# Patient Record
Sex: FEMALE | Race: WHITE | NOT HISPANIC OR LATINO
[De-identification: names, ages, dates, MRNs, and addresses within clinical notes are randomized per-mention and may not be internally consistent; named-entity substitution may affect disease eponyms.]

---

## 2022-11-15 ENCOUNTER — RESULT REVIEW (OUTPATIENT)
Age: 57
End: 2022-11-15

## 2022-11-20 ENCOUNTER — RESULT REVIEW (OUTPATIENT)
Age: 57
End: 2022-11-20

## 2022-11-29 ENCOUNTER — RESULT REVIEW (OUTPATIENT)
Age: 57
End: 2022-11-29

## 2022-12-01 ENCOUNTER — RESULT REVIEW (OUTPATIENT)
Age: 57
End: 2022-12-01

## 2023-12-18 RX ORDER — ERYTHROMYCIN BASE 5 MG/GRAM
1 OINTMENT (GRAM) OPHTHALMIC (EYE)
Refills: 0 | DISCHARGE
Start: 2023-12-18 | End: 2023-12-24

## 2023-12-21 ENCOUNTER — INPATIENT (INPATIENT)
Facility: HOSPITAL | Age: 58
LOS: 6 days | Discharge: ANOTHER HOSPITAL | DRG: 872 | End: 2023-12-28
Attending: INTERNAL MEDICINE | Admitting: INTERNAL MEDICINE
Payer: MEDICAID

## 2023-12-21 VITALS
WEIGHT: 139.99 LBS | RESPIRATION RATE: 16 BRPM | HEART RATE: 113 BPM | TEMPERATURE: 98 F | HEIGHT: 55 IN | SYSTOLIC BLOOD PRESSURE: 105 MMHG | DIASTOLIC BLOOD PRESSURE: 70 MMHG | OXYGEN SATURATION: 98 %

## 2023-12-21 LAB
BASOPHILS # BLD AUTO: 0.04 K/UL — SIGNIFICANT CHANGE UP (ref 0–0.2)
BASOPHILS # BLD AUTO: 0.04 K/UL — SIGNIFICANT CHANGE UP (ref 0–0.2)
BASOPHILS NFR BLD AUTO: 0.3 % — SIGNIFICANT CHANGE UP (ref 0–2)
BASOPHILS NFR BLD AUTO: 0.3 % — SIGNIFICANT CHANGE UP (ref 0–2)
EOSINOPHIL # BLD AUTO: 0.05 K/UL — SIGNIFICANT CHANGE UP (ref 0–0.5)
EOSINOPHIL # BLD AUTO: 0.05 K/UL — SIGNIFICANT CHANGE UP (ref 0–0.5)
EOSINOPHIL NFR BLD AUTO: 0.4 % — SIGNIFICANT CHANGE UP (ref 0–6)
EOSINOPHIL NFR BLD AUTO: 0.4 % — SIGNIFICANT CHANGE UP (ref 0–6)
HCT VFR BLD CALC: 24.6 % — LOW (ref 34.5–45)
HCT VFR BLD CALC: 24.6 % — LOW (ref 34.5–45)
HGB BLD-MCNC: 8.2 G/DL — LOW (ref 11.5–15.5)
HGB BLD-MCNC: 8.2 G/DL — LOW (ref 11.5–15.5)
IMM GRANULOCYTES NFR BLD AUTO: 0.9 % — SIGNIFICANT CHANGE UP (ref 0–0.9)
IMM GRANULOCYTES NFR BLD AUTO: 0.9 % — SIGNIFICANT CHANGE UP (ref 0–0.9)
LYMPHOCYTES # BLD AUTO: 17.3 % — SIGNIFICANT CHANGE UP (ref 13–44)
LYMPHOCYTES # BLD AUTO: 17.3 % — SIGNIFICANT CHANGE UP (ref 13–44)
LYMPHOCYTES # BLD AUTO: 2.09 K/UL — SIGNIFICANT CHANGE UP (ref 1–3.3)
LYMPHOCYTES # BLD AUTO: 2.09 K/UL — SIGNIFICANT CHANGE UP (ref 1–3.3)
MCHC RBC-ENTMCNC: 31.5 PG — SIGNIFICANT CHANGE UP (ref 27–34)
MCHC RBC-ENTMCNC: 31.5 PG — SIGNIFICANT CHANGE UP (ref 27–34)
MCHC RBC-ENTMCNC: 33.3 GM/DL — SIGNIFICANT CHANGE UP (ref 32–36)
MCHC RBC-ENTMCNC: 33.3 GM/DL — SIGNIFICANT CHANGE UP (ref 32–36)
MCV RBC AUTO: 94.6 FL — SIGNIFICANT CHANGE UP (ref 80–100)
MCV RBC AUTO: 94.6 FL — SIGNIFICANT CHANGE UP (ref 80–100)
MONOCYTES # BLD AUTO: 0.7 K/UL — SIGNIFICANT CHANGE UP (ref 0–0.9)
MONOCYTES # BLD AUTO: 0.7 K/UL — SIGNIFICANT CHANGE UP (ref 0–0.9)
MONOCYTES NFR BLD AUTO: 5.8 % — SIGNIFICANT CHANGE UP (ref 2–14)
MONOCYTES NFR BLD AUTO: 5.8 % — SIGNIFICANT CHANGE UP (ref 2–14)
NEUTROPHILS # BLD AUTO: 9.07 K/UL — HIGH (ref 1.8–7.4)
NEUTROPHILS # BLD AUTO: 9.07 K/UL — HIGH (ref 1.8–7.4)
NEUTROPHILS NFR BLD AUTO: 75.3 % — SIGNIFICANT CHANGE UP (ref 43–77)
NEUTROPHILS NFR BLD AUTO: 75.3 % — SIGNIFICANT CHANGE UP (ref 43–77)
NRBC # BLD: 0 /100 WBCS — SIGNIFICANT CHANGE UP (ref 0–0)
NRBC # BLD: 0 /100 WBCS — SIGNIFICANT CHANGE UP (ref 0–0)
PLATELET # BLD AUTO: 399 K/UL — SIGNIFICANT CHANGE UP (ref 150–400)
PLATELET # BLD AUTO: 399 K/UL — SIGNIFICANT CHANGE UP (ref 150–400)
RBC # BLD: 2.6 M/UL — LOW (ref 3.8–5.2)
RBC # BLD: 2.6 M/UL — LOW (ref 3.8–5.2)
RBC # FLD: 13.4 % — SIGNIFICANT CHANGE UP (ref 10.3–14.5)
RBC # FLD: 13.4 % — SIGNIFICANT CHANGE UP (ref 10.3–14.5)
WBC # BLD: 12.06 K/UL — HIGH (ref 3.8–10.5)
WBC # BLD: 12.06 K/UL — HIGH (ref 3.8–10.5)
WBC # FLD AUTO: 12.06 K/UL — HIGH (ref 3.8–10.5)
WBC # FLD AUTO: 12.06 K/UL — HIGH (ref 3.8–10.5)

## 2023-12-21 PROCEDURE — 93010 ELECTROCARDIOGRAM REPORT: CPT

## 2023-12-21 PROCEDURE — 99291 CRITICAL CARE FIRST HOUR: CPT

## 2023-12-21 RX ORDER — ACETAMINOPHEN 500 MG
650 TABLET ORAL EVERY 6 HOURS
Refills: 0 | Status: DISCONTINUED | OUTPATIENT
Start: 2023-12-21 | End: 2023-12-28

## 2023-12-21 RX ORDER — PANTOPRAZOLE SODIUM 20 MG/1
40 TABLET, DELAYED RELEASE ORAL DAILY
Refills: 0 | Status: DISCONTINUED | OUTPATIENT
Start: 2023-12-22 | End: 2023-12-28

## 2023-12-21 RX ORDER — SODIUM CHLORIDE 9 MG/ML
1000 INJECTION INTRAMUSCULAR; INTRAVENOUS; SUBCUTANEOUS ONCE
Refills: 0 | Status: COMPLETED | OUTPATIENT
Start: 2023-12-21 | End: 2023-12-21

## 2023-12-21 RX ORDER — SODIUM CHLORIDE 9 MG/ML
1000 INJECTION, SOLUTION INTRAVENOUS
Refills: 0 | Status: DISCONTINUED | OUTPATIENT
Start: 2023-12-21 | End: 2023-12-26

## 2023-12-21 RX ORDER — PIPERACILLIN AND TAZOBACTAM 4; .5 G/20ML; G/20ML
3.38 INJECTION, POWDER, LYOPHILIZED, FOR SOLUTION INTRAVENOUS ONCE
Refills: 0 | Status: COMPLETED | OUTPATIENT
Start: 2023-12-21 | End: 2023-12-21

## 2023-12-21 RX ORDER — VANCOMYCIN HCL 1 G
1000 VIAL (EA) INTRAVENOUS ONCE
Refills: 0 | Status: COMPLETED | OUTPATIENT
Start: 2023-12-21 | End: 2023-12-21

## 2023-12-21 RX ADMIN — SODIUM CHLORIDE 1000 MILLILITER(S): 9 INJECTION INTRAMUSCULAR; INTRAVENOUS; SUBCUTANEOUS at 23:40

## 2023-12-21 RX ADMIN — PIPERACILLIN AND TAZOBACTAM 200 GRAM(S): 4; .5 INJECTION, POWDER, LYOPHILIZED, FOR SOLUTION INTRAVENOUS at 23:41

## 2023-12-21 NOTE — ED ADULT TRIAGE NOTE - CHIEF COMPLAINT QUOTE
Patient brought in by ambulance from [facility] as reported was having low blood pressure and tachycardia; red spots to bilateral upper and lower extremities and redness to right eye Patient brought in by ambulance from [facility] as reported was having low blood pressure and tachycardia; red spots to bilateral upper and lower extremities and redness to right eye

## 2023-12-21 NOTE — ED ADULT NURSE NOTE - OBJECTIVE STATEMENT
Pt is a 58yr old female, c/o hypotension. Pt is from [place], DNR/DNI. Pt is warm to touch, rectal temp 101.4. Pt is A+Ox4, calm and cooperative, able to move all extremities. Pt has redness to medial buttocks area. Unlabored breathing at this time. No distress noted. MD evaluation in progress. Pt is a 58yr old female, c/o hypotension. Pt is from [place], DNR/DNI. Pt is warm to touch, rectal temp 101.4. Pt is A+Ox4, calm and cooperative, able to move all extremities. Pt has redness to medial buttocks area. Unlabored breathing at this time. No distress noted. MD evaluation in progress. Pt is a 58yr old female, c/o hypotension. Pt is from [place], DNR/DNI. Pt is warm to touch, rectal temp 101.4. Pt is A+Ox4, calm and cooperative, able to move all extremities. Pt has redness to medial buttocks area, pt has ostomy bag in place. Unlabored breathing at this time. No distress noted. MD evaluation in progress. Pt is a 58yr old female, c/o hypotension. Pt is [place], DNR/DNI. Pt is warm to touch, rectal temp 101.4. Pt is A+Ox4, calm and cooperative, able to move all extremities. Pt has redness to medial buttocks area, pt has ostomy bag in place. Unlabored breathing at this time. No distress noted. MD evaluation in progress.

## 2023-12-21 NOTE — ED ADULT NURSE NOTE - NSFALLRISKFACTORS_ED_ALL_ED
weakness/Coagulation: Bleeding disorder either through use of anticoagulants or underlying clinical condition(s)/Other

## 2023-12-21 NOTE — ED ADULT NURSE NOTE - NSFALLHARMRISKINTERV_ED_ALL_ED
Assistance OOB with selected safe patient handling equipment if applicable/Assistance with ambulation/Communicate risk of Fall with Harm to all staff, patient, and family/Monitor gait and stability/Provide patient with walking aids/Provide visual cue: red socks, yellow wristband, yellow gown, etc/Reinforce activity limits and safety measures with patient and family/Bed in lowest position, wheels locked, appropriate side rails in place/Call bell, personal items and telephone in reach/Instruct patient to call for assistance before getting out of bed/chair/stretcher/Non-slip footwear applied when patient is off stretcher/[place] to call system/Physically safe environment - no spills, clutter or unnecessary equipment/Purposeful Proactive Rounding/Room/bathroom lighting operational, light cord in reach Assistance OOB with selected safe patient handling equipment if applicable/Assistance with ambulation/Communicate risk of Fall with Harm to all staff, patient, and family/Monitor gait and stability/Provide patient with walking aids/Provide visual cue: red socks, yellow wristband, yellow gown, etc/Reinforce activity limits and safety measures with patient and family/Bed in lowest position, wheels locked, appropriate side rails in place/Call bell, personal items and telephone in reach/Instruct patient to call for assistance before getting out of bed/chair/stretcher/Non-slip footwear applied when patient is off stretcher/[place] to call system/Physically safe environment - no spills, clutter or unnecessary equipment/Purposeful Proactive Rounding/Room/bathroom lighting operational, light cord in reach

## 2023-12-21 NOTE — ED ADULT NURSE NOTE - CHIEF COMPLAINT QUOTE
Patient brought in by ambulance from [facility] as reported was having low blood pressure and tachycardia; red spots to bilateral upper and lower extremities and redness to right eye Patient brought in by ambulance from [facility] as reported was having low blood pressure and tachycardia; red spots to bilateral upper and lower extremities and redness to right eye

## 2023-12-22 ENCOUNTER — TRANSCRIPTION ENCOUNTER (OUTPATIENT)
Age: 58
End: 2023-12-22

## 2023-12-22 DIAGNOSIS — R50.9 FEVER, UNSPECIFIED: ICD-10-CM

## 2023-12-22 DIAGNOSIS — Z90.49 ACQUIRED ABSENCE OF OTHER SPECIFIED PARTS OF DIGESTIVE TRACT: Chronic | ICD-10-CM

## 2023-12-22 DIAGNOSIS — F32.9 MAJOR DEPRESSIVE DISORDER, SINGLE EPISODE, UNSPECIFIED: ICD-10-CM

## 2023-12-22 DIAGNOSIS — K74.60 UNSPECIFIED CIRRHOSIS OF LIVER: ICD-10-CM

## 2023-12-22 DIAGNOSIS — K21.9 GASTRO-ESOPHAGEAL REFLUX DISEASE WITHOUT ESOPHAGITIS: ICD-10-CM

## 2023-12-22 DIAGNOSIS — Z29.9 ENCOUNTER FOR PROPHYLACTIC MEASURES, UNSPECIFIED: ICD-10-CM

## 2023-12-22 DIAGNOSIS — K51.00 ULCERATIVE (CHRONIC) PANCOLITIS WITHOUT COMPLICATIONS: ICD-10-CM

## 2023-12-22 DIAGNOSIS — R93.0 ABNORMAL FINDINGS ON DIAGNOSTIC IMAGING OF SKULL AND HEAD, NOT ELSEWHERE CLASSIFIED: ICD-10-CM

## 2023-12-22 DIAGNOSIS — I25.10 ATHEROSCLEROTIC HEART DISEASE OF NATIVE CORONARY ARTERY WITHOUT ANGINA PECTORIS: ICD-10-CM

## 2023-12-22 DIAGNOSIS — H05.019 CELLULITIS OF UNSPECIFIED ORBIT: ICD-10-CM

## 2023-12-22 DIAGNOSIS — E11.9 TYPE 2 DIABETES MELLITUS WITHOUT COMPLICATIONS: ICD-10-CM

## 2023-12-22 DIAGNOSIS — R23.3 SPONTANEOUS ECCHYMOSES: ICD-10-CM

## 2023-12-22 LAB
ALBUMIN SERPL ELPH-MCNC: 2.4 G/DL — LOW (ref 3.3–5)
ALBUMIN SERPL ELPH-MCNC: 2.4 G/DL — LOW (ref 3.3–5)
ALP SERPL-CCNC: 125 U/L — HIGH (ref 40–120)
ALP SERPL-CCNC: 125 U/L — HIGH (ref 40–120)
ALT FLD-CCNC: 26 U/L — SIGNIFICANT CHANGE UP (ref 12–78)
ALT FLD-CCNC: 26 U/L — SIGNIFICANT CHANGE UP (ref 12–78)
AMMONIA BLD-MCNC: 35 UMOL/L — HIGH (ref 11–32)
AMMONIA BLD-MCNC: 35 UMOL/L — HIGH (ref 11–32)
ANION GAP SERPL CALC-SCNC: 6 MMOL/L — SIGNIFICANT CHANGE UP (ref 5–17)
ANION GAP SERPL CALC-SCNC: 6 MMOL/L — SIGNIFICANT CHANGE UP (ref 5–17)
APTT BLD: 47.9 SEC — HIGH (ref 24.5–35.6)
APTT BLD: 47.9 SEC — HIGH (ref 24.5–35.6)
AST SERPL-CCNC: 27 U/L — SIGNIFICANT CHANGE UP (ref 15–37)
AST SERPL-CCNC: 27 U/L — SIGNIFICANT CHANGE UP (ref 15–37)
BILIRUB SERPL-MCNC: 0.3 MG/DL — SIGNIFICANT CHANGE UP (ref 0.2–1.2)
BILIRUB SERPL-MCNC: 0.3 MG/DL — SIGNIFICANT CHANGE UP (ref 0.2–1.2)
BUN SERPL-MCNC: 25 MG/DL — HIGH (ref 7–23)
BUN SERPL-MCNC: 25 MG/DL — HIGH (ref 7–23)
CALCIUM SERPL-MCNC: 8.3 MG/DL — LOW (ref 8.5–10.1)
CALCIUM SERPL-MCNC: 8.3 MG/DL — LOW (ref 8.5–10.1)
CHLORIDE SERPL-SCNC: 106 MMOL/L — SIGNIFICANT CHANGE UP (ref 96–108)
CHLORIDE SERPL-SCNC: 106 MMOL/L — SIGNIFICANT CHANGE UP (ref 96–108)
CK SERPL-CCNC: 37 U/L — SIGNIFICANT CHANGE UP (ref 26–192)
CK SERPL-CCNC: 37 U/L — SIGNIFICANT CHANGE UP (ref 26–192)
CK SERPL-CCNC: 40 U/L — SIGNIFICANT CHANGE UP (ref 26–192)
CK SERPL-CCNC: 40 U/L — SIGNIFICANT CHANGE UP (ref 26–192)
CO2 SERPL-SCNC: 24 MMOL/L — SIGNIFICANT CHANGE UP (ref 22–31)
CO2 SERPL-SCNC: 24 MMOL/L — SIGNIFICANT CHANGE UP (ref 22–31)
CREAT SERPL-MCNC: 0.68 MG/DL — SIGNIFICANT CHANGE UP (ref 0.5–1.3)
CREAT SERPL-MCNC: 0.68 MG/DL — SIGNIFICANT CHANGE UP (ref 0.5–1.3)
CRP SERPL-MCNC: 104 MG/L — HIGH
CRP SERPL-MCNC: 104 MG/L — HIGH
EGFR: 102 ML/MIN/1.73M2 — SIGNIFICANT CHANGE UP
EGFR: 102 ML/MIN/1.73M2 — SIGNIFICANT CHANGE UP
ERYTHROCYTE [SEDIMENTATION RATE] IN BLOOD: 51 MM/HR — HIGH (ref 0–20)
ERYTHROCYTE [SEDIMENTATION RATE] IN BLOOD: 51 MM/HR — HIGH (ref 0–20)
GLUCOSE SERPL-MCNC: 125 MG/DL — HIGH (ref 70–99)
GLUCOSE SERPL-MCNC: 125 MG/DL — HIGH (ref 70–99)
HIV 1 & 2 AB SERPL IA.RAPID: SIGNIFICANT CHANGE UP
HIV 1 & 2 AB SERPL IA.RAPID: SIGNIFICANT CHANGE UP
INR BLD: 3.84 RATIO — HIGH (ref 0.85–1.18)
INR BLD: 3.84 RATIO — HIGH (ref 0.85–1.18)
LACTATE SERPL-SCNC: 0.9 MMOL/L — SIGNIFICANT CHANGE UP (ref 0.7–2)
LACTATE SERPL-SCNC: 0.9 MMOL/L — SIGNIFICANT CHANGE UP (ref 0.7–2)
LIDOCAIN IGE QN: 27 U/L — SIGNIFICANT CHANGE UP (ref 13–75)
LIDOCAIN IGE QN: 27 U/L — SIGNIFICANT CHANGE UP (ref 13–75)
MAGNESIUM SERPL-MCNC: 1.7 MG/DL — SIGNIFICANT CHANGE UP (ref 1.6–2.6)
MAGNESIUM SERPL-MCNC: 1.7 MG/DL — SIGNIFICANT CHANGE UP (ref 1.6–2.6)
POTASSIUM SERPL-MCNC: 4.2 MMOL/L — SIGNIFICANT CHANGE UP (ref 3.5–5.3)
POTASSIUM SERPL-MCNC: 4.2 MMOL/L — SIGNIFICANT CHANGE UP (ref 3.5–5.3)
POTASSIUM SERPL-SCNC: 4.2 MMOL/L — SIGNIFICANT CHANGE UP (ref 3.5–5.3)
POTASSIUM SERPL-SCNC: 4.2 MMOL/L — SIGNIFICANT CHANGE UP (ref 3.5–5.3)
PROT SERPL-MCNC: 5.8 G/DL — LOW (ref 6–8.3)
PROT SERPL-MCNC: 5.8 G/DL — LOW (ref 6–8.3)
PROTHROM AB SERPL-ACNC: 43.2 SEC — HIGH (ref 9.5–13)
PROTHROM AB SERPL-ACNC: 43.2 SEC — HIGH (ref 9.5–13)
RAPID RVP RESULT: SIGNIFICANT CHANGE UP
RAPID RVP RESULT: SIGNIFICANT CHANGE UP
SARS-COV-2 RNA SPEC QL NAA+PROBE: SIGNIFICANT CHANGE UP
SARS-COV-2 RNA SPEC QL NAA+PROBE: SIGNIFICANT CHANGE UP
SODIUM SERPL-SCNC: 136 MMOL/L — SIGNIFICANT CHANGE UP (ref 135–145)
SODIUM SERPL-SCNC: 136 MMOL/L — SIGNIFICANT CHANGE UP (ref 135–145)
TROPONIN I, HIGH SENSITIVITY RESULT: 37.7 NG/L — SIGNIFICANT CHANGE UP
TROPONIN I, HIGH SENSITIVITY RESULT: 37.7 NG/L — SIGNIFICANT CHANGE UP

## 2023-12-22 PROCEDURE — 93010 ELECTROCARDIOGRAM REPORT: CPT

## 2023-12-22 PROCEDURE — 70450 CT HEAD/BRAIN W/O DYE: CPT | Mod: 26,MA

## 2023-12-22 PROCEDURE — 93970 EXTREMITY STUDY: CPT | Mod: 26

## 2023-12-22 PROCEDURE — 70481 CT ORBIT/EAR/FOSSA W/DYE: CPT | Mod: 26,59,MA

## 2023-12-22 PROCEDURE — 71260 CT THORAX DX C+: CPT | Mod: 26,MA

## 2023-12-22 PROCEDURE — 74177 CT ABD & PELVIS W/CONTRAST: CPT | Mod: 26,MA

## 2023-12-22 RX ORDER — ONDANSETRON 8 MG/1
4 TABLET, FILM COATED ORAL EVERY 8 HOURS
Refills: 0 | Status: DISCONTINUED | OUTPATIENT
Start: 2023-12-22 | End: 2023-12-28

## 2023-12-22 RX ORDER — PIPERACILLIN AND TAZOBACTAM 4; .5 G/20ML; G/20ML
3.38 INJECTION, POWDER, LYOPHILIZED, FOR SOLUTION INTRAVENOUS EVERY 8 HOURS
Refills: 0 | Status: DISCONTINUED | OUTPATIENT
Start: 2023-12-22 | End: 2023-12-22

## 2023-12-22 RX ORDER — TRAMADOL HYDROCHLORIDE 50 MG/1
50 TABLET ORAL THREE TIMES A DAY
Refills: 0 | Status: DISCONTINUED | OUTPATIENT
Start: 2023-12-22 | End: 2023-12-28

## 2023-12-22 RX ORDER — DEXTROSE 50 % IN WATER 50 %
25 SYRINGE (ML) INTRAVENOUS ONCE
Refills: 0 | Status: DISCONTINUED | OUTPATIENT
Start: 2023-12-22 | End: 2023-12-28

## 2023-12-22 RX ORDER — MIRTAZAPINE 45 MG/1
30 TABLET, ORALLY DISINTEGRATING ORAL AT BEDTIME
Refills: 0 | Status: DISCONTINUED | OUTPATIENT
Start: 2023-12-22 | End: 2023-12-28

## 2023-12-22 RX ORDER — LACTOBACILLUS ACIDOPHILUS 100MM CELL
1 CAPSULE ORAL EVERY 12 HOURS
Refills: 0 | Status: DISCONTINUED | OUTPATIENT
Start: 2023-12-22 | End: 2023-12-28

## 2023-12-22 RX ORDER — SODIUM CHLORIDE 9 MG/ML
1000 INJECTION, SOLUTION INTRAVENOUS
Refills: 0 | Status: DISCONTINUED | OUTPATIENT
Start: 2023-12-22 | End: 2023-12-28

## 2023-12-22 RX ORDER — DEXTROSE 50 % IN WATER 50 %
12.5 SYRINGE (ML) INTRAVENOUS ONCE
Refills: 0 | Status: DISCONTINUED | OUTPATIENT
Start: 2023-12-22 | End: 2023-12-28

## 2023-12-22 RX ORDER — MIDODRINE HYDROCHLORIDE 2.5 MG/1
5 TABLET ORAL THREE TIMES A DAY
Refills: 0 | Status: DISCONTINUED | OUTPATIENT
Start: 2023-12-22 | End: 2023-12-28

## 2023-12-22 RX ORDER — DEXTROSE 50 % IN WATER 50 %
15 SYRINGE (ML) INTRAVENOUS ONCE
Refills: 0 | Status: DISCONTINUED | OUTPATIENT
Start: 2023-12-22 | End: 2023-12-28

## 2023-12-22 RX ORDER — METRONIDAZOLE 500 MG
500 TABLET ORAL EVERY 8 HOURS
Refills: 0 | Status: DISCONTINUED | OUTPATIENT
Start: 2023-12-22 | End: 2023-12-23

## 2023-12-22 RX ORDER — ERYTHROMYCIN BASE 5 MG/GRAM
1 OINTMENT (GRAM) OPHTHALMIC (EYE)
Refills: 0 | Status: COMPLETED | OUTPATIENT
Start: 2023-12-22 | End: 2023-12-27

## 2023-12-22 RX ORDER — CEFTRIAXONE 500 MG/1
1000 INJECTION, POWDER, FOR SOLUTION INTRAMUSCULAR; INTRAVENOUS ONCE
Refills: 0 | Status: COMPLETED | OUTPATIENT
Start: 2023-12-22 | End: 2023-12-22

## 2023-12-22 RX ORDER — METRONIDAZOLE 500 MG
500 TABLET ORAL ONCE
Refills: 0 | Status: COMPLETED | OUTPATIENT
Start: 2023-12-22 | End: 2023-12-22

## 2023-12-22 RX ORDER — METRONIDAZOLE 500 MG
TABLET ORAL
Refills: 0 | Status: DISCONTINUED | OUTPATIENT
Start: 2023-12-22 | End: 2023-12-23

## 2023-12-22 RX ORDER — LANOLIN ALCOHOL/MO/W.PET/CERES
3 CREAM (GRAM) TOPICAL AT BEDTIME
Refills: 0 | Status: DISCONTINUED | OUTPATIENT
Start: 2023-12-22 | End: 2023-12-28

## 2023-12-22 RX ORDER — GLUCAGON INJECTION, SOLUTION 0.5 MG/.1ML
1 INJECTION, SOLUTION SUBCUTANEOUS ONCE
Refills: 0 | Status: DISCONTINUED | OUTPATIENT
Start: 2023-12-22 | End: 2023-12-28

## 2023-12-22 RX ORDER — MAGNESIUM HYDROXIDE 400 MG/1
30 TABLET, CHEWABLE ORAL DAILY
Refills: 0 | Status: DISCONTINUED | OUTPATIENT
Start: 2023-12-22 | End: 2023-12-28

## 2023-12-22 RX ORDER — FOLIC ACID 0.8 MG
1 TABLET ORAL DAILY
Refills: 0 | Status: DISCONTINUED | OUTPATIENT
Start: 2023-12-22 | End: 2023-12-28

## 2023-12-22 RX ORDER — SPIRONOLACTONE 25 MG/1
100 TABLET, FILM COATED ORAL DAILY
Refills: 0 | Status: DISCONTINUED | OUTPATIENT
Start: 2023-12-22 | End: 2023-12-28

## 2023-12-22 RX ORDER — SENNA PLUS 8.6 MG/1
2 TABLET ORAL AT BEDTIME
Refills: 0 | Status: DISCONTINUED | OUTPATIENT
Start: 2023-12-22 | End: 2023-12-26

## 2023-12-22 RX ORDER — INSULIN LISPRO 100/ML
VIAL (ML) SUBCUTANEOUS
Refills: 0 | Status: DISCONTINUED | OUTPATIENT
Start: 2023-12-22 | End: 2023-12-28

## 2023-12-22 RX ORDER — CEFTRIAXONE 500 MG/1
INJECTION, POWDER, FOR SOLUTION INTRAMUSCULAR; INTRAVENOUS
Refills: 0 | Status: DISCONTINUED | OUTPATIENT
Start: 2023-12-22 | End: 2023-12-24

## 2023-12-22 RX ORDER — CEFTRIAXONE 500 MG/1
1000 INJECTION, POWDER, FOR SOLUTION INTRAMUSCULAR; INTRAVENOUS EVERY 24 HOURS
Refills: 0 | Status: DISCONTINUED | OUTPATIENT
Start: 2023-12-23 | End: 2023-12-24

## 2023-12-22 RX ADMIN — Medication 250 MILLIGRAM(S): at 00:53

## 2023-12-22 RX ADMIN — SODIUM CHLORIDE 75 MILLILITER(S): 9 INJECTION, SOLUTION INTRAVENOUS at 04:31

## 2023-12-22 RX ADMIN — Medication 125 MILLIGRAM(S): at 00:53

## 2023-12-22 RX ADMIN — Medication 1 APPLICATION(S): at 17:51

## 2023-12-22 RX ADMIN — Medication 1 TABLET(S): at 17:51

## 2023-12-22 RX ADMIN — CEFTRIAXONE 100 MILLIGRAM(S): 500 INJECTION, POWDER, FOR SOLUTION INTRAMUSCULAR; INTRAVENOUS at 11:25

## 2023-12-22 RX ADMIN — Medication 1: at 17:51

## 2023-12-22 RX ADMIN — Medication 1000 MILLIGRAM(S): at 01:55

## 2023-12-22 RX ADMIN — Medication 100 MILLIGRAM(S): at 12:03

## 2023-12-22 RX ADMIN — SODIUM CHLORIDE 1000 MILLILITER(S): 9 INJECTION INTRAMUSCULAR; INTRAVENOUS; SUBCUTANEOUS at 00:45

## 2023-12-22 RX ADMIN — PIPERACILLIN AND TAZOBACTAM 3.38 GRAM(S): 4; .5 INJECTION, POWDER, LYOPHILIZED, FOR SOLUTION INTRAVENOUS at 00:10

## 2023-12-22 RX ADMIN — Medication 100 MILLIGRAM(S): at 22:09

## 2023-12-22 RX ADMIN — SODIUM CHLORIDE 100 MILLILITER(S): 9 INJECTION, SOLUTION INTRAVENOUS at 08:12

## 2023-12-22 RX ADMIN — PANTOPRAZOLE SODIUM 40 MILLIGRAM(S): 20 TABLET, DELAYED RELEASE ORAL at 12:03

## 2023-12-22 NOTE — H&P ADULT - PROBLEM SELECTOR PLAN 2
Primary source of infection is most likely  periorbital cellulitis  Pt also w/ pancolitis on CT, unclear if infectious or inflammatory; abdomen exam benign  S/p zosyn in the ED   ceftriaxone 1gm q24h and flagyl 500mg q8h (ordered)  F/u pending cx until completion  Trend temps/WBC  Supportive care and additional management

## 2023-12-22 NOTE — CONSULT NOTE ADULT - SUBJECTIVE AND OBJECTIVE BOX
INCOMPLETE NOTE.  Documentation in Progress  PT SEEN AND EVALUATED.   FULL/ADDITIONAL RECOMMENDATIONS TO FOLLOW   ***************************************************************    Patient is a 58y old  Female who presents with a chief complaint of hypotension and tachycardia (22 Dec 2023 10:37)      HPI:   Patient is a 58-year-old female  ,[facility] resident who presents to the emergency room with multiple medical issues.  Past medical history of CAD diabetes insomnia major depressive disorder GERD hypertension irritable bowel syndrome with diarrhea cirrhosis of the liver nonalcoholic fatty liver hepatic encephalopathy bipolar disorder chronic A-fib on Coumadin blepharitis of the left eye.    Per PCP signout patient was transferred to [place] for reported hypotension and tachycardia diffuse purpura.  Patient had a temperature of 99.3 with a heart rate of 116 and a blood pressure of 88/60.  Patient was FEBRILE later in ER with TEMP 101 , septic workup sent ,  started on iv abx and fluids .CT abd showed pancolitis , seen by GI and ID . Found to have cellulitis of lids b/l R>L  (22 Dec 2023 10:37)      PAST MEDICAL & SURGICAL HISTORY:  MDD (major depressive disorder)  GERD (gastroesophageal reflux disease)  Ulcerative colitis  HTN (hypertension)  DM (diabetes mellitus)  Arteriosclerotic heart disease (ASHD)  IBS (irritable bowel syndrome)  History of ataxia  Liver cirrhosis  Nonalcoholic fatty liver disease without nonalcoholic steatohepatitis (PATEL)  Hepatic encephalopathy  Bipolar illness  Chronic atrial fibrillation  Moderate protein-calorie malnutrition  OA (osteoarthritis)  Blepharitis, bilateral  Brain aneurysm  Uterine fibroid  History of cholecystectomy       HEALTH ISSUES - PROBLEM Dx:  Acute febrile illness  Orbital cellulitis  Pancolitis  Petechial rash  Abnormal head CT  Prophylactic measure  Liver cirrhosis  MDD (major depressive disorder)  GERD (gastroesophageal reflux disease)  DM (diabetes mellitus)  CAD (coronary artery disease)    Fever due to unspecified condition [R50.9]  MDD (major depressive disorder) [F32.9]  GERD (gastroesophageal reflux disease) [K21.9]  Ulcerative colitis [K51.90]  HTN (hypertension) [I10]  DM (diabetes mellitus) [E11.9]  Arteriosclerotic heart disease (ASHD) [I25.10]  IBS (irritable bowel syndrome) [K58.9]  History of ataxia [Z87.898]  Liver cirrhosis [K74.60]  Nonalcoholic fatty liver disease without nonalcoholic steatohepatitis (PATEL) [K76.0]  Hepatic encephalopathy [K76.82]  Bipolar illness [F31.9]  Chronic atrial fibrillation [I48.20]  Moderate protein-calorie malnutrition [E44.0]  OA (osteoarthritis) [M19.90]  Blepharitis, bilateral [H01.003]  Brain aneurysm [I67.1]  Uterine fibroid [D25.9]  History of cholecystectomy [Z90.49]  Rash [R21]      FAMILY HISTORY:      [SOCIAL HISTORY: ]     smoking:  none currently     EtOH:  none currently     illicit drugs:  none currently     occupation:  Retired     marital status:       Other:       [ALLERGIES/INTOLERANCES:]  Allergies  No Known Allergies  Intolerances      [MEDICATIONS]  MEDICATIONS  (STANDING):  cefTRIAXone   IVPB      dextrose 5% + sodium chloride 0.45%. 1000 milliLiter(s) (100 mL/Hr) IV Continuous <Continuous>  dextrose 5%. 1000 milliLiter(s) (100 mL/Hr) IV Continuous <Continuous>  dextrose 5%. 1000 milliLiter(s) (50 mL/Hr) IV Continuous <Continuous>  dextrose 50% Injectable 25 Gram(s) IV Push once  dextrose 50% Injectable 25 Gram(s) IV Push once  dextrose 50% Injectable 12.5 Gram(s) IV Push once  erythromycin   Ointment 1 Application(s) Both EYES two times a day  folic acid 1 milliGRAM(s) Oral daily  glucagon  Injectable 1 milliGRAM(s) IntraMuscular once  insulin lispro (ADMELOG) corrective regimen sliding scale   SubCutaneous three times a day before meals  lactobacillus acidophilus 1 Tablet(s) Oral every 12 hours  metroNIDAZOLE  IVPB      metroNIDAZOLE  IVPB 500 milliGRAM(s) IV Intermittent every 8 hours  mirtazapine 30 milliGRAM(s) Oral at bedtime  pantoprazole    Tablet 40 milliGRAM(s) Oral daily  rifAXIMin 550 milliGRAM(s) Oral two times a day  senna 2 Tablet(s) Oral at bedtime  spironolactone 100 milliGRAM(s) Oral daily    MEDICATIONS  (PRN):  acetaminophen     Tablet .. 650 milliGRAM(s) Oral every 6 hours PRN Temp greater or equal to 38C (100.4F), Mild Pain (1 - 3)  aluminum hydroxide/magnesium hydroxide/simethicone Suspension 30 milliLiter(s) Oral every 4 hours PRN Dyspepsia  dextrose Oral Gel 15 Gram(s) Oral once PRN Blood Glucose LESS THAN 70 milliGRAM(s)/deciliter  magnesium hydroxide Suspension 30 milliLiter(s) Oral daily PRN Constipation  melatonin 3 milliGRAM(s) Oral at bedtime PRN Insomnia  midodrine. 5 milliGRAM(s) Oral three times a day PRN SBP below 90  ondansetron Injectable 4 milliGRAM(s) IV Push every 8 hours PRN Nausea and/or Vomiting  traMADol 50 milliGRAM(s) Oral three times a day PRN Moderate Pain (4 - 6)      [REVIEW OF SYSTEMS: ]  CONSTITUTIONAL: normal, no fever, no shakes, no chills   EYES: No eye pain, no visual disturbances, no discharge  ENMT:  no discharge  NECK: No pain, no stiffness  BREASTS: No pain, no masses, no nipple discharge  RESPIRATORY: No cough, no wheezing, no chills, no hemoptysis; No shortness of breath  CARDIOVASCULAR: No chest pain, no palpitations, no dizziness, no leg swelling  GASTROINTESTINAL: No abdominal, no epigastric pain. No nausea, no vomiting, no hematemesis; No diarrhea , no constipation. No melena, no hematochezia.  GENITOURINARY: No dysuria, no frequency, no hematuria, no incontinence  NEUROLOGICAL: No headaches, no memory loss, no loss of strength, no numbness, no tremors  SKIN: No itching, no burning, no rashes, no lesions   LYMPH NODES: No enlarged glands  ENDOCRINE: No heat or cold intolerance; No hair loss  MUSCULOSKELETAL: No joint pain or swelling; No muscle, no back, no extremity pain  PSYCHIATRIC: No depression, no anxiety, no mood swings, no difficulty sleeping  HEME/LYMPH: No easy bruising, no bleeding gums    [VITALS SIGNS 24hrs]  Vital Signs Last 24 Hrs  T(C): 36.3 (22 Dec 2023 12:31), Max: 38.6 (21 Dec 2023 22:50)  T(F): 97.4 (22 Dec 2023 12:31), Max: 101.4 (21 Dec 2023 22:50)  HR: 80 (22 Dec 2023 12:31) (80 - 113)  BP: 93/60 (22 Dec 2023 12:31) (93/60 - 105/70)  BP(mean): --  RR: 17 (22 Dec 2023 12:31) (16 - 17)  SpO2: 96% (22 Dec 2023 12:31) (96% - 98%)    Parameters below as of 22 Dec 2023 12:31  Patient On (Oxygen Delivery Method): room air    Daily Height in cm: 119.38 (21 Dec 2023 22:25)    Daily   I&O's Summary    [PHYSICAL EXAM]  GEN:   HEENT: normocephalic and atraumatic. EOMI. PERRL.    NECK: Supple.  No lymphadenopathy   LUNGS: Clear to auscultation.  HEART: S1S2 Regular rate and rhythm, no MRG  ABDOMEN: Soft, nontender, and nondistended.  Positive bowel sounds.    : No CVA tenderness  EXTREMITIES: Without edema.  NEUROLOGIC: grossly intact.  PSYCHIATRIC: Appropriate affect .  SKIN: No rash     [LABS: ]                        8.2    12.06 )-----------( 399      ( 21 Dec 2023 22:30 )             24.6     CBC Full  -  ( 21 Dec 2023 22:30 )  WBC Count : 12.06 K/uL  RBC Count : 2.60 M/uL  Hemoglobin : 8.2 g/dL  Hematocrit : 24.6 %  Platelet Count - Automated : 399 K/uL  Mean Cell Volume : 94.6 fl  Mean Cell Hemoglobin : 31.5 pg  Mean Cell Hemoglobin Concentration : 33.3 gm/dL  Auto Neutrophil # : 9.07 K/uL  Auto Lymphocyte # : 2.09 K/uL  Auto Monocyte # : 0.70 K/uL  Auto Eosinophil # : 0.05 K/uL  Auto Basophil # : 0.04 K/uL  Auto Neutrophil % : 75.3 %  Auto Lymphocyte % : 17.3 %  Auto Monocyte % : 5.8 %  Auto Eosinophil % : 0.4 %  Auto Basophil % : 0.3 %    12-21    136  |  106  |  25<H>  ----------------------------<  125<H>  4.2   |  24  |  0.68    Ca    8.3<L>      21 Dec 2023 22:30  Mg     1.7     12-21    TPro  5.8<L>  /  Alb  2.4<L>  /  TBili  0.3  /  DBili  x   /  AST  27  /  ALT  26  /  AlkPhos  125<H>  12-21  PT/INR - ( 21 Dec 2023 22:30 )   PT: 43.2 sec;   INR: 3.84 ratio    PTT - ( 21 Dec 2023 22:30 )  PTT:47.9 sec  LIVER FUNCTIONS - ( 21 Dec 2023 22:30 )  Alb: 2.4 g/dL / Pro: 5.8 g/dL / ALK PHOS: 125 U/L / ALT: 26 U/L / AST: 27 U/L / GGT: x         CARDIAC MARKERS ( 21 Dec 2023 22:30 )  x     / x     / 40 U/L / x     / x        Urinalysis Basic - ( 21 Dec 2023 22:30 )  Color: x / Appearance: x / SG: x / pH: x  Gluc: 125 mg/dL / Ketone: x  / Bili: x / Urobili: x   Blood: x / Protein: x / Nitrite: x   Leuk Esterase: x / RBC: x / WBC x   Sq Epi: x / Non Sq Epi: x / Bacteria: x    CBC TREND (5 Days)  WBC Count: 12.06 K/uL (12-21 @ 22:30)  Hemoglobin: 8.2 g/dL (12-21 @ 22:30)  Hematocrit: 24.6 % (12-21 @ 22:30)  Platelet Count - Automated: 399 K/uL (12-21 @ 22:30)     Sedimentation Rate, Erythrocyte: 51 mm/hr (12-21 @ 22:30)      [MICROBIOLOGY /  VIROLOGY:]  SARS-CoV-2: NotDetec (22 Dec 2023 00:55)      [PATHOLOGY]       [RADIOLOGY & ADDITIONAL STUDIES:]   CT Chest w/ IV Cont:   ACC: [id number] EXAM:  CT ABDOMEN AND PELVIS IC   ORDERED BY: FRANKI PATEL   ACC: [id number] EXAM:  CT CHEST IC   ORDERED BY: FRANKI PATEL   PROCEDURE DATE:  12/22/2023    INTERPRETATION:  CLINICAL INFORMATION: Hypotension, tachycardia, red spots in the upper and lower extremities, evaluate for vasculitis  COMPARISON: None.  CONTRAST/COMPLICATIONS:  IV Contrast: Omnipaque 350 (accession [id number]), IV contrast documented in unlinked concurrent exam (accession [id number])  90 cc administered (accession [id number]), 0 cc administered (accession [id number])   10 cc discarded (accession [id number]), 0 cc discarded (accession [id number])  Oral Contrast: NONE  Complications: None reported at time of study completion    PROCEDURE:  CT of the Chest, Abdomen and Pelvis was performed.  Sagittal and coronal reformats were performed.    FINDINGS:  CHEST:  LUNGS AND LARGE AIRWAYS: Patent central airways. Dependent atelectatic changes at the lung bases.  PLEURA: No pleural effusion.  VESSELS: Within normal limits.  HEART: Heart size is normal. No pericardial effusion.  MEDIASTINUM AND FOREST: No lymphadenopathy.  CHEST WALL AND LOWER NECK: Within normal limits.    ABDOMEN AND PELVIS:  LIVER: Within normal limits.  BILE DUCTS:Normal caliber.  GALLBLADDER: Cholecystectomy.  SPLEEN: Within normal limits.  PANCREAS: Within normal limits.  ADRENALS: Within normal limits.  KIDNEYS/URETERS: Within normal limits.  BLADDER: Within normal limits.  REPRODUCTIVE ORGANS: Posterior calcified fibroid.  BOWEL: No bowel obstruction. Appendix is unremarkable. There is thickening, thumbprinting and hyperemia of the colon from the cecum through rectum compatible with pancolitis.  PERITONEUM: No ascites.  VESSELS: Aorta is not dilated. Moderate atherosclerotic vascular calcification.  No perivascular inflammation as clinically questioned.  RETROPERITONEUM/LYMPH NODES: No lymphadenopathy.  ABDOMINAL WALL: Within normal limits.  BONES: Within normal limits.    IMPRESSION:  Mild pancolitis, of infectious or inflammatory etiology.  --- End of Report ---  CARLENE ALEJANDRA MD; Attending Radiologist  This document has been electronically signed. Dec 22 2023  3:15AM (12-22-23 @ 02:22)        CT Abdomen and Pelvis w/ IV Cont:   ACC: [id number] EXAM:  CT ABDOMEN AND PELVIS IC   ORDERED BY: FRANKI PATEL   ACC: [id number] EXAM:  CT CHEST IC   ORDERED BY: FRANKI PATEL   PROCEDURE DATE:  12/22/2023    INTERPRETATION:  CLINICAL INFORMATION: Hypotension, tachycardia, red spots in the upper and lower extremities, evaluate for vasculitis  COMPARISON: None.  FINDINGS:  CHEST:  LUNGS AND LARGE AIRWAYS: Patent central airways. Dependent atelectatic   changes at the lung bases.  PLEURA: No pleural effusion.  VESSELS: Within normal limits.  HEART: Heart size is normal. No pericardial effusion.  MEDIASTINUM AND FOREST: No lymphadenopathy.  CHEST WALL AND LOWER NECK: Within normal limits.    ABDOMEN AND PELVIS:  LIVER: Within normal limits.  BILE DUCTS:Normal caliber.  GALLBLADDER: Cholecystectomy.  SPLEEN: Within normal limits.  PANCREAS: Within normal limits.  ADRENALS: Within normal limits.  KIDNEYS/URETERS: Within normal limits.  BLADDER: Within normal limits.  REPRODUCTIVE ORGANS: Posterior calcified fibroid.  BOWEL: No bowel obstruction. Appendix is unremarkable. There is   thickening, thumbprinting and hyperemia of the colon from the cecum   through rectum compatible with pancolitis.  PERITONEUM: No ascites.  VESSELS: Aorta is not dilated. Moderate atherosclerotic vascular   calcification.  No perivascular inflammation as clinically questioned.  RETROPERITONEUM/LYMPH NODES: No lymphadenopathy.  ABDOMINAL WALL: Within normal limits.  BONES: Within normal limits.    IMPRESSION:  Mild pancolitis, of infectious or inflammatory etiology.  --- End of Report ---  CARLENE ALEJANDRA MD; Attending Radiologist  This document has been electronically signed. Dec 22 2023  3:15AM (12-22-23 @ 02:23)   INCOMPLETE NOTE.  Documentation in Progress  PT SEEN AND EVALUATED.   FULL/ADDITIONAL RECOMMENDATIONS TO FOLLOW   ***************************************************************    Patient is a 58y old  Female who presents with a chief complaint of hypotension and tachycardia (22 Dec 2023 10:37)      HPI:   Patient is a 58-year-old female  ,[facility] resident who presents to the emergency room with multiple medical issues.  Past medical history of CAD diabetes insomnia major depressive disorder GERD hypertension irritable bowel syndrome with diarrhea cirrhosis of the liver nonalcoholic fatty liver hepatic encephalopathy bipolar disorder chronic A-fib on Coumadin blepharitis of the left eye.    Per PCP signout patient was transferred to [place] for reported hypotension and tachycardia diffuse purpura.  Patient had a temperature of 99.3 with a heart rate of 116 and a blood pressure of 88/60.  Patient was FEBRILE later in ER with TEMP 101 , septic workup sent ,  started on iv abx and fluids .CT abd showed pancolitis , seen by GI and ID . Found to have cellulitis of lids b/l R>L  (22 Dec 2023 10:37)      PAST MEDICAL & SURGICAL HISTORY:  MDD (major depressive disorder)  GERD (gastroesophageal reflux disease)  Ulcerative colitis  HTN (hypertension)  DM (diabetes mellitus)  Arteriosclerotic heart disease (ASHD)  IBS (irritable bowel syndrome)  History of ataxia  Liver cirrhosis  Nonalcoholic fatty liver disease without nonalcoholic steatohepatitis (PATEL)  Hepatic encephalopathy  Bipolar illness  Chronic atrial fibrillation  Moderate protein-calorie malnutrition  OA (osteoarthritis)  Blepharitis, bilateral  Brain aneurysm  Uterine fibroid  History of cholecystectomy       HEALTH ISSUES - PROBLEM Dx:  Acute febrile illness  Orbital cellulitis  Pancolitis  Petechial rash  Abnormal head CT  Prophylactic measure  Liver cirrhosis  MDD (major depressive disorder)  GERD (gastroesophageal reflux disease)  DM (diabetes mellitus)  CAD (coronary artery disease)    Fever due to unspecified condition [R50.9]  MDD (major depressive disorder) [F32.9]  GERD (gastroesophageal reflux disease) [K21.9]  Ulcerative colitis [K51.90]  HTN (hypertension) [I10]  DM (diabetes mellitus) [E11.9]  Arteriosclerotic heart disease (ASHD) [I25.10]  IBS (irritable bowel syndrome) [K58.9]  History of ataxia [Z87.898]  Liver cirrhosis [K74.60]  Nonalcoholic fatty liver disease without nonalcoholic steatohepatitis (PATEL) [K76.0]  Hepatic encephalopathy [K76.82]  Bipolar illness [F31.9]  Chronic atrial fibrillation [I48.20]  Moderate protein-calorie malnutrition [E44.0]  OA (osteoarthritis) [M19.90]  Blepharitis, bilateral [H01.003]  Brain aneurysm [I67.1]  Uterine fibroid [D25.9]  History of cholecystectomy [Z90.49]  Rash [R21]      FAMILY HISTORY:      [SOCIAL HISTORY: ]     smoking:  none currently     EtOH:  none currently     illicit drugs:  none currently     occupation:  Retired     marital status:       Other:       [ALLERGIES/INTOLERANCES:]  Allergies  No Known Allergies  Intolerances      [MEDICATIONS]  MEDICATIONS  (STANDING):  cefTRIAXone   IVPB      dextrose 5% + sodium chloride 0.45%. 1000 milliLiter(s) (100 mL/Hr) IV Continuous <Continuous>  dextrose 5%. 1000 milliLiter(s) (100 mL/Hr) IV Continuous <Continuous>  dextrose 5%. 1000 milliLiter(s) (50 mL/Hr) IV Continuous <Continuous>  dextrose 50% Injectable 25 Gram(s) IV Push once  dextrose 50% Injectable 25 Gram(s) IV Push once  dextrose 50% Injectable 12.5 Gram(s) IV Push once  erythromycin   Ointment 1 Application(s) Both EYES two times a day  folic acid 1 milliGRAM(s) Oral daily  glucagon  Injectable 1 milliGRAM(s) IntraMuscular once  insulin lispro (ADMELOG) corrective regimen sliding scale   SubCutaneous three times a day before meals  lactobacillus acidophilus 1 Tablet(s) Oral every 12 hours  metroNIDAZOLE  IVPB      metroNIDAZOLE  IVPB 500 milliGRAM(s) IV Intermittent every 8 hours  mirtazapine 30 milliGRAM(s) Oral at bedtime  pantoprazole    Tablet 40 milliGRAM(s) Oral daily  rifAXIMin 550 milliGRAM(s) Oral two times a day  senna 2 Tablet(s) Oral at bedtime  spironolactone 100 milliGRAM(s) Oral daily    MEDICATIONS  (PRN):  acetaminophen     Tablet .. 650 milliGRAM(s) Oral every 6 hours PRN Temp greater or equal to 38C (100.4F), Mild Pain (1 - 3)  aluminum hydroxide/magnesium hydroxide/simethicone Suspension 30 milliLiter(s) Oral every 4 hours PRN Dyspepsia  dextrose Oral Gel 15 Gram(s) Oral once PRN Blood Glucose LESS THAN 70 milliGRAM(s)/deciliter  magnesium hydroxide Suspension 30 milliLiter(s) Oral daily PRN Constipation  melatonin 3 milliGRAM(s) Oral at bedtime PRN Insomnia  midodrine. 5 milliGRAM(s) Oral three times a day PRN SBP below 90  ondansetron Injectable 4 milliGRAM(s) IV Push every 8 hours PRN Nausea and/or Vomiting  traMADol 50 milliGRAM(s) Oral three times a day PRN Moderate Pain (4 - 6)      [REVIEW OF SYSTEMS: ]  CONSTITUTIONAL: normal, no fever, no shakes, no chills   EYES: No eye pain, no visual disturbances, no discharge  ENMT:  no discharge  NECK: No pain, no stiffness  BREASTS: No pain, no masses, no nipple discharge  RESPIRATORY: No cough, no wheezing, no chills, no hemoptysis; No shortness of breath  CARDIOVASCULAR: No chest pain, no palpitations, no dizziness, no leg swelling  GASTROINTESTINAL: No abdominal, no epigastric pain. No nausea, no vomiting, no hematemesis; No diarrhea , no constipation. No melena, no hematochezia.  GENITOURINARY: No dysuria, no frequency, no hematuria, no incontinence  NEUROLOGICAL: No headaches, no memory loss, no loss of strength, no numbness, no tremors  SKIN: No itching, no burning, no rashes, no lesions   LYMPH NODES: No enlarged glands  ENDOCRINE: No heat or cold intolerance; No hair loss  MUSCULOSKELETAL: No joint pain or swelling; No muscle, no back, no extremity pain  PSYCHIATRIC: No depression, no anxiety, no mood swings, no difficulty sleeping  HEME/LYMPH: No easy bruising, no bleeding gums    [VITALS SIGNS 24hrs]  Vital Signs Last 24 Hrs  T(C): 36.3 (22 Dec 2023 12:31), Max: 38.6 (21 Dec 2023 22:50)  T(F): 97.4 (22 Dec 2023 12:31), Max: 101.4 (21 Dec 2023 22:50)  HR: 80 (22 Dec 2023 12:31) (80 - 113)  BP: 93/60 (22 Dec 2023 12:31) (93/60 - 105/70)  BP(mean): --  RR: 17 (22 Dec 2023 12:31) (16 - 17)  SpO2: 96% (22 Dec 2023 12:31) (96% - 98%)    Parameters below as of 22 Dec 2023 12:31  Patient On (Oxygen Delivery Method): room air    Daily Height in cm: 119.38 (21 Dec 2023 22:25)    Daily   I&O's Summary    [PHYSICAL EXAM]  GEN:   HEENT: normocephalic and atraumatic. EOMI. PERRL.    NECK: Supple.  No lymphadenopathy   LUNGS: Clear to auscultation.  HEART: S1S2 Regular rate and rhythm, no MRG  ABDOMEN: Soft, nontender, and nondistended.  Positive bowel sounds.    : No CVA tenderness  EXTREMITIES: Without edema.  NEUROLOGIC: grossly intact.  PSYCHIATRIC: Appropriate affect .  SKIN: No rash     [LABS: ]                        8.2    12.06 )-----------( 399      ( 21 Dec 2023 22:30 )             24.6     CBC Full  -  ( 21 Dec 2023 22:30 )  WBC Count : 12.06 K/uL  RBC Count : 2.60 M/uL  Hemoglobin : 8.2 g/dL  Hematocrit : 24.6 %  Platelet Count - Automated : 399 K/uL  Mean Cell Volume : 94.6 fl  Mean Cell Hemoglobin : 31.5 pg  Mean Cell Hemoglobin Concentration : 33.3 gm/dL  Auto Neutrophil # : 9.07 K/uL  Auto Lymphocyte # : 2.09 K/uL  Auto Monocyte # : 0.70 K/uL  Auto Eosinophil # : 0.05 K/uL  Auto Basophil # : 0.04 K/uL  Auto Neutrophil % : 75.3 %  Auto Lymphocyte % : 17.3 %  Auto Monocyte % : 5.8 %  Auto Eosinophil % : 0.4 %  Auto Basophil % : 0.3 %    12-21    136  |  106  |  25<H>  ----------------------------<  125<H>  4.2   |  24  |  0.68    Ca    8.3<L>      21 Dec 2023 22:30  Mg     1.7     12-21    TPro  5.8<L>  /  Alb  2.4<L>  /  TBili  0.3  /  DBili  x   /  AST  27  /  ALT  26  /  AlkPhos  125<H>  12-21  PT/INR - ( 21 Dec 2023 22:30 )   PT: 43.2 sec;   INR: 3.84 ratio    PTT - ( 21 Dec 2023 22:30 )  PTT:47.9 sec  LIVER FUNCTIONS - ( 21 Dec 2023 22:30 )  Alb: 2.4 g/dL / Pro: 5.8 g/dL / ALK PHOS: 125 U/L / ALT: 26 U/L / AST: 27 U/L / GGT: x         CARDIAC MARKERS ( 21 Dec 2023 22:30 )  x     / x     / 40 U/L / x     / x        Urinalysis Basic - ( 21 Dec 2023 22:30 )  Color: x / Appearance: x / SG: x / pH: x  Gluc: 125 mg/dL / Ketone: x  / Bili: x / Urobili: x   Blood: x / Protein: x / Nitrite: x   Leuk Esterase: x / RBC: x / WBC x   Sq Epi: x / Non Sq Epi: x / Bacteria: x    CBC TREND (5 Days)  WBC Count: 12.06 K/uL (12-21 @ 22:30)  Hemoglobin: 8.2 g/dL (12-21 @ 22:30)  Hematocrit: 24.6 % (12-21 @ 22:30)  Platelet Count - Automated: 399 K/uL (12-21 @ 22:30)     Sedimentation Rate, Erythrocyte: 51 mm/hr (12-21 @ 22:30)      [MICROBIOLOGY /  VIROLOGY:]  SARS-CoV-2: NotDetec (22 Dec 2023 00:55)      [PATHOLOGY]       [RADIOLOGY & ADDITIONAL STUDIES:]   CT Chest w/ IV Cont:   ACC: [id number] EXAM:  CT ABDOMEN AND PELVIS IC   ORDERED BY: FRANKI PATEL   ACC: [id number] EXAM:  CT CHEST IC   ORDERED BY: FRANKI PATEL   PROCEDURE DATE:  12/22/2023    INTERPRETATION:  CLINICAL INFORMATION: Hypotension, tachycardia, red spots in the upper and lower extremities, evaluate for vasculitis  COMPARISON: None.  CONTRAST/COMPLICATIONS:  IV Contrast: Omnipaque 350 (accession [id number]), IV contrast documented in unlinked concurrent exam (accession [id number])  90 cc administered (accession [id number]), 0 cc administered (accession [id number])   10 cc discarded (accession [id number]), 0 cc discarded (accession [id number])  Oral Contrast: NONE  Complications: None reported at time of study completion    PROCEDURE:  CT of the Chest, Abdomen and Pelvis was performed.  Sagittal and coronal reformats were performed.    FINDINGS:  CHEST:  LUNGS AND LARGE AIRWAYS: Patent central airways. Dependent atelectatic changes at the lung bases.  PLEURA: No pleural effusion.  VESSELS: Within normal limits.  HEART: Heart size is normal. No pericardial effusion.  MEDIASTINUM AND FOREST: No lymphadenopathy.  CHEST WALL AND LOWER NECK: Within normal limits.    ABDOMEN AND PELVIS:  LIVER: Within normal limits.  BILE DUCTS:Normal caliber.  GALLBLADDER: Cholecystectomy.  SPLEEN: Within normal limits.  PANCREAS: Within normal limits.  ADRENALS: Within normal limits.  KIDNEYS/URETERS: Within normal limits.  BLADDER: Within normal limits.  REPRODUCTIVE ORGANS: Posterior calcified fibroid.  BOWEL: No bowel obstruction. Appendix is unremarkable. There is thickening, thumbprinting and hyperemia of the colon from the cecum through rectum compatible with pancolitis.  PERITONEUM: No ascites.  VESSELS: Aorta is not dilated. Moderate atherosclerotic vascular calcification.  No perivascular inflammation as clinically questioned.  RETROPERITONEUM/LYMPH NODES: No lymphadenopathy.  ABDOMINAL WALL: Within normal limits.  BONES: Within normal limits.    IMPRESSION:  Mild pancolitis, of infectious or inflammatory etiology.  --- End of Report ---  CARLENE ALEJANDRA MD; Attending Radiologist  This document has been electronically signed. Dec 22 2023  3:15AM (12-22-23 @ 02:22)        CT Abdomen and Pelvis w/ IV Cont:   ACC: [id number] EXAM:  CT ABDOMEN AND PELVIS IC   ORDERED BY: FRANKI PATEL   ACC: [id number] EXAM:  CT CHEST IC   ORDERED BY: FRANKI PATEL   PROCEDURE DATE:  12/22/2023    INTERPRETATION:  CLINICAL INFORMATION: Hypotension, tachycardia, red spots in the upper and lower extremities, evaluate for vasculitis  COMPARISON: None.  FINDINGS:  CHEST:  LUNGS AND LARGE AIRWAYS: Patent central airways. Dependent atelectatic   changes at the lung bases.  PLEURA: No pleural effusion.  VESSELS: Within normal limits.  HEART: Heart size is normal. No pericardial effusion.  MEDIASTINUM AND FOREST: No lymphadenopathy.  CHEST WALL AND LOWER NECK: Within normal limits.    ABDOMEN AND PELVIS:  LIVER: Within normal limits.  BILE DUCTS:Normal caliber.  GALLBLADDER: Cholecystectomy.  SPLEEN: Within normal limits.  PANCREAS: Within normal limits.  ADRENALS: Within normal limits.  KIDNEYS/URETERS: Within normal limits.  BLADDER: Within normal limits.  REPRODUCTIVE ORGANS: Posterior calcified fibroid.  BOWEL: No bowel obstruction. Appendix is unremarkable. There is   thickening, thumbprinting and hyperemia of the colon from the cecum   through rectum compatible with pancolitis.  PERITONEUM: No ascites.  VESSELS: Aorta is not dilated. Moderate atherosclerotic vascular   calcification.  No perivascular inflammation as clinically questioned.  RETROPERITONEUM/LYMPH NODES: No lymphadenopathy.  ABDOMINAL WALL: Within normal limits.  BONES: Within normal limits.    IMPRESSION:  Mild pancolitis, of infectious or inflammatory etiology.  --- End of Report ---  CARLENE ALEJANDRA MD; Attending Radiologist  This document has been electronically signed. Dec 22 2023  3:15AM (12-22-23 @ 02:23)   Patient is a 58y old  Female who presents with a chief complaint of hypotension and tachycardia (22 Dec 2023 10:37)    HPI:   Patient is a 58-year-old female  ,[facility] resident who presents to the emergency room with multiple medical issues.  Past medical history of CAD diabetes insomnia major depressive disorder GERD hypertension irritable bowel syndrome with diarrhea cirrhosis of the liver nonalcoholic fatty liver hepatic encephalopathy bipolar disorder chronic A-fib on Coumadin blepharitis of the left eye.    Per PCP signout patient was transferred to [place] for reported hypotension and tachycardia diffuse purpura.  Patient had a temperature of 99.3 with a heart rate of 116 and a blood pressure of 88/60.  Patient was FEBRILE later in ER with TEMP 101 , septic workup sent ,  started on iv abx and fluids .CT abd showed pancolitis , seen by GI and ID . Found to have cellulitis of lids b/l R>L  (22 Dec 2023 10:37)      PAST MEDICAL & SURGICAL HISTORY:  MDD (major depressive disorder)  GERD (gastroesophageal reflux disease)  Ulcerative colitis  HTN (hypertension)  DM (diabetes mellitus)  Arteriosclerotic heart disease (ASHD)  IBS (irritable bowel syndrome)  History of ataxia  Liver cirrhosis  Nonalcoholic fatty liver disease without nonalcoholic steatohepatitis (PATEL)  Hepatic encephalopathy  Bipolar illness  Chronic atrial fibrillation  Moderate protein-calorie malnutrition  OA (osteoarthritis)  Blepharitis, bilateral  Brain aneurysm  Uterine fibroid  History of cholecystectomy       HEALTH ISSUES - PROBLEM Dx:  Acute febrile illness  Orbital cellulitis  Pancolitis  Petechial rash  Abnormal head CT  Prophylactic measure  Liver cirrhosis  MDD (major depressive disorder)  GERD (gastroesophageal reflux disease)  DM (diabetes mellitus)  CAD (coronary artery disease)    Fever due to unspecified condition [R50.9]  MDD (major depressive disorder) [F32.9]  GERD (gastroesophageal reflux disease) [K21.9]  Ulcerative colitis [K51.90]  HTN (hypertension) [I10]  DM (diabetes mellitus) [E11.9]  Arteriosclerotic heart disease (ASHD) [I25.10]  IBS (irritable bowel syndrome) [K58.9]  History of ataxia [Z87.898]  Liver cirrhosis [K74.60]  Nonalcoholic fatty liver disease without nonalcoholic steatohepatitis (PATEL) [K76.0]  Hepatic encephalopathy [K76.82]  Bipolar illness [F31.9]  Chronic atrial fibrillation [I48.20]  Moderate protein-calorie malnutrition [E44.0]  OA (osteoarthritis) [M19.90]  Blepharitis, bilateral [H01.003]  Brain aneurysm [I67.1]  Uterine fibroid [D25.9]  History of cholecystectomy [Z90.49]  Rash [R21]      FAMILY HISTORY:      [SOCIAL HISTORY: ]     smoking:  none currently     EtOH:  none currently     illicit drugs:  none currently     occupation:  Retired     marital status:       Other:       [ALLERGIES/INTOLERANCES:]  Allergies  No Known Allergies  Intolerances      [MEDICATIONS]  MEDICATIONS  (STANDING):  cefTRIAXone   IVPB      dextrose 5% + sodium chloride 0.45%. 1000 milliLiter(s) (100 mL/Hr) IV Continuous <Continuous>  dextrose 5%. 1000 milliLiter(s) (100 mL/Hr) IV Continuous <Continuous>  dextrose 5%. 1000 milliLiter(s) (50 mL/Hr) IV Continuous <Continuous>  dextrose 50% Injectable 25 Gram(s) IV Push once  dextrose 50% Injectable 25 Gram(s) IV Push once  dextrose 50% Injectable 12.5 Gram(s) IV Push once  erythromycin   Ointment 1 Application(s) Both EYES two times a day  folic acid 1 milliGRAM(s) Oral daily  glucagon  Injectable 1 milliGRAM(s) IntraMuscular once  insulin lispro (ADMELOG) corrective regimen sliding scale   SubCutaneous three times a day before meals  lactobacillus acidophilus 1 Tablet(s) Oral every 12 hours  metroNIDAZOLE  IVPB      metroNIDAZOLE  IVPB 500 milliGRAM(s) IV Intermittent every 8 hours  mirtazapine 30 milliGRAM(s) Oral at bedtime  pantoprazole    Tablet 40 milliGRAM(s) Oral daily  rifAXIMin 550 milliGRAM(s) Oral two times a day  senna 2 Tablet(s) Oral at bedtime  spironolactone 100 milliGRAM(s) Oral daily      MEDICATIONS  (PRN):  acetaminophen     Tablet .. 650 milliGRAM(s) Oral every 6 hours PRN Temp greater or equal to 38C (100.4F), Mild Pain (1 - 3)  aluminum hydroxide/magnesium hydroxide/simethicone Suspension 30 milliLiter(s) Oral every 4 hours PRN Dyspepsia  dextrose Oral Gel 15 Gram(s) Oral once PRN Blood Glucose LESS THAN 70 milliGRAM(s)/deciliter  magnesium hydroxide Suspension 30 milliLiter(s) Oral daily PRN Constipation  melatonin 3 milliGRAM(s) Oral at bedtime PRN Insomnia  midodrine. 5 milliGRAM(s) Oral three times a day PRN SBP below 90  ondansetron Injectable 4 milliGRAM(s) IV Push every 8 hours PRN Nausea and/or Vomiting  traMADol 50 milliGRAM(s) Oral three times a day PRN Moderate Pain (4 - 6)      [REVIEW OF SYSTEMS: ]  CONSTITUTIONAL: normal, no fever, no shakes, no chills   EYES: No eye pain, no visual disturbances, no discharge  ENMT:  no discharge  NECK: No pain, no stiffness  BREASTS: No pain, no masses, no nipple discharge  RESPIRATORY: No cough, no wheezing, no chills, no hemoptysis; No shortness of breath  CARDIOVASCULAR: No chest pain, no palpitations, no dizziness, no leg swelling  GASTROINTESTINAL: No abdominal, no epigastric pain. No nausea, no vomiting, no hematemesis; No diarrhea , no constipation. No melena, no hematochezia.  GENITOURINARY: No dysuria, no frequency, no hematuria, no incontinence  NEUROLOGICAL: No headaches, no memory loss, no loss of strength, no numbness, no tremors  SKIN: No itching, no burning, no rashes, no lesions   LYMPH NODES: No enlarged glands  ENDOCRINE: No heat or cold intolerance; No hair loss  MUSCULOSKELETAL: No joint pain or swelling; No muscle, no back, no extremity pain  PSYCHIATRIC: No depression, no anxiety, no mood swings, no difficulty sleeping  HEME/LYMPH: No easy bruising, no bleeding gums      [VITALS SIGNS 24hrs]  Vital Signs Last 24 Hrs  T(C): 36.3 (22 Dec 2023 12:31), Max: 38.6 (21 Dec 2023 22:50)  T(F): 97.4 (22 Dec 2023 12:31), Max: 101.4 (21 Dec 2023 22:50)  HR: 80 (22 Dec 2023 12:31) (80 - 113)  BP: 93/60 (22 Dec 2023 12:31) (93/60 - 105/70)  BP(mean): --  RR: 17 (22 Dec 2023 12:31) (16 - 17)  SpO2: 96% (22 Dec 2023 12:31) (96% - 98%)    Parameters below as of 22 Dec 2023 12:31  Patient On (Oxygen Delivery Method): room air    Daily Height in cm: 119.38 (21 Dec 2023 22:25)    Daily   I&O's Summary    [PHYSICAL EXAM]  GEN:   HEENT: normocephalic and atraumatic. EOMI. PERRL.    NECK: Supple.  No lymphadenopathy   LUNGS: Clear to auscultation.  HEART: S1S2 Regular rate and rhythm, no MRG  ABDOMEN: Soft, nontender, and nondistended.  Positive bowel sounds.    : No CVA tenderness  EXTREMITIES: Without edema.  NEUROLOGIC: grossly intact.  PSYCHIATRIC: Appropriate affect .  SKIN: No rash     [LABS: ]                        8.2    12.06 )-----------( 399      ( 21 Dec 2023 22:30 )             24.6     CBC Full  -  ( 21 Dec 2023 22:30 )  WBC Count : 12.06 K/uL  RBC Count : 2.60 M/uL  Hemoglobin : 8.2 g/dL  Hematocrit : 24.6 %  Platelet Count - Automated : 399 K/uL  Mean Cell Volume : 94.6 fl  Mean Cell Hemoglobin : 31.5 pg  Mean Cell Hemoglobin Concentration : 33.3 gm/dL  Auto Neutrophil # : 9.07 K/uL  Auto Lymphocyte # : 2.09 K/uL  Auto Monocyte # : 0.70 K/uL  Auto Eosinophil # : 0.05 K/uL  Auto Basophil # : 0.04 K/uL  Auto Neutrophil % : 75.3 %  Auto Lymphocyte % : 17.3 %  Auto Monocyte % : 5.8 %  Auto Eosinophil % : 0.4 %  Auto Basophil % : 0.3 %    12-21    136  |  106  |  25<H>  ----------------------------<  125<H>  4.2   |  24  |  0.68    Ca    8.3<L>      21 Dec 2023 22:30  Mg     1.7     12-21    TPro  5.8<L>  /  Alb  2.4<L>  /  TBili  0.3  /  DBili  x   /  AST  27  /  ALT  26  /  AlkPhos  125<H>  12-21  PT/INR - ( 21 Dec 2023 22:30 )   PT: 43.2 sec;   INR: 3.84 ratio    PTT - ( 21 Dec 2023 22:30 )  PTT:47.9 sec  LIVER FUNCTIONS - ( 21 Dec 2023 22:30 )  Alb: 2.4 g/dL / Pro: 5.8 g/dL / ALK PHOS: 125 U/L / ALT: 26 U/L / AST: 27 U/L / GGT: x         CARDIAC MARKERS ( 21 Dec 2023 22:30 )  x     / x     / 40 U/L / x     / x        Urinalysis Basic - ( 21 Dec 2023 22:30 )  Color: x / Appearance: x / SG: x / pH: x  Gluc: 125 mg/dL / Ketone: x  / Bili: x / Urobili: x   Blood: x / Protein: x / Nitrite: x   Leuk Esterase: x / RBC: x / WBC x   Sq Epi: x / Non Sq Epi: x / Bacteria: x    CBC TREND (5 Days)  WBC Count: 12.06 K/uL (12-21 @ 22:30)  Hemoglobin: 8.2 g/dL (12-21 @ 22:30)  Hematocrit: 24.6 % (12-21 @ 22:30)  Platelet Count - Automated: 399 K/uL (12-21 @ 22:30)     Sedimentation Rate, Erythrocyte: 51 mm/hr (12-21 @ 22:30)      [MICROBIOLOGY /  VIROLOGY:]  SARS-CoV-2: NotDetec (22 Dec 2023 00:55)      [PATHOLOGY]       [RADIOLOGY & ADDITIONAL STUDIES:]   CT Chest w/ IV Cont:   ACC: [id number] EXAM:  CT ABDOMEN AND PELVIS IC   ORDERED BY: FRANKI PATEL   ACC: [id number] EXAM:  CT CHEST IC   ORDERED BY: FRANKI PATEL   PROCEDURE DATE:  12/22/2023    INTERPRETATION:  CLINICAL INFORMATION: Hypotension, tachycardia, red spots in the upper and lower extremities, evaluate for vasculitis  COMPARISON: None.  CONTRAST/COMPLICATIONS:  IV Contrast: Omnipaque 350 (accession [id number]), IV contrast documented in unlinked concurrent exam (accession [id number])  90 cc administered (accession [id number]), 0 cc administered (accession [id number])   10 cc discarded (accession [id number]), 0 cc discarded (accession [id number])  Oral Contrast: NONE  Complications: None reported at time of study completion    PROCEDURE:  CT of the Chest, Abdomen and Pelvis was performed.  Sagittal and coronal reformats were performed.    FINDINGS:  CHEST:  LUNGS AND LARGE AIRWAYS: Patent central airways. Dependent atelectatic changes at the lung bases.  PLEURA: No pleural effusion.  VESSELS: Within normal limits.  HEART: Heart size is normal. No pericardial effusion.  MEDIASTINUM AND FOREST: No lymphadenopathy.  CHEST WALL AND LOWER NECK: Within normal limits.    ABDOMEN AND PELVIS:  LIVER: Within normal limits.  BILE DUCTS:Normal caliber.  GALLBLADDER: Cholecystectomy.  SPLEEN: Within normal limits.  PANCREAS: Within normal limits.  ADRENALS: Within normal limits.  KIDNEYS/URETERS: Within normal limits.  BLADDER: Within normal limits.  REPRODUCTIVE ORGANS: Posterior calcified fibroid.  BOWEL: No bowel obstruction. Appendix is unremarkable. There is thickening, thumbprinting and hyperemia of the colon from the cecum through rectum compatible with pancolitis.  PERITONEUM: No ascites.  VESSELS: Aorta is not dilated. Moderate atherosclerotic vascular calcification.  No perivascular inflammation as clinically questioned.  RETROPERITONEUM/LYMPH NODES: No lymphadenopathy.  ABDOMINAL WALL: Within normal limits.  BONES: Within normal limits.    IMPRESSION:  Mild pancolitis, of infectious or inflammatory etiology.  --- End of Report ---  CARLENE ALEJANDRA MD; Attending Radiologist  This document has been electronically signed. Dec 22 2023  3:15AM (12-22-23 @ 02:22)       Patient is a 58y old  Female who presents with a chief complaint of hypotension and tachycardia (22 Dec 2023 10:37)    HPI:   Patient is a 58-year-old female  ,[facility] resident who presents to the emergency room with multiple medical issues.  Past medical history of CAD diabetes insomnia major depressive disorder GERD hypertension irritable bowel syndrome with diarrhea cirrhosis of the liver nonalcoholic fatty liver hepatic encephalopathy bipolar disorder chronic A-fib on Coumadin blepharitis of the left eye.    Per PCP signout patient was transferred to [place] for reported hypotension and tachycardia diffuse purpura.  Patient had a temperature of 99.3 with a heart rate of 116 and a blood pressure of 88/60.  Patient was FEBRILE later in ER with TEMP 101 , septic workup sent ,  started on iv abx and fluids .CT abd showed pancolitis , seen by GI and ID . Found to have cellulitis of lids b/l R>L  (22 Dec 2023 10:37)      PAST MEDICAL & SURGICAL HISTORY:  MDD (major depressive disorder)  GERD (gastroesophageal reflux disease)  Ulcerative colitis  HTN (hypertension)  DM (diabetes mellitus)  Arteriosclerotic heart disease (ASHD)  IBS (irritable bowel syndrome)  History of ataxia  Liver cirrhosis  Nonalcoholic fatty liver disease without nonalcoholic steatohepatitis (PATEL)  Hepatic encephalopathy  Bipolar illness  Chronic atrial fibrillation  Moderate protein-calorie malnutrition  OA (osteoarthritis)  Blepharitis, bilateral  Brain aneurysm  Uterine fibroid  History of cholecystectomy       HEALTH ISSUES - PROBLEM Dx:  Acute febrile illness  Orbital cellulitis  Pancolitis  Petechial rash  Abnormal head CT  Prophylactic measure  Liver cirrhosis  MDD (major depressive disorder)  GERD (gastroesophageal reflux disease)  DM (diabetes mellitus)  CAD (coronary artery disease)    Fever due to unspecified condition [R50.9]  MDD (major depressive disorder) [F32.9]  GERD (gastroesophageal reflux disease) [K21.9]  Ulcerative colitis [K51.90]  HTN (hypertension) [I10]  DM (diabetes mellitus) [E11.9]  Arteriosclerotic heart disease (ASHD) [I25.10]  IBS (irritable bowel syndrome) [K58.9]  History of ataxia [Z87.898]  Liver cirrhosis [K74.60]  Nonalcoholic fatty liver disease without nonalcoholic steatohepatitis (PATEL) [K76.0]  Hepatic encephalopathy [K76.82]  Bipolar illness [F31.9]  Chronic atrial fibrillation [I48.20]  Moderate protein-calorie malnutrition [E44.0]  OA (osteoarthritis) [M19.90]  Blepharitis, bilateral [H01.003]  Brain aneurysm [I67.1]  Uterine fibroid [D25.9]  History of cholecystectomy [Z90.49]  Rash [R21]      FAMILY HISTORY:      [SOCIAL HISTORY: ]     smoking:  none currently     EtOH:  none currently     illicit drugs:  none currently     occupation:  Retired     marital status:       Other:       [ALLERGIES/INTOLERANCES:]  Allergies  No Known Allergies  Intolerances      [MEDICATIONS]  MEDICATIONS  (STANDING):  cefTRIAXone   IVPB      dextrose 5% + sodium chloride 0.45%. 1000 milliLiter(s) (100 mL/Hr) IV Continuous <Continuous>  dextrose 5%. 1000 milliLiter(s) (100 mL/Hr) IV Continuous <Continuous>  dextrose 5%. 1000 milliLiter(s) (50 mL/Hr) IV Continuous <Continuous>  dextrose 50% Injectable 25 Gram(s) IV Push once  dextrose 50% Injectable 25 Gram(s) IV Push once  dextrose 50% Injectable 12.5 Gram(s) IV Push once  erythromycin   Ointment 1 Application(s) Both EYES two times a day  folic acid 1 milliGRAM(s) Oral daily  glucagon  Injectable 1 milliGRAM(s) IntraMuscular once  insulin lispro (ADMELOG) corrective regimen sliding scale   SubCutaneous three times a day before meals  lactobacillus acidophilus 1 Tablet(s) Oral every 12 hours  metroNIDAZOLE  IVPB      metroNIDAZOLE  IVPB 500 milliGRAM(s) IV Intermittent every 8 hours  mirtazapine 30 milliGRAM(s) Oral at bedtime  pantoprazole    Tablet 40 milliGRAM(s) Oral daily  rifAXIMin 550 milliGRAM(s) Oral two times a day  senna 2 Tablet(s) Oral at bedtime  spironolactone 100 milliGRAM(s) Oral daily      MEDICATIONS  (PRN):  acetaminophen     Tablet .. 650 milliGRAM(s) Oral every 6 hours PRN Temp greater or equal to 38C (100.4F), Mild Pain (1 - 3)  aluminum hydroxide/magnesium hydroxide/simethicone Suspension 30 milliLiter(s) Oral every 4 hours PRN Dyspepsia  dextrose Oral Gel 15 Gram(s) Oral once PRN Blood Glucose LESS THAN 70 milliGRAM(s)/deciliter  magnesium hydroxide Suspension 30 milliLiter(s) Oral daily PRN Constipation  melatonin 3 milliGRAM(s) Oral at bedtime PRN Insomnia  midodrine. 5 milliGRAM(s) Oral three times a day PRN SBP below 90  ondansetron Injectable 4 milliGRAM(s) IV Push every 8 hours PRN Nausea and/or Vomiting  traMADol 50 milliGRAM(s) Oral three times a day PRN Moderate Pain (4 - 6)      [REVIEW OF SYSTEMS: ]  CONSTITUTIONAL: normal, no fever, no shakes, no chills   EYES: No eye pain, no visual disturbances, no discharge  ENMT:  no discharge  NECK: No pain, no stiffness  BREASTS: No pain, no masses, no nipple discharge  RESPIRATORY: No cough, no wheezing, no chills, no hemoptysis; No shortness of breath  CARDIOVASCULAR: No chest pain, no palpitations, no dizziness, no leg swelling  GASTROINTESTINAL: No abdominal, no epigastric pain. No nausea, no vomiting, no hematemesis; No diarrhea , no constipation. No melena, no hematochezia.  GENITOURINARY: No dysuria, no frequency, no hematuria, no incontinence  NEUROLOGICAL: No headaches, no memory loss, no loss of strength, no numbness, no tremors  SKIN: No itching, no burning, no rashes, no lesions   LYMPH NODES: No enlarged glands  ENDOCRINE: No heat or cold intolerance; No hair loss  MUSCULOSKELETAL: No joint pain or swelling; No muscle, no back, no extremity pain  PSYCHIATRIC: No depression, no anxiety, no mood swings, no difficulty sleeping  HEME/LYMPH: No easy bruising, no bleeding gums      [VITALS SIGNS 24hrs]  Vital Signs Last 24 Hrs  T(C): 36.3 (22 Dec 2023 12:31), Max: 38.6 (21 Dec 2023 22:50)  T(F): 97.4 (22 Dec 2023 12:31), Max: 101.4 (21 Dec 2023 22:50)  HR: 80 (22 Dec 2023 12:31) (80 - 113)  BP: 93/60 (22 Dec 2023 12:31) (93/60 - 105/70)  BP(mean): --  RR: 17 (22 Dec 2023 12:31) (16 - 17)  SpO2: 96% (22 Dec 2023 12:31) (96% - 98%)    Parameters below as of 22 Dec 2023 12:31  Patient On (Oxygen Delivery Method): room air    Daily Height in cm: 119.38 (21 Dec 2023 22:25)    Daily   I&O's Summary    [PHYSICAL EXAM]  GEN:   HEENT: normocephalic and atraumatic. EOMI. PERRL.    NECK: Supple.  No lymphadenopathy   LUNGS: Clear to auscultation.  HEART: S1S2 Regular rate and rhythm, no MRG  ABDOMEN: Soft, nontender, and nondistended.  Positive bowel sounds.    : No CVA tenderness  EXTREMITIES: Without edema.  NEUROLOGIC: grossly intact.  PSYCHIATRIC: Appropriate affect .  SKIN: No rash     [LABS: ]                        8.2    12.06 )-----------( 399      ( 21 Dec 2023 22:30 )             24.6     CBC Full  -  ( 21 Dec 2023 22:30 )  WBC Count : 12.06 K/uL  RBC Count : 2.60 M/uL  Hemoglobin : 8.2 g/dL  Hematocrit : 24.6 %  Platelet Count - Automated : 399 K/uL  Mean Cell Volume : 94.6 fl  Mean Cell Hemoglobin : 31.5 pg  Mean Cell Hemoglobin Concentration : 33.3 gm/dL  Auto Neutrophil # : 9.07 K/uL  Auto Lymphocyte # : 2.09 K/uL  Auto Monocyte # : 0.70 K/uL  Auto Eosinophil # : 0.05 K/uL  Auto Basophil # : 0.04 K/uL  Auto Neutrophil % : 75.3 %  Auto Lymphocyte % : 17.3 %  Auto Monocyte % : 5.8 %  Auto Eosinophil % : 0.4 %  Auto Basophil % : 0.3 %    12-21    136  |  106  |  25<H>  ----------------------------<  125<H>  4.2   |  24  |  0.68    Ca    8.3<L>      21 Dec 2023 22:30  Mg     1.7     12-21    TPro  5.8<L>  /  Alb  2.4<L>  /  TBili  0.3  /  DBili  x   /  AST  27  /  ALT  26  /  AlkPhos  125<H>  12-21  PT/INR - ( 21 Dec 2023 22:30 )   PT: 43.2 sec;   INR: 3.84 ratio    PTT - ( 21 Dec 2023 22:30 )  PTT:47.9 sec  LIVER FUNCTIONS - ( 21 Dec 2023 22:30 )  Alb: 2.4 g/dL / Pro: 5.8 g/dL / ALK PHOS: 125 U/L / ALT: 26 U/L / AST: 27 U/L / GGT: x         CARDIAC MARKERS ( 21 Dec 2023 22:30 )  x     / x     / 40 U/L / x     / x        Urinalysis Basic - ( 21 Dec 2023 22:30 )  Color: x / Appearance: x / SG: x / pH: x  Gluc: 125 mg/dL / Ketone: x  / Bili: x / Urobili: x   Blood: x / Protein: x / Nitrite: x   Leuk Esterase: x / RBC: x / WBC x   Sq Epi: x / Non Sq Epi: x / Bacteria: x    CBC TREND (5 Days)  WBC Count: 12.06 K/uL (12-21 @ 22:30)  Hemoglobin: 8.2 g/dL (12-21 @ 22:30)  Hematocrit: 24.6 % (12-21 @ 22:30)  Platelet Count - Automated: 399 K/uL (12-21 @ 22:30)     Sedimentation Rate, Erythrocyte: 51 mm/hr (12-21 @ 22:30)      [MICROBIOLOGY /  VIROLOGY:]  SARS-CoV-2: NotDetec (22 Dec 2023 00:55)      [PATHOLOGY]       [RADIOLOGY & ADDITIONAL STUDIES:]   CT Chest w/ IV Cont:   ACC: [id number] EXAM:  CT ABDOMEN AND PELVIS IC   ORDERED BY: FRANKI PATEL   ACC: [id number] EXAM:  CT CHEST IC   ORDERED BY: FRANKI PATEL   PROCEDURE DATE:  12/22/2023    INTERPRETATION:  CLINICAL INFORMATION: Hypotension, tachycardia, red spots in the upper and lower extremities, evaluate for vasculitis  COMPARISON: None.  CONTRAST/COMPLICATIONS:  IV Contrast: Omnipaque 350 (accession [id number]), IV contrast documented in unlinked concurrent exam (accession [id number])  90 cc administered (accession [id number]), 0 cc administered (accession [id number])   10 cc discarded (accession [id number]), 0 cc discarded (accession [id number])  Oral Contrast: NONE  Complications: None reported at time of study completion    PROCEDURE:  CT of the Chest, Abdomen and Pelvis was performed.  Sagittal and coronal reformats were performed.    FINDINGS:  CHEST:  LUNGS AND LARGE AIRWAYS: Patent central airways. Dependent atelectatic changes at the lung bases.  PLEURA: No pleural effusion.  VESSELS: Within normal limits.  HEART: Heart size is normal. No pericardial effusion.  MEDIASTINUM AND FOREST: No lymphadenopathy.  CHEST WALL AND LOWER NECK: Within normal limits.    ABDOMEN AND PELVIS:  LIVER: Within normal limits.  BILE DUCTS:Normal caliber.  GALLBLADDER: Cholecystectomy.  SPLEEN: Within normal limits.  PANCREAS: Within normal limits.  ADRENALS: Within normal limits.  KIDNEYS/URETERS: Within normal limits.  BLADDER: Within normal limits.  REPRODUCTIVE ORGANS: Posterior calcified fibroid.  BOWEL: No bowel obstruction. Appendix is unremarkable. There is thickening, thumbprinting and hyperemia of the colon from the cecum through rectum compatible with pancolitis.  PERITONEUM: No ascites.  VESSELS: Aorta is not dilated. Moderate atherosclerotic vascular calcification.  No perivascular inflammation as clinically questioned.  RETROPERITONEUM/LYMPH NODES: No lymphadenopathy.  ABDOMINAL WALL: Within normal limits.  BONES: Within normal limits.    IMPRESSION:  Mild pancolitis, of infectious or inflammatory etiology.  --- End of Report ---  CARLENE ALEJANDRA MD; Attending Radiologist  This document has been electronically signed. Dec 22 2023  3:15AM (12-22-23 @ 02:22)       Patient is a 58y old  Female who presents with a chief complaint of hypotension and tachycardia (22 Dec 2023 10:37)    HPI:   Patient is a 58-year-old female  ,[facility] resident who presents to the emergency room with multiple medical issues.  Past medical history of CAD diabetes insomnia major depressive disorder GERD hypertension irritable bowel syndrome with diarrhea cirrhosis of the liver nonalcoholic fatty liver hepatic encephalopathy bipolar disorder chronic A-fib on Coumadin blepharitis of the left eye.    Per PCP signout patient was transferred to [place] for reported hypotension and tachycardia diffuse purpura.  Patient had a temperature of 99.3 with a heart rate of 116 and a blood pressure of 88/60.  Patient was FEBRILE later in ER with TEMP 101 , septic workup sent ,  started on iv abx and fluids .CT abd showed pancolitis , seen by GI and ID . Found to have cellulitis of lids b/l R>L  (22 Dec 2023 10:37)      PAST MEDICAL & SURGICAL HISTORY:  MDD (major depressive disorder)  GERD (gastroesophageal reflux disease)  Ulcerative colitis  HTN (hypertension)  DM (diabetes mellitus)  Arteriosclerotic heart disease (ASHD)  IBS (irritable bowel syndrome)  History of ataxia  Liver cirrhosis  Nonalcoholic fatty liver disease without nonalcoholic steatohepatitis (PATEL)  Hepatic encephalopathy  Bipolar illness  Chronic atrial fibrillation  Moderate protein-calorie malnutrition  OA (osteoarthritis)  Blepharitis, bilateral  Brain aneurysm  Uterine fibroid  History of cholecystectomy       HEALTH ISSUES - PROBLEM Dx:  Acute febrile illness  Orbital cellulitis  Pancolitis  Petechial rash  Abnormal head CT  Prophylactic measure  Liver cirrhosis  MDD (major depressive disorder)  GERD (gastroesophageal reflux disease)  DM (diabetes mellitus)  CAD (coronary artery disease)    Fever due to unspecified condition [R50.9]  MDD (major depressive disorder) [F32.9]  GERD (gastroesophageal reflux disease) [K21.9]  Ulcerative colitis [K51.90]  HTN (hypertension) [I10]  DM (diabetes mellitus) [E11.9]  Arteriosclerotic heart disease (ASHD) [I25.10]  IBS (irritable bowel syndrome) [K58.9]  History of ataxia [Z87.898]  Liver cirrhosis [K74.60]  Nonalcoholic fatty liver disease without nonalcoholic steatohepatitis (PATEL) [K76.0]  Hepatic encephalopathy [K76.82]  Bipolar illness [F31.9]  Chronic atrial fibrillation [I48.20]  Moderate protein-calorie malnutrition [E44.0]  OA (osteoarthritis) [M19.90]  Blepharitis, bilateral [H01.003]  Brain aneurysm [I67.1]  Uterine fibroid [D25.9]  History of cholecystectomy [Z90.49]  Rash [R21]      FAMILY HISTORY:      [SOCIAL HISTORY: ]     smoking:  none currently     EtOH:  none currently     illicit drugs:  none currently     occupation:  Retired     marital status:       Other:       [ALLERGIES/INTOLERANCES:]  Allergies  No Known Allergies  Intolerances      [MEDICATIONS]  MEDICATIONS  (STANDING):  cefTRIAXone   IVPB      dextrose 5% + sodium chloride 0.45%. 1000 milliLiter(s) (100 mL/Hr) IV Continuous <Continuous>  dextrose 5%. 1000 milliLiter(s) (100 mL/Hr) IV Continuous <Continuous>  dextrose 5%. 1000 milliLiter(s) (50 mL/Hr) IV Continuous <Continuous>  dextrose 50% Injectable 25 Gram(s) IV Push once  dextrose 50% Injectable 25 Gram(s) IV Push once  dextrose 50% Injectable 12.5 Gram(s) IV Push once  erythromycin   Ointment 1 Application(s) Both EYES two times a day  folic acid 1 milliGRAM(s) Oral daily  glucagon  Injectable 1 milliGRAM(s) IntraMuscular once  insulin lispro (ADMELOG) corrective regimen sliding scale   SubCutaneous three times a day before meals  lactobacillus acidophilus 1 Tablet(s) Oral every 12 hours  metroNIDAZOLE  IVPB      metroNIDAZOLE  IVPB 500 milliGRAM(s) IV Intermittent every 8 hours  mirtazapine 30 milliGRAM(s) Oral at bedtime  pantoprazole    Tablet 40 milliGRAM(s) Oral daily  rifAXIMin 550 milliGRAM(s) Oral two times a day  senna 2 Tablet(s) Oral at bedtime  spironolactone 100 milliGRAM(s) Oral daily      MEDICATIONS  (PRN):  acetaminophen     Tablet .. 650 milliGRAM(s) Oral every 6 hours PRN Temp greater or equal to 38C (100.4F), Mild Pain (1 - 3)  aluminum hydroxide/magnesium hydroxide/simethicone Suspension 30 milliLiter(s) Oral every 4 hours PRN Dyspepsia  dextrose Oral Gel 15 Gram(s) Oral once PRN Blood Glucose LESS THAN 70 milliGRAM(s)/deciliter  magnesium hydroxide Suspension 30 milliLiter(s) Oral daily PRN Constipation  melatonin 3 milliGRAM(s) Oral at bedtime PRN Insomnia  midodrine. 5 milliGRAM(s) Oral three times a day PRN SBP below 90  ondansetron Injectable 4 milliGRAM(s) IV Push every 8 hours PRN Nausea and/or Vomiting  traMADol 50 milliGRAM(s) Oral three times a day PRN Moderate Pain (4 - 6)      [REVIEW OF SYSTEMS: ]  CONSTITUTIONAL: normal, +fever, no shakes, no chills   EYES: No eye pain, no visual disturbances, no discharge  ENMT:  no discharge  NECK: No pain, no stiffness  BREASTS: No pain, no masses, no nipple discharge  RESPIRATORY: No cough, no wheezing, no chills, no hemoptysis; No shortness of breath  CARDIOVASCULAR: No chest pain, no palpitations, no dizziness, no leg swelling  GASTROINTESTINAL: No abdominal, no epigastric pain. No nausea, no vomiting, no hematemesis; No diarrhea , no constipation. No melena, no hematochezia.  GENITOURINARY: No dysuria, no frequency, no hematuria, no incontinence  NEUROLOGICAL: No headaches, no memory loss, no loss of strength, no numbness, no tremors  SKIN: No itching, no burning, +rashes, +lesions   LYMPH NODES: No enlarged glands  ENDOCRINE: No heat or cold intolerance; No hair loss  MUSCULOSKELETAL: No joint pain or swelling; No muscle, no back, no extremity pain  PSYCHIATRIC: No depression, no anxiety, no mood swings, no difficulty sleeping  HEME/LYMPH: No easy bruising, no bleeding gums      [VITALS SIGNS 24hrs]  Vital Signs Last 24 Hrs  T(C): 36.3 (22 Dec 2023 12:31), Max: 38.6 (21 Dec 2023 22:50)  T(F): 97.4 (22 Dec 2023 12:31), Max: 101.4 (21 Dec 2023 22:50)  HR: 80 (22 Dec 2023 12:31) (80 - 113)  BP: 93/60 (22 Dec 2023 12:31) (93/60 - 105/70)  BP(mean): --  RR: 17 (22 Dec 2023 12:31) (16 - 17)  SpO2: 96% (22 Dec 2023 12:31) (96% - 98%)    Parameters below as of 22 Dec 2023 12:31  Patient On (Oxygen Delivery Method): room air    Daily Height in cm: 119.38 (21 Dec 2023 22:25)    Daily   I&O's Summary    [PHYSICAL EXAM]  GEN:   HEENT: normocephalic and atraumatic. EOMI. PERRL.    NECK: Supple.  No lymphadenopathy   LUNGS: Clear to auscultation.  HEART: S1S2 Regular rate and rhythm, no MRG  ABDOMEN: Soft, nontender, and nondistended.  Positive bowel sounds.    : No CVA tenderness  EXTREMITIES: Without edema.  NEUROLOGIC: grossly intact.  PSYCHIATRIC: Appropriate affect .  SKIN: +rash   semi-symmetric grecia-ungal erythema/purple discoloration  rare annular irregualr lesion palm  pink-puple lesion on sole of feet  BL reddish-purple lesion with central clearing, flat. No scale.   BL periorbital and conjunctival erythema    [LABS: ]                        8.2    12.06 )-----------( 399      ( 21 Dec 2023 22:30 )             24.6     CBC Full  -  ( 21 Dec 2023 22:30 )  WBC Count : 12.06 K/uL  RBC Count : 2.60 M/uL  Hemoglobin : 8.2 g/dL  Hematocrit : 24.6 %  Platelet Count - Automated : 399 K/uL  Mean Cell Volume : 94.6 fl  Mean Cell Hemoglobin : 31.5 pg  Mean Cell Hemoglobin Concentration : 33.3 gm/dL  Auto Neutrophil # : 9.07 K/uL  Auto Lymphocyte # : 2.09 K/uL  Auto Monocyte # : 0.70 K/uL  Auto Eosinophil # : 0.05 K/uL  Auto Basophil # : 0.04 K/uL  Auto Neutrophil % : 75.3 %  Auto Lymphocyte % : 17.3 %  Auto Monocyte % : 5.8 %  Auto Eosinophil % : 0.4 %  Auto Basophil % : 0.3 %    12-21    136  |  106  |  25<H>  ----------------------------<  125<H>  4.2   |  24  |  0.68    Ca    8.3<L>      21 Dec 2023 22:30  Mg     1.7     12-21    TPro  5.8<L>  /  Alb  2.4<L>  /  TBili  0.3  /  DBili  x   /  AST  27  /  ALT  26  /  AlkPhos  125<H>  12-21  PT/INR - ( 21 Dec 2023 22:30 )   PT: 43.2 sec;   INR: 3.84 ratio    PTT - ( 21 Dec 2023 22:30 )  PTT:47.9 sec  LIVER FUNCTIONS - ( 21 Dec 2023 22:30 )  Alb: 2.4 g/dL / Pro: 5.8 g/dL / ALK PHOS: 125 U/L / ALT: 26 U/L / AST: 27 U/L / GGT: x         CARDIAC MARKERS ( 21 Dec 2023 22:30 )  x     / x     / 40 U/L / x     / x        Urinalysis Basic - ( 21 Dec 2023 22:30 )  Color: x / Appearance: x / SG: x / pH: x  Gluc: 125 mg/dL / Ketone: x  / Bili: x / Urobili: x   Blood: x / Protein: x / Nitrite: x   Leuk Esterase: x / RBC: x / WBC x   Sq Epi: x / Non Sq Epi: x / Bacteria: x    CBC TREND (5 Days)  WBC Count: 12.06 K/uL (12-21 @ 22:30)  Hemoglobin: 8.2 g/dL (12-21 @ 22:30)  Hematocrit: 24.6 % (12-21 @ 22:30)  Platelet Count - Automated: 399 K/uL (12-21 @ 22:30)     Sedimentation Rate, Erythrocyte: 51 mm/hr (12-21 @ 22:30)      [MICROBIOLOGY /  VIROLOGY:]  SARS-CoV-2: NotDetec (22 Dec 2023 00:55)      [PATHOLOGY]       [RADIOLOGY & ADDITIONAL STUDIES:]   CT Chest w/ IV Cont:   ACC: [id number] EXAM:  CT ABDOMEN AND PELVIS IC   ORDERED BY: FRANKI PATEL   ACC: [id number] EXAM:  CT CHEST IC   ORDERED BY: FRANKI PATEL   PROCEDURE DATE:  12/22/2023    INTERPRETATION:  CLINICAL INFORMATION: Hypotension, tachycardia, red spots in the upper and lower extremities, evaluate for vasculitis  COMPARISON: None.  CONTRAST/COMPLICATIONS:  IV Contrast: Omnipaque 350 (accession [id number]), IV contrast documented in unlinked concurrent exam (accession [id number])  90 cc administered (accession [id number]), 0 cc administered (accession [id number])   10 cc discarded (accession [id number]), 0 cc discarded (accession [id number])  Oral Contrast: NONE  Complications: None reported at time of study completion    PROCEDURE:  CT of the Chest, Abdomen and Pelvis was performed.  Sagittal and coronal reformats were performed.    FINDINGS:  CHEST:  LUNGS AND LARGE AIRWAYS: Patent central airways. Dependent atelectatic changes at the lung bases.  PLEURA: No pleural effusion.  VESSELS: Within normal limits.  HEART: Heart size is normal. No pericardial effusion.  MEDIASTINUM AND FOREST: No lymphadenopathy.  CHEST WALL AND LOWER NECK: Within normal limits.    ABDOMEN AND PELVIS:  LIVER: Within normal limits.  BILE DUCTS:Normal caliber.  GALLBLADDER: Cholecystectomy.  SPLEEN: Within normal limits.  PANCREAS: Within normal limits.  ADRENALS: Within normal limits.  KIDNEYS/URETERS: Within normal limits.  BLADDER: Within normal limits.  REPRODUCTIVE ORGANS: Posterior calcified fibroid.  BOWEL: No bowel obstruction. Appendix is unremarkable. There is thickening, thumbprinting and hyperemia of the colon from the cecum through rectum compatible with pancolitis.  PERITONEUM: No ascites.  VESSELS: Aorta is not dilated. Moderate atherosclerotic vascular calcification.  No perivascular inflammation as clinically questioned.  RETROPERITONEUM/LYMPH NODES: No lymphadenopathy.  ABDOMINAL WALL: Within normal limits.  BONES: Within normal limits.    IMPRESSION:  Mild pancolitis, of infectious or inflammatory etiology.  --- End of Report ---  CARLENE ALEJANDRA MD; Attending Radiologist  This document has been electronically signed. Dec 22 2023  3:15AM (12-22-23 @ 02:22)       Patient is a 58y old  Female who presents with a chief complaint of hypotension and tachycardia (22 Dec 2023 10:37)    HPI:   Patient is a 58-year-old female  , resident who presents to the emergency room with multiple medical issues.  Past medical history of CAD diabetes insomnia major depressive disorder GERD hypertension irritable bowel syndrome with diarrhea cirrhosis of the liver nonalcoholic fatty liver hepatic encephalopathy bipolar disorder chronic A-fib on Coumadin blepharitis of the left eye.    Per PCP signout patient was transferred to [place] for reported hypotension and tachycardia diffuse purpura.  Patient had a temperature of 99.3 with a heart rate of 116 and a blood pressure of 88/60.  Patient was FEBRILE later in ER with TEMP 101 , septic workup sent ,  started on iv abx and fluids .CT abd showed pancolitis , seen by GI and ID . Found to have cellulitis of lids b/l R>L  (22 Dec 2023 10:37)      PAST MEDICAL & SURGICAL HISTORY:  MDD (major depressive disorder)  GERD (gastroesophageal reflux disease)  Ulcerative colitis  HTN (hypertension)  DM (diabetes mellitus)  Arteriosclerotic heart disease (ASHD)  IBS (irritable bowel syndrome)  History of ataxia  Liver cirrhosis  Nonalcoholic fatty liver disease without nonalcoholic steatohepatitis (PATEL)  Hepatic encephalopathy  Bipolar illness  Chronic atrial fibrillation  Moderate protein-calorie malnutrition  OA (osteoarthritis)  Blepharitis, bilateral  Brain aneurysm  Uterine fibroid  History of cholecystectomy       HEALTH ISSUES - PROBLEM Dx:  Acute febrile illness  Orbital cellulitis  Pancolitis  Petechial rash  Abnormal head CT  Prophylactic measure  Liver cirrhosis  MDD (major depressive disorder)  GERD (gastroesophageal reflux disease)  DM (diabetes mellitus)  CAD (coronary artery disease)    Fever due to unspecified condition [R50.9]  MDD (major depressive disorder) [F32.9]  GERD (gastroesophageal reflux disease) [K21.9]  Ulcerative colitis [K51.90]  HTN (hypertension) [I10]  DM (diabetes mellitus) [E11.9]  Arteriosclerotic heart disease (ASHD) [I25.10]  IBS (irritable bowel syndrome) [K58.9]  History of ataxia [Z87.898]  Liver cirrhosis [K74.60]  Nonalcoholic fatty liver disease without nonalcoholic steatohepatitis (PATEL) [K76.0]  Hepatic encephalopathy [K76.82]  Bipolar illness [F31.9]  Chronic atrial fibrillation [I48.20]  Moderate protein-calorie malnutrition [E44.0]  OA (osteoarthritis) [M19.90]  Blepharitis, bilateral [H01.003]  Brain aneurysm [I67.1]  Uterine fibroid [D25.9]  History of cholecystectomy [Z90.49]  Rash [R21]      FAMILY HISTORY:      [SOCIAL HISTORY: ]     smoking:  none currently     EtOH:  none currently     illicit drugs:  none currently     occupation:  Retired     marital status:       Other:       [ALLERGIES/INTOLERANCES:]  Allergies  No Known Allergies  Intolerances      [MEDICATIONS]  MEDICATIONS  (STANDING):  cefTRIAXone   IVPB      dextrose 5% + sodium chloride 0.45%. 1000 milliLiter(s) (100 mL/Hr) IV Continuous <Continuous>  dextrose 5%. 1000 milliLiter(s) (100 mL/Hr) IV Continuous <Continuous>  dextrose 5%. 1000 milliLiter(s) (50 mL/Hr) IV Continuous <Continuous>  dextrose 50% Injectable 25 Gram(s) IV Push once  dextrose 50% Injectable 25 Gram(s) IV Push once  dextrose 50% Injectable 12.5 Gram(s) IV Push once  erythromycin   Ointment 1 Application(s) Both EYES two times a day  folic acid 1 milliGRAM(s) Oral daily  glucagon  Injectable 1 milliGRAM(s) IntraMuscular once  insulin lispro (ADMELOG) corrective regimen sliding scale   SubCutaneous three times a day before meals  lactobacillus acidophilus 1 Tablet(s) Oral every 12 hours  metroNIDAZOLE  IVPB      metroNIDAZOLE  IVPB 500 milliGRAM(s) IV Intermittent every 8 hours  mirtazapine 30 milliGRAM(s) Oral at bedtime  pantoprazole    Tablet 40 milliGRAM(s) Oral daily  rifAXIMin 550 milliGRAM(s) Oral two times a day  senna 2 Tablet(s) Oral at bedtime  spironolactone 100 milliGRAM(s) Oral daily      MEDICATIONS  (PRN):  acetaminophen     Tablet .. 650 milliGRAM(s) Oral every 6 hours PRN Temp greater or equal to 38C (100.4F), Mild Pain (1 - 3)  aluminum hydroxide/magnesium hydroxide/simethicone Suspension 30 milliLiter(s) Oral every 4 hours PRN Dyspepsia  dextrose Oral Gel 15 Gram(s) Oral once PRN Blood Glucose LESS THAN 70 milliGRAM(s)/deciliter  magnesium hydroxide Suspension 30 milliLiter(s) Oral daily PRN Constipation  melatonin 3 milliGRAM(s) Oral at bedtime PRN Insomnia  midodrine. 5 milliGRAM(s) Oral three times a day PRN SBP below 90  ondansetron Injectable 4 milliGRAM(s) IV Push every 8 hours PRN Nausea and/or Vomiting  traMADol 50 milliGRAM(s) Oral three times a day PRN Moderate Pain (4 - 6)      [REVIEW OF SYSTEMS: ]  CONSTITUTIONAL: normal, +fever, no shakes, no chills   EYES: No eye pain, no visual disturbances, no discharge  ENMT:  no discharge  NECK: No pain, no stiffness  BREASTS: No pain, no masses, no nipple discharge  RESPIRATORY: No cough, no wheezing, no chills, no hemoptysis; No shortness of breath  CARDIOVASCULAR: No chest pain, no palpitations, no dizziness, no leg swelling  GASTROINTESTINAL: No abdominal, no epigastric pain. No nausea, no vomiting, no hematemesis; No diarrhea , no constipation. No melena, no hematochezia.  GENITOURINARY: No dysuria, no frequency, no hematuria, no incontinence  NEUROLOGICAL: No headaches, no memory loss, no loss of strength, no numbness, no tremors  SKIN: No itching, no burning, +rashes, +lesions   LYMPH NODES: No enlarged glands  ENDOCRINE: No heat or cold intolerance; No hair loss  MUSCULOSKELETAL: No joint pain or swelling; No muscle, no back, no extremity pain  PSYCHIATRIC: No depression, no anxiety, no mood swings, no difficulty sleeping  HEME/LYMPH: No easy bruising, no bleeding gums      [VITALS SIGNS 24hrs]  Vital Signs Last 24 Hrs  T(C): 36.3 (22 Dec 2023 12:31), Max: 38.6 (21 Dec 2023 22:50)  T(F): 97.4 (22 Dec 2023 12:31), Max: 101.4 (21 Dec 2023 22:50)  HR: 80 (22 Dec 2023 12:31) (80 - 113)  BP: 93/60 (22 Dec 2023 12:31) (93/60 - 105/70)  BP(mean): --  RR: 17 (22 Dec 2023 12:31) (16 - 17)  SpO2: 96% (22 Dec 2023 12:31) (96% - 98%)    Parameters below as of 22 Dec 2023 12:31  Patient On (Oxygen Delivery Method): room air    Daily Height in cm: 119.38 (21 Dec 2023 22:25)    Daily   I&O's Summary    [PHYSICAL EXAM]  GEN:   HEENT: normocephalic and atraumatic. EOMI. PERRL.    NECK: Supple.  No lymphadenopathy   LUNGS: Clear to auscultation.  HEART: S1S2 Regular rate and rhythm, no MRG  ABDOMEN: Soft, nontender, and nondistended.  Positive bowel sounds.    : No CVA tenderness  EXTREMITIES: Without edema.  NEUROLOGIC: grossly intact.  PSYCHIATRIC: Appropriate affect .  SKIN: +rash   semi-symmetric grecia-ungal erythema/purple discoloration  rare annular irregualr lesion palm  pink-puple lesion on sole of feet  BL reddish-purple lesion with central clearing, flat. No scale.   BL periorbital and conjunctival erythema    [LABS: ]                        8.2    12.06 )-----------( 399      ( 21 Dec 2023 22:30 )             24.6     CBC Full  -  ( 21 Dec 2023 22:30 )  WBC Count : 12.06 K/uL  RBC Count : 2.60 M/uL  Hemoglobin : 8.2 g/dL  Hematocrit : 24.6 %  Platelet Count - Automated : 399 K/uL  Mean Cell Volume : 94.6 fl  Mean Cell Hemoglobin : 31.5 pg  Mean Cell Hemoglobin Concentration : 33.3 gm/dL  Auto Neutrophil # : 9.07 K/uL  Auto Lymphocyte # : 2.09 K/uL  Auto Monocyte # : 0.70 K/uL  Auto Eosinophil # : 0.05 K/uL  Auto Basophil # : 0.04 K/uL  Auto Neutrophil % : 75.3 %  Auto Lymphocyte % : 17.3 %  Auto Monocyte % : 5.8 %  Auto Eosinophil % : 0.4 %  Auto Basophil % : 0.3 %    12-21    136  |  106  |  25<H>  ----------------------------<  125<H>  4.2   |  24  |  0.68    Ca    8.3<L>      21 Dec 2023 22:30  Mg     1.7     12-21    TPro  5.8<L>  /  Alb  2.4<L>  /  TBili  0.3  /  DBili  x   /  AST  27  /  ALT  26  /  AlkPhos  125<H>  12-21  PT/INR - ( 21 Dec 2023 22:30 )   PT: 43.2 sec;   INR: 3.84 ratio    PTT - ( 21 Dec 2023 22:30 )  PTT:47.9 sec  LIVER FUNCTIONS - ( 21 Dec 2023 22:30 )  Alb: 2.4 g/dL / Pro: 5.8 g/dL / ALK PHOS: 125 U/L / ALT: 26 U/L / AST: 27 U/L / GGT: x         CARDIAC MARKERS ( 21 Dec 2023 22:30 )  x     / x     / 40 U/L / x     / x        Urinalysis Basic - ( 21 Dec 2023 22:30 )  Color: x / Appearance: x / SG: x / pH: x  Gluc: 125 mg/dL / Ketone: x  / Bili: x / Urobili: x   Blood: x / Protein: x / Nitrite: x   Leuk Esterase: x / RBC: x / WBC x   Sq Epi: x / Non Sq Epi: x / Bacteria: x    CBC TREND (5 Days)  WBC Count: 12.06 K/uL (12-21 @ 22:30)  Hemoglobin: 8.2 g/dL (12-21 @ 22:30)  Hematocrit: 24.6 % (12-21 @ 22:30)  Platelet Count - Automated: 399 K/uL (12-21 @ 22:30)     Sedimentation Rate, Erythrocyte: 51 mm/hr (12-21 @ 22:30)      [MICROBIOLOGY /  VIROLOGY:]  SARS-CoV-2: NotDetec (22 Dec 2023 00:55)      [PATHOLOGY]       [RADIOLOGY & ADDITIONAL STUDIES:]   CT Chest w/ IV Cont:   ACC: [id number] EXAM:  CT ABDOMEN AND PELVIS IC   ORDERED BY: FRANKI PATEL   ACC: [id number] EXAM:  CT CHEST IC   ORDERED BY: FRANKI PATEL   PROCEDURE DATE:  12/22/2023    INTERPRETATION:  CLINICAL INFORMATION: Hypotension, tachycardia, red spots in the upper and lower extremities, evaluate for vasculitis  COMPARISON: None.  CONTRAST/COMPLICATIONS:  IV Contrast: Omnipaque 350 (accession [id number]), IV contrast documented in unlinked concurrent exam (accession [id number])  90 cc administered (accession [id number]), 0 cc administered (accession [id number])   10 cc discarded (accession [id number]), 0 cc discarded (accession [id number])  Oral Contrast: NONE  Complications: None reported at time of study completion    PROCEDURE:  CT of the Chest, Abdomen and Pelvis was performed.  Sagittal and coronal reformats were performed.    FINDINGS:  CHEST:  LUNGS AND LARGE AIRWAYS: Patent central airways. Dependent atelectatic changes at the lung bases.  PLEURA: No pleural effusion.  VESSELS: Within normal limits.  HEART: Heart size is normal. No pericardial effusion.  MEDIASTINUM AND FOREST: No lymphadenopathy.  CHEST WALL AND LOWER NECK: Within normal limits.    ABDOMEN AND PELVIS:  LIVER: Within normal limits.  BILE DUCTS:Normal caliber.  GALLBLADDER: Cholecystectomy.  SPLEEN: Within normal limits.  PANCREAS: Within normal limits.  ADRENALS: Within normal limits.  KIDNEYS/URETERS: Within normal limits.  BLADDER: Within normal limits.  REPRODUCTIVE ORGANS: Posterior calcified fibroid.  BOWEL: No bowel obstruction. Appendix is unremarkable. There is thickening, thumbprinting and hyperemia of the colon from the cecum through rectum compatible with pancolitis.  PERITONEUM: No ascites.  VESSELS: Aorta is not dilated. Moderate atherosclerotic vascular calcification.  No perivascular inflammation as clinically questioned.  RETROPERITONEUM/LYMPH NODES: No lymphadenopathy.  ABDOMINAL WALL: Within normal limits.  BONES: Within normal limits.    IMPRESSION:  Mild pancolitis, of infectious or inflammatory etiology.  --- End of Report ---  CARLENE ALEJANDRA MD; Attending Radiologist  This document has been electronically signed. Dec 22 2023  3:15AM (12-22-23 @ 02:22)

## 2023-12-22 NOTE — CONSULT NOTE ADULT - ASSESSMENT
57 y/o F w/ pmh of cad, dm, mdd, gerd, IBS, cirrhosis 2/2 to nonalcoholic fatty liver, bipolar, afib on coumadin, today presented to \[facility]"" hospital for hypotension and tachycardia with new diffuse purpura to the LE ongoing the last few days and today morning waking up by R>L orbital swelling. In the ED pt was worked up and was found to have a INR of 3.83, diffuse LE ecchymosis and R>L orbital swelling and CTH finding of 8mm aneurysm.  Pt seen and examined upon further questioning pt states the weakness in her ext have been ongoing for months. Pt states she was brought to the ED today because of the R>L orbital swelling that she woke up with.   ID c/s for further evaluation of possible periorbital cellulitis  Rash noted; faint petechiae, pt reporting has been ongoing  Was febrile in the ED Tm 101.4F  CT orbits: Nonspecific bilateral periorbital soft tissue swelling, right greater than left, as well as right facial soft tissue swelling. No discrete drainable fluid collection. No evidence of post septal involvement/orbital cellulitis.  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 Neurosurgical consultation is recommended.  CT A/P: Mild pancolitis, of infectious or inflammatory etiology.    Recommendations:   Primary c/f possible periorbital cellulitis  Pt also w/ pancolitis on CT, unclear if infectious or inflammatory; abdomen exam benign  Rash unlikely infectious; possibly in the setting of blood thinner use    S/p zosyn in the ED  Can narrow to ceftriaxone 1gm q24h and flagyl 500mg q8h (ordered)  F/u pending cx  Trend temps/WBC  Supportive care and additional management per primary team    Dr. Werner covering service from 12/23-12/29  Infectious Diseases will continue to follow. Please call with any questions.   Emily Urbano M.D.  [facility] Division of Infectious Diseases [phone]  For after 5 P.M. and weekends, please call [phone]       57 y/o F w/ pmh of cad, dm, mdd, gerd, IBS, cirrhosis 2/2 to nonalcoholic fatty liver, bipolar, afib on coumadin, today presented to [facility] hospital for hypotension and tachycardia with new diffuse purpura to the LE ongoing the last few days and today morning waking up by R>L orbital swelling. In the ED pt was worked up and was found to have a INR of 3.83, diffuse LE ecchymosis and R>L orbital swelling and CTH finding of 8mm aneurysm.  Pt seen and examined upon further questioning pt states the weakness in her ext have been ongoing for months. Pt states she was brought to the ED today because of the R>L orbital swelling that she woke up with.   ID c/s for further evaluation of possible periorbital cellulitis  Rash noted; faint petechiae, pt reporting has been ongoing  Was febrile in the ED Tm 101.4F  CT orbits: Nonspecific bilateral periorbital soft tissue swelling, right greater than left, as well as right facial soft tissue swelling. No discrete drainable fluid collection. No evidence of post septal involvement/orbital cellulitis.  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 Neurosurgical consultation is recommended.  CT A/P: Mild pancolitis, of infectious or inflammatory etiology.    Recommendations:   Primary c/f possible periorbital cellulitis  Pt also w/ pancolitis on CT, unclear if infectious or inflammatory; abdomen exam benign  Rash unlikely infectious; possibly in the setting of blood thinner use    S/p zosyn in the ED  Can narrow to ceftriaxone 1gm q24h and flagyl 500mg q8h (ordered)  F/u pending cx  Trend temps/WBC  Supportive care and additional management per primary team    Dr. Werner covering service from 12/23-12/29  Infectious Diseases will continue to follow. Please call with any questions.   Emily Urbano M.D.  [facility] Division of Infectious Diseases [phone]  For after 5 P.M. and weekends, please call [phone]       59 y/o F w/ pmh of cad, dm, mdd, gerd, IBS, cirrhosis 2/2 to nonalcoholic fatty liver, bipolar, afib on coumadin, today presented to [facility] for hypotension and tachycardia with new diffuse purpura to the LE ongoing the last few days and today morning waking up by R>L orbital swelling. In the ED pt was worked up and was found to have a INR of 3.83, diffuse LE ecchymosis and R>L orbital swelling and CTH finding of 8mm aneurysm.  Pt seen and examined upon further questioning pt states the weakness in her ext have been ongoing for months. Pt states she was brought to the ED today because of the R>L orbital swelling that she woke up with.   ID c/s for further evaluation of possible periorbital cellulitis  Rash noted; faint petechiae, pt reporting has been ongoing  Was febrile in the ED Tm 101.4F  CT orbits: Nonspecific bilateral periorbital soft tissue swelling, right greater than left, as well as right facial soft tissue swelling. No discrete drainable fluid collection. No evidence of post septal involvement/orbital cellulitis.  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 Neurosurgical consultation is recommended.  CT A/P: Mild pancolitis, of infectious or inflammatory etiology.    Recommendations:   Primary c/f possible periorbital cellulitis  Pt also w/ pancolitis on CT, unclear if infectious or inflammatory; abdomen exam benign  Rash unlikely infectious; possibly in the setting of blood thinner use    S/p zosyn in the ED  Can narrow to ceftriaxone 1gm q24h and flagyl 500mg q8h (ordered)  F/u pending cx  Trend temps/WBC  Monitor rash progression; appreciate hematology recs  Supportive care and additional management per primary team    D/w Dr. Ramírez  D/w Dr. Rigoberto Werner covering service from 12/23-12/29  Infectious Diseases will continue to follow. Please call with any questions.   Emily Urbano M.D.  [facility] Division of Infectious Diseases [phone]  For after 5 P.M. 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 In the ED pt was worked up and was found to have a INR of 3.83, diffuse LE ecchymosis and R>L orbital swelling and CTH finding of 8mm aneurysm.  Pt seen and examined upon further questioning pt states the weakness in her ext have been ongoing for months. Pt states she was brought to the ED today because of the R>L orbital swelling that she woke up with.   ID c/s for further evaluation of possible periorbital cellulitis  Rash noted; faint petechiae, pt reporting has been ongoing  Was febrile in the ED Tm 101.4F  CT orbits: Nonspecific bilateral periorbital soft tissue swelling, right greater than left, as well as right facial soft tissue swelling. No discrete drainable fluid collection. No evidence of post septal involvement/orbital cellulitis.  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 Neurosurgical consultation is recommended.  CT A/P: Mild pancolitis, of infectious or inflammatory etiology.    Recommendations:   Primary c/f possible periorbital cellulitis  Pt also w/ pancolitis on CT, unclear if infectious or inflammatory; abdomen exam benign  Rash unlikely infectious; possibly in the setting of blood thinner use    S/p zosyn in the ED  Can narrow to ceftriaxone 1gm q24h and flagyl 500mg q8h (ordered)  F/u pending cx  Trend temps/WBC  Monitor rash progression; appreciate hematology recs  Supportive care and additional management per primary team    D/w Dr. Ramírez  D/w Dr. Rigoberto Werner covering service from 12/23-12/29  Infectious Diseases will continue to follow. Please call with any questions.   Emily Urbano M.D.  [facility] Division of Infectious Diseases [phone]  For after 5 P.M. 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 In the ED pt was worked up and was found to have a INR of 3.83, diffuse LE ecchymosis and R>L orbital swelling and CTH finding of 8mm aneurysm.  Pt seen and examined upon further questioning pt states the weakness in her ext have been ongoing for months. Pt states she was brought to the ED today because of the R>L orbital swelling that she woke up with.   ID c/s for further evaluation of possible periorbital cellulitis  Rash noted; faint petechiae, pt reporting has been ongoing  Was febrile in the ED Tm 101.4F  CT orbits: Nonspecific bilateral periorbital soft tissue swelling, right greater than left, as well as right facial soft tissue swelling. No discrete drainable fluid collection. No evidence of post septal involvement/orbital cellulitis.  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 Neurosurgical consultation is recommended.  CT A/P: Mild pancolitis, of infectious or inflammatory etiology.    Recommendations:   Primary c/f possible periorbital cellulitis  Pt also w/ pancolitis on CT, unclear if infectious or inflammatory; abdomen exam benign  Rash unlikely infectious; possibly in the setting of blood thinner use    S/p zosyn in the ED  Can narrow to ceftriaxone 1gm q24h and flagyl 500mg q8h (ordered)  F/u pending cx  F/u RPR  F/u tickborne studies  Trend temps/WBC  Monitor rash progression; appreciate hematology recs  Supportive care and additional management per primary team    D/w Dr. Ramírez  D/w Dr. Rigoberto Werner covering service from 12/23-12/29  Infectious Diseases will continue to follow. Please call with any questions.   Emily Urbano M.D.  [facility] Division of Infectious Diseases [phone]  For after 5 P.M. and weekends, please call [phone]       59 y/o F w/ pmh of cad, dm, mdd, gerd, IBS, cirrhosis 2/2 to nonalcoholic fatty liver, bipolar, afib on coumadin, today presented to \[facility]"" hospital for hypotension and tachycardia with new diffuse purpura to the LE ongoing the last few days and today morning waking up by R>L orbital swelling. In the ED pt was worked up and was found to have a INR of 3.83, diffuse LE ecchymosis and R>L orbital swelling and CTH finding of 8mm aneurysm.  Pt seen and examined upon further questioning pt states the weakness in her ext have been ongoing for months. Pt states she was brought to the ED today because of the R>L orbital swelling that she woke up with.   ID c/s for further evaluation of possible periorbital cellulitis  Rash noted; faint petechiae, pt reporting has been ongoing  Was febrile in the ED Tm 101.4F  CT orbits: Nonspecific bilateral periorbital soft tissue swelling, right greater than left, as well as right facial soft tissue swelling. No discrete drainable fluid collection. No evidence of post septal involvement/orbital cellulitis.  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 Neurosurgical consultation is recommended.  CT A/P: Mild pancolitis, of infectious or inflammatory etiology.    Recommendations:   Primary c/f possible periorbital cellulitis  Pt also w/ pancolitis on CT, unclear if infectious or inflammatory; abdomen exam benign  Rash unlikely infectious; possibly in the setting of blood thinner use    S/p zosyn in the ED  Can narrow to ceftriaxone 1gm q24h and flagyl 500mg q8h (ordered)  F/u pending cx  F/u RPR  F/u tickborne studies  Trend temps/WBC  Monitor rash progression; appreciate hematology recs  Supportive care and additional management per primary team    D/w Dr. Ramírez  D/w Dr. Rigoberto Werner covering service from 12/23-12/29  Infectious Diseases will continue to follow. Please call with any questions.   Emily Urbano M.D.  [facility] Division of Infectious Diseases [phone]  For after 5 P.M. and weekends, please call [phone]

## 2023-12-22 NOTE — DISCHARGE NOTE PROVIDER - NSDCCPCAREPLAN_GEN_ALL_CORE_FT
PRINCIPAL DISCHARGE DIAGNOSIS  Diagnosis: Orbital cellulitis  Assessment and Plan of Treatment: You have been admitted w/ orbital cellulitis which is the likely source of your acute infection/febrile illness. You have been treated with IV antibiotics including zosyn, ceftriaxone, and flagyl. Cultures are pending.  You also have been found to have inflammation of your colon on CT scan howveer unclear if this is infectious in nature as your abdominal exam is benign.   You are being transferred to [facility] as services you require, rheumatology and opthalmology, are unavailable at [facility] at this time.     PRINCIPAL DISCHARGE DIAGNOSIS  Diagnosis: Orbital cellulitis  Assessment and Plan of Treatment: You have been admitted w/ orbital cellulitis which is the likely source of your acute infection/febrile illness. You have been treated with IV antibiotics including zosyn, ceftriaxone, and flagyl. Cultures are pending.  You also have been found to have inflammation of your colon on CT scan howveer unclear if this is infectious in nature as your abdominal exam is benign.   You are being transferred to [facility] as services you require, rheumatology and opthalmology, are unavailable at [facility] at this time.

## 2023-12-22 NOTE — H&P ADULT - TIME BILLING
The patient feels that the cataract is significantly impacting daily activities and has elected cataract surgery. The risks, benefits, and alternatives to surgery were discussed. The patient elects to proceed with surgery. 75minutes spent on this visit, 50% visit time spent in care co-ordination with other attendings and counselling patient ,writing admission orders ( see complete and current orders and order section) ,requesting necessary consults ,informing family about status & plan of care .I have discussed care plan with [facility] wellness/admitting /nursing   department ,outpatient PCP , hospital consultants , ER physician & med staff . 75minutes spent on this visit, 50% visit time spent in care co-ordination with other attendings and counselling patient ,writing admission orders ( see complete and current orders and order section) ,requesting necessary consults ,informing family about status & plan of care .I have discussed care plan with [facility] wellness/admitting /nursing   department ,outpatient PCP , hospital consultants , ER physician & med staff .

## 2023-12-22 NOTE — ED PROVIDER NOTE - OBJECTIVE STATEMENT
Patient is a 58-year-old female who presents to the emergency room with multiple medical issues.  Past medical history of CAD diabetes insomnia major depressive disorder GERD hypertension irritable bowel syndrome with diarrhea cirrhosis of the liver nonalcoholic fatty liver hepatic encephalopathy bipolar disorder chronic A-fib on Coumadin blepharitis of the left eye.  It appears the patient was started on erythromycin ointment to the left eye on 1218.  Per [facility] signout patient was transferred to [place] for reported hypotension and tachycardia diffuse purpura.  Patient had a temperature of 99.3 with a heart rate of 116 and a blood pressure of 88/60.  Patient was started on fluids EMS was called and patient was sent to the emergency room. Patient is a 58-year-old female who presents to the emergency room with multiple medical issues.  Past medical history of CAD diabetes insomnia major depressive disorder GERD hypertension irritable bowel syndrome with diarrhea cirrhosis of the liver nonalcoholic fatty liver hepatic encephalopathy bipolar disorder chronic A-fib on Coumadin blepharitis of the left eye.  It appears the patient was started on erythromycin ointment to the left eye on 1218.  Per [facility] signout patient was transferred to [place] for reported hypotension and tachycardia diffuse purpura.  Patient had a temperature of 99.3 with a heart rate of 116 and a blood pressure of 88/60.  Patient was started on fluids EMS was called and patient was sent to the emergency room.

## 2023-12-22 NOTE — ED PROVIDER NOTE - CARE PLAN
Rx Auth received from the [facility] to RF pt's:  citalopram (CeleXA) 40 MG tablet--take 1 tab po daily. #90 Last Rx was on 11-22-22 for #90 RF3  And  PANTOPRAZOLE SOD DR 20 MG TAB--take 1 tab po daily. #90 Last Rx was on 11-22-22 for #90 RF3    Last seen by PCP on 4-24-23 for a pre-op  Next scheduled appt is on 11-29-23 for CPE      PASSED  Medication: citalopram and pantoprazole  Last office visit date: 4-24-23  Next appointment scheduled?: Yes   Number of refills given: #90 RF0 on each       1 Principal Discharge DX:	Fever  Secondary Diagnosis:	Rash

## 2023-12-22 NOTE — CONSULT NOTE ADULT - ASSESSMENT
[ASSESSMENT and  PLAN]        RECOMMENDATIONS  Transfuse PRBC as clinically indicated.   Transfuse PRBC if Hgb <7.0 or if symptomatic.   Follow CBC    Check Anemia studies.      Ferritin, Iron studies     B12, Folate     ESR, CRP    DVT Prophylaxis  SQ Lovenox or SQ heparin    Discussed plan of care with patient and or family in detail.   Pt/Family expressed understanding of the treatment plan.   Risks, benefits and alternatives discussed in detail.   Opportunity given for questions and discussion.   Questions or concerns all addressed and answered to their satisfaction, and in lay terms.     Discussed with  xxxxxxx.    Thank you for consulting us.     > xxxxxx minutes spent in direct patient care, examining and counseling patient,  reviewing  the notes, lab data/ imaging , discussion with multidisciplinary team.      [ASSESSMENT and  PLAN]    Anemia chronic disease  elevated elevated ESR  elevated CRP  Erythema multiforma  hx ulcerative colitis      58-year-old female ,[facility] resident who presents to the emergency room with multiple medical issues.  Past medical history of CAD diabetes insomnia major depressive disorder GERD hypertension irritable bowel syndrome with diarrhea cirrhosis of the liver nonalcoholic fatty liver hepatic encephalopathy bipolar disorder chronic A-fib on Coumadin blepharitis of the left eye.    Per PCP signout patient was transferred to [place] for reported hypotension and tachycardia diffuse purpura.  Patient had a temperature of 99.3 with a heart rate of 116 and a blood pressure of 88/60.  Patient was FEBRILE later in ER with TEMP 101 , septic workup sent ,  started on iv abx and fluids .CT abd showed pancolitis , seen by GI and ID . Found to have cellulitis of lids b/l R>L       RECOMMENDATIONS  Transfuse PRBC as clinically indicated.   Transfuse PRBC if Hgb <7.0 or if symptomatic.   Follow CBC    Check Anemia studies.      Ferritin, Iron studies     B12, Folate     ESR, CRP    DVT Prophylaxis  SQ Lovenox or SQ heparin    Discussed plan of care with patient and or family in detail.   Pt/Family expressed understanding of the treatment plan.   Risks, benefits and alternatives discussed in detail.   Opportunity given for questions and discussion.   Questions or concerns all addressed and answered to their satisfaction, and in lay terms.     Discussed with  xxxxxxx.    Thank you for consulting us.     > xxxxxx minutes spent in direct patient care, examining and counseling patient,  reviewing  the notes, lab data/ imaging , discussion with multidisciplinary team.      [ASSESSMENT and  PLAN]    Anemia chronic disease  elevated elevated ESR  elevated CRP  Erythema multiforma  hx ulcerative colitis      58-year-old female ,[facility] resident who presents to the emergency room with multiple medical issues.  Past medical history of CAD diabetes insomnia major depressive disorder GERD hypertension irritable bowel syndrome with diarrhea cirrhosis of the liver nonalcoholic fatty liver hepatic encephalopathy bipolar disorder chronic A-fib on Coumadin blepharitis of the left eye.    Per PCP signout patient was transferred to [place] for reported hypotension and tachycardia diffuse purpura.  Patient had a temperature of 99.3 with a heart rate of 116 and a blood pressure of 88/60.  Patient was FEBRILE later in ER with TEMP 101 , septic workup sent ,  started on iv abx and fluids .CT abd showed pancolitis , seen by GI and ID . Found to have cellulitis of lids b/l R>L       RECOMMENDATIONS  Transfuse PRBC as clinically indicated.   Transfuse PRBC if Hgb <7.0 or if symptomatic.   Follow CBC    Check Anemia studies.      Ferritin, Iron studies     B12, Folate     ESR, CRP    DVT Prophylaxis  SQ Lovenox or SQ heparin    Discussed plan of care with patient and or family in detail.   Pt/Family expressed understanding of the treatment plan.   Risks, benefits and alternatives discussed in detail.   Opportunity given for questions and discussion.   Questions or concerns all addressed and answered to their satisfaction, and in lay terms.     Discussed with  xxxxxxx.    Thank you for consulting us.     > xxxxxx minutes spent in direct patient care, examining and counseling patient,  reviewing  the notes, lab data/ imaging , discussion with multidisciplinary team.      [ASSESSMENT and  PLAN]  D63. 8    Anemia chronic disease  R70. 0    elevated elevated ESR  R79. 82  elevated CRP  K51. 90  hx ulcerative colitis  L51. 9    rash    58-year-old female ,[facility] resident who presents to the emergency room with multiple medical issues.  Past medical history of CAD diabetes insomnia major depressive disorder GERD hypertension irritable bowel syndrome with diarrhea cirrhosis of the liver nonalcoholic fatty liver hepatic encephalopathy bipolar disorder chronic A-fib on Coumadin blepharitis of the left eye.    Per PCP signout patient was transferred to [place] for reported hypotension and tachycardia diffuse purpura.  Patient had a temperature of 99.3 with a heart rate of 116 and a blood pressure of 88/60.  Patient was FEBRILE later in ER with TEMP 101 , septic workup sent ,  started on iv abx and fluids .CT abd showed pancolitis , seen by GI and ID . Found to have cellulitis of lids b/l R>L       ? inflammatory rash due to ? Erythema multiforme or vasculitis, or dermatomyositis.   Bl Eyelid redness appear oddly semi-symmetric. R>L.   Similarly with BL periungual reddish to slight purple discoloration of hands, without nail involvement, and to some extent toes.   Doubt purpura  Petechiae not seen.     WBC with mild leukocytosis. Pt non-toxic appearing.   mod anemia, with Hgb 8. likely anemia due to chronic disease  plts normal.     RECOMMENDATIONS    Follow CBC  No indication for transfusion currently.   Transfuse PRBC as clinically indicated.   Transfuse PRBC if Hgb <7.0 or if symptomatic.     Check Anemia studies.      Ferritin, Iron studies     B12, Folate     ESR, CRP    Check rheum studies    CATHERINE, anti-DS-DNA     AntiRo     ANCA studies     HSV 1/2 abs     CMV abs     Aldolase, LDH, CPK    D/w ID  additional infectious workup to be examined  RPR, etc    Consider eval with opth and derm as unusual constellation of sx,  Consider rheum eval     GI evaluating for inflammatory bowel disease.     DVT Prophylaxis  SQ Lovenox or SQ heparin    Discussed plan of care with patient and in detail.   Pt/Family expressed understanding of the treatment plan.   Opportunity given for questions and discussion.   Questions or concerns all addressed and answered to their satisfaction, and in lay terms.     Discussed with Dr EMELINA Urbano [ID] and Dr NAHUM Ramírez, Dr EMELINA Franklin    Thank you for consulting us.      [ASSESSMENT and  PLAN]  D63. 8    Anemia chronic disease  R70. 0    elevated elevated ESR  R79. 82  elevated CRP  K51. 90  hx ulcerative colitis  L51. 9    rash    58-year-old female ,[facility] resident who presents to the emergency room with multiple medical issues.  Past medical history of CAD diabetes insomnia major depressive disorder GERD hypertension irritable bowel syndrome with diarrhea cirrhosis of the liver nonalcoholic fatty liver hepatic encephalopathy bipolar disorder chronic A-fib on Coumadin blepharitis of the left eye.    Per PCP signout patient was transferred to [place] for reported hypotension and tachycardia diffuse purpura.  Patient had a temperature of 99.3 with a heart rate of 116 and a blood pressure of 88/60.  Patient was FEBRILE later in ER with TEMP 101 , septic workup sent ,  started on iv abx and fluids .CT abd showed pancolitis , seen by GI and ID . Found to have cellulitis of lids b/l R>L       ? inflammatory rash due to ? Erythema multiforme or vasculitis, or dermatomyositis.   Bl Eyelid redness appear oddly semi-symmetric. R>L.   Similarly with BL periungual reddish to slight purple discoloration of hands, without nail involvement, and to some extent toes.   Doubt purpura  Petechiae not seen.     WBC with mild leukocytosis. Pt non-toxic appearing.   mod anemia, with Hgb 8. likely anemia due to chronic disease  plts normal.     RECOMMENDATIONS    Follow CBC  No indication for transfusion currently.   Transfuse PRBC as clinically indicated.   Transfuse PRBC if Hgb <7.0 or if symptomatic.     Check Anemia studies.      Ferritin, Iron studies     B12, Folate     ESR, CRP    Check rheum studies    CATHERINE, anti-DS-DNA     AntiRo     ANCA studies     HSV 1/2 abs     CMV abs     Aldolase, LDH, CPK    D/w ID  additional infectious workup to be examined  RPR, etc    Consider eval with opth and derm as unusual constellation of sx,  Consider rheum eval     GI evaluating for inflammatory bowel disease.     DVT Prophylaxis  SQ Lovenox or SQ heparin    Discussed plan of care with patient and in detail.   Pt/Family expressed understanding of the treatment plan.   Opportunity given for questions and discussion.   Questions or concerns all addressed and answered to their satisfaction, and in lay terms.     Discussed with Dr EMELINA Urbano [ID] and Dr NAHUM Ramírez, Dr EMELINA Franklin    Thank you for consulting us.

## 2023-12-22 NOTE — CONSULT NOTE ADULT - CONSULT REASON
Rash D63. 8    Anemia chronic disease  R70. 0    elevated elevated ESR  R79. 82  elevated CRP  K51. 90  hx ulcerative colitis  L51. 9    rash

## 2023-12-22 NOTE — PATIENT PROFILE ADULT - FALL HARM RISK - PATIENT NEEDS ASSISTANCE
Complex Repair And Double Advancement Flap Text: The defect edges were debeveled with a #15 scalpel blade.  The primary defect was closed partially with a complex linear closure.  Given the location of the remaining defect, shape of the defect and the proximity to free margins a double advancement flap was deemed most appropriate for complete closure of the defect.  Using a sterile surgical marker, an appropriate advancement flap was drawn incorporating the defect and placing the expected incisions within the relaxed skin tension lines where possible.    The area thus outlined was incised deep to adipose tissue with a #15 scalpel blade.  The skin margins were undermined to an appropriate distance in all directions utilizing iris scissors. Standing/Walking/Toileting

## 2023-12-22 NOTE — DISCHARGE NOTE PROVIDER - DETAILS OF MALNUTRITION DIAGNOSIS/DIAGNOSES
This patient has been assessed with a concern for Malnutrition and was treated during this hospitalization for the following Nutrition diagnosis/diagnoses:     -  12/24/2023: Moderate protein-calorie malnutrition

## 2023-12-22 NOTE — CONSULT NOTE ADULT - ASSESSMENT
Initial evaluation/Pulmonary Critical Care consultation requested by DR MORGAN   on 12/22/2023  from Dr Kev Franklin    Patient examined chart reviewed    HOSPITAL ADMISSION   PATIENT CAME  FROM (if information available)      REASON FOR VISIT  .. Management of problems listed below        REVIEW OF SYMPTOMS   Able to give ROS  Yes     RELIABILITY +/-   CONSTITUTIONAL Weakness Yes    ENDOCRINE  No heat or cold intolerance    ALLERGY No hives  Sore throat No stridor  RESP Shortness of breath YES   NEURO New weakness No   CARDIAC   Palpitations No         PHYSICAL EXAM    HEENT Unremarkable  atraumatic   RESP Fair air entry  Harsh breath sound   CARDIAC S1 S2 No S3     NO JVD    ABDOMEN No hepatosplenomegaly   PEDAL EDEMA present No calf tenderness  NO rash     SUMMARY.  . 12/22/2023  59 y/o F w/ pmh of cad, dm, mdd, gerd, IBS, cirrhosis 2/2 to nonalcoholic fatty liver, bipolar, afib on coumadin, today presented to [facility] for hypotension and tachycardia with new diffuse purpura to the LE ongoing the last few days and today morning waking up by R>L orbital swelling. In the ED pt was worked up and was found to have a INR of 3.83, diffuse LE ecchymosis and R>L orbital swelling and CTH finding of 8mm aneurysm. ED team requested ICU consult given pt was complaining of UE weakness for vasculitis concern  . 12/22/2023   It appears the patient was started on erythromycin ointment to the left eye on 1218.  . 12/22/2023 Pulm consulktd   . PMHx .   . HOME MEDS .  . ISSUES CURRENT ADMISSION .  . CENTRIPETAL SKIN RASH (More in periphery)  . PRE ORBITAL CELLULITIS   . PANCOLITIS 12/22/2023 CT  . A fib ON COUMADIN PMH  . ANEMIA   . BRAIN ANEURYSM   PROBLEM/ASSESMENT/PLAN.  . SEPSIS  . PREORBITAL CELLULITIS  . PANCOLITIS   .. w 12/22/2023 w 12   .. CT CHEST ABD 12/22/2023  .... Mild pancolitis Dependent atelectasis   .. RVP 12/22/2023 RVP (-)   .. 12/22/2023 Magdalena Urbano   .. 12/22/2023 Flagul     . CAD   .. CK 12/22/2023 CK 40     . A fib ON COUMADIN PMH    . ANEMIA   .. Hb 12/22/2023 Hb 8.2   .. monitor    . PURPURA  .. Plt 12/22/2023 plt 399   .. INR 12/22/2023 INR 3.8     RENAL   .. Na 12/22/2023 Na 136  .. CO2 12/22/2023 CO2 24  .. Cr 12/22/2023 Cr .6     . RO CVA   .. CT h 12/22/2023   .... chr l post frontal infacrt     . RO VTE   .. V duplx 12/22/2023 (-)     . ORBITAL CELLULITIS  .. CT ORBITS 12/22/2023   .... r periorbital and r facial soft tissue swelling  .... mild l periorbital soft tissue swelling   .... patchy paranasal sinus mucosal thickening and opacn charissa ethmoid air cells   .... l laterally projecting 8 x 6 x 6 mm aneurysm betw takeoff of l sup cerebellar artery and l post cerebral artery   .... NO POST SEPTAL/ORBITAL CELLULITIS     . BRAIN ANEURYSM  .. CT ORBITS 12/22/2023   .... l laterally projecting 8 x 6 x 6 mm aneurysm betw takeoff of l sup cerebellar artery and l post cerebral artery   .... NS CONSULT IS RECOMMENDED     . SKIN RASH   .... Palpable purpura lower extremities   .... BL purple discoloration of eyelids with local swelling   .. DD includes vasculitis eg Henoch Schonlein Severe infection eg meningococcal, Staph Strep RMSF (but no ho travel [place])     OVERALL  . 59 y/o F w/ pmh of cad, dm, mdd, gerd, IBS, cirrhosis 2/2 to nonalcoholic fatty liver, bipolar, afib on coumadin admitted 12/22/2023 with orbital cellulitis and rash extremities loked like palpable purpura  incidental finding of brain aneurysm   . CENTRIPETAL SKIN RASH (More in periphery) Suggest ID Hemat eval   . PRE ORBITAL CELLULITIS ID on case 12/22/2023 Started on rocephin  . PANCOLITIS 12/22/2023 CT 12/22/2023 ID on case 12/22/2023 Started on flagyl   . A fib ON COUMADIN PMH  . ANEMIA 12/22/2023 suggest hemat eval   . BRAIN ANEURYSM 12/22/2023 suggest neuro eval    TIME SPENT.  . Over 55 minutes aggregate care time spent on encounter; activities included   direct patient care, counseling and/or coordinating care reviewing notes, lab data/ imaging , discussion with multidisciplinary team/ patient  /family and explaining in detail risks, benefits, alternatives  of the recommendations     PATIENT.  . SPECCIO     Initial evaluation/Pulmonary Critical Care consultation requested by DR MORGAN   on 12/22/2023  from Dr Kev Franklin    Patient examined chart reviewed    HOSPITAL ADMISSION   PATIENT CAME  FROM (if information available)      REASON FOR VISIT  .. Management of problems listed below        REVIEW OF SYMPTOMS   Able to give ROS  Yes     RELIABILITY +/-   CONSTITUTIONAL Weakness Yes    ENDOCRINE  No heat or cold intolerance    ALLERGY No hives  Sore throat No stridor  RESP Shortness of breath YES   NEURO New weakness No   CARDIAC   Palpitations No         PHYSICAL EXAM    HEENT Unremarkable  atraumatic   RESP Fair air entry  Harsh breath sound   CARDIAC S1 S2 No S3     NO JVD    ABDOMEN No hepatosplenomegaly   PEDAL EDEMA present No calf tenderness  NO rash     SUMMARY.  . 12/22/2023  59 y/o F w/ pmh of cad, dm, mdd, gerd, IBS, cirrhosis 2/2 to nonalcoholic fatty liver, bipolar, afib on coumadin, today presented to [facility] for hypotension and tachycardia with new diffuse purpura to the LE ongoing the last few days and today morning waking up by R>L orbital swelling. In the ED pt was worked up and was found to have a INR of 3.83, diffuse LE ecchymosis and R>L orbital swelling and CTH finding of 8mm aneurysm. ED team requested ICU consult given pt was complaining of UE weakness for vasculitis concern  . 12/22/2023   It appears the patient was started on erythromycin ointment to the left eye on 1218.  . 12/22/2023 Pulm consulktd   . PMHx .   . HOME MEDS .  . ISSUES CURRENT ADMISSION .  . CENTRIPETAL SKIN RASH (More in periphery)  . PRE ORBITAL CELLULITIS   . PANCOLITIS 12/22/2023 CT  . A fib ON COUMADIN PMH  . ANEMIA   . BRAIN ANEURYSM   PROBLEM/ASSESMENT/PLAN.  . SEPSIS  . PREORBITAL CELLULITIS  . PANCOLITIS   .. w 12/22/2023 w 12   .. CT CHEST ABD 12/22/2023  .... Mild pancolitis Dependent atelectasis   .. RVP 12/22/2023 RVP (-)   .. 12/22/2023 Magdalena Urbano   .. 12/22/2023 Flagul     . CAD   .. CK 12/22/2023 CK 40     . A fib ON COUMADIN PMH    . ANEMIA   .. Hb 12/22/2023 Hb 8.2   .. monitor    . PURPURA  .. Plt 12/22/2023 plt 399   .. INR 12/22/2023 INR 3.8     RENAL   .. Na 12/22/2023 Na 136  .. CO2 12/22/2023 CO2 24  .. Cr 12/22/2023 Cr .6     . RO CVA   .. CT h 12/22/2023   .... chr l post frontal infacrt     . RO VTE   .. V duplx 12/22/2023 (-)     . ORBITAL CELLULITIS  .. CT ORBITS 12/22/2023   .... r periorbital and r facial soft tissue swelling  .... mild l periorbital soft tissue swelling   .... patchy paranasal sinus mucosal thickening and opacn charissa ethmoid air cells   .... l laterally projecting 8 x 6 x 6 mm aneurysm betw takeoff of l sup cerebellar artery and l post cerebral artery   .... NO POST SEPTAL/ORBITAL CELLULITIS     . BRAIN ANEURYSM  .. CT ORBITS 12/22/2023   .... l laterally projecting 8 x 6 x 6 mm aneurysm betw takeoff of l sup cerebellar artery and l post cerebral artery   .... NS CONSULT IS RECOMMENDED     . SKIN RASH   .... Palpable purpura lower extremities   .... BL purple discoloration of eyelids with local swelling   .. DD includes vasculitis eg Henoch Schonlein Severe infection eg meningococcal, Staph Strep RMSF (but no ho travel [place])     OVERALL  . 59 y/o F w/ pmh of cad, dm, mdd, gerd, IBS, cirrhosis 2/2 to nonalcoholic fatty liver, bipolar, afib on coumadin admitted 12/22/2023 with orbital cellulitis and rash extremities loked like palpable purpura  incidental finding of brain aneurysm   . CENTRIPETAL SKIN RASH (More in periphery) Suggest ID Hemat eval   . PRE ORBITAL CELLULITIS ID on case 12/22/2023 Started on rocephin  . PANCOLITIS 12/22/2023 CT 12/22/2023 ID on case 12/22/2023 Started on flagyl   . A fib ON COUMADIN PMH  . ANEMIA 12/22/2023 suggest hemat eval   . BRAIN ANEURYSM 12/22/2023 suggest neuro eval    TIME SPENT.  . Over 55 minutes aggregate care time spent on encounter; activities included   direct patient care, counseling and/or coordinating care reviewing notes, lab data/ imaging , discussion with multidisciplinary team/ patient  /family and explaining in detail risks, benefits, alternatives  of the recommendations     PATIENT.  . SPECCIO

## 2023-12-22 NOTE — H&P ADULT - PROBLEM SELECTOR PLAN 6
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 Neurosurgical   consultation is recommended.

## 2023-12-22 NOTE — ED PROVIDER NOTE - PROGRESS NOTE DETAILS
Discussed case with neurosurgery at [facility] Dr. Conchita Castellanos who states patient does not need to come to [facility] aneurysm is is an incidental finding Discussed case with neurosurgery at [facility] Dr. Conchita Castellanos who states patient does not need to come to [facility] aneurysm is is an incidental finding ICU PA discussed case with ophthalmology resident intraocular pressures were checked by me 17 mmHg for both eyes.  No need for emergent ophthalmology evaluation as per Optho resident.

## 2023-12-22 NOTE — H&P ADULT - NSHPLABSRESULTS_GEN_ALL_CORE
< from: US Duplex Venous Lower Ext Complete, Bilateral (12.22.23 @ 03:40) >      RIGHT:  Normal compressibility of the RIGHT common femoral, femoral and popliteal   veins.  Doppler examination shows normal spontaneous and phasic flow.  No RIGHT calf vein thrombosis is detected.    LEFT:  Normal compressibility of the LEFT common femoral, femoral and popliteal   veins.  Doppler examination shows normal spontaneous and phasic flow.  No LEFT calf vein thrombosis is detected.    Incidental note is made of calcific plaque involving the left common   femoral artery.    IMPRESSION:  No evidence of deep venous thrombosis in either lower extremity.    < end of copied text > < from: US Duplex Venous Lower Ext Complete, Bilateral (12.22.23 @ 03:40) >      RIGHT:  Normal compressibility of the RIGHT common femoral, femoral and popliteal   veins.  Doppler examination shows normal spontaneous and phasic flow.  No RIGHT calf vein thrombosis is detected.    LEFT:  Normal compressibility of the LEFT common femoral, femoral and popliteal   veins.  Doppler examination shows normal spontaneous and phasic flow.  No LEFT calf vein thrombosis is detected.    Incidental note is made of calcific plaque involving the left common   femoral artery.    IMPRESSION:  No evidence of deep venous thrombosis in either lower extremity.    < end of copied text >< from: CT Abdomen and Pelvis w/ IV Cont (12.22.23 @ 02:23) >    ed   spots in the upper and lower extremities, evaluate for vasculitis    COMPARISON: None.    CONTRAST/COMPLICATIONS:  IV Contrast: Omnipaque 350 (accession [id number]), IV contrast documented   in unlinked concurrent exam (accession [id number])  90 cc administered   (accession [id number]), 0 cc administered (accession [id number])   10 cc   discarded (accession [id number]), 0 cc discarded (accession [id number])  Oral Contrast: NONE  Complications: None reported at time of study completion    PROCEDURE:  CT of the Chest, Abdomen and Pelvis was performed.  Sagittal and coronal reformats were performed.    FINDINGS:  CHEST:  LUNGS AND LARGE AIRWAYS: Patent central airways. Dependent atelectatic   changes at the lung bases.  PLEURA: No pleural effusion.  VESSELS: Within normal limits.  HEART: Heart size is normal. No pericardial effusion.  MEDIASTINUM AND FOREST: No lymphadenopathy.  CHEST WALL AND LOWER NECK: Within normal limits.    ABDOMEN AND PELVIS:  LIVER: Within normal limits.  BILE DUCTS:Normal caliber.  GALLBLADDER: Cholecystectomy.  SPLEEN: Within normal limits.  PANCREAS: Within normal limits.  ADRENALS: Within normal limits.  KIDNEYS/URETERS: Within normal limits.    BLADDER: Within normal limits.  REPRODUCTIVE ORGANS: Posterior calcified fibroid.    BOWEL: No bowel obstruction. Appendix is unremarkable. There is   thickening, thumbprinting and hyperemia of the colon from the cecum   through rectum compatible with pancolitis.  PERITONEUM: No ascites.  VESSELS: Aorta is not dilated. Moderate atherosclerotic vascular   calcification.  No perivascular inflammation as clinically questioned.  RETROPERITONEUM/LYMPH NODES: No lymphadenopathy.  ABDOMINAL WALL: Within normal limits.  BONES: Within normal limits.    IMPRESSION:  Mild pancolitis, of infectious or inflammatory etiology.    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 Neurosurgical   consultation is recommended.    < end of copied text > < from: US Duplex Venous Lower Ext Complete, Bilateral (12.22.23 @ 03:40) >      RIGHT:  Normal compressibility of the RIGHT common femoral, femoral and popliteal   veins.  Doppler examination shows normal spontaneous and phasic flow.  No RIGHT calf vein thrombosis is detected.    LEFT:  Normal compressibility of the LEFT common femoral, femoral and popliteal   veins.  Doppler examination shows normal spontaneous and phasic flow.  No LEFT calf vein thrombosis is detected.    Incidental note is made of calcific plaque involving the left common   femoral artery.    IMPRESSION:  No evidence of deep venous thrombosis in either lower extremity.    < end of copied text >< from: CT Abdomen and Pelvis w/ IV Cont (12.22.23 @ 02:23) >    ed   spots in the upper and lower extremities, evaluate for vasculitis    COMPARISON: None.    CONTRAST/COMPLICATIONS:  IV Contrast: Omnipaque 350 (accession [id number]), IV contrast documented   in unlinked concurrent exam (accession [id number])  90 cc administered   (accession [id number]), 0 cc administered (accession [id number])   10 cc   discarded (accession [id number]), 0 cc discarded (accession [id number])  Oral Contrast: NONE  Complications: None reported at time of study completion    PROCEDURE:  CT of the Chest, Abdomen and Pelvis was performed.  Sagittal and coronal reformats were performed.    FINDINGS:  CHEST:  LUNGS AND LARGE AIRWAYS: Patent central airways. Dependent atelectatic   changes at the lung bases.  PLEURA: No pleural effusion.  VESSELS: Within normal limits.  HEART: Heart size is normal. No pericardial effusion.  MEDIASTINUM AND FOREST: No lymphadenopathy.  CHEST WALL AND LOWER NECK: Within normal limits.    ABDOMEN AND PELVIS:  LIVER: Within normal limits.  BILE DUCTS:Normal caliber.  GALLBLADDER: Cholecystectomy.  SPLEEN: Within normal limits.  PANCREAS: Within normal limits.  ADRENALS: Within normal limits.  KIDNEYS/URETERS: Within normal limits.    BLADDER: Within normal limits.  REPRODUCTIVE ORGANS: Posterior calcified fibroid.    BOWEL: No bowel obstruction. Appendix is unremarkable. There is   thickening, thumbprinting and hyperemia of the colon from the cecum   through rectum compatible with pancolitis.  PERITONEUM: No ascites.  VESSELS: Aorta is not dilated. Moderate atherosclerotic vascular   calcification.  No perivascular inflammation as clinically questioned.  RETROPERITONEUM/LYMPH NODES: No lymphadenopathy.  ABDOMINAL WALL: Within normal limits.  BONES: Within normal limits.    IMPRESSION:  Mild pancolitis, of infectious or inflammatory etiology.    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 Neurosurgical   consultation is recommended.    < end of copied text >

## 2023-12-22 NOTE — ED PROVIDER NOTE - NS ED MD DISPO SPECIAL CONSIDERATION1
Long Salazar's chief complaint for this visit includes:  Chief Complaint   Patient presents with    Follow Up     Turbinate swelling, noisy breathing. Was to continue Flonase and Verimist. With no changes. Has Allergy appt in Dec. Recent cold.      PCP: Sylwia Yanes    Referring Provider:  No referring provider defined for this encounter.    There were no vitals taken for this visit.           None None/Remote Telemetry

## 2023-12-22 NOTE — ED PROVIDER NOTE - PHYSICAL EXAMINATION
Nonblanchable diffuse petechial rash  Right orbital redness and swelling  Pupils equal round and reactive no conjunctival erythema extraocular muscles intact

## 2023-12-22 NOTE — PATIENT PROFILE ADULT - NURSING HOMES
[facility]

## 2023-12-22 NOTE — H&P ADULT - NSICDXPASTMEDICALHX_GEN_ALL_CORE_FT
PAST MEDICAL HISTORY:  Arteriosclerotic heart disease (ASHD)     Bipolar illness     Blepharitis, bilateral     Brain aneurysm     Chronic atrial fibrillation     DM (diabetes mellitus)     GERD (gastroesophageal reflux disease)     Hepatic encephalopathy     History of ataxia     HTN (hypertension)     IBS (irritable bowel syndrome)     Liver cirrhosis     MDD (major depressive disorder)     Moderate protein-calorie malnutrition     Nonalcoholic fatty liver disease without nonalcoholic steatohepatitis (PATEL)     OA (osteoarthritis)     Ulcerative colitis     Uterine fibroid

## 2023-12-22 NOTE — ED PROVIDER NOTE - CRITICAL CARE ATTENDING CONTRIBUTION TO CARE
Patient is a 58-year-old female who presents to the emergency room with multiple medical issues.  Past medical history of CAD diabetes insomnia major depressive disorder GERD hypertension irritable bowel syndrome with diarrhea cirrhosis of the liver nonalcoholic fatty liver hepatic encephalopathy bipolar disorder chronic A-fib on Coumadin blepharitis of the left eye.  It appears the patient was started on erythromycin ointment to the left eye on 1218.  Per [facility] signout patient was transferred to [place] for reported hypotension and tachycardia diffuse purpura.  Patient had a temperature of 99.3 with a heart rate of 116 and a blood pressure of 88/60.  Patient was started on fluids EMS was called and patient was sent to the emergency room. Patient is a 58-year-old female who presents to the emergency room with multiple medical issues.  Past medical history of CAD diabetes insomnia major depressive disorder GERD hypertension irritable bowel syndrome with diarrhea cirrhosis of the liver nonalcoholic fatty liver hepatic encephalopathy bipolar disorder chronic A-fib on Coumadin blepharitis of the left eye.  It appears the patient was started on erythromycin ointment to the left eye on 1218.  Per [facility] signout patient was transferred to [place] for reported hypotension and tachycardia diffuse purpura.  Patient had a temperature of 99.3 with a heart rate of 116 and a blood pressure of 88/60.  Patient was started on fluids EMS was called and patient was sent to the emergency room.

## 2023-12-22 NOTE — CONSULT NOTE ADULT - CONSULT REQUESTED DATE/TIME
22-Dec-2023 07:10
22-Dec-2023 11:53
22-Dec-2023 05:14
22-Dec-2023
22-Dec-2023 03:14
22-Dec-2023 18:49
22-Dec-2023 12:45

## 2023-12-22 NOTE — H&P ADULT - PROBLEM SELECTOR PLAN 4
Rash unlikely infectious; possibly in the setting of blood thinner use ,2/2 to Coumadin ,hematology cons requested

## 2023-12-22 NOTE — ED PROVIDER NOTE - CLINICAL SUMMARY MEDICAL DECISION MAKING FREE TEXT BOX
Patient is a 58-year-old female who presents to the emergency room with multiple medical issues.  Past medical history of CAD diabetes insomnia major depressive disorder GERD hypertension irritable bowel syndrome with diarrhea cirrhosis of the liver nonalcoholic fatty liver hepatic encephalopathy bipolar disorder chronic A-fib on Coumadin blepharitis of the left eye.  It appears the patient was started on erythromycin ointment to the left eye on 1218.  Per [facility] signout patient was transferred to [place] for reported hypotension and tachycardia diffuse purpura.  Patient had a temperature of 99.3 with a heart rate of 116 and a blood pressure of 88/60.  Patient was started on fluids EMS was called and patient was sent to the emergency room. Patient is a 58-year-old female who presents to the emergency room with multiple medical issues.  Past medical history of CAD diabetes insomnia major depressive disorder GERD hypertension irritable bowel syndrome with diarrhea cirrhosis of the liver nonalcoholic fatty liver hepatic encephalopathy bipolar disorder chronic A-fib on Coumadin blepharitis of the left eye.  It appears the patient was started on erythromycin ointment to the left eye on 1218.  Per [facility] signout patient was transferred to [place] for reported hypotension and tachycardia diffuse purpura.  Patient had a temperature of 99.3 with a heart rate of 116 and a blood pressure of 88/60.  Patient was started on fluids EMS was called and patient was sent to the emergency room.

## 2023-12-22 NOTE — PATIENT PROFILE ADULT - NSPROHMDIABETBLDGLCTARGET_GEN_A_NUR
"Anesthesia Discharge Summary    Admit Date: 2022    Discharge Date and Time: 2022  3:16 PM    Attending Physician:  No att. providers found    Discharge Provider:  Julito Mendez MD    Active Problems: There is no problem list on file for this patient.       Discharged Condition: good    Reason for Admission:back mass    Hospital Course: Patient tolerate procedure and anesthesia well. Test performed without complication.    Consults: none    Significant Diagnostic Studies: None    Treatments/Procedures: Procedure(s) (LRB): anesthesia for exam    Disposition: Home or Self Care    Patient Instructions:   Discharge Medication List as of 2022  2:08 PM      CONTINUE these medications which have NOT CHANGED    Details   nystatin (MYCOSTATIN) ointment Apply topically 2 (two) times daily., Starting Mon 2022, Until Tue 4/18/2023, Historical Med               Discharge Procedure Orders (must include Diet, Follow-up, Activity)  No discharge procedures on file.     Discharge instructions - Please return to clinic (contact pediatrician etc..) if:  1) Persistent cough.  2) Respiratory difficulty (including: noisy breathing, nasal flaring, "barky" cough or wheezing).  3) Persistent pain not responsive to prescribed medications (if any).  4) Change in current mental status (age appropriate).  5) Repeating or recurrent episodes of vomiting.  6) Inability to tolerate oral fluids.      " unknown

## 2023-12-22 NOTE — H&P ADULT - PROBLEM SELECTOR PLAN 3
Seen by GI -hx of ulcerative colitis  abnormal ct  pancolitis  check fecal calprotectin  if elevated can start mesalamine 400mg tid  reg diet  Case d/w Dr Maldonado

## 2023-12-22 NOTE — H&P ADULT - ASSESSMENT
< from: US Duplex Venous Lower Ext Complete, Bilateral (12.22.23 @ 03:40) >  ACC: [id number] EXAM:  US DPLX LWR EXT VEINS COMPL BI   ORDERED BY: FRANKI SINCLAIR PATEL     PROCEDURE DATE:  12/22/2023          INTERPRETATION:  CLINICAL INFORMATION: Bilateral lower extremity swelling.    COMPARISON: None available.    TECHNIQUE: Duplex sonography of the BILATERAL LOWER extremity veins with   color and spectral Doppler, with and without compression.    FINDINGS:    RIGHT:  Normal compressibility of the RIGHT common femoral, femoral and popliteal   veins.  Doppler examination shows normal spontaneous and phasic flow.  No RIGHT calf vein thrombosis is detected.    LEFT:  Normal compressibility of the LEFT common femoral, femoral and popliteal   veins.  Doppler examination shows normal spontaneous and phasic flow.  No LEFT calf vein thrombosis is detected.    Incidental note is made of calcific plaque involving the left common   femoral artery.    IMPRESSION:  No evidence of deep venous thrombosis in either lower extremity.    < end of copied text >  < from: CT Abdomen and Pelvis w/ IV Cont (12.22.23 @ 02:23) >  FINDINGS:  CHEST:  LUNGS AND LARGE AIRWAYS: Patent central airways. Dependent atelectatic   changes at the lung bases.  PLEURA: No pleural effusion.  VESSELS: Within normal limits.  HEART: Heart size is normal. No pericardial effusion.  MEDIASTINUM AND FOREST: No lymphadenopathy.  CHEST WALL AND LOWER NECK: Within normal limits.    ABDOMEN AND PELVIS:  LIVER: Within normal limits.  BILE DUCTS:Normal caliber.  GALLBLADDER: Cholecystectomy.  SPLEEN: Within normal limits.  PANCREAS: Within normal limits.  ADRENALS: Within normal limits.  KIDNEYS/URETERS: Within normal limits.    BLADDER: Within normal limits.  REPRODUCTIVE ORGANS: Posterior calcified fibroid.    BOWEL: No bowel obstruction. Appendix is unremarkable. There is   thickening, thumbprinting and hyperemia of the colon from the cecum   through rectum compatible with pancolitis.  PERITONEUM: No ascites.  VESSELS: Aorta is not dilated. Moderate atherosclerotic vascular   calcification.  No perivascular inflammation as clinically questioned.  RETROPERITONEUM/LYMPH NODES: No lymphadenopathy.  ABDOMINAL WALL: Within normal limits.  BONES: Within normal limits.    IMPRESSION:  Mild pancolitis, of infectious or inflammatory etiology.    < end of copied text >   < from: US Duplex Venous Lower Ext Complete, Bilateral (12.22.23 @ 03:40) >  ACC: [id number] EXAM:  US DPLX LWR EXT VEINS COMPL BI   ORDERED BY: FRANKI SINCLAIR PATEL     PROCEDURE DATE:  12/22/2023          INTERPRETATION:  CLINICAL INFORMATION: Bilateral lower extremity swelling.    COMPARISON: None available.    TECHNIQUE: Duplex sonography of the BILATERAL LOWER extremity veins with   color and spectral Doppler, with and without compression.    FINDINGS:    RIGHT:  Normal compressibility of the RIGHT common femoral, femoral and popliteal   veins.  Doppler examination shows normal spontaneous and phasic flow.  No RIGHT calf vein thrombosis is detected.    LEFT:  Normal compressibility of the LEFT common femoral, femoral and popliteal   veins.  Doppler examination shows normal spontaneous and phasic flow.  No LEFT calf vein thrombosis is detected.    Incidental note is made of calcific plaque involving the left common   femoral artery.    IMPRESSION:  No evidence of deep venous thrombosis in either lower extremity.    < end of copied text >  < from: CT Abdomen and Pelvis w/ IV Cont (12.22.23 @ 02:23) >  FINDINGS:  CHEST:  LUNGS AND LARGE AIRWAYS: Patent central airways. Dependent atelectatic   changes at the lung bases.  PLEURA: No pleural effusion.  VESSELS: Within normal limits.  HEART: Heart size is normal. No pericardial effusion.  MEDIASTINUM AND FOREST: No lymphadenopathy.  CHEST WALL AND LOWER NECK: Within normal limits.    ABDOMEN AND PELVIS:  LIVER: Within normal limits.  BILE DUCTS:Normal caliber.  GALLBLADDER: Cholecystectomy.  SPLEEN: Within normal limits.  PANCREAS: Within normal limits.  ADRENALS: Within normal limits.  KIDNEYS/URETERS: Within normal limits.    BLADDER: Within normal limits.  REPRODUCTIVE ORGANS: Posterior calcified fibroid.    BOWEL: No bowel obstruction. Appendix is unremarkable. There is   thickening, thumbprinting and hyperemia of the colon from the cecum   through rectum compatible with pancolitis.  PERITONEUM: No ascites.  VESSELS: Aorta is not dilated. Moderate atherosclerotic vascular   calcification.  No perivascular inflammation as clinically questioned.  RETROPERITONEUM/LYMPH NODES: No lymphadenopathy.  ABDOMINAL WALL: Within normal limits.  BONES: Within normal limits.    IMPRESSION:  Mild pancolitis, of infectious or inflammatory etiology.    < end of copied text >      Patient is a 58-year-old female  ,[facility] resident who presents to the emergency room with multiple medical issues.  Past medical history of CAD diabetes insomnia major depressive disorder GERD hypertension irritable bowel syndrome with diarrhea cirrhosis of the liver nonalcoholic fatty liver hepatic encephalopathy bipolar disorder chronic A-fib on Coumadin blepharitis of the left eye.    Per PCP signout patient was transferred to [place] for reported hypotension and tachycardia diffuse purpura.  Patient had a temperature of 99.3 with a heart rate of 116 and a blood pressure of 88/60.  Patient was FEBRILE later in ER with TEMP 101 , septic workup sent ,  started on iv abx and fluids .CT abd showed pancolitis , seen by GI and ID . Found to have cellulitis of lids b/l R>L     Patient is a 58-year-old female  ,[facility] resident who presents to the emergency room with multiple medical issues.  Past medical history of CAD diabetes insomnia major depressive disorder GERD hypertension irritable bowel syndrome with diarrhea cirrhosis of the liver nonalcoholic fatty liver hepatic encephalopathy bipolar disorder chronic A-fib on Coumadin blepharitis of the left eye.    Per PCP signout patient was transferred to [place] for reported hypotension and tachycardia diffuse purpura.  Patient had a temperature of 99.3 with a heart rate of 116 and a blood pressure of 88/60.  Patient was FEBRILE later in ER with TEMP 101 , septic workup sent ,  started on iv abx and fluids .CT abd showed pancolitis , seen by GI and ID . Found to have cellulitis of lids b/l R>L

## 2023-12-22 NOTE — CONSULT NOTE ADULT - SUBJECTIVE AND OBJECTIVE BOX
Date of Service is equal to the date of entry    24 hour events:     Review of Systems:  Constitutional: No fever, chills, fatigue  Neuro: No headache, numbness, weakness  Resp: No cough, wheezing, shortness of breath  CVS: No chest pain, palpitations, leg swelling  GI: No abdominal pain, nausea, vomiting, diarrhea   : No dysuria, frequency, incontinence  Skin: No itching, burning, rashes, or lesions   Msk: No joint pain or swelling  Psych: No depression, anxiety, mood swings    T(F): 99.4 (12-22-23 @ 04:03), Max: 101.4 (12-21-23 @ 22:50)  HR: 92 (12-22-23 @ 04:03) (92 - 113)  BP: 94/57 (12-22-23 @ 04:03) (94/57 - 105/70)  RR: 17 (12-22-23 @ 04:03) (16 - 17)  SpO2: 96% (12-22-23 @ 04:03) (96% - 98%)  Wt(kg): --        CAPILLARY BLOOD GLUCOSE          I&O's Summary      Physical Exam:   Gen:  Neuro:  HEENT:  Resp:  CVS:  Abd:  Ext:  Skin:    Meds:          acetaminophen     Tablet .. Oral PRN        pantoprazole    Tablet Oral      dextrose 5% + sodium chloride 0.45%. IV Continuous                                  8.2    12.06 )-----------( 399      ( 21 Dec 2023 22:30 )             24.6       12-21    136  |  106  |  25<H>  ----------------------------<  125<H>  4.2   |  24  |  0.68    Ca    8.3<L>      21 Dec 2023 22:30  Mg     1.7     12-21    TPro  5.8<L>  /  Alb  2.4<L>  /  TBili  0.3  /  DBili  x   /  AST  27  /  ALT  26  /  AlkPhos  125<H>  12-21    Lactate 0.9           12-21 @ 22:30      CARDIAC MARKERS ( 21 Dec 2023 22:30 )  x     / x     / 40 U/L / x     / x          PT/INR - ( 21 Dec 2023 22:30 )   PT: 43.2 sec;   INR: 3.84 ratio         PTT - ( 21 Dec 2023 22:30 )  PTT:47.9 sec  Urinalysis Basic - ( 21 Dec 2023 22:30 )    Color: x / Appearance: x / SG: x / pH: x  Gluc: 125 mg/dL / Ketone: x  / Bili: x / Urobili: x   Blood: x / Protein: x / Nitrite: x   Leuk Esterase: x / RBC: x / WBC x   Sq Epi: x / Non Sq Epi: x / Bacteria: x          Rapid RVP Result: NotDetec (12-22 @ 00:55)          Radiology: ***  Bedside ultrasound: ***    CENTRAL LINE: N/Y          DATE INSERTED:              REMOVE: Y/N  GARCIA: N/Y                       DATE INSERTED:              REMOVE: Y/N  A-LINE: N/Y                       DATE INSERTED:              REMOVE: Y/N    GLOBAL ISSUE/BEST PRACTICE:  Analgesia:  Sedation:  CAM-ICU:   HOB elevation: yes  Stress ulcer prophylaxis:  VTE prophylaxis:  Glycemic control:  Nutrition:    CODE STATUS: ***    Time spent on this patient encounter, which includes documenting this note in the electronic medical record, was 42 minutes including assessing the presenting problems with associated risk, reviewing the medical record to prepare for the encounter, and meeting face to face with patient to obtain additional history. I have also performed an appropriate physical exam, made interventions listed and ordered and interpreted appropriate diagnostic studies as documented. To improve communication and patient safety. I have coordinated care with the multidisciplinary team including the bedside nurse, appropriate attending of record and consultant as needed.         Date of Service is equal to the date of entry    HPI: 59 y/o F w/ pmh of cad, dm, mdd, gerd, IBS, cirrhosis 2/2 to nonalcoholic fatty liver, bipolar, afib on coumadin, today presented to [facility] for hypotension and tacycardia with new diffuse purpura to the LE. In the ED    Review of Systems:  Constitutional: No fever, chills, fatigue  Neuro: No headache, numbness, weakness  Resp: No cough, wheezing, shortness of breath  CVS: No chest pain, palpitations, leg swelling  GI: No abdominal pain, nausea, vomiting, diarrhea   : No dysuria, frequency, incontinence  Skin: No itching, burning, rashes, or lesions   Msk: No joint pain or swelling  Psych: No depression, anxiety, mood swings    T(F): 99.4 (12-22-23 @ 04:03), Max: 101.4 (12-21-23 @ 22:50)  HR: 92 (12-22-23 @ 04:03) (92 - 113)  BP: 94/57 (12-22-23 @ 04:03) (94/57 - 105/70)  RR: 17 (12-22-23 @ 04:03) (16 - 17)  SpO2: 96% (12-22-23 @ 04:03) (96% - 98%)  Wt(kg): --        CAPILLARY BLOOD GLUCOSE          I&O's Summary      Physical Exam:   Gen: Comfortable in bed in NAD  Neuro: AAOx3  HEENT: NC/AT, R>L swelling with redness of the orbits and b/l conjunctiva positive for hemorrhage, EOMI, PERRL, no visual changes  Resp: CTA b/l  CVS: +RRR  Abd: BSx4, soft, nt/nd  Ext: +diffuse ecchymosis to the b/l LE  Skin: warm/dry    Meds:          acetaminophen     Tablet .. Oral PRN        pantoprazole    Tablet Oral      dextrose 5% + sodium chloride 0.45%. IV Continuous                                  8.2    12.06 )-----------( 399      ( 21 Dec 2023 22:30 )             24.6       12-21    136  |  106  |  25<H>  ----------------------------<  125<H>  4.2   |  24  |  0.68    Ca    8.3<L>      21 Dec 2023 22:30  Mg     1.7     12-21    TPro  5.8<L>  /  Alb  2.4<L>  /  TBili  0.3  /  DBili  x   /  AST  27  /  ALT  26  /  AlkPhos  125<H>  12-21    Lactate 0.9           12-21 @ 22:30      CARDIAC MARKERS ( 21 Dec 2023 22:30 )  x     / x     / 40 U/L / x     / x          PT/INR - ( 21 Dec 2023 22:30 )   PT: 43.2 sec;   INR: 3.84 ratio         PTT - ( 21 Dec 2023 22:30 )  PTT:47.9 sec  Urinalysis Basic - ( 21 Dec 2023 22:30 )    Color: x / Appearance: x / SG: x / pH: x  Gluc: 125 mg/dL / Ketone: x  / Bili: x / Urobili: x   Blood: x / Protein: x / Nitrite: x   Leuk Esterase: x / RBC: x / WBC x   Sq Epi: x / Non Sq Epi: x / Bacteria: x          Rapid RVP Result: NotDetec (12-22 @ 00:55)          Radiology: ***  Bedside ultrasound: ***    CENTRAL LINE: N/Y          DATE INSERTED:              REMOVE: Y/N  GARCIA: N/Y                       DATE INSERTED:              REMOVE: Y/N  A-LINE: N/Y                       DATE INSERTED:              REMOVE: Y/N    GLOBAL ISSUE/BEST PRACTICE:  Analgesia:  Sedation:  CAM-ICU:   HOB elevation: yes  Stress ulcer prophylaxis:  VTE prophylaxis:  Glycemic control:  Nutrition:    CODE STATUS: ***    Time spent on this patient encounter, which includes documenting this note in the electronic medical record, was 42 minutes including assessing the presenting problems with associated risk, reviewing the medical record to prepare for the encounter, and meeting face to face with patient to obtain additional history. I have also performed an appropriate physical exam, made interventions listed and ordered and interpreted appropriate diagnostic studies as documented. To improve communication and patient safety. I have coordinated care with the multidisciplinary team including the bedside nurse, appropriate attending of record and consultant as needed.         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 In the ED    Review of Systems:  Constitutional: No fever, chills, fatigue  Neuro: No headache, numbness, weakness  Resp: No cough, wheezing, shortness of breath  CVS: No chest pain, palpitations, leg swelling  GI: No abdominal pain, nausea, vomiting, diarrhea   : No dysuria, frequency, incontinence  Skin: No itching, burning, rashes, or lesions   Msk: No joint pain or swelling  Psych: No depression, anxiety, mood swings    T(F): 99.4 (12-22-23 @ 04:03), Max: 101.4 (12-21-23 @ 22:50)  HR: 92 (12-22-23 @ 04:03) (92 - 113)  BP: 94/57 (12-22-23 @ 04:03) (94/57 - 105/70)  RR: 17 (12-22-23 @ 04:03) (16 - 17)  SpO2: 96% (12-22-23 @ 04:03) (96% - 98%)  Wt(kg): --        CAPILLARY BLOOD GLUCOSE          I&O's Summary      Physical Exam:   Gen: Comfortable in bed in NAD  Neuro: AAOx3  HEENT: NC/AT, R>L swelling with redness of the orbits and b/l conjunctiva positive for hemorrhage, EOMI, PERRL, no visual changes  Resp: CTA b/l  CVS: +RRR  Abd: BSx4, soft, nt/nd  Ext: +diffuse ecchymosis to the b/l LE  Skin: warm/dry    Meds:          acetaminophen     Tablet .. Oral PRN        pantoprazole    Tablet Oral      dextrose 5% + sodium chloride 0.45%. IV Continuous                                  8.2    12.06 )-----------( 399      ( 21 Dec 2023 22:30 )             24.6       12-21    136  |  106  |  25<H>  ----------------------------<  125<H>  4.2   |  24  |  0.68    Ca    8.3<L>      21 Dec 2023 22:30  Mg     1.7     12-21    TPro  5.8<L>  /  Alb  2.4<L>  /  TBili  0.3  /  DBili  x   /  AST  27  /  ALT  26  /  AlkPhos  125<H>  12-21    Lactate 0.9           12-21 @ 22:30      CARDIAC MARKERS ( 21 Dec 2023 22:30 )  x     / x     / 40 U/L / x     / x          PT/INR - ( 21 Dec 2023 22:30 )   PT: 43.2 sec;   INR: 3.84 ratio         PTT - ( 21 Dec 2023 22:30 )  PTT:47.9 sec  Urinalysis Basic - ( 21 Dec 2023 22:30 )    Color: x / Appearance: x / SG: x / pH: x  Gluc: 125 mg/dL / Ketone: x  / Bili: x / Urobili: x   Blood: x / Protein: x / Nitrite: x   Leuk Esterase: x / RBC: x / WBC x   Sq Epi: x / Non Sq Epi: x / Bacteria: x          Rapid RVP Result: NotDetec (12-22 @ 00:55)          Radiology: ***  Bedside ultrasound: ***    CENTRAL LINE: N/Y          DATE INSERTED:              REMOVE: Y/N  GARCIA: N/Y                       DATE INSERTED:              REMOVE: Y/N  A-LINE: N/Y                       DATE INSERTED:              REMOVE: Y/N    GLOBAL ISSUE/BEST PRACTICE:  Analgesia:  Sedation:  CAM-ICU:   HOB elevation: yes  Stress ulcer prophylaxis:  VTE prophylaxis:  Glycemic control:  Nutrition:    CODE STATUS: ***    Time spent on this patient encounter, which includes documenting this note in the electronic medical record, was 42 minutes including assessing the presenting problems with associated risk, reviewing the medical record to prepare for the encounter, and meeting face to face with patient to obtain additional history. I have also performed an appropriate physical exam, made interventions listed and ordered and interpreted appropriate diagnostic studies as documented. To improve communication and patient safety. I have coordinated care with the multidisciplinary team including the bedside nurse, appropriate attending of record and consultant as needed.         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 In the ED pt was worked up and was found to have a INR of 3.83, diffuse LE ecchymosis and R>L orbital swelling. ED team requested ICU consult given pt was complaining of UE weakness for vasculitis concern. Pt seen and examined upon further questioning pt states the weakness in her ext have been ongoing for months. Pt states she was brought to the ED today because of the R>L orbital swelling that she woke up with. Pt otherwise denies any other medical complains at this time. No visual changes, pain in the eye, photophobia. ED team reports intraocular pressures were checked and reported to be 17 b/l.    Review of Systems:  Constitutional: No fever, chills, fatigue  Neuro: No headache, numbness, weakness  Resp: No cough, wheezing, shortness of breath  CVS: No chest pain, palpitations, leg swelling  GI: No abdominal pain, nausea, vomiting, diarrhea   : No dysuria, frequency, incontinence  Skin: No itching, burning, rashes, or lesions   Msk: No joint pain or swelling  Psych: No depression, anxiety, mood swings    T(F): 99.4 (12-22-23 @ 04:03), Max: 101.4 (12-21-23 @ 22:50)  HR: 92 (12-22-23 @ 04:03) (92 - 113)  BP: 94/57 (12-22-23 @ 04:03) (94/57 - 105/70)  RR: 17 (12-22-23 @ 04:03) (16 - 17)  SpO2: 96% (12-22-23 @ 04:03) (96% - 98%)  Wt(kg): --        CAPILLARY BLOOD GLUCOSE          I&O's Summary      Physical Exam:   Gen: Comfortable in bed in NAD  Neuro: AAOx3  HEENT: NC/AT, R>L swelling with redness of the orbits and b/l conjunctiva positive for hemorrhage, EOMI, PERRL, no visual changes  Resp: CTA b/l  CVS: +RRR  Abd: BSx4, soft, nt/nd  Ext: +diffuse ecchymosis to the b/l LE  Skin: warm/dry    Meds:          acetaminophen     Tablet .. Oral PRN        pantoprazole    Tablet Oral      dextrose 5% + sodium chloride 0.45%. IV Continuous                                  8.2    12.06 )-----------( 399      ( 21 Dec 2023 22:30 )             24.6       12-21    136  |  106  |  25<H>  ----------------------------<  125<H>  4.2   |  24  |  0.68    Ca    8.3<L>      21 Dec 2023 22:30  Mg     1.7     12-21    TPro  5.8<L>  /  Alb  2.4<L>  /  TBili  0.3  /  DBili  x   /  AST  27  /  ALT  26  /  AlkPhos  125<H>  12-21    Lactate 0.9           12-21 @ 22:30      CARDIAC MARKERS ( 21 Dec 2023 22:30 )  x     / x     / 40 U/L / x     / x          PT/INR - ( 21 Dec 2023 22:30 )   PT: 43.2 sec;   INR: 3.84 ratio         PTT - ( 21 Dec 2023 22:30 )  PTT:47.9 sec  Urinalysis Basic - ( 21 Dec 2023 22:30 )    Color: x / Appearance: x / SG: x / pH: x  Gluc: 125 mg/dL / Ketone: x  / Bili: x / Urobili: x   Blood: x / Protein: x / Nitrite: x   Leuk Esterase: x / RBC: x / WBC x   Sq Epi: x / Non Sq Epi: x / Bacteria: x          Rapid RVP Result: NotDetec (12-22 @ 00:55)          Radiology: ***  Bedside ultrasound: ***    CENTRAL LINE: N/Y          DATE INSERTED:              REMOVE: Y/N  GARCIA: N/Y                       DATE INSERTED:              REMOVE: Y/N  A-LINE: N/Y                       DATE INSERTED:              REMOVE: Y/N    GLOBAL ISSUE/BEST PRACTICE:  Analgesia:  Sedation:  CAM-ICU:   HOB elevation: yes  Stress ulcer prophylaxis:  VTE prophylaxis:  Glycemic control:  Nutrition:    CODE STATUS: ***    Time spent on this patient encounter, which includes documenting this note in the electronic medical record, was 42 minutes including assessing the presenting problems with associated risk, reviewing the medical record to prepare for the encounter, and meeting face to face with patient to obtain additional history. I have also performed an appropriate physical exam, made interventions listed and ordered and interpreted appropriate diagnostic studies as documented. To improve communication and patient safety. I have coordinated care with the multidisciplinary team including the bedside nurse, appropriate attending of record and consultant as needed.         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 for hypotension and tachycardia with new diffuse purpura to the LE. In the ED pt was worked up and was found to have a INR of 3.83, diffuse LE ecchymosis and R>L orbital swelling. ED team requested ICU consult given pt was complaining of UE weakness for vasculitis concern. Pt seen and examined upon further questioning pt states the weakness in her ext have been ongoing for months. Pt states she was brought to the ED today because of the R>L orbital swelling that she woke up with. Pt otherwise denies any other medical complains at this time. No visual changes, pain in the eye, photophobia. ED team reports intraocular pressures were checked and reported to be 17 b/l.    Review of Systems:  Constitutional: No fever, chills, fatigue  Neuro: No headache, numbness, weakness  Resp: No cough, wheezing, shortness of breath  CVS: No chest pain, palpitations, leg swelling  GI: No abdominal pain, nausea, vomiting, diarrhea   : No dysuria, frequency, incontinence  Skin: No itching, burning, rashes, or lesions   Msk: No joint pain or swelling  Psych: No depression, anxiety, mood swings    T(F): 99.4 (12-22-23 @ 04:03), Max: 101.4 (12-21-23 @ 22:50)  HR: 92 (12-22-23 @ 04:03) (92 - 113)  BP: 94/57 (12-22-23 @ 04:03) (94/57 - 105/70)  RR: 17 (12-22-23 @ 04:03) (16 - 17)  SpO2: 96% (12-22-23 @ 04:03) (96% - 98%)  Wt(kg): --        CAPILLARY BLOOD GLUCOSE          I&O's Summary      Physical Exam:   Gen: Comfortable in bed in NAD  Neuro: AAOx3  HEENT: NC/AT, R>L swelling with redness of the orbits and b/l conjunctiva positive for hemorrhage, EOMI, PERRL, no visual changes  Resp: CTA b/l  CVS: +RRR  Abd: BSx4, soft, nt/nd  Ext: +diffuse ecchymosis to the b/l LE  Skin: warm/dry    Meds:          acetaminophen     Tablet .. Oral PRN        pantoprazole    Tablet Oral      dextrose 5% + sodium chloride 0.45%. IV Continuous                                  8.2    12.06 )-----------( 399      ( 21 Dec 2023 22:30 )             24.6       12-21    136  |  106  |  25<H>  ----------------------------<  125<H>  4.2   |  24  |  0.68    Ca    8.3<L>      21 Dec 2023 22:30  Mg     1.7     12-21    TPro  5.8<L>  /  Alb  2.4<L>  /  TBili  0.3  /  DBili  x   /  AST  27  /  ALT  26  /  AlkPhos  125<H>  12-21    Lactate 0.9           12-21 @ 22:30      CARDIAC MARKERS ( 21 Dec 2023 22:30 )  x     / x     / 40 U/L / x     / x          PT/INR - ( 21 Dec 2023 22:30 )   PT: 43.2 sec;   INR: 3.84 ratio         PTT - ( 21 Dec 2023 22:30 )  PTT:47.9 sec  Urinalysis Basic - ( 21 Dec 2023 22:30 )    Color: x / Appearance: x / SG: x / pH: x  Gluc: 125 mg/dL / Ketone: x  / Bili: x / Urobili: x   Blood: x / Protein: x / Nitrite: x   Leuk Esterase: x / RBC: x / WBC x   Sq Epi: x / Non Sq Epi: x / Bacteria: x          Rapid RVP Result: NotDetec (12-22 @ 00:55)          Radiology: ***  Bedside ultrasound: ***    CENTRAL LINE: N/Y          DATE INSERTED:              REMOVE: Y/N  GARCIA: N/Y                       DATE INSERTED:              REMOVE: Y/N  A-LINE: N/Y                       DATE INSERTED:              REMOVE: Y/N    GLOBAL ISSUE/BEST PRACTICE:  Analgesia:  Sedation:  CAM-ICU:   HOB elevation: yes  Stress ulcer prophylaxis:  VTE prophylaxis:  Glycemic control:  Nutrition:    CODE STATUS: ***    Time spent on this patient encounter, which includes documenting this note in the electronic medical record, was 42 minutes including assessing the presenting problems with associated risk, reviewing the medical record to prepare for the encounter, and meeting face to face with patient to obtain additional history. I have also performed an appropriate physical exam, made interventions listed and ordered and interpreted appropriate diagnostic studies as documented. To improve communication and patient safety. I have coordinated care with the multidisciplinary team including the bedside nurse, appropriate attending of record and consultant as needed.         Date of Service is equal to the date of entry    HPI: 57 y/o F w/ pmh of cad, dm, mdd, gerd, IBS, cirrhosis 2/2 to nonalcoholic fatty liver, bipolar, afib on coumadin, today presented to [facility] for hypotension and tachycardia with new diffuse purpura to the LE ongoing the last few days and today morning waking up by R>L orbital swelling. In the ED pt was worked up and was found to have a INR of 3.83, diffuse LE ecchymosis and R>L orbital swelling and CTH finding of 8mm aneurysm. ED team requested ICU consult given pt was complaining of UE weakness for vasculitis concern. Pt seen and examined upon further questioning pt states the weakness in her ext have been ongoing for months. Pt states she was brought to the ED today because of the R>L orbital swelling that she woke up with. Pt otherwise denies any other medical complains at this time. No visual changes, pain in the eye, photophobia. ED team reports intraocular pressures were checked and reported to be 17 b/l. Both Neurosurg/optho were consulted and no urgent transfer was recommend.    Review of Systems:  Constitutional: No fever, chills, fatigue  Neuro: No headache, numbness, weakness  Resp: No cough, wheezing, shortness of breath  CVS: No chest pain, palpitations, leg swelling  GI: No abdominal pain, nausea, vomiting, diarrhea   : No dysuria, frequency, incontinence  Skin: No itching, burning, rashes, or lesions   Msk: No joint pain or swelling  Psych: No depression, anxiety, mood swings    T(F): 99.4 (12-22-23 @ 04:03), Max: 101.4 (12-21-23 @ 22:50)  HR: 92 (12-22-23 @ 04:03) (92 - 113)  BP: 94/57 (12-22-23 @ 04:03) (94/57 - 105/70)  RR: 17 (12-22-23 @ 04:03) (16 - 17)  SpO2: 96% (12-22-23 @ 04:03) (96% - 98%)  Wt(kg): --        CAPILLARY BLOOD GLUCOSE          I&O's Summary      Physical Exam:   Gen: Comfortable in bed in NAD  Neuro: AAOx3  HEENT: NC/AT, R>L swelling with redness of the orbits and b/l conjunctiva positive for hemorrhage, EOMI, PERRL, no visual changes  Resp: CTA b/l  CVS: +RRR  Abd: BSx4, soft, nt/nd  Ext: +diffuse ecchymosis to the b/l LE  Skin: warm/dry    Meds:          acetaminophen     Tablet .. Oral PRN        pantoprazole    Tablet Oral      dextrose 5% + sodium chloride 0.45%. IV Continuous                                  8.2    12.06 )-----------( 399      ( 21 Dec 2023 22:30 )             24.6       12-21    136  |  106  |  25<H>  ----------------------------<  125<H>  4.2   |  24  |  0.68    Ca    8.3<L>      21 Dec 2023 22:30  Mg     1.7     12-21    TPro  5.8<L>  /  Alb  2.4<L>  /  TBili  0.3  /  DBili  x   /  AST  27  /  ALT  26  /  AlkPhos  125<H>  12-21    Lactate 0.9           12-21 @ 22:30      CARDIAC MARKERS ( 21 Dec 2023 22:30 )  x     / x     / 40 U/L / x     / x          PT/INR - ( 21 Dec 2023 22:30 )   PT: 43.2 sec;   INR: 3.84 ratio         PTT - ( 21 Dec 2023 22:30 )  PTT:47.9 sec  Urinalysis Basic - ( 21 Dec 2023 22:30 )    Color: x / Appearance: x / SG: x / pH: x  Gluc: 125 mg/dL / Ketone: x  / Bili: x / Urobili: x   Blood: x / Protein: x / Nitrite: x   Leuk Esterase: x / RBC: x / WBC x   Sq Epi: x / Non Sq Epi: x / Bacteria: x          Rapid RVP Result: NotDetec (12-22 @ 00:55)          Radiology:     < from: US Duplex Venous Lower Ext Complete, Bilateral (12.22.23 @ 03:40) >  ACC: [id number] EXAM:  US DPLX LWR EXT VEINS COMPL BI   ORDERED BY: FRANKI PATEL     PROCEDURE DATE:  12/22/2023        INTERPRETATION:  CLINICAL INFORMATION: Bilateral lower extremity swelling.    COMPARISON: None available.    TECHNIQUE: Duplex sonography of the BILATERAL LOWER extremity veins with   color and spectral Doppler, with and without compression.    FINDINGS:    RIGHT:  Normal compressibility of the RIGHT common femoral, femoral and popliteal   veins.  Doppler examination shows normal spontaneous and phasic flow.  No RIGHT calf vein thrombosis is detected.    LEFT:  Normal compressibility of the LEFT common femoral, femoral and popliteal   veins.  Doppler examination shows normal spontaneous and phasic flow.  No LEFT calf vein thrombosis is detected.    Incidental note is made of calcific plaque involving the left common   femoral artery.    IMPRESSION:  No evidence of deep venous thrombosis in either lower extremity.      --- End of Report ---      RANCHO SALAZAR MD; Attending Radiologist  This document has been electronically signed. Dec 22 2023  3:44AM    < end of copied text >      < from: CT Abdomen and Pelvis w/ IV Cont (12.22.23 @ 02:23) >  ACC: [id number] EXAM:  CT ABDOMEN AND PELVIS IC   ORDERED BY: FRANKI PATEL     ACC: [id number] EXAM:  CT CHEST IC   ORDERED BY: FRANKI PATEL     PROCEDURE DATE:  12/22/2023          INTERPRETATION:  CLINICAL INFORMATION: Hypotension, tachycardia, red   spots in the upper and lower extremities, evaluate for vasculitis    COMPARISON: None.    CONTRAST/COMPLICATIONS:  IV Contrast: Omnipaque 350 (accession [id number]), IV contrast documented   in unlinked concurrent exam (accession [id number])  90 cc administered   (accession [id number]), 0 cc administered (accession [id number])   10 cc   discarded (accession [id number]), 0 cc discarded (accession [id number])  Oral Contrast: NONE  Complications: None reported at time of study completion    PROCEDURE:  CT of the Chest, Abdomen and Pelvis was performed.  Sagittal and coronal reformats were performed.    FINDINGS:  CHEST:  LUNGS AND LARGE AIRWAYS: Patent central airways. Dependent atelectatic   changes at the lung bases.  PLEURA: No pleural effusion.  VESSELS: Within normal limits.  HEART: Heart size is normal. No pericardial effusion.  MEDIASTINUM AND FOREST: No lymphadenopathy.  CHEST WALL AND LOWER NECK: Within normal limits.    ABDOMEN AND PELVIS:  LIVER: Within normal limits.  BILE DUCTS:Normal caliber.  GALLBLADDER: Cholecystectomy.  SPLEEN: Within normal limits.  PANCREAS: Within normal limits.  ADRENALS: Within normal limits.  KIDNEYS/URETERS: Within normal limits.    BLADDER: Within normal limits.  REPRODUCTIVE ORGANS: Posterior calcified fibroid.    BOWEL: No bowel obstruction. Appendix is unremarkable. There is   thickening, thumbprinting and hyperemia of the colon from the cecum   through rectum compatible with pancolitis.  PERITONEUM: No ascites.  VESSELS: Aorta is not dilated. Moderate atherosclerotic vascular   calcification.  No perivascular inflammation as clinically questioned.  RETROPERITONEUM/LYMPH NODES: No lymphadenopathy.  ABDOMINAL WALL: Within normal limits.  BONES: Within normal limits.    IMPRESSION:  Mild pancolitis, of infectious or inflammatory etiology.        --- End of Report ---        CARLENE ALEJANDRA MD; Attending Radiologist  This document has been electronically signed. Dec 22 2023  3:15AM    < end of copied text >    < from: CT Orbit w/ IV Cont (12.22.23 @ 02:23) >    ACC: [id number] EXAM:  CT ORBITS IC   ORDERED BY: FRANKI PATEL     ACC: [id number] EXAM:  CT BRAIN   ORDERED BY: FRANKI PATEL     PROCEDURE DATE:  12/22/2023          INTERPRETATION:  CLINICAL INFORMATION: Right arm weakness. Right eye   redness and swelling.    COMPARISON: None    PROCEDURE:  Noncontrast CT of the Head was followed by contrast-enhanced CT of the   orbits. Coronal and Sagittal reformats were obtained.    CONTRAST/COMPLICATIONS:  IV Contrast: NONE (accession [id number]), IV contrast documented in   unlinked concurrent exam (accession [id number])  90 cc administered   (accession [id number]), 0 cc administered (accession [id number])   10 cc   discarded (accession [id number]), 0 cc discarded (accession [id number])  Complications: None reported at time of study completion    FINDINGS:    CT HEAD:    There is no acute intracranial hemorrhage, mass effect, midline shift,   extra-axial collection, hydrocephalus, or evidence of acute vascular   territorial infarction. Chronic left posterior frontal infarct. Mild   patchy hypodensities within the periventricular and subcortical white   matter, although nonspecific, likely reflect chronic microvascular   disease. Cerebral volume loss contributes to prominence of the ventricles   and sulci.    Mastoid air cells are clear. Calvarium is intact.    CT ORBITS:    Right periorbital and right facial soft tissue swelling. Mild left   periorbital soft tissue swelling. No discrete drainable fluid collection.   Intraorbital contents including the globes, extraocular muscles, and   optic nerves are intact. No infiltration of the retrobulbar fat.    Patchy paranasal sinus mucosal thickening and opacification most   prominent involving the ethmoid air cells.    Left laterally projecting8 x 6 x 6 mm aneurysm situated between the   takeoff of the left superior cerebellar artery and left posterior   cerebral arteries.    IMPRESSION:    CT head: No acute intracranial hemorrhage, mass effect, or evidence of   acute vascular territorial infarction. If clinical symptoms persist or   worsen, more sensitive evaluation with brain MRI may be obtained, if no   contraindications exist.    CT orbits: Nonspecific bilateral periorbital soft tissue swelling, right   greater than left, as well as right facial soft tissue swelling. No   discrete drainable fluid collection.    No evidence of post septal involvement/orbital cellulitis.    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 Neurosurgical   consultation is recommended.    Dr. Ovalle discussed the above findings with Dr. Patel at 3:20 AM via   Teams on 12/22/2023 with read back.    --- End of Report ---      VIRGINIA OVALLE MD; Attending Radiologist  This document has been electronically signed. Dec 22 2023  3:21AM    < end of copied text >        CODE STATUS: FULL CODE    Time spent on this patient encounter, which includes documenting this note in the electronic medical record, was 55 minutes including assessing the presenting problems with associated risk, reviewing the medical record to prepare for the encounter, and meeting face to face with patient to obtain additional history. I have also performed an appropriate physical exam, made interventions listed and ordered and interpreted appropriate diagnostic studies as documented. To improve communication and patient safety. I have coordinated care with the multidisciplinary team including the bedside nurse, appropriate attending of record and consultant as needed.         Date of Service is equal to the date of entry    HPI: 59 y/o F w/ pmh of cad, dm, mdd, gerd, IBS, cirrhosis 2/2 to nonalcoholic fatty liver, bipolar, afib on coumadin, today presented to [facility] for hypotension and tachycardia with new diffuse purpura to the LE ongoing the last few days and today morning waking up by R>L orbital swelling. In the ED pt was worked up and was found to have a INR of 3.83, diffuse LE ecchymosis and R>L orbital swelling and CTH finding of 8mm aneurysm. ED team requested ICU consult given pt was complaining of UE weakness for vasculitis concern. Pt seen and examined upon further questioning pt states the weakness in her ext have been ongoing for months. Pt states she was brought to the ED today because of the R>L orbital swelling that she woke up with. Pt otherwise denies any other medical complains at this time. No visual changes, pain in the eye, photophobia. ED team reports intraocular pressures were checked and reported to be 17 b/l. Both Neurosurg/optho were consulted and no urgent transfer was recommend.    Review of Systems:  Constitutional: No fever, chills, fatigue  Neuro: No headache, numbness, weakness  Resp: No cough, wheezing, shortness of breath  CVS: No chest pain, palpitations, leg swelling  GI: No abdominal pain, nausea, vomiting, diarrhea   : No dysuria, frequency, incontinence  Skin: No itching, burning, rashes, or lesions   Msk: No joint pain or swelling  Psych: No depression, anxiety, mood swings    T(F): 99.4 (12-22-23 @ 04:03), Max: 101.4 (12-21-23 @ 22:50)  HR: 92 (12-22-23 @ 04:03) (92 - 113)  BP: 94/57 (12-22-23 @ 04:03) (94/57 - 105/70)  RR: 17 (12-22-23 @ 04:03) (16 - 17)  SpO2: 96% (12-22-23 @ 04:03) (96% - 98%)  Wt(kg): --        CAPILLARY BLOOD GLUCOSE          I&O's Summary      Physical Exam:   Gen: Comfortable in bed in NAD  Neuro: AAOx3  HEENT: NC/AT, R>L swelling with redness of the orbits and b/l conjunctiva positive for hemorrhage, EOMI, PERRL, no visual changes  Resp: CTA b/l  CVS: +RRR  Abd: BSx4, soft, nt/nd  Ext: +diffuse ecchymosis to the b/l LE  Skin: warm/dry    Meds:          acetaminophen     Tablet .. Oral PRN        pantoprazole    Tablet Oral      dextrose 5% + sodium chloride 0.45%. IV Continuous                                  8.2    12.06 )-----------( 399      ( 21 Dec 2023 22:30 )             24.6       12-21    136  |  106  |  25<H>  ----------------------------<  125<H>  4.2   |  24  |  0.68    Ca    8.3<L>      21 Dec 2023 22:30  Mg     1.7     12-21    TPro  5.8<L>  /  Alb  2.4<L>  /  TBili  0.3  /  DBili  x   /  AST  27  /  ALT  26  /  AlkPhos  125<H>  12-21    Lactate 0.9           12-21 @ 22:30      CARDIAC MARKERS ( 21 Dec 2023 22:30 )  x     / x     / 40 U/L / x     / x          PT/INR - ( 21 Dec 2023 22:30 )   PT: 43.2 sec;   INR: 3.84 ratio         PTT - ( 21 Dec 2023 22:30 )  PTT:47.9 sec  Urinalysis Basic - ( 21 Dec 2023 22:30 )    Color: x / Appearance: x / SG: x / pH: x  Gluc: 125 mg/dL / Ketone: x  / Bili: x / Urobili: x   Blood: x / Protein: x / Nitrite: x   Leuk Esterase: x / RBC: x / WBC x   Sq Epi: x / Non Sq Epi: x / Bacteria: x          Rapid RVP Result: NotDetec (12-22 @ 00:55)          Radiology:     < from: US Duplex Venous Lower Ext Complete, Bilateral (12.22.23 @ 03:40) >  ACC: [id number] EXAM:  US DPLX LWR EXT VEINS COMPL BI   ORDERED BY: FRANKI PATEL     PROCEDURE DATE:  12/22/2023        INTERPRETATION:  CLINICAL INFORMATION: Bilateral lower extremity swelling.    COMPARISON: None available.    TECHNIQUE: Duplex sonography of the BILATERAL LOWER extremity veins with   color and spectral Doppler, with and without compression.    FINDINGS:    RIGHT:  Normal compressibility of the RIGHT common femoral, femoral and popliteal   veins.  Doppler examination shows normal spontaneous and phasic flow.  No RIGHT calf vein thrombosis is detected.    LEFT:  Normal compressibility of the LEFT common femoral, femoral and popliteal   veins.  Doppler examination shows normal spontaneous and phasic flow.  No LEFT calf vein thrombosis is detected.    Incidental note is made of calcific plaque involving the left common   femoral artery.    IMPRESSION:  No evidence of deep venous thrombosis in either lower extremity.      --- End of Report ---      RANCHO SALAZAR MD; Attending Radiologist  This document has been electronically signed. Dec 22 2023  3:44AM    < end of copied text >      < from: CT Abdomen and Pelvis w/ IV Cont (12.22.23 @ 02:23) >  ACC: [id number] EXAM:  CT ABDOMEN AND PELVIS IC   ORDERED BY: FRANKI PATEL     ACC: [id number] EXAM:  CT CHEST IC   ORDERED BY: FRANKI PATEL     PROCEDURE DATE:  12/22/2023          INTERPRETATION:  CLINICAL INFORMATION: Hypotension, tachycardia, red   spots in the upper and lower extremities, evaluate for vasculitis    COMPARISON: None.    CONTRAST/COMPLICATIONS:  IV Contrast: Omnipaque 350 (accession [id number]), IV contrast documented   in unlinked concurrent exam (accession [id number])  90 cc administered   (accession [id number]), 0 cc administered (accession [id number])   10 cc   discarded (accession [id number]), 0 cc discarded (accession [id number])  Oral Contrast: NONE  Complications: None reported at time of study completion    PROCEDURE:  CT of the Chest, Abdomen and Pelvis was performed.  Sagittal and coronal reformats were performed.    FINDINGS:  CHEST:  LUNGS AND LARGE AIRWAYS: Patent central airways. Dependent atelectatic   changes at the lung bases.  PLEURA: No pleural effusion.  VESSELS: Within normal limits.  HEART: Heart size is normal. No pericardial effusion.  MEDIASTINUM AND FOREST: No lymphadenopathy.  CHEST WALL AND LOWER NECK: Within normal limits.    ABDOMEN AND PELVIS:  LIVER: Within normal limits.  BILE DUCTS:Normal caliber.  GALLBLADDER: Cholecystectomy.  SPLEEN: Within normal limits.  PANCREAS: Within normal limits.  ADRENALS: Within normal limits.  KIDNEYS/URETERS: Within normal limits.    BLADDER: Within normal limits.  REPRODUCTIVE ORGANS: Posterior calcified fibroid.    BOWEL: No bowel obstruction. Appendix is unremarkable. There is   thickening, thumbprinting and hyperemia of the colon from the cecum   through rectum compatible with pancolitis.  PERITONEUM: No ascites.  VESSELS: Aorta is not dilated. Moderate atherosclerotic vascular   calcification.  No perivascular inflammation as clinically questioned.  RETROPERITONEUM/LYMPH NODES: No lymphadenopathy.  ABDOMINAL WALL: Within normal limits.  BONES: Within normal limits.    IMPRESSION:  Mild pancolitis, of infectious or inflammatory etiology.        --- End of Report ---        CARLENE ALEJANDRA MD; Attending Radiologist  This document has been electronically signed. Dec 22 2023  3:15AM    < end of copied text >    < from: CT Orbit w/ IV Cont (12.22.23 @ 02:23) >    ACC: [id number] EXAM:  CT ORBITS IC   ORDERED BY: FRANKI PATEL     ACC: [id number] EXAM:  CT BRAIN   ORDERED BY: FRANKI PATEL     PROCEDURE DATE:  12/22/2023          INTERPRETATION:  CLINICAL INFORMATION: Right arm weakness. Right eye   redness and swelling.    COMPARISON: None    PROCEDURE:  Noncontrast CT of the Head was followed by contrast-enhanced CT of the   orbits. Coronal and Sagittal reformats were obtained.    CONTRAST/COMPLICATIONS:  IV Contrast: NONE (accession [id number]), IV contrast documented in   unlinked concurrent exam (accession [id number])  90 cc administered   (accession [id number]), 0 cc administered (accession [id number])   10 cc   discarded (accession [id number]), 0 cc discarded (accession [id number])  Complications: None reported at time of study completion    FINDINGS:    CT HEAD:    There is no acute intracranial hemorrhage, mass effect, midline shift,   extra-axial collection, hydrocephalus, or evidence of acute vascular   territorial infarction. Chronic left posterior frontal infarct. Mild   patchy hypodensities within the periventricular and subcortical white   matter, although nonspecific, likely reflect chronic microvascular   disease. Cerebral volume loss contributes to prominence of the ventricles   and sulci.    Mastoid air cells are clear. Calvarium is intact.    CT ORBITS:    Right periorbital and right facial soft tissue swelling. Mild left   periorbital soft tissue swelling. No discrete drainable fluid collection.   Intraorbital contents including the globes, extraocular muscles, and   optic nerves are intact. No infiltration of the retrobulbar fat.    Patchy paranasal sinus mucosal thickening and opacification most   prominent involving the ethmoid air cells.    Left laterally projecting8 x 6 x 6 mm aneurysm situated between the   takeoff of the left superior cerebellar artery and left posterior   cerebral arteries.    IMPRESSION:    CT head: No acute intracranial hemorrhage, mass effect, or evidence of   acute vascular territorial infarction. If clinical symptoms persist or   worsen, more sensitive evaluation with brain MRI may be obtained, if no   contraindications exist.    CT orbits: Nonspecific bilateral periorbital soft tissue swelling, right   greater than left, as well as right facial soft tissue swelling. No   discrete drainable fluid collection.    No evidence of post septal involvement/orbital cellulitis.    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 Neurosurgical   consultation is recommended.    Dr. Ovalle discussed the above findings with Dr. Patel at 3:20 AM via   Teams on 12/22/2023 with read back.    --- End of Report ---      VIRGINIA OVALLE MD; Attending Radiologist  This document has been electronically signed. Dec 22 2023  3:21AM    < end of copied text >        CODE STATUS: FULL CODE    Time spent on this patient encounter, which includes documenting this note in the electronic medical record, was 55 minutes including assessing the presenting problems with associated risk, reviewing the medical record to prepare for the encounter, and meeting face to face with patient to obtain additional history. I have also performed an appropriate physical exam, made interventions listed and ordered and interpreted appropriate diagnostic studies as documented. To improve communication and patient safety. I have coordinated care with the multidisciplinary team including the bedside nurse, appropriate attending of record and consultant as needed.

## 2023-12-22 NOTE — CONSULT NOTE ADULT - SUBJECTIVE AND OBJECTIVE BOX
Date of Service is equal to the date of entry    24 hour events:     Review of Systems:  Constitutional: No fever, chills, fatigue  Neuro: No headache, numbness, weakness  Resp: No cough, wheezing, shortness of breath  CVS: No chest pain, palpitations, leg swelling  GI: No abdominal pain, nausea, vomiting, diarrhea   : No dysuria, frequency, incontinence  Skin: No itching, burning, rashes, or lesions   Msk: No joint pain or swelling  Psych: No depression, anxiety, mood swings    T(F): 101.4 (12-21-23 @ 22:50), Max: 101.4 (12-21-23 @ 22:50)  HR: 113 (12-21-23 @ 22:25) (113 - 113)  BP: 105/70 (12-21-23 @ 22:25) (105/70 - 105/70)  RR: 16 (12-21-23 @ 22:25) (16 - 16)  SpO2: 98% (12-21-23 @ 22:25) (98% - 98%)  Wt(kg): --        CAPILLARY BLOOD GLUCOSE          I&O's Summary      Physical Exam:   Gen:  Neuro:  HEENT:  Resp:  CVS:  Abd:  Ext:  Skin:    Meds:          acetaminophen     Tablet .. Oral PRN        pantoprazole    Tablet Oral      dextrose 5% + sodium chloride 0.45%. IV Continuous                                  8.2    12.06 )-----------( 399      ( 21 Dec 2023 22:30 )             24.6       12-21    136  |  106  |  25<H>  ----------------------------<  125<H>  4.2   |  24  |  0.68    Ca    8.3<L>      21 Dec 2023 22:30  Mg     1.7     12-21    TPro  5.8<L>  /  Alb  2.4<L>  /  TBili  0.3  /  DBili  x   /  AST  27  /  ALT  26  /  AlkPhos  125<H>  12-21    Lactate 0.9           12-21 @ 22:30      CARDIAC MARKERS ( 21 Dec 2023 22:30 )  x     / x     / 40 U/L / x     / x          PT/INR - ( 21 Dec 2023 22:30 )   PT: 43.2 sec;   INR: 3.84 ratio         PTT - ( 21 Dec 2023 22:30 )  PTT:47.9 sec  Urinalysis Basic - ( 21 Dec 2023 22:30 )    Color: x / Appearance: x / SG: x / pH: x  Gluc: 125 mg/dL / Ketone: x  / Bili: x / Urobili: x   Blood: x / Protein: x / Nitrite: x   Leuk Esterase: x / RBC: x / WBC x   Sq Epi: x / Non Sq Epi: x / Bacteria: x          Rapid RVP Result: NotDetec (12-22 @ 00:55)          Radiology: ***  Bedside ultrasound: ***    CENTRAL LINE: N/Y          DATE INSERTED:              REMOVE: Y/N  GARCIA: N/Y                       DATE INSERTED:              REMOVE: Y/N  A-LINE: N/Y                       DATE INSERTED:              REMOVE: Y/N    GLOBAL ISSUE/BEST PRACTICE:  Analgesia:  Sedation:  CAM-ICU:   HOB elevation: yes  Stress ulcer prophylaxis:  VTE prophylaxis:  Glycemic control:  Nutrition:    CODE STATUS: ***    Time spent on this patient encounter, which includes documenting this note in the electronic medical record, was 42 minutes including assessing the presenting problems with associated risk, reviewing the medical record to prepare for the encounter, and meeting face to face with patient to obtain additional history. I have also performed an appropriate physical exam, made interventions listed and ordered and interpreted appropriate diagnostic studies as documented. To improve communication and patient safety. I have coordinated care with the multidisciplinary team including the bedside nurse, appropriate attending of record and consultant as needed.

## 2023-12-22 NOTE — H&P ADULT - MUSCULOSKELETAL
negative no joint swelling/no joint erythema/no joint warmth/no calf tenderness/no chest wall tenderness

## 2023-12-22 NOTE — ED ADULT NURSE REASSESSMENT NOTE - NS ED NURSE REASSESS COMMENT FT1
Patient and report received at 0715.  Patient is sleeping, easily arousable.  Respirations are even and unlabored, skin is warm and dry.  She is currently on a cooling blanket, temperature is now normal at 97.9 orally.  Will discontinue cooling blanket.  Patient denies needs at this time.  call bell is within reach.

## 2023-12-22 NOTE — CONSULT NOTE ADULT - SUBJECTIVE AND OBJECTIVE BOX
[facility]  Tez Olivera PA-C  [address] [place], [place] [id number] [phone]      Chief Complaint:  Patient is a 58y old  Female who presents with a chief complaint of SOB (22 Dec 2023 07:10)      HPI: 59 y/o F w/ pmh of cad, dm, mdd, gerd, IBS, cirrhosis 2/2 to nonalcoholic fatty liver, bipolar, afib on coumadin, today presented to [facility] for hypotension and tachycardia with new diffuse purpura to the LE ongoing the last few days and today morning waking up by R>L orbital swelling. In the ED pt was worked up and was found to have a INR of 3.83, diffuse LE ecchymosis and R>L orbital swelling and CTH finding of 8mm aneurysm.     GI asked to eval for pancolitis on CT  patient states she has h/o UC  was diagnosed at outside hospital  has soft stools, not really diarrhea  unsure if she is on UC medication    Allergies:  No Known Allergies      Medications:  acetaminophen     Tablet .. 650 milliGRAM(s) Oral every 6 hours PRN  aluminum hydroxide/magnesium hydroxide/simethicone Suspension 30 milliLiter(s) Oral every 4 hours PRN  cefTRIAXone   IVPB      dextrose 5% + sodium chloride 0.45%. 1000 milliLiter(s) IV Continuous <Continuous>  lactobacillus acidophilus 1 Tablet(s) Oral every 12 hours  magnesium hydroxide Suspension 30 milliLiter(s) Oral daily PRN  melatonin 3 milliGRAM(s) Oral at bedtime PRN  metroNIDAZOLE  IVPB      metroNIDAZOLE  IVPB 500 milliGRAM(s) IV Intermittent every 8 hours  ondansetron Injectable 4 milliGRAM(s) IV Push every 8 hours PRN  pantoprazole    Tablet 40 milliGRAM(s) Oral daily  senna 2 Tablet(s) Oral at bedtime  traMADol 50 milliGRAM(s) Oral three times a day PRN      PMHX/PSHX:      Family history:      Social History:     ROS:     General:  no fevers, chills, night sweats, fatigue,   Eyes:  Good vision, no reported pain  ENT:  No sore throat, pain, runny nose, dysphagia  CV:  No pain, palpitations, hypo/hypertension  Resp:  No dyspnea, cough, tachypnea, wheezing  GI:  No pain, No nausea, No vomiting, No diarrhea, No constipation, No weight loss, No fever, No pruritis, No rectal bleeding, No tarry stools, No dysphagia,  :  No pain, bleeding, incontinence, nocturia  Muscle:  No pain, weakness  Neuro:  No weakness, tingling, memory problems  Psych:  No fatigue, insomnia, mood problems, depression  Endocrine:  No polyuria, polydipsia, cold/heat intolerance  Heme:  No petechiae, ecchymosis, easy bruisability  Skin:  No rash, tattoos, scars, edema      PHYSICAL EXAM:   Vital Signs:  Vital Signs Last 24 Hrs  T(C): 36.3 (22 Dec 2023 12:31), Max: 38.6 (21 Dec 2023 22:50)  T(F): 97.4 (22 Dec 2023 12:31), Max: 101.4 (21 Dec 2023 22:50)  HR: 80 (22 Dec 2023 12:31) (80 - 113)  BP: 93/60 (22 Dec 2023 12:31) (93/60 - 105/70)  BP(mean): --  RR: 17 (22 Dec 2023 12:31) (16 - 17)  SpO2: 96% (22 Dec 2023 12:31) (96% - 98%)    Parameters below as of 22 Dec 2023 12:31  Patient On (Oxygen Delivery Method): room air      Daily Height in cm: 119.38 (21 Dec 2023 22:25)    Daily     GENERAL:  Appears stated age,   HEENT:  NC/AT,    CHEST:  Full & symmetric excursion,   HEART:  Regular rhythm  ABDOMEN:  Soft, non-tender, non-distended,   EXTEREMITIES:  no cyanosis,clubbing or edema  SKIN:  No rash  NEURO:  Alert,    LABS:                        8.2    12.06 )-----------( 399      ( 21 Dec 2023 22:30 )             24.6     12-21    136  |  106  |  25<H>  ----------------------------<  125<H>  4.2   |  24  |  0.68    Ca    8.3<L>      21 Dec 2023 22:30  Mg     1.7     12-21    TPro  5.8<L>  /  Alb  2.4<L>  /  TBili  0.3  /  DBili  x   /  AST  27  /  ALT  26  /  AlkPhos  125<H>  12-21    LIVER FUNCTIONS - ( 21 Dec 2023 22:30 )  Alb: 2.4 g/dL / Pro: 5.8 g/dL / ALK PHOS: 125 U/L / ALT: 26 U/L / AST: 27 U/L / GGT: x           PT/INR - ( 21 Dec 2023 22:30 )   PT: 43.2 sec;   INR: 3.84 ratio         PTT - ( 21 Dec 2023 22:30 )  PTT:47.9 sec  Urinalysis Basic - ( 21 Dec 2023 22:30 )    Color: x / Appearance: x / SG: x / pH: x  Gluc: 125 mg/dL / Ketone: x  / Bili: x / Urobili: x   Blood: x / Protein: x / Nitrite: x   Leuk Esterase: x / RBC: x / WBC x   Sq Epi: x / Non Sq Epi: x / Bacteria: x      Amylase Serum--      Lipase serum27       [id number]      Imaging:           [facility]  Tez Olivera PA-C  [address] [place], [place] [id number] [phone]      Chief Complaint:  Patient is a 58y old  Female who presents with a chief complaint of SOB (22 Dec 2023 07:10)      HPI: 57 y/o F w/ pmh of cad, dm, mdd, gerd, IBS, cirrhosis 2/2 to nonalcoholic fatty liver, bipolar, afib on coumadin, today presented to [facility] for hypotension and tachycardia with new diffuse purpura to the LE ongoing the last few days and today morning waking up by R>L orbital swelling. In the ED pt was worked up and was found to have a INR of 3.83, diffuse LE ecchymosis and R>L orbital swelling and CTH finding of 8mm aneurysm.     GI asked to eval for pancolitis on CT  patient states she has h/o UC  was diagnosed at outside hospital  has soft stools, not really diarrhea  unsure if she is on UC medication    Allergies:  No Known Allergies      Medications:  acetaminophen     Tablet .. 650 milliGRAM(s) Oral every 6 hours PRN  aluminum hydroxide/magnesium hydroxide/simethicone Suspension 30 milliLiter(s) Oral every 4 hours PRN  cefTRIAXone   IVPB      dextrose 5% + sodium chloride 0.45%. 1000 milliLiter(s) IV Continuous <Continuous>  lactobacillus acidophilus 1 Tablet(s) Oral every 12 hours  magnesium hydroxide Suspension 30 milliLiter(s) Oral daily PRN  melatonin 3 milliGRAM(s) Oral at bedtime PRN  metroNIDAZOLE  IVPB      metroNIDAZOLE  IVPB 500 milliGRAM(s) IV Intermittent every 8 hours  ondansetron Injectable 4 milliGRAM(s) IV Push every 8 hours PRN  pantoprazole    Tablet 40 milliGRAM(s) Oral daily  senna 2 Tablet(s) Oral at bedtime  traMADol 50 milliGRAM(s) Oral three times a day PRN      PMHX/PSHX:      Family history:      Social History:     ROS:     General:  no fevers, chills, night sweats, fatigue,   Eyes:  Good vision, no reported pain  ENT:  No sore throat, pain, runny nose, dysphagia  CV:  No pain, palpitations, hypo/hypertension  Resp:  No dyspnea, cough, tachypnea, wheezing  GI:  No pain, No nausea, No vomiting, No diarrhea, No constipation, No weight loss, No fever, No pruritis, No rectal bleeding, No tarry stools, No dysphagia,  :  No pain, bleeding, incontinence, nocturia  Muscle:  No pain, weakness  Neuro:  No weakness, tingling, memory problems  Psych:  No fatigue, insomnia, mood problems, depression  Endocrine:  No polyuria, polydipsia, cold/heat intolerance  Heme:  No petechiae, ecchymosis, easy bruisability  Skin:  No rash, tattoos, scars, edema      PHYSICAL EXAM:   Vital Signs:  Vital Signs Last 24 Hrs  T(C): 36.3 (22 Dec 2023 12:31), Max: 38.6 (21 Dec 2023 22:50)  T(F): 97.4 (22 Dec 2023 12:31), Max: 101.4 (21 Dec 2023 22:50)  HR: 80 (22 Dec 2023 12:31) (80 - 113)  BP: 93/60 (22 Dec 2023 12:31) (93/60 - 105/70)  BP(mean): --  RR: 17 (22 Dec 2023 12:31) (16 - 17)  SpO2: 96% (22 Dec 2023 12:31) (96% - 98%)    Parameters below as of 22 Dec 2023 12:31  Patient On (Oxygen Delivery Method): room air      Daily Height in cm: 119.38 (21 Dec 2023 22:25)    Daily     GENERAL:  Appears stated age,   HEENT:  NC/AT,    CHEST:  Full & symmetric excursion,   HEART:  Regular rhythm  ABDOMEN:  Soft, non-tender, non-distended,   EXTEREMITIES:  no cyanosis,clubbing or edema  SKIN:  No rash  NEURO:  Alert,    LABS:                        8.2    12.06 )-----------( 399      ( 21 Dec 2023 22:30 )             24.6     12-21    136  |  106  |  25<H>  ----------------------------<  125<H>  4.2   |  24  |  0.68    Ca    8.3<L>      21 Dec 2023 22:30  Mg     1.7     12-21    TPro  5.8<L>  /  Alb  2.4<L>  /  TBili  0.3  /  DBili  x   /  AST  27  /  ALT  26  /  AlkPhos  125<H>  12-21    LIVER FUNCTIONS - ( 21 Dec 2023 22:30 )  Alb: 2.4 g/dL / Pro: 5.8 g/dL / ALK PHOS: 125 U/L / ALT: 26 U/L / AST: 27 U/L / GGT: x           PT/INR - ( 21 Dec 2023 22:30 )   PT: 43.2 sec;   INR: 3.84 ratio         PTT - ( 21 Dec 2023 22:30 )  PTT:47.9 sec  Urinalysis Basic - ( 21 Dec 2023 22:30 )    Color: x / Appearance: x / SG: x / pH: x  Gluc: 125 mg/dL / Ketone: x  / Bili: x / Urobili: x   Blood: x / Protein: x / Nitrite: x   Leuk Esterase: x / RBC: x / WBC x   Sq Epi: x / Non Sq Epi: x / Bacteria: x      Amylase Serum--      Lipase serum27       [id number]      Imaging:

## 2023-12-22 NOTE — CONSULT NOTE ADULT - ASSESSMENT
57 y/o F w/ pmh of cad, dm, mdd, gerd, IBS, cirrhosis 2/2 to nonalcoholic fatty liver, bipolar, afib on coumadin, today presented to [facility] for hypotension and tachycardia with new diffuse purpura to the LE ongoing the last few days and today morning waking up by R>L orbital swelling. In the ED pt was worked up and was found to have a INR of 3.83, diffuse LE ecchymosis and R>L orbital swelling and CTH finding of 8mm aneurysm. ED team requested ICU consult given pt was complaining of UE weakness for vasculitis concern.     Active problem List  1. Orbital swelling possible cellulitis?   2. Fevers  3. Rash  4. Elevated INR  5. Ecchymosis of LE?  6. Vasculitis?  7. brain Aneurysm incidental finding on CTH    A/P: pt seen and examined currently HDS, pt AAOx3 and protecting her airway, At this time pt is currently stable and does not requiring ICU level of care, pt can be admitted to medicine, please reconsult should pt condition change, Pt medical care per primary medical team, case d/w eicu dr chung in agreement with plan.  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 In the ED pt was worked up and was found to have a INR of 3.83, diffuse LE ecchymosis and R>L orbital swelling and CTH finding of 8mm aneurysm. ED team requested ICU consult given pt was complaining of UE weakness for vasculitis concern.     Active problem List  1. Orbital swelling possible cellulitis?   2. Fevers  3. Rash  4. Elevated INR  5. Ecchymosis of LE?  6. Vasculitis?  7. brain Aneurysm incidental finding on CTH    A/P: pt seen and examined currently HDS, pt AAOx3 and protecting her airway, At this time pt is currently stable and does not requiring ICU level of care, pt can be admitted to medicine, please reconsult should pt condition change, Pt medical care per primary medical team, case d/w eicu dr chung in agreement with plan.

## 2023-12-22 NOTE — CONSULT NOTE ADULT - TIME BILLING
in direct care of patient , reviewing labs and other results and in adjusting medications and in discussion with other consultants , RN and PMD

## 2023-12-22 NOTE — DISCHARGE NOTE PROVIDER - NSDCMRMEDTOKEN_GEN_ALL_CORE_FT
acetaminophen 325 mg oral tablet: 2 tab(s) orally every 6 hours As needed Temp greater or equal to 38C (100.4F), Mild Pain (1 - 3)  Acidophilus oral tablet: 1 tab(s) orally 2 times a day  Aspercreme with Lidocaine 4% topical cream: Apply topically to affected area 2 times a day  Entresto 24 mg-26 mg oral tablet: 1 tab(s) orally 2 times a day  folic acid 1 mg oral tablet: 1 tab(s) orally once a day  insulin lispro 100 units/mL injectable solution: injectable 3 times a day  melatonin 3 mg oral tablet: 1 tab(s) orally once a day (at bedtime) As needed Insomnia  mesalamine 400 mg oral delayed release capsule: 1 cap(s) orally 3 times a day  midodrine 5 mg oral tablet: 1 tab(s) orally 3 times a day As needed SBP below 90  mirtazapine 30 mg oral tablet: 1 tab(s) orally once a day (at bedtime)  pantoprazole 40 mg oral delayed release tablet: 1 tab(s) orally once a day  rifAXIMin 550 mg oral tablet: 1 tab(s) orally 2 times a day  spironolactone 25 mg oral tablet: 4 tab(s) orally once a day

## 2023-12-22 NOTE — DISCHARGE NOTE PROVIDER - CARE PROVIDERS DIRECT ADDRESSES
Detail Level: Detailed Depth Of Biopsy: dermis Was A Bandage Applied: Yes Size Of Lesion In Cm: 0 Biopsy Type: H and E Biopsy Method: Dermablade Anesthesia Type: 1% lidocaine with epinephrine and a 1:10 solution of 8.4% sodium bicarbonate Anesthesia Volume In Cc: 2 Hemostasis: Electrocautery and Aluminum Chloride Wound Care: Petrolatum Dressing: bandage Destruction After The Procedure: No Type Of Destruction Used: Curettage Curettage Text: The wound bed was treated with curettage after the biopsy was performed. Cryotherapy Text: The wound bed was treated with cryotherapy after the biopsy was performed. Electrodesiccation Text: The wound bed was treated with electrodesiccation after the biopsy was performed. Electrodesiccation And Curettage Text: The wound bed was treated with electrodesiccation and curettage after the biopsy was performed. Silver Nitrate Text: The wound bed was treated with silver nitrate after the biopsy was performed. Lab: [id number] Consent: Written consent was obtained and risks were reviewed including but not limited to scarring, infection, bleeding, scabbing, incomplete removal, nerve damage and allergy to anesthesia. ,[email or web address] Post-Care Instructions: I reviewed with the patient in detail post-care instructions. Patient is to keep the biopsy site dry overnight, and then apply bacitracin twice daily until healed. Patient may apply hydrogen peroxide soaks to remove any crusting. Notification Instructions: Patient will be notified of biopsy results. However, patient instructed to call the office if not contacted within 2 weeks. Billing Type: Third-Party Bill Information: Selecting Yes will display possible errors in your note based on the variables you have selected. This validation is only offered as a suggestion for you. PLEASE NOTE THAT THE VALIDATION TEXT WILL BE REMOVED WHEN YOU FINALIZE YOUR NOTE. IF YOU WANT TO FAX A PRELIMINARY NOTE YOU WILL NEED TO TOGGLE THIS TO 'NO' IF YOU DO NOT WANT IT IN YOUR FAXED NOTE.

## 2023-12-22 NOTE — CONSULT NOTE ADULT - SUBJECTIVE AND OBJECTIVE BOX
[facility], Division of Infectious Diseases  GENESIS Gentile S. Shah, Y. Patel, G. [facility]  [phone]    ANAYA JOINER  58y, Female  [phone]    HPI--  HPI:        Active Medications--  acetaminophen     Tablet .. 650 milliGRAM(s) Oral every 6 hours PRN  aluminum hydroxide/magnesium hydroxide/simethicone Suspension 30 milliLiter(s) Oral every 4 hours PRN  cefTRIAXone   IVPB      dextrose 5% + sodium chloride 0.45%. 1000 milliLiter(s) IV Continuous <Continuous>  lactobacillus acidophilus 1 Tablet(s) Oral every 12 hours  magnesium hydroxide Suspension 30 milliLiter(s) Oral daily PRN  melatonin 3 milliGRAM(s) Oral at bedtime PRN  metroNIDAZOLE  IVPB 500 milliGRAM(s) IV Intermittent once  metroNIDAZOLE  IVPB 500 milliGRAM(s) IV Intermittent every 8 hours  metroNIDAZOLE  IVPB      ondansetron Injectable 4 milliGRAM(s) IV Push every 8 hours PRN  pantoprazole    Tablet 40 milliGRAM(s) Oral daily  senna 2 Tablet(s) Oral at bedtime  traMADol 50 milliGRAM(s) Oral three times a day PRN    Antimicrobials:   cefTRIAXone   IVPB      metroNIDAZOLE  IVPB 500 milliGRAM(s) IV Intermittent once  metroNIDAZOLE  IVPB 500 milliGRAM(s) IV Intermittent every 8 hours  metroNIDAZOLE  IVPB        Immunologic:     ROS:  CONSTITUTIONAL: No fevers or chills. No weakness or headache. No weight changes.  EYES/ENT: No visual or hearing changes. No sore throat or throat pain .  NECK: No pain or stiffness  RESPIRATORY: No cough, wheezing, or hemoptysis. No shortness of breath  CARDIOVASCULAR: No chest pain or palpitations  GASTROINTESTINAL: No abdominal pain. No nausea or vomiting. No diarrhea or constipation.  GENITOURINARY: No dysuria, frequency or hematuria  NEUROLOGICAL: No numbness or weakness  SKIN: No itching or rashes  PSYCHIATRIC: Pleasant. Appropriate affect    Allergies: No Known Allergies    PMH --   PSH --   FH --   Social History --  EtOH: denies   Tobacco: denies   Drug Use: denies     Travel/Environmental/Occupational History:    Physical Exam--  Vital Signs Last 24 Hrs  T(F): 97.9 (22 Dec 2023 08:01), Max: 101.4 (21 Dec 2023 22:50)  HR: 81 (22 Dec 2023 08:01) (81 - 113)  BP: 101/66 (22 Dec 2023 08:01) (94/57 - 105/70)  RR: 17 (22 Dec 2023 08:01) (16 - 17)  SpO2: 98% (22 Dec 2023 08:01) (96% - 98%)  General: nontoxic-appearing, no acute distress  HEENT: NC/AT, EOMI, anicteric, conjunctiva pink and moist, oropharynx clear, dentition fair  Neck: Not rigid. No sense of mass. No LAD  Lungs: Clear bilaterally without rales, wheezing or rhonchi  Heart: Regular rate and rhythm. No murmur, rub or gallop.  Abdomen: Soft. Nondistended. Nontender. Bowel sounds present. No organomegaly.  Back: No spinal tenderness. No costovertebral angle tenderness.  Extremities: No cyanosis or clubbing. No edema.   Skin: Warm. Dry. Good turgor. No rash. No vasculitic stigmata.  Psychiatric: Appropriate affect and mood for situation.   Lines:    Laboratory & Imaging Data:  CBC:                       8.2    12.06 )-----------( 399      ( 21 Dec 2023 22:30 )             24.6     CMP: 12-21    136  |  106  |  25<H>  ----------------------------<  125<H>  4.2   |  24  |  0.68    Ca    8.3<L>      21 Dec 2023 22:30  Mg     1.7     12-21    TPro  5.8<L>  /  Alb  2.4<L>  /  TBili  0.3  /  DBili  x   /  AST  27  /  ALT  26  /  AlkPhos  125<H>  12-21    LIVER FUNCTIONS - ( 21 Dec 2023 22:30 )  Alb: 2.4 g/dL / Pro: 5.8 g/dL / ALK PHOS: 125 U/L / ALT: 26 U/L / AST: 27 U/L / GGT: x           Urinalysis Basic - ( 21 Dec 2023 22:30 )    Color: x / Appearance: x / SG: x / pH: x  Gluc: 125 mg/dL / Ketone: x  / Bili: x / Urobili: x   Blood: x / Protein: x / Nitrite: x   Leuk Esterase: x / RBC: x / WBC x   Sq Epi: x / Non Sq Epi: x / Bacteria: x        Microbiology: reviewed        Radiology: reviewed     [facility], Division of Infectious Diseases  GENESIS Gentile S. Shah, Y. Patel, G. [facility]  [phone]    ANAYA JOINER  58y, Female  [id number]    HPI--  HPI:        Active Medications--  acetaminophen     Tablet .. 650 milliGRAM(s) Oral every 6 hours PRN  aluminum hydroxide/magnesium hydroxide/simethicone Suspension 30 milliLiter(s) Oral every 4 hours PRN  cefTRIAXone   IVPB      dextrose 5% + sodium chloride 0.45%. 1000 milliLiter(s) IV Continuous <Continuous>  lactobacillus acidophilus 1 Tablet(s) Oral every 12 hours  magnesium hydroxide Suspension 30 milliLiter(s) Oral daily PRN  melatonin 3 milliGRAM(s) Oral at bedtime PRN  metroNIDAZOLE  IVPB 500 milliGRAM(s) IV Intermittent once  metroNIDAZOLE  IVPB 500 milliGRAM(s) IV Intermittent every 8 hours  metroNIDAZOLE  IVPB      ondansetron Injectable 4 milliGRAM(s) IV Push every 8 hours PRN  pantoprazole    Tablet 40 milliGRAM(s) Oral daily  senna 2 Tablet(s) Oral at bedtime  traMADol 50 milliGRAM(s) Oral three times a day PRN    Antimicrobials:   cefTRIAXone   IVPB      metroNIDAZOLE  IVPB 500 milliGRAM(s) IV Intermittent once  metroNIDAZOLE  IVPB 500 milliGRAM(s) IV Intermittent every 8 hours  metroNIDAZOLE  IVPB        Immunologic:     ROS:  CONSTITUTIONAL: No fevers or chills. No weakness or headache. No weight changes.  EYES/ENT: No visual or hearing changes. No sore throat or throat pain .  NECK: No pain or stiffness  RESPIRATORY: No cough, wheezing, or hemoptysis. No shortness of breath  CARDIOVASCULAR: No chest pain or palpitations  GASTROINTESTINAL: No abdominal pain. No nausea or vomiting. No diarrhea or constipation.  GENITOURINARY: No dysuria, frequency or hematuria  NEUROLOGICAL: No numbness or weakness  SKIN: No itching or rashes  PSYCHIATRIC: Pleasant. Appropriate affect    Allergies: No Known Allergies    PMH --   PSH --   FH --   Social History --  EtOH: denies   Tobacco: denies   Drug Use: denies     Travel/Environmental/Occupational History:    Physical Exam--  Vital Signs Last 24 Hrs  T(F): 97.9 (22 Dec 2023 08:01), Max: 101.4 (21 Dec 2023 22:50)  HR: 81 (22 Dec 2023 08:01) (81 - 113)  BP: 101/66 (22 Dec 2023 08:01) (94/57 - 105/70)  RR: 17 (22 Dec 2023 08:01) (16 - 17)  SpO2: 98% (22 Dec 2023 08:01) (96% - 98%)  General: nontoxic-appearing, no acute distress  HEENT: NC/AT, EOMI, anicteric, conjunctiva pink and moist, oropharynx clear, dentition fair  Neck: Not rigid. No sense of mass. No LAD  Lungs: Clear bilaterally without rales, wheezing or rhonchi  Heart: Regular rate and rhythm. No murmur, rub or gallop.  Abdomen: Soft. Nondistended. Nontender. Bowel sounds present. No organomegaly.  Back: No spinal tenderness. No costovertebral angle tenderness.  Extremities: No cyanosis or clubbing. No edema.   Skin: Warm. Dry. Good turgor. No rash. No vasculitic stigmata.  Psychiatric: Appropriate affect and mood for situation.   Lines:    Laboratory & Imaging Data:  CBC:                       8.2    12.06 )-----------( 399      ( 21 Dec 2023 22:30 )             24.6     CMP: 12-21    136  |  106  |  25<H>  ----------------------------<  125<H>  4.2   |  24  |  0.68    Ca    8.3<L>      21 Dec 2023 22:30  Mg     1.7     12-21    TPro  5.8<L>  /  Alb  2.4<L>  /  TBili  0.3  /  DBili  x   /  AST  27  /  ALT  26  /  AlkPhos  125<H>  12-21    LIVER FUNCTIONS - ( 21 Dec 2023 22:30 )  Alb: 2.4 g/dL / Pro: 5.8 g/dL / ALK PHOS: 125 U/L / ALT: 26 U/L / AST: 27 U/L / GGT: x           Urinalysis Basic - ( 21 Dec 2023 22:30 )    Color: x / Appearance: x / SG: x / pH: x  Gluc: 125 mg/dL / Ketone: x  / Bili: x / Urobili: x   Blood: x / Protein: x / Nitrite: x   Leuk Esterase: x / RBC: x / WBC x   Sq Epi: x / Non Sq Epi: x / Bacteria: x        Microbiology: reviewed        Radiology: reviewed     [facility], Division of Infectious Diseases  GENESIS Gentile S. Shah, Y. Patel, G. [facility]  [phone]    ANAYA JOINER  58y, Female  [id number]    HPI--  HPI: 59 y/o F w/ pmh of cad, dm, mdd, gerd, IBS, cirrhosis 2/2 to nonalcoholic fatty liver, bipolar, afib on coumadin, today presented to [facility] for hypotension and tachycardia with new diffuse purpura to the LE ongoing the last few days and today morning waking up by R>L orbital swelling. In the ED pt was worked up and was found to have a INR of 3.83, diffuse LE ecchymosis and R>L orbital swelling and CTH finding of 8mm aneurysm.  Pt seen and examined upon further questioning pt states the weakness in her ext have been ongoing for months. Pt states she was brought to the ED today because of the R>L orbital swelling that she woke up with. Pt otherwise denies any other medical complains at this time. No visual changes, pain in the eye, photophobia. ED team reports intraocular pressures were checked and reported to be 17 b/l. Both Neurosurg/optho were consulted and no urgent transfer was recommend.  ID c/s for further evaluation of possible periorbital cellulitis  Pt seen at bedside  Rash noted; faint petechiae, pt reporting has been ongoing  R vs L eye swelling is new  No other complaints  Was febrile in the ED Tm 101.4F        Active Medications--  acetaminophen     Tablet .. 650 milliGRAM(s) Oral every 6 hours PRN  aluminum hydroxide/magnesium hydroxide/simethicone Suspension 30 milliLiter(s) Oral every 4 hours PRN  cefTRIAXone   IVPB      dextrose 5% + sodium chloride 0.45%. 1000 milliLiter(s) IV Continuous <Continuous>  lactobacillus acidophilus 1 Tablet(s) Oral every 12 hours  magnesium hydroxide Suspension 30 milliLiter(s) Oral daily PRN  melatonin 3 milliGRAM(s) Oral at bedtime PRN  metroNIDAZOLE  IVPB 500 milliGRAM(s) IV Intermittent once  metroNIDAZOLE  IVPB 500 milliGRAM(s) IV Intermittent every 8 hours  metroNIDAZOLE  IVPB      ondansetron Injectable 4 milliGRAM(s) IV Push every 8 hours PRN  pantoprazole    Tablet 40 milliGRAM(s) Oral daily  senna 2 Tablet(s) Oral at bedtime  traMADol 50 milliGRAM(s) Oral three times a day PRN    Antimicrobials:   cefTRIAXone   IVPB      metroNIDAZOLE  IVPB 500 milliGRAM(s) IV Intermittent once  metroNIDAZOLE  IVPB 500 milliGRAM(s) IV Intermittent every 8 hours  metroNIDAZOLE  IVPB        Immunologic:     ROS:  CONSTITUTIONAL: No fevers or chills. No weakness or headache. No weight changes.  EYES/ENT: No visual or hearing changes. No sore throat or throat pain .  NECK: No pain or stiffness  RESPIRATORY: No cough, wheezing, or hemoptysis. No shortness of breath  CARDIOVASCULAR: No chest pain or palpitations  GASTROINTESTINAL: No abdominal pain. No nausea or vomiting. No diarrhea or constipation.  GENITOURINARY: No dysuria, frequency or hematuria  NEUROLOGICAL: No numbness or weakness  SKIN: No itching or rashes  PSYCHIATRIC: Pleasant. Appropriate affect    Allergies: No Known Allergies    PMH --   PSH --   FH --   Social History --  EtOH: denies   Tobacco: denies   Drug Use: denies     Travel/Environmental/Occupational History:    Physical Exam--  Vital Signs Last 24 Hrs  T(F): 97.9 (22 Dec 2023 08:01), Max: 101.4 (21 Dec 2023 22:50)  HR: 81 (22 Dec 2023 08:01) (81 - 113)  BP: 101/66 (22 Dec 2023 08:01) (94/57 - 105/70)  RR: 17 (22 Dec 2023 08:01) (16 - 17)  SpO2: 98% (22 Dec 2023 08:01) (96% - 98%)  General: nontoxic-appearing, no acute distress  HEENT: NC/AT, R periorbital swelling compared to L  Lungs: CTA  Heart: Regular rate and rhythm. No murmur, rub or gallop.  Abdomen: Soft. Nondistended. Nontender.  Extremities: No cyanosis or clubbing. No edema.   Skin: faint petechial rash scattered across b/l UE/LE and torso    Laboratory & Imaging Data:  CBC:                       8.2    12.06 )-----------( 399      ( 21 Dec 2023 22:30 )             24.6     CMP: 12-21    136  |  106  |  25<H>  ----------------------------<  125<H>  4.2   |  24  |  0.68    Ca    8.3<L>      21 Dec 2023 22:30  Mg     1.7     12-21    TPro  5.8<L>  /  Alb  2.4<L>  /  TBili  0.3  /  DBili  x   /  AST  27  /  ALT  26  /  AlkPhos  125<H>  12-21    LIVER FUNCTIONS - ( 21 Dec 2023 22:30 )  Alb: 2.4 g/dL / Pro: 5.8 g/dL / ALK PHOS: 125 U/L / ALT: 26 U/L / AST: 27 U/L / GGT: x           Urinalysis Basic - ( 21 Dec 2023 22:30 )    Color: x / Appearance: x / SG: x / pH: x  Gluc: 125 mg/dL / Ketone: x  / Bili: x / Urobili: x   Blood: x / Protein: x / Nitrite: x   Leuk Esterase: x / RBC: x / WBC x   Sq Epi: x / Non Sq Epi: x / Bacteria: x        Microbiology: reviewed        Radiology: reviewed    < from: US Duplex Venous Lower Ext Complete, Bilateral (12.22.23 @ 03:40) >    ACC: [id number] EXAM:  US DPLX LWR EXT VEINS COMPL BI   ORDERED BY: FRANKI PATEL     PROCEDURE DATE:  12/22/2023          INTERPRETATION:  CLINICAL INFORMATION: Bilateral lower extremity swelling.    COMPARISON: None available.    TECHNIQUE: Duplex sonography of the BILATERAL LOWER extremity veins with   color and spectral Doppler, with and without compression.    FINDINGS:    RIGHT:  Normal compressibility of the RIGHT common femoral, femoral and popliteal   veins.  Doppler examination shows normal spontaneous and phasic flow.  No RIGHT calf vein thrombosis is detected.    LEFT:  Normal compressibility of the LEFT common femoral, femoral and popliteal   veins.  Doppler examination shows normal spontaneous and phasic flow.  No LEFT calf vein thrombosis is detected.    Incidental note is made of calcific plaque involving the left common   femoral artery.    IMPRESSION:  No evidence of deep venous thrombosis in either lower extremity.            --- End of Report ---            RANCHO SALAZAR MD; Attending Radiologist  This document has been electronically signed. Dec 22 2023  3:44AM    < end of copied text >  < from: CT Abdomen and Pelvis w/ IV Cont (12.22.23 @ 02:23) >    ACC: [id number] EXAM:  CT ABDOMEN AND PELVIS IC   ORDERED BY: FRANKI PATEL     ACC: [id number] EXAM:  CT CHEST IC   ORDERED BY: FRANKI PATEL     PROCEDURE DATE:  12/22/2023          INTERPRETATION:  CLINICAL INFORMATION: Hypotension, tachycardia, red   spots in the upper and lower extremities, evaluate for vasculitis    COMPARISON: None.    CONTRAST/COMPLICATIONS:  IV Contrast: Omnipaque 350 (accession [id number]), IV contrast documented   in unlinked concurrent exam (accession [id number])  90 cc administered   (accession [id number]), 0 cc administered (accession [id number])   10 cc   discarded (accession [id number]), 0 cc discarded (accession [id number])  Oral Contrast: NONE  Complications: None reported at time of study completion    PROCEDURE:  CT of the Chest, Abdomen and Pelvis was performed.  Sagittal and coronal reformats were performed.    FINDINGS:  CHEST:  LUNGS AND LARGE AIRWAYS: Patent central airways. Dependent atelectatic   changes at the lung bases.  PLEURA: No pleural effusion.  VESSELS: Within normal limits.  HEART: Heart size is normal. No pericardial effusion.  MEDIASTINUM AND FOREST: No lymphadenopathy.  CHEST WALL AND LOWER NECK: Within normal limits.    ABDOMEN AND PELVIS:  LIVER: Within normal limits.  BILE DUCTS:Normal caliber.  GALLBLADDER: Cholecystectomy.  SPLEEN: Within normal limits.  PANCREAS: Within normal limits.  ADRENALS: Within normal limits.  KIDNEYS/URETERS: Within normal limits.    BLADDER: Within normal limits.  REPRODUCTIVE ORGANS: Posterior calcified fibroid.    BOWEL: No bowel obstruction. Appendix is unremarkable. There is   thickening, thumbprinting and hyperemia of the colon from the cecum   through rectum compatible with pancolitis.  PERITONEUM: No ascites.  VESSELS: Aorta is not dilated. Moderate atherosclerotic vascular   calcification.  No perivascular inflammation as clinically questioned.  RETROPERITONEUM/LYMPH NODES: No lymphadenopathy.  ABDOMINAL WALL: Within normal limits.  BONES: Within normal limits.    IMPRESSION:  Mild pancolitis, of infectious or inflammatory etiology.        --- End of Report ---            CARLENE ALEJANDRA MD; Attending Radiologist  This document has been electronically signed. Dec 22 2023  3:15AM    < end of copied text >  < from: CT Orbit w/ IV Cont (12.22.23 @ 02:23) >    ACC: [id number] EXAM:  CT ORBITS IC   ORDERED BY: FRANKI PATEL     ACC: [id number] EXAM:  CT BRAIN   ORDERED BY: FRANKI PATEL     PROCEDURE DATE:  12/22/2023          INTERPRETATION:  CLINICAL INFORMATION: Right arm weakness. Right eye   redness and swelling.    COMPARISON: None    PROCEDURE:  Noncontrast CT of the Head was followed by contrast-enhanced CT of the   orbits. Coronal and Sagittal reformats were obtained.    CONTRAST/COMPLICATIONS:  IV Contrast: NONE (accession [id number]), IV contrast documented in   unlinked concurrent exam (accession [id number])  90 cc administered   (accession [id number]), 0 cc administered (accession [id number])   10 cc   discarded (accession [id number]), 0 cc discarded (accession [id number])  Complications: None reported at time of study completion    FINDINGS:    CT HEAD:    There is no acute intracranial hemorrhage, mass effect, midline shift,   extra-axial collection, hydrocephalus, or evidence of acute vascular   territorial infarction. Chronic left posterior frontal infarct. Mild   patchy hypodensities within the periventricular and subcortical white   matter, although nonspecific, likely reflect chronic microvascular   disease. Cerebral volume loss contributes to prominence of the ventricles   and sulci.    Mastoid air cells are clear. Calvarium is intact.    CT ORBITS:    Right periorbital and right facial soft tissue swelling. Mild left   periorbital soft tissue swelling. No discrete drainable fluid collection.   Intraorbital contents including the globes, extraocular muscles, and   optic nerves are intact. No infiltration of the retrobulbar fat.    Patchy paranasal sinus mucosal thickening and opacification most   prominent involving the ethmoid air cells.    Left laterally projecting8 x 6 x 6 mm aneurysm situated between the   takeoff of the left superior cerebellar artery and left posterior   cerebral arteries.    IMPRESSION:    CT head: No acute intracranial hemorrhage, mass effect, or evidence of   acute vascular territorial infarction. If clinical symptoms persist or   worsen, more sensitive evaluation with brain MRI may be obtained, if no   contraindications exist.    CT orbits: Nonspecific bilateral periorbital soft tissue swelling, right   greater than left, as well as right facial soft tissue swelling. No   discrete drainable fluid collection.    No evidence of post septal involvement/orbital cellulitis.    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 Neurosurgical   consultation is recommended.    Dr. Ovalle discussed the above findings with Dr. Patel at 3:20 AM via   Teams on 12/22/2023 with read back.    --- End of Report ---            VIRGINIA OVALLE MD; Attending Radiologist  This document has been electronically signed. Dec 22 2023  3:21AM    < end of copied text >     [facility], Division of Infectious Diseases  GENESIS Gentile S. Shah, Y. Patel, G. [facility]  [phone]    ANAYA JOINER  58y, Female  [id number]    HPI--  HPI: 59 y/o F w/ pmh of cad, dm, mdd, gerd, IBS, cirrhosis 2/2 to nonalcoholic fatty liver, bipolar, afib on coumadin, today presented to [facility] for hypotension and tachycardia with new diffuse purpura to the LE ongoing the last few days and today morning waking up by R>L orbital swelling. In the ED pt was worked up and was found to have a INR of 3.83, diffuse LE ecchymosis and R>L orbital swelling and CTH finding of 8mm aneurysm.  Pt seen and examined upon further questioning pt states the weakness in her ext have been ongoing for months. Pt states she was brought to the ED today because of the R>L orbital swelling that she woke up with. Pt otherwise denies any other medical complains at this time. No visual changes, pain in the eye, photophobia. ED team reports intraocular pressures were checked and reported to be 17 b/l. Both Neurosurg/optho were consulted and no urgent transfer was recommend.  ID c/s for further evaluation of possible periorbital cellulitis  Pt seen at bedside  Rash noted; faint petechiae, pt reporting has been ongoing  R vs L eye swelling is new  No other complaints  Was febrile in the ED Tm 101.4F        Active Medications--  acetaminophen     Tablet .. 650 milliGRAM(s) Oral every 6 hours PRN  aluminum hydroxide/magnesium hydroxide/simethicone Suspension 30 milliLiter(s) Oral every 4 hours PRN  cefTRIAXone   IVPB      dextrose 5% + sodium chloride 0.45%. 1000 milliLiter(s) IV Continuous <Continuous>  lactobacillus acidophilus 1 Tablet(s) Oral every 12 hours  magnesium hydroxide Suspension 30 milliLiter(s) Oral daily PRN  melatonin 3 milliGRAM(s) Oral at bedtime PRN  metroNIDAZOLE  IVPB 500 milliGRAM(s) IV Intermittent once  metroNIDAZOLE  IVPB 500 milliGRAM(s) IV Intermittent every 8 hours  metroNIDAZOLE  IVPB      ondansetron Injectable 4 milliGRAM(s) IV Push every 8 hours PRN  pantoprazole    Tablet 40 milliGRAM(s) Oral daily  senna 2 Tablet(s) Oral at bedtime  traMADol 50 milliGRAM(s) Oral three times a day PRN    Antimicrobials:   cefTRIAXone   IVPB      metroNIDAZOLE  IVPB 500 milliGRAM(s) IV Intermittent once  metroNIDAZOLE  IVPB 500 milliGRAM(s) IV Intermittent every 8 hours  metroNIDAZOLE  IVPB        Immunologic:     ROS:  CONSTITUTIONAL: No fevers or chills. No weakness or headache. No weight changes.  EYES/ENT: No visual or hearing changes. No sore throat or throat pain .  NECK: No pain or stiffness  RESPIRATORY: No cough, wheezing, or hemoptysis. No shortness of breath  CARDIOVASCULAR: No chest pain or palpitations  GASTROINTESTINAL: No abdominal pain. No nausea or vomiting. No diarrhea or constipation.  GENITOURINARY: No dysuria, frequency or hematuria  NEUROLOGICAL: No numbness or weakness  SKIN: No itching or rashes  PSYCHIATRIC: Pleasant. Appropriate affect    Allergies: No Known Allergies    PMH --   PSH --   FH --   Social History --  EtOH: denies   Tobacco: denies   Drug Use: denies     Travel/Environmental/Occupational History:    Physical Exam--  Vital Signs Last 24 Hrs  T(F): 97.9 (22 Dec 2023 08:01), Max: 101.4 (21 Dec 2023 22:50)  HR: 81 (22 Dec 2023 08:01) (81 - 113)  BP: 101/66 (22 Dec 2023 08:01) (94/57 - 105/70)  RR: 17 (22 Dec 2023 08:01) (16 - 17)  SpO2: 98% (22 Dec 2023 08:01) (96% - 98%)  General: nontoxic-appearing, no acute distress  HEENT: NC/AT, R periorbital swelling compared to L  Lungs: CTA  Heart: Regular rate and rhythm. No murmur, rub or gallop.  Abdomen: Soft. Nondistended. Nontender.  Extremities: No cyanosis or clubbing. No edema.   Skin: faint petechial rash scattered across b/l UE/LE and torso    Laboratory & Imaging Data:  CBC:                       8.2    12.06 )-----------( 399      ( 21 Dec 2023 22:30 )             24.6     CMP: 12-21    136  |  106  |  25<H>  ----------------------------<  125<H>  4.2   |  24  |  0.68    Ca    8.3<L>      21 Dec 2023 22:30  Mg     1.7     12-21    TPro  5.8<L>  /  Alb  2.4<L>  /  TBili  0.3  /  DBili  x   /  AST  27  /  ALT  26  /  AlkPhos  125<H>  12-21    LIVER FUNCTIONS - ( 21 Dec 2023 22:30 )  Alb: 2.4 g/dL / Pro: 5.8 g/dL / ALK PHOS: 125 U/L / ALT: 26 U/L / AST: 27 U/L / GGT: x           Urinalysis Basic - ( 21 Dec 2023 22:30 )    Color: x / Appearance: x / SG: x / pH: x  Gluc: 125 mg/dL / Ketone: x  / Bili: x / Urobili: x   Blood: x / Protein: x / Nitrite: x   Leuk Esterase: x / RBC: x / WBC x   Sq Epi: x / Non Sq Epi: x / Bacteria: x        Microbiology: reviewed        Radiology: reviewed    < from: US Duplex Venous Lower Ext Complete, Bilateral (12.22.23 @ 03:40) >    ACC: [id number] EXAM:  US DPLX LWR EXT VEINS COMPL BI   ORDERED BY: FRANKI PATEL     PROCEDURE DATE:  12/22/2023          INTERPRETATION:  CLINICAL INFORMATION: Bilateral lower extremity swelling.    COMPARISON: None available.    TECHNIQUE: Duplex sonography of the BILATERAL LOWER extremity veins with   color and spectral Doppler, with and without compression.    FINDINGS:    RIGHT:  Normal compressibility of the RIGHT common femoral, femoral and popliteal   veins.  Doppler examination shows normal spontaneous and phasic flow.  No RIGHT calf vein thrombosis is detected.    LEFT:  Normal compressibility of the LEFT common femoral, femoral and popliteal   veins.  Doppler examination shows normal spontaneous and phasic flow.  No LEFT calf vein thrombosis is detected.    Incidental note is made of calcific plaque involving the left common   femoral artery.    IMPRESSION:  No evidence of deep venous thrombosis in either lower extremity.            --- End of Report ---            RANCHO SALAZAR MD; Attending Radiologist  This document has been electronically signed. Dec 22 2023  3:44AM    < end of copied text >  < from: CT Abdomen and Pelvis w/ IV Cont (12.22.23 @ 02:23) >    ACC: [id number] EXAM:  CT ABDOMEN AND PELVIS IC   ORDERED BY: FRANKI PATEL     ACC: [id number] EXAM:  CT CHEST IC   ORDERED BY: FRANKI PATEL     PROCEDURE DATE:  12/22/2023          INTERPRETATION:  CLINICAL INFORMATION: Hypotension, tachycardia, red   spots in the upper and lower extremities, evaluate for vasculitis    COMPARISON: None.    CONTRAST/COMPLICATIONS:  IV Contrast: Omnipaque 350 (accession [id number]), IV contrast documented   in unlinked concurrent exam (accession [id number])  90 cc administered   (accession [id number]), 0 cc administered (accession [id number])   10 cc   discarded (accession [id number]), 0 cc discarded (accession [id number])  Oral Contrast: NONE  Complications: None reported at time of study completion    PROCEDURE:  CT of the Chest, Abdomen and Pelvis was performed.  Sagittal and coronal reformats were performed.    FINDINGS:  CHEST:  LUNGS AND LARGE AIRWAYS: Patent central airways. Dependent atelectatic   changes at the lung bases.  PLEURA: No pleural effusion.  VESSELS: Within normal limits.  HEART: Heart size is normal. No pericardial effusion.  MEDIASTINUM AND FOREST: No lymphadenopathy.  CHEST WALL AND LOWER NECK: Within normal limits.    ABDOMEN AND PELVIS:  LIVER: Within normal limits.  BILE DUCTS:Normal caliber.  GALLBLADDER: Cholecystectomy.  SPLEEN: Within normal limits.  PANCREAS: Within normal limits.  ADRENALS: Within normal limits.  KIDNEYS/URETERS: Within normal limits.    BLADDER: Within normal limits.  REPRODUCTIVE ORGANS: Posterior calcified fibroid.    BOWEL: No bowel obstruction. Appendix is unremarkable. There is   thickening, thumbprinting and hyperemia of the colon from the cecum   through rectum compatible with pancolitis.  PERITONEUM: No ascites.  VESSELS: Aorta is not dilated. Moderate atherosclerotic vascular   calcification.  No perivascular inflammation as clinically questioned.  RETROPERITONEUM/LYMPH NODES: No lymphadenopathy.  ABDOMINAL WALL: Within normal limits.  BONES: Within normal limits.    IMPRESSION:  Mild pancolitis, of infectious or inflammatory etiology.        --- End of Report ---            CARLENE ALEJANDRA MD; Attending Radiologist  This document has been electronically signed. Dec 22 2023  3:15AM    < end of copied text >  < from: CT Orbit w/ IV Cont (12.22.23 @ 02:23) >    ACC: [id number] EXAM:  CT ORBITS IC   ORDERED BY: FRANKI PATEL     ACC: [id number] EXAM:  CT BRAIN   ORDERED BY: FRANKI PATEL     PROCEDURE DATE:  12/22/2023          INTERPRETATION:  CLINICAL INFORMATION: Right arm weakness. Right eye   redness and swelling.    COMPARISON: None    PROCEDURE:  Noncontrast CT of the Head was followed by contrast-enhanced CT of the   orbits. Coronal and Sagittal reformats were obtained.    CONTRAST/COMPLICATIONS:  IV Contrast: NONE (accession [id number]), IV contrast documented in   unlinked concurrent exam (accession [id number])  90 cc administered   (accession [id number]), 0 cc administered (accession [id number])   10 cc   discarded (accession [id number]), 0 cc discarded (accession [id number])  Complications: None reported at time of study completion    FINDINGS:    CT HEAD:    There is no acute intracranial hemorrhage, mass effect, midline shift,   extra-axial collection, hydrocephalus, or evidence of acute vascular   territorial infarction. Chronic left posterior frontal infarct. Mild   patchy hypodensities within the periventricular and subcortical white   matter, although nonspecific, likely reflect chronic microvascular   disease. Cerebral volume loss contributes to prominence of the ventricles   and sulci.    Mastoid air cells are clear. Calvarium is intact.    CT ORBITS:    Right periorbital and right facial soft tissue swelling. Mild left   periorbital soft tissue swelling. No discrete drainable fluid collection.   Intraorbital contents including the globes, extraocular muscles, and   optic nerves are intact. No infiltration of the retrobulbar fat.    Patchy paranasal sinus mucosal thickening and opacification most   prominent involving the ethmoid air cells.    Left laterally projecting8 x 6 x 6 mm aneurysm situated between the   takeoff of the left superior cerebellar artery and left posterior   cerebral arteries.    IMPRESSION:    CT head: No acute intracranial hemorrhage, mass effect, or evidence of   acute vascular territorial infarction. If clinical symptoms persist or   worsen, more sensitive evaluation with brain MRI may be obtained, if no   contraindications exist.    CT orbits: Nonspecific bilateral periorbital soft tissue swelling, right   greater than left, as well as right facial soft tissue swelling. No   discrete drainable fluid collection.    No evidence of post septal involvement/orbital cellulitis.    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 Neurosurgical   consultation is recommended.    Dr. Ovalle discussed the above findings with Dr. Patel at 3:20 AM via   Teams on 12/22/2023 with read back.    --- End of Report ---            VIRGINIA OVALLE MD; Attending Radiologist  This document has been electronically signed. Dec 22 2023  3:21AM    < end of copied text >

## 2023-12-22 NOTE — CONSULT NOTE ADULT - SUBJECTIVE AND OBJECTIVE BOX
CHIEF COMPLAINT/ REASON FOR VISIT  .. Patient was seen to address the  issue listed under PROBLEM LIST which is located toward bottom of this note     ANAYA JOINER    PLV 2NOR 236 W1    Allergies    No Known Allergies    Intolerances        PAST MEDICAL & SURGICAL HISTORY:      FAMILY HISTORY:      Home Medications:      MEDICATIONS  (STANDING):  cefTRIAXone   IVPB 1000 milliGRAM(s) IV Intermittent once  cefTRIAXone   IVPB      dextrose 5% + sodium chloride 0.45%. 1000 milliLiter(s) (100 mL/Hr) IV Continuous <Continuous>  lactobacillus acidophilus 1 Tablet(s) Oral every 12 hours  metroNIDAZOLE  IVPB      metroNIDAZOLE  IVPB 500 milliGRAM(s) IV Intermittent once  metroNIDAZOLE  IVPB 500 milliGRAM(s) IV Intermittent every 8 hours  pantoprazole    Tablet 40 milliGRAM(s) Oral daily  senna 2 Tablet(s) Oral at bedtime    MEDICATIONS  (PRN):  acetaminophen     Tablet .. 650 milliGRAM(s) Oral every 6 hours PRN Temp greater or equal to 38C (100.4F), Mild Pain (1 - 3)  aluminum hydroxide/magnesium hydroxide/simethicone Suspension 30 milliLiter(s) Oral every 4 hours PRN Dyspepsia  magnesium hydroxide Suspension 30 milliLiter(s) Oral daily PRN Constipation  melatonin 3 milliGRAM(s) Oral at bedtime PRN Insomnia  ondansetron Injectable 4 milliGRAM(s) IV Push every 8 hours PRN Nausea and/or Vomiting  traMADol 50 milliGRAM(s) Oral three times a day PRN Moderate Pain (4 - 6)              Vital Signs Last 24 Hrs  T(C): 36.6 (22 Dec 2023 08:01), Max: 38.6 (21 Dec 2023 22:50)  T(F): 97.9 (22 Dec 2023 08:01), Max: 101.4 (21 Dec 2023 22:50)  HR: 81 (22 Dec 2023 08:01) (81 - 113)  BP: 101/66 (22 Dec 2023 08:01) (94/57 - 105/70)  BP(mean): --  RR: 17 (22 Dec 2023 08:01) (16 - 17)  SpO2: 98% (22 Dec 2023 08:01) (96% - 98%)    Parameters below as of 22 Dec 2023 08:01  Patient On (Oxygen Delivery Method): room air                  LABS:                        8.2    12.06 )-----------( 399      ( 21 Dec 2023 22:30 )             24.6     12-21    136  |  106  |  25<H>  ----------------------------<  125<H>  4.2   |  24  |  0.68    Ca    8.3<L>      21 Dec 2023 22:30  Mg     1.7     12-21    TPro  5.8<L>  /  Alb  2.4<L>  /  TBili  0.3  /  DBili  x   /  AST  27  /  ALT  26  /  AlkPhos  125<H>  12-21    PT/INR - ( 21 Dec 2023 22:30 )   PT: 43.2 sec;   INR: 3.84 ratio         PTT - ( 21 Dec 2023 22:30 )  PTT:47.9 sec  Urinalysis Basic - ( 21 Dec 2023 22:30 )    Color: x / Appearance: x / SG: x / pH: x  Gluc: 125 mg/dL / Ketone: x  / Bili: x / Urobili: x   Blood: x / Protein: x / Nitrite: x   Leuk Esterase: x / RBC: x / WBC x   Sq Epi: x / Non Sq Epi: x / Bacteria: x            WBC:  WBC Count: 12.06 K/uL (12-21 @ 22:30)      MICROBIOLOGY:  RECENT CULTURES:        CARDIAC MARKERS ( 21 Dec 2023 22:30 )  x     / x     / 40 U/L / x     / x            PT/INR - ( 21 Dec 2023 22:30 )   PT: 43.2 sec;   INR: 3.84 ratio         PTT - ( 21 Dec 2023 22:30 )  PTT:47.9 sec    Sodium:  Sodium: 136 mmol/L (12-21 @ 22:30)      0.68 mg/dL 12-21 @ 22:30      Hemoglobin:  Hemoglobin: 8.2 g/dL (12-21 @ 22:30)      Platelets: Platelet Count - Automated: 399 K/uL (12-21 @ 22:30)      LIVER FUNCTIONS - ( 21 Dec 2023 22:30 )  Alb: 2.4 g/dL / Pro: 5.8 g/dL / ALK PHOS: 125 U/L / ALT: 26 U/L / AST: 27 U/L / GGT: x             Urinalysis Basic - ( 21 Dec 2023 22:30 )    Color: x / Appearance: x / SG: x / pH: x  Gluc: 125 mg/dL / Ketone: x  / Bili: x / Urobili: x   Blood: x / Protein: x / Nitrite: x   Leuk Esterase: x / RBC: x / WBC x   Sq Epi: x / Non Sq Epi: x / Bacteria: x        RADIOLOGY & ADDITIONAL STUDIES:      MICROBIOLOGY:  RECENT CULTURES:                CHIEF COMPLAINT/ REASON FOR VISIT  .. Patient was seen to address the  issue listed under PROBLEM LIST which is located toward bottom of this note     ANYAA JOINER    PLV 2NOR 236 W1    Allergies    No Known Allergies    Intolerances        PAST MEDICAL & SURGICAL HISTORY:      FAMILY HISTORY:      Home Medications:      MEDICATIONS  (STANDING):  cefTRIAXone   IVPB 1000 milliGRAM(s) IV Intermittent once  cefTRIAXone   IVPB      dextrose 5% + sodium chloride 0.45%. 1000 milliLiter(s) (100 mL/Hr) IV Continuous <Continuous>  lactobacillus acidophilus 1 Tablet(s) Oral every 12 hours  metroNIDAZOLE  IVPB      metroNIDAZOLE  IVPB 500 milliGRAM(s) IV Intermittent once  metroNIDAZOLE  IVPB 500 milliGRAM(s) IV Intermittent every 8 hours  pantoprazole    Tablet 40 milliGRAM(s) Oral daily  senna 2 Tablet(s) Oral at bedtime    MEDICATIONS  (PRN):  acetaminophen     Tablet .. 650 milliGRAM(s) Oral every 6 hours PRN Temp greater or equal to 38C (100.4F), Mild Pain (1 - 3)  aluminum hydroxide/magnesium hydroxide/simethicone Suspension 30 milliLiter(s) Oral every 4 hours PRN Dyspepsia  magnesium hydroxide Suspension 30 milliLiter(s) Oral daily PRN Constipation  melatonin 3 milliGRAM(s) Oral at bedtime PRN Insomnia  ondansetron Injectable 4 milliGRAM(s) IV Push every 8 hours PRN Nausea and/or Vomiting  traMADol 50 milliGRAM(s) Oral three times a day PRN Moderate Pain (4 - 6)              Vital Signs Last 24 Hrs  T(C): 36.6 (22 Dec 2023 08:01), Max: 38.6 (21 Dec 2023 22:50)  T(F): 97.9 (22 Dec 2023 08:01), Max: 101.4 (21 Dec 2023 22:50)  HR: 81 (22 Dec 2023 08:01) (81 - 113)  BP: 101/66 (22 Dec 2023 08:01) (94/57 - 105/70)  BP(mean): --  RR: 17 (22 Dec 2023 08:01) (16 - 17)  SpO2: 98% (22 Dec 2023 08:01) (96% - 98%)    Parameters below as of 22 Dec 2023 08:01  Patient On (Oxygen Delivery Method): room air                  LABS:                        8.2    12.06 )-----------( 399      ( 21 Dec 2023 22:30 )             24.6     12-21    136  |  106  |  25<H>  ----------------------------<  125<H>  4.2   |  24  |  0.68    Ca    8.3<L>      21 Dec 2023 22:30  Mg     1.7     12-21    TPro  5.8<L>  /  Alb  2.4<L>  /  TBili  0.3  /  DBili  x   /  AST  27  /  ALT  26  /  AlkPhos  125<H>  12-21    PT/INR - ( 21 Dec 2023 22:30 )   PT: 43.2 sec;   INR: 3.84 ratio         PTT - ( 21 Dec 2023 22:30 )  PTT:47.9 sec  Urinalysis Basic - ( 21 Dec 2023 22:30 )    Color: x / Appearance: x / SG: x / pH: x  Gluc: 125 mg/dL / Ketone: x  / Bili: x / Urobili: x   Blood: x / Protein: x / Nitrite: x   Leuk Esterase: x / RBC: x / WBC x   Sq Epi: x / Non Sq Epi: x / Bacteria: x            WBC:  WBC Count: 12.06 K/uL (12-21 @ 22:30)      MICROBIOLOGY:  RECENT CULTURES:        CARDIAC MARKERS ( 21 Dec 2023 22:30 )  x     / x     / 40 U/L / x     / x            PT/INR - ( 21 Dec 2023 22:30 )   PT: 43.2 sec;   INR: 3.84 ratio         PTT - ( 21 Dec 2023 22:30 )  PTT:47.9 sec    Sodium:  Sodium: 136 mmol/L (12-21 @ 22:30)      0.68 mg/dL 12-21 @ 22:30      Hemoglobin:  Hemoglobin: 8.2 g/dL (12-21 @ 22:30)      Platelets: Platelet Count - Automated: 399 K/uL (12-21 @ 22:30)      LIVER FUNCTIONS - ( 21 Dec 2023 22:30 )  Alb: 2.4 g/dL / Pro: 5.8 g/dL / ALK PHOS: 125 U/L / ALT: 26 U/L / AST: 27 U/L / GGT: x             Urinalysis Basic - ( 21 Dec 2023 22:30 )    Color: x / Appearance: x / SG: x / pH: x  Gluc: 125 mg/dL / Ketone: x  / Bili: x / Urobili: x   Blood: x / Protein: x / Nitrite: x   Leuk Esterase: x / RBC: x / WBC x   Sq Epi: x / Non Sq Epi: x / Bacteria: x        RADIOLOGY & ADDITIONAL STUDIES:      MICROBIOLOGY:  RECENT CULTURES:

## 2023-12-22 NOTE — CONSULT NOTE ADULT - ASSESSMENT
ulcerative colitis  abnormal ct  pancolitis    check fecal calprotectin  if elevated can start mesalamine 400mg tid  reg diet  will follow

## 2023-12-22 NOTE — H&P ADULT - HISTORY OF PRESENT ILLNESS
Patient is a 58-year-old female  ,[facility] resident who presents to the emergency room with multiple medical issues.  Past medical history of CAD diabetes insomnia major depressive disorder GERD hypertension irritable bowel syndrome with diarrhea cirrhosis of the liver nonalcoholic fatty liver hepatic encephalopathy bipolar disorder chronic A-fib on Coumadin blepharitis of the left eye.    Per PCP signout patient was transferred to [place] for reported hypotension and tachycardia diffuse purpura.  Patient had a temperature of 99.3 with a heart rate of 116 and a blood pressure of 88/60.  Patient was FEBRILE later in ER with TEMP 101 , septic workup sent ,  started on iv abx and fluids .CT abd showed pancolitis , seen by GI and ID . Found to have cellulitis of lids b/l R>L   Patient is a 58-year-old female  ,[facility] resident who presents to the emergency room with multiple medical issues.  Past medical history of CAD diabetes insomnia major depressive disorder GERD hypertension irritable bowel syndrome with diarrhea cirrhosis of the liver nonalcoholic fatty liver hepatic encephalopathy bipolar disorder chronic A-fib on Coumadin blepharitis of the left eye.    Per PCP signout patient was transferred to [place] for reported hypotension and tachycardia diffuse purpura.  Patient had a temperature of 99.3 with a heart rate of 116 and a blood pressure of 88/60.  Patient was FEBRILE later in ER with TEMP 101 , septic workup sent ,  started on iv abx and fluids .CT abd showed pancolitis , seen by GI and ID . Found to have cellulitis of lids b/l R>L

## 2023-12-22 NOTE — DISCHARGE NOTE PROVIDER - HOSPITAL COURSE
ADMISSION DATE:  12-22-23    ---  FROM ADMISSION H+P:   HPI:   Patient is a 58-year-old female  ,[facility] resident who presents to the emergency room with multiple medical issues.  Past medical history of CAD diabetes insomnia major depressive disorder GERD hypertension irritable bowel syndrome with diarrhea cirrhosis of the liver nonalcoholic fatty liver hepatic encephalopathy bipolar disorder chronic A-fib on Coumadin blepharitis of the left eye.    Per PCP signout patient was transferred to [place] for reported hypotension and tachycardia diffuse purpura.  Patient had a temperature of 99.3 with a heart rate of 116 and a blood pressure of 88/60.  Patient was FEBRILE later in ER with TEMP 101 , septic workup sent ,  started on iv abx and fluids .CT abd showed pancolitis , seen by GI and ID . Found to have cellulitis of lids b/l R>L  (22 Dec 2023 10:37)      ---  HOSPITAL COURSE/PERTINENT LABS/PROCEDURES PERFORMED/PENDING TESTS: Patient was admitted for treatment of blepharitis / orbital cellulitis. Treated with IV abx, antibiotic ointment, Rheum and opthalmology consultations unavailable at [place].     ---  PATIENT CONDITION:  - stable    ---  Physical Exam:    Physical Exam:  · Constitutional Comments	frail , ill appearing female  · Eyes	PERRL; EOMI; conjunctiva clear  · Eyes Comments	b/l blepharitis with lid cellulitis righ > left  · ENMT	no gross abnormalities  · Respiratory	clear to auscultation bilaterally; no wheezes; no rales; no rhonchi  · Cardiovascular	regular rate and rhythm; S1 S2 present; no gallops; no rub; no murmur  · Gastrointestinal	soft; nontender; nondistended; normal active bowel sounds  · Skin Comments	petechial rash and purpura extremities and abdomen  · Lymphatic	No lymphadedenopathy  · Musculoskeletal	no joint swelling; no joint erythema; no joint warmth; no calf tenderness; no chest wall tenderness  · Psychiatric	depressed      ---  CONSULTANTS:   heme/onc  GI  ID  Pulm    ---  ADVANCED CARE PLANNING:  - Code status:      - MOLST completed:      [  ] NO     [  ] YES    ---  TIME SPENT:  I, the attending physician, was physically present for the key portions of the evaluation and management (E/M) service provided. The total amount of time spent reviewing the hospital notes, laboratory values, imaging findings, assessing/counseling the patient, discussing with consultant physicians, social work, nursing staff was -- minutes ADMISSION DATE:  12-22-23    ---  FROM ADMISSION H+P:   HPI:   Patient is a 58-year-old female  ,[facility] resident who presents to the emergency room with multiple medical issues.  Past medical history of CAD diabetes insomnia major depressive disorder GERD hypertension irritable bowel syndrome with diarrhea cirrhosis of the liver nonalcoholic fatty liver hepatic encephalopathy bipolar disorder chronic A-fib on Coumadin blepharitis of the left eye.    Per PCP signout patient was transferred to [place] for reported hypotension and tachycardia diffuse purpura.  Patient had a temperature of 99.3 with a heart rate of 116 and a blood pressure of 88/60.  Patient was FEBRILE later in ER with TEMP 101 , septic workup sent ,  started on iv abx and fluids .CT abd showed pancolitis , seen by GI and ID . Found to have cellulitis of lids b/l R>L  (22 Dec 2023 10:37)      ---  HOSPITAL COURSE/PERTINENT LABS/PROCEDURES PERFORMED/PENDING TESTS: Patient was admitted for treatment of blepharitis / orbital cellulitis. Treated with IV abx, antibiotic ointment, Rheum and opthalmology consultations unavailable at [place].     ---  PATIENT CONDITION:  - stable    ---  Physical Exam:    Physical Exam:  · Constitutional Comments	frail , ill appearing female  · Eyes	PERRL; EOMI; conjunctiva clear  · Eyes Comments	b/l blepharitis with lid cellulitis righ > left  · ENMT	no gross abnormalities  · Respiratory	clear to auscultation bilaterally; no wheezes; no rales; no rhonchi  · Cardiovascular	regular rate and rhythm; S1 S2 present; no gallops; no rub; no murmur  · Gastrointestinal	soft; nontender; nondistended; normal active bowel sounds  · Skin Comments	petechial rash and purpura extremities and abdomen  · Lymphatic	No lymphadedenopathy  · Musculoskeletal	no joint swelling; no joint erythema; no joint warmth; no calf tenderness; no chest wall tenderness  · Psychiatric	depressed      ---  CONSULTANTS:   heme/onc  GI  ID  Pulm    ---  ADVANCED CARE PLANNING:  - Code status:      - MOLST completed:      [  ] NO     [  ] YES    ---  TIME SPENT:  I, the attending physician, was physically present for the key portions of the evaluation and management (E/M) service provided. The total amount of time spent reviewing the hospital notes, laboratory values, imaging findings, assessing/counseling the patient, discussing with consultant physicians, social work, nursing staff was -- minutes

## 2023-12-22 NOTE — CONSULT NOTE ADULT - SUBJECTIVE AND OBJECTIVE BOX
[facility]     Patient is a 58y old  Female who presents with a chief complaint of     HPI:      HPI:    58y Female for Cardiology Consult    PAST MEDICAL & SURGICAL HISTORY:      FAMILY HISTORY:      SOCIAL HISTORY:   Alcohol:  Smoking:    Allergies    No Known Allergies    Intolerances        MEDICATIONS  (STANDING):  dextrose 5% + sodium chloride 0.45%. 1000 milliLiter(s) (75 mL/Hr) IV Continuous <Continuous>  pantoprazole    Tablet 40 milliGRAM(s) Oral daily    MEDICATIONS  (PRN):  acetaminophen     Tablet .. 650 milliGRAM(s) Oral every 6 hours PRN Temp greater or equal to 38C (100.4F), Mild Pain (1 - 3)      REVIEW OF SYSTEMS:  CONSTITUTIONAL: No fever, weight loss, chills, shakes, or fat  RESPIRATORY: No cough, wheezing, hemoptysis, or shortness of breath  CARDIOVASCULAR: No chest pain, dyspnea, palpitations, dizziness, syncope, paroxysmal nocturnal dyspnea, orthopnea, or arm or leg swelling  GASTROINTESTINAL: No abdominal  or epigastric pain, nausea, vomiting, hematemesis, diarrhea, constipation, melena or bright red bloo  NEUROLOGICAL: No headaches, memory loss, slurred speech, limb weakness, loss of strength, numbness, or tremors  SKIN: No itching, burning, rashes, or lesions  ENDOCRINE: No heat or cold intolerance, or hair loss  MUSCULOSKELETAL: No joint pain or swelling, muscle, back, or extremity pain  HEME/LYMPH: No easy bruising or bleeding gums  ALLERY AND IMMUNOLOGIC: No hives or rash.    Vital Signs Last 24 Hrs  T(C): 37.4 (22 Dec 2023 04:03), Max: 38.6 (21 Dec 2023 22:50)  T(F): 99.4 (22 Dec 2023 04:03), Max: 101.4 (21 Dec 2023 22:50)  HR: 92 (22 Dec 2023 04:03) (92 - 113)  BP: 94/57 (22 Dec 2023 04:03) (94/57 - 105/70)  BP(mean): --  RR: 17 (22 Dec 2023 04:03) (16 - 17)  SpO2: 96% (22 Dec 2023 04:03) (96% - 98%)    Parameters below as of 22 Dec 2023 04:03  Patient On (Oxygen Delivery Method): room air        PHYSICAL EXAM:  HEAD:  Atraumatic, Normocephalic  EYES: EOMI, PERRLA, conjunctiva and sclera clear  NECK: Supple and normal thyroid.  No JVD or carotid bruit.   HEART: S1, S2 regular , 1/6 soft ejection systolic murmur in mitral area , no thrill and no gallops .  PULMONARY: Bilateral vesicular breathing , few scattered ronchi and few scattered rales are present .  ABDOMEN: Soft nontender and positive bowl sounds   EXTREMITIES:  No clubbing, cyanosis, or pedal  edema  NEUROLOGICAL: AAOX3 , no focal deficit .    LABS:                        8.2    12.06 )-----------( 399      ( 21 Dec 2023 22:30 )             24.6     12-21    136  |  106  |  25<H>  ----------------------------<  125<H>  4.2   |  24  |  0.68    Ca    8.3<L>      21 Dec 2023 22:30  Mg     1.7     12-21    TPro  5.8<L>  /  Alb  2.4<L>  /  TBili  0.3  /  DBili  x   /  AST  27  /  ALT  26  /  AlkPhos  125<H>  12-21    CARDIAC MARKERS ( 21 Dec 2023 22:30 )  x     / x     / 40 U/L / x     / x          PT/INR - ( 21 Dec 2023 22:30 )   PT: 43.2 sec;   INR: 3.84 ratio         PTT - ( 21 Dec 2023 22:30 )  PTT:47.9 sec  Urinalysis Basic - ( 21 Dec 2023 22:30 )    Color: x / Appearance: x / SG: x / pH: x  Gluc: 125 mg/dL / Ketone: x  / Bili: x / Urobili: x   Blood: x / Protein: x / Nitrite: x   Leuk Esterase: x / RBC: x / WBC x   Sq Epi: x / Non Sq Epi: x / Bacteria: x      BNP      EKG:  ECHO:  IMAGING:    Assessment and Plan :     Will continue to follow during hospital course and give further recommendations as needed . Thanks for your referral . if any questions please contact me at office ([phone] [phone])  [facility]     Patient is a 58y old  Female who presents with a chief complaint of     HPI:      HPI:    58y Female for Cardiology Consult    PAST MEDICAL & SURGICAL HISTORY:      FAMILY HISTORY:      SOCIAL HISTORY:   Alcohol:  Smoking:    Allergies    No Known Allergies    Intolerances        MEDICATIONS  (STANDING):  dextrose 5% + sodium chloride 0.45%. 1000 milliLiter(s) (75 mL/Hr) IV Continuous <Continuous>  pantoprazole    Tablet 40 milliGRAM(s) Oral daily    MEDICATIONS  (PRN):  acetaminophen     Tablet .. 650 milliGRAM(s) Oral every 6 hours PRN Temp greater or equal to 38C (100.4F), Mild Pain (1 - 3)      REVIEW OF SYSTEMS:  CONSTITUTIONAL: No fever, weight loss, chills, shakes, or fat  RESPIRATORY: No cough, wheezing, hemoptysis, or shortness of breath  CARDIOVASCULAR: No chest pain, dyspnea, palpitations, dizziness, syncope, paroxysmal nocturnal dyspnea, orthopnea, or arm or leg swelling  GASTROINTESTINAL: No abdominal  or epigastric pain, nausea, vomiting, hematemesis, diarrhea, constipation, melena or bright red bloo  NEUROLOGICAL: No headaches, memory loss, slurred speech, limb weakness, loss of strength, numbness, or tremors  SKIN: No itching, burning, rashes, or lesions  ENDOCRINE: No heat or cold intolerance, or hair loss  MUSCULOSKELETAL: No joint pain or swelling, muscle, back, or extremity pain  HEME/LYMPH: No easy bruising or bleeding gums  ALLERY AND IMMUNOLOGIC: No hives or rash.    Vital Signs Last 24 Hrs  T(C): 37.4 (22 Dec 2023 04:03), Max: 38.6 (21 Dec 2023 22:50)  T(F): 99.4 (22 Dec 2023 04:03), Max: 101.4 (21 Dec 2023 22:50)  HR: 92 (22 Dec 2023 04:03) (92 - 113)  BP: 94/57 (22 Dec 2023 04:03) (94/57 - 105/70)  BP(mean): --  RR: 17 (22 Dec 2023 04:03) (16 - 17)  SpO2: 96% (22 Dec 2023 04:03) (96% - 98%)    Parameters below as of 22 Dec 2023 04:03  Patient On (Oxygen Delivery Method): room air        PHYSICAL EXAM:  HEAD:  Atraumatic, Normocephalic  EYES: EOMI, PERRLA, conjunctiva and sclera clear  NECK: Supple and normal thyroid.  No JVD or carotid bruit.   HEART: S1, S2 regular , 1/6 soft ejection systolic murmur in mitral area , no thrill and no gallops .  PULMONARY: Bilateral vesicular breathing , few scattered ronchi and few scattered rales are present .  ABDOMEN: Soft nontender and positive bowl sounds   EXTREMITIES:  No clubbing, cyanosis, or pedal  edema  NEUROLOGICAL: AAOX3 , no focal deficit .    LABS:                        8.2    12.06 )-----------( 399      ( 21 Dec 2023 22:30 )             24.6     12-21    136  |  106  |  25<H>  ----------------------------<  125<H>  4.2   |  24  |  0.68    Ca    8.3<L>      21 Dec 2023 22:30  Mg     1.7     12-21    TPro  5.8<L>  /  Alb  2.4<L>  /  TBili  0.3  /  DBili  x   /  AST  27  /  ALT  26  /  AlkPhos  125<H>  12-21    CARDIAC MARKERS ( 21 Dec 2023 22:30 )  x     / x     / 40 U/L / x     / x          PT/INR - ( 21 Dec 2023 22:30 )   PT: 43.2 sec;   INR: 3.84 ratio         PTT - ( 21 Dec 2023 22:30 )  PTT:47.9 sec  Urinalysis Basic - ( 21 Dec 2023 22:30 )    Color: x / Appearance: x / SG: x / pH: x  Gluc: 125 mg/dL / Ketone: x  / Bili: x / Urobili: x   Blood: x / Protein: x / Nitrite: x   Leuk Esterase: x / RBC: x / WBC x   Sq Epi: x / Non Sq Epi: x / Bacteria: x      BNP      EKG:  ECHO:  IMAGING:    Assessment and Plan :     Will continue to follow during hospital course and give further recommendations as needed . Thanks for your referral . if any questions please contact me at office ([phone])cell [phone])  CAIT BERTRAND MD [place] [id number]  [address]  OFFICE : [phone]  CELL : [phone]  CARDIOLOGY CONSULT :   Patient is a 58y old  Female who presents with a chief complaint of BODY RASH AND REDNESS AROUND RIGHT EYE .    · Chief Complaint: The patient is a 58y Female complaining of  · HPI Objective Statement: Patient is a 58-year-old female who presents to the emergency room with multiple medical issues.  Past medical history of CAD diabetes insomnia major depressive disorder GERD hypertension irritable bowel syndrome with diarrhea cirrhosis of the liver nonalcoholic fatty liver hepatic encephalopathy bipolar disorder chronic A-fib on Coumadin blepharitis of the left eye.  It appears the patient was started on erythromycin ointment to the left eye on 1218.  Per residential signout patient was transferred to [place] for reported hypotension and tachycardia diffuse purpura.  Patient had a temperature of 99.3 with a heart rate of 116 and a blood pressure of 88/60.  Patient was started on fluids EMS was called and patient was sent to the emergency room.    PAST MEDICAL & SURGICAL HISTORY:      FAMILY HISTORY:      SOCIAL HISTORY:   Alcohol:  Smoking:    Allergies    No Known Allergies    Intolerances        MEDICATIONS  (STANDING):  dextrose 5% + sodium chloride 0.45%. 1000 milliLiter(s) (75 mL/Hr) IV Continuous <Continuous>  pantoprazole    Tablet 40 milliGRAM(s) Oral daily    MEDICATIONS  (PRN):  acetaminophen     Tablet .. 650 milliGRAM(s) Oral every 6 hours PRN Temp greater or equal to 38C (100.4F), Mild Pain (1 - 3)    Vital Signs Last 24 Hrs  T(C): 37.4 (22 Dec 2023 04:03), Max: 38.6 (21 Dec 2023 22:50)  T(F): 99.4 (22 Dec 2023 04:03), Max: 101.4 (21 Dec 2023 22:50)  HR: 92 (22 Dec 2023 04:03) (92 - 113)  BP: 94/57 (22 Dec 2023 04:03) (94/57 - 105/70)  BP(mean): --  RR: 17 (22 Dec 2023 04:03) (16 - 17)  SpO2: 96% (22 Dec 2023 04:03) (96% - 98%)    Parameters below as of 22 Dec 2023 04:03  Patient On (Oxygen Delivery Method): room air        LABS:                        8.2    12.06 )-----------( 399      ( 21 Dec 2023 22:30 )             24.6     12-21    136  |  106  |  25<H>  ----------------------------<  125<H>  4.2   |  24  |  0.68    Ca    8.3<L>      21 Dec 2023 22:30  Mg     1.7     12-21    TPro  5.8<L>  /  Alb  2.4<L>  /  TBili  0.3  /  DBili  x   /  AST  27  /  ALT  26  /  AlkPhos  125<H>  12-21    CARDIAC MARKERS ( 21 Dec 2023 22:30 )  x     / x     / 40 U/L / x     / x          PT/INR - ( 21 Dec 2023 22:30 )   PT: 43.2 sec;   INR: 3.84 ratio         PTT - ( 21 Dec 2023 22:30 )  PTT:47.9 sec  Urinalysis Basic - ( 21 Dec 2023 22:30 )    Color: x / Appearance: x / SG: x / pH: x  Gluc: 125 mg/dL / Ketone: x  / Bili: x / Urobili: x   Blood: x / Protein: x / Nitrite: x   Leuk Esterase: x / RBC: x / WBC x   Sq Epi: x / Non Sq Epi: x / Bacteria: x      Assessment and Plan :   58-year-old female who presents to the emergency room with multiple medical issues.  Past medical history of CAD diabetes insomnia major depressive disorder GERD hypertension irritable bowel syndrome with diarrhea cirrhosis of the liver nonalcoholic fatty liver hepatic encephalopathy bipolar disorder chronic A-fib on Coumadin blepharitis of the left eye.  It appears the patient was started on erythromycin ointment to the left eye on 1218.  Per residential signout patient was transferred to [place] for reported hypotension and tachycardia diffuse purpura.  Patient had a temperature of 99.3 with a heart rate of 116 and a blood pressure of 88/60.  Patient was started on I/V antibiotics in ER and also getting I/V fluids and BP is better . Patient has paroxysmal atrial fibrillation and tachycardia due to underlying sepsis . Will Monitor electrolytes and PT and INR and also hemoglobin . Patient has chronic anemia . Patient prognosis is guarded . Will resume coumadin when INR is less than 3 . Will get echocardiogram done   Will continue to follow during hospital course and give further recommendations as needed . Thanks for your referral . if any questions please contact me at office ([phone] cell [phone])  CAIT BERTRAND MD [place] [id number]  [address]  OFFICE : [phone]  CELL : [phone]  CARDIOLOGY CONSULT :   Patient is a 58y old  Female who presents with a chief complaint of BODY RASH AND REDNESS AROUND RIGHT EYE .    · Chief Complaint: The patient is a 58y Female complaining of  · HPI Objective Statement: Patient is a 58-year-old female who presents to the emergency room with multiple medical issues.  Past medical history of CAD diabetes insomnia major depressive disorder GERD hypertension irritable bowel syndrome with diarrhea cirrhosis of the liver nonalcoholic fatty liver hepatic encephalopathy bipolar disorder chronic A-fib on Coumadin blepharitis of the left eye.  It appears the patient was started on erythromycin ointment to the left eye on 1218.  Per snf signout patient was transferred to [place] for reported hypotension and tachycardia diffuse purpura.  Patient had a temperature of 99.3 with a heart rate of 116 and a blood pressure of 88/60.  Patient was started on fluids EMS was called and patient was sent to the emergency room.    PAST MEDICAL & SURGICAL HISTORY:      FAMILY HISTORY:      SOCIAL HISTORY:   Alcohol:  Smoking:    Allergies    No Known Allergies    Intolerances        MEDICATIONS  (STANDING):  dextrose 5% + sodium chloride 0.45%. 1000 milliLiter(s) (75 mL/Hr) IV Continuous <Continuous>  pantoprazole    Tablet 40 milliGRAM(s) Oral daily    MEDICATIONS  (PRN):  acetaminophen     Tablet .. 650 milliGRAM(s) Oral every 6 hours PRN Temp greater or equal to 38C (100.4F), Mild Pain (1 - 3)    Vital Signs Last 24 Hrs  T(C): 37.4 (22 Dec 2023 04:03), Max: 38.6 (21 Dec 2023 22:50)  T(F): 99.4 (22 Dec 2023 04:03), Max: 101.4 (21 Dec 2023 22:50)  HR: 92 (22 Dec 2023 04:03) (92 - 113)  BP: 94/57 (22 Dec 2023 04:03) (94/57 - 105/70)  BP(mean): --  RR: 17 (22 Dec 2023 04:03) (16 - 17)  SpO2: 96% (22 Dec 2023 04:03) (96% - 98%)    Parameters below as of 22 Dec 2023 04:03  Patient On (Oxygen Delivery Method): room air        LABS:                        8.2    12.06 )-----------( 399      ( 21 Dec 2023 22:30 )             24.6     12-21    136  |  106  |  25<H>  ----------------------------<  125<H>  4.2   |  24  |  0.68    Ca    8.3<L>      21 Dec 2023 22:30  Mg     1.7     12-21    TPro  5.8<L>  /  Alb  2.4<L>  /  TBili  0.3  /  DBili  x   /  AST  27  /  ALT  26  /  AlkPhos  125<H>  12-21    CARDIAC MARKERS ( 21 Dec 2023 22:30 )  x     / x     / 40 U/L / x     / x          PT/INR - ( 21 Dec 2023 22:30 )   PT: 43.2 sec;   INR: 3.84 ratio         PTT - ( 21 Dec 2023 22:30 )  PTT:47.9 sec  Urinalysis Basic - ( 21 Dec 2023 22:30 )    Color: x / Appearance: x / SG: x / pH: x  Gluc: 125 mg/dL / Ketone: x  / Bili: x / Urobili: x   Blood: x / Protein: x / Nitrite: x   Leuk Esterase: x / RBC: x / WBC x   Sq Epi: x / Non Sq Epi: x / Bacteria: x      Assessment and Plan :   58-year-old female who presents to the emergency room with multiple medical issues.  Past medical history of CAD diabetes insomnia major depressive disorder GERD hypertension irritable bowel syndrome with diarrhea cirrhosis of the liver nonalcoholic fatty liver hepatic encephalopathy bipolar disorder chronic A-fib on Coumadin blepharitis of the left eye.  It appears the patient was started on erythromycin ointment to the left eye on 1218.  Per snf signout patient was transferred to [place] for reported hypotension and tachycardia diffuse purpura.  Patient had a temperature of 99.3 with a heart rate of 116 and a blood pressure of 88/60.  Patient was started on I/V antibiotics in ER and also getting I/V fluids and BP is better . Patient has paroxysmal atrial fibrillation and tachycardia due to underlying sepsis . Will Monitor electrolytes and PT and INR and also hemoglobin . Patient has chronic anemia . Patient prognosis is guarded . Will resume coumadin when INR is less than 3 . Will get echocardiogram done   Will continue to follow during hospital course and give further recommendations as needed . Thanks for your referral . if any questions please contact me at office ([phone] cell [phone])

## 2023-12-23 LAB
A1C WITH ESTIMATED AVERAGE GLUCOSE RESULT: 5.1 % — SIGNIFICANT CHANGE UP (ref 4–5.6)
A1C WITH ESTIMATED AVERAGE GLUCOSE RESULT: 5.1 % — SIGNIFICANT CHANGE UP (ref 4–5.6)
ALBUMIN SERPL ELPH-MCNC: 2.3 G/DL — LOW (ref 3.3–5)
ALBUMIN SERPL ELPH-MCNC: 2.3 G/DL — LOW (ref 3.3–5)
ALP SERPL-CCNC: 106 U/L — SIGNIFICANT CHANGE UP (ref 40–120)
ALP SERPL-CCNC: 106 U/L — SIGNIFICANT CHANGE UP (ref 40–120)
ALT FLD-CCNC: 20 U/L — SIGNIFICANT CHANGE UP (ref 12–78)
ALT FLD-CCNC: 20 U/L — SIGNIFICANT CHANGE UP (ref 12–78)
AMMONIA BLD-MCNC: 22 UMOL/L — SIGNIFICANT CHANGE UP (ref 11–32)
AMMONIA BLD-MCNC: 22 UMOL/L — SIGNIFICANT CHANGE UP (ref 11–32)
ANION GAP SERPL CALC-SCNC: 6 MMOL/L — SIGNIFICANT CHANGE UP (ref 5–17)
ANION GAP SERPL CALC-SCNC: 6 MMOL/L — SIGNIFICANT CHANGE UP (ref 5–17)
APPEARANCE UR: CLEAR — SIGNIFICANT CHANGE UP
APPEARANCE UR: CLEAR — SIGNIFICANT CHANGE UP
AST SERPL-CCNC: 19 U/L — SIGNIFICANT CHANGE UP (ref 15–37)
AST SERPL-CCNC: 19 U/L — SIGNIFICANT CHANGE UP (ref 15–37)
B BURGDOR IGG+IGM SER QL IB: SIGNIFICANT CHANGE UP
B BURGDOR IGG+IGM SER QL IB: SIGNIFICANT CHANGE UP
BABESIA MICROTI PCR, BLD RESULT: SIGNIFICANT CHANGE UP
BABESIA MICROTI PCR, BLD RESULT: SIGNIFICANT CHANGE UP
BASOPHILS # BLD AUTO: 0.03 K/UL — SIGNIFICANT CHANGE UP (ref 0–0.2)
BASOPHILS # BLD AUTO: 0.03 K/UL — SIGNIFICANT CHANGE UP (ref 0–0.2)
BASOPHILS NFR BLD AUTO: 0.4 % — SIGNIFICANT CHANGE UP (ref 0–2)
BASOPHILS NFR BLD AUTO: 0.4 % — SIGNIFICANT CHANGE UP (ref 0–2)
BILIRUB SERPL-MCNC: 0.2 MG/DL — SIGNIFICANT CHANGE UP (ref 0.2–1.2)
BILIRUB SERPL-MCNC: 0.2 MG/DL — SIGNIFICANT CHANGE UP (ref 0.2–1.2)
BILIRUB UR-MCNC: NEGATIVE — SIGNIFICANT CHANGE UP
BILIRUB UR-MCNC: NEGATIVE — SIGNIFICANT CHANGE UP
BUN SERPL-MCNC: 15 MG/DL — SIGNIFICANT CHANGE UP (ref 7–23)
BUN SERPL-MCNC: 15 MG/DL — SIGNIFICANT CHANGE UP (ref 7–23)
CALCIUM SERPL-MCNC: 7.9 MG/DL — LOW (ref 8.5–10.1)
CALCIUM SERPL-MCNC: 7.9 MG/DL — LOW (ref 8.5–10.1)
CHLORIDE SERPL-SCNC: 107 MMOL/L — SIGNIFICANT CHANGE UP (ref 96–108)
CHLORIDE SERPL-SCNC: 107 MMOL/L — SIGNIFICANT CHANGE UP (ref 96–108)
CMV IGG FLD QL: <0.2 U/ML — SIGNIFICANT CHANGE UP
CMV IGG FLD QL: <0.2 U/ML — SIGNIFICANT CHANGE UP
CMV IGG SERPL-IMP: NEGATIVE — SIGNIFICANT CHANGE UP
CMV IGG SERPL-IMP: NEGATIVE — SIGNIFICANT CHANGE UP
CMV IGM FLD-ACNC: <8 AU/ML — SIGNIFICANT CHANGE UP
CMV IGM FLD-ACNC: <8 AU/ML — SIGNIFICANT CHANGE UP
CMV IGM SERPL QL: NEGATIVE — SIGNIFICANT CHANGE UP
CMV IGM SERPL QL: NEGATIVE — SIGNIFICANT CHANGE UP
CO2 SERPL-SCNC: 25 MMOL/L — SIGNIFICANT CHANGE UP (ref 22–31)
CO2 SERPL-SCNC: 25 MMOL/L — SIGNIFICANT CHANGE UP (ref 22–31)
COLOR SPEC: YELLOW — SIGNIFICANT CHANGE UP
COLOR SPEC: YELLOW — SIGNIFICANT CHANGE UP
CREAT SERPL-MCNC: 0.53 MG/DL — SIGNIFICANT CHANGE UP (ref 0.5–1.3)
CREAT SERPL-MCNC: 0.53 MG/DL — SIGNIFICANT CHANGE UP (ref 0.5–1.3)
DIFF PNL FLD: NEGATIVE — SIGNIFICANT CHANGE UP
DIFF PNL FLD: NEGATIVE — SIGNIFICANT CHANGE UP
EGFR: 108 ML/MIN/1.73M2 — SIGNIFICANT CHANGE UP
EGFR: 108 ML/MIN/1.73M2 — SIGNIFICANT CHANGE UP
EOSINOPHIL # BLD AUTO: 0.06 K/UL — SIGNIFICANT CHANGE UP (ref 0–0.5)
EOSINOPHIL # BLD AUTO: 0.06 K/UL — SIGNIFICANT CHANGE UP (ref 0–0.5)
EOSINOPHIL # BLD: 50 /UL — SIGNIFICANT CHANGE UP (ref 50–350)
EOSINOPHIL # BLD: 50 /UL — SIGNIFICANT CHANGE UP (ref 50–350)
EOSINOPHIL NFR BLD AUTO: 0.8 % — SIGNIFICANT CHANGE UP (ref 0–6)
EOSINOPHIL NFR BLD AUTO: 0.8 % — SIGNIFICANT CHANGE UP (ref 0–6)
ESTIMATED AVERAGE GLUCOSE: 100 MG/DL — SIGNIFICANT CHANGE UP (ref 68–114)
ESTIMATED AVERAGE GLUCOSE: 100 MG/DL — SIGNIFICANT CHANGE UP (ref 68–114)
FOLATE SERPL-MCNC: >20 NG/ML — SIGNIFICANT CHANGE UP
FOLATE SERPL-MCNC: >20 NG/ML — SIGNIFICANT CHANGE UP
GLUCOSE SERPL-MCNC: 113 MG/DL — HIGH (ref 70–99)
GLUCOSE SERPL-MCNC: 113 MG/DL — HIGH (ref 70–99)
GLUCOSE UR QL: NEGATIVE MG/DL — SIGNIFICANT CHANGE UP
GLUCOSE UR QL: NEGATIVE MG/DL — SIGNIFICANT CHANGE UP
HCT VFR BLD CALC: 21.6 % — LOW (ref 34.5–45)
HCT VFR BLD CALC: 21.6 % — LOW (ref 34.5–45)
HGB BLD-MCNC: 7.2 G/DL — LOW (ref 11.5–15.5)
HGB BLD-MCNC: 7.2 G/DL — LOW (ref 11.5–15.5)
HSV1 IGG SER-ACNC: 5.86 INDEX — HIGH
HSV1 IGG SER-ACNC: 5.86 INDEX — HIGH
HSV1 IGG SERPL QL IA: POSITIVE
HSV1 IGG SERPL QL IA: POSITIVE
HSV2 IGG FLD-ACNC: 0.63 INDEX — SIGNIFICANT CHANGE UP
HSV2 IGG FLD-ACNC: 0.63 INDEX — SIGNIFICANT CHANGE UP
HSV2 IGG SERPL QL IA: NEGATIVE — SIGNIFICANT CHANGE UP
HSV2 IGG SERPL QL IA: NEGATIVE — SIGNIFICANT CHANGE UP
IMM GRANULOCYTES NFR BLD AUTO: 1.7 % — HIGH (ref 0–0.9)
IMM GRANULOCYTES NFR BLD AUTO: 1.7 % — HIGH (ref 0–0.9)
INR BLD: 3.52 RATIO — HIGH (ref 0.85–1.18)
INR BLD: 3.52 RATIO — HIGH (ref 0.85–1.18)
KETONES UR-MCNC: NEGATIVE MG/DL — SIGNIFICANT CHANGE UP
KETONES UR-MCNC: NEGATIVE MG/DL — SIGNIFICANT CHANGE UP
LDH SERPL L TO P-CCNC: 164 U/L — SIGNIFICANT CHANGE UP (ref 50–242)
LDH SERPL L TO P-CCNC: 164 U/L — SIGNIFICANT CHANGE UP (ref 50–242)
LEUKOCYTE ESTERASE UR-ACNC: ABNORMAL
LEUKOCYTE ESTERASE UR-ACNC: ABNORMAL
LYMPHOCYTES # BLD AUTO: 1.49 K/UL — SIGNIFICANT CHANGE UP (ref 1–3.3)
LYMPHOCYTES # BLD AUTO: 1.49 K/UL — SIGNIFICANT CHANGE UP (ref 1–3.3)
LYMPHOCYTES # BLD AUTO: 19.6 % — SIGNIFICANT CHANGE UP (ref 13–44)
LYMPHOCYTES # BLD AUTO: 19.6 % — SIGNIFICANT CHANGE UP (ref 13–44)
MCHC RBC-ENTMCNC: 31.4 PG — SIGNIFICANT CHANGE UP (ref 27–34)
MCHC RBC-ENTMCNC: 31.4 PG — SIGNIFICANT CHANGE UP (ref 27–34)
MCHC RBC-ENTMCNC: 33.3 GM/DL — SIGNIFICANT CHANGE UP (ref 32–36)
MCHC RBC-ENTMCNC: 33.3 GM/DL — SIGNIFICANT CHANGE UP (ref 32–36)
MCV RBC AUTO: 94.3 FL — SIGNIFICANT CHANGE UP (ref 80–100)
MCV RBC AUTO: 94.3 FL — SIGNIFICANT CHANGE UP (ref 80–100)
MONOCYTES # BLD AUTO: 0.54 K/UL — SIGNIFICANT CHANGE UP (ref 0–0.9)
MONOCYTES # BLD AUTO: 0.54 K/UL — SIGNIFICANT CHANGE UP (ref 0–0.9)
MONOCYTES NFR BLD AUTO: 7.1 % — SIGNIFICANT CHANGE UP (ref 2–14)
MONOCYTES NFR BLD AUTO: 7.1 % — SIGNIFICANT CHANGE UP (ref 2–14)
NEUTROPHILS # BLD AUTO: 5.34 K/UL — SIGNIFICANT CHANGE UP (ref 1.8–7.4)
NEUTROPHILS # BLD AUTO: 5.34 K/UL — SIGNIFICANT CHANGE UP (ref 1.8–7.4)
NEUTROPHILS NFR BLD AUTO: 70.4 % — SIGNIFICANT CHANGE UP (ref 43–77)
NEUTROPHILS NFR BLD AUTO: 70.4 % — SIGNIFICANT CHANGE UP (ref 43–77)
NITRITE UR-MCNC: NEGATIVE — SIGNIFICANT CHANGE UP
NITRITE UR-MCNC: NEGATIVE — SIGNIFICANT CHANGE UP
NRBC # BLD: 0 /100 WBCS — SIGNIFICANT CHANGE UP (ref 0–0)
NRBC # BLD: 0 /100 WBCS — SIGNIFICANT CHANGE UP (ref 0–0)
PH UR: 6 — SIGNIFICANT CHANGE UP (ref 5–8)
PH UR: 6 — SIGNIFICANT CHANGE UP (ref 5–8)
PLATELET # BLD AUTO: 365 K/UL — SIGNIFICANT CHANGE UP (ref 150–400)
PLATELET # BLD AUTO: 365 K/UL — SIGNIFICANT CHANGE UP (ref 150–400)
POTASSIUM SERPL-MCNC: 3.3 MMOL/L — LOW (ref 3.5–5.3)
POTASSIUM SERPL-MCNC: 3.3 MMOL/L — LOW (ref 3.5–5.3)
POTASSIUM SERPL-SCNC: 3.3 MMOL/L — LOW (ref 3.5–5.3)
POTASSIUM SERPL-SCNC: 3.3 MMOL/L — LOW (ref 3.5–5.3)
PROT SERPL-MCNC: 5.4 G/DL — LOW (ref 6–8.3)
PROT SERPL-MCNC: 5.4 G/DL — LOW (ref 6–8.3)
PROT UR-MCNC: 30 MG/DL
PROT UR-MCNC: 30 MG/DL
PROTHROM AB SERPL-ACNC: 39.7 SEC — HIGH (ref 9.5–13)
PROTHROM AB SERPL-ACNC: 39.7 SEC — HIGH (ref 9.5–13)
RBC # BLD: 2.29 M/UL — LOW (ref 3.8–5.2)
RBC # BLD: 2.29 M/UL — LOW (ref 3.8–5.2)
RBC # FLD: 13.2 % — SIGNIFICANT CHANGE UP (ref 10.3–14.5)
RBC # FLD: 13.2 % — SIGNIFICANT CHANGE UP (ref 10.3–14.5)
S PNEUM AG UR QL: NEGATIVE — SIGNIFICANT CHANGE UP
S PNEUM AG UR QL: NEGATIVE — SIGNIFICANT CHANGE UP
SODIUM SERPL-SCNC: 138 MMOL/L — SIGNIFICANT CHANGE UP (ref 135–145)
SODIUM SERPL-SCNC: 138 MMOL/L — SIGNIFICANT CHANGE UP (ref 135–145)
SP GR SPEC: 1.02 — SIGNIFICANT CHANGE UP (ref 1–1.03)
SP GR SPEC: 1.02 — SIGNIFICANT CHANGE UP (ref 1–1.03)
T PALLIDUM AB TITR SER: NEGATIVE — SIGNIFICANT CHANGE UP
T PALLIDUM AB TITR SER: NEGATIVE — SIGNIFICANT CHANGE UP
TSH SERPL-MCNC: 5.21 UIU/ML — HIGH (ref 0.36–3.74)
TSH SERPL-MCNC: 5.21 UIU/ML — HIGH (ref 0.36–3.74)
UROBILINOGEN FLD QL: 0.2 MG/DL — SIGNIFICANT CHANGE UP (ref 0.2–1)
UROBILINOGEN FLD QL: 0.2 MG/DL — SIGNIFICANT CHANGE UP (ref 0.2–1)
VIT B12 SERPL-MCNC: 856 PG/ML — SIGNIFICANT CHANGE UP (ref 232–1245)
VIT B12 SERPL-MCNC: 856 PG/ML — SIGNIFICANT CHANGE UP (ref 232–1245)
WBC # BLD: 7.59 K/UL — SIGNIFICANT CHANGE UP (ref 3.8–10.5)
WBC # BLD: 7.59 K/UL — SIGNIFICANT CHANGE UP (ref 3.8–10.5)
WBC # FLD AUTO: 7.59 K/UL — SIGNIFICANT CHANGE UP (ref 3.8–10.5)
WBC # FLD AUTO: 7.59 K/UL — SIGNIFICANT CHANGE UP (ref 3.8–10.5)

## 2023-12-23 PROCEDURE — 70498 CT ANGIOGRAPHY NECK: CPT | Mod: 26

## 2023-12-23 PROCEDURE — 70496 CT ANGIOGRAPHY HEAD: CPT | Mod: 26

## 2023-12-23 RX ADMIN — Medication 100 MILLIGRAM(S): at 05:01

## 2023-12-23 RX ADMIN — Medication 1 TABLET(S): at 17:45

## 2023-12-23 RX ADMIN — MIRTAZAPINE 30 MILLIGRAM(S): 45 TABLET, ORALLY DISINTEGRATING ORAL at 21:12

## 2023-12-23 RX ADMIN — CEFTRIAXONE 100 MILLIGRAM(S): 500 INJECTION, POWDER, FOR SOLUTION INTRAMUSCULAR; INTRAVENOUS at 11:06

## 2023-12-23 RX ADMIN — Medication 1 TABLET(S): at 05:00

## 2023-12-23 RX ADMIN — Medication 1 APPLICATION(S): at 17:46

## 2023-12-23 RX ADMIN — PANTOPRAZOLE SODIUM 40 MILLIGRAM(S): 20 TABLET, DELAYED RELEASE ORAL at 13:09

## 2023-12-23 RX ADMIN — Medication 1 MILLIGRAM(S): at 13:10

## 2023-12-23 NOTE — PROGRESS NOTE ADULT - ASSESSMENT
REASON FOR VISIT  .. Management of problems listed below        REVIEW OF SYMPTOMS   Able to give ROS  Yes     RELIABILITY +/-   CONSTITUTIONAL Weakness Yes    ENDOCRINE  No heat or cold intolerance    ALLERGY No hives  Sore throat No stridor  RESP Shortness of breath YES   NEURO New weakness No   CARDIAC   Palpitations No         PHYSICAL EXAM    HEENT Unremarkable  atraumatic   RESP Fair air entry  Harsh breath sound   CARDIAC S1 S2 No S3     NO JVD    ABDOMEN No hepatosplenomegaly   PEDAL EDEMA present No calf tenderness    GENERAL DATA .   GOC.  ..  12/22/2023 full code   ICU STAY.  ..   COVID. .. scv2 12/22/2023 (-)       BEST PRACTICE ISSUES.    HOB ELEVATN.  .. Yes  DVT PPLX.  ..   ..        DIET.   ..  12/22/2023 soft bite size   IV fl. .. 12/22/2023 d5 1/2 100   BOLUS. ..      STRESS ULCER PPLX.   .  ..   12/22/2023 protonix 40   INFECTION PPLX.   ..       SPEECH SWALLOW RECOMMENDATIONS.       ALLGY. ..    nka   WT. ..  12/22/2023 63  BMI. .. 12/22/2023 44    PROCEDURES. ..     ABGS.   .  VS/ PO/IO/ VENT/ DRIPS.  12/23/2023 afeb 66 100/60   12/23/2023 ra 98%      SUMMARY.  . 12/22/2023  57 y/o F w/ pmh of cad, dm, mdd, gerd, IBS, cirrhosis 2/2 to nonalcoholic fatty liver, bipolar, afib on coumadin, today presented to [facility] for hypotension and tachycardia with new diffuse purpura to the LE ongoing the last few days and today morning waking up by R>L orbital swelling. In the ED pt was worked up and was found to have a INR of 3.83, diffuse LE ecchymosis and R>L orbital swelling and CTH finding of 8mm aneurysm. ED team requested ICU consult given pt was complaining of UE weakness for vasculitis concern  . 12/22/2023   It appears the patient was started on erythromycin ointment to the left eye on 1218.  . 12/22/2023 Pulm consulktd   . PMHx .   . HOME MEDS .  . ISSUES CURRENT ADMISSION .  . CENTRIPETAL SKIN RASH (More in periphery)  . PRE ORBITAL CELLULITIS   . PANCOLITIS 12/22/2023 CT  . A fib ON COUMADIN PMH  . ANEMIA   . BRAIN ANEURYSM   PROBLEM/ASSESMENT/PLAN.  . SEPSIS  . PREORBITAL CELLULITIS  . PANCOLITIS   .. 12/23/2023 Cellulitis much improved   .. w 12/22-12/23/2023 w 12- 7.5   .. CT CHEST ABD 12/22/2023  .... Mild pancolitis Dependent atelectasis   .. RVP 12/22/2023 RVP (-)   .. 12/22/2023 Rocephin Dr EMELINA Urbano   .. 12/22/2023 Flagul dced  .. 12/22 rifaximin Dr Ramírez   .. 12/22 emycin oitment eyes Dr Ramírez     . CAD   .. CK 12/22/2023 CK 40     . A fib ON COUMADIN PMH  .. inr 12/23/2023 inr 3.5    . ANEMIA   .. Hb 12/22-12/23/2023 Hb 8.2 - 7.2   .. monitor    . PURPURA  .. Plt 12/22/2023 plt 399   .. INR 12/22/2023 INR 3.8     RENAL   .. Na 12/22/2023 Na 136  .. CO2 12/22/2023 CO2 24  .. Cr 12/22/2023 Cr .6     . RO CVA   .. CT h 12/22/2023   .... chr l post frontal infacrt     . RO VTE   .. V duplx 12/22/2023 (-)     . ORBITAL CELLULITIS  .. CT ORBITS 12/22/2023   .... r periorbital and r facial soft tissue swelling  .... mild l periorbital soft tissue swelling   .... patchy paranasal sinus mucosal thickening and opacn charissa ethmoid air cells   .... l laterally projecting 8 x 6 x 6 mm aneurysm betw takeoff of l sup cerebellar artery and l post cerebral artery   .... NO POST SEPTAL/ORBITAL CELLULITIS     . BRAIN ANEURYSM  .. CT ORBITS 12/22/2023   .... l laterally projecting 8 x 6 x 6 mm aneurysm betw takeoff of l sup cerebellar artery and l post cerebral artery   .... NS CONSULT IS RECOMMENDED   .. CTA Head 12/23/2023  .... no sah  .... l sup cerebellar artery aneurusm 7.7 mm with neck 5.6 mm   .... small l M1 aneurysm at origin of a lenticulostriate vessel 2.7 mm  .. 12/23/2023 3:32 PM called and informed Dr Tovar   .. NS was called in ER at time of admission and no immediate intervention was recommendedby NS     . SKIN RASH   .... Palpable purpura lower extremities   .... BL purple discoloration of eyelids with local swelling   .. 12/23/2023 rash seems to be dissipating   .. DD includes vasculitis eg Henoch Schonlein Severe infection eg meningococcal, Staph Strep RMSF (but no ho travel [place])   .. AEC 12/23 AEC 50  . AMM 12/23/2023 AMM 22   .. LUME 12/22 (-)   .. CMV 12/22 (-)   .. HSV IGG (+)   .. BABESIA PCR 12/22 (-)     OVERALL  . 57 y/o F w/ pmh of cad, dm, mdd, gerd, IBS, cirrhosis 2/2 to nonalcoholic fatty liver, bipolar, afib on coumadin admitted 12/22/2023 with orbital cellulitis and rash extremities loked like palpable purpura  incidental finding of brain aneurysm   . CENTRIPETAL SKIN RASH (More in periphery) Suggest ID Hemat eval   . PRE ORBITAL CELLULITIS ID on case 12/22/2023 Started on rocephin  . PANCOLITIS 12/22/2023 CT 12/22/2023 ID on case 12/22/2023 Started on flagyl   . A fib ON COUMADIN PMH  . ANEMIA 12/22/2023 suggest hemat eval   . BRAIN ANEURYSM 12/22/2023 suggest neuro eval    . TRANSFER   .. 12/22/2023 RUY HOSPITALIST ON CALLDR LORENT [id number]   .. 12/22/2023 TRANSFER NEEDED AS WE DO NOIT HAVE RHEUMATOLOGY DERMATOLOGY OR OPHTHALMOLOGY AVAILABLE AT [facility] AND THESE CONSULTANTS ARE NEEDED TO CARE FOR THIS PATIENYT  .. 12/22/2023 RUY ROMAN AT [facility] ACCEPTING MD HE WILL ACCEPT PT TO TELE AND WE WILLPLACE PT ON CARDIAC MONMITOR   .. 12/23/2023 awaits bed in tertiary hosp     TIME SPENT.  . Over 36 minutes aggregate care time spent on encounter; activities included   direct patient care, counseling and/or coordinating care reviewing notes, lab data/ imaging , discussion with multidisciplinary team/ patient  /family and explaining in detail risks, benefits, alternatives  of the recommendations     PATIENT.  . SPECCHIO     REASON FOR VISIT  .. Management of problems listed below        REVIEW OF SYMPTOMS   Able to give ROS  Yes     RELIABILITY +/-   CONSTITUTIONAL Weakness Yes    ENDOCRINE  No heat or cold intolerance    ALLERGY No hives  Sore throat No stridor  RESP Shortness of breath YES   NEURO New weakness No   CARDIAC   Palpitations No         PHYSICAL EXAM    HEENT Unremarkable  atraumatic   RESP Fair air entry  Harsh breath sound   CARDIAC S1 S2 No S3     NO JVD    ABDOMEN No hepatosplenomegaly   PEDAL EDEMA present No calf tenderness    GENERAL DATA .   GOC.  ..  12/22/2023 full code   ICU STAY.  ..   COVID. .. scv2 12/22/2023 (-)       BEST PRACTICE ISSUES.    HOB ELEVATN.  .. Yes  DVT PPLX.  ..   ..        DIET.   ..  12/22/2023 soft bite size   IV fl. .. 12/22/2023 d5 1/2 100   BOLUS. ..      STRESS ULCER PPLX.   .  ..   12/22/2023 protonix 40   INFECTION PPLX.   ..       SPEECH SWALLOW RECOMMENDATIONS.       ALLGY. ..    nka   WT. ..  12/22/2023 63  BMI. .. 12/22/2023 44    PROCEDURES. ..     ABGS.   .  VS/ PO/IO/ VENT/ DRIPS.  12/23/2023 afeb 66 100/60   12/23/2023 ra 98%      SUMMARY.  . 12/22/2023  57 y/o F w/ pmh of cad, dm, mdd, gerd, IBS, cirrhosis 2/2 to nonalcoholic fatty liver, bipolar, afib on coumadin, today presented to [facility] for hypotension and tachycardia with new diffuse purpura to the LE ongoing the last few days and today morning waking up by R>L orbital swelling. In the ED pt was worked up and was found to have a INR of 3.83, diffuse LE ecchymosis and R>L orbital swelling and CTH finding of 8mm aneurysm. ED team requested ICU consult given pt was complaining of UE weakness for vasculitis concern  . 12/22/2023   It appears the patient was started on erythromycin ointment to the left eye on 1218.  . 12/22/2023 Pulm consulktd   . PMHx .   . HOME MEDS .  . ISSUES CURRENT ADMISSION .  . CENTRIPETAL SKIN RASH (More in periphery)  . PRE ORBITAL CELLULITIS   . PANCOLITIS 12/22/2023 CT  . A fib ON COUMADIN PMH  . ANEMIA   . BRAIN ANEURYSM   PROBLEM/ASSESMENT/PLAN.  . SEPSIS  . PREORBITAL CELLULITIS  . PANCOLITIS   .. 12/23/2023 Cellulitis much improved   .. w 12/22-12/23/2023 w 12- 7.5   .. CT CHEST ABD 12/22/2023  .... Mild pancolitis Dependent atelectasis   .. RVP 12/22/2023 RVP (-)   .. 12/22/2023 Rocephin Dr EMELINA Urbano   .. 12/22/2023 Flagul dced  .. 12/22 rifaximin Dr Ramírez   .. 12/22 emycin oitment eyes Dr Ramírez     . CAD   .. CK 12/22/2023 CK 40     . A fib ON COUMADIN PMH  .. inr 12/23/2023 inr 3.5    . ANEMIA   .. Hb 12/22-12/23/2023 Hb 8.2 - 7.2   .. monitor    . PURPURA  .. Plt 12/22/2023 plt 399   .. INR 12/22/2023 INR 3.8     RENAL   .. Na 12/22/2023 Na 136  .. CO2 12/22/2023 CO2 24  .. Cr 12/22/2023 Cr .6     . RO CVA   .. CT h 12/22/2023   .... chr l post frontal infacrt     . RO VTE   .. V duplx 12/22/2023 (-)     . ORBITAL CELLULITIS  .. CT ORBITS 12/22/2023   .... r periorbital and r facial soft tissue swelling  .... mild l periorbital soft tissue swelling   .... patchy paranasal sinus mucosal thickening and opacn charissa ethmoid air cells   .... l laterally projecting 8 x 6 x 6 mm aneurysm betw takeoff of l sup cerebellar artery and l post cerebral artery   .... NO POST SEPTAL/ORBITAL CELLULITIS     . BRAIN ANEURYSM  .. CT ORBITS 12/22/2023   .... l laterally projecting 8 x 6 x 6 mm aneurysm betw takeoff of l sup cerebellar artery and l post cerebral artery   .... NS CONSULT IS RECOMMENDED   .. CTA Head 12/23/2023  .... no sah  .... l sup cerebellar artery aneurusm 7.7 mm with neck 5.6 mm   .... small l M1 aneurysm at origin of a lenticulostriate vessel 2.7 mm  .. 12/23/2023 3:32 PM called and informed Dr Tovar   .. NS was called in ER at time of admission and no immediate intervention was recommendedby NS     . SKIN RASH   .... Palpable purpura lower extremities   .... BL purple discoloration of eyelids with local swelling   .. 12/23/2023 rash seems to be dissipating   .. DD includes vasculitis eg Henoch Schonlein Severe infection eg meningococcal, Staph Strep RMSF (but no ho travel [place])   .. AEC 12/23 AEC 50  . AMM 12/23/2023 AMM 22   .. LUME 12/22 (-)   .. CMV 12/22 (-)   .. HSV IGG (+)   .. BABESIA PCR 12/22 (-)     OVERALL  . 57 y/o F w/ pmh of cad, dm, mdd, gerd, IBS, cirrhosis 2/2 to nonalcoholic fatty liver, bipolar, afib on coumadin admitted 12/22/2023 with orbital cellulitis and rash extremities loked like palpable purpura  incidental finding of brain aneurysm   . CENTRIPETAL SKIN RASH (More in periphery) Suggest ID Hemat eval   . PRE ORBITAL CELLULITIS ID on case 12/22/2023 Started on rocephin  . PANCOLITIS 12/22/2023 CT 12/22/2023 ID on case 12/22/2023 Started on flagyl   . A fib ON COUMADIN PMH  . ANEMIA 12/22/2023 suggest hemat eval   . BRAIN ANEURYSM 12/22/2023 suggest neuro eval    . TRANSFER   .. 12/22/2023 RUY HOSPITALIST ON CALLDR LORENT [id number]   .. 12/22/2023 TRANSFER NEEDED AS WE DO NOIT HAVE RHEUMATOLOGY DERMATOLOGY OR OPHTHALMOLOGY AVAILABLE AT [facility] AND THESE CONSULTANTS ARE NEEDED TO CARE FOR THIS PATIENYT  .. 12/22/2023 RUY ROMAN AT [facility] ACCEPTING MD HE WILL ACCEPT PT TO TELE AND WE WILLPLACE PT ON CARDIAC MONMITOR   .. 12/23/2023 awaits bed in tertiary hosp     TIME SPENT.  . Over 36 minutes aggregate care time spent on encounter; activities included   direct patient care, counseling and/or coordinating care reviewing notes, lab data/ imaging , discussion with multidisciplinary team/ patient  /family and explaining in detail risks, benefits, alternatives  of the recommendations     PATIENT.  . SPECCHIO

## 2023-12-23 NOTE — PROGRESS NOTE ADULT - ASSESSMENT
ulcerative colitis  abnormal ct  pancolitis    check fecal calprotectin-Pending collection   if elevated can start mesalamine 400mg tid  reg diet  will follow       Advanced care planning was discussed with patient and family.  Advanced care planning forms were reviewed and discussed.  Risks, benefits and alternatives of gastroenterologic procedures were discussed in detail and all questions were answered.    30 minutes spent.

## 2023-12-23 NOTE — PROGRESS NOTE ADULT - SUBJECTIVE AND OBJECTIVE BOX
[facility] DIVISION of INFECTIOUS DISEASE  Eddie Werner MD PhD, Alexa Hill MD, Emily Urbano MD, Kirti Garza MD, Brando Arguelles MD  and providing coverage with Brandi Carter MD  Providing Infectious Disease Consultations at [facility], [facility], [facility], [facility]'s    Office# [phone] to schedule follow up appointments  Answering Service for urgent calls or New Consults [phone]  Cell# to text for urgent issues Eddie Werner [phone]     infectious diseases progress note:    ANAYA JOINER is a 58y y. o. Female patient    Overnight and events of the last 24hrs reviewed    Allergies    No Known Allergies    Intolerances        ANTIBIOTICS/RELEVANT:  antimicrobials  cefTRIAXone   IVPB      cefTRIAXone   IVPB 1000 milliGRAM(s) IV Intermittent every 24 hours  metroNIDAZOLE  IVPB 500 milliGRAM(s) IV Intermittent every 8 hours  metroNIDAZOLE  IVPB      rifAXIMin 550 milliGRAM(s) Oral two times a day    immunologic:    OTHER:  acetaminophen     Tablet .. 650 milliGRAM(s) Oral every 6 hours PRN  aluminum hydroxide/magnesium hydroxide/simethicone Suspension 30 milliLiter(s) Oral every 4 hours PRN  dextrose 5% + sodium chloride 0.45%. 1000 milliLiter(s) IV Continuous <Continuous>  dextrose 5%. 1000 milliLiter(s) IV Continuous <Continuous>  dextrose 5%. 1000 milliLiter(s) IV Continuous <Continuous>  dextrose 50% Injectable 25 Gram(s) IV Push once  dextrose 50% Injectable 25 Gram(s) IV Push once  dextrose 50% Injectable 12.5 Gram(s) IV Push once  dextrose Oral Gel 15 Gram(s) Oral once PRN  erythromycin   Ointment 1 Application(s) Both EYES two times a day  folic acid 1 milliGRAM(s) Oral daily  glucagon  Injectable 1 milliGRAM(s) IntraMuscular once  insulin lispro (ADMELOG) corrective regimen sliding scale   SubCutaneous three times a day before meals  lactobacillus acidophilus 1 Tablet(s) Oral every 12 hours  magnesium hydroxide Suspension 30 milliLiter(s) Oral daily PRN  melatonin 3 milliGRAM(s) Oral at bedtime PRN  midodrine. 5 milliGRAM(s) Oral three times a day PRN  mirtazapine 30 milliGRAM(s) Oral at bedtime  ondansetron Injectable 4 milliGRAM(s) IV Push every 8 hours PRN  pantoprazole    Tablet 40 milliGRAM(s) Oral daily  senna 2 Tablet(s) Oral at bedtime  spironolactone 100 milliGRAM(s) Oral daily  traMADol 50 milliGRAM(s) Oral three times a day PRN      Objective:  Vital Signs Last 24 Hrs  T(C): 36.7 (23 Dec 2023 12:09), Max: 36.7 (23 Dec 2023 12:09)  T(F): 98 (23 Dec 2023 12:09), Max: 98 (23 Dec 2023 12:09)  HR: 86 (23 Dec 2023 12:09) (66 - 86)  BP: 112/70 (23 Dec 2023 12:09) (107/64 - 112/70)  BP(mean): --  RR: 20 (23 Dec 2023 12:09) (17 - 20)  SpO2: 98% (23 Dec 2023 12:09) (98% - 98%)    Parameters below as of 23 Dec 2023 12:09  Patient On (Oxygen Delivery Method): room air        T(C): 36.7 (12-23-23 @ 12:09), Max: 38.6 (12-21-23 @ 22:50)  T(C): 36.7 (12-23-23 @ 12:09), Max: 38.6 (12-21-23 @ 22:50)  T(C): 36.7 (12-23-23 @ 12:09), Max: 38.6 (12-21-23 @ 22:50)    PHYSICAL EXAM:  HEENT: NC atraumatic  Neck: supple  Respiratory: no accessory muscle use, breathing comfortably  Cardiovascular: distant  Gastrointestinal: normal appearing, nondistended  Extremities: no clubbing, no cyanosis,  Skin: erythema and swelling around eyes greatly decreased, sig conjunctivitis right eye, rash on body/legs with dark serpiginous border      LABS:                          7.2    7.59  )-----------( 365      ( 23 Dec 2023 07:30 )             21.6       WBC  7.59 12-23 @ 07:30  12.06 12-21 @ 22:30      12-23    138  |  107  |  15  ----------------------------<  113<H>  3.3<L>   |  25  |  0.53    Ca    7.9<L>      23 Dec 2023 07:30  Mg     1.7     12-21    TPro  5.4<L>  /  Alb  2.3<L>  /  TBili  0.2  /  DBili  <0.1  /  AST  19  /  ALT  20  /  AlkPhos  106  12-23      Creatinine: 0.53 mg/dL (12-23-23 @ 07:30)  Creatinine: 0.68 mg/dL (12-21-23 @ 22:30)      PT/INR - ( 23 Dec 2023 07:30 )   PT: 39.7 sec;   INR: 3.52 ratio         PTT - ( 21 Dec 2023 22:30 )  PTT:47.9 sec  Urinalysis Basic - ( 23 Dec 2023 07:30 )    Color: x / Appearance: x / SG: x / pH: x  Gluc: 113 mg/dL / Ketone: x  / Bili: x / Urobili: x   Blood: x / Protein: x / Nitrite: x   Leuk Esterase: x / RBC: x / WBC x   Sq Epi: x / Non Sq Epi: x / Bacteria: x            INFLAMMATORY MARKERS    Sedimentation Rate, Erythrocyte: 51 mm/hr (12.21.23 @ 22:30)      MICROBIOLOGY:      LYME IgG/IgM Antibodies Result: 0.08 Index (12-22 @ 23:30)      RADIOLOGY & ADDITIONAL STUDIES:   [facility] DIV[facility] of INFECTIOUS DISEASE  Eddie Werner MD PhD, Alexa Hill MD, Emily Urbano MD, Kirti Garza MD, Brando Arguelles MD  and providing coverage with Brandi Carter MD  Providing Infectious Disease Consultations at [facility], [facility], [facility], [facility]'s    Office# [phone] to schedule follow up appointments  Answering Service for urgent calls or New Consults [phone]  Cell# to text for urgent issues Eddie Werner [phone]     infectious diseases progress note:    ANAYA JOINER is a 58y y. o. Female patient    Overnight and events of the last 24hrs reviewed    Allergies    No Known Allergies    Intolerances        ANTIBIOTICS/RELEVANT:  antimicrobials  cefTRIAXone   IVPB      cefTRIAXone   IVPB 1000 milliGRAM(s) IV Intermittent every 24 hours  metroNIDAZOLE  IVPB 500 milliGRAM(s) IV Intermittent every 8 hours  metroNIDAZOLE  IVPB      rifAXIMin 550 milliGRAM(s) Oral two times a day    immunologic:    OTHER:  acetaminophen     Tablet .. 650 milliGRAM(s) Oral every 6 hours PRN  aluminum hydroxide/magnesium hydroxide/simethicone Suspension 30 milliLiter(s) Oral every 4 hours PRN  dextrose 5% + sodium chloride 0.45%. 1000 milliLiter(s) IV Continuous <Continuous>  dextrose 5%. 1000 milliLiter(s) IV Continuous <Continuous>  dextrose 5%. 1000 milliLiter(s) IV Continuous <Continuous>  dextrose 50% Injectable 25 Gram(s) IV Push once  dextrose 50% Injectable 25 Gram(s) IV Push once  dextrose 50% Injectable 12.5 Gram(s) IV Push once  dextrose Oral Gel 15 Gram(s) Oral once PRN  erythromycin   Ointment 1 Application(s) Both EYES two times a day  folic acid 1 milliGRAM(s) Oral daily  glucagon  Injectable 1 milliGRAM(s) IntraMuscular once  insulin lispro (ADMELOG) corrective regimen sliding scale   SubCutaneous three times a day before meals  lactobacillus acidophilus 1 Tablet(s) Oral every 12 hours  magnesium hydroxide Suspension 30 milliLiter(s) Oral daily PRN  melatonin 3 milliGRAM(s) Oral at bedtime PRN  midodrine. 5 milliGRAM(s) Oral three times a day PRN  mirtazapine 30 milliGRAM(s) Oral at bedtime  ondansetron Injectable 4 milliGRAM(s) IV Push every 8 hours PRN  pantoprazole    Tablet 40 milliGRAM(s) Oral daily  senna 2 Tablet(s) Oral at bedtime  spironolactone 100 milliGRAM(s) Oral daily  traMADol 50 milliGRAM(s) Oral three times a day PRN      Objective:  Vital Signs Last 24 Hrs  T(C): 36.7 (23 Dec 2023 12:09), Max: 36.7 (23 Dec 2023 12:09)  T(F): 98 (23 Dec 2023 12:09), Max: 98 (23 Dec 2023 12:09)  HR: 86 (23 Dec 2023 12:09) (66 - 86)  BP: 112/70 (23 Dec 2023 12:09) (107/64 - 112/70)  BP(mean): --  RR: 20 (23 Dec 2023 12:09) (17 - 20)  SpO2: 98% (23 Dec 2023 12:09) (98% - 98%)    Parameters below as of 23 Dec 2023 12:09  Patient On (Oxygen Delivery Method): room air        T(C): 36.7 (12-23-23 @ 12:09), Max: 38.6 (12-21-23 @ 22:50)  T(C): 36.7 (12-23-23 @ 12:09), Max: 38.6 (12-21-23 @ 22:50)  T(C): 36.7 (12-23-23 @ 12:09), Max: 38.6 (12-21-23 @ 22:50)    PHYSICAL EXAM:  HEENT: NC atraumatic  Neck: supple  Respiratory: no accessory muscle use, breathing comfortably  Cardiovascular: distant  Gastrointestinal: normal appearing, nondistended  Extremities: no clubbing, no cyanosis,  Skin: erythema and swelling around eyes greatly decreased, sig conjunctivitis right eye, rash on body/legs with dark serpiginous border      LABS:                          7.2    7.59  )-----------( 365      ( 23 Dec 2023 07:30 )             21.6       WBC  7.59 12-23 @ 07:30  12.06 12-21 @ 22:30      12-23    138  |  107  |  15  ----------------------------<  113<H>  3.3<L>   |  25  |  0.53    Ca    7.9<L>      23 Dec 2023 07:30  Mg     1.7     12-21    TPro  5.4<L>  /  Alb  2.3<L>  /  TBili  0.2  /  DBili  <0.1  /  AST  19  /  ALT  20  /  AlkPhos  106  12-23      Creatinine: 0.53 mg/dL (12-23-23 @ 07:30)  Creatinine: 0.68 mg/dL (12-21-23 @ 22:30)      PT/INR - ( 23 Dec 2023 07:30 )   PT: 39.7 sec;   INR: 3.52 ratio         PTT - ( 21 Dec 2023 22:30 )  PTT:47.9 sec  Urinalysis Basic - ( 23 Dec 2023 07:30 )    Color: x / Appearance: x / SG: x / pH: x  Gluc: 113 mg/dL / Ketone: x  / Bili: x / Urobili: x   Blood: x / Protein: x / Nitrite: x   Leuk Esterase: x / RBC: x / WBC x   Sq Epi: x / Non Sq Epi: x / Bacteria: x            INFLAMMATORY MARKERS    Sedimentation Rate, Erythrocyte: 51 mm/hr (12.21.23 @ 22:30)      MICROBIOLOGY:      LYME IgG/IgM Antibodies Result: 0.08 Index (12-22 @ 23:30)      RADIOLOGY & ADDITIONAL STUDIES:

## 2023-12-23 NOTE — PROGRESS NOTE ADULT - SUBJECTIVE AND OBJECTIVE BOX
Neurology Follow up note    ANAYA [id number]    HPI:   Patient is a 58-year-old female  ,[facility] resident who presents to the emergency room with multiple medical issues.  Past medical history of CAD diabetes insomnia major depressive disorder GERD hypertension irritable bowel syndrome with diarrhea cirrhosis of the liver nonalcoholic fatty liver hepatic encephalopathy bipolar disorder chronic A-fib on Coumadin blepharitis of the left eye.    Per PCP signout patient was transferred to [place] for reported hypotension and tachycardia diffuse purpura.  Patient had a temperature of 99.3 with a heart rate of 116 and a blood pressure of 88/60.  Patient was FEBRILE later in ER with TEMP 101 , septic workup sent ,  started on iv abx and fluids .CT abd showed pancolitis , seen by GI and ID . Found to have cellulitis of lids b/l R>L  (22 Dec 2023 10:37)      Interval History -no ha/dizziness    Patient is seen, chart was reviewed and case was discussed with the treatment team.  Pt is not in any distress.   Lying on bed comfortably.   No events reported overnight    Vital Signs Last 24 Hrs  T(C): 36.4 (23 Dec 2023 04:13), Max: 36.4 (23 Dec 2023 04:13)  T(F): 97.6 (23 Dec 2023 04:13), Max: 97.6 (23 Dec 2023 04:13)  HR: 66 (23 Dec 2023 04:13) (66 - 84)  BP: 107/64 (23 Dec 2023 04:13) (93/60 - 107/64)  BP(mean): --  RR: 18 (23 Dec 2023 04:13) (17 - 18)  SpO2: 98% (23 Dec 2023 04:13) (96% - 98%)    Parameters below as of 23 Dec 2023 04:13  Patient On (Oxygen Delivery Method): room air            REVIEW OF SYSTEMS:    Constitutional: No fever,   Eyes: No eye pain, visual disturbances, or discharge  ENT:  No difficulty hearing, tinnitus, vertigo; No sinus or throat pain  Neck: No pain or stiffness  Respiratory: No , wheezing, chills or hemoptysis  Cardiovascular: No chest pain, palpitations,  Gastrointestinal: No abdominal or epigastric pain. No nausea, vomiting  Genitourinary: No dysuria, frequency, hematuria or incontinence  Neurological: No headaches, , numbness or tremors  Psychiatric: No depression, anxiety, mood swings or difficulty sleeping  Musculoskeletal: No joint pain or swelling;   Skin: No itching, burning,   Lymph Nodes: No enlarged glands  Endocrine: No heat or cold intolerance;  Allergy and Immunologic: No hives or eczema    On Neurological Examination:    Mental Status - Pt is alert, awake, Follows simple  commands      Speech -  Normal.     Cranial Nerves - Pupils 3 mm equal and reactive to light, extraocular eye movements intact. Pt has no visual field deficit.  Pt has no  facial asymmetry. Facial sensation is intact.Tongue - is in midline.    Muscle tone - is normal     Motor Exam -left side 4/5  right deltoid/elbow 3/5; hand grasp 4/5  right hip 2/5 rest 3/5    Sensory Exam - . Pt withdraws all extremities equally on stimulation. No asymmetry seen. No complaints of tingling, numbness.        coordination:    Finger to nose: normal-left        Deep tendon Reflexes - 2 plus all over.            Neck Supple -  Yes.     MEDICATIONS    acetaminophen     Tablet .. 650 milliGRAM(s) Oral every 6 hours PRN  aluminum hydroxide/magnesium hydroxide/simethicone Suspension 30 milliLiter(s) Oral every 4 hours PRN  cefTRIAXone   IVPB      cefTRIAXone   IVPB 1000 milliGRAM(s) IV Intermittent every 24 hours  dextrose 5% + sodium chloride 0.45%. 1000 milliLiter(s) IV Continuous <Continuous>  dextrose 5%. 1000 milliLiter(s) IV Continuous <Continuous>  dextrose 5%. 1000 milliLiter(s) IV Continuous <Continuous>  dextrose 50% Injectable 25 Gram(s) IV Push once  dextrose 50% Injectable 25 Gram(s) IV Push once  dextrose 50% Injectable 12.5 Gram(s) IV Push once  dextrose Oral Gel 15 Gram(s) Oral once PRN  erythromycin   Ointment 1 Application(s) Both EYES two times a day  folic acid 1 milliGRAM(s) Oral daily  glucagon  Injectable 1 milliGRAM(s) IntraMuscular once  insulin lispro (ADMELOG) corrective regimen sliding scale   SubCutaneous three times a day before meals  lactobacillus acidophilus 1 Tablet(s) Oral every 12 hours  magnesium hydroxide Suspension 30 milliLiter(s) Oral daily PRN  melatonin 3 milliGRAM(s) Oral at bedtime PRN  metroNIDAZOLE  IVPB 500 milliGRAM(s) IV Intermittent every 8 hours  metroNIDAZOLE  IVPB      midodrine. 5 milliGRAM(s) Oral three times a day PRN  mirtazapine 30 milliGRAM(s) Oral at bedtime  ondansetron Injectable 4 milliGRAM(s) IV Push every 8 hours PRN  pantoprazole    Tablet 40 milliGRAM(s) Oral daily  rifAXIMin 550 milliGRAM(s) Oral two times a day  senna 2 Tablet(s) Oral at bedtime  spironolactone 100 milliGRAM(s) Oral daily  traMADol 50 milliGRAM(s) Oral three times a day PRN      Allergies    No Known Allergies    Intolerances        LABS:  CBC Full  -  ( 23 Dec 2023 07:30 )  WBC Count : 7.59 K/uL  RBC Count : 2.29 M/uL  Hemoglobin : 7.2 g/dL  Hematocrit : 21.6 %  Platelet Count - Automated : 365 K/uL  Mean Cell Volume : 94.3 fl  Mean Cell Hemoglobin : 31.4 pg  Mean Cell Hemoglobin Concentration : 33.3 gm/dL  Auto Neutrophil # : 5.34 K/uL  Auto Lymphocyte # : 1.49 K/uL  Auto Monocyte # : 0.54 K/uL  Auto Eosinophil # : 0.06 K/uL  Auto Basophil # : 0.03 K/uL  Auto Neutrophil % : 70.4 %  Auto Lymphocyte % : 19.6 %  Auto Monocyte % : 7.1 %  Auto Eosinophil % : 0.8 %  Auto Basophil % : 0.4 %    Urinalysis Basic - ( 23 Dec 2023 07:30 )    Color: x / Appearance: x / SG: x / pH: x  Gluc: 113 mg/dL / Ketone: x  / Bili: x / Urobili: x   Blood: x / Protein: x / Nitrite: x   Leuk Esterase: x / RBC: x / WBC x   Sq Epi: x / Non Sq Epi: x / Bacteria: x      12-23    138  |  107  |  15  ----------------------------<  113<H>  3.3<L>   |  25  |  0.53    Ca    7.9<L>      23 Dec 2023 07:30  Mg     1.7     12-21    TPro  5.4<L>  /  Alb  2.3<L>  /  TBili  0.2  /  DBili  <0.1  /  AST  19  /  ALT  20  /  AlkPhos  106  12-23    Hemoglobin A1C:     Vitamin B12     RADIOLOGY    < from: CT Orbit w/ IV Cont (12.22.23 @ 02:23) >    ACC: [id number] EXAM:  CT ORBITS IC   ORDERED BY: FRANKI PATEL     ACC: [id number] EXAM:  CT BRAIN   ORDERED BY: FRANKI K AMANDA     PROCEDURE DATE:  12/22/2023          INTERPRETATION:  CLINICAL INFORMATION: Right arm weakness. Right eye   redness and swelling.    COMPARISON: None    PROCEDURE:  Noncontrast CT of the Head was followed by contrast-enhanced CT of the   orbits. Coronal and Sagittal reformats were obtained.    CONTRAST/COMPLICATIONS:  IV Contrast: NONE (accession [id number]), IV contrast documented in   unlinked concurrent exam (accession [id number])  90 cc administered   (accession [id number]), 0 cc administered (accession [id number])   10 cc   discarded (accession [id number]), 0 cc discarded (accession [id number])  Complications: None reported at time of study completion    FINDINGS:    CT HEAD:    There is no acute intracranial hemorrhage, mass effect, midline shift,   extra-axial collection, hydrocephalus, or evidence of acute vascular   territorial infarction. Chronic left posterior frontal infarct. Mild   patchy hypodensities within the periventricular and subcortical white   matter, although nonspecific, likely reflect chronic microvascular   disease. Cerebral volume loss contributes to prominence of the ventricles   and sulci.    Mastoid air cells are clear. Calvarium is intact.    CT ORBITS:    Right periorbital and right facial soft tissue swelling. Mild left   periorbital soft tissue swelling. No discrete drainable fluid collection.   Intraorbital contents including the globes, extraocular muscles, and   optic nerves are intact. No infiltration of the retrobulbar fat.    Patchy paranasal sinus mucosal thickening and opacification most   prominent involving the ethmoid air cells.    Left laterally projecting8 x 6 x 6 mm aneurysm situated between the   takeoff of the left superior cerebellar artery and left posterior   cerebral arteries.    IMPRESSION:    CT head: No acute intracranial hemorrhage, mass effect, or evidence of   acute vascular territorial infarction. If clinical symptoms persist or   worsen, more sensitive evaluation with brain MRI may be obtained, if no   contraindications exist.    CT orbits: Nonspecific bilateral periorbital soft tissue swelling, right   greater than left, as well as right facial soft tissue swelling. No   discrete drainable fluid collection.    No evidence of post septal involvement/orbital cellulitis.    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 Neurosurgical   consultation is recommended.    Dr. Ovalle discussed the above findings with Dr. Patel at 3:20 AM via   Teams on 12/22/2023 with read back.    --- End of Report ---            VIRGINIA OVALLE MD; Attending Radiologist  This document has been electronically signed. 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  OOB to chair/ambulation with assistance only.  Pain is accessed and addressed.  Would continue to follow.               Neurology Follow up note    ANAYA [id number]    HPI:   Patient is a 58-year-old female  ,[facility] resident who presents to the emergency room with multiple medical issues.  Past medical history of CAD diabetes insomnia major depressive disorder GERD hypertension irritable bowel syndrome with diarrhea cirrhosis of the liver nonalcoholic fatty liver hepatic encephalopathy bipolar disorder chronic A-fib on Coumadin blepharitis of the left eye.    Per PCP signout patient was transferred to [place] for reported hypotension and tachycardia diffuse purpura.  Patient had a temperature of 99.3 with a heart rate of 116 and a blood pressure of 88/60.  Patient was FEBRILE later in ER with TEMP 101 , septic workup sent ,  started on iv abx and fluids .CT abd showed pancolitis , seen by GI and ID . Found to have cellulitis of lids b/l R>L  (22 Dec 2023 10:37)      Interval History -no ha/dizziness    Patient is seen, chart was reviewed and case was discussed with the treatment team.  Pt is not in any distress.   Lying on bed comfortably.   No events reported overnight    Vital Signs Last 24 Hrs  T(C): 36.4 (23 Dec 2023 04:13), Max: 36.4 (23 Dec 2023 04:13)  T(F): 97.6 (23 Dec 2023 04:13), Max: 97.6 (23 Dec 2023 04:13)  HR: 66 (23 Dec 2023 04:13) (66 - 84)  BP: 107/64 (23 Dec 2023 04:13) (93/60 - 107/64)  BP(mean): --  RR: 18 (23 Dec 2023 04:13) (17 - 18)  SpO2: 98% (23 Dec 2023 04:13) (96% - 98%)    Parameters below as of 23 Dec 2023 04:13  Patient On (Oxygen Delivery Method): room air            REVIEW OF SYSTEMS:    Constitutional: No fever,   Eyes: No eye pain, visual disturbances, or discharge  ENT:  No difficulty hearing, tinnitus, vertigo; No sinus or throat pain  Neck: No pain or stiffness  Respiratory: No , wheezing, chills or hemoptysis  Cardiovascular: No chest pain, palpitations,  Gastrointestinal: No abdominal or epigastric pain. No nausea, vomiting  Genitourinary: No dysuria, frequency, hematuria or incontinence  Neurological: No headaches, , numbness or tremors  Psychiatric: No depression, anxiety, mood swings or difficulty sleeping  Musculoskeletal: No joint pain or swelling;   Skin: No itching, burning,   Lymph Nodes: No enlarged glands  Endocrine: No heat or cold intolerance;  Allergy and Immunologic: No hives or eczema    On Neurological Examination:    Mental Status - Pt is alert, awake, Follows simple  commands      Speech -  Normal.     Cranial Nerves - Pupils 3 mm equal and reactive to light, extraocular eye movements intact. Pt has no visual field deficit.  Pt has no  facial asymmetry. Facial sensation is intact.Tongue - is in midline.    Muscle tone - is normal     Motor Exam -left side 4/5  right deltoid/elbow 3/5; hand grasp 4/5  right hip 2/5 rest 3/5    Sensory Exam - . Pt withdraws all extremities equally on stimulation. No asymmetry seen. No complaints of tingling, numbness.        coordination:    Finger to nose: normal-left        Deep tendon Reflexes - 2 plus all over.            Neck Supple -  Yes.     MEDICATIONS    acetaminophen     Tablet .. 650 milliGRAM(s) Oral every 6 hours PRN  aluminum hydroxide/magnesium hydroxide/simethicone Suspension 30 milliLiter(s) Oral every 4 hours PRN  cefTRIAXone   IVPB      cefTRIAXone   IVPB 1000 milliGRAM(s) IV Intermittent every 24 hours  dextrose 5% + sodium chloride 0.45%. 1000 milliLiter(s) IV Continuous <Continuous>  dextrose 5%. 1000 milliLiter(s) IV Continuous <Continuous>  dextrose 5%. 1000 milliLiter(s) IV Continuous <Continuous>  dextrose 50% Injectable 25 Gram(s) IV Push once  dextrose 50% Injectable 25 Gram(s) IV Push once  dextrose 50% Injectable 12.5 Gram(s) IV Push once  dextrose Oral Gel 15 Gram(s) Oral once PRN  erythromycin   Ointment 1 Application(s) Both EYES two times a day  folic acid 1 milliGRAM(s) Oral daily  glucagon  Injectable 1 milliGRAM(s) IntraMuscular once  insulin lispro (ADMELOG) corrective regimen sliding scale   SubCutaneous three times a day before meals  lactobacillus acidophilus 1 Tablet(s) Oral every 12 hours  magnesium hydroxide Suspension 30 milliLiter(s) Oral daily PRN  melatonin 3 milliGRAM(s) Oral at bedtime PRN  metroNIDAZOLE  IVPB 500 milliGRAM(s) IV Intermittent every 8 hours  metroNIDAZOLE  IVPB      midodrine. 5 milliGRAM(s) Oral three times a day PRN  mirtazapine 30 milliGRAM(s) Oral at bedtime  ondansetron Injectable 4 milliGRAM(s) IV Push every 8 hours PRN  pantoprazole    Tablet 40 milliGRAM(s) Oral daily  rifAXIMin 550 milliGRAM(s) Oral two times a day  senna 2 Tablet(s) Oral at bedtime  spironolactone 100 milliGRAM(s) Oral daily  traMADol 50 milliGRAM(s) Oral three times a day PRN      Allergies    No Known Allergies    Intolerances        LABS:  CBC Full  -  ( 23 Dec 2023 07:30 )  WBC Count : 7.59 K/uL  RBC Count : 2.29 M/uL  Hemoglobin : 7.2 g/dL  Hematocrit : 21.6 %  Platelet Count - Automated : 365 K/uL  Mean Cell Volume : 94.3 fl  Mean Cell Hemoglobin : 31.4 pg  Mean Cell Hemoglobin Concentration : 33.3 gm/dL  Auto Neutrophil # : 5.34 K/uL  Auto Lymphocyte # : 1.49 K/uL  Auto Monocyte # : 0.54 K/uL  Auto Eosinophil # : 0.06 K/uL  Auto Basophil # : 0.03 K/uL  Auto Neutrophil % : 70.4 %  Auto Lymphocyte % : 19.6 %  Auto Monocyte % : 7.1 %  Auto Eosinophil % : 0.8 %  Auto Basophil % : 0.4 %    Urinalysis Basic - ( 23 Dec 2023 07:30 )    Color: x / Appearance: x / SG: x / pH: x  Gluc: 113 mg/dL / Ketone: x  / Bili: x / Urobili: x   Blood: x / Protein: x / Nitrite: x   Leuk Esterase: x / RBC: x / WBC x   Sq Epi: x / Non Sq Epi: x / Bacteria: x      12-23    138  |  107  |  15  ----------------------------<  113<H>  3.3<L>   |  25  |  0.53    Ca    7.9<L>      23 Dec 2023 07:30  Mg     1.7     12-21    TPro  5.4<L>  /  Alb  2.3<L>  /  TBili  0.2  /  DBili  <0.1  /  AST  19  /  ALT  20  /  AlkPhos  106  12-23    Hemoglobin A1C:     Vitamin B12     RADIOLOGY    < from: CT Orbit w/ IV Cont (12.22.23 @ 02:23) >    ACC: [id number] EXAM:  CT ORBITS IC   ORDERED BY: FRANKI PATEL     ACC: [id number] EXAM:  CT BRAIN   ORDERED BY: FRANKI K AMANDA     PROCEDURE DATE:  12/22/2023          INTERPRETATION:  CLINICAL INFORMATION: Right arm weakness. Right eye   redness and swelling.    COMPARISON: None    PROCEDURE:  Noncontrast CT of the Head was followed by contrast-enhanced CT of the   orbits. Coronal and Sagittal reformats were obtained.    CONTRAST/COMPLICATIONS:  IV Contrast: NONE (accession [id number]), IV contrast documented in   unlinked concurrent exam (accession [id number])  90 cc administered   (accession [id number]), 0 cc administered (accession [id number])   10 cc   discarded (accession [id number]), 0 cc discarded (accession [id number])  Complications: None reported at time of study completion    FINDINGS:    CT HEAD:    There is no acute intracranial hemorrhage, mass effect, midline shift,   extra-axial collection, hydrocephalus, or evidence of acute vascular   territorial infarction. Chronic left posterior frontal infarct. Mild   patchy hypodensities within the periventricular and subcortical white   matter, although nonspecific, likely reflect chronic microvascular   disease. Cerebral volume loss contributes to prominence of the ventricles   and sulci.    Mastoid air cells are clear. Calvarium is intact.    CT ORBITS:    Right periorbital and right facial soft tissue swelling. Mild left   periorbital soft tissue swelling. No discrete drainable fluid collection.   Intraorbital contents including the globes, extraocular muscles, and   optic nerves are intact. No infiltration of the retrobulbar fat.    Patchy paranasal sinus mucosal thickening and opacification most   prominent involving the ethmoid air cells.    Left laterally projecting8 x 6 x 6 mm aneurysm situated between the   takeoff of the left superior cerebellar artery and left posterior   cerebral arteries.    IMPRESSION:    CT head: No acute intracranial hemorrhage, mass effect, or evidence of   acute vascular territorial infarction. If clinical symptoms persist or   worsen, more sensitive evaluation with brain MRI may be obtained, if no   contraindications exist.    CT orbits: Nonspecific bilateral periorbital soft tissue swelling, right   greater than left, as well as right facial soft tissue swelling. No   discrete drainable fluid collection.    No evidence of post septal involvement/orbital cellulitis.    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 Neurosurgical   consultation is recommended.    Dr. Ovalle discussed the above findings with Dr. Patel at 3:20 AM via   Teams on 12/22/2023 with read back.    --- End of Report ---            VIRGINIA OVALLE MD; Attending Radiologist  This document has been electronically signed. 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  OOB to chair/ambulation with assistance only.  Pain is accessed and addressed.  Would continue to follow.

## 2023-12-23 NOTE — PROGRESS NOTE ADULT - ASSESSMENT
[ASSESSMENT and  PLAN]  D63. 8    Anemia chronic disease  R70. 0    elevated elevated ESR  R79. 82  elevated CRP  K51. 90  hx ulcerative colitis  L51. 9    rash    58-year-old female ,[facility] resident who presents to the emergency room with multiple medical issues.  Past medical history of CAD diabetes insomnia major depressive disorder GERD hypertension irritable bowel syndrome with diarrhea cirrhosis of the liver nonalcoholic fatty liver hepatic encephalopathy bipolar disorder chronic A-fib on Coumadin blepharitis of the left eye.    Per PCP signout patient was transferred to [place] for reported hypotension and tachycardia diffuse purpura.  Patient had a temperature of 99.3 with a heart rate of 116 and a blood pressure of 88/60.  Patient was FEBRILE later in ER with TEMP 101 , septic workup sent ,  started on iv abx and fluids .CT abd showed pancolitis , seen by GI and ID . Found to have cellulitis of lids b/l R>L       ? inflammatory rash due to ? Erythema multiforme or vasculitis, or dermatomyositis.   Bl Eyelid redness appear oddly semi-symmetric. R>L.   Similarly with BL periungual reddish to slight purple discoloration of hands, without nail involvement, and to some extent toes.   Doubt purpura  Petechiae not seen.     WBC with mild leukocytosis. Pt non-toxic appearing.   mod anemia, with Hgb 8. likely anemia due to chronic disease  plts normal.     CMV IgG neg  LDH normal  Babesia neg  Lyme neg  RPR neg    ESR 50      RECOMMENDATIONS    rash better post dose Solumedrol    Follow CBC  No indication for transfusion currently.   Transfuse PRBC as clinically indicated.   Transfuse PRBC if Hgb <7.0 or if symptomatic.     Follow up Anemia studies.      Ferritin, Iron studies     B12, Folate     ESR, CRP    Await rheum studies    CATHERINE, anti-DS-DNA     AntiRo     ANCA studies     HSV 1/2 abs     CMV abs.      Aldolase, LDH, CPK    D/w ID  additional infectious workup to be examined  RPR, etc    Consider eval with opth and derm as unusual constellation of sx,  Consider rheum eval     GI evaluating for inflammatory bowel disease.     DVT Prophylaxis  SQ Lovenox or SQ heparin    Discussed plan of care with patient and in detail.   Pt/Family expressed understanding of the treatment plan.   Opportunity given for questions and discussion.   Questions or concerns all addressed and answered to their satisfaction, and in lay terms.     Thank you for consulting us.      [ASSESSMENT and  PLAN]  D63. 8    Anemia chronic disease  R70. 0    elevated elevated ESR  R79. 82  elevated CRP  K51. 90  hx ulcerative colitis  L51. 9    rash    58-year-old female ,[facility] resident who presents to the emergency room with multiple medical issues.  Past medical history of CAD diabetes insomnia major depressive disorder GERD hypertension irritable bowel syndrome with diarrhea cirrhosis of the liver nonalcoholic fatty liver hepatic encephalopathy bipolar disorder chronic A-fib on Coumadin blepharitis of the left eye.    Per PCP signout patient was transferred to [place] for reported hypotension and tachycardia diffuse purpura.  Patient had a temperature of 99.3 with a heart rate of 116 and a blood pressure of 88/60.  Patient was FEBRILE later in ER with TEMP 101 , septic workup sent ,  started on iv abx and fluids .CT abd showed pancolitis , seen by GI and ID . Found to have cellulitis of lids b/l R>L       ? inflammatory rash due to ? Erythema multiforme or vasculitis, or dermatomyositis.   Bl Eyelid redness appear oddly semi-symmetric. R>L.   Similarly with BL periungual reddish to slight purple discoloration of hands, without nail involvement, and to some extent toes.   Doubt purpura  Petechiae not seen.     WBC with mild leukocytosis. Pt non-toxic appearing.   mod anemia, with Hgb 8. likely anemia due to chronic disease  plts normal.     CMV IgG neg  LDH normal  Babesia neg  Lyme neg  RPR neg    ESR 50      RECOMMENDATIONS    rash better post dose Solumedrol    Follow CBC  No indication for transfusion currently.   Transfuse PRBC as clinically indicated.   Transfuse PRBC if Hgb <7.0 or if symptomatic.     Follow up Anemia studies.      Ferritin, Iron studies     B12, Folate     ESR, CRP    Await rheum studies    CATHERINE, anti-DS-DNA     AntiRo     ANCA studies     HSV 1/2 abs     CMV abs.      Aldolase, LDH, CPK    D/w ID  additional infectious workup to be examined  RPR, etc    Consider eval with opth and derm as unusual constellation of sx,  Consider rheum eval     GI evaluating for inflammatory bowel disease.     DVT Prophylaxis  SQ Lovenox or SQ heparin    Discussed plan of care with patient and in detail.   Pt/Family expressed understanding of the treatment plan.   Opportunity given for questions and discussion.   Questions or concerns all addressed and answered to their satisfaction, and in lay terms.     Thank you for consulting us.

## 2023-12-23 NOTE — PROGRESS NOTE ADULT - SUBJECTIVE AND OBJECTIVE BOX
CHIEF COMPLAINT/ REASON FOR VISIT  .. Patient was seen to address the  issue listed under PROBLEM LIST which is located toward bottom of this note     ANAYA GONZALES [address]    Allergies    No Known Allergies    Intolerances        PAST MEDICAL & SURGICAL HISTORY:  MDD (major depressive disorder)      GERD (gastroesophageal reflux disease)      Ulcerative colitis      HTN (hypertension)      DM (diabetes mellitus)      Arteriosclerotic heart disease (ASHD)      IBS (irritable bowel syndrome)      History of ataxia      Liver cirrhosis      Nonalcoholic fatty liver disease without nonalcoholic steatohepatitis (PATEL)      Hepatic encephalopathy      Bipolar illness      Chronic atrial fibrillation      Moderate protein-calorie malnutrition      OA (osteoarthritis)      Blepharitis, bilateral      Brain aneurysm      Uterine fibroid      History of cholecystectomy          FAMILY HISTORY:      Home Medications:      MEDICATIONS  (STANDING):  cefTRIAXone   IVPB      cefTRIAXone   IVPB 1000 milliGRAM(s) IV Intermittent every 24 hours  dextrose 5% + sodium chloride 0.45%. 1000 milliLiter(s) (100 mL/Hr) IV Continuous <Continuous>  dextrose 5%. 1000 milliLiter(s) (100 mL/Hr) IV Continuous <Continuous>  dextrose 5%. 1000 milliLiter(s) (50 mL/Hr) IV Continuous <Continuous>  dextrose 50% Injectable 25 Gram(s) IV Push once  dextrose 50% Injectable 25 Gram(s) IV Push once  dextrose 50% Injectable 12.5 Gram(s) IV Push once  erythromycin   Ointment 1 Application(s) Both EYES two times a day  folic acid 1 milliGRAM(s) Oral daily  glucagon  Injectable 1 milliGRAM(s) IntraMuscular once  insulin lispro (ADMELOG) corrective regimen sliding scale   SubCutaneous three times a day before meals  lactobacillus acidophilus 1 Tablet(s) Oral every 12 hours  metroNIDAZOLE  IVPB 500 milliGRAM(s) IV Intermittent every 8 hours  metroNIDAZOLE  IVPB      mirtazapine 30 milliGRAM(s) Oral at bedtime  pantoprazole    Tablet 40 milliGRAM(s) Oral daily  rifAXIMin 550 milliGRAM(s) Oral two times a day  senna 2 Tablet(s) Oral at bedtime  spironolactone 100 milliGRAM(s) Oral daily    MEDICATIONS  (PRN):  acetaminophen     Tablet .. 650 milliGRAM(s) Oral every 6 hours PRN Temp greater or equal to 38C (100.4F), Mild Pain (1 - 3)  aluminum hydroxide/magnesium hydroxide/simethicone Suspension 30 milliLiter(s) Oral every 4 hours PRN Dyspepsia  dextrose Oral Gel 15 Gram(s) Oral once PRN Blood Glucose LESS THAN 70 milliGRAM(s)/deciliter  magnesium hydroxide Suspension 30 milliLiter(s) Oral daily PRN Constipation  melatonin 3 milliGRAM(s) Oral at bedtime PRN Insomnia  midodrine. 5 milliGRAM(s) Oral three times a day PRN SBP below 90  ondansetron Injectable 4 milliGRAM(s) IV Push every 8 hours PRN Nausea and/or Vomiting  traMADol 50 milliGRAM(s) Oral three times a day PRN Moderate Pain (4 - 6)              Vital Signs Last 24 Hrs  T(C): 36.4 (23 Dec 2023 04:13), Max: 36.4 (23 Dec 2023 04:13)  T(F): 97.6 (23 Dec 2023 04:13), Max: 97.6 (23 Dec 2023 04:13)  HR: 66 (23 Dec 2023 04:13) (66 - 84)  BP: 107/64 (23 Dec 2023 04:13) (93/60 - 107/64)  BP(mean): --  RR: 18 (23 Dec 2023 04:13) (17 - 18)  SpO2: 98% (23 Dec 2023 04:13) (96% - 98%)    Parameters below as of 23 Dec 2023 04:13  Patient On (Oxygen Delivery Method): room air                  LABS:                        8.2    12.06 )-----------( 399      ( 21 Dec 2023 22:30 )             24.6     12-    136  |  106  |  25<H>  ----------------------------<  125<H>  4.2   |  24  |  0.68    Ca    8.3<L>      21 Dec 2023 22:30  Mg     1.7     12    TPro  5.8<L>  /  Alb  2.4<L>  /  TBili  0.3  /  DBili  x   /  AST  27  /  ALT  26  /  AlkPhos  125<H>  12-21    PT/INR - ( 21 Dec 2023 22:30 )   PT: 43.2 sec;   INR: 3.84 ratio         PTT - ( 21 Dec 2023 22:30 )  PTT:47.9 sec  Urinalysis Basic - ( 23 Dec 2023 06:30 )    Color: Yellow / Appearance: Clear / S.024 / pH: x  Gluc: x / Ketone: Negative mg/dL  / Bili: Negative / Urobili: 0.2 mg/dL   Blood: x / Protein: 30 mg/dL / Nitrite: Negative   Leuk Esterase: Small / RBC: 0 /HPF / WBC 30 /HPF   Sq Epi: x / Non Sq Epi: x / Bacteria: Moderate /HPF            WBC:  WBC Count: 12.06 K/uL ( @ 22:30)      MICROBIOLOGY:  RECENT CULTURES:   .Blood Blood-Peripheral XXXX XXXX   No growth at 24 hours     .Blood Blood-Peripheral XXXX XXXX   No growth at 24 hours          CARDIAC MARKERS ( 22 Dec 2023 20:23 )  x     / x     / 37 U/L / x     / x      CARDIAC MARKERS ( 21 Dec 2023 22:30 )  x     / x     / 40 U/L / x     / x            PT/INR - ( 21 Dec 2023 22:30 )   PT: 43.2 sec;   INR: 3.84 ratio         PTT - ( 21 Dec 2023 22:30 )  PTT:47.9 sec    Sodium:  Sodium: 136 mmol/L ( @ 22:30)      0.68 mg/dL  @ 22:30      Hemoglobin:  Hemoglobin: 8.2 g/dL ( @ 22:30)      Platelets: Platelet Count - Automated: 399 K/uL ( @ 22:30)      LIVER FUNCTIONS - ( 21 Dec 2023 22:30 )  Alb: 2.4 g/dL / Pro: 5.8 g/dL / ALK PHOS: 125 U/L / ALT: 26 U/L / AST: 27 U/L / GGT: x             Urinalysis Basic - ( 23 Dec 2023 06:30 )    Color: Yellow / Appearance: Clear / S.024 / pH: x  Gluc: x / Ketone: Negative mg/dL  / Bili: Negative / Urobili: 0.2 mg/dL   Blood: x / Protein: 30 mg/dL / Nitrite: Negative   Leuk Esterase: Small / RBC: 0 /HPF / WBC 30 /HPF   Sq Epi: x / Non Sq Epi: x / Bacteria: Moderate /HPF        RADIOLOGY & ADDITIONAL STUDIES:      MICROBIOLOGY:  RECENT CULTURES:   .Blood Blood-Peripheral XXXX XXXX   No growth at 24 hours     .Blood Blood-Peripheral XXXX XXXX   No growth at 24 hours

## 2023-12-23 NOTE — PROGRESS NOTE ADULT - SUBJECTIVE AND OBJECTIVE BOX
[INTERVAL HX: ]  Patient seen and examined;  Chart reviewed and events noted;     [MEDICATIONS]  MEDICATIONS  (STANDING):  cefTRIAXone   IVPB 1000 milliGRAM(s) IV Intermittent every 24 hours  cefTRIAXone   IVPB      dextrose 5% + sodium chloride 0.45%. 1000 milliLiter(s) (100 mL/Hr) IV Continuous <Continuous>  dextrose 5%. 1000 milliLiter(s) (50 mL/Hr) IV Continuous <Continuous>  dextrose 5%. 1000 milliLiter(s) (100 mL/Hr) IV Continuous <Continuous>  dextrose 50% Injectable 25 Gram(s) IV Push once  dextrose 50% Injectable 25 Gram(s) IV Push once  dextrose 50% Injectable 12.5 Gram(s) IV Push once  erythromycin   Ointment 1 Application(s) Both EYES two times a day  folic acid 1 milliGRAM(s) Oral daily  glucagon  Injectable 1 milliGRAM(s) IntraMuscular once  insulin lispro (ADMELOG) corrective regimen sliding scale   SubCutaneous three times a day before meals  lactobacillus acidophilus 1 Tablet(s) Oral every 12 hours  mirtazapine 30 milliGRAM(s) Oral at bedtime  pantoprazole    Tablet 40 milliGRAM(s) Oral daily  rifAXIMin 550 milliGRAM(s) Oral two times a day  senna 2 Tablet(s) Oral at bedtime  spironolactone 100 milliGRAM(s) Oral daily    MEDICATIONS  (PRN):  acetaminophen     Tablet .. 650 milliGRAM(s) Oral every 6 hours PRN Temp greater or equal to 38C (100.4F), Mild Pain (1 - 3)  aluminum hydroxide/magnesium hydroxide/simethicone Suspension 30 milliLiter(s) Oral every 4 hours PRN Dyspepsia  dextrose Oral Gel 15 Gram(s) Oral once PRN Blood Glucose LESS THAN 70 milliGRAM(s)/deciliter  magnesium hydroxide Suspension 30 milliLiter(s) Oral daily PRN Constipation  melatonin 3 milliGRAM(s) Oral at bedtime PRN Insomnia  midodrine. 5 milliGRAM(s) Oral three times a day PRN SBP below 90  ondansetron Injectable 4 milliGRAM(s) IV Push every 8 hours PRN Nausea and/or Vomiting  traMADol 50 milliGRAM(s) Oral three times a day PRN Moderate Pain (4 - 6)      [VITALS]  Vital Signs Last 24 Hrs  T(C): 36.7 (23 Dec 2023 12:09), Max: 36.7 (23 Dec 2023 12:09)  T(F): 98 (23 Dec 2023 12:09), Max: 98 (23 Dec 2023 12:09)  HR: 86 (23 Dec 2023 12:09) (66 - 86)  BP: 112/70 (23 Dec 2023 12:09) (107/64 - 112/70)  BP(mean): --  RR: 20 (23 Dec 2023 12:09) (17 - 20)  SpO2: 98% (23 Dec 2023 12:09) (98% - 98%)    Parameters below as of 23 Dec 2023 12:09  Patient On (Oxygen Delivery Method): room air      [WT/HT]  Daily     Daily Weight in k.5 (23 Dec 2023 04:13)  [VENT]      [PHYSICAL EXAM]  GEN: NAD  HEENT: normocephalic and atraumatic. EOMI. PERRL.    NECK: Supple.  No lymphadenopathy   LUNGS: Clear to auscultation.  HEART: S1S2 Regular rate and rhythm, no MRG  ABDOMEN: Soft, nontender, and nondistended.  Positive bowel sounds.    : No CVA tenderness  EXTREMITIES: Without edema.  NEUROLOGIC: grossly intact.  PSYCHIATRIC: Appropriate affect .  SKIN: +rash   semi-symmetric grecia-ungal erythema/purple discoloration, less prominent  rare annular irregualr lesion palm, less apparent  pink-puple lesion on sole of feet improved  BL reddish-purple lesion with central clearing, flat. No scale.   BL periorbital and conjunctival erythema improved      [LABS:]                        7.2    7.59  )-----------( 365      ( 23 Dec 2023 07:30 )             21.6     12-    138  |  107  |  15  ----------------------------<  113<H>  3.3<L>   |  25  |  0.53    Ca    7.9<L>      23 Dec 2023 07:30  Mg     1.7     12-    TPro  5.4<L>  /  Alb  2.3<L>  /  TBili  0.2  /  DBili  <0.1  /  AST  19  /  ALT  20  /  AlkPhos  106  12-    PT/INR - ( 23 Dec 2023 07:30 )   PT: 39.7 sec;   INR: 3.52 ratio         PTT - ( 21 Dec 2023 22:30 )  PTT:47.9 sec    Babesia microti PCR, Bld (collected 23 @ 23:30)        Folate, Serum: >20.0 ng/mL (23 @ 07:30)    Vitamin B12, Serum: 856 pg/mL [232 - 1245] (23 @ 07:30)    Sedimentation Rate, Erythrocyte: 51 mm/hr *H* [0 - 20] (23 @ 22:30)      Urinalysis Basic - ( 23 Dec 2023 07:30 )    Color: x / Appearance: x / SG: x / pH: x  Gluc: 113 mg/dL / Ketone: x  / Bili: x / Urobili: x   Blood: x / Protein: x / Nitrite: x   Leuk Esterase: x / RBC: x / WBC x   Sq Epi: x / Non Sq Epi: x / Bacteria: x        Babesia microti PCR, Bld (collected 22 Dec 2023 23:30)    Culture - Blood (collected 21 Dec 2023 22:40)  Source: .Blood Blood-Peripheral  Preliminary Report (23 Dec 2023 07:02):    No growth at 24 hours    Culture - Blood (collected 21 Dec 2023 22:30)  Source: .Blood Blood-Peripheral  Preliminary Report (23 Dec 2023 07:02):    No growth at 24 hours      SARS-CoV-2: NotDetec (22 Dec 2023 00:55)        Babesia microti PCR, Bld (collected 22 Dec 2023 23:30)    Culture - Blood (collected 21 Dec 2023 22:40)  Source: .Blood Blood-Peripheral  Preliminary Report (23 Dec 2023 07:02):    No growth at 24 hours    Culture - Blood (collected 21 Dec 2023 22:30)  Source: .Blood Blood-Peripheral  Preliminary Report (23 Dec 2023 07:02):    No growth at 24 hours        [RADIOLOGY STUDIES:]

## 2023-12-23 NOTE — PROGRESS NOTE ADULT - SUBJECTIVE AND OBJECTIVE BOX
[facility] [place]       HPI:   Patient is a 58-year-old female  ,[facility] resident who presents to the emergency room with multiple medical issues.  Past medical history of CAD diabetes insomnia major depressive disorder GERD hypertension irritable bowel syndrome with diarrhea cirrhosis of the liver nonalcoholic fatty liver hepatic encephalopathy bipolar disorder chronic A-fib on Coumadin blepharitis of the left eye.    Per PCP signout patient was transferred to [place] for reported hypotension and tachycardia diffuse purpura.  Patient had a temperature of 99.3 with a heart rate of 116 and a blood pressure of 88/60.  Patient was FEBRILE later in ER with TEMP 101 , septic workup sent ,  started on iv abx and fluids .CT abd showed pancolitis , seen by GI and ID . Found to have cellulitis of lids b/l R>L  (22 Dec 2023 10:37)        SUBJECTIVE:      ALLERGIES:  Allergies    No Known Allergies    Intolerances          MEDICATIONS  (STANDING):  dextrose 5% + sodium chloride 0.45%. 1000 milliLiter(s) (100 mL/Hr) IV Continuous <Continuous>  dextrose 5%. 1000 milliLiter(s) (100 mL/Hr) IV Continuous <Continuous>  dextrose 5%. 1000 milliLiter(s) (50 mL/Hr) IV Continuous <Continuous>  dextrose 50% Injectable 25 Gram(s) IV Push once  dextrose 50% Injectable 25 Gram(s) IV Push once  dextrose 50% Injectable 12.5 Gram(s) IV Push once  erythromycin   Ointment 1 Application(s) Both EYES two times a day  folic acid 1 milliGRAM(s) Oral daily  glucagon  Injectable 1 milliGRAM(s) IntraMuscular once  insulin lispro (ADMELOG) corrective regimen sliding scale   SubCutaneous three times a day before meals  lactobacillus acidophilus 1 Tablet(s) Oral every 12 hours  mirtazapine 30 milliGRAM(s) Oral at bedtime  pantoprazole    Tablet 40 milliGRAM(s) Oral daily  rifAXIMin 550 milliGRAM(s) Oral two times a day  senna 2 Tablet(s) Oral at bedtime  spironolactone 100 milliGRAM(s) Oral daily    MEDICATIONS  (PRN):  acetaminophen     Tablet .. 650 milliGRAM(s) Oral every 6 hours PRN Temp greater or equal to 38C (100.4F), Mild Pain (1 - 3)  aluminum hydroxide/magnesium hydroxide/simethicone Suspension 30 milliLiter(s) Oral every 4 hours PRN Dyspepsia  dextrose Oral Gel 15 Gram(s) Oral once PRN Blood Glucose LESS THAN 70 milliGRAM(s)/deciliter  magnesium hydroxide Suspension 30 milliLiter(s) Oral daily PRN Constipation  melatonin 3 milliGRAM(s) Oral at bedtime PRN Insomnia  midodrine. 5 milliGRAM(s) Oral three times a day PRN SBP below 90  ondansetron Injectable 4 milliGRAM(s) IV Push every 8 hours PRN Nausea and/or Vomiting  traMADol 50 milliGRAM(s) Oral three times a day PRN Moderate Pain (4 - 6)      REVIEW OF SYSTEMS:  CONSTITUTIONAL: No fever,  RESPIRATORY: No cough, wheezing, shortness of breath  CARDIOVASCULAR: No chest pain, dyspnea, palpitations, dizziness, syncope, paroxysmal nocturnal dyspnea, orthopnea, or arm or leg swelling  GASTROINTESTINAL: No abdominal  or epigastric pain, nausea, vomiting,  diarrhea  NEUROLOGICAL: No headaches,  loss of strength, numbness, or tremors    Vital Signs Last 24 Hrs  T(C): 36.8 (24 Dec 2023 20:33), Max: 37.4 (24 Dec 2023 06:01)  T(F): 98.3 (24 Dec 2023 20:33), Max: 99.4 (24 Dec 2023 06:01)  HR: 100 (24 Dec 2023 20:33) (92 - 100)  BP: 106/71 (24 Dec 2023 20:33) (89/58 - 106/71)  BP(mean): --  RR: 18 (24 Dec 2023 20:33) (18 - 20)  SpO2: 98% (24 Dec 2023 20:33) (92% - 98%)    Parameters below as of 24 Dec 2023 20:33  Patient On (Oxygen Delivery Method): room air        PHYSICAL EXAM:  HEAD:  Atraumatic, Normocephalic  NECK: Supple and normal thyroid.  No JVD or carotid bruit.   HEART: S1, S2 regular , 1/6 soft ejection systolic murmur in mitral area , no thrill and no gallops .  PULMONARY: Bilateral vesicular breathing , few scattered ronchi and few scattered rales are present .  ABDOMEN: Soft nontender and positive bowl sounds   EXTREMITIES:  No clubbing, cyanosis, or pedal  edema  NEUROLOGICAL: AAOX3 , no focal deficit .    LABS:                        7.2    7.59  )-----------( 365      ( 23 Dec 2023 07:30 )             21.6     12-24    141  |  x   |  x   ----------------------------<  x   x    |  x   |  0.61    Ca    7.9<L>      23 Dec 2023 07:30    TPro  x   /  Alb  x   /  TBili  0.2  /  DBili  x   /  AST  x   /  ALT  x   /  AlkPhos  x   12-24        PT/INR - ( 24 Dec 2023 05:03 )   PT: 25.6 sec;   INR: 2.24 ratio           Urinalysis Basic - ( 23 Dec 2023 07:30 )    Color: x / Appearance: x / SG: x / pH: x  Gluc: 113 mg/dL / Ketone: x  / Bili: x / Urobili: x   Blood: x / Protein: x / Nitrite: x   Leuk Esterase: x / RBC: x / WBC x   Sq Epi: x / Non Sq Epi: x / Bacteria: x      BNP      EKG:  ECHO:  IMAGING:    Assessment/Plan    Will continue to follow during hospital course and give further recommendations as needed . Thanks for your referral . if any questions please contact me at office [phone] [phone])  [facility] [place]       HPI:   Patient is a 58-year-old female  ,[facility] resident who presents to the emergency room with multiple medical issues.  Past medical history of CAD diabetes insomnia major depressive disorder GERD hypertension irritable bowel syndrome with diarrhea cirrhosis of the liver nonalcoholic fatty liver hepatic encephalopathy bipolar disorder chronic A-fib on Coumadin blepharitis of the left eye.    Per PCP signout patient was transferred to [place] for reported hypotension and tachycardia diffuse purpura.  Patient had a temperature of 99.3 with a heart rate of 116 and a blood pressure of 88/60.  Patient was FEBRILE later in ER with TEMP 101 , septic workup sent ,  started on iv abx and fluids .CT abd showed pancolitis , seen by GI and ID . Found to have cellulitis of lids b/l R>L  (22 Dec 2023 10:37)        SUBJECTIVE:      ALLERGIES:  Allergies    No Known Allergies    Intolerances          MEDICATIONS  (STANDING):  dextrose 5% + sodium chloride 0.45%. 1000 milliLiter(s) (100 mL/Hr) IV Continuous <Continuous>  dextrose 5%. 1000 milliLiter(s) (100 mL/Hr) IV Continuous <Continuous>  dextrose 5%. 1000 milliLiter(s) (50 mL/Hr) IV Continuous <Continuous>  dextrose 50% Injectable 25 Gram(s) IV Push once  dextrose 50% Injectable 25 Gram(s) IV Push once  dextrose 50% Injectable 12.5 Gram(s) IV Push once  erythromycin   Ointment 1 Application(s) Both EYES two times a day  folic acid 1 milliGRAM(s) Oral daily  glucagon  Injectable 1 milliGRAM(s) IntraMuscular once  insulin lispro (ADMELOG) corrective regimen sliding scale   SubCutaneous three times a day before meals  lactobacillus acidophilus 1 Tablet(s) Oral every 12 hours  mirtazapine 30 milliGRAM(s) Oral at bedtime  pantoprazole    Tablet 40 milliGRAM(s) Oral daily  rifAXIMin 550 milliGRAM(s) Oral two times a day  senna 2 Tablet(s) Oral at bedtime  spironolactone 100 milliGRAM(s) Oral daily    MEDICATIONS  (PRN):  acetaminophen     Tablet .. 650 milliGRAM(s) Oral every 6 hours PRN Temp greater or equal to 38C (100.4F), Mild Pain (1 - 3)  aluminum hydroxide/magnesium hydroxide/simethicone Suspension 30 milliLiter(s) Oral every 4 hours PRN Dyspepsia  dextrose Oral Gel 15 Gram(s) Oral once PRN Blood Glucose LESS THAN 70 milliGRAM(s)/deciliter  magnesium hydroxide Suspension 30 milliLiter(s) Oral daily PRN Constipation  melatonin 3 milliGRAM(s) Oral at bedtime PRN Insomnia  midodrine. 5 milliGRAM(s) Oral three times a day PRN SBP below 90  ondansetron Injectable 4 milliGRAM(s) IV Push every 8 hours PRN Nausea and/or Vomiting  traMADol 50 milliGRAM(s) Oral three times a day PRN Moderate Pain (4 - 6)      REVIEW OF SYSTEMS:  CONSTITUTIONAL: No fever,  RESPIRATORY: No cough, wheezing, shortness of breath  CARDIOVASCULAR: No chest pain, dyspnea, palpitations, dizziness, syncope, paroxysmal nocturnal dyspnea, orthopnea, or arm or leg swelling  GASTROINTESTINAL: No abdominal  or epigastric pain, nausea, vomiting,  diarrhea  NEUROLOGICAL: No headaches,  loss of strength, numbness, or tremors    Vital Signs Last 24 Hrs  T(C): 36.8 (24 Dec 2023 20:33), Max: 37.4 (24 Dec 2023 06:01)  T(F): 98.3 (24 Dec 2023 20:33), Max: 99.4 (24 Dec 2023 06:01)  HR: 100 (24 Dec 2023 20:33) (92 - 100)  BP: 106/71 (24 Dec 2023 20:33) (89/58 - 106/71)  BP(mean): --  RR: 18 (24 Dec 2023 20:33) (18 - 20)  SpO2: 98% (24 Dec 2023 20:33) (92% - 98%)    Parameters below as of 24 Dec 2023 20:33  Patient On (Oxygen Delivery Method): room air        PHYSICAL EXAM:  HEAD:  Atraumatic, Normocephalic  NECK: Supple and normal thyroid.  No JVD or carotid bruit.   HEART: S1, S2 regular , 1/6 soft ejection systolic murmur in mitral area , no thrill and no gallops .  PULMONARY: Bilateral vesicular breathing , few scattered ronchi and few scattered rales are present .  ABDOMEN: Soft nontender and positive bowl sounds   EXTREMITIES:  No clubbing, cyanosis, or pedal  edema  NEUROLOGICAL: AAOX3 , no focal deficit .    LABS:                        7.2    7.59  )-----------( 365      ( 23 Dec 2023 07:30 )             21.6     12-24    141  |  x   |  x   ----------------------------<  x   x    |  x   |  0.61    Ca    7.9<L>      23 Dec 2023 07:30    TPro  x   /  Alb  x   /  TBili  0.2  /  DBili  x   /  AST  x   /  ALT  x   /  AlkPhos  x   12-24        PT/INR - ( 24 Dec 2023 05:03 )   PT: 25.6 sec;   INR: 2.24 ratio           Urinalysis Basic - ( 23 Dec 2023 07:30 )    Color: x / Appearance: x / SG: x / pH: x  Gluc: 113 mg/dL / Ketone: x  / Bili: x / Urobili: x   Blood: x / Protein: x / Nitrite: x   Leuk Esterase: x / RBC: x / WBC x   Sq Epi: x / Non Sq Epi: x / Bacteria: x      BNP      EKG:  ECHO:  IMAGING:    Assessment/Plan    Will continue to follow during hospital course and give further recommendations as needed . Thanks for your referral . if any questions please contact me at office ([phone])cell [phone])

## 2023-12-23 NOTE — PROGRESS NOTE ADULT - SUBJECTIVE AND OBJECTIVE BOX
INTERVAL HPI/OVERNIGHT EVENTS:  Pt seen and examined  No acute issues overnight as per nursing staff       MEDICATIONS  (STANDING):  cefTRIAXone   IVPB 1000 milliGRAM(s) IV Intermittent every 24 hours  cefTRIAXone   IVPB      dextrose 5% + sodium chloride 0.45%. 1000 milliLiter(s) (100 mL/Hr) IV Continuous <Continuous>  dextrose 5%. 1000 milliLiter(s) (100 mL/Hr) IV Continuous <Continuous>  dextrose 5%. 1000 milliLiter(s) (50 mL/Hr) IV Continuous <Continuous>  dextrose 50% Injectable 25 Gram(s) IV Push once  dextrose 50% Injectable 12.5 Gram(s) IV Push once  dextrose 50% Injectable 25 Gram(s) IV Push once  erythromycin   Ointment 1 Application(s) Both EYES two times a day  folic acid 1 milliGRAM(s) Oral daily  glucagon  Injectable 1 milliGRAM(s) IntraMuscular once  insulin lispro (ADMELOG) corrective regimen sliding scale   SubCutaneous three times a day before meals  lactobacillus acidophilus 1 Tablet(s) Oral every 12 hours  metroNIDAZOLE  IVPB      metroNIDAZOLE  IVPB 500 milliGRAM(s) IV Intermittent every 8 hours  mirtazapine 30 milliGRAM(s) Oral at bedtime  pantoprazole    Tablet 40 milliGRAM(s) Oral daily  rifAXIMin 550 milliGRAM(s) Oral two times a day  senna 2 Tablet(s) Oral at bedtime  spironolactone 100 milliGRAM(s) Oral daily    MEDICATIONS  (PRN):  acetaminophen     Tablet .. 650 milliGRAM(s) Oral every 6 hours PRN Temp greater or equal to 38C (100.4F), Mild Pain (1 - 3)  aluminum hydroxide/magnesium hydroxide/simethicone Suspension 30 milliLiter(s) Oral every 4 hours PRN Dyspepsia  dextrose Oral Gel 15 Gram(s) Oral once PRN Blood Glucose LESS THAN 70 milliGRAM(s)/deciliter  magnesium hydroxide Suspension 30 milliLiter(s) Oral daily PRN Constipation  melatonin 3 milliGRAM(s) Oral at bedtime PRN Insomnia  midodrine. 5 milliGRAM(s) Oral three times a day PRN SBP below 90  ondansetron Injectable 4 milliGRAM(s) IV Push every 8 hours PRN Nausea and/or Vomiting  traMADol 50 milliGRAM(s) Oral three times a day PRN Moderate Pain (4 - 6)      Allergies  No Known Allergies    Intolerances      Vital Signs Last 24 Hrs  T(C): 36.4 (23 Dec 2023 04:13), Max: 36.4 (23 Dec 2023 04:13)  T(F): 97.6 (23 Dec 2023 04:13), Max: 97.6 (23 Dec 2023 04:13)  HR: 66 (23 Dec 2023 04:13) (66 - 84)  BP: 107/64 (23 Dec 2023 04:13) (93/60 - 107/64)  BP(mean): --  RR: 18 (23 Dec 2023 04:13) (17 - 18)  SpO2: 98% (23 Dec 2023 04:13) (96% - 98%)    Parameters below as of 23 Dec 2023 04:13  Patient On (Oxygen Delivery Method): room air          LABS:                        7.2    7.59  )-----------( 365      ( 23 Dec 2023 07:30 )             21.6     12-23    138  |  107  |  15  ----------------------------<  113<H>  3.3<L>   |  25  |  0.53    Ca    7.9<L>      23 Dec 2023 07:30  Mg     1.7     12-21    TPro  5.4<L>  /  Alb  2.3<L>  /  TBili  0.2  /  DBili  <0.1  /  AST  19  /  ALT  20  /  AlkPhos  106  12-23    PT/INR - ( 23 Dec 2023 07:30 )   PT: 39.7 sec;   INR: 3.52 ratio         PTT - ( 21 Dec 2023 22:30 )  PTT:47.9 sec            Babesia microti PCR, Bld (collected 12-22-23 @ 23:30)          Babesia microti PCR, Bld (collected 22 Dec 2023 23:30)    Culture - Blood (collected 21 Dec 2023 22:40)  Source: .Blood Blood-Peripheral  Preliminary Report (23 Dec 2023 07:02):    No growth at 24 hours    Culture - Blood (collected 21 Dec 2023 22:30)  Source: .Blood Blood-Peripheral  Preliminary Report (23 Dec 2023 07:02):    No growth at 24 hours        RADIOLOGY

## 2023-12-23 NOTE — PROGRESS NOTE ADULT - ASSESSMENT
59 y/o F w/ pmh of cad, dm, mdd, gerd, IBS, cirrhosis 2/2 to nonalcoholic fatty liver, bipolar, afib on coumadin, today presented to [facility] hospital for hypotension and tachycardia with new diffuse purpura to the LE ongoing the last few days and today morning waking up by R>L orbital swelling. R>L orbital swelling. Febrile in the ED Tm 101.4F  CT orbits: Nonspecific bilateral periorbital soft tissue swelling, right greater than left, as well as right facial soft tissue swelling. No discrete drainable fluid collection. No evidence of post septal involvement/orbital cellulitis.  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 Neurosurgical consultation is recommended.  CT A/P: Mild pancolitis, of infectious or inflammatory etiology.    RECOMMENDATIONS  Rash unlikely infectious and higher suspicion for inflammatory disorder  for now recommend ceftriaxone 1gm q24h   a number of studies still pending  possible transfer    Thank you for consulting us and involving us in the management of this most interesting and challenging case.  We will follow along in the care of this patient. Please call us at [phone] or text me directly on my cell# at [phone] with any concerns.   57 y/o F w/ pmh of cad, dm, mdd, gerd, IBS, cirrhosis 2/2 to nonalcoholic fatty liver, bipolar, afib on coumadin, today presented to [facility] hospital for hypotension and tachycardia with new diffuse purpura to the LE ongoing the last few days and today morning waking up by R>L orbital swelling. R>L orbital swelling. Febrile in the ED Tm 101.4F  CT orbits: Nonspecific bilateral periorbital soft tissue swelling, right greater than left, as well as right facial soft tissue swelling. No discrete drainable fluid collection. No evidence of post septal involvement/orbital cellulitis.  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 Neurosurgical consultation is recommended.  CT A/P: Mild pancolitis, of infectious or inflammatory etiology.    RECOMMENDATIONS  Rash unlikely infectious and higher suspicion for inflammatory disorder  for now recommend ceftriaxone 1gm q24h   a number of studies still pending  possible transfer    Thank you for consulting us and involving us in the management of this most interesting and challenging case.  We will follow along in the care of this patient. Please call us at [phone] or text me directly on my cell# at [phone] with any concerns.

## 2023-12-24 LAB
ANA TITR SER: NEGATIVE — SIGNIFICANT CHANGE UP
ANA TITR SER: NEGATIVE — SIGNIFICANT CHANGE UP
BILIRUB SERPL-MCNC: 0.2 MG/DL — SIGNIFICANT CHANGE UP (ref 0.2–1.2)
BILIRUB SERPL-MCNC: 0.2 MG/DL — SIGNIFICANT CHANGE UP (ref 0.2–1.2)
CREAT SERPL-MCNC: 0.61 MG/DL — SIGNIFICANT CHANGE UP (ref 0.5–1.3)
CREAT SERPL-MCNC: 0.61 MG/DL — SIGNIFICANT CHANGE UP (ref 0.5–1.3)
CULTURE RESULTS: NO GROWTH — SIGNIFICANT CHANGE UP
CULTURE RESULTS: NO GROWTH — SIGNIFICANT CHANGE UP
EGFR: 105 ML/MIN/1.73M2 — SIGNIFICANT CHANGE UP
EGFR: 105 ML/MIN/1.73M2 — SIGNIFICANT CHANGE UP
GI PCR PANEL: SIGNIFICANT CHANGE UP
GI PCR PANEL: SIGNIFICANT CHANGE UP
IGE SERPL-ACNC: 14 KU/L — SIGNIFICANT CHANGE UP
IGE SERPL-ACNC: 14 KU/L — SIGNIFICANT CHANGE UP
INR BLD: 2.24 RATIO — HIGH (ref 0.85–1.18)
INR BLD: 2.24 RATIO — HIGH (ref 0.85–1.18)
MELD SCORE WITH DIALYSIS: 29 POINTS — SIGNIFICANT CHANGE UP
MELD SCORE WITH DIALYSIS: 29 POINTS — SIGNIFICANT CHANGE UP
MELD SCORE WITHOUT DIALYSIS: 15 POINTS — SIGNIFICANT CHANGE UP
MELD SCORE WITHOUT DIALYSIS: 15 POINTS — SIGNIFICANT CHANGE UP
PROTHROM AB SERPL-ACNC: 25.6 SEC — HIGH (ref 9.5–13)
PROTHROM AB SERPL-ACNC: 25.6 SEC — HIGH (ref 9.5–13)
SODIUM SERPL-SCNC: 141 MMOL/L — SIGNIFICANT CHANGE UP (ref 135–145)
SODIUM SERPL-SCNC: 141 MMOL/L — SIGNIFICANT CHANGE UP (ref 135–145)
SPECIMEN SOURCE: SIGNIFICANT CHANGE UP
SPECIMEN SOURCE: SIGNIFICANT CHANGE UP

## 2023-12-24 RX ADMIN — Medication 1 MILLIGRAM(S): at 12:10

## 2023-12-24 RX ADMIN — Medication 1 APPLICATION(S): at 08:39

## 2023-12-24 RX ADMIN — PANTOPRAZOLE SODIUM 40 MILLIGRAM(S): 20 TABLET, DELAYED RELEASE ORAL at 12:10

## 2023-12-24 RX ADMIN — SODIUM CHLORIDE 100 MILLILITER(S): 9 INJECTION, SOLUTION INTRAVENOUS at 17:04

## 2023-12-24 RX ADMIN — MIRTAZAPINE 30 MILLIGRAM(S): 45 TABLET, ORALLY DISINTEGRATING ORAL at 22:19

## 2023-12-24 RX ADMIN — Medication 1 TABLET(S): at 17:08

## 2023-12-24 RX ADMIN — Medication 1 APPLICATION(S): at 17:07

## 2023-12-24 RX ADMIN — SODIUM CHLORIDE 100 MILLILITER(S): 9 INJECTION, SOLUTION INTRAVENOUS at 03:51

## 2023-12-24 RX ADMIN — Medication 1 TABLET(S): at 07:27

## 2023-12-24 NOTE — PROGRESS NOTE ADULT - SUBJECTIVE AND OBJECTIVE BOX
[facility] DIVISION of INFECTIOUS DISEASE  Eddie Werner MD PhD, Alexa Hill MD, Emily Urbano MD, Kirti Garza MD, Brando Arguelles MD  and providing coverage with Brandi Carter MD  Providing Infectious Disease Consultations at [facility], [facility], [facility], [facility]'s    Office# [phone] to schedule follow up appointments  Answering Service for urgent calls or New Consults [phone]  Cell# to text for urgent issues Eddie Werner [phone]     infectious diseases progress note:    ANAYA JOINER is a 58y y. o. Female patient    Overnight and events of the last 24hrs reviewed    Allergies    No Known Allergies    Intolerances        ANTIBIOTICS/RELEVANT:  antimicrobials  cefTRIAXone   IVPB      cefTRIAXone   IVPB 1000 milliGRAM(s) IV Intermittent every 24 hours  rifAXIMin 550 milliGRAM(s) Oral two times a day    immunologic:    OTHER:  acetaminophen     Tablet .. 650 milliGRAM(s) Oral every 6 hours PRN  aluminum hydroxide/magnesium hydroxide/simethicone Suspension 30 milliLiter(s) Oral every 4 hours PRN  dextrose 5% + sodium chloride 0.45%. 1000 milliLiter(s) IV Continuous <Continuous>  dextrose 5%. 1000 milliLiter(s) IV Continuous <Continuous>  dextrose 5%. 1000 milliLiter(s) IV Continuous <Continuous>  dextrose 50% Injectable 25 Gram(s) IV Push once  dextrose 50% Injectable 25 Gram(s) IV Push once  dextrose 50% Injectable 12.5 Gram(s) IV Push once  dextrose Oral Gel 15 Gram(s) Oral once PRN  erythromycin   Ointment 1 Application(s) Both EYES two times a day  folic acid 1 milliGRAM(s) Oral daily  glucagon  Injectable 1 milliGRAM(s) IntraMuscular once  insulin lispro (ADMELOG) corrective regimen sliding scale   SubCutaneous three times a day before meals  lactobacillus acidophilus 1 Tablet(s) Oral every 12 hours  magnesium hydroxide Suspension 30 milliLiter(s) Oral daily PRN  melatonin 3 milliGRAM(s) Oral at bedtime PRN  midodrine. 5 milliGRAM(s) Oral three times a day PRN  mirtazapine 30 milliGRAM(s) Oral at bedtime  ondansetron Injectable 4 milliGRAM(s) IV Push every 8 hours PRN  pantoprazole    Tablet 40 milliGRAM(s) Oral daily  senna 2 Tablet(s) Oral at bedtime  spironolactone 100 milliGRAM(s) Oral daily  traMADol 50 milliGRAM(s) Oral three times a day PRN      Objective:  Vital Signs Last 24 Hrs  T(C): 36.7 (24 Dec 2023 08:34), Max: 37.4 (24 Dec 2023 06:01)  T(F): 98.1 (24 Dec 2023 08:34), Max: 99.4 (24 Dec 2023 06:01)  HR: 95 (24 Dec 2023 08:34) (86 - 99)  BP: 89/58 (24 Dec 2023 08:34) (89/58 - 112/70)  BP(mean): --  RR: 18 (24 Dec 2023 08:34) (18 - 20)  SpO2: 92% (24 Dec 2023 08:34) (92% - 98%)    Parameters below as of 24 Dec 2023 08:34  Patient On (Oxygen Delivery Method): room air        T(C): 36.7 (12-24-23 @ 08:34), Max: 37.4 (12-24-23 @ 06:01)  T(C): 36.7 (12-24-23 @ 08:34), Max: 38.6 (12-21-23 @ 22:50)  T(C): 36.7 (12-24-23 @ 08:34), Max: 38.6 (12-21-23 @ 22:50)    PHYSICAL EXAM:  HEENT: NC atraumatic  Neck: supple  Respiratory: no accessory muscle use, breathing comfortably  Cardiovascular: distant  Gastrointestinal: normal appearing, nondistended  Extremities: no clubbing, no cyanosis,        LABS:                          7.2    7.59  )-----------( 365      ( 23 Dec 2023 07:30 )             21.6       WBC  7.59 12-23 @ 07:30  12.06 12-21 @ 22:30      12-24    141  |  x   |  x   ----------------------------<  x   x    |  x   |  0.61    Ca    7.9<L>      23 Dec 2023 07:30    TPro  x   /  Alb  x   /  TBili  0.2  /  DBili  x   /  AST  x   /  ALT  x   /  AlkPhos  x   12-24      Creatinine: 0.61 mg/dL (12-24-23 @ 05:03)  Creatinine: 0.53 mg/dL (12-23-23 @ 07:30)  Creatinine: 0.68 mg/dL (12-21-23 @ 22:30)      PT/INR - ( 24 Dec 2023 05:03 )   PT: 25.6 sec;   INR: 2.24 ratio           Urinalysis Basic - ( 23 Dec 2023 07:30 )    Color: x / Appearance: x / SG: x / pH: x  Gluc: 113 mg/dL / Ketone: x  / Bili: x / Urobili: x   Blood: x / Protein: x / Nitrite: x   Leuk Esterase: x / RBC: x / WBC x   Sq Epi: x / Non Sq Epi: x / Bacteria: x            INFLAMMATORY MARKERS      MICROBIOLOGY:              RADIOLOGY & ADDITIONAL STUDIES:   [facility] DIV[facility] of INFECTIOUS DISEASE  Eddie Werner MD PhD, Alexa Hill MD, Emily Urbano MD, Kirti Garza MD, Brando Arguelles MD  and providing coverage with Brandi Carter MD  Providing Infectious Disease Consultations at [facility], [facility], [facility], [facility]'s    Office# [phone] to schedule follow up appointments  Answering Service for urgent calls or New Consults [phone]  Cell# to text for urgent issues Eddie Werner [phone]     infectious diseases progress note:    ANAYA JOINER is a 58y y. o. Female patient    Overnight and events of the last 24hrs reviewed    Allergies    No Known Allergies    Intolerances        ANTIBIOTICS/RELEVANT:  antimicrobials  cefTRIAXone   IVPB      cefTRIAXone   IVPB 1000 milliGRAM(s) IV Intermittent every 24 hours  rifAXIMin 550 milliGRAM(s) Oral two times a day    immunologic:    OTHER:  acetaminophen     Tablet .. 650 milliGRAM(s) Oral every 6 hours PRN  aluminum hydroxide/magnesium hydroxide/simethicone Suspension 30 milliLiter(s) Oral every 4 hours PRN  dextrose 5% + sodium chloride 0.45%. 1000 milliLiter(s) IV Continuous <Continuous>  dextrose 5%. 1000 milliLiter(s) IV Continuous <Continuous>  dextrose 5%. 1000 milliLiter(s) IV Continuous <Continuous>  dextrose 50% Injectable 25 Gram(s) IV Push once  dextrose 50% Injectable 25 Gram(s) IV Push once  dextrose 50% Injectable 12.5 Gram(s) IV Push once  dextrose Oral Gel 15 Gram(s) Oral once PRN  erythromycin   Ointment 1 Application(s) Both EYES two times a day  folic acid 1 milliGRAM(s) Oral daily  glucagon  Injectable 1 milliGRAM(s) IntraMuscular once  insulin lispro (ADMELOG) corrective regimen sliding scale   SubCutaneous three times a day before meals  lactobacillus acidophilus 1 Tablet(s) Oral every 12 hours  magnesium hydroxide Suspension 30 milliLiter(s) Oral daily PRN  melatonin 3 milliGRAM(s) Oral at bedtime PRN  midodrine. 5 milliGRAM(s) Oral three times a day PRN  mirtazapine 30 milliGRAM(s) Oral at bedtime  ondansetron Injectable 4 milliGRAM(s) IV Push every 8 hours PRN  pantoprazole    Tablet 40 milliGRAM(s) Oral daily  senna 2 Tablet(s) Oral at bedtime  spironolactone 100 milliGRAM(s) Oral daily  traMADol 50 milliGRAM(s) Oral three times a day PRN      Objective:  Vital Signs Last 24 Hrs  T(C): 36.7 (24 Dec 2023 08:34), Max: 37.4 (24 Dec 2023 06:01)  T(F): 98.1 (24 Dec 2023 08:34), Max: 99.4 (24 Dec 2023 06:01)  HR: 95 (24 Dec 2023 08:34) (86 - 99)  BP: 89/58 (24 Dec 2023 08:34) (89/58 - 112/70)  BP(mean): --  RR: 18 (24 Dec 2023 08:34) (18 - 20)  SpO2: 92% (24 Dec 2023 08:34) (92% - 98%)    Parameters below as of 24 Dec 2023 08:34  Patient On (Oxygen Delivery Method): room air        T(C): 36.7 (12-24-23 @ 08:34), Max: 37.4 (12-24-23 @ 06:01)  T(C): 36.7 (12-24-23 @ 08:34), Max: 38.6 (12-21-23 @ 22:50)  T(C): 36.7 (12-24-23 @ 08:34), Max: 38.6 (12-21-23 @ 22:50)    PHYSICAL EXAM:  HEENT: NC atraumatic  Neck: supple  Respiratory: no accessory muscle use, breathing comfortably  Cardiovascular: distant  Gastrointestinal: normal appearing, nondistended  Extremities: no clubbing, no cyanosis,        LABS:                          7.2    7.59  )-----------( 365      ( 23 Dec 2023 07:30 )             21.6       WBC  7.59 12-23 @ 07:30  12.06 12-21 @ 22:30      12-24    141  |  x   |  x   ----------------------------<  x   x    |  x   |  0.61    Ca    7.9<L>      23 Dec 2023 07:30    TPro  x   /  Alb  x   /  TBili  0.2  /  DBili  x   /  AST  x   /  ALT  x   /  AlkPhos  x   12-24      Creatinine: 0.61 mg/dL (12-24-23 @ 05:03)  Creatinine: 0.53 mg/dL (12-23-23 @ 07:30)  Creatinine: 0.68 mg/dL (12-21-23 @ 22:30)      PT/INR - ( 24 Dec 2023 05:03 )   PT: 25.6 sec;   INR: 2.24 ratio           Urinalysis Basic - ( 23 Dec 2023 07:30 )    Color: x / Appearance: x / SG: x / pH: x  Gluc: 113 mg/dL / Ketone: x  / Bili: x / Urobili: x   Blood: x / Protein: x / Nitrite: x   Leuk Esterase: x / RBC: x / WBC x   Sq Epi: x / Non Sq Epi: x / Bacteria: x            INFLAMMATORY MARKERS      MICROBIOLOGY:              RADIOLOGY & ADDITIONAL STUDIES:

## 2023-12-24 NOTE — DIETITIAN INITIAL EVALUATION ADULT - PERTINENT MEDS FT
MEDICATIONS  (STANDING):  dextrose 5% + sodium chloride 0.45%. 1000 milliLiter(s) (100 mL/Hr) IV Continuous <Continuous>  dextrose 5%. 1000 milliLiter(s) (50 mL/Hr) IV Continuous <Continuous>  dextrose 5%. 1000 milliLiter(s) (100 mL/Hr) IV Continuous <Continuous>  dextrose 50% Injectable 25 Gram(s) IV Push once  dextrose 50% Injectable 12.5 Gram(s) IV Push once  dextrose 50% Injectable 25 Gram(s) IV Push once  erythromycin   Ointment 1 Application(s) Both EYES two times a day  folic acid 1 milliGRAM(s) Oral daily  glucagon  Injectable 1 milliGRAM(s) IntraMuscular once  insulin lispro (ADMELOG) corrective regimen sliding scale   SubCutaneous three times a day before meals  lactobacillus acidophilus 1 Tablet(s) Oral every 12 hours  mirtazapine 30 milliGRAM(s) Oral at bedtime  pantoprazole    Tablet 40 milliGRAM(s) Oral daily  rifAXIMin 550 milliGRAM(s) Oral two times a day  senna 2 Tablet(s) Oral at bedtime  spironolactone 100 milliGRAM(s) Oral daily    MEDICATIONS  (PRN):  acetaminophen     Tablet .. 650 milliGRAM(s) Oral every 6 hours PRN Temp greater or equal to 38C (100.4F), Mild Pain (1 - 3)  aluminum hydroxide/magnesium hydroxide/simethicone Suspension 30 milliLiter(s) Oral every 4 hours PRN Dyspepsia  dextrose Oral Gel 15 Gram(s) Oral once PRN Blood Glucose LESS THAN 70 milliGRAM(s)/deciliter  magnesium hydroxide Suspension 30 milliLiter(s) Oral daily PRN Constipation  melatonin 3 milliGRAM(s) Oral at bedtime PRN Insomnia  midodrine. 5 milliGRAM(s) Oral three times a day PRN SBP below 90  ondansetron Injectable 4 milliGRAM(s) IV Push every 8 hours PRN Nausea and/or Vomiting  traMADol 50 milliGRAM(s) Oral three times a day PRN Moderate Pain (4 - 6)

## 2023-12-24 NOTE — DIETITIAN NUTRITION RISK NOTIFICATION - TREATMENT: THE FOLLOWING DIET HAS BEEN RECOMMENDED
Diet, Clear Liquid:   Supplement Feeding Modality:  Oral  Ensure Clear Cans or Servings Per Day:  1       Frequency:  Three Times a day (12-24-23 @ 11:09) [Pending Verification By Attending]  Diet, Soft and Bite Sized (12-22-23 @ 08:31) [Active]

## 2023-12-24 NOTE — PROGRESS NOTE ADULT - ASSESSMENT
59 y/o F w/ pmh of cad, dm, mdd, gerd, IBS, cirrhosis 2/2 to nonalcoholic fatty liver, bipolar, afib on coumadin, today presented to [facility] hospital for hypotension and tachycardia with new diffuse purpura to the LE ongoing the last few days and today morning waking up by R>L orbital swelling. R>L orbital swelling. Febrile in the ED Tm 101.4F  CT orbits: Nonspecific bilateral periorbital soft tissue swelling, right greater than left, as well as right facial soft tissue swelling. No discrete drainable fluid collection. No evidence of post septal involvement/orbital cellulitis.  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 Neurosurgical consultation is recommended.  CT A/P: Mild pancolitis, of infectious or inflammatory etiology.    RECOMMENDATIONS  Rash unlikely infectious and higher suspicion for inflammatory disorder  with neg micro will dc ceftriaxone   a number of studies still pending  possible transfer    Thank you for consulting us and involving us in the management of this most interesting and challenging case.  We will follow along in the care of this patient. Please call us at [phone] or text me directly on my cell# at [phone] with any concerns.   57 y/o F w/ pmh of cad, dm, mdd, gerd, IBS, cirrhosis 2/2 to nonalcoholic fatty liver, bipolar, afib on coumadin, today presented to [facility] hospital for hypotension and tachycardia with new diffuse purpura to the LE ongoing the last few days and today morning waking up by R>L orbital swelling. R>L orbital swelling. Febrile in the ED Tm 101.4F  CT orbits: Nonspecific bilateral periorbital soft tissue swelling, right greater than left, as well as right facial soft tissue swelling. No discrete drainable fluid collection. No evidence of post septal involvement/orbital cellulitis.  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 Neurosurgical consultation is recommended.  CT A/P: Mild pancolitis, of infectious or inflammatory etiology.    RECOMMENDATIONS  Rash unlikely infectious and higher suspicion for inflammatory disorder  with neg micro will dc ceftriaxone   a number of studies still pending  possible transfer    Thank you for consulting us and involving us in the management of this most interesting and challenging case.  We will follow along in the care of this patient. Please call us at [phone] or text me directly on my cell# at [phone] with any concerns.

## 2023-12-24 NOTE — PROGRESS NOTE ADULT - SUBJECTIVE AND OBJECTIVE BOX
[INTERVAL HX: ]  Patient seen and examined;  Chart reviewed and events noted;     no CP, no SOB  +diarrhea.   states very loose. Prior with soft BM in general    [MEDICATIONS]  MEDICATIONS  (STANDING):  dextrose 5% + sodium chloride 0.45%. 1000 milliLiter(s) (100 mL/Hr) IV Continuous <Continuous>  dextrose 5%. 1000 milliLiter(s) (100 mL/Hr) IV Continuous <Continuous>  dextrose 5%. 1000 milliLiter(s) (50 mL/Hr) IV Continuous <Continuous>  dextrose 50% Injectable 25 Gram(s) IV Push once  dextrose 50% Injectable 12.5 Gram(s) IV Push once  dextrose 50% Injectable 25 Gram(s) IV Push once  erythromycin   Ointment 1 Application(s) Both EYES two times a day  folic acid 1 milliGRAM(s) Oral daily  glucagon  Injectable 1 milliGRAM(s) IntraMuscular once  insulin lispro (ADMELOG) corrective regimen sliding scale   SubCutaneous three times a day before meals  lactobacillus acidophilus 1 Tablet(s) Oral every 12 hours  mirtazapine 30 milliGRAM(s) Oral at bedtime  pantoprazole    Tablet 40 milliGRAM(s) Oral daily  rifAXIMin 550 milliGRAM(s) Oral two times a day  senna 2 Tablet(s) Oral at bedtime  spironolactone 100 milliGRAM(s) Oral daily    MEDICATIONS  (PRN):  acetaminophen     Tablet .. 650 milliGRAM(s) Oral every 6 hours PRN Temp greater or equal to 38C (100.4F), Mild Pain (1 - 3)  aluminum hydroxide/magnesium hydroxide/simethicone Suspension 30 milliLiter(s) Oral every 4 hours PRN Dyspepsia  dextrose Oral Gel 15 Gram(s) Oral once PRN Blood Glucose LESS THAN 70 milliGRAM(s)/deciliter  magnesium hydroxide Suspension 30 milliLiter(s) Oral daily PRN Constipation  melatonin 3 milliGRAM(s) Oral at bedtime PRN Insomnia  midodrine. 5 milliGRAM(s) Oral three times a day PRN SBP below 90  ondansetron Injectable 4 milliGRAM(s) IV Push every 8 hours PRN Nausea and/or Vomiting  traMADol 50 milliGRAM(s) Oral three times a day PRN Moderate Pain (4 - 6)      [VITALS]  Vital Signs Last 24 Hrs  T(C): 36.8 (24 Dec 2023 11:29), Max: 37.4 (24 Dec 2023 06:01)  T(F): 98.2 (24 Dec 2023 11:29), Max: 99.4 (24 Dec 2023 06:01)  HR: 93 (24 Dec 2023 11:) (91 - 99)  BP: 94/63 (24 Dec 2023 11:) (89/58 - 103/62)  BP(mean): --  RR: 18 (24 Dec 2023 11:) (18 - 20)  SpO2: 94% (24 Dec 2023 11:) (92% - 97%)    Parameters below as of 24 Dec 2023 11:  Patient On (Oxygen Delivery Method): room air      [WT/HT]  Daily     Daily Weight in k.5 (24 Dec 2023 06:01)  [VENT]      [PHYSICAL EXAM]  GEN: NAD  HEENT: normocephalic and atraumatic. EOMI. PERRL.    NECK: Supple.  No lymphadenopathy   LUNGS: Clear to auscultation.  HEART: Regular rate and rhythm,  no MRG  ABDOMEN: Soft, nontender, and nondistended.  Positive bowel sounds.    : No CVA tenderness  EXTREMITIES: Without edema.  NEUROLOGIC: grossly intact.  PSYCHIATRIC: Appropriate affect .  SKIN: No rash     [LABS:]                        7.2    7.59  )-----------( 365      ( 23 Dec 2023 07:30 )             21.6     12-    141  |  x   |  x   ----------------------------<  x   x    |  x   |  0.61    Ca    7.9<L>      23 Dec 2023 07:30    TPro  x   /  Alb  x   /  TBili  0.2  /  DBili  x   /  AST  x   /  ALT  x   /  AlkPhos  x   12-    PT/INR - ( 24 Dec 2023 05:03 )   PT: 25.6 sec;   INR: 2.24 ratio               Folate, Serum: >20.0 ng/mL (23 @ 07:30)    Vitamin B12, Serum: 856 pg/mL [232 - 1245] (23 @ 07:30)    Sedimentation Rate, Erythrocyte: 51 mm/hr *H* [0 - 20] (12-21-23 @ 22:30)      Urinalysis Basic - ( 23 Dec 2023 07:30 )    Color: x / Appearance: x / SG: x / pH: x  Gluc: 113 mg/dL / Ketone: x  / Bili: x / Urobili: x   Blood: x / Protein: x / Nitrite: x   Leuk Esterase: x / RBC: x / WBC x   Sq Epi: x / Non Sq Epi: x / Bacteria: x        Culture - Urine (collected 23 Dec 2023 06:30)  Source: Clean Catch Clean Catch (Midstream)  Final Report (24 Dec 2023 10:27):    No growth    Babesia microti PCR, Bld (collected 22 Dec 2023 23:30)    Culture - Blood (collected 21 Dec 2023 22:40)  Source: .Blood Blood-Peripheral  Preliminary Report (24 Dec 2023 07:02):    No growth at 48 Hours    Culture - Blood (collected 21 Dec 2023 22:30)  Source: .Blood Blood-Peripheral  Preliminary Report (24 Dec 2023 07:02):    No growth at 48 Hours      SARS-CoV-2: NotDetec (22 Dec 2023 00:55)        Culture - Urine (collected 23 Dec 2023 06:30)  Source: Clean Catch Clean Catch (Midstream)  Final Report (24 Dec 2023 10:27):    No growth    Babesia microti PCR, Bld (collected 22 Dec 2023 23:30)    Culture - Blood (collected 21 Dec 2023 22:40)  Source: .Blood Blood-Peripheral  Preliminary Report (24 Dec 2023 07:02):    No growth at 48 Hours    Culture - Blood (collected 21 Dec 2023 22:30)  Source: .Blood Blood-Peripheral  Preliminary Report (24 Dec 2023 07:02):    No growth at 48 Hours        [RADIOLOGY STUDIES:]

## 2023-12-24 NOTE — PROGRESS NOTE ADULT - SUBJECTIVE AND OBJECTIVE BOX
INTERVAL HPI/OVERNIGHT EVENTS:  Pt seen and examined  No acute issues overnight as per nursing staff   Tolerating bite sized diet         MEDICATIONS  (STANDING):  dextrose 5% + sodium chloride 0.45%. 1000 milliLiter(s) (100 mL/Hr) IV Continuous <Continuous>  dextrose 5%. 1000 milliLiter(s) (100 mL/Hr) IV Continuous <Continuous>  dextrose 5%. 1000 milliLiter(s) (50 mL/Hr) IV Continuous <Continuous>  dextrose 50% Injectable 25 Gram(s) IV Push once  dextrose 50% Injectable 25 Gram(s) IV Push once  dextrose 50% Injectable 12.5 Gram(s) IV Push once  erythromycin   Ointment 1 Application(s) Both EYES two times a day  folic acid 1 milliGRAM(s) Oral daily  glucagon  Injectable 1 milliGRAM(s) IntraMuscular once  insulin lispro (ADMELOG) corrective regimen sliding scale   SubCutaneous three times a day before meals  lactobacillus acidophilus 1 Tablet(s) Oral every 12 hours  mirtazapine 30 milliGRAM(s) Oral at bedtime  pantoprazole    Tablet 40 milliGRAM(s) Oral daily  rifAXIMin 550 milliGRAM(s) Oral two times a day  senna 2 Tablet(s) Oral at bedtime  spironolactone 100 milliGRAM(s) Oral daily    MEDICATIONS  (PRN):  acetaminophen     Tablet .. 650 milliGRAM(s) Oral every 6 hours PRN Temp greater or equal to 38C (100.4F), Mild Pain (1 - 3)  aluminum hydroxide/magnesium hydroxide/simethicone Suspension 30 milliLiter(s) Oral every 4 hours PRN Dyspepsia  dextrose Oral Gel 15 Gram(s) Oral once PRN Blood Glucose LESS THAN 70 milliGRAM(s)/deciliter  magnesium hydroxide Suspension 30 milliLiter(s) Oral daily PRN Constipation  melatonin 3 milliGRAM(s) Oral at bedtime PRN Insomnia  midodrine. 5 milliGRAM(s) Oral three times a day PRN SBP below 90  ondansetron Injectable 4 milliGRAM(s) IV Push every 8 hours PRN Nausea and/or Vomiting  traMADol 50 milliGRAM(s) Oral three times a day PRN Moderate Pain (4 - 6)      Allergies  No Known Allergies    Intolerances        Vital Signs Last 24 Hrs  T(C): 36.7 (24 Dec 2023 08:34), Max: 37.4 (24 Dec 2023 06:01)  T(F): 98.1 (24 Dec 2023 08:34), Max: 99.4 (24 Dec 2023 06:01)  HR: 95 (24 Dec 2023 08:34) (86 - 99)  BP: 89/58 (24 Dec 2023 08:34) (89/58 - 112/70)  BP(mean): --  RR: 18 (24 Dec 2023 08:34) (18 - 20)  SpO2: 92% (24 Dec 2023 08:34) (92% - 98%)    Parameters below as of 24 Dec 2023 08:34  Patient On (Oxygen Delivery Method): room air      PHYSICAL EXAM:  GENERAL: NAD  HEENT:  NC/AT, EOMI, moist mucous membranes  CHEST/LUNG:  CTA b/l, no rales, wheezes, or rhonchi  HEART:  RRR, S1, S2  ABDOMEN:  BS+, soft, nontender, nondistended  EXTREMITIES: no edema, cyanosis, or calf tenderness  NERVOUS SYSTEM: AA&Ox3    LABS:                        7.2    7.59  )-----------( 365      ( 23 Dec 2023 07:30 )             21.6     12-24    141  |  x   |  x   ----------------------------<  x   x    |  x   |  0.61    Ca    7.9<L>      23 Dec 2023 07:30    TPro  x   /  Alb  x   /  TBili  0.2  /  DBili  x   /  AST  x   /  ALT  x   /  AlkPhos  x   12-24    PT/INR - ( 24 Dec 2023 05:03 )   PT: 25.6 sec;   INR: 2.24 ratio     Culture - Urine (collected 23 Dec 2023 06:30)  Source: Clean Catch Clean Catch (Midstream)  Final Report (24 Dec 2023 10:27):    No growth    Babesia microti PCR, Bld (collected 22 Dec 2023 23:30)    Culture - Blood (collected 21 Dec 2023 22:40)  Source: .Blood Blood-Peripheral  Preliminary Report (24 Dec 2023 07:02):    No growth at 48 Hours    Culture - Blood (collected 21 Dec 2023 22:30)  Source: .Blood Blood-Peripheral  Preliminary Report (24 Dec 2023 07:02):    No growth at 48 Hours

## 2023-12-24 NOTE — DIETITIAN INITIAL EVALUATION ADULT - ORAL INTAKE PTA/DIET HISTORY
limited history from patient . who reports is gluten free. per transfer papers NCS low fiber thin liquids. patient from [facility].   patient with low PO this AM stomach hurts per patient with + diarrhea 2X this AM per CNA. dx pancolitis noted.   other food preferences per patient no milk   education not appropriate as patient from [facility] limited history from patient . who reports is gluten free. per transfer papers NCS low fiber thin liquids. patient from [facility].   patient with low PO this AM stomach hurts per patient with + diarrhea 2X this AM per CNA. dx pancolitis noted.   other food preferences per patient no milk   education not appropriate as patient from [facility]

## 2023-12-24 NOTE — DIETITIAN INITIAL EVALUATION ADULT - OTHER INFO
Reason for Admission: hypotension and tachycardia  History of Present Illness:    Patient is a 58-year-old female  ,[facility] resident who presents to the emergency room with multiple medical issues.  Past medical history of CAD diabetes insomnia major depressive disorder GERD hypertension irritable bowel syndrome with diarrhea cirrhosis of the liver nonalcoholic fatty liver hepatic encephalopathy bipolar disorder chronic A-fib on Coumadin blepharitis of the left eye.    Per PCP signout patient was transferred to [place] for reported hypotension and tachycardia diffuse purpura.  Patient had a temperature of 99.3 with a heart rate of 116 and a blood pressure of 88/60.  Patient was FEBRILE later in ER with TEMP 101 , septic workup sent ,  started on iv abx and fluids .CT abd showed pancolitis , seen by GI and ID . Found to have cellulitis of lids b/l R>  weight from transfer papers 89.9# patient states UBW 88# consistent. ht 60" ? weight this admit at 140#? bed scale error   Reason for Admission: hypotension and tachycardia  History of Present Illness:    Patient is a 58-year-old female  ,[facility] resident who presents to the emergency room with multiple medical issues.  Past medical history of CAD diabetes insomnia major depressive disorder GERD hypertension irritable bowel syndrome with diarrhea cirrhosis of the liver nonalcoholic fatty liver hepatic encephalopathy bipolar disorder chronic A-fib on Coumadin blepharitis of the left eye.    Per PCP signout patient was transferred to [place] for reported hypotension and tachycardia diffuse purpura.  Patient had a temperature of 99.3 with a heart rate of 116 and a blood pressure of 88/60.  Patient was FEBRILE later in ER with TEMP 101 , septic workup sent ,  started on iv abx and fluids .CT abd showed pancolitis , seen by GI and ID . Found to have cellulitis of lids b/l R>  weight from transfer papers 89.9# patient states UBW 88# consistent. ht 60" ? weight this admit at 140#? bed scale error

## 2023-12-24 NOTE — DIETITIAN INITIAL EVALUATION ADULT - NSICDXPASTMEDICALHX_GEN_ALL_CORE_FT
The patient is a 46y Male complaining of shoulder pain/injury.
PAST MEDICAL HISTORY:  Arteriosclerotic heart disease (ASHD)     Bipolar illness     Blepharitis, bilateral     Brain aneurysm     Chronic atrial fibrillation     DM (diabetes mellitus)     GERD (gastroesophageal reflux disease)     Hepatic encephalopathy     History of ataxia     HTN (hypertension)     IBS (irritable bowel syndrome)     Liver cirrhosis     MDD (major depressive disorder)     Moderate protein-calorie malnutrition     Nonalcoholic fatty liver disease without nonalcoholic steatohepatitis (PATEL)     OA (osteoarthritis)     Ulcerative colitis     Uterine fibroid

## 2023-12-24 NOTE — DIETITIAN INITIAL EVALUATION ADULT - PERTINENT LABORATORY DATA
12-24    141  |  x   |  x   ----------------------------<  x   x    |  x   |  0.61    Ca    7.9<L>      23 Dec 2023 07:30    TPro  x   /  Alb  x   /  TBili  0.2  /  DBili  x   /  AST  x   /  ALT  x   /  AlkPhos  x   12-24  POCT Blood Glucose.: 98 mg/dL (12-24-23 @ 08:05)  A1C with Estimated Average Glucose Result: 5.1 % (12-23-23 @ 07:30)

## 2023-12-24 NOTE — DIETITIAN INITIAL EVALUATION ADULT - ETIOLOGY
related to presumed inadequate oral intake in setting of chronic disease IBS  related to alteration in GI tract function/structure

## 2023-12-24 NOTE — PROGRESS NOTE ADULT - ASSESSMENT
REASON FOR VISIT  .. Management of problems listed below        REVIEW OF SYMPTOMS   Able to give ROS  Yes     RELIABILITY +/-   CONSTITUTIONAL Weakness Yes    ENDOCRINE  No heat or cold intolerance    ALLERGY No hives  Sore throat No stridor  RESP Shortness of breath YES   NEURO New weakness No   CARDIAC   Palpitations No         PHYSICAL EXAM    HEENT Unremarkable  atraumatic   RESP Fair air entry  Harsh breath sound   CARDIAC S1 S2 No S3     NO JVD    ABDOMEN No hepatosplenomegaly   PEDAL EDEMA present No calf tenderness  NO rash     GENERAL DATA .   GOC.  ..  12/22/2023 full code   ICU STAY.  ..   COVID. .. scv2 12/22/2023 (-)       BEST PRACTICE ISSUES.    HOB ELEVATN.  .. Yes  DVT PPLX.  ..   ..        DIET.   ..  12/22/2023 soft bite size   IV fl. .. 12/22/2023 d5 1/2 100   BOLUS. ..      STRESS ULCER PPLX.   .  ..   12/22/2023 protonix 40   INFECTION PPLX.   ..       SPEECH SWALLOW RECOMMENDATIONS.       ALLGY. ..    nka   WT. ..  12/22/2023 63  BMI. .. 12/22/2023 44    PROCEDURES. ..     ABGS.   .  VS/ PO/IO/ VENT/ DRIPS.  12/24/2023 afeb 90 90/80   12/24/2023 ra 94%     SUMMARY.  . 12/22/2023  59 y/o F w/ pmh of cad, dm, mdd, gerd, IBS, cirrhosis 2/2 to nonalcoholic fatty liver, bipolar, afib on coumadin, today presented to [facility] for hypotension and tachycardia with new diffuse purpura to the LE ongoing the last few days and today morning waking up by R>L orbital swelling. In the ED pt was worked up and was found to have a INR of 3.83, diffuse LE ecchymosis and R>L orbital swelling and CTH finding of 8mm aneurysm. ED team requested ICU consult given pt was complaining of UE weakness for vasculitis concern  . 12/22/2023   It appears the patient was started on erythromycin ointment to the left eye on 1218.  . 12/22/2023 Pulm consulktd   . PMHx .   . HOME MEDS .  . ISSUES CURRENT ADMISSION .  . CENTRIPETAL SKIN RASH (More in periphery)  . PRE ORBITAL CELLULITIS   . PANCOLITIS 12/22/2023 CT  . A fib ON COUMADIN PMH  . ANEMIA   . BRAIN ANEURYSM   PROBLEM/ASSESMENT/PLAN.  . SEPSIS  . PREORBITAL CELLULITIS  . PANCOLITIS   .. 12/23/2023 Cellulitis much improved   .. w 12/22-12/23/2023 w 12- 7.5   .. CT CHEST ABD 12/22/2023  .... Mild pancolitis Dependent atelectasis   .. RVP 12/22/2023 RVP (-)   .. 12/22-12/24/2023 Rocephin Dr EMELINA Urbano   .. 12/22/2023 Flagul dced  .. 12/22 rifaximin Dr Ramírez   .. 12/22 emycin oitment eyes Dr Ramírez     . CAD   .. CK 12/22/2023 CK 40     . A fib ON COUMADIN PMH  .. inr 12/23/2023 inr 3.5  .. coumadin held as purpuric rash     . CHF  .. 12/22 spironolacton 100     . ANEMIA   .. Hb 12/22-12/23/2023 Hb 8.2 - 7.2   .. monitor    . PURPURA  .. Plt 12/22/2023 plt 399   .. INR 12/22/2023 INR 3.8     RENAL   .. Na 12/22/2023 Na 136  .. CO2 12/22/2023 CO2 24  .. Cr 12/22/2023 Cr .6     . RO CVA   .. CT h 12/22/2023   .... chr l post frontal infacrt     . RO VTE   .. V duplx 12/22/2023 (-)     . ORBITAL CELLULITIS  .. CT ORBITS 12/22/2023   .... r periorbital and r facial soft tissue swelling  .... mild l periorbital soft tissue swelling   .... patchy paranasal sinus mucosal thickening and opacn charissa ethmoid air cells   .... l laterally projecting 8 x 6 x 6 mm aneurysm betw takeoff of l sup cerebellar artery and l post cerebral artery   .... NO POST SEPTAL/ORBITAL CELLULITIS     . BRAIN ANEURYSM  .. CT ORBITS 12/22/2023   .... l laterally projecting 8 x 6 x 6 mm aneurysm betw takeoff of l sup cerebellar artery and l post cerebral artery   .... NS CONSULT IS RECOMMENDED   .. CTA Head 12/23/2023  .... no sah  .... l sup cerebellar artery aneurusm 7.7 mm with neck 5.6 mm   .... small l M1 aneurysm at origin of a lenticulostriate vessel 2.7 mm  .. 12/23/2023 3:32 PM called and informed Dr Tovar   .. NS was called in ER at time of admission and no immediate intervention was recommendedby NS     . SKIN RASH   .... Palpable purpura lower extremities   .... BL purple discoloration of eyelids with local swelling   .. 12/23/2023 rash seems to be dissipating   .. DD includes vasculitis eg Henoch Schonlein Severe infection eg meningococcal, Staph Strep RMSF (but no ho travel [place])   .. AEC 12/23 AEC 50  . AMM 12/23/2023 AMM 22   .. LUME 12/22 (-)   .. CMV 12/22 (-)   .. HSV IGG (+)   .. BABESIA PCR 12/22 (-)     OVERALL  . 59 y/o F w/ pmh of cad, dm, mdd, gerd, IBS, cirrhosis 2/2 to nonalcoholic fatty liver, bipolar, afib on coumadin admitted 12/22/2023 with orbital cellulitis and rash extremities loked like palpable purpura  incidental finding of brain aneurysm   . CENTRIPETAL SKIN RASH (More in periphery) Suggest ID Hemat eval   . PRE ORBITAL CELLULITIS ID on case 12/22/2023 Started on rocephin 12/24/2023 abio dced by ID  . PANCOLITIS 12/22/2023 CT 12/22/2023 ID on case 12/22/2023 Started on flagyl   . A fib ON COUMADIN PMH  . ANEMIA 12/22/2023 suggest hemat eval   . BRAIN ANEURYSM 12/22/2023 suggest neuro eval  . DIARRHEA 12/24/2023 Placed on clear liq     . TRANSFER   .. 12/22/2023 [facility] HOSPITALIST ON CALLDR AMARIS [id number]   .. 12/22/2023 TRANSFER NEEDED AS WE DO NOIT HAVE RHEUMATOLOGY DERMATOLOGY OR OPHTHALMOLOGY AVAILABLE AT [facility] AND THESE CONSULTANTS ARE NEEDED TO CARE FOR THIS PATIENYT  .. 12/22/2023 RUY ROMAN AT [facility] ACCEPTING MD HE WILL ACCEPT PT TO TELE AND WE WILLPLACE PT ON CARDIAC MONMITOR   .. 12/23/2023 awaits bed in tertiary [facility]     TIME SPENT.  . Over 36 minutes aggregate care time spent on encounter; activities included   direct patient care, counseling and/or coordinating care reviewing notes, lab data/ imaging , discussion with multidisciplinary team/ patient  /family and explaining in detail risks, benefits, alternatives  of the recommendations     PATIENT.  . SPECCHIO   REASON FOR VISIT  .. Management of problems listed below        REVIEW OF SYMPTOMS   Able to give ROS  Yes     RELIABILITY +/-   CONSTITUTIONAL Weakness Yes    ENDOCRINE  No heat or cold intolerance    ALLERGY No hives  Sore throat No stridor  RESP Shortness of breath YES   NEURO New weakness No   CARDIAC   Palpitations No         PHYSICAL EXAM    HEENT Unremarkable  atraumatic   RESP Fair air entry  Harsh breath sound   CARDIAC S1 S2 No S3     NO JVD    ABDOMEN No hepatosplenomegaly   PEDAL EDEMA present No calf tenderness  NO rash     GENERAL DATA .   GOC.  ..  12/22/2023 full code   ICU STAY.  ..   COVID. .. scv2 12/22/2023 (-)       BEST PRACTICE ISSUES.    HOB ELEVATN.  .. Yes  DVT PPLX.  ..   ..        DIET.   ..  12/22/2023 soft bite size   IV fl. .. 12/22/2023 d5 1/2 100   BOLUS. ..      STRESS ULCER PPLX.   .  ..   12/22/2023 protonix 40   INFECTION PPLX.   ..       SPEECH SWALLOW RECOMMENDATIONS.       ALLGY. ..    nka   WT. ..  12/22/2023 63  BMI. .. 12/22/2023 44    PROCEDURES. ..     ABGS.   .  VS/ PO/IO/ VENT/ DRIPS.  12/24/2023 afeb 90 90/80   12/24/2023 ra 94%     SUMMARY.  . 12/22/2023  57 y/o F w/ pmh of cad, dm, mdd, gerd, IBS, cirrhosis 2/2 to nonalcoholic fatty liver, bipolar, afib on coumadin, today presented to [facility] for hypotension and tachycardia with new diffuse purpura to the LE ongoing the last few days and today morning waking up by R>L orbital swelling. In the ED pt was worked up and was found to have a INR of 3.83, diffuse LE ecchymosis and R>L orbital swelling and CTH finding of 8mm aneurysm. ED team requested ICU consult given pt was complaining of UE weakness for vasculitis concern  . 12/22/2023   It appears the patient was started on erythromycin ointment to the left eye on 1218.  . 12/22/2023 Pulm consulktd   . PMHx .   . HOME MEDS .  . ISSUES CURRENT ADMISSION .  . CENTRIPETAL SKIN RASH (More in periphery)  . PRE ORBITAL CELLULITIS   . PANCOLITIS 12/22/2023 CT  . A fib ON COUMADIN PMH  . ANEMIA   . BRAIN ANEURYSM   PROBLEM/ASSESMENT/PLAN.  . SEPSIS  . PREORBITAL CELLULITIS  . PANCOLITIS   .. 12/23/2023 Cellulitis much improved   .. w 12/22-12/23/2023 w 12- 7.5   .. CT CHEST ABD 12/22/2023  .... Mild pancolitis Dependent atelectasis   .. RVP 12/22/2023 RVP (-)   .. 12/22-12/24/2023 Rocephin Dr EMELINA Urbano   .. 12/22/2023 Flagul dced  .. 12/22 rifaximin Dr Ramírez   .. 12/22 emycin oitment eyes Dr Ramírez     . CAD   .. CK 12/22/2023 CK 40     . A fib ON COUMADIN PMH  .. inr 12/23/2023 inr 3.5  .. coumadin held as purpuric rash     . CHF  .. 12/22 spironolacton 100     . ANEMIA   .. Hb 12/22-12/23/2023 Hb 8.2 - 7.2   .. monitor    . PURPURA  .. Plt 12/22/2023 plt 399   .. INR 12/22/2023 INR 3.8     RENAL   .. Na 12/22/2023 Na 136  .. CO2 12/22/2023 CO2 24  .. Cr 12/22/2023 Cr .6     . RO CVA   .. CT h 12/22/2023   .... chr l post frontal infacrt     . RO VTE   .. V duplx 12/22/2023 (-)     . ORBITAL CELLULITIS  .. CT ORBITS 12/22/2023   .... r periorbital and r facial soft tissue swelling  .... mild l periorbital soft tissue swelling   .... patchy paranasal sinus mucosal thickening and opacn charissa ethmoid air cells   .... l laterally projecting 8 x 6 x 6 mm aneurysm betw takeoff of l sup cerebellar artery and l post cerebral artery   .... NO POST SEPTAL/ORBITAL CELLULITIS     . BRAIN ANEURYSM  .. CT ORBITS 12/22/2023   .... l laterally projecting 8 x 6 x 6 mm aneurysm betw takeoff of l sup cerebellar artery and l post cerebral artery   .... NS CONSULT IS RECOMMENDED   .. CTA Head 12/23/2023  .... no sah  .... l sup cerebellar artery aneurusm 7.7 mm with neck 5.6 mm   .... small l M1 aneurysm at origin of a lenticulostriate vessel 2.7 mm  .. 12/23/2023 3:32 PM called and informed Dr Tovar   .. NS was called in ER at time of admission and no immediate intervention was recommendedby NS     . SKIN RASH   .... Palpable purpura lower extremities   .... BL purple discoloration of eyelids with local swelling   .. 12/23/2023 rash seems to be dissipating   .. DD includes vasculitis eg Henoch Schonlein Severe infection eg meningococcal, Staph Strep RMSF (but no ho travel [place])   .. AEC 12/23 AEC 50  . AMM 12/23/2023 AMM 22   .. LUME 12/22 (-)   .. CMV 12/22 (-)   .. HSV IGG (+)   .. BABESIA PCR 12/22 (-)     OVERALL  . 57 y/o F w/ pmh of cad, dm, mdd, gerd, IBS, cirrhosis 2/2 to nonalcoholic fatty liver, bipolar, afib on coumadin admitted 12/22/2023 with orbital cellulitis and rash extremities loked like palpable purpura  incidental finding of brain aneurysm   . CENTRIPETAL SKIN RASH (More in periphery) Suggest ID Hemat eval   . PRE ORBITAL CELLULITIS ID on case 12/22/2023 Started on rocephin 12/24/2023 abio dced by ID  . PANCOLITIS 12/22/2023 CT 12/22/2023 ID on case 12/22/2023 Started on flagyl   . A fib ON COUMADIN PMH  . ANEMIA 12/22/2023 suggest hemat eval   . BRAIN ANEURYSM 12/22/2023 suggest neuro eval  . DIARRHEA 12/24/2023 Placed on clear liq     . TRANSFER   .. 12/22/2023 DW HOSPITALIST ON CALLDR AMARIS [id number]   .. 12/22/2023 TRANSFER NEEDED AS WE DO NOIT HAVE RHEUMATOLOGY DERMATOLOGY OR OPHTHALMOLOGY AVAILABLE AT [facility] AND THESE CONSULTANTS ARE NEEDED TO CARE FOR THIS PATIENYT  .. 12/22/2023 RUY ROMAN AT [facility] ACCEPTING MD HE WILL ACCEPT PT TO TELE AND WE WILLPLACE PT ON CARDIAC MONMITOR   .. 12/23/2023 awaits bed in tertiary [facility]     TIME SPENT.  . Over 36 minutes aggregate care time spent on encounter; activities included   direct patient care, counseling and/or coordinating care reviewing notes, lab data/ imaging , discussion with multidisciplinary team/ patient  /family and explaining in detail risks, benefits, alternatives  of the recommendations     PATIENT.  . SPECCHIO

## 2023-12-24 NOTE — DIETITIAN INITIAL EVALUATION ADULT - PROBLEM/PLAN-4
DISPLAY PLAN FREE TEXT Metronidazole Counseling:  I discussed with the patient the risks of metronidazole including but not limited to seizures, nausea/vomiting, a metallic taste in the mouth, nausea/vomiting and severe allergy.

## 2023-12-24 NOTE — PROGRESS NOTE ADULT - ASSESSMENT
[ASSESSMENT and  PLAN]  D63. 8    Anemia chronic disease  R70. 0    elevated elevated ESR  R79. 82  elevated CRP  K51. 90  hx ulcerative colitis  L51. 9    rash    58-year-old female ,[facility] resident who presents to the emergency room with multiple medical issues.  Past medical history of CAD diabetes insomnia major depressive disorder GERD hypertension irritable bowel syndrome with diarrhea cirrhosis of the liver nonalcoholic fatty liver hepatic encephalopathy bipolar disorder chronic A-fib on Coumadin blepharitis of the left eye.    Per PCP signout patient was transferred to [place] for reported hypotension and tachycardia diffuse purpura.  Patient had a temperature of 99.3 with a heart rate of 116 and a blood pressure of 88/60.  Patient was FEBRILE later in ER with TEMP 101 , septic workup sent ,  started on iv abx and fluids .CT abd showed pancolitis , seen by GI and ID . Found to have cellulitis of lids b/l R>L       ? inflammatory rash due to ? Erythema multiforme or vasculitis, or dermatomyositis or MCTD  Bl Eyelid redness appear oddly semi-symmetric. R>L.   Similarly with BL periungual reddish to slight purple discoloration of hands, without nail involvement, and to some extent toes.   Doubt purpura  Petechiae not seen.     WBC with mild leukocytosis. Pt non-toxic appearing.   mod anemia, with Hgb 8. likely anemia due to chronic disease  plts normal.     CMV IgG neg  LDH normal  Babesia neg  Lyme neg  RPR neg    ESR 50  [id number]  HSV1 IgG+, HSV2 IgG neg    RECOMMENDATIONS    rash better post dose Solumedrol    Follow CBC  No indication for transfusion currently.   Transfuse PRBC as clinically indicated.   Transfuse PRBC if Hgb <7.0 or if symptomatic.     Follow up Anemia studies.      Ferritin, Iron studies     B12 856, Folate >20     ESR 51,      Await rheum studies    CATHERINE, anti-DS-DNA     AntiRo     ANCA studies     Aldolase, LDH, CPK    D/w ID  additional infectious workup to be examined    Consider eval with opth and derm as unusual constellation of sx,  Consider rheum eval     GI evaluating for inflammatory bowel disease.     DVT Prophylaxis  SQ Lovenox or SQ heparin    Discussed plan of care with patient and in detail.   Pt/Family expressed understanding of the treatment plan.   Opportunity given for questions and discussion.   Questions or concerns all addressed and answered to their satisfaction, and in lay terms.     Thank you for consulting us.      [ASSESSMENT and  PLAN]  D63. 8    Anemia chronic disease  R70. 0    elevated elevated ESR  R79. 82  elevated CRP  K51. 90  hx ulcerative colitis  L51. 9    rash    58-year-old female ,[facility] resident who presents to the emergency room with multiple medical issues.  Past medical history of CAD diabetes insomnia major depressive disorder GERD hypertension irritable bowel syndrome with diarrhea cirrhosis of the liver nonalcoholic fatty liver hepatic encephalopathy bipolar disorder chronic A-fib on Coumadin blepharitis of the left eye.    Per PCP signout patient was transferred to [place] for reported hypotension and tachycardia diffuse purpura.  Patient had a temperature of 99.3 with a heart rate of 116 and a blood pressure of 88/60.  Patient was FEBRILE later in ER with TEMP 101 , septic workup sent ,  started on iv abx and fluids .CT abd showed pancolitis , seen by GI and ID . Found to have cellulitis of lids b/l R>L       ? inflammatory rash due to ? Erythema multiforme or vasculitis, or dermatomyositis or MCTD  Bl Eyelid redness appear oddly semi-symmetric. R>L.   Similarly with BL periungual reddish to slight purple discoloration of hands, without nail involvement, and to some extent toes.   Doubt purpura  Petechiae not seen.     WBC with mild leukocytosis. Pt non-toxic appearing.   mod anemia, with Hgb 8. likely anemia due to chronic disease  plts normal.     CMV IgG neg  LDH normal  Babesia neg  Lyme neg  RPR neg    ESR 50  [id number]  HSV1 IgG+, HSV2 IgG neg    RECOMMENDATIONS    rash better post dose Solumedrol    Follow CBC  No indication for transfusion currently.   Transfuse PRBC as clinically indicated.   Transfuse PRBC if Hgb <7.0 or if symptomatic.     Follow up Anemia studies.      Ferritin, Iron studies     B12 856, Folate >20     ESR 51,      Await rheum studies    CATHERINE, anti-DS-DNA     AntiRo     ANCA studies     Aldolase, LDH, CPK    D/w ID  additional infectious workup to be examined    Consider eval with opth and derm as unusual constellation of sx,  Consider rheum eval     GI evaluating for inflammatory bowel disease.     DVT Prophylaxis  SQ Lovenox or SQ heparin    Discussed plan of care with patient and in detail.   Pt/Family expressed understanding of the treatment plan.   Opportunity given for questions and discussion.   Questions or concerns all addressed and answered to their satisfaction, and in lay terms.     Thank you for consulting us.

## 2023-12-24 NOTE — PROGRESS NOTE ADULT - SUBJECTIVE AND OBJECTIVE BOX
CHIEF COMPLAINT/ REASON FOR VISIT  .. Patient was seen to address the  issue listed under PROBLEM LIST which is located toward bottom of this note     ANAYA GONZALES 2NOR 236 W1    Allergies    No Known Allergies    Intolerances        PAST MEDICAL & SURGICAL HISTORY:  MDD (major depressive disorder)      GERD (gastroesophageal reflux disease)      Ulcerative colitis      HTN (hypertension)      DM (diabetes mellitus)      Arteriosclerotic heart disease (ASHD)      IBS (irritable bowel syndrome)      History of ataxia      Liver cirrhosis      Nonalcoholic fatty liver disease without nonalcoholic steatohepatitis (PATEL)      Hepatic encephalopathy      Bipolar illness      Chronic atrial fibrillation      Moderate protein-calorie malnutrition      OA (osteoarthritis)      Blepharitis, bilateral      Brain aneurysm      Uterine fibroid      History of cholecystectomy          FAMILY HISTORY:      Home Medications:  Acidophilus oral tablet: 1 tab(s) orally 2 times a day (23 Dec 2023 15:42)  Aspercreme with Lidocaine 4% topical cream: Apply topically to affected area 2 times a day (23 Dec 2023 15:42)  carvedilol 3.125 mg oral tablet: 1 tab(s) orally 2 times a day (23 Dec 2023 15:42)  Entresto 24 mg-26 mg oral tablet: 1 tab(s) orally 2 times a day (23 Dec 2023 15:42)  erythromycin 0.5% ophthalmic ointment: 1 application in each affected eye once a day (23 Dec 2023 15:42)  folic acid 1 mg oral tablet: 1 tab(s) orally once a day (23 Dec 2023 15:42)  Lantus Solostar Pen 100 units/mL subcutaneous solution: 4 unit(s) subcutaneous once a day (at bedtime) (23 Dec 2023 15:42)  melatonin 3 mg oral tablet: 1 tab(s) orally once a day as needed for  insomnia (23 Dec 2023 15:42)  mirtazapine 30 mg oral tablet: 1 tab(s) orally once a day (23 Dec 2023 15:42)  pantoprazole 40 mg oral delayed release tablet: 1 tab(s) orally once a day (23 Dec 2023 15:42)  spironolactone 100 mg oral tablet: 1 tab(s) orally once a day (23 Dec 2023 15:42)  warfarin 6 mg oral tablet: 1 tab(s) orally once a day (23 Dec 2023 15:42)  Xifaxan 550 mg oral tablet: 1 tab(s) orally 2 times a day (23 Dec 2023 15:42)      MEDICATIONS  (STANDING):  cefTRIAXone   IVPB      cefTRIAXone   IVPB 1000 milliGRAM(s) IV Intermittent every 24 hours  dextrose 5% + sodium chloride 0.45%. 1000 milliLiter(s) (100 mL/Hr) IV Continuous <Continuous>  dextrose 5%. 1000 milliLiter(s) (100 mL/Hr) IV Continuous <Continuous>  dextrose 5%. 1000 milliLiter(s) (50 mL/Hr) IV Continuous <Continuous>  dextrose 50% Injectable 25 Gram(s) IV Push once  dextrose 50% Injectable 12.5 Gram(s) IV Push once  dextrose 50% Injectable 25 Gram(s) IV Push once  erythromycin   Ointment 1 Application(s) Both EYES two times a day  folic acid 1 milliGRAM(s) Oral daily  glucagon  Injectable 1 milliGRAM(s) IntraMuscular once  insulin lispro (ADMELOG) corrective regimen sliding scale   SubCutaneous three times a day before meals  lactobacillus acidophilus 1 Tablet(s) Oral every 12 hours  mirtazapine 30 milliGRAM(s) Oral at bedtime  pantoprazole    Tablet 40 milliGRAM(s) Oral daily  rifAXIMin 550 milliGRAM(s) Oral two times a day  senna 2 Tablet(s) Oral at bedtime  spironolactone 100 milliGRAM(s) Oral daily    MEDICATIONS  (PRN):  acetaminophen     Tablet .. 650 milliGRAM(s) Oral every 6 hours PRN Temp greater or equal to 38C (100.4F), Mild Pain (1 - 3)  aluminum hydroxide/magnesium hydroxide/simethicone Suspension 30 milliLiter(s) Oral every 4 hours PRN Dyspepsia  dextrose Oral Gel 15 Gram(s) Oral once PRN Blood Glucose LESS THAN 70 milliGRAM(s)/deciliter  magnesium hydroxide Suspension 30 milliLiter(s) Oral daily PRN Constipation  melatonin 3 milliGRAM(s) Oral at bedtime PRN Insomnia  midodrine. 5 milliGRAM(s) Oral three times a day PRN SBP below 90  ondansetron Injectable 4 milliGRAM(s) IV Push every 8 hours PRN Nausea and/or Vomiting  traMADol 50 milliGRAM(s) Oral three times a day PRN Moderate Pain (4 - 6)              Vital Signs Last 24 Hrs  T(C): 36.7 (24 Dec 2023 08:34), Max: 37.4 (24 Dec 2023 06:01)  T(F): 98.1 (24 Dec 2023 08:34), Max: 99.4 (24 Dec 2023 06:01)  HR: 95 (24 Dec 2023 08:34) (86 - 99)  BP: 89/58 (24 Dec 2023 08:34) (89/58 - 112/70)  BP(mean): --  RR: 18 (24 Dec 2023 08:34) (18 - 20)  SpO2: 92% (24 Dec 2023 08:34) (92% - 98%)    Parameters below as of 24 Dec 2023 08:34  Patient On (Oxygen Delivery Method): room air          12-23-23 @ 07:01  -  12-24-23 @ 07:00  --------------------------------------------------------  IN: 900 mL / OUT: 650 mL / NET: 250 mL              LABS:                        7.2    7.59  )-----------( 365      ( 23 Dec 2023 07:30 )             21.6     12-24    141  |  x   |  x   ----------------------------<  x   x    |  x   |  0.61    Ca    7.9<L>      23 Dec 2023 07:30    TPro  x   /  Alb  x   /  TBili  0.2  /  DBili  x   /  AST  x   /  ALT  x   /  AlkPhos  x   12-24    PT/INR - ( 24 Dec 2023 05:03 )   PT: 25.6 sec;   INR: 2.24 ratio           Urinalysis Basic - ( 23 Dec 2023 07:30 )    Color: x / Appearance: x / SG: x / pH: x  Gluc: 113 mg/dL / Ketone: x  / Bili: x / Urobili: x   Blood: x / Protein: x / Nitrite: x   Leuk Esterase: x / RBC: x / WBC x   Sq Epi: x / Non Sq Epi: x / Bacteria: x            WBC:  WBC Count: 7.59 K/uL (12-23 @ 07:30)  WBC Count: 12.06 K/uL (12-21 @ 22:30)      MICROBIOLOGY:  RECENT CULTURES:  12-21 .Blood Blood-Peripheral XXXX XXXX   No growth at 48 Hours    12-21 .Blood Blood-Peripheral XXXX XXXX   No growth at 48 Hours          CARDIAC MARKERS ( 22 Dec 2023 20:23 )  x     / x     / 37 U/L / x     / x            PT/INR - ( 24 Dec 2023 05:03 )   PT: 25.6 sec;   INR: 2.24 ratio             Sodium:  Sodium: 141 mmol/L (12-24 @ 05:03)  Sodium: 138 mmol/L (12-23 @ 07:30)  Sodium: 136 mmol/L (12-21 @ 22:30)      0.61 mg/dL 12-24 @ 05:03  0.53 mg/dL 12-23 @ 07:30  0.68 mg/dL 12-21 @ 22:30      Hemoglobin:  Hemoglobin: 7.2 g/dL (12-23 @ 07:30)  Hemoglobin: 8.2 g/dL (12-21 @ 22:30)      Platelets: Platelet Count - Automated: 365 K/uL (12-23 @ 07:30)  Platelet Count - Automated: 399 K/uL (12-21 @ 22:30)      LIVER FUNCTIONS - ( 23 Dec 2023 07:30 )  Alb: 2.3 g/dL / Pro: 5.4 g/dL / ALK PHOS: 106 U/L / ALT: 20 U/L / AST: 19 U/L / GGT: x             Urinalysis Basic - ( 23 Dec 2023 07:30 )    Color: x / Appearance: x / SG: x / pH: x  Gluc: 113 mg/dL / Ketone: x  / Bili: x / Urobili: x   Blood: x / Protein: x / Nitrite: x   Leuk Esterase: x / RBC: x / WBC x   Sq Epi: x / Non Sq Epi: x / Bacteria: x        RADIOLOGY & ADDITIONAL STUDIES:      MICROBIOLOGY:  RECENT CULTURES:  12-21 .Blood Blood-Peripheral XXXX XXXX   No growth at 48 Hours    12-21 .Blood Blood-Peripheral XXXX XXXX   No growth at 48 Hours

## 2023-12-25 LAB
ALBUMIN SERPL ELPH-MCNC: 2.2 G/DL — LOW (ref 3.3–5)
ALBUMIN SERPL ELPH-MCNC: 2.2 G/DL — LOW (ref 3.3–5)
ALP SERPL-CCNC: 100 U/L — SIGNIFICANT CHANGE UP (ref 40–120)
ALP SERPL-CCNC: 100 U/L — SIGNIFICANT CHANGE UP (ref 40–120)
ALT FLD-CCNC: 17 U/L — SIGNIFICANT CHANGE UP (ref 12–78)
ALT FLD-CCNC: 17 U/L — SIGNIFICANT CHANGE UP (ref 12–78)
ANION GAP SERPL CALC-SCNC: 8 MMOL/L — SIGNIFICANT CHANGE UP (ref 5–17)
ANION GAP SERPL CALC-SCNC: 8 MMOL/L — SIGNIFICANT CHANGE UP (ref 5–17)
AST SERPL-CCNC: 21 U/L — SIGNIFICANT CHANGE UP (ref 15–37)
AST SERPL-CCNC: 21 U/L — SIGNIFICANT CHANGE UP (ref 15–37)
BILIRUB SERPL-MCNC: 0.4 MG/DL — SIGNIFICANT CHANGE UP (ref 0.2–1.2)
BILIRUB SERPL-MCNC: 0.4 MG/DL — SIGNIFICANT CHANGE UP (ref 0.2–1.2)
BUN SERPL-MCNC: 9 MG/DL — SIGNIFICANT CHANGE UP (ref 7–23)
BUN SERPL-MCNC: 9 MG/DL — SIGNIFICANT CHANGE UP (ref 7–23)
C DIFF BY PCR RESULT: SIGNIFICANT CHANGE UP
C DIFF BY PCR RESULT: SIGNIFICANT CHANGE UP
CALCIUM SERPL-MCNC: 8 MG/DL — LOW (ref 8.5–10.1)
CALCIUM SERPL-MCNC: 8 MG/DL — LOW (ref 8.5–10.1)
CHLORIDE SERPL-SCNC: 110 MMOL/L — HIGH (ref 96–108)
CHLORIDE SERPL-SCNC: 110 MMOL/L — HIGH (ref 96–108)
CO2 SERPL-SCNC: 22 MMOL/L — SIGNIFICANT CHANGE UP (ref 22–31)
CO2 SERPL-SCNC: 22 MMOL/L — SIGNIFICANT CHANGE UP (ref 22–31)
CREAT SERPL-MCNC: 0.58 MG/DL — SIGNIFICANT CHANGE UP (ref 0.5–1.3)
CREAT SERPL-MCNC: 0.58 MG/DL — SIGNIFICANT CHANGE UP (ref 0.5–1.3)
EGFR: 106 ML/MIN/1.73M2 — SIGNIFICANT CHANGE UP
EGFR: 106 ML/MIN/1.73M2 — SIGNIFICANT CHANGE UP
FERRITIN SERPL-MCNC: 379 NG/ML — HIGH (ref 13–330)
FERRITIN SERPL-MCNC: 379 NG/ML — HIGH (ref 13–330)
GLUCOSE SERPL-MCNC: 106 MG/DL — HIGH (ref 70–99)
GLUCOSE SERPL-MCNC: 106 MG/DL — HIGH (ref 70–99)
HCT VFR BLD CALC: 24.1 % — LOW (ref 34.5–45)
HCT VFR BLD CALC: 24.1 % — LOW (ref 34.5–45)
HGB BLD-MCNC: 7.8 G/DL — LOW (ref 11.5–15.5)
HGB BLD-MCNC: 7.8 G/DL — LOW (ref 11.5–15.5)
INR BLD: 1.64 RATIO — HIGH (ref 0.85–1.18)
INR BLD: 1.64 RATIO — HIGH (ref 0.85–1.18)
IRON SATN MFR SERPL: 16 % — SIGNIFICANT CHANGE UP (ref 14–50)
IRON SATN MFR SERPL: 16 % — SIGNIFICANT CHANGE UP (ref 14–50)
IRON SATN MFR SERPL: 27 UG/DL — LOW (ref 30–160)
IRON SATN MFR SERPL: 27 UG/DL — LOW (ref 30–160)
MCHC RBC-ENTMCNC: 31.2 PG — SIGNIFICANT CHANGE UP (ref 27–34)
MCHC RBC-ENTMCNC: 31.2 PG — SIGNIFICANT CHANGE UP (ref 27–34)
MCHC RBC-ENTMCNC: 32.4 GM/DL — SIGNIFICANT CHANGE UP (ref 32–36)
MCHC RBC-ENTMCNC: 32.4 GM/DL — SIGNIFICANT CHANGE UP (ref 32–36)
MCV RBC AUTO: 96.4 FL — SIGNIFICANT CHANGE UP (ref 80–100)
MCV RBC AUTO: 96.4 FL — SIGNIFICANT CHANGE UP (ref 80–100)
NRBC # BLD: 0 /100 WBCS — SIGNIFICANT CHANGE UP (ref 0–0)
NRBC # BLD: 0 /100 WBCS — SIGNIFICANT CHANGE UP (ref 0–0)
OB PNL STL: POSITIVE
OB PNL STL: POSITIVE
PLATELET # BLD AUTO: 339 K/UL — SIGNIFICANT CHANGE UP (ref 150–400)
PLATELET # BLD AUTO: 339 K/UL — SIGNIFICANT CHANGE UP (ref 150–400)
POTASSIUM SERPL-MCNC: 3.2 MMOL/L — LOW (ref 3.5–5.3)
POTASSIUM SERPL-MCNC: 3.2 MMOL/L — LOW (ref 3.5–5.3)
POTASSIUM SERPL-SCNC: 3.2 MMOL/L — LOW (ref 3.5–5.3)
POTASSIUM SERPL-SCNC: 3.2 MMOL/L — LOW (ref 3.5–5.3)
PROT SERPL-MCNC: 5.1 G/DL — LOW (ref 6–8.3)
PROT SERPL-MCNC: 5.1 G/DL — LOW (ref 6–8.3)
PROTHROM AB SERPL-ACNC: 18.9 SEC — HIGH (ref 9.5–13)
PROTHROM AB SERPL-ACNC: 18.9 SEC — HIGH (ref 9.5–13)
RBC # BLD: 2.5 M/UL — LOW (ref 3.8–5.2)
RBC # BLD: 2.5 M/UL — LOW (ref 3.8–5.2)
RBC # FLD: 13.3 % — SIGNIFICANT CHANGE UP (ref 10.3–14.5)
RBC # FLD: 13.3 % — SIGNIFICANT CHANGE UP (ref 10.3–14.5)
SODIUM SERPL-SCNC: 140 MMOL/L — SIGNIFICANT CHANGE UP (ref 135–145)
SODIUM SERPL-SCNC: 140 MMOL/L — SIGNIFICANT CHANGE UP (ref 135–145)
SSA-52 (RO52) (ENA) ANTIBODY, IGG: 3 AU/ML — SIGNIFICANT CHANGE UP (ref 0–40)
SSA-52 (RO52) (ENA) ANTIBODY, IGG: 3 AU/ML — SIGNIFICANT CHANGE UP (ref 0–40)
SSA-60 (RO60) (ENA) ANTIBODY, IGG: 0 AU/ML — SIGNIFICANT CHANGE UP (ref 0–40)
SSA-60 (RO60) (ENA) ANTIBODY, IGG: 0 AU/ML — SIGNIFICANT CHANGE UP (ref 0–40)
TIBC SERPL-MCNC: 172 UG/DL — LOW (ref 220–430)
TIBC SERPL-MCNC: 172 UG/DL — LOW (ref 220–430)
UIBC SERPL-MCNC: 145 UG/DL — SIGNIFICANT CHANGE UP (ref 110–370)
UIBC SERPL-MCNC: 145 UG/DL — SIGNIFICANT CHANGE UP (ref 110–370)
WBC # BLD: 7.73 K/UL — SIGNIFICANT CHANGE UP (ref 3.8–10.5)
WBC # BLD: 7.73 K/UL — SIGNIFICANT CHANGE UP (ref 3.8–10.5)
WBC # FLD AUTO: 7.73 K/UL — SIGNIFICANT CHANGE UP (ref 3.8–10.5)
WBC # FLD AUTO: 7.73 K/UL — SIGNIFICANT CHANGE UP (ref 3.8–10.5)

## 2023-12-25 RX ORDER — POTASSIUM CHLORIDE 20 MEQ
20 PACKET (EA) ORAL
Refills: 0 | Status: COMPLETED | OUTPATIENT
Start: 2023-12-25 | End: 2023-12-25

## 2023-12-25 RX ADMIN — Medication 1 APPLICATION(S): at 17:17

## 2023-12-25 RX ADMIN — SODIUM CHLORIDE 100 MILLILITER(S): 9 INJECTION, SOLUTION INTRAVENOUS at 06:48

## 2023-12-25 RX ADMIN — Medication 20 MILLIEQUIVALENT(S): at 15:34

## 2023-12-25 RX ADMIN — MIRTAZAPINE 30 MILLIGRAM(S): 45 TABLET, ORALLY DISINTEGRATING ORAL at 22:04

## 2023-12-25 RX ADMIN — Medication 20 MILLIEQUIVALENT(S): at 13:08

## 2023-12-25 RX ADMIN — Medication 1 TABLET(S): at 06:24

## 2023-12-25 RX ADMIN — Medication 1 MILLIGRAM(S): at 11:35

## 2023-12-25 RX ADMIN — Medication 1 TABLET(S): at 17:17

## 2023-12-25 RX ADMIN — SPIRONOLACTONE 100 MILLIGRAM(S): 25 TABLET, FILM COATED ORAL at 06:23

## 2023-12-25 RX ADMIN — PANTOPRAZOLE SODIUM 40 MILLIGRAM(S): 20 TABLET, DELAYED RELEASE ORAL at 11:35

## 2023-12-25 RX ADMIN — Medication 2: at 11:35

## 2023-12-25 RX ADMIN — Medication 1 APPLICATION(S): at 06:23

## 2023-12-25 NOTE — CARE COORDINATION ASSESSMENT. - NSPASTMEDSURGHISTORY_GEN_ALL_CORE_FT
PAST MEDICAL & SURGICAL HISTORY:  Uterine fibroid      Brain aneurysm      Blepharitis, bilateral      OA (osteoarthritis)      Moderate protein-calorie malnutrition      Chronic atrial fibrillation      Bipolar illness      Hepatic encephalopathy      Nonalcoholic fatty liver disease without nonalcoholic steatohepatitis (PATEL)      Liver cirrhosis      History of ataxia      IBS (irritable bowel syndrome)      Arteriosclerotic heart disease (ASHD)      DM (diabetes mellitus)      HTN (hypertension)      Ulcerative colitis      GERD (gastroesophageal reflux disease)      MDD (major depressive disorder)      History of cholecystectomy

## 2023-12-25 NOTE — PATIENT CHOICE NOTE. - NSPTCHOICESTATE_GEN_ALL_CORE
I have met with the patient and/or caregiver to discuss discharge goals and treatment plan. Patient and/or caregiver also provided with instructions on accessing the CMS Compare websites for additional information related to Post Acute Provider quality and resource use measures to assist them in evaluation of the providers and in selecting their post-acute provider of choice. Patient and caregiver were informed of the facilities that are owned and/or operated by [facility]. I have discussed with the patient the availability of in-network facilities and providers. Patient and caregiver provided with a list of post-acute providers whose services are appropriate to the discharge plans and patient needs.     For patient requiring durable medical equipment, patient and/or caregiver were informed that they have the right to request who provides the required equipment. I have met with the patient and/or caregiver to discuss discharge goals and treatment plan. Patient and/or caregiver also provided with instructions on accessing the CMS Compare websites for additional information related to Post Acute Provider quality and resource use measures to assist them in evaluation of the providers and in selecting their post-acute provider of choice. Patient and caregiver were informed of the facilities that are owned and/or operated by [facility]. I have discussed with the patient the availability of in-network facilities and providers. Patient and caregiver provided with a list of post-acute providers whose services are appropriate to the discharge plans and patient needs.     For patient requiring durable medical equipment, patient and/or caregiver were informed that they have the right to request who provides the required equipment.

## 2023-12-25 NOTE — CARE COORDINATION ASSESSMENT. - PRO ARRIVE FROM
Pt is a L.T resident at [facility]/other (specify) Pt is a L.T resident at [facility]/other (specify)

## 2023-12-25 NOTE — PROGRESS NOTE ADULT - SUBJECTIVE AND OBJECTIVE BOX
CHIEF COMPLAINT/ REASON FOR VISIT  .. Patient was seen to address the  issue listed under PROBLEM LIST which is located toward bottom of this note     ANAYA GONZALES [address] 236 W1    Allergies    No Known Allergies    Intolerances        PAST MEDICAL & SURGICAL HISTORY:  MDD (major depressive disorder)      GERD (gastroesophageal reflux disease)      Ulcerative colitis      HTN (hypertension)      DM (diabetes mellitus)      Arteriosclerotic heart disease (ASHD)      IBS (irritable bowel syndrome)      History of ataxia      Liver cirrhosis      Nonalcoholic fatty liver disease without nonalcoholic steatohepatitis (PATEL)      Hepatic encephalopathy      Bipolar illness      Chronic atrial fibrillation      Moderate protein-calorie malnutrition      OA (osteoarthritis)      Blepharitis, bilateral      Brain aneurysm      Uterine fibroid      History of cholecystectomy          FAMILY HISTORY:      Home Medications:  Acidophilus oral tablet: 1 tab(s) orally 2 times a day (23 Dec 2023 15:42)  Aspercreme with Lidocaine 4% topical cream: Apply topically to affected area 2 times a day (23 Dec 2023 15:42)  carvedilol 3.125 mg oral tablet: 1 tab(s) orally 2 times a day (23 Dec 2023 15:42)  Entresto 24 mg-26 mg oral tablet: 1 tab(s) orally 2 times a day (23 Dec 2023 15:42)  erythromycin 0.5% ophthalmic ointment: 1 application in each affected eye once a day (23 Dec 2023 15:42)  folic acid 1 mg oral tablet: 1 tab(s) orally once a day (23 Dec 2023 15:42)  Lantus Solostar Pen 100 units/mL subcutaneous solution: 4 unit(s) subcutaneous once a day (at bedtime) (23 Dec 2023 15:42)  melatonin 3 mg oral tablet: 1 tab(s) orally once a day as needed for  insomnia (23 Dec 2023 15:42)  mirtazapine 30 mg oral tablet: 1 tab(s) orally once a day (23 Dec 2023 15:42)  pantoprazole 40 mg oral delayed release tablet: 1 tab(s) orally once a day (23 Dec 2023 15:42)  spironolactone 100 mg oral tablet: 1 tab(s) orally once a day (23 Dec 2023 15:42)  warfarin 6 mg oral tablet: 1 tab(s) orally once a day (23 Dec 2023 15:42)  Xifaxan 550 mg oral tablet: 1 tab(s) orally 2 times a day (23 Dec 2023 15:42)      MEDICATIONS  (STANDING):  dextrose 5% + sodium chloride 0.45%. 1000 milliLiter(s) (100 mL/Hr) IV Continuous <Continuous>  dextrose 5%. 1000 milliLiter(s) (50 mL/Hr) IV Continuous <Continuous>  dextrose 5%. 1000 milliLiter(s) (100 mL/Hr) IV Continuous <Continuous>  dextrose 50% Injectable 25 Gram(s) IV Push once  dextrose 50% Injectable 12.5 Gram(s) IV Push once  dextrose 50% Injectable 25 Gram(s) IV Push once  erythromycin   Ointment 1 Application(s) Both EYES two times a day  folic acid 1 milliGRAM(s) Oral daily  glucagon  Injectable 1 milliGRAM(s) IntraMuscular once  insulin lispro (ADMELOG) corrective regimen sliding scale   SubCutaneous three times a day before meals  lactobacillus acidophilus 1 Tablet(s) Oral every 12 hours  mirtazapine 30 milliGRAM(s) Oral at bedtime  pantoprazole    Tablet 40 milliGRAM(s) Oral daily  rifAXIMin 550 milliGRAM(s) Oral two times a day  senna 2 Tablet(s) Oral at bedtime  spironolactone 100 milliGRAM(s) Oral daily    MEDICATIONS  (PRN):  acetaminophen     Tablet .. 650 milliGRAM(s) Oral every 6 hours PRN Temp greater or equal to 38C (100.4F), Mild Pain (1 - 3)  aluminum hydroxide/magnesium hydroxide/simethicone Suspension 30 milliLiter(s) Oral every 4 hours PRN Dyspepsia  dextrose Oral Gel 15 Gram(s) Oral once PRN Blood Glucose LESS THAN 70 milliGRAM(s)/deciliter  magnesium hydroxide Suspension 30 milliLiter(s) Oral daily PRN Constipation  melatonin 3 milliGRAM(s) Oral at bedtime PRN Insomnia  midodrine. 5 milliGRAM(s) Oral three times a day PRN SBP below 90  ondansetron Injectable 4 milliGRAM(s) IV Push every 8 hours PRN Nausea and/or Vomiting  traMADol 50 milliGRAM(s) Oral three times a day PRN Moderate Pain (4 - 6)      Diet, Clear Liquid:   Supplement Feeding Modality:  Oral  Ensure Clear Cans or Servings Per Day:  1       Frequency:  Three Times a day (12-24-23 @ 11:09) [Pending Verification By Attending]          Vital Signs Last 24 Hrs  T(C): 36.9 (25 Dec 2023 05:04), Max: 36.9 (25 Dec 2023 05:04)  T(F): 98.4 (25 Dec 2023 05:04), Max: 98.4 (25 Dec 2023 05:04)  HR: 104 (25 Dec 2023 05:04) (92 - 104)  BP: 103/67 (25 Dec 2023 05:04) (94/63 - 106/71)  BP(mean): --  RR: 19 (25 Dec 2023 05:04) (18 - 19)  SpO2: 97% (25 Dec 2023 05:04) (94% - 98%)    Parameters below as of 25 Dec 2023 05:04  Patient On (Oxygen Delivery Method): room air          12-24-23 @ 07:01  -  12-25-23 @ 07:00  --------------------------------------------------------  IN: 0 mL / OUT: 1351 mL / NET: -1351 mL              LABS:                        7.8    7.73  )-----------( 339      ( 25 Dec 2023 07:12 )             24.1     12-25    140  |  110<H>  |  9   ----------------------------<  106<H>  3.2<L>   |  22  |  0.58    Ca    8.0<L>      25 Dec 2023 07:12    TPro  x   /  Alb  2.2<L>  /  TBili  x   /  DBili  x   /  AST  21  /  ALT  17  /  AlkPhos  x   12-25    PT/INR - ( 25 Dec 2023 07:12 )   PT: 18.9 sec;   INR: 1.64 ratio           Urinalysis Basic - ( 25 Dec 2023 07:12 )    Color: x / Appearance: x / SG: x / pH: x  Gluc: 106 mg/dL / Ketone: x  / Bili: x / Urobili: x   Blood: x / Protein: x / Nitrite: x   Leuk Esterase: x / RBC: x / WBC x   Sq Epi: x / Non Sq Epi: x / Bacteria: x            WBC:  WBC Count: 7.73 K/uL (12-25 @ 07:12)  WBC Count: 7.59 K/uL (12-23 @ 07:30)  WBC Count: 12.06 K/uL (12-21 @ 22:30)      MICROBIOLOGY:  RECENT CULTURES:  12-23 Clean Catch Clean Catch (Midstream) XXXX XXXX   No growth    12-21 .Blood Blood-Peripheral XXXX XXXX   No growth at 72 Hours    12-21 .Blood Blood-Peripheral XXXX XXXX   No growth at 72 Hours                PT/INR - ( 25 Dec 2023 07:12 )   PT: 18.9 sec;   INR: 1.64 ratio             Sodium:  Sodium: 140 mmol/L (12-25 @ 07:12)  Sodium: 141 mmol/L (12-24 @ 05:03)  Sodium: 138 mmol/L (12-23 @ 07:30)  Sodium: 136 mmol/L (12-21 @ 22:30)      0.58 mg/dL 12-25 @ 07:12  0.61 mg/dL 12-24 @ 05:03  0.53 mg/dL 12-23 @ 07:30  0.68 mg/dL 12-21 @ 22:30      Hemoglobin:  Hemoglobin: 7.8 g/dL (12-25 @ 07:12)  Hemoglobin: 7.2 g/dL (12-23 @ 07:30)  Hemoglobin: 8.2 g/dL (12-21 @ 22:30)      Platelets: Platelet Count - Automated: 339 K/uL (12-25 @ 07:12)  Platelet Count - Automated: 365 K/uL (12-23 @ 07:30)  Platelet Count - Automated: 399 K/uL (12-21 @ 22:30)      LIVER FUNCTIONS - ( 25 Dec 2023 07:12 )  Alb: 2.2 g/dL / Pro: x     / ALK PHOS: x     / ALT: 17 U/L / AST: 21 U/L / GGT: x             Urinalysis Basic - ( 25 Dec 2023 07:12 )    Color: x / Appearance: x / SG: x / pH: x  Gluc: 106 mg/dL / Ketone: x  / Bili: x / Urobili: x   Blood: x / Protein: x / Nitrite: x   Leuk Esterase: x / RBC: x / WBC x   Sq Epi: x / Non Sq Epi: x / Bacteria: x        RADIOLOGY & ADDITIONAL STUDIES:      MICROBIOLOGY:  RECENT CULTURES:  12-23 Clean Catch Clean Catch (Midstream) XXXX XXXX   No growth    12-21 .Blood Blood-Peripheral XXXX XXXX   No growth at 72 Hours    12-21 .Blood Blood-Peripheral XXXX XXXX   No growth at 72 Hours

## 2023-12-25 NOTE — PROGRESS NOTE ADULT - ASSESSMENT
59 y/o F w/ pmh of cad, dm, mdd, gerd, IBS, cirrhosis 2/2 to nonalcoholic fatty liver, bipolar, afib on coumadin, today presented to [facility] hospital for hypotension and tachycardia with new diffuse purpura to the LE ongoing the last few days and today morning waking up by R>L orbital swelling. R>L orbital swelling. Febrile in the ED Tm 101.4F  CT orbits: Nonspecific bilateral periorbital soft tissue swelling, right greater than left, as well as right facial soft tissue swelling. No discrete drainable fluid collection. No evidence of post septal involvement/orbital cellulitis.  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 Neurosurgical consultation is recommended.  CT A/P: Mild pancolitis, of infectious or inflammatory etiology.    RECOMMENDATIONS  Rash unlikely infectious and higher suspicion for inflammatory disorder and improvement may be in response to steroids  with neg micro will dc ceftriaxone   a number of studies still pending    Thank you for consulting us and involving us in the management of this most interesting and challenging case.  We will follow along in the care of this patient. Please call us at [phone] or text me directly on my cell# at [phone] with any concerns.   57 y/o F w/ pmh of cad, dm, mdd, gerd, IBS, cirrhosis 2/2 to nonalcoholic fatty liver, bipolar, afib on coumadin, today presented to [facility] hospital for hypotension and tachycardia with new diffuse purpura to the LE ongoing the last few days and today morning waking up by R>L orbital swelling. R>L orbital swelling. Febrile in the ED Tm 101.4F  CT orbits: Nonspecific bilateral periorbital soft tissue swelling, right greater than left, as well as right facial soft tissue swelling. No discrete drainable fluid collection. No evidence of post septal involvement/orbital cellulitis.  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 Neurosurgical consultation is recommended.  CT A/P: Mild pancolitis, of infectious or inflammatory etiology.    RECOMMENDATIONS  Rash unlikely infectious and higher suspicion for inflammatory disorder and improvement may be in response to steroids  with neg micro will dc ceftriaxone   a number of studies still pending    Thank you for consulting us and involving us in the management of this most interesting and challenging case.  We will follow along in the care of this patient. Please call us at [phone] or text me directly on my cell# at [phone] with any concerns.

## 2023-12-25 NOTE — PROGRESS NOTE ADULT - SUBJECTIVE AND OBJECTIVE BOX
[facility] [place]       HPI:   Patient is a 58-year-old female  ,[facility] resident who presents to the emergency room with multiple medical issues.  Past medical history of CAD diabetes insomnia major depressive disorder GERD hypertension irritable bowel syndrome with diarrhea cirrhosis of the liver nonalcoholic fatty liver hepatic encephalopathy bipolar disorder chronic A-fib on Coumadin blepharitis of the left eye.    Per PCP signout patient was transferred to [place] for reported hypotension and tachycardia diffuse purpura.  Patient had a temperature of 99.3 with a heart rate of 116 and a blood pressure of 88/60.  Patient was FEBRILE later in ER with TEMP 101 , septic workup sent ,  started on iv abx and fluids .CT abd showed pancolitis , seen by GI and ID . Found to have cellulitis of lids b/l R>L  (22 Dec 2023 10:37)        SUBJECTIVE:      ALLERGIES:  Allergies    No Known Allergies    Intolerances          MEDICATIONS  (STANDING):  dextrose 5% + sodium chloride 0.45%. 1000 milliLiter(s) (100 mL/Hr) IV Continuous <Continuous>  dextrose 5%. 1000 milliLiter(s) (100 mL/Hr) IV Continuous <Continuous>  dextrose 5%. 1000 milliLiter(s) (50 mL/Hr) IV Continuous <Continuous>  dextrose 50% Injectable 25 Gram(s) IV Push once  dextrose 50% Injectable 12.5 Gram(s) IV Push once  dextrose 50% Injectable 25 Gram(s) IV Push once  erythromycin   Ointment 1 Application(s) Both EYES two times a day  folic acid 1 milliGRAM(s) Oral daily  glucagon  Injectable 1 milliGRAM(s) IntraMuscular once  insulin lispro (ADMELOG) corrective regimen sliding scale   SubCutaneous three times a day before meals  lactobacillus acidophilus 1 Tablet(s) Oral every 12 hours  mirtazapine 30 milliGRAM(s) Oral at bedtime  pantoprazole    Tablet 40 milliGRAM(s) Oral daily  rifAXIMin 550 milliGRAM(s) Oral two times a day  senna 2 Tablet(s) Oral at bedtime  spironolactone 100 milliGRAM(s) Oral daily    MEDICATIONS  (PRN):  acetaminophen     Tablet .. 650 milliGRAM(s) Oral every 6 hours PRN Temp greater or equal to 38C (100.4F), Mild Pain (1 - 3)  aluminum hydroxide/magnesium hydroxide/simethicone Suspension 30 milliLiter(s) Oral every 4 hours PRN Dyspepsia  dextrose Oral Gel 15 Gram(s) Oral once PRN Blood Glucose LESS THAN 70 milliGRAM(s)/deciliter  magnesium hydroxide Suspension 30 milliLiter(s) Oral daily PRN Constipation  melatonin 3 milliGRAM(s) Oral at bedtime PRN Insomnia  midodrine. 5 milliGRAM(s) Oral three times a day PRN SBP below 90  ondansetron Injectable 4 milliGRAM(s) IV Push every 8 hours PRN Nausea and/or Vomiting  traMADol 50 milliGRAM(s) Oral three times a day PRN Moderate Pain (4 - 6)      REVIEW OF SYSTEMS:  CONSTITUTIONAL: No fever,  RESPIRATORY: No cough, wheezing, shortness of breath  CARDIOVASCULAR: No chest pain, dyspnea, palpitations, dizziness, syncope, paroxysmal nocturnal dyspnea, orthopnea, or arm or leg swelling  GASTROINTESTINAL: No abdominal  or epigastric pain, nausea, vomiting,  diarrhea  NEUROLOGICAL: No headaches,  loss of strength, numbness, or tremors    Vital Signs Last 24 Hrs  T(C): 36.6 (26 Dec 2023 00:31), Max: 36.9 (25 Dec 2023 05:04)  T(F): 97.9 (26 Dec 2023 00:31), Max: 98.4 (25 Dec 2023 05:04)  HR: 101 (26 Dec 2023 00:31) (88 - 104)  BP: 103/67 (26 Dec 2023 00:31) (103/67 - 103/67)  BP(mean): --  RR: 18 (26 Dec 2023 00:31) (18 - 19)  SpO2: 96% (26 Dec 2023 00:31) (96% - 98%)    Parameters below as of 26 Dec 2023 00:31  Patient On (Oxygen Delivery Method): room air        PHYSICAL EXAM:  HEAD:  Atraumatic, Normocephalic  NECK: Supple and normal thyroid.  No JVD or carotid bruit.   HEART: S1, S2 regular , 1/6 soft ejection systolic murmur in mitral area , no thrill and no gallops .  PULMONARY: Bilateral vesicular breathing , few scattered ronchi and few scattered rales are present .  ABDOMEN: Soft nontender and positive bowl sounds   EXTREMITIES:  No clubbing, cyanosis, or pedal  edema  NEUROLOGICAL: AAOX3 , no focal deficit .    LABS:                        7.8    7.73  )-----------( 339      ( 25 Dec 2023 07:12 )             24.1     12-25    140  |  110<H>  |  9   ----------------------------<  106<H>  3.2<L>   |  22  |  0.58    Ca    8.0<L>      25 Dec 2023 07:12    TPro  5.1<L>  /  Alb  2.2<L>  /  TBili  0.4  /  DBili  x   /  AST  21  /  ALT  17  /  AlkPhos  100  12-25        PT/INR - ( 25 Dec 2023 07:12 )   PT: 18.9 sec;   INR: 1.64 ratio           Urinalysis Basic - ( 25 Dec 2023 07:12 )    Color: x / Appearance: x / SG: x / pH: x  Gluc: 106 mg/dL / Ketone: x  / Bili: x / Urobili: x   Blood: x / Protein: x / Nitrite: x   Leuk Esterase: x / RBC: x / WBC x   Sq Epi: x / Non Sq Epi: x / Bacteria: x      BNP      EKG:  ECHO:  IMAGING:    Assessment/Plan    Will continue to follow during hospital course and give further recommendations as needed . Thanks for your referral . if any questions please contact me at office ([phone])cell [phone])  [facility] [place]       HPI:   Patient is a 58-year-old female  ,[facility] resident who presents to the emergency room with multiple medical issues.  Past medical history of CAD diabetes insomnia major depressive disorder GERD hypertension irritable bowel syndrome with diarrhea cirrhosis of the liver nonalcoholic fatty liver hepatic encephalopathy bipolar disorder chronic A-fib on Coumadin blepharitis of the left eye.    Per PCP signout patient was transferred to [place] for reported hypotension and tachycardia diffuse purpura.  Patient had a temperature of 99.3 with a heart rate of 116 and a blood pressure of 88/60.  Patient was FEBRILE later in ER with TEMP 101 , septic workup sent ,  started on iv abx and fluids .CT abd showed pancolitis , seen by GI and ID . Found to have cellulitis of lids b/l R>L  (22 Dec 2023 10:37)        SUBJECTIVE:      ALLERGIES:  Allergies    No Known Allergies    Intolerances          MEDICATIONS  (STANDING):  dextrose 5% + sodium chloride 0.45%. 1000 milliLiter(s) (100 mL/Hr) IV Continuous <Continuous>  dextrose 5%. 1000 milliLiter(s) (100 mL/Hr) IV Continuous <Continuous>  dextrose 5%. 1000 milliLiter(s) (50 mL/Hr) IV Continuous <Continuous>  dextrose 50% Injectable 25 Gram(s) IV Push once  dextrose 50% Injectable 12.5 Gram(s) IV Push once  dextrose 50% Injectable 25 Gram(s) IV Push once  erythromycin   Ointment 1 Application(s) Both EYES two times a day  folic acid 1 milliGRAM(s) Oral daily  glucagon  Injectable 1 milliGRAM(s) IntraMuscular once  insulin lispro (ADMELOG) corrective regimen sliding scale   SubCutaneous three times a day before meals  lactobacillus acidophilus 1 Tablet(s) Oral every 12 hours  mirtazapine 30 milliGRAM(s) Oral at bedtime  pantoprazole    Tablet 40 milliGRAM(s) Oral daily  rifAXIMin 550 milliGRAM(s) Oral two times a day  senna 2 Tablet(s) Oral at bedtime  spironolactone 100 milliGRAM(s) Oral daily    MEDICATIONS  (PRN):  acetaminophen     Tablet .. 650 milliGRAM(s) Oral every 6 hours PRN Temp greater or equal to 38C (100.4F), Mild Pain (1 - 3)  aluminum hydroxide/magnesium hydroxide/simethicone Suspension 30 milliLiter(s) Oral every 4 hours PRN Dyspepsia  dextrose Oral Gel 15 Gram(s) Oral once PRN Blood Glucose LESS THAN 70 milliGRAM(s)/deciliter  magnesium hydroxide Suspension 30 milliLiter(s) Oral daily PRN Constipation  melatonin 3 milliGRAM(s) Oral at bedtime PRN Insomnia  midodrine. 5 milliGRAM(s) Oral three times a day PRN SBP below 90  ondansetron Injectable 4 milliGRAM(s) IV Push every 8 hours PRN Nausea and/or Vomiting  traMADol 50 milliGRAM(s) Oral three times a day PRN Moderate Pain (4 - 6)      REVIEW OF SYSTEMS:  CONSTITUTIONAL: No fever,  RESPIRATORY: No cough, wheezing, shortness of breath  CARDIOVASCULAR: No chest pain, dyspnea, palpitations, dizziness, syncope, paroxysmal nocturnal dyspnea, orthopnea, or arm or leg swelling  GASTROINTESTINAL: No abdominal  or epigastric pain, nausea, vomiting,  diarrhea  NEUROLOGICAL: No headaches,  loss of strength, numbness, or tremors    Vital Signs Last 24 Hrs  T(C): 36.6 (26 Dec 2023 00:31), Max: 36.9 (25 Dec 2023 05:04)  T(F): 97.9 (26 Dec 2023 00:31), Max: 98.4 (25 Dec 2023 05:04)  HR: 101 (26 Dec 2023 00:31) (88 - 104)  BP: 103/67 (26 Dec 2023 00:31) (103/67 - 103/67)  BP(mean): --  RR: 18 (26 Dec 2023 00:31) (18 - 19)  SpO2: 96% (26 Dec 2023 00:31) (96% - 98%)    Parameters below as of 26 Dec 2023 00:31  Patient On (Oxygen Delivery Method): room air        PHYSICAL EXAM:  HEAD:  Atraumatic, Normocephalic  NECK: Supple and normal thyroid.  No JVD or carotid bruit.   HEART: S1, S2 regular , 1/6 soft ejection systolic murmur in mitral area , no thrill and no gallops .  PULMONARY: Bilateral vesicular breathing , few scattered ronchi and few scattered rales are present .  ABDOMEN: Soft nontender and positive bowl sounds   EXTREMITIES:  No clubbing, cyanosis, or pedal  edema  NEUROLOGICAL: AAOX3 , no focal deficit .    LABS:                        7.8    7.73  )-----------( 339      ( 25 Dec 2023 07:12 )             24.1     12-25    140  |  110<H>  |  9   ----------------------------<  106<H>  3.2<L>   |  22  |  0.58    Ca    8.0<L>      25 Dec 2023 07:12    TPro  5.1<L>  /  Alb  2.2<L>  /  TBili  0.4  /  DBili  x   /  AST  21  /  ALT  17  /  AlkPhos  100  12-25        PT/INR - ( 25 Dec 2023 07:12 )   PT: 18.9 sec;   INR: 1.64 ratio           Urinalysis Basic - ( 25 Dec 2023 07:12 )    Color: x / Appearance: x / SG: x / pH: x  Gluc: 106 mg/dL / Ketone: x  / Bili: x / Urobili: x   Blood: x / Protein: x / Nitrite: x   Leuk Esterase: x / RBC: x / WBC x   Sq Epi: x / Non Sq Epi: x / Bacteria: x      BNP      EKG:  ECHO:  IMAGING:    Assessment/Plan    Will continue to follow during hospital course and give further recommendations as needed . Thanks for your referral . if any questions please contact me at office ([phone] [phone])  CAIT BERTRAND MD [place] [address]  [address]  OFFICE : [phone]  CELL : [phone]  CARDIOLOGY F/U :       HPI:   Patient is a 58-year-old female  ,[facility] resident who presents to the emergency room with multiple medical issues.  Past medical history of CAD diabetes insomnia major depressive disorder GERD hypertension irritable bowel syndrome with diarrhea cirrhosis of the liver nonalcoholic fatty liver hepatic encephalopathy bipolar disorder chronic A-fib on Coumadin blepharitis of the left eye.    Per PCP signout patient was transferred to [place] for reported hypotension and tachycardia diffuse purpura.  Patient had a temperature of 99.3 with a heart rate of 116 and a blood pressure of 88/60.  Patient was FEBRILE later in ER with TEMP 101 , septic workup sent ,  started on iv abx and fluids .CT abd showed pancolitis , seen by GI and ID . Found to have cellulitis of lids b/l R>L  (22 Dec 2023 10:37)        SUBJECTIVE: Patient feeling better .      ALLERGIES:  Allergies    No Known Allergies    Intolerances          MEDICATIONS  (STANDING):  dextrose 5% + sodium chloride 0.45%. 1000 milliLiter(s) (100 mL/Hr) IV Continuous <Continuous>  dextrose 5%. 1000 milliLiter(s) (100 mL/Hr) IV Continuous <Continuous>  dextrose 5%. 1000 milliLiter(s) (50 mL/Hr) IV Continuous <Continuous>  dextrose 50% Injectable 25 Gram(s) IV Push once  dextrose 50% Injectable 12.5 Gram(s) IV Push once  dextrose 50% Injectable 25 Gram(s) IV Push once  erythromycin   Ointment 1 Application(s) Both EYES two times a day  folic acid 1 milliGRAM(s) Oral daily  glucagon  Injectable 1 milliGRAM(s) IntraMuscular once  insulin lispro (ADMELOG) corrective regimen sliding scale   SubCutaneous three times a day before meals  lactobacillus acidophilus 1 Tablet(s) Oral every 12 hours  mirtazapine 30 milliGRAM(s) Oral at bedtime  pantoprazole    Tablet 40 milliGRAM(s) Oral daily  rifAXIMin 550 milliGRAM(s) Oral two times a day  senna 2 Tablet(s) Oral at bedtime  spironolactone 100 milliGRAM(s) Oral daily    MEDICATIONS  (PRN):  acetaminophen     Tablet .. 650 milliGRAM(s) Oral every 6 hours PRN Temp greater or equal to 38C (100.4F), Mild Pain (1 - 3)  aluminum hydroxide/magnesium hydroxide/simethicone Suspension 30 milliLiter(s) Oral every 4 hours PRN Dyspepsia  dextrose Oral Gel 15 Gram(s) Oral once PRN Blood Glucose LESS THAN 70 milliGRAM(s)/deciliter  magnesium hydroxide Suspension 30 milliLiter(s) Oral daily PRN Constipation  melatonin 3 milliGRAM(s) Oral at bedtime PRN Insomnia  midodrine. 5 milliGRAM(s) Oral three times a day PRN SBP below 90  ondansetron Injectable 4 milliGRAM(s) IV Push every 8 hours PRN Nausea and/or Vomiting  traMADol 50 milliGRAM(s) Oral three times a day PRN Moderate Pain (4 - 6)      REVIEW OF SYSTEMS:  CONSTITUTIONAL: No fever,  RESPIRATORY: No cough, wheezing, shortness of breath  CARDIOVASCULAR: No chest pain, dyspnea, palpitations, dizziness, syncope, paroxysmal nocturnal dyspnea, orthopnea, or arm or leg swelling  GASTROINTESTINAL: No abdominal  or epigastric pain, nausea, vomiting,  diarrhea  NEUROLOGICAL: No headaches,  numbness, or tremors  Skin : No itching   Hematology : No active bleeding .  Endocrinology : No heat and cold intolerance   Psychiatry : Patient is confused but calm   Genitourinary : No dysuria   Musculoskeletal : patient has mild arthritis .    Vital Signs Last 24 Hrs  T(C): 36.6 (26 Dec 2023 00:31), Max: 36.9 (25 Dec 2023 05:04)  T(F): 97.9 (26 Dec 2023 00:31), Max: 98.4 (25 Dec 2023 05:04)  HR: 101 (26 Dec 2023 00:31) (88 - 104)  BP: 103/67 (26 Dec 2023 00:31) (103/67 - 103/67)  BP(mean): --  RR: 18 (26 Dec 2023 00:31) (18 - 19)  SpO2: 96% (26 Dec 2023 00:31) (96% - 98%)    Parameters below as of 26 Dec 2023 00:31  Patient On (Oxygen Delivery Method): room air        PHYSICAL EXAM:  HEAD:  Atraumatic, Normocephalic  NECK: Supple and normal thyroid.  No JVD or carotid bruit.   HEART: S1, S2 regular , 1/6 soft ejection systolic murmur in mitral area , no thrill and no gallops .  PULMONARY: Bilateral vesicular breathing , few scattered rhonchi and few scattered rales are present .  ABDOMEN: Soft nontender and positive bowl sounds   EXTREMITIES:  No clubbing, cyanosis, or pedal  edema  NEUROLOGICAL: AA and confused    Skin : Patient has purpuric rashes on lower extremities and better   Musculoskeletal : No joint swellings .    LABS:                        7.8    7.73  )-----------( 339      ( 25 Dec 2023 07:12 )             24.1     12-25    140  |  110<H>  |  9   ----------------------------<  106<H>  3.2<L>   |  22  |  0.58    Ca    8.0<L>      25 Dec 2023 07:12    TPro  5.1<L>  /  Alb  2.2<L>  /  TBili  0.4  /  DBili  x   /  AST  21  /  ALT  17  /  AlkPhos  100  12-25        PT/INR - ( 25 Dec 2023 07:12 )   PT: 18.9 sec;   INR: 1.64 ratio           Urinalysis Basic - ( 25 Dec 2023 07:12 )    Color: x / Appearance: x / SG: x / pH: x  Gluc: 106 mg/dL / Ketone: x  / Bili: x / Urobili: x   Blood: x / Protein: x / Nitrite: x   Leuk Esterase: x / RBC: x / WBC x   Sq Epi: x / Non Sq Epi: x / Bacteria: x      Assessment/Plan  58-year-old female who presents to the emergency room with multiple medical issues.  Past medical history of CAD diabetes insomnia major depressive disorder GERD hypertension irritable bowel syndrome with diarrhea cirrhosis of the liver nonalcoholic fatty liver hepatic encephalopathy bipolar disorder chronic A-fib on Coumadin blepharitis of the left eye.  It appears the patient was started on erythromycin ointment to the left eye on 1218.  Per long term signout patient was transferred to [place] for reported hypotension and tachycardia diffuse purpura.  Patient had a temperature of 99.3 with a heart rate of 116 and a blood pressure of 88/60.  Patient was started on I/V antibiotics in ER and also getting I/V fluids and BP is better . Patient has paroxysmal atrial fibrillation and tachycardia due to underlying sepsis . Will Monitor electrolytes and PT and INR and also hemoglobin . Patient has chronic anemia . Patient prognosis is guarded . Will resume coumadin when INR is less than 3 .  Patient has :  1) Periorbital cellulitis right eye more than left and better .  2) Paroxysmal atrial fibrillation and stable   3) H/O cirrhosis of Liver   4) S/P hypotension   5) H/O CAD . LV EF normal on echocardiogram .  6) H/O DM   7) Anxiety and depression   8) Chronic anemia   Plan : 1) Encourage PO intake 2) Monitor hemoglobin and electrolytes 3) Prognosis guarded 4) I/V antibiotics as per ID 5) Rest of medications as such . 6) Monitor PT and INR   Will continue to follow during hospital course and give further recommendations as needed . Thanks for your referral . if any questions please contact me at office ([phone] cell [phone])      CAIT BERTRAND MD [place] [address]  [address]  OFFICE : [phone]  CELL : [phone]  CARDIOLOGY F/U :       HPI:   Patient is a 58-year-old female  ,[facility] resident who presents to the emergency room with multiple medical issues.  Past medical history of CAD diabetes insomnia major depressive disorder GERD hypertension irritable bowel syndrome with diarrhea cirrhosis of the liver nonalcoholic fatty liver hepatic encephalopathy bipolar disorder chronic A-fib on Coumadin blepharitis of the left eye.    Per PCP signout patient was transferred to [place] for reported hypotension and tachycardia diffuse purpura.  Patient had a temperature of 99.3 with a heart rate of 116 and a blood pressure of 88/60.  Patient was FEBRILE later in ER with TEMP 101 , septic workup sent ,  started on iv abx and fluids .CT abd showed pancolitis , seen by GI and ID . Found to have cellulitis of lids b/l R>L  (22 Dec 2023 10:37)        SUBJECTIVE: Patient feeling better .      ALLERGIES:  Allergies    No Known Allergies    Intolerances          MEDICATIONS  (STANDING):  dextrose 5% + sodium chloride 0.45%. 1000 milliLiter(s) (100 mL/Hr) IV Continuous <Continuous>  dextrose 5%. 1000 milliLiter(s) (100 mL/Hr) IV Continuous <Continuous>  dextrose 5%. 1000 milliLiter(s) (50 mL/Hr) IV Continuous <Continuous>  dextrose 50% Injectable 25 Gram(s) IV Push once  dextrose 50% Injectable 12.5 Gram(s) IV Push once  dextrose 50% Injectable 25 Gram(s) IV Push once  erythromycin   Ointment 1 Application(s) Both EYES two times a day  folic acid 1 milliGRAM(s) Oral daily  glucagon  Injectable 1 milliGRAM(s) IntraMuscular once  insulin lispro (ADMELOG) corrective regimen sliding scale   SubCutaneous three times a day before meals  lactobacillus acidophilus 1 Tablet(s) Oral every 12 hours  mirtazapine 30 milliGRAM(s) Oral at bedtime  pantoprazole    Tablet 40 milliGRAM(s) Oral daily  rifAXIMin 550 milliGRAM(s) Oral two times a day  senna 2 Tablet(s) Oral at bedtime  spironolactone 100 milliGRAM(s) Oral daily    MEDICATIONS  (PRN):  acetaminophen     Tablet .. 650 milliGRAM(s) Oral every 6 hours PRN Temp greater or equal to 38C (100.4F), Mild Pain (1 - 3)  aluminum hydroxide/magnesium hydroxide/simethicone Suspension 30 milliLiter(s) Oral every 4 hours PRN Dyspepsia  dextrose Oral Gel 15 Gram(s) Oral once PRN Blood Glucose LESS THAN 70 milliGRAM(s)/deciliter  magnesium hydroxide Suspension 30 milliLiter(s) Oral daily PRN Constipation  melatonin 3 milliGRAM(s) Oral at bedtime PRN Insomnia  midodrine. 5 milliGRAM(s) Oral three times a day PRN SBP below 90  ondansetron Injectable 4 milliGRAM(s) IV Push every 8 hours PRN Nausea and/or Vomiting  traMADol 50 milliGRAM(s) Oral three times a day PRN Moderate Pain (4 - 6)      REVIEW OF SYSTEMS:  CONSTITUTIONAL: No fever,  RESPIRATORY: No cough, wheezing, shortness of breath  CARDIOVASCULAR: No chest pain, dyspnea, palpitations, dizziness, syncope, paroxysmal nocturnal dyspnea, orthopnea, or arm or leg swelling  GASTROINTESTINAL: No abdominal  or epigastric pain, nausea, vomiting,  diarrhea  NEUROLOGICAL: No headaches,  numbness, or tremors  Skin : No itching   Hematology : No active bleeding .  Endocrinology : No heat and cold intolerance   Psychiatry : Patient is confused but calm   Genitourinary : No dysuria   Musculoskeletal : patient has mild arthritis .    Vital Signs Last 24 Hrs  T(C): 36.6 (26 Dec 2023 00:31), Max: 36.9 (25 Dec 2023 05:04)  T(F): 97.9 (26 Dec 2023 00:31), Max: 98.4 (25 Dec 2023 05:04)  HR: 101 (26 Dec 2023 00:31) (88 - 104)  BP: 103/67 (26 Dec 2023 00:31) (103/67 - 103/67)  BP(mean): --  RR: 18 (26 Dec 2023 00:31) (18 - 19)  SpO2: 96% (26 Dec 2023 00:31) (96% - 98%)    Parameters below as of 26 Dec 2023 00:31  Patient On (Oxygen Delivery Method): room air        PHYSICAL EXAM:  HEAD:  Atraumatic, Normocephalic  NECK: Supple and normal thyroid.  No JVD or carotid bruit.   HEART: S1, S2 regular , 1/6 soft ejection systolic murmur in mitral area , no thrill and no gallops .  PULMONARY: Bilateral vesicular breathing , few scattered rhonchi and few scattered rales are present .  ABDOMEN: Soft nontender and positive bowl sounds   EXTREMITIES:  No clubbing, cyanosis, or pedal  edema  NEUROLOGICAL: AA and confused    Skin : Patient has purpuric rashes on lower extremities and better   Musculoskeletal : No joint swellings .    LABS:                        7.8    7.73  )-----------( 339      ( 25 Dec 2023 07:12 )             24.1     12-25    140  |  110<H>  |  9   ----------------------------<  106<H>  3.2<L>   |  22  |  0.58    Ca    8.0<L>      25 Dec 2023 07:12    TPro  5.1<L>  /  Alb  2.2<L>  /  TBili  0.4  /  DBili  x   /  AST  21  /  ALT  17  /  AlkPhos  100  12-25        PT/INR - ( 25 Dec 2023 07:12 )   PT: 18.9 sec;   INR: 1.64 ratio           Urinalysis Basic - ( 25 Dec 2023 07:12 )    Color: x / Appearance: x / SG: x / pH: x  Gluc: 106 mg/dL / Ketone: x  / Bili: x / Urobili: x   Blood: x / Protein: x / Nitrite: x   Leuk Esterase: x / RBC: x / WBC x   Sq Epi: x / Non Sq Epi: x / Bacteria: x      Assessment/Plan  58-year-old female who presents to the emergency room with multiple medical issues.  Past medical history of CAD diabetes insomnia major depressive disorder GERD hypertension irritable bowel syndrome with diarrhea cirrhosis of the liver nonalcoholic fatty liver hepatic encephalopathy bipolar disorder chronic A-fib on Coumadin blepharitis of the left eye.  It appears the patient was started on erythromycin ointment to the left eye on 1218.  Per residential signout patient was transferred to [place] for reported hypotension and tachycardia diffuse purpura.  Patient had a temperature of 99.3 with a heart rate of 116 and a blood pressure of 88/60.  Patient was started on I/V antibiotics in ER and also getting I/V fluids and BP is better . Patient has paroxysmal atrial fibrillation and tachycardia due to underlying sepsis . Will Monitor electrolytes and PT and INR and also hemoglobin . Patient has chronic anemia . Patient prognosis is guarded . Will resume coumadin when INR is less than 3 .  Patient has :  1) Periorbital cellulitis right eye more than left and better .  2) Paroxysmal atrial fibrillation and stable   3) H/O cirrhosis of Liver   4) S/P hypotension   5) H/O CAD . LV EF normal on echocardiogram .  6) H/O DM   7) Anxiety and depression   8) Chronic anemia   Plan : 1) Encourage PO intake 2) Monitor hemoglobin and electrolytes 3) Prognosis guarded 4) I/V antibiotics as per ID 5) Rest of medications as such . 6) Monitor PT and INR   Will continue to follow during hospital course and give further recommendations as needed . Thanks for your referral . if any questions please contact me at office ([phone] cell [phone])

## 2023-12-25 NOTE — CARE COORDINATION ASSESSMENT. - NSCAREPROVIDERS_GEN_ALL_CORE_FT
CARE PROVIDERS:  Accepting Physician: Zoey Ramírez  Administration: kD Smith  Administration: Didier Shepard  Admitting: Zoey Ramírez  Attending: Zoey Ramírez  Consultant: Kev Franklin  Consultant: Vincenzo Maldonado  Consultant: Eddie Werner  Consultant: Weil, Patricia  Consultant: Claudette Warren  Consultant: Cj Ferrer  Consultant: Hon Raul  Consultant: Gay Tovar  Consultant: Yael Olivera  Consultant: Conchita Castellanos  ED Attending: Kasey Padilla  ED Nurse: Alayna Nieves  Nurse: Umer Dixon  Ordered: Physician, Ordering  Ordered: Doctor, Unknown  Ordered: ADM, User  Ordered: Amarilis Rojo  Ordered: Amarilis Rojo  PCA/Nursing Assistant: Araceli Cervantes  PCA/Nursing Assistant: Shirley Sanchez  Primary Team: Raffaele Ni  Registered Dietitian: Caitlin Gilliam  : Claudia Nunn// Supp. Assoc.: Trina Espinosa   CARE PROVIDERS:  Accepting Physician: Zoey Ramírez  Administration: Dk Smith  Administration: Didier Shepard  Admitting: Zoey Ramírez  Attending: Zoey Ramírez  Consultant: Kev Franklin  Consultant: Vincenzo Maldonado  Consultant: Eddie Werner  Consultant: Weil, Patricia  Consultant: Claudette Warren  Consultant: Cj Ferrer  Consultant: Hon Raul  Consultant: Gay Tovar  Consultant: Yael Olivera  Consultant: Conchita Castellanos  ED Attending: Kasey Padilla  ED Nurse: Alayna Nieves  Nurse: Umer Dixon  Ordered: Physician, Ordering  Ordered: Doctor, Unknown  Ordered: ADM, User  Ordered: Amarilis Rojo  Ordered: Amarilis Rojo  PCA/Nursing Assistant: Araceli Cervantes  PCA/Nursing Assistant: Shirley Sanchez  Primary Team: Raffaele Ni  Registered Dietitian: Caitlin Gilliam  : Claudia Nunn// Supp. Assoc.: Trina Espinosa

## 2023-12-25 NOTE — PROGRESS NOTE ADULT - SUBJECTIVE AND OBJECTIVE BOX
[facility] DIVISION of INFECTIOUS DISEASE  Eddie Werner MD PhD, Alexa Hill MD, Emily Urbano MD, Kirti Garza MD, Brando Arguelles MD  and providing coverage with Brandi Carter MD  Providing Infectious Disease Consultations at [facility], [facility], [facility]'s    Office# [phone] to schedule follow up appointments  Answering Service for urgent calls or New Consults [phone]  Cell# to text for urgent issues Eddie Werner [phone]     infectious diseases progress note:    ANAYA JOINER is a 58y y. o. Female patient    Overnight and events of the last 24hrs reviewed    Allergies    No Known Allergies    Intolerances        ANTIBIOTICS/RELEVANT:  antimicrobials  rifAXIMin 550 milliGRAM(s) Oral two times a day    immunologic:    OTHER:  acetaminophen     Tablet .. 650 milliGRAM(s) Oral every 6 hours PRN  aluminum hydroxide/magnesium hydroxide/simethicone Suspension 30 milliLiter(s) Oral every 4 hours PRN  dextrose 5% + sodium chloride 0.45%. 1000 milliLiter(s) IV Continuous <Continuous>  dextrose 5%. 1000 milliLiter(s) IV Continuous <Continuous>  dextrose 5%. 1000 milliLiter(s) IV Continuous <Continuous>  dextrose 50% Injectable 25 Gram(s) IV Push once  dextrose 50% Injectable 25 Gram(s) IV Push once  dextrose 50% Injectable 12.5 Gram(s) IV Push once  dextrose Oral Gel 15 Gram(s) Oral once PRN  erythromycin   Ointment 1 Application(s) Both EYES two times a day  folic acid 1 milliGRAM(s) Oral daily  glucagon  Injectable 1 milliGRAM(s) IntraMuscular once  insulin lispro (ADMELOG) corrective regimen sliding scale   SubCutaneous three times a day before meals  lactobacillus acidophilus 1 Tablet(s) Oral every 12 hours  magnesium hydroxide Suspension 30 milliLiter(s) Oral daily PRN  melatonin 3 milliGRAM(s) Oral at bedtime PRN  midodrine. 5 milliGRAM(s) Oral three times a day PRN  mirtazapine 30 milliGRAM(s) Oral at bedtime  ondansetron Injectable 4 milliGRAM(s) IV Push every 8 hours PRN  pantoprazole    Tablet 40 milliGRAM(s) Oral daily  potassium chloride    Tablet ER 20 milliEquivalent(s) Oral every 2 hours  senna 2 Tablet(s) Oral at bedtime  spironolactone 100 milliGRAM(s) Oral daily  traMADol 50 milliGRAM(s) Oral three times a day PRN      Objective:  Vital Signs Last 24 Hrs  T(C): 36.7 (25 Dec 2023 12:00), Max: 36.9 (25 Dec 2023 05:04)  T(F): 98 (25 Dec 2023 12:00), Max: 98.4 (25 Dec 2023 05:04)  HR: 88 (25 Dec 2023 12:00) (88 - 104)  BP: 103/67 (25 Dec 2023 05:04) (103/55 - 106/71)  BP(mean): --  RR: 18 (25 Dec 2023 12:00) (18 - 19)  SpO2: 98% (25 Dec 2023 12:00) (96% - 98%)    Parameters below as of 25 Dec 2023 12:00  Patient On (Oxygen Delivery Method): room air        T(C): 36.7 (12-25-23 @ 12:00), Max: 37.4 (12-24-23 @ 06:01)  T(C): 36.7 (12-25-23 @ 12:00), Max: 37.4 (12-24-23 @ 06:01)  T(C): 36.7 (12-25-23 @ 12:00), Max: 38.6 (12-21-23 @ 22:50)    PHYSICAL EXAM:  HEENT: NC atraumatic  Neck: supple  Respiratory: no accessory muscle use, breathing comfortably  Cardiovascular: distant  Gastrointestinal: normal appearing, nondistended  Extremities: no clubbing, no cyanosis,  Skin: improved rash in most places but pt reporting new lesions in 'private' areas, noted hand swelling      LABS:                          7.8    7.73  )-----------( 339      ( 25 Dec 2023 07:12 )             24.1       WBC  7.73 12-25 @ 07:12  7.59 12-23 @ 07:30  12.06 12-21 @ 22:30      12-25    140  |  110<H>  |  9   ----------------------------<  106<H>  3.2<L>   |  22  |  0.58    Ca    8.0<L>      25 Dec 2023 07:12    TPro  5.1<L>  /  Alb  2.2<L>  /  TBili  0.4  /  DBili  x   /  AST  21  /  ALT  17  /  AlkPhos  100  12-25      Creatinine: 0.58 mg/dL (12-25-23 @ 07:12)  Creatinine: 0.61 mg/dL (12-24-23 @ 05:03)  Creatinine: 0.53 mg/dL (12-23-23 @ 07:30)  Creatinine: 0.68 mg/dL (12-21-23 @ 22:30)      PT/INR - ( 25 Dec 2023 07:12 )   PT: 18.9 sec;   INR: 1.64 ratio           Urinalysis Basic - ( 25 Dec 2023 07:12 )    Color: x / Appearance: x / SG: x / pH: x  Gluc: 106 mg/dL / Ketone: x  / Bili: x / Urobili: x   Blood: x / Protein: x / Nitrite: x   Leuk Esterase: x / RBC: x / WBC x   Sq Epi: x / Non Sq Epi: x / Bacteria: x            INFLAMMATORY MARKERS      MICROBIOLOGY:    C Diff by PCR Result: NotDetec (12-24 @ 13:57)            RADIOLOGY & ADDITIONAL STUDIES:   [facility] DIVISION of INFECTIOUS DISEASE  Eddie Werner MD PhD, Alexa Hill MD, Emily Urbano MD, Kirti Garza MD, Brando Arguelles MD  and providing coverage with Brandi Carter MD  Providing Infectious Disease Consultations at [facility], [facility], [facility]'s    Office# [phone] to schedule follow up appointments  Answering Service for urgent calls or New Consults [phone]  Cell# to text for urgent issues Eddie Werner [phone]     infectious diseases progress note:    ANAYA JOINER is a 58y y. o. Female patient    Overnight and events of the last 24hrs reviewed    Allergies    No Known Allergies    Intolerances        ANTIBIOTICS/RELEVANT:  antimicrobials  rifAXIMin 550 milliGRAM(s) Oral two times a day    immunologic:    OTHER:  acetaminophen     Tablet .. 650 milliGRAM(s) Oral every 6 hours PRN  aluminum hydroxide/magnesium hydroxide/simethicone Suspension 30 milliLiter(s) Oral every 4 hours PRN  dextrose 5% + sodium chloride 0.45%. 1000 milliLiter(s) IV Continuous <Continuous>  dextrose 5%. 1000 milliLiter(s) IV Continuous <Continuous>  dextrose 5%. 1000 milliLiter(s) IV Continuous <Continuous>  dextrose 50% Injectable 25 Gram(s) IV Push once  dextrose 50% Injectable 25 Gram(s) IV Push once  dextrose 50% Injectable 12.5 Gram(s) IV Push once  dextrose Oral Gel 15 Gram(s) Oral once PRN  erythromycin   Ointment 1 Application(s) Both EYES two times a day  folic acid 1 milliGRAM(s) Oral daily  glucagon  Injectable 1 milliGRAM(s) IntraMuscular once  insulin lispro (ADMELOG) corrective regimen sliding scale   SubCutaneous three times a day before meals  lactobacillus acidophilus 1 Tablet(s) Oral every 12 hours  magnesium hydroxide Suspension 30 milliLiter(s) Oral daily PRN  melatonin 3 milliGRAM(s) Oral at bedtime PRN  midodrine. 5 milliGRAM(s) Oral three times a day PRN  mirtazapine 30 milliGRAM(s) Oral at bedtime  ondansetron Injectable 4 milliGRAM(s) IV Push every 8 hours PRN  pantoprazole    Tablet 40 milliGRAM(s) Oral daily  potassium chloride    Tablet ER 20 milliEquivalent(s) Oral every 2 hours  senna 2 Tablet(s) Oral at bedtime  spironolactone 100 milliGRAM(s) Oral daily  traMADol 50 milliGRAM(s) Oral three times a day PRN      Objective:  Vital Signs Last 24 Hrs  T(C): 36.7 (25 Dec 2023 12:00), Max: 36.9 (25 Dec 2023 05:04)  T(F): 98 (25 Dec 2023 12:00), Max: 98.4 (25 Dec 2023 05:04)  HR: 88 (25 Dec 2023 12:00) (88 - 104)  BP: 103/67 (25 Dec 2023 05:04) (103/55 - 106/71)  BP(mean): --  RR: 18 (25 Dec 2023 12:00) (18 - 19)  SpO2: 98% (25 Dec 2023 12:00) (96% - 98%)    Parameters below as of 25 Dec 2023 12:00  Patient On (Oxygen Delivery Method): room air        T(C): 36.7 (12-25-23 @ 12:00), Max: 37.4 (12-24-23 @ 06:01)  T(C): 36.7 (12-25-23 @ 12:00), Max: 37.4 (12-24-23 @ 06:01)  T(C): 36.7 (12-25-23 @ 12:00), Max: 38.6 (12-21-23 @ 22:50)    PHYSICAL EXAM:  HEENT: NC atraumatic  Neck: supple  Respiratory: no accessory muscle use, breathing comfortably  Cardiovascular: distant  Gastrointestinal: normal appearing, nondistended  Extremities: no clubbing, no cyanosis,  Skin: improved rash in most places but pt reporting new lesions in 'private' areas, noted hand swelling      LABS:                          7.8    7.73  )-----------( 339      ( 25 Dec 2023 07:12 )             24.1       WBC  7.73 12-25 @ 07:12  7.59 12-23 @ 07:30  12.06 12-21 @ 22:30      12-25    140  |  110<H>  |  9   ----------------------------<  106<H>  3.2<L>   |  22  |  0.58    Ca    8.0<L>      25 Dec 2023 07:12    TPro  5.1<L>  /  Alb  2.2<L>  /  TBili  0.4  /  DBili  x   /  AST  21  /  ALT  17  /  AlkPhos  100  12-25      Creatinine: 0.58 mg/dL (12-25-23 @ 07:12)  Creatinine: 0.61 mg/dL (12-24-23 @ 05:03)  Creatinine: 0.53 mg/dL (12-23-23 @ 07:30)  Creatinine: 0.68 mg/dL (12-21-23 @ 22:30)      PT/INR - ( 25 Dec 2023 07:12 )   PT: 18.9 sec;   INR: 1.64 ratio           Urinalysis Basic - ( 25 Dec 2023 07:12 )    Color: x / Appearance: x / SG: x / pH: x  Gluc: 106 mg/dL / Ketone: x  / Bili: x / Urobili: x   Blood: x / Protein: x / Nitrite: x   Leuk Esterase: x / RBC: x / WBC x   Sq Epi: x / Non Sq Epi: x / Bacteria: x            INFLAMMATORY MARKERS      MICROBIOLOGY:    C Diff by PCR Result: NotDetec (12-24 @ 13:57)            RADIOLOGY & ADDITIONAL STUDIES:

## 2023-12-25 NOTE — PROGRESS NOTE ADULT - SUBJECTIVE AND OBJECTIVE BOX
[INTERVAL HX: ]  Patient seen and examined;  Chart reviewed and events noted;     rash continues to improve.     +diarrhea  Feels weak today  on IVF  Advised to hydrate PO    [MEDICATIONS]  MEDICATIONS  (STANDING):  dextrose 5% + sodium chloride 0.45%. 1000 milliLiter(s) (100 mL/Hr) IV Continuous <Continuous>  dextrose 5%. 1000 milliLiter(s) (50 mL/Hr) IV Continuous <Continuous>  dextrose 5%. 1000 milliLiter(s) (100 mL/Hr) IV Continuous <Continuous>  dextrose 50% Injectable 25 Gram(s) IV Push once  dextrose 50% Injectable 25 Gram(s) IV Push once  dextrose 50% Injectable 12.5 Gram(s) IV Push once  erythromycin   Ointment 1 Application(s) Both EYES two times a day  folic acid 1 milliGRAM(s) Oral daily  glucagon  Injectable 1 milliGRAM(s) IntraMuscular once  insulin lispro (ADMELOG) corrective regimen sliding scale   SubCutaneous three times a day before meals  lactobacillus acidophilus 1 Tablet(s) Oral every 12 hours  mirtazapine 30 milliGRAM(s) Oral at bedtime  pantoprazole    Tablet 40 milliGRAM(s) Oral daily  rifAXIMin 550 milliGRAM(s) Oral two times a day  senna 2 Tablet(s) Oral at bedtime  spironolactone 100 milliGRAM(s) Oral daily    MEDICATIONS  (PRN):  acetaminophen     Tablet .. 650 milliGRAM(s) Oral every 6 hours PRN Temp greater or equal to 38C (100.4F), Mild Pain (1 - 3)  aluminum hydroxide/magnesium hydroxide/simethicone Suspension 30 milliLiter(s) Oral every 4 hours PRN Dyspepsia  dextrose Oral Gel 15 Gram(s) Oral once PRN Blood Glucose LESS THAN 70 milliGRAM(s)/deciliter  magnesium hydroxide Suspension 30 milliLiter(s) Oral daily PRN Constipation  melatonin 3 milliGRAM(s) Oral at bedtime PRN Insomnia  midodrine. 5 milliGRAM(s) Oral three times a day PRN SBP below 90  ondansetron Injectable 4 milliGRAM(s) IV Push every 8 hours PRN Nausea and/or Vomiting  traMADol 50 milliGRAM(s) Oral three times a day PRN Moderate Pain (4 - 6)      [VITALS]  Vital Signs Last 24 Hrs  T(C): 36.7 (25 Dec 2023 12:00), Max: 36.9 (25 Dec 2023 05:04)  T(F): 98 (25 Dec 2023 12:00), Max: 98.4 (25 Dec 2023 05:04)  HR: 88 (25 Dec 2023 12:00) (88 - 104)  BP: 103/67 (25 Dec 2023 05:04) (103/67 - 106/71)  BP(mean): --  RR: 18 (25 Dec 2023 12:00) (18 - 19)  SpO2: 98% (25 Dec 2023 12:00) (97% - 98%)    Parameters below as of 25 Dec 2023 12:00  Patient On (Oxygen Delivery Method): room air      [WT/HT]  Daily     Daily Weight in k.7 (25 Dec 2023 05:04)  [VENT]      [PHYSICAL EXAM]  GEN: NAD  HEENT: normocephalic and atraumatic. EOMI. PERRL.    NECK: Supple.  No lymphadenopathy   LUNGS: Clear to auscultation.  HEART: Regular rate and rhythm,  no MRG  ABDOMEN: Soft, nontender, and nondistended.  Positive bowel sounds.    : No CVA tenderness  EXTREMITIES: Without edema.  NEUROLOGIC: grossly intact.  PSYCHIATRIC: Appropriate affect .  SKIN: No rash     [LABS:]                        7.8    7.73  )-----------( 339      ( 25 Dec 2023 07:12 )             24.1     12-25    140  |  110<H>  |  9   ----------------------------<  106<H>  3.2<L>   |  22  |  0.58    Ca    8.0<L>      25 Dec 2023 07:12    TPro  5.1<L>  /  Alb  2.2<L>  /  TBili  0.4  /  DBili  x   /  AST  21  /  ALT  17  /  AlkPhos  100  12-25    PT/INR - ( 25 Dec 2023 07:12 )   PT: 18.9 sec;   INR: 1.64 ratio               Folate, Serum: >20.0 ng/mL (23 @ 07:30)    Vitamin B12, Serum: 856 pg/mL [232 - 1245] (23 @ 07:30)    Sedimentation Rate, Erythrocyte: 51 mm/hr *H* [0 - 20] (- @ 22:30)      Urinalysis Basic - ( 25 Dec 2023 07:12 )    Color: x / Appearance: x / SG: x / pH: x  Gluc: 106 mg/dL / Ketone: x  / Bili: x / Urobili: x   Blood: x / Protein: x / Nitrite: x   Leuk Esterase: x / RBC: x / WBC x   Sq Epi: x / Non Sq Epi: x / Bacteria: x        Culture - Urine (collected 23 Dec 2023 06:30)  Source: Clean Catch Clean Catch (Midstream)  Final Report (24 Dec 2023 10:27):    No growth    Babesia microti PCR, Bld (collected 22 Dec 2023 23:30)      SARS-CoV-2: NotDetec (22 Dec 2023 00:55)        Culture - Urine (collected 23 Dec 2023 06:30)  Source: Clean Catch Clean Catch (Midstream)  Final Report (24 Dec 2023 10:27):    No growth    Babesia microti PCR, Bld (collected 22 Dec 2023 23:30)        [RADIOLOGY STUDIES:]

## 2023-12-25 NOTE — PROGRESS NOTE ADULT - ASSESSMENT
[ASSESSMENT and  PLAN]  D63. 8    Anemia chronic disease  R70. 0    elevated elevated ESR  R79. 82  elevated CRP  K51. 90  hx ulcerative colitis  L51. 9    rash    58-year-old female ,[facility] resident who presents to the emergency room with multiple medical issues.  Past medical history of CAD diabetes insomnia major depressive disorder GERD hypertension irritable bowel syndrome with diarrhea cirrhosis of the liver nonalcoholic fatty liver hepatic encephalopathy bipolar disorder chronic A-fib on Coumadin blepharitis of the left eye.    Per PCP signout patient was transferred to [place] for reported hypotension and tachycardia diffuse purpura.  Patient had a temperature of 99.3 with a heart rate of 116 and a blood pressure of 88/60.  Patient was FEBRILE later in ER with TEMP 101 , septic workup sent ,  started on iv abx and fluids .CT abd showed pancolitis , seen by GI and ID . Found to have cellulitis of lids b/l R>L       ? inflammatory rash due to ? Erythema multiforme or vasculitis, or dermatomyositis or MCTD  Bl Eyelid redness appear oddly semi-symmetric. R>L.   Similarly with BL periungual reddish to slight purple discoloration of hands, without nail involvement, and to some extent toes.   Doubt purpura  Petechiae not seen.     WBC with mild leukocytosis. Pt non-toxic appearing.   mod anemia, with Hgb 8. likely anemia due to chronic disease  plts normal.     CMV IgG neg  LDH normal  Babesia neg  Lyme neg  RPR neg    ESR 50  [id number]  HSV1 IgG+, HSV2 IgG neg     CATHERINE negative, anti-DS-DNA PENDING     AntiRo negative      Anemia   Hgb 7.8g/dL  FOBT+      RECOMMENDATIONS    rash better post dose Solumedrol    Follow CBC  No indication for transfusion currently.   Transfuse PRBC as clinically indicated.   Transfuse PRBC if Hgb <7.0 or if symptomatic.     Follow up Anemia studies.      Ferritin, Iron studies     B12 856, Folate >20     ESR 51,      Await rheum studies     CATHERINE negative, anti-DS-DNA PENDING     ANCA studies     Aldolase, LDH, CPK    D/w ID    Consider eval with opth and derm as unusual constellation of sx,  Consider rheum eval   GI evaluating for inflammatory bowel disease. and FOBT+  HIGH MELD score. CT Scan liver WNL    DVT Prophylaxis  SQ Lovenox or SQ heparin    Discussed plan of care with patient and in detail.   Pt/Family expressed understanding of the treatment plan.   Opportunity given for questions and discussion.   Questions or concerns all addressed and answered to their satisfaction, and in lay terms.     Thank you for consulting us.      [ASSESSMENT and  PLAN]  D63. 8    Anemia chronic disease  R70. 0    elevated elevated ESR  R79. 82  elevated CRP  K51. 90  hx ulcerative colitis  L51. 9    rash    58-year-old female ,[facility] resident who presents to the emergency room with multiple medical issues.  Past medical history of CAD diabetes insomnia major depressive disorder GERD hypertension irritable bowel syndrome with diarrhea cirrhosis of the liver nonalcoholic fatty liver hepatic encephalopathy bipolar disorder chronic A-fib on Coumadin blepharitis of the left eye.    Per PCP signout patient was transferred to [place] for reported hypotension and tachycardia diffuse purpura.  Patient had a temperature of 99.3 with a heart rate of 116 and a blood pressure of 88/60.  Patient was FEBRILE later in ER with TEMP 101 , septic workup sent ,  started on iv abx and fluids .CT abd showed pancolitis , seen by GI and ID . Found to have cellulitis of lids b/l R>L       ? inflammatory rash due to ? Erythema multiforme or vasculitis, or dermatomyositis or MCTD  Bl Eyelid redness appear oddly semi-symmetric. R>L.   Similarly with BL periungual reddish to slight purple discoloration of hands, without nail involvement, and to some extent toes.   Doubt purpura  Petechiae not seen.     WBC with mild leukocytosis. Pt non-toxic appearing.   mod anemia, with Hgb 8. likely anemia due to chronic disease  plts normal.     CMV IgG neg  LDH normal  Babesia neg  Lyme neg  RPR neg    ESR 50  [id number]  HSV1 IgG+, HSV2 IgG neg     CATHERINE negative, anti-DS-DNA PENDING     AntiRo negative      Anemia   Hgb 7.8g/dL  FOBT+      RECOMMENDATIONS    rash better post dose Solumedrol    Follow CBC  No indication for transfusion currently.   Transfuse PRBC as clinically indicated.   Transfuse PRBC if Hgb <7.0 or if symptomatic.     Follow up Anemia studies.      Ferritin, Iron studies     B12 856, Folate >20     ESR 51,      Await rheum studies     CATHERINE negative, anti-DS-DNA PENDING     ANCA studies     Aldolase, LDH, CPK    D/w ID    Consider eval with opth and derm as unusual constellation of sx,  Consider rheum eval   GI evaluating for inflammatory bowel disease. and FOBT+  HIGH MELD score. CT Scan liver WNL    DVT Prophylaxis  SQ Lovenox or SQ heparin    Discussed plan of care with patient and in detail.   Pt/Family expressed understanding of the treatment plan.   Opportunity given for questions and discussion.   Questions or concerns all addressed and answered to their satisfaction, and in lay terms.     Thank you for consulting us.

## 2023-12-25 NOTE — PATIENT CHOICE NOTE. - NSPTCHOICENOTES_GEN_ALL_CORE
Pt requested to return to [facility] where she is a [facility] resident. Pt requested to return to [facility] where she is a [facility] resident.

## 2023-12-25 NOTE — PROGRESS NOTE ADULT - SUBJECTIVE AND OBJECTIVE BOX
Asked to see this 59 yo F, P0 LMP "a couple of years ago" for report of possible PVB. Patient reports that she is incontinent of liquid stool. Indicates episode occur and observed discharge smelled like stool. Recent Guiac +. Reports knowing of fibroid, and having biopsy at obn a couple of years ago. Not sure if she was previously taking coumadin or other blood thinners. No h/o nosebleeds or blood when brushing teeth. Denies abnormal paps    PAST MEDICAL & SURGICAL HISTORY:  MDD (major depressive disorder)      GERD (gastroesophageal reflux disease)      Ulcerative colitis      HTN (hypertension)      DM (diabetes mellitus)      Arteriosclerotic heart disease (ASHD)      IBS (irritable bowel syndrome)      History of ataxia      Liver cirrhosis      Nonalcoholic fatty liver disease without nonalcoholic steatohepatitis (PATEL)      Hepatic encephalopathy      Bipolar illness      Chronic atrial fibrillation      Moderate protein-calorie malnutrition      OA (osteoarthritis)      Blepharitis, bilateral      Brain aneurysm      Uterine fibroid      History of cholecystectomy          Medications:   acetaminophen     Tablet .. 650 milliGRAM(s) Oral every 6 hours PRN  aluminum hydroxide/magnesium hydroxide/simethicone Suspension 30 milliLiter(s) Oral every 4 hours PRN  dextrose 5% + sodium chloride 0.45%. 1000 milliLiter(s) IV Continuous <Continuous>  dextrose 5%. 1000 milliLiter(s) IV Continuous <Continuous>  dextrose 5%. 1000 milliLiter(s) IV Continuous <Continuous>  dextrose 50% Injectable 25 Gram(s) IV Push once  dextrose 50% Injectable 12.5 Gram(s) IV Push once  dextrose 50% Injectable 25 Gram(s) IV Push once  dextrose Oral Gel 15 Gram(s) Oral once PRN  erythromycin   Ointment 1 Application(s) Both EYES two times a day  folic acid 1 milliGRAM(s) Oral daily  glucagon  Injectable 1 milliGRAM(s) IntraMuscular once  insulin lispro (ADMELOG) corrective regimen sliding scale   SubCutaneous three times a day before meals  lactobacillus acidophilus 1 Tablet(s) Oral every 12 hours  magnesium hydroxide Suspension 30 milliLiter(s) Oral daily PRN  melatonin 3 milliGRAM(s) Oral at bedtime PRN  midodrine. 5 milliGRAM(s) Oral three times a day PRN  mirtazapine 30 milliGRAM(s) Oral at bedtime  ondansetron Injectable 4 milliGRAM(s) IV Push every 8 hours PRN  pantoprazole    Tablet 40 milliGRAM(s) Oral daily  rifAXIMin 550 milliGRAM(s) Oral two times a day  senna 2 Tablet(s) Oral at bedtime  spironolactone 100 milliGRAM(s) Oral daily  traMADol 50 milliGRAM(s) Oral three times a day PRN      Allergies    No Known Allergies    Intolerances        Social Hx: Lives in [facility]. no local family - family in [place]    FAMILY HISTORY: N/C      Physical exam:   Vital Signs Last 24 Hrs  T(C): 36.7 (25 Dec 2023 12:00), Max: 36.9 (25 Dec 2023 05:04)  T(F): 98 (25 Dec 2023 12:00), Max: 98.4 (25 Dec 2023 05:04)  HR: 88 (25 Dec 2023 12:00) (88 - 104)  BP: 103/67 (25 Dec 2023 05:04) (103/67 - 106/71)  BP(mean): --  RR: 18 (25 Dec 2023 12:00) (18 - 19)  SpO2: 98% (25 Dec 2023 12:00) (97% - 98%)    Parameters below as of 25 Dec 2023 12:00  Patient On (Oxygen Delivery Method): room air      Gen: Alert and oriented  Disposition is depressive. She reluctantly consented to visual exam  Abdomen soft  Vagina: Primacath in place, rectal collection bag  It is not possible to perform an adequate bedside exam    LABS:                        7.8    7.73  )-----------( 339      ( 25 Dec 2023 07:12 )             24.1     12-25    140  |  110<H>  |  9   ----------------------------<  106<H>  3.2<L>   |  22  |  0.58    Ca    8.0<L>      25 Dec 2023 07:12    TPro  5.1<L>  /  Alb  2.2<L>  /  TBili  0.4  /  DBili  x   /  AST  21  /  ALT  17  /  AlkPhos  100  12-25    PT/INR - ( 25 Dec 2023 07:12 )   PT: 18.9 sec;   INR: 1.64 ratio           Urinalysis Basic - ( 25 Dec 2023 07:12 )    Color: x / Appearance: x / SG: x / pH: x  Gluc: 106 mg/dL / Ketone: x  / Bili: x / Urobili: x   Blood: x / Protein: x / Nitrite: x   Leuk Esterase: x / RBC: x / WBC x   Sq Epi: x / Non Sq Epi: x / Bacteria: x            RADIOLOGY & ADDITIONAL STUDIES:  (double click tilde, type "rad", select one and copy results)    A/P: 58y Female with possible postmenopausal bleeding in setting of multiple chronic illnesses  By patient history, episode was due to incontinence of liquid stool which has since proven to be guiac positive  H/o ulcerative colitis    Doubt serious gyn pathology based on history  Recommend pelvic sono to evaluate for EM thickening    **       Asked to see this 59 yo F, P0 LMP "a couple of years ago" for report of possible PVB. Patient reports that she is incontinent of liquid stool. Indicates episode occur and observed discharge smelled like stool. Recent Guiac +. Reports knowing of fibroid, and having biopsy at obn a couple of years ago. Not sure if she was previously taking coumadin or other blood thinners. No h/o nosebleeds or blood when brushing teeth. Denies abnormal paps    PAST MEDICAL & SURGICAL HISTORY:  MDD (major depressive disorder)      GERD (gastroesophageal reflux disease)      Ulcerative colitis      HTN (hypertension)      DM (diabetes mellitus)      Arteriosclerotic heart disease (ASHD)      IBS (irritable bowel syndrome)      History of ataxia      Liver cirrhosis      Nonalcoholic fatty liver disease without nonalcoholic steatohepatitis (PATEL)      Hepatic encephalopathy      Bipolar illness      Chronic atrial fibrillation      Moderate protein-calorie malnutrition      OA (osteoarthritis)      Blepharitis, bilateral      Brain aneurysm      Uterine fibroid      History of cholecystectomy          Medications:   acetaminophen     Tablet .. 650 milliGRAM(s) Oral every 6 hours PRN  aluminum hydroxide/magnesium hydroxide/simethicone Suspension 30 milliLiter(s) Oral every 4 hours PRN  dextrose 5% + sodium chloride 0.45%. 1000 milliLiter(s) IV Continuous <Continuous>  dextrose 5%. 1000 milliLiter(s) IV Continuous <Continuous>  dextrose 5%. 1000 milliLiter(s) IV Continuous <Continuous>  dextrose 50% Injectable 25 Gram(s) IV Push once  dextrose 50% Injectable 12.5 Gram(s) IV Push once  dextrose 50% Injectable 25 Gram(s) IV Push once  dextrose Oral Gel 15 Gram(s) Oral once PRN  erythromycin   Ointment 1 Application(s) Both EYES two times a day  folic acid 1 milliGRAM(s) Oral daily  glucagon  Injectable 1 milliGRAM(s) IntraMuscular once  insulin lispro (ADMELOG) corrective regimen sliding scale   SubCutaneous three times a day before meals  lactobacillus acidophilus 1 Tablet(s) Oral every 12 hours  magnesium hydroxide Suspension 30 milliLiter(s) Oral daily PRN  melatonin 3 milliGRAM(s) Oral at bedtime PRN  midodrine. 5 milliGRAM(s) Oral three times a day PRN  mirtazapine 30 milliGRAM(s) Oral at bedtime  ondansetron Injectable 4 milliGRAM(s) IV Push every 8 hours PRN  pantoprazole    Tablet 40 milliGRAM(s) Oral daily  rifAXIMin 550 milliGRAM(s) Oral two times a day  senna 2 Tablet(s) Oral at bedtime  spironolactone 100 milliGRAM(s) Oral daily  traMADol 50 milliGRAM(s) Oral three times a day PRN      Allergies    No Known Allergies    Intolerances        Social Hx: Lives in [facility]. no local family - family in [place]    FAMILY HISTORY: N/C      Physical exam:   Vital Signs Last 24 Hrs  T(C): 36.7 (25 Dec 2023 12:00), Max: 36.9 (25 Dec 2023 05:04)  T(F): 98 (25 Dec 2023 12:00), Max: 98.4 (25 Dec 2023 05:04)  HR: 88 (25 Dec 2023 12:00) (88 - 104)  BP: 103/67 (25 Dec 2023 05:04) (103/67 - 106/71)  BP(mean): --  RR: 18 (25 Dec 2023 12:00) (18 - 19)  SpO2: 98% (25 Dec 2023 12:00) (97% - 98%)    Parameters below as of 25 Dec 2023 12:00  Patient On (Oxygen Delivery Method): room air      Gen: Alert and oriented  Disposition is depressive. She reluctantly consented to visual exam  Abdomen soft  Vagina: Primacath in place, rectal collection bag  It is not possible to perform an adequate bedside exam    LABS:                        7.8    7.73  )-----------( 339      ( 25 Dec 2023 07:12 )             24.1     12-25    140  |  110<H>  |  9   ----------------------------<  106<H>  3.2<L>   |  22  |  0.58    Ca    8.0<L>      25 Dec 2023 07:12    TPro  5.1<L>  /  Alb  2.2<L>  /  TBili  0.4  /  DBili  x   /  AST  21  /  ALT  17  /  AlkPhos  100  12-25    PT/INR - ( 25 Dec 2023 07:12 )   PT: 18.9 sec;   INR: 1.64 ratio           Urinalysis Basic - ( 25 Dec 2023 07:12 )    Color: x / Appearance: x / SG: x / pH: x  Gluc: 106 mg/dL / Ketone: x  / Bili: x / Urobili: x   Blood: x / Protein: x / Nitrite: x   Leuk Esterase: x / RBC: x / WBC x   Sq Epi: x / Non Sq Epi: x / Bacteria: x            RADIOLOGY & ADDITIONAL STUDIES:  (double click tilde, type "rad", select one and copy results)    A/P: 58y Female with possible postmenopausal bleeding in setting of multiple chronic illnesses  By patient history, episode was due to incontinence of liquid stool which has since proven to be guiac positive  H/o ulcerative colitis    Doubt serious gyn pathology based on history  Recommend pelvic sono to evaluate for EM thickening    **       Asked to see this 59 yo F, P0 LMP "a couple of years ago" for report of possible PVB. Patient reports that she is incontinent of liquid stool. Indicates episode occur and observed discharge smelled like stool. Recent Guiac +. Reports knowing of fibroid, and having biopsy at obn a couple of years ago. Not sure if she was previously taking coumadin or other blood thinners. No h/o nosebleeds or blood when brushing teeth. Denies abnormal paps    PAST MEDICAL & SURGICAL HISTORY:  MDD (major depressive disorder)      GERD (gastroesophageal reflux disease)      Ulcerative colitis      HTN (hypertension)      DM (diabetes mellitus)      Arteriosclerotic heart disease (ASHD)      IBS (irritable bowel syndrome)      History of ataxia      Liver cirrhosis      Nonalcoholic fatty liver disease without nonalcoholic steatohepatitis (PATEL)      Hepatic encephalopathy      Bipolar illness      Chronic atrial fibrillation      Moderate protein-calorie malnutrition      OA (osteoarthritis)      Blepharitis, bilateral      Brain aneurysm      Uterine fibroid      History of cholecystectomy          Medications:   acetaminophen     Tablet .. 650 milliGRAM(s) Oral every 6 hours PRN  aluminum hydroxide/magnesium hydroxide/simethicone Suspension 30 milliLiter(s) Oral every 4 hours PRN  dextrose 5% + sodium chloride 0.45%. 1000 milliLiter(s) IV Continuous <Continuous>  dextrose 5%. 1000 milliLiter(s) IV Continuous <Continuous>  dextrose 5%. 1000 milliLiter(s) IV Continuous <Continuous>  dextrose 50% Injectable 25 Gram(s) IV Push once  dextrose 50% Injectable 12.5 Gram(s) IV Push once  dextrose 50% Injectable 25 Gram(s) IV Push once  dextrose Oral Gel 15 Gram(s) Oral once PRN  erythromycin   Ointment 1 Application(s) Both EYES two times a day  folic acid 1 milliGRAM(s) Oral daily  glucagon  Injectable 1 milliGRAM(s) IntraMuscular once  insulin lispro (ADMELOG) corrective regimen sliding scale   SubCutaneous three times a day before meals  lactobacillus acidophilus 1 Tablet(s) Oral every 12 hours  magnesium hydroxide Suspension 30 milliLiter(s) Oral daily PRN  melatonin 3 milliGRAM(s) Oral at bedtime PRN  midodrine. 5 milliGRAM(s) Oral three times a day PRN  mirtazapine 30 milliGRAM(s) Oral at bedtime  ondansetron Injectable 4 milliGRAM(s) IV Push every 8 hours PRN  pantoprazole    Tablet 40 milliGRAM(s) Oral daily  rifAXIMin 550 milliGRAM(s) Oral two times a day  senna 2 Tablet(s) Oral at bedtime  spironolactone 100 milliGRAM(s) Oral daily  traMADol 50 milliGRAM(s) Oral three times a day PRN      Allergies    No Known Allergies    Intolerances        Social Hx: Lives in [facility]. no local family - family in [place]    FAMILY HISTORY: N/C      Physical exam:   Vital Signs Last 24 Hrs  T(C): 36.7 (25 Dec 2023 12:00), Max: 36.9 (25 Dec 2023 05:04)  T(F): 98 (25 Dec 2023 12:00), Max: 98.4 (25 Dec 2023 05:04)  HR: 88 (25 Dec 2023 12:00) (88 - 104)  BP: 103/67 (25 Dec 2023 05:04) (103/67 - 106/71)  BP(mean): --  RR: 18 (25 Dec 2023 12:00) (18 - 19)  SpO2: 98% (25 Dec 2023 12:00) (97% - 98%)    Parameters below as of 25 Dec 2023 12:00  Patient On (Oxygen Delivery Method): room air      Gen: Alert and oriented  Disposition is depressive. She reluctantly consented to visual exam  Abdomen soft  Vagina: Primacath in place, rectal collection bag  It is not possible to perform an adequate bedside exam    LABS:                        7.8    7.73  )-----------( 339      ( 25 Dec 2023 07:12 )             24.1     12-25    140  |  110<H>  |  9   ----------------------------<  106<H>  3.2<L>   |  22  |  0.58    Ca    8.0<L>      25 Dec 2023 07:12    TPro  5.1<L>  /  Alb  2.2<L>  /  TBili  0.4  /  DBili  x   /  AST  21  /  ALT  17  /  AlkPhos  100  12-25    PT/INR - ( 25 Dec 2023 07:12 )   PT: 18.9 sec;   INR: 1.64 ratio           Urinalysis Basic - ( 25 Dec 2023 07:12 )    Color: x / Appearance: x / SG: x / pH: x  Gluc: 106 mg/dL / Ketone: x  / Bili: x / Urobili: x   Blood: x / Protein: x / Nitrite: x   Leuk Esterase: x / RBC: x / WBC x   Sq Epi: x / Non Sq Epi: x / Bacteria: x            RADIOLOGY & ADDITIONAL STUDIES:  (double click tilde, type "rad", select one and copy results)    A/P: 58y Female with possible postmenopausal bleeding in setting of multiple chronic illnesses  Cirrhosis with elevated PT, likely coagulopathy  By patient history, episode was due to incontinence of liquid stool which has since proven to be guiac positive  H/o ulcerative colitis    Doubt serious gyn pathology based on history  Recommend pelvic sono to evaluate for EM thickening    **       Asked to see this 57 yo F, P0 LMP "a couple of years ago" for report of possible PVB. Patient reports that she is incontinent of liquid stool. Indicates episode occur and observed discharge smelled like stool. Recent Guiac +. Reports knowing of fibroid, and having biopsy at obn a couple of years ago. Not sure if she was previously taking coumadin or other blood thinners. No h/o nosebleeds or blood when brushing teeth. Denies abnormal paps    PAST MEDICAL & SURGICAL HISTORY:  MDD (major depressive disorder)      GERD (gastroesophageal reflux disease)      Ulcerative colitis      HTN (hypertension)      DM (diabetes mellitus)      Arteriosclerotic heart disease (ASHD)      IBS (irritable bowel syndrome)      History of ataxia      Liver cirrhosis      Nonalcoholic fatty liver disease without nonalcoholic steatohepatitis (PATEL)      Hepatic encephalopathy      Bipolar illness      Chronic atrial fibrillation      Moderate protein-calorie malnutrition      OA (osteoarthritis)      Blepharitis, bilateral      Brain aneurysm      Uterine fibroid      History of cholecystectomy          Medications:   acetaminophen     Tablet .. 650 milliGRAM(s) Oral every 6 hours PRN  aluminum hydroxide/magnesium hydroxide/simethicone Suspension 30 milliLiter(s) Oral every 4 hours PRN  dextrose 5% + sodium chloride 0.45%. 1000 milliLiter(s) IV Continuous <Continuous>  dextrose 5%. 1000 milliLiter(s) IV Continuous <Continuous>  dextrose 5%. 1000 milliLiter(s) IV Continuous <Continuous>  dextrose 50% Injectable 25 Gram(s) IV Push once  dextrose 50% Injectable 12.5 Gram(s) IV Push once  dextrose 50% Injectable 25 Gram(s) IV Push once  dextrose Oral Gel 15 Gram(s) Oral once PRN  erythromycin   Ointment 1 Application(s) Both EYES two times a day  folic acid 1 milliGRAM(s) Oral daily  glucagon  Injectable 1 milliGRAM(s) IntraMuscular once  insulin lispro (ADMELOG) corrective regimen sliding scale   SubCutaneous three times a day before meals  lactobacillus acidophilus 1 Tablet(s) Oral every 12 hours  magnesium hydroxide Suspension 30 milliLiter(s) Oral daily PRN  melatonin 3 milliGRAM(s) Oral at bedtime PRN  midodrine. 5 milliGRAM(s) Oral three times a day PRN  mirtazapine 30 milliGRAM(s) Oral at bedtime  ondansetron Injectable 4 milliGRAM(s) IV Push every 8 hours PRN  pantoprazole    Tablet 40 milliGRAM(s) Oral daily  rifAXIMin 550 milliGRAM(s) Oral two times a day  senna 2 Tablet(s) Oral at bedtime  spironolactone 100 milliGRAM(s) Oral daily  traMADol 50 milliGRAM(s) Oral three times a day PRN      Allergies    No Known Allergies    Intolerances        Social Hx: Lives in [facility]. no local family - family in [place]    FAMILY HISTORY: N/C      Physical exam:   Vital Signs Last 24 Hrs  T(C): 36.7 (25 Dec 2023 12:00), Max: 36.9 (25 Dec 2023 05:04)  T(F): 98 (25 Dec 2023 12:00), Max: 98.4 (25 Dec 2023 05:04)  HR: 88 (25 Dec 2023 12:00) (88 - 104)  BP: 103/67 (25 Dec 2023 05:04) (103/67 - 106/71)  BP(mean): --  RR: 18 (25 Dec 2023 12:00) (18 - 19)  SpO2: 98% (25 Dec 2023 12:00) (97% - 98%)    Parameters below as of 25 Dec 2023 12:00  Patient On (Oxygen Delivery Method): room air      Gen: Alert and oriented  Disposition is depressive. She reluctantly consented to visual exam  Abdomen soft  Vagina: Primacath in place, rectal collection bag  It is not possible to perform an adequate bedside exam    LABS:                        7.8    7.73  )-----------( 339      ( 25 Dec 2023 07:12 )             24.1     12-25    140  |  110<H>  |  9   ----------------------------<  106<H>  3.2<L>   |  22  |  0.58    Ca    8.0<L>      25 Dec 2023 07:12    TPro  5.1<L>  /  Alb  2.2<L>  /  TBili  0.4  /  DBili  x   /  AST  21  /  ALT  17  /  AlkPhos  100  12-25    PT/INR - ( 25 Dec 2023 07:12 )   PT: 18.9 sec;   INR: 1.64 ratio           Urinalysis Basic - ( 25 Dec 2023 07:12 )    Color: x / Appearance: x / SG: x / pH: x  Gluc: 106 mg/dL / Ketone: x  / Bili: x / Urobili: x   Blood: x / Protein: x / Nitrite: x   Leuk Esterase: x / RBC: x / WBC x   Sq Epi: x / Non Sq Epi: x / Bacteria: x            RADIOLOGY & ADDITIONAL STUDIES:  (double click tilde, type "rad", select one and copy results)    A/P: 58y Female with possible postmenopausal bleeding in setting of multiple chronic illnesses  Cirrhosis with elevated PT, likely coagulopathy  By patient history, episode was due to incontinence of liquid stool which has since proven to be guiac positive  H/o ulcerative colitis    Doubt serious gyn pathology based on history  Recommend pelvic sono to evaluate for EM thickening    **

## 2023-12-25 NOTE — PROGRESS NOTE ADULT - ASSESSMENT
REASON FOR VISIT  .. Management of problems listed below        REVIEW OF SYMPTOMS   Able to give ROS  Yes     RELIABILITY +/-   CONSTITUTIONAL Weakness Yes    ENDOCRINE  No heat or cold intolerance    ALLERGY No hives  Sore throat No stridor  RESP Shortness of breath YES   NEURO New weakness No   CARDIAC   Palpitations No         PHYSICAL EXAM    HEENT Unremarkable  atraumatic   RESP Fair air entry  Harsh breath sound   CARDIAC S1 S2 No S3     NO JVD    ABDOMEN No hepatosplenomegaly   PEDAL EDEMA present No calf tenderness  NO rash     GENERAL DATA .   GOC.  ..  12/22/2023 full code   ICU STAY.  ..   COVID. .. scv2 12/22/2023 (-)       BEST PRACTICE ISSUES.    HOB ELEVATN.  .. Yes  DVT PPLX.  ..   12/25/2023 Vaginal bleed so coumadin deferred         DIET.   ..  12/22/2023 soft bite size   IV fl. .. 12/22/2023 d5 1/2 100   BOLUS. ..      STRESS ULCER PPLX.   .  ..   12/22/2023 protonix 40   INFECTION PPLX.   ..       SPEECH SWALLOW RECOMMENDATIONS.       ALLGY. ..    nka   WT. ..  12/22/2023 63  BMI. .. 12/22/2023 44      ABGS.   .  VS/ PO/IO/ VENT/ DRIPS.  12/25/2023 afeb 100 100/60   12/25/2023 ra 97%     SUMMARY.  . 12/22/2023  59 y/o F w/ pmh of cad, dm, mdd, gerd, IBS, cirrhosis 2/2 to nonalcoholic fatty liver, bipolar, afib on coumadin, today presented to [facility] for hypotension and tachycardia with new diffuse purpura to the LE ongoing the last few days and today morning waking up by R>L orbital swelling. In the ED pt was worked up and was found to have a INR of 3.83, diffuse LE ecchymosis and R>L orbital swelling and CTH finding of 8mm aneurysm. ED team requested ICU consult given pt was complaining of UE weakness for vasculitis concern  . 12/22/2023   It appears the patient was started on erythromycin ointment to the left eye on 1218.  . 12/22/2023 Pulm consulktd     . PMHx . cad, dm, mdd, gerd, IBS, cirrhosis 2/2 to nonalcoholic fatty liver, bipolar, afib on coumadin,    PROBLEM/ASSESMENT/PLAN.  . SEPSIS  . PREORBITAL CELLULITIS  . PANCOLITIS   .. 12/25/2023 Cellulitis orbits continues to improve   .. 12/23/2023 Cellulitis much improved   .. w 12/22-12/23-12/25/2023 w 12- 7.5 - 7.7  .. CT CHEST ABD 12/22/2023  .... Mild pancolitis Dependent atelectasis   .. RVP 12/22/2023 RVP (-)   .. 12/22-12/24/2023 Rocephin Dr EMELINA Urbano   .. 12/22/2023 Flagul dced  .. 12/22 rifaximin Dr Ramírez   .. 12/22 emycin oitment eyes Dr Ramírez     . CAD   .. CK 12/22/2023 CK 40     . A fib ON COUMADIN PMH  .. inr 12/23/2023 inr 3.5  .. coumadin held as purpuric rash     . CHF  .. 12/22 spironolacton 100     . ANEMIA   .. Hb 12/22-12/23-12/25/2023 Hb 8.2 - 7.2 - 7.8   .. monitor    . PURPURA  .. Plt 12/22/2023 plt 399   .. INR 12/22-12/25/2023 INR 3.8 - 1.6      . DIARRHEA   .. 12/24 C diff (-)   .. 12/24 GI PCR (-)     RENAL   .. Na 12/22/2023 Na 136  .. CO2 12/22/2023 CO2 24  .. Cr 12/22/2023 Cr .6     . RO CVA   .. CT h 12/22/2023   .... chr l post frontal infacrt     . RO VTE   .. V duplx 12/22/2023 (-)     . ORBITAL CELLULITIS  .. CT ORBITS 12/22/2023   .... r periorbital and r facial soft tissue swelling  .... mild l periorbital soft tissue swelling   .... patchy paranasal sinus mucosal thickening and opacn charissa ethmoid air cells   .... l laterally projecting 8 x 6 x 6 mm aneurysm betw takeoff of l sup cerebellar artery and l post cerebral artery   .... NO POST SEPTAL/ORBITAL CELLULITIS   .. 12/25/2023 Considerably improved     . BRAIN ANEURYSM  .. CT ORBITS 12/22/2023   .... l laterally projecting 8 x 6 x 6 mm aneurysm betw takeoff of l sup cerebellar artery and l post cerebral artery   .... NS CONSULT IS RECOMMENDED   .. CTA Head 12/23/2023  .... no sah  .... l sup cerebellar artery aneurusm 7.7 mm with neck 5.6 mm   .... small l M1 aneurysm at origin of a lenticulostriate vessel 2.7 mm  .. 12/23/2023 3:32 PM called and informed Dr Tovar   .. NS was called in ER at time of admission and no immediate intervention was recommendedby NS   .. awaits transfer     . SKIN RASH   .... Palpable purpura lower extremities   .... BL purple discoloration of eyelids with local swelling   .. 12/23/2023 rash seems to be dissipating   .. DD includes vasculitis eg Henoch Schonlein Severe infection eg meningococcal, Staph Strep RMSF (but no ho travel [place])   .. AEC 12/23 AEC 50  . AMM 12/23/2023 AMM 22   .. LUME 12/22 (-)   .. CMV 12/22 (-)   .. HSV IGG (+)   .. BABESIA PCR 12/22 (-)   .. 12/25/2023 skin rash is disspiating     . VAGINAL BLEED 12/25/2023   .. 12/25/2023 4:17 PM Message left with Gyne on call Dr Rivera     OVERALL  . 59 y/o F w/ pmh of cad, dm, mdd, gerd, IBS, cirrhosis 2/2 to nonalcoholic fatty liver, bipolar, afib on coumadin admitted 12/22/2023 with orbital cellulitis and rash extremities loked like palpable purpura  incidental finding of brain aneurysm   . CENTRIPETAL SKIN RASH (More in periphery)  ID Hemat eval were called No Rheum available so transfer was requested 12/22 to tertiary facility   . PRE ORBITAL CELLULITIS ID on case 12/22/2023  Given rocephin 12/22-12/24/2023 abio dced by ID Improvd   . PANCOLITIS 12/22/2023 CT 12/22/2023 ID on case Given flagyl 12/22-12/23/2023  Seen by GI   . A fib ON COUMADIN PMH Coumadin deferred because of bleed (vaginal)   . ANEMIA 12/22/2023  hemat on case    . BRAIN ANEURYSM 12/22/2023  Transfer requested 12/22 to get NS eval although case was dw NS by ER MD at admission and no urgent intervention was recommended by NS   . DIARRHEA 12/24/2023 C diff GI PCR were (-)   . VAGINAL BLEED ON 12/25/2023 Gyne consult requested case dw Dr Rivera [phone]    . TRANSFER   .. 12/22/2023 [id number]   .. 12/22/2023 TRANSFER NEEDED AS WE DO NOIT HAVE RHEUMATOLOGY DERMATOLOGY OR OPHTHALMOLOGY AVAILABLE AT [facility] AND THESE CONSULTANTS ARE NEEDED TO CARE FOR THIS PATIENYT  .. 12/22/2023 RUY ROMAN AT [facility] ACCEPTING MD HE WILL ACCEPT PT TO TELE AND WE WILLPLACE PT ON CARDIAC MONMITOR   .. 12/23/2023 awaits bed in tertiary hosp     TIME SPENT.  . Over 36 minutes aggregate care time spent on encounter; activities included   direct patient care, counseling and/or coordinating care reviewing notes, lab data/ imaging , discussion with multidisciplinary team/ patient  /family and explaining in detail risks, benefits, alternatives  of the recommendations     PATIENT.  . SPECCHIO      REASON FOR VISIT  .. Management of problems listed below        REVIEW OF SYMPTOMS   Able to give ROS  Yes     RELIABILITY +/-   CONSTITUTIONAL Weakness Yes    ENDOCRINE  No heat or cold intolerance    ALLERGY No hives  Sore throat No stridor  RESP Shortness of breath YES   NEURO New weakness No   CARDIAC   Palpitations No         PHYSICAL EXAM    HEENT Unremarkable  atraumatic   RESP Fair air entry  Harsh breath sound   CARDIAC S1 S2 No S3     NO JVD    ABDOMEN No hepatosplenomegaly   PEDAL EDEMA present No calf tenderness  NO rash     GENERAL DATA .   GOC.  ..  12/22/2023 full code   ICU STAY.  ..   COVID. .. scv2 12/22/2023 (-)       BEST PRACTICE ISSUES.    HOB ELEVATN.  .. Yes  DVT PPLX.  ..   12/25/2023 Vaginal bleed so coumadin deferred         DIET.   ..  12/22/2023 soft bite size   IV fl. .. 12/22/2023 d5 1/2 100   BOLUS. ..      STRESS ULCER PPLX.   .  ..   12/22/2023 protonix 40   INFECTION PPLX.   ..       SPEECH SWALLOW RECOMMENDATIONS.       ALLGY. ..    nka   WT. ..  12/22/2023 63  BMI. .. 12/22/2023 44      ABGS.   .  VS/ PO/IO/ VENT/ DRIPS.  12/25/2023 afeb 100 100/60   12/25/2023 ra 97%     SUMMARY.  . 12/22/2023  59 y/o F w/ pmh of cad, dm, mdd, gerd, IBS, cirrhosis 2/2 to nonalcoholic fatty liver, bipolar, afib on coumadin, today presented to [facility] for hypotension and tachycardia with new diffuse purpura to the LE ongoing the last few days and today morning waking up by R>L orbital swelling. In the ED pt was worked up and was found to have a INR of 3.83, diffuse LE ecchymosis and R>L orbital swelling and CTH finding of 8mm aneurysm. ED team requested ICU consult given pt was complaining of UE weakness for vasculitis concern  . 12/22/2023   It appears the patient was started on erythromycin ointment to the left eye on 1218.  . 12/22/2023 Pulm consulktd     . PMHx . cad, dm, mdd, gerd, IBS, cirrhosis 2/2 to nonalcoholic fatty liver, bipolar, afib on coumadin,    PROBLEM/ASSESMENT/PLAN.  . SEPSIS  . PREORBITAL CELLULITIS  . PANCOLITIS   .. 12/25/2023 Cellulitis orbits continues to improve   .. 12/23/2023 Cellulitis much improved   .. w 12/22-12/23-12/25/2023 w 12- 7.5 - 7.7  .. CT CHEST ABD 12/22/2023  .... Mild pancolitis Dependent atelectasis   .. RVP 12/22/2023 RVP (-)   .. 12/22-12/24/2023 Rocephin Dr EMELINA Urbano   .. 12/22/2023 Flagul dced  .. 12/22 rifaximin Dr Ramírez   .. 12/22 emycin oitment eyes Dr Ramírez     . CAD   .. CK 12/22/2023 CK 40     . A fib ON COUMADIN PMH  .. inr 12/23/2023 inr 3.5  .. coumadin held as purpuric rash     . CHF  .. 12/22 spironolacton 100     . ANEMIA   .. Hb 12/22-12/23-12/25/2023 Hb 8.2 - 7.2 - 7.8   .. monitor    . PURPURA  .. Plt 12/22/2023 plt 399   .. INR 12/22-12/25/2023 INR 3.8 - 1.6      . DIARRHEA   .. 12/24 C diff (-)   .. 12/24 GI PCR (-)     RENAL   .. Na 12/22/2023 Na 136  .. CO2 12/22/2023 CO2 24  .. Cr 12/22/2023 Cr .6     . RO CVA   .. CT h 12/22/2023   .... chr l post frontal infacrt     . RO VTE   .. V duplx 12/22/2023 (-)     . ORBITAL CELLULITIS  .. CT ORBITS 12/22/2023   .... r periorbital and r facial soft tissue swelling  .... mild l periorbital soft tissue swelling   .... patchy paranasal sinus mucosal thickening and opacn charissa ethmoid air cells   .... l laterally projecting 8 x 6 x 6 mm aneurysm betw takeoff of l sup cerebellar artery and l post cerebral artery   .... NO POST SEPTAL/ORBITAL CELLULITIS   .. 12/25/2023 Considerably improved     . BRAIN ANEURYSM  .. CT ORBITS 12/22/2023   .... l laterally projecting 8 x 6 x 6 mm aneurysm betw takeoff of l sup cerebellar artery and l post cerebral artery   .... NS CONSULT IS RECOMMENDED   .. CTA Head 12/23/2023  .... no sah  .... l sup cerebellar artery aneurusm 7.7 mm with neck 5.6 mm   .... small l M1 aneurysm at origin of a lenticulostriate vessel 2.7 mm  .. 12/23/2023 3:32 PM called and informed Dr Tovar   .. NS was called in ER at time of admission and no immediate intervention was recommendedby NS   .. awaits transfer     . SKIN RASH   .... Palpable purpura lower extremities   .... BL purple discoloration of eyelids with local swelling   .. 12/23/2023 rash seems to be dissipating   .. DD includes vasculitis eg Henoch Schonlein Severe infection eg meningococcal, Staph Strep RMSF (but no ho travel [place])   .. AEC 12/23 AEC 50  . AMM 12/23/2023 AMM 22   .. LUME 12/22 (-)   .. CMV 12/22 (-)   .. HSV IGG (+)   .. BABESIA PCR 12/22 (-)   .. 12/25/2023 skin rash is disspiating     . VAGINAL BLEED 12/25/2023   .. 12/25/2023 4:17 PM Message left with Gyne on call Dr Rivera     OVERALL  . 59 y/o F w/ pmh of cad, dm, mdd, gerd, IBS, cirrhosis 2/2 to nonalcoholic fatty liver, bipolar, afib on coumadin admitted 12/22/2023 with orbital cellulitis and rash extremities loked like palpable purpura  incidental finding of brain aneurysm   . CENTRIPETAL SKIN RASH (More in periphery)  ID Hemat eval were called No Rheum available so transfer was requested 12/22 to tertiary facility   . PRE ORBITAL CELLULITIS ID on case 12/22/2023  Given rocephin 12/22-12/24/2023 abio dced by ID Improvd   . PANCOLITIS 12/22/2023 CT 12/22/2023 ID on case Given flagyl 12/22-12/23/2023  Seen by GI   . A fib ON COUMADIN PMH Coumadin deferred because of bleed (vaginal)   . ANEMIA 12/22/2023  hemat on case    . BRAIN ANEURYSM 12/22/2023  Transfer requested 12/22 to get NS eval although case was dw NS by ER MD at admission and no urgent intervention was recommended by NS   . DIARRHEA 12/24/2023 C diff GI PCR were (-)   . VAGINAL BLEED ON 12/25/2023 Gyne consult requested case dw Dr Rivera [phone]    . TRANSFER   .. 12/22/2023 DW HOSPITALIST ON CALLDR [facility] [id number]   .. 12/22/2023 TRANSFER NEEDED AS WE DO NOIT HAVE RHEUMATOLOGY DERMATOLOGY OR OPHTHALMOLOGY AVAILABLE AT [facility] [place] [place] AND THESE CONSULTANTS ARE NEEDED TO CARE FOR THIS PATIENYT  .. 12/22/2023 RUY ROMAN AT [facility] ACCEPTING MD HE WILL ACCEPT PT TO TELE AND WE WILLPLACE PT ON CARDIAC MONMITOR   .. 12/23/2023 awaits bed in tertiary hosp     TIME SPENT.  . Over 36 minutes aggregate care time spent on encounter; activities included   direct patient care, counseling and/or coordinating care reviewing notes, lab data/ imaging , discussion with multidisciplinary team/ patient  /family and explaining in detail risks, benefits, alternatives  of the recommendations     PATIENT.  . SPECCHIO

## 2023-12-25 NOTE — CARE COORDINATION ASSESSMENT. - OTHER PERTINENT DISCHARGE PLANNING INFORMATION:
Pt admitted from [facility] with fever and is CDIFF positive. Pt is a L.T resident at [facility] as of March 2023 and requested to return when stable.    Pt admitted from [facility] with fever and is CDIFF positive. Pt is a L.T resident at [facility] as of March 2023 and requested to return when stable.

## 2023-12-25 NOTE — CARE COORDINATION ASSESSMENT. - LIVING ARRANGEMENTS, PROFILE
AXEL resident at [facility] AXEL resident at [facility]

## 2023-12-26 DIAGNOSIS — D63.8 ANEMIA IN OTHER CHRONIC DISEASES CLASSIFIED ELSEWHERE: ICD-10-CM

## 2023-12-26 DIAGNOSIS — R21 RASH AND OTHER NONSPECIFIC SKIN ERUPTION: ICD-10-CM

## 2023-12-26 LAB
A PHAGOCYTOPH DNA BLD QL NAA+PROBE: NEGATIVE — SIGNIFICANT CHANGE UP
A PHAGOCYTOPH DNA BLD QL NAA+PROBE: NEGATIVE — SIGNIFICANT CHANGE UP
ACE SERPL-CCNC: 18 U/L — SIGNIFICANT CHANGE UP (ref 14–82)
ACE SERPL-CCNC: 18 U/L — SIGNIFICANT CHANGE UP (ref 14–82)
ALBUMIN SERPL ELPH-MCNC: 2.2 G/DL — LOW (ref 3.3–5)
ALBUMIN SERPL ELPH-MCNC: 2.2 G/DL — LOW (ref 3.3–5)
ALDOLASE SERPL-CCNC: 3.6 U/L — SIGNIFICANT CHANGE UP (ref 3.3–10.3)
ALDOLASE SERPL-CCNC: 3.6 U/L — SIGNIFICANT CHANGE UP (ref 3.3–10.3)
ALP SERPL-CCNC: 107 U/L — SIGNIFICANT CHANGE UP (ref 40–120)
ALP SERPL-CCNC: 107 U/L — SIGNIFICANT CHANGE UP (ref 40–120)
ALT FLD-CCNC: 16 U/L — SIGNIFICANT CHANGE UP (ref 12–78)
ALT FLD-CCNC: 16 U/L — SIGNIFICANT CHANGE UP (ref 12–78)
ANION GAP SERPL CALC-SCNC: 6 MMOL/L — SIGNIFICANT CHANGE UP (ref 5–17)
ANION GAP SERPL CALC-SCNC: 6 MMOL/L — SIGNIFICANT CHANGE UP (ref 5–17)
AST SERPL-CCNC: 15 U/L — SIGNIFICANT CHANGE UP (ref 15–37)
AST SERPL-CCNC: 15 U/L — SIGNIFICANT CHANGE UP (ref 15–37)
BILIRUB SERPL-MCNC: 0.3 MG/DL — SIGNIFICANT CHANGE UP (ref 0.2–1.2)
BILIRUB SERPL-MCNC: 0.3 MG/DL — SIGNIFICANT CHANGE UP (ref 0.2–1.2)
BUN SERPL-MCNC: 4 MG/DL — LOW (ref 7–23)
BUN SERPL-MCNC: 4 MG/DL — LOW (ref 7–23)
C4 SERPL-MCNC: 28 MG/DL — SIGNIFICANT CHANGE UP (ref 13–39)
C4 SERPL-MCNC: 28 MG/DL — SIGNIFICANT CHANGE UP (ref 13–39)
CALCIUM SERPL-MCNC: 8.1 MG/DL — LOW (ref 8.5–10.1)
CALCIUM SERPL-MCNC: 8.1 MG/DL — LOW (ref 8.5–10.1)
CHLORIDE SERPL-SCNC: 114 MMOL/L — HIGH (ref 96–108)
CHLORIDE SERPL-SCNC: 114 MMOL/L — HIGH (ref 96–108)
CO2 SERPL-SCNC: 22 MMOL/L — SIGNIFICANT CHANGE UP (ref 22–31)
CO2 SERPL-SCNC: 22 MMOL/L — SIGNIFICANT CHANGE UP (ref 22–31)
CREAT SERPL-MCNC: 0.61 MG/DL — SIGNIFICANT CHANGE UP (ref 0.5–1.3)
CREAT SERPL-MCNC: 0.61 MG/DL — SIGNIFICANT CHANGE UP (ref 0.5–1.3)
DSDNA AB SER-ACNC: <12 IU/ML — SIGNIFICANT CHANGE UP
DSDNA AB SER-ACNC: <12 IU/ML — SIGNIFICANT CHANGE UP
E CHAFFEENSIS DNA BLD QL NAA+PROBE: NEGATIVE — SIGNIFICANT CHANGE UP
E CHAFFEENSIS DNA BLD QL NAA+PROBE: NEGATIVE — SIGNIFICANT CHANGE UP
E EWINGII DNA SPEC QL NAA+PROBE: NEGATIVE — SIGNIFICANT CHANGE UP
E EWINGII DNA SPEC QL NAA+PROBE: NEGATIVE — SIGNIFICANT CHANGE UP
EGFR: 105 ML/MIN/1.73M2 — SIGNIFICANT CHANGE UP
EGFR: 105 ML/MIN/1.73M2 — SIGNIFICANT CHANGE UP
EHRLICHIA DNA SPEC QL NAA+PROBE: NEGATIVE — SIGNIFICANT CHANGE UP
EHRLICHIA DNA SPEC QL NAA+PROBE: NEGATIVE — SIGNIFICANT CHANGE UP
GBM IGG SER-ACNC: <0.2 — SIGNIFICANT CHANGE UP (ref 0–0.9)
GBM IGG SER-ACNC: <0.2 — SIGNIFICANT CHANGE UP (ref 0–0.9)
GI PCR PANEL: SIGNIFICANT CHANGE UP
GI PCR PANEL: SIGNIFICANT CHANGE UP
GLUCOSE SERPL-MCNC: 123 MG/DL — HIGH (ref 70–99)
GLUCOSE SERPL-MCNC: 123 MG/DL — HIGH (ref 70–99)
HCT VFR BLD CALC: 21.4 % — LOW (ref 34.5–45)
HCT VFR BLD CALC: 21.4 % — LOW (ref 34.5–45)
HGB BLD-MCNC: 7.1 G/DL — LOW (ref 11.5–15.5)
HGB BLD-MCNC: 7.1 G/DL — LOW (ref 11.5–15.5)
INR BLD: 1.76 RATIO — HIGH (ref 0.85–1.18)
INR BLD: 1.76 RATIO — HIGH (ref 0.85–1.18)
MCHC RBC-ENTMCNC: 32.1 PG — SIGNIFICANT CHANGE UP (ref 27–34)
MCHC RBC-ENTMCNC: 32.1 PG — SIGNIFICANT CHANGE UP (ref 27–34)
MCHC RBC-ENTMCNC: 33.2 GM/DL — SIGNIFICANT CHANGE UP (ref 32–36)
MCHC RBC-ENTMCNC: 33.2 GM/DL — SIGNIFICANT CHANGE UP (ref 32–36)
MCV RBC AUTO: 96.8 FL — SIGNIFICANT CHANGE UP (ref 80–100)
MCV RBC AUTO: 96.8 FL — SIGNIFICANT CHANGE UP (ref 80–100)
MPO AB + PR3 PNL SER: SIGNIFICANT CHANGE UP
MPO AB + PR3 PNL SER: SIGNIFICANT CHANGE UP
NRBC # BLD: 0 /100 WBCS — SIGNIFICANT CHANGE UP (ref 0–0)
NRBC # BLD: 0 /100 WBCS — SIGNIFICANT CHANGE UP (ref 0–0)
PLATELET # BLD AUTO: 437 K/UL — HIGH (ref 150–400)
PLATELET # BLD AUTO: 437 K/UL — HIGH (ref 150–400)
POTASSIUM SERPL-MCNC: 3.1 MMOL/L — LOW (ref 3.5–5.3)
POTASSIUM SERPL-MCNC: 3.1 MMOL/L — LOW (ref 3.5–5.3)
POTASSIUM SERPL-SCNC: 3.1 MMOL/L — LOW (ref 3.5–5.3)
POTASSIUM SERPL-SCNC: 3.1 MMOL/L — LOW (ref 3.5–5.3)
PROT SERPL-MCNC: 5.3 G/DL — LOW (ref 6–8.3)
PROT SERPL-MCNC: 5.3 G/DL — LOW (ref 6–8.3)
PROTHROM AB SERPL-ACNC: 20.3 SEC — HIGH (ref 9.5–13)
PROTHROM AB SERPL-ACNC: 20.3 SEC — HIGH (ref 9.5–13)
RBC # BLD: 2.21 M/UL — LOW (ref 3.8–5.2)
RBC # BLD: 2.21 M/UL — LOW (ref 3.8–5.2)
RBC # FLD: 13.5 % — SIGNIFICANT CHANGE UP (ref 10.3–14.5)
RBC # FLD: 13.5 % — SIGNIFICANT CHANGE UP (ref 10.3–14.5)
RIBOSOMAL P AB SER-ACNC: <0.2 AI — SIGNIFICANT CHANGE UP
RIBOSOMAL P AB SER-ACNC: <0.2 AI — SIGNIFICANT CHANGE UP
SODIUM SERPL-SCNC: 142 MMOL/L — SIGNIFICANT CHANGE UP (ref 135–145)
SODIUM SERPL-SCNC: 142 MMOL/L — SIGNIFICANT CHANGE UP (ref 135–145)
TOTAL HEM COMP BLD-ACNC: 72 U/ML — SIGNIFICANT CHANGE UP (ref 42–95)
TOTAL HEM COMP BLD-ACNC: 72 U/ML — SIGNIFICANT CHANGE UP (ref 42–95)
WBC # BLD: 9.15 K/UL — SIGNIFICANT CHANGE UP (ref 3.8–10.5)
WBC # BLD: 9.15 K/UL — SIGNIFICANT CHANGE UP (ref 3.8–10.5)
WBC # FLD AUTO: 9.15 K/UL — SIGNIFICANT CHANGE UP (ref 3.8–10.5)
WBC # FLD AUTO: 9.15 K/UL — SIGNIFICANT CHANGE UP (ref 3.8–10.5)

## 2023-12-26 PROCEDURE — 76856 US EXAM PELVIC COMPLETE: CPT | Mod: 26

## 2023-12-26 RX ORDER — MIDODRINE HYDROCHLORIDE 2.5 MG/1
1 TABLET ORAL
Qty: 0 | Refills: 0 | DISCHARGE
Start: 2023-12-26

## 2023-12-26 RX ORDER — ACETAMINOPHEN 500 MG
2 TABLET ORAL
Qty: 0 | Refills: 0 | DISCHARGE
Start: 2023-12-26

## 2023-12-26 RX ORDER — PANTOPRAZOLE SODIUM 20 MG/1
1 TABLET, DELAYED RELEASE ORAL
Qty: 0 | Refills: 0 | DISCHARGE
Start: 2023-12-26

## 2023-12-26 RX ORDER — LANOLIN ALCOHOL/MO/W.PET/CERES
1 CREAM (GRAM) TOPICAL
Qty: 0 | Refills: 0 | DISCHARGE
Start: 2023-12-26

## 2023-12-26 RX ORDER — DEXTROSE MONOHYDRATE, SODIUM CHLORIDE, AND POTASSIUM CHLORIDE 50; .745; 4.5 G/1000ML; G/1000ML; G/1000ML
1000 INJECTION, SOLUTION INTRAVENOUS
Refills: 0 | Status: DISCONTINUED | OUTPATIENT
Start: 2023-12-26 | End: 2023-12-27

## 2023-12-26 RX ORDER — MIRTAZAPINE 45 MG/1
1 TABLET, ORALLY DISINTEGRATING ORAL
Qty: 0 | Refills: 0 | DISCHARGE
Start: 2023-12-26

## 2023-12-26 RX ORDER — RIFAXIMIN 200 MG/1
1 TABLET ORAL
Qty: 0 | Refills: 0 | DISCHARGE
Start: 2023-12-26

## 2023-12-26 RX ORDER — MESALAMINE 400 MG
400 TABLET, DELAYED RELEASE (ENTERIC COATED) ORAL THREE TIMES A DAY
Refills: 0 | Status: DISCONTINUED | OUTPATIENT
Start: 2023-12-26 | End: 2023-12-28

## 2023-12-26 RX ORDER — SPIRONOLACTONE 25 MG/1
4 TABLET, FILM COATED ORAL
Qty: 0 | Refills: 0 | DISCHARGE
Start: 2023-12-26

## 2023-12-26 RX ORDER — INSULIN LISPRO 100/ML
0 VIAL (ML) SUBCUTANEOUS
Qty: 0 | Refills: 0 | DISCHARGE
Start: 2023-12-26

## 2023-12-26 RX ORDER — MESALAMINE 400 MG
1 TABLET, DELAYED RELEASE (ENTERIC COATED) ORAL
Qty: 0 | Refills: 0 | DISCHARGE
Start: 2023-12-26

## 2023-12-26 RX ADMIN — Medication 1 TABLET(S): at 05:12

## 2023-12-26 RX ADMIN — MIRTAZAPINE 30 MILLIGRAM(S): 45 TABLET, ORALLY DISINTEGRATING ORAL at 21:15

## 2023-12-26 RX ADMIN — Medication 400 MILLIGRAM(S): at 22:03

## 2023-12-26 RX ADMIN — PANTOPRAZOLE SODIUM 40 MILLIGRAM(S): 20 TABLET, DELAYED RELEASE ORAL at 12:01

## 2023-12-26 RX ADMIN — Medication 1 MILLIGRAM(S): at 12:01

## 2023-12-26 RX ADMIN — Medication 1 APPLICATION(S): at 05:45

## 2023-12-26 RX ADMIN — SPIRONOLACTONE 100 MILLIGRAM(S): 25 TABLET, FILM COATED ORAL at 05:12

## 2023-12-26 NOTE — SOCIAL WORK PROGRESS NOTE - NSSWPROGRESSNOTE_GEN_ALL_CORE
Pt awaiting transfer to [facility] when bed available,  pt is a LTC resident from [facility]. SW to remain available for follow up as needed.  Physical Exam    Wound #1 Assessment wound #1 Location:, left lower leg, started on 11/29/2016, Care for this wound started on 12/19/2016  Wound Status: not healed  Venous Ulcer: Full Thickness  Length: 5 3cm x Width: 1 8cm x Depth: 0 2cm   Total: 9 54sq cm   Wound Volume: 1 908cm3           Tissue type: Subcutaneous and Slough   Color of Wound: Red - 30% and Yellow - 70%   Exudate Amount: Minimal   Exudate Type: Serosangiunous   Odor: None   Exudate Color: Yellow and Tan   Wound Edges: Intact   Periwound Skin Condition: Intact, Hemosiderin pigmentation, Scaly, lower leg edema   Comments: pruritic  Wound #2 Assessment wound #2 Location:, right lateral ankle, started on 12/29/2016, Care for this wound started on 12/19/2016  Wound Status: not healed  Venous Ulcer: Full Thickness  Length: 1 8cm x Width: 2cm x Depth: 0 2cm   Total: 3 6sq cm   Wound Volume: 0 72cm3           Tissue type: Subcutaneous, Granulation and Slough   Color of Wound: Red - 40% and Yellow - 60%   Exudate Amount: Minimal   Exudate Type: Serosangiunous   Odor: None   Exudate Color: Yellow and Tan   Wound Edges: Intact   Periwound Skin Condition: Hemosiderin pigmentation, Scaly, lower leg edema   Comments: pruritic  Wound Drsg  Orders/Instructions  Wound Identification Dressing Orders--Instructions:   Wound Identification and Instructions   Wound #1: left lower leg    Wound #2: right lateral ankle      Wound Care Instructions  Discussed with Patient/Caregiver  Dressing Type: Cleatus Frock with mild soap and water, normal saline, wound cleanser  Apply 4% Topical Lidocaine anesthetic solution PRN to wound/ulcer prior to debridement for pain control  Apply specified dressing to wound base/bed  To periwound apply: Bethamethasone cream  Secondary dressing apply: Gauze  Secure with: Rupinder  Comments/Other:   One time dose of betamethasone today  No betamethasone at home   Verbal order to increase compression with double  Pt awaiting transfer to [facility] when bed available,  pt is a LTC resident from [facility]. SW to remain available for follow up as needed.  layer tubigrip E   Apply compression using: Double Tubigrip E  Future Appointments    Date/Time Provider Specialty Site   01/09/2017 08:00 AM Margaret Craig[address] Plan  Wound Nursing Care Plan Windy Jeong: Wound Nursing Care Plan   Impaired Tissue Integrity related to: wounds B/L lower extremities   Risk for Infection related to open wound:   Pain related to disease process and/or wound treatment:   Fluid Volume Excess related to venous insufficiency, ineffective offloading, improper positioning, and/or disease process:   Goals   Patient will achieve 100% epithelialization:  Patient will verbalize signs and symptoms of infection and when to notify physician or [facility]:    Patient or caregiver will demonstrate ability to perform wound dressing changes:  Patient will demonstrate offloading and body positioning to effectively decrease edema:  Wound Nursing Care Interventions:   Provide moist wound healing:  Assess patient's nutrition on each wound care visit (including protein and fluid intake): Bryce Duque Teach patient and caregiver how to change wound dressing:  Complete Fall Risk Assessment upon admission to wound program and with change in condition/implement identified interventions:  Teach patient on edema reducing measures:     Evaluate effectiveness of all above measures every 4 weeks with Patient Specific CQI:    Other:  POC initiated 12/19/2016      Signatures   Electronically signed by : Madhav Swanson RN; Dec 29 2016  1:47PM EST                       (Author)    Electronically signed by : Madhav Swanson RN; Dec 29 2016  1:47PM EST                       (Author)

## 2023-12-26 NOTE — PROGRESS NOTE ADULT - SUBJECTIVE AND OBJECTIVE BOX
PROGRESS NOTE  Patient is a 58y old  Female who presents with a chief complaint of hypotension and tachycardia (26 Dec 2023 09:44)      OVERNIGHT      HPI:   Patient is a 58-year-old female  ,[facility] resident who presents to the emergency room with multiple medical issues.  Past medical history of CAD diabetes insomnia major depressive disorder GERD hypertension irritable bowel syndrome with diarrhea cirrhosis of the liver nonalcoholic fatty liver hepatic encephalopathy bipolar disorder chronic A-fib on Coumadin blepharitis of the left eye.    Per PCP signout patient was transferred to [place] for reported hypotension and tachycardia diffuse purpura.  Patient had a temperature of 99.3 with a heart rate of 116 and a blood pressure of 88/60.  Patient was FEBRILE later in ER with TEMP 101 , septic workup sent ,  started on iv abx and fluids .CT abd showed pancolitis , seen by GI and ID . Found to have cellulitis of lids b/l R>L  (22 Dec 2023 10:37)    PAST MEDICAL & SURGICAL HISTORY:  MDD (major depressive disorder)      GERD (gastroesophageal reflux disease)      Ulcerative colitis      HTN (hypertension)      DM (diabetes mellitus)      Arteriosclerotic heart disease (ASHD)      IBS (irritable bowel syndrome)      History of ataxia      Liver cirrhosis      Nonalcoholic fatty liver disease without nonalcoholic steatohepatitis (PATEL)      Hepatic encephalopathy      Bipolar illness      Chronic atrial fibrillation      Moderate protein-calorie malnutrition      OA (osteoarthritis)      Blepharitis, bilateral      Brain aneurysm      Uterine fibroid      History of cholecystectomy          MEDICATIONS  (STANDING):  dextrose 5% + sodium chloride 0.45%. 1000 milliLiter(s) (100 mL/Hr) IV Continuous <Continuous>  dextrose 5%. 1000 milliLiter(s) (100 mL/Hr) IV Continuous <Continuous>  dextrose 5%. 1000 milliLiter(s) (50 mL/Hr) IV Continuous <Continuous>  dextrose 50% Injectable 25 Gram(s) IV Push once  dextrose 50% Injectable 12.5 Gram(s) IV Push once  dextrose 50% Injectable 25 Gram(s) IV Push once  erythromycin   Ointment 1 Application(s) Both EYES two times a day  folic acid 1 milliGRAM(s) Oral daily  glucagon  Injectable 1 milliGRAM(s) IntraMuscular once  insulin lispro (ADMELOG) corrective regimen sliding scale   SubCutaneous three times a day before meals  lactobacillus acidophilus 1 Tablet(s) Oral every 12 hours  mesalamine DR Capsule 400 milliGRAM(s) Oral three times a day  mirtazapine 30 milliGRAM(s) Oral at bedtime  pantoprazole    Tablet 40 milliGRAM(s) Oral daily  rifAXIMin 550 milliGRAM(s) Oral two times a day  senna 2 Tablet(s) Oral at bedtime  spironolactone 100 milliGRAM(s) Oral daily    MEDICATIONS  (PRN):  acetaminophen     Tablet .. 650 milliGRAM(s) Oral every 6 hours PRN Temp greater or equal to 38C (100.4F), Mild Pain (1 - 3)  aluminum hydroxide/magnesium hydroxide/simethicone Suspension 30 milliLiter(s) Oral every 4 hours PRN Dyspepsia  dextrose Oral Gel 15 Gram(s) Oral once PRN Blood Glucose LESS THAN 70 milliGRAM(s)/deciliter  magnesium hydroxide Suspension 30 milliLiter(s) Oral daily PRN Constipation  melatonin 3 milliGRAM(s) Oral at bedtime PRN Insomnia  midodrine. 5 milliGRAM(s) Oral three times a day PRN SBP below 90  ondansetron Injectable 4 milliGRAM(s) IV Push every 8 hours PRN Nausea and/or Vomiting  traMADol 50 milliGRAM(s) Oral three times a day PRN Moderate Pain (4 - 6)      OBJECTIVE    T(C): 36.4 (12-26-23 @ 05:15), Max: 36.7 (12-25-23 @ 12:00)  HR: 96 (12-26-23 @ 05:15) (88 - 101)  BP: 90/60 (12-26-23 @ 05:15) (90/60 - 103/67)  RR: 18 (12-26-23 @ 05:15) (18 - 18)  SpO2: 96% (12-26-23 @ 05:15) (96% - 98%)  Wt(kg): --  I&O's Summary    25 Dec 2023 07:01  -  26 Dec 2023 07:00  --------------------------------------------------------  IN: 0 mL / OUT: 501 mL / NET: -501 mL          REVIEW OF SYSTEMS:  CONSTITUTIONAL: No fever, weight loss, or fatigue  EYES: No eye pain, visual disturbances, or discharge  ENMT:   No sinus or throat pain  NECK: No pain or stiffness  RESPIRATORY: No cough, wheezing, chills or hemoptysis; No shortness of breath  CARDIOVASCULAR: No chest pain, palpitations, dizziness, or leg swelling  GASTROINTESTINAL: No abdominal pain. No nausea, vomiting; No diarrhea or constipation. No melena or hematochezia.  GENITOURINARY: No dysuria, frequency, hematuria, or incontinence  NEUROLOGICAL: No headaches, memory loss, loss of strength, numbness, or tremors  SKIN: No itching, burning, rashes, or lesions   MUSCULOSKELETAL: No joint pain or swelling; No muscle, back, or extremity pain    PHYSICAL EXAM:  Appearance: NAD. VS past 24 hrs -as above   HEENT:   Moist oral mucosa. Conjunctiva clear b/l.   Neck : supple  Respiratory: Lungs CTAB.  Gastrointestinal:  Soft, nontender. No rebound. No rigidity. BS present	  Cardiovascular: RRR ,S1S2 present  Neurologic: Non-focal. Moving all extremities.  Extremities: No edema. No erythema. No calf tenderness.  Skin: No rashes, No ecchymoses, No cyanosis.	  wounds ,skin lesions-See skin assesment flow sheet   LABS:                        7.1    9.15  )-----------( 437      ( 26 Dec 2023 09:40 )             21.4     12-25    140  |  110<H>  |  9   ----------------------------<  106<H>  3.2<L>   |  22  |  0.58    Ca    8.0<L>      25 Dec 2023 07:12    TPro  5.1<L>  /  Alb  2.2<L>  /  TBili  0.4  /  DBili  x   /  AST  21  /  ALT  17  /  AlkPhos  100  12-25    CAPILLARY BLOOD GLUCOSE      POCT Blood Glucose.: 117 mg/dL (26 Dec 2023 08:06)  POCT Blood Glucose.: 114 mg/dL (25 Dec 2023 21:17)  POCT Blood Glucose.: 83 mg/dL (25 Dec 2023 16:58)    PT/INR - ( 26 Dec 2023 09:40 )   PT: 20.3 sec;   INR: 1.76 ratio           Urinalysis Basic - ( 25 Dec 2023 07:12 )    Color: x / Appearance: x / SG: x / pH: x  Gluc: 106 mg/dL / Ketone: x  / Bili: x / Urobili: x   Blood: x / Protein: x / Nitrite: x   Leuk Esterase: x / RBC: x / WBC x   Sq Epi: x / Non Sq Epi: x / Bacteria: x        Culture - Urine (collected 23 Dec 2023 06:30)  Source: Clean Catch Clean Catch (Midstream)  Final Report (24 Dec 2023 10:27):    No growth    Babesia microti PCR, Bld (collected 22 Dec 2023 23:30)    Culture - Blood (collected 21 Dec 2023 22:40)  Source: .Blood Blood-Peripheral  Preliminary Report (26 Dec 2023 07:01):    No growth at 4 days    Culture - Blood (collected 21 Dec 2023 22:30)  Source: .Blood Blood-Peripheral  Preliminary Report (26 Dec 2023 07:01):    No growth at 4 days      RADIOLOGY & ADDITIONAL TESTS:   reviewed elctronically  ASSESSMENT/PLAN: 	     PROGRESS NOTE  Patient is a 58y old  Female who presents with a chief complaint of hypotension and tachycardia (26 Dec 2023 09:44)      OVERNIGHT      HPI:   Patient is a 58-year-old female  ,[facility] resident who presents to the emergency room with multiple medical issues.  Past medical history of CAD diabetes insomnia major depressive disorder GERD hypertension irritable bowel syndrome with diarrhea cirrhosis of the liver nonalcoholic fatty liver hepatic encephalopathy bipolar disorder chronic A-fib on Coumadin blepharitis of the left eye.    Per PCP signout patient was transferred to [place] for reported hypotension and tachycardia diffuse purpura.  Patient had a temperature of 99.3 with a heart rate of 116 and a blood pressure of 88/60.  Patient was FEBRILE later in ER with TEMP 101 , septic workup sent ,  started on iv abx and fluids .CT abd showed pancolitis , seen by GI and ID . Found to have cellulitis of lids b/l R>L  (22 Dec 2023 10:37)    PAST MEDICAL & SURGICAL HISTORY:  MDD (major depressive disorder)      GERD (gastroesophageal reflux disease)      Ulcerative colitis      HTN (hypertension)      DM (diabetes mellitus)      Arteriosclerotic heart disease (ASHD)      IBS (irritable bowel syndrome)      History of ataxia      Liver cirrhosis      Nonalcoholic fatty liver disease without nonalcoholic steatohepatitis (PATEL)      Hepatic encephalopathy      Bipolar illness      Chronic atrial fibrillation      Moderate protein-calorie malnutrition      OA (osteoarthritis)      Blepharitis, bilateral      Brain aneurysm      Uterine fibroid      History of cholecystectomy          MEDICATIONS  (STANDING):  dextrose 5% + sodium chloride 0.45%. 1000 milliLiter(s) (100 mL/Hr) IV Continuous <Continuous>  dextrose 5%. 1000 milliLiter(s) (100 mL/Hr) IV Continuous <Continuous>  dextrose 5%. 1000 milliLiter(s) (50 mL/Hr) IV Continuous <Continuous>  dextrose 50% Injectable 25 Gram(s) IV Push once  dextrose 50% Injectable 12.5 Gram(s) IV Push once  dextrose 50% Injectable 25 Gram(s) IV Push once  erythromycin   Ointment 1 Application(s) Both EYES two times a day  folic acid 1 milliGRAM(s) Oral daily  glucagon  Injectable 1 milliGRAM(s) IntraMuscular once  insulin lispro (ADMELOG) corrective regimen sliding scale   SubCutaneous three times a day before meals  lactobacillus acidophilus 1 Tablet(s) Oral every 12 hours  mesalamine DR Capsule 400 milliGRAM(s) Oral three times a day  mirtazapine 30 milliGRAM(s) Oral at bedtime  pantoprazole    Tablet 40 milliGRAM(s) Oral daily  rifAXIMin 550 milliGRAM(s) Oral two times a day  senna 2 Tablet(s) Oral at bedtime  spironolactone 100 milliGRAM(s) Oral daily    MEDICATIONS  (PRN):  acetaminophen     Tablet .. 650 milliGRAM(s) Oral every 6 hours PRN Temp greater or equal to 38C (100.4F), Mild Pain (1 - 3)  aluminum hydroxide/magnesium hydroxide/simethicone Suspension 30 milliLiter(s) Oral every 4 hours PRN Dyspepsia  dextrose Oral Gel 15 Gram(s) Oral once PRN Blood Glucose LESS THAN 70 milliGRAM(s)/deciliter  magnesium hydroxide Suspension 30 milliLiter(s) Oral daily PRN Constipation  melatonin 3 milliGRAM(s) Oral at bedtime PRN Insomnia  midodrine. 5 milliGRAM(s) Oral three times a day PRN SBP below 90  ondansetron Injectable 4 milliGRAM(s) IV Push every 8 hours PRN Nausea and/or Vomiting  traMADol 50 milliGRAM(s) Oral three times a day PRN Moderate Pain (4 - 6)      OBJECTIVE    T(C): 36.4 (12-26-23 @ 05:15), Max: 36.7 (12-25-23 @ 12:00)  HR: 96 (12-26-23 @ 05:15) (88 - 101)  BP: 90/60 (12-26-23 @ 05:15) (90/60 - 103/67)  RR: 18 (12-26-23 @ 05:15) (18 - 18)  SpO2: 96% (12-26-23 @ 05:15) (96% - 98%)  Wt(kg): --  I&O's Summary    25 Dec 2023 07:01  -  26 Dec 2023 07:00  --------------------------------------------------------  IN: 0 mL / OUT: 501 mL / NET: -501 mL          REVIEW OF SYSTEMS:  CONSTITUTIONAL: No fever, weight loss, or fatigue  EYES: No eye pain, visual disturbances, or discharge  ENMT:   No sinus or throat pain  NECK: No pain or stiffness  RESPIRATORY: No cough, wheezing, chills or hemoptysis; No shortness of breath  CARDIOVASCULAR: No chest pain, palpitations, dizziness, or leg swelling  GASTROINTESTINAL: No abdominal pain. No nausea, vomiting; No diarrhea or constipation. No melena or hematochezia.  GENITOURINARY: No dysuria, frequency, hematuria, or incontinence  NEUROLOGICAL: No headaches, memory loss, loss of strength, numbness, or tremors  SKIN: No itching, burning, rashes, or lesions   MUSCULOSKELETAL: No joint pain or swelling; No muscle, back, or extremity pain    PHYSICAL EXAM:  Appearance: NAD. VS past 24 hrs -as above   HEENT:   Moist oral mucosa. Conjunctiva clear b/l.   Neck : supple  Respiratory: Lungs CTAB.  Gastrointestinal:  Soft, nontender. No rebound. No rigidity. BS present	  Cardiovascular: RRR ,S1S2 present  Neurologic: Non-focal. Moving all extremities.  Extremities: No edema. No erythema. No calf tenderness.  Skin: No rashes, No ecchymoses, No cyanosis.	  wounds ,skin lesions-See skin assesment flow sheet   LABS:                        7.1    9.15  )-----------( 437      ( 26 Dec 2023 09:40 )             21.4     12-25    140  |  110<H>  |  9   ----------------------------<  106<H>  3.2<L>   |  22  |  0.58    Ca    8.0<L>      25 Dec 2023 07:12    TPro  5.1<L>  /  Alb  2.2<L>  /  TBili  0.4  /  DBili  x   /  AST  21  /  ALT  17  /  AlkPhos  100  12-25    CAPILLARY BLOOD GLUCOSE      POCT Blood Glucose.: 117 mg/dL (26 Dec 2023 08:06)  POCT Blood Glucose.: 114 mg/dL (25 Dec 2023 21:17)  POCT Blood Glucose.: 83 mg/dL (25 Dec 2023 16:58)    PT/INR - ( 26 Dec 2023 09:40 )   PT: 20.3 sec;   INR: 1.76 ratio           Urinalysis Basic - ( 25 Dec 2023 07:12 )    Color: x / Appearance: x / SG: x / pH: x  Gluc: 106 mg/dL / Ketone: x  / Bili: x / Urobili: x   Blood: x / Protein: x / Nitrite: x   Leuk Esterase: x / RBC: x / WBC x   Sq Epi: x / Non Sq Epi: x / Bacteria: x        Culture - Urine (collected 23 Dec 2023 06:30)  Source: Clean Catch Clean Catch (Midstream)  Final Report (24 Dec 2023 10:27):    No growth    Babesia microti PCR, Bld (collected 22 Dec 2023 23:30)    Culture - Blood (collected 21 Dec 2023 22:40)  Source: .Blood Blood-Peripheral  Preliminary Report (26 Dec 2023 07:01):    No growth at 4 days    Culture - Blood (collected 21 Dec 2023 22:30)  Source: .Blood Blood-Peripheral  Preliminary Report (26 Dec 2023 07:01):    No growth at 4 days      RADIOLOGY & ADDITIONAL TESTS:   reviewed elctronically  ASSESSMENT/PLAN: 	     PROGRESS NOTE  Patient is a 58y old  Female who presents with a chief complaint of hypotension and tachycardia (26 Dec 2023 09:44)  Chart and available morning labs /imaging are reviewed electronically , urgent issues addressed . More information  is being added upon completion of rounds , when more information is collected and management discussed with consultants , medical staff and social service/case management on the floor     OVERNIGHT  No new issues reported by medical staff . All above noted Patient is resting in a bed comfortably .Confused ,poor mentation .No distress noted     HPI:   Patient is a 58-year-old female  ,[facility] resident who presents to the emergency room with multiple medical issues.  Past medical history of CAD diabetes insomnia major depressive disorder GERD hypertension irritable bowel syndrome with diarrhea cirrhosis of the liver nonalcoholic fatty liver hepatic encephalopathy bipolar disorder chronic A-fib on Coumadin blepharitis of the left eye.    Per PCP signout patient was transferred to [place] for reported hypotension and tachycardia diffuse purpura.  Patient had a temperature of 99.3 with a heart rate of 116 and a blood pressure of 88/60.  Patient was FEBRILE later in ER with TEMP 101 , septic workup sent ,  started on iv abx and fluids .CT abd showed pancolitis , seen by GI and ID . Found to have cellulitis of lids b/l R>L  (22 Dec 2023 10:37)    PAST MEDICAL & SURGICAL HISTORY:  MDD (major depressive disorder)      GERD (gastroesophageal reflux disease)      Ulcerative colitis      HTN (hypertension)      DM (diabetes mellitus)      Arteriosclerotic heart disease (ASHD)      IBS (irritable bowel syndrome)      History of ataxia      Liver cirrhosis      Nonalcoholic fatty liver disease without nonalcoholic steatohepatitis (PATEL)      Hepatic encephalopathy      Bipolar illness      Chronic atrial fibrillation      Moderate protein-calorie malnutrition      OA (osteoarthritis)      Blepharitis, bilateral      Brain aneurysm      Uterine fibroid      History of cholecystectomy          MEDICATIONS  (STANDING):  dextrose 5% + sodium chloride 0.45%. 1000 milliLiter(s) (100 mL/Hr) IV Continuous <Continuous>  dextrose 5%. 1000 milliLiter(s) (100 mL/Hr) IV Continuous <Continuous>  dextrose 5%. 1000 milliLiter(s) (50 mL/Hr) IV Continuous <Continuous>  dextrose 50% Injectable 25 Gram(s) IV Push once  dextrose 50% Injectable 12.5 Gram(s) IV Push once  dextrose 50% Injectable 25 Gram(s) IV Push once  erythromycin   Ointment 1 Application(s) Both EYES two times a day  folic acid 1 milliGRAM(s) Oral daily  glucagon  Injectable 1 milliGRAM(s) IntraMuscular once  insulin lispro (ADMELOG) corrective regimen sliding scale   SubCutaneous three times a day before meals  lactobacillus acidophilus 1 Tablet(s) Oral every 12 hours  mesalamine DR Capsule 400 milliGRAM(s) Oral three times a day  mirtazapine 30 milliGRAM(s) Oral at bedtime  pantoprazole    Tablet 40 milliGRAM(s) Oral daily  rifAXIMin 550 milliGRAM(s) Oral two times a day  senna 2 Tablet(s) Oral at bedtime  spironolactone 100 milliGRAM(s) Oral daily    MEDICATIONS  (PRN):  acetaminophen     Tablet .. 650 milliGRAM(s) Oral every 6 hours PRN Temp greater or equal to 38C (100.4F), Mild Pain (1 - 3)  aluminum hydroxide/magnesium hydroxide/simethicone Suspension 30 milliLiter(s) Oral every 4 hours PRN Dyspepsia  dextrose Oral Gel 15 Gram(s) Oral once PRN Blood Glucose LESS THAN 70 milliGRAM(s)/deciliter  magnesium hydroxide Suspension 30 milliLiter(s) Oral daily PRN Constipation  melatonin 3 milliGRAM(s) Oral at bedtime PRN Insomnia  midodrine. 5 milliGRAM(s) Oral three times a day PRN SBP below 90  ondansetron Injectable 4 milliGRAM(s) IV Push every 8 hours PRN Nausea and/or Vomiting  traMADol 50 milliGRAM(s) Oral three times a day PRN Moderate Pain (4 - 6)      OBJECTIVE    T(C): 36.4 (12-26-23 @ 05:15), Max: 36.7 (12-25-23 @ 12:00)  HR: 96 (12-26-23 @ 05:15) (88 - 101)  BP: 90/60 (12-26-23 @ 05:15) (90/60 - 103/67)  RR: 18 (12-26-23 @ 05:15) (18 - 18)  SpO2: 96% (12-26-23 @ 05:15) (96% - 98%)  Wt(kg): --  I&O's Summary    25 Dec 2023 07:01  -  26 Dec 2023 07:00  --------------------------------------------------------  IN: 0 mL / OUT: 501 mL / NET: -501 mL          REVIEW OF SYSTEMS:  CONSTITUTIONAL: No fever, weight loss, or fatigue  EYES: No eye pain, visual disturbances, or discharge  ENMT:   No sinus or throat pain  NECK: No pain or stiffness  RESPIRATORY: No cough, wheezing, chills or hemoptysis; No shortness of breath  CARDIOVASCULAR: No chest pain, palpitations, dizziness, or leg swelling  GASTROINTESTINAL: No abdominal pain. No nausea, vomiting; No diarrhea or constipation. No melena or hematochezia.  GENITOURINARY: No dysuria, frequency, hematuria, or incontinence  NEUROLOGICAL: No headaches, memory loss, loss of strength, numbness, or tremors  SKIN: No itching, burning, rashes, or lesions   MUSCULOSKELETAL: No joint pain or swelling; No muscle, back, or extremity pain    PHYSICAL EXAM:  Appearance: NAD. VS past 24 hrs -as above   HEENT:   Moist oral mucosa. Conjunctiva clear b/l.   Neck : supple  Respiratory: Lungs CTAB.  Gastrointestinal:  Soft, nontender. No rebound. No rigidity. BS present	  Cardiovascular: RRR ,S1S2 present  Neurologic: Non-focal. Moving all extremities.  Extremities: No edema. No erythema. No calf tenderness.  Skin: No rashes, No ecchymoses, No cyanosis.	  wounds ,skin lesions-See skin assesment flow sheet   LABS:                        7.1    9.15  )-----------( 437      ( 26 Dec 2023 09:40 )             21.4     12-25    140  |  110<H>  |  9   ----------------------------<  106<H>  3.2<L>   |  22  |  0.58    Ca    8.0<L>      25 Dec 2023 07:12    TPro  5.1<L>  /  Alb  2.2<L>  /  TBili  0.4  /  DBili  x   /  AST  21  /  ALT  17  /  AlkPhos  100  12-25    CAPILLARY BLOOD GLUCOSE      POCT Blood Glucose.: 117 mg/dL (26 Dec 2023 08:06)  POCT Blood Glucose.: 114 mg/dL (25 Dec 2023 21:17)  POCT Blood Glucose.: 83 mg/dL (25 Dec 2023 16:58)    PT/INR - ( 26 Dec 2023 09:40 )   PT: 20.3 sec;   INR: 1.76 ratio           Urinalysis Basic - ( 25 Dec 2023 07:12 )    Color: x / Appearance: x / SG: x / pH: x  Gluc: 106 mg/dL / Ketone: x  / Bili: x / Urobili: x   Blood: x / Protein: x / Nitrite: x   Leuk Esterase: x / RBC: x / WBC x   Sq Epi: x / Non Sq Epi: x / Bacteria: x        Culture - Urine (collected 23 Dec 2023 06:30)  Source: Clean Catch Clean Catch (Midstream)  Final Report (24 Dec 2023 10:27):    No growth    Babesia microti PCR, Bld (collected 22 Dec 2023 23:30)    Culture - Blood (collected 21 Dec 2023 22:40)  Source: .Blood Blood-Peripheral  Preliminary Report (26 Dec 2023 07:01):    No growth at 4 days    Culture - Blood (collected 21 Dec 2023 22:30)  Source: .Blood Blood-Peripheral  Preliminary Report (26 Dec 2023 07:01):    No growth at 4 days      RADIOLOGY & ADDITIONAL TESTS:   reviewed elctronically  ASSESSMENT/PLAN: 	    25 minutes aggregate time was spent on this visit, 50% visit time spent in care co-ordination with other attendings and counselling patient .I have discussed care plan with patient / HCP/family member ,who expressed understanding of problems treatment and their effect and side effects, alternatives in details. I have asked if they have any questions and concerns and appropriately addressed them to best of my ability.  PROGRESS NOTE  Patient is a 58y old  Female who presents with a chief complaint of hypotension and tachycardia (26 Dec 2023 09:44)  Chart and available morning labs /imaging are reviewed electronically , urgent issues addressed . More information  is being added upon completion of rounds , when more information is collected and management discussed with consultants , medical staff and social service/case management on the floor     OVERNIGHT  No new issues reported by medical staff . All above noted Patient is resting in a bed comfortably .Confused ,poor mentation .No distress noted     HPI:   Patient is a 58-year-old female  ,[facility] resident who presents to the emergency room with multiple medical issues.  Past medical history of CAD diabetes insomnia major depressive disorder GERD hypertension irritable bowel syndrome with diarrhea cirrhosis of the liver nonalcoholic fatty liver hepatic encephalopathy bipolar disorder chronic A-fib on Coumadin blepharitis of the left eye.    Per PCP signout patient was transferred to [place] for reported hypotension and tachycardia diffuse purpura.  Patient had a temperature of 99.3 with a heart rate of 116 and a blood pressure of 88/60.  Patient was FEBRILE later in ER with TEMP 101 , septic workup sent ,  started on iv abx and fluids .CT abd showed pancolitis , seen by GI and ID . Found to have cellulitis of lids b/l R>L  (22 Dec 2023 10:37)    PAST MEDICAL & SURGICAL HISTORY:  MDD (major depressive disorder)      GERD (gastroesophageal reflux disease)      Ulcerative colitis      HTN (hypertension)      DM (diabetes mellitus)      Arteriosclerotic heart disease (ASHD)      IBS (irritable bowel syndrome)      History of ataxia      Liver cirrhosis      Nonalcoholic fatty liver disease without nonalcoholic steatohepatitis (PATEL)      Hepatic encephalopathy      Bipolar illness      Chronic atrial fibrillation      Moderate protein-calorie malnutrition      OA (osteoarthritis)      Blepharitis, bilateral      Brain aneurysm      Uterine fibroid      History of cholecystectomy          MEDICATIONS  (STANDING):  dextrose 5% + sodium chloride 0.45%. 1000 milliLiter(s) (100 mL/Hr) IV Continuous <Continuous>  dextrose 5%. 1000 milliLiter(s) (100 mL/Hr) IV Continuous <Continuous>  dextrose 5%. 1000 milliLiter(s) (50 mL/Hr) IV Continuous <Continuous>  dextrose 50% Injectable 25 Gram(s) IV Push once  dextrose 50% Injectable 12.5 Gram(s) IV Push once  dextrose 50% Injectable 25 Gram(s) IV Push once  erythromycin   Ointment 1 Application(s) Both EYES two times a day  folic acid 1 milliGRAM(s) Oral daily  glucagon  Injectable 1 milliGRAM(s) IntraMuscular once  insulin lispro (ADMELOG) corrective regimen sliding scale   SubCutaneous three times a day before meals  lactobacillus acidophilus 1 Tablet(s) Oral every 12 hours  mesalamine DR Capsule 400 milliGRAM(s) Oral three times a day  mirtazapine 30 milliGRAM(s) Oral at bedtime  pantoprazole    Tablet 40 milliGRAM(s) Oral daily  rifAXIMin 550 milliGRAM(s) Oral two times a day  senna 2 Tablet(s) Oral at bedtime  spironolactone 100 milliGRAM(s) Oral daily    MEDICATIONS  (PRN):  acetaminophen     Tablet .. 650 milliGRAM(s) Oral every 6 hours PRN Temp greater or equal to 38C (100.4F), Mild Pain (1 - 3)  aluminum hydroxide/magnesium hydroxide/simethicone Suspension 30 milliLiter(s) Oral every 4 hours PRN Dyspepsia  dextrose Oral Gel 15 Gram(s) Oral once PRN Blood Glucose LESS THAN 70 milliGRAM(s)/deciliter  magnesium hydroxide Suspension 30 milliLiter(s) Oral daily PRN Constipation  melatonin 3 milliGRAM(s) Oral at bedtime PRN Insomnia  midodrine. 5 milliGRAM(s) Oral three times a day PRN SBP below 90  ondansetron Injectable 4 milliGRAM(s) IV Push every 8 hours PRN Nausea and/or Vomiting  traMADol 50 milliGRAM(s) Oral three times a day PRN Moderate Pain (4 - 6)      OBJECTIVE    T(C): 36.4 (12-26-23 @ 05:15), Max: 36.7 (12-25-23 @ 12:00)  HR: 96 (12-26-23 @ 05:15) (88 - 101)  BP: 90/60 (12-26-23 @ 05:15) (90/60 - 103/67)  RR: 18 (12-26-23 @ 05:15) (18 - 18)  SpO2: 96% (12-26-23 @ 05:15) (96% - 98%)  Wt(kg): --  I&O's Summary    25 Dec 2023 07:01  -  26 Dec 2023 07:00  --------------------------------------------------------  IN: 0 mL / OUT: 501 mL / NET: -501 mL          REVIEW OF SYSTEMS:  CONSTITUTIONAL: No fever, weight loss, or fatigue  EYES: No eye pain, visual disturbances, or discharge  ENMT:   No sinus or throat pain  NECK: No pain or stiffness  RESPIRATORY: No cough, wheezing, chills or hemoptysis; No shortness of breath  CARDIOVASCULAR: No chest pain, palpitations, dizziness, or leg swelling  GASTROINTESTINAL: No abdominal pain. No nausea, vomiting; No diarrhea or constipation. No melena or hematochezia.  GENITOURINARY: No dysuria, frequency, hematuria, or incontinence  NEUROLOGICAL: No headaches, memory loss, loss of strength, numbness, or tremors  SKIN: No itching, burning, rashes, or lesions   MUSCULOSKELETAL: No joint pain or swelling; No muscle, back, or extremity pain    PHYSICAL EXAM:  Appearance: NAD. VS past 24 hrs -as above   HEENT:   Moist oral mucosa. Conjunctiva clear b/l.   Neck : supple  Respiratory: Lungs CTAB.  Gastrointestinal:  Soft, nontender. No rebound. No rigidity. BS present	  Cardiovascular: RRR ,S1S2 present  Neurologic: Non-focal. Moving all extremities.  Extremities: No edema. No erythema. No calf tenderness.  Skin: No rashes, No ecchymoses, No cyanosis.	  wounds ,skin lesions-See skin assesment flow sheet   LABS:                        7.1    9.15  )-----------( 437      ( 26 Dec 2023 09:40 )             21.4     12-25    140  |  110<H>  |  9   ----------------------------<  106<H>  3.2<L>   |  22  |  0.58    Ca    8.0<L>      25 Dec 2023 07:12    TPro  5.1<L>  /  Alb  2.2<L>  /  TBili  0.4  /  DBili  x   /  AST  21  /  ALT  17  /  AlkPhos  100  12-25    CAPILLARY BLOOD GLUCOSE      POCT Blood Glucose.: 117 mg/dL (26 Dec 2023 08:06)  POCT Blood Glucose.: 114 mg/dL (25 Dec 2023 21:17)  POCT Blood Glucose.: 83 mg/dL (25 Dec 2023 16:58)    PT/INR - ( 26 Dec 2023 09:40 )   PT: 20.3 sec;   INR: 1.76 ratio           Urinalysis Basic - ( 25 Dec 2023 07:12 )    Color: x / Appearance: x / SG: x / pH: x  Gluc: 106 mg/dL / Ketone: x  / Bili: x / Urobili: x   Blood: x / Protein: x / Nitrite: x   Leuk Esterase: x / RBC: x / WBC x   Sq Epi: x / Non Sq Epi: x / Bacteria: x        Culture - Urine (collected 23 Dec 2023 06:30)  Source: Clean Catch Clean Catch (Midstream)  Final Report (24 Dec 2023 10:27):    No growth    Babesia microti PCR, Bld (collected 22 Dec 2023 23:30)    Culture - Blood (collected 21 Dec 2023 22:40)  Source: .Blood Blood-Peripheral  Preliminary Report (26 Dec 2023 07:01):    No growth at 4 days    Culture - Blood (collected 21 Dec 2023 22:30)  Source: .Blood Blood-Peripheral  Preliminary Report (26 Dec 2023 07:01):    No growth at 4 days      RADIOLOGY & ADDITIONAL TESTS:   reviewed elctronically  ASSESSMENT/PLAN: 	    25 minutes aggregate time was spent on this visit, 50% visit time spent in care co-ordination with other attendings and counselling patient .I have discussed care plan with patient / HCP/family member ,who expressed understanding of problems treatment and their effect and side effects, alternatives in details. I have asked if they have any questions and concerns and appropriately addressed them to best of my ability.

## 2023-12-26 NOTE — PROGRESS NOTE ADULT - ASSESSMENT
GENERAL DATA .   GOC.  ..  12/22/2023 full code   ICU STAY.  ..   COVID. .. scv2 12/22/2023 (-)       BEST PRACTICE ISSUES.    HOB ELEVATN.  .. Yes  DVT PPLX.  ..   12/25/2023 Vaginal bleed so coumadin deferred         DIET.   ..  12/22/2023 soft bite size   IV fl. .. 12/22/2023 d5 1/2 100   BOLUS. ..      STRESS ULCER PPLX.   .  ..   12/22/2023 protonix 40   INFECTION PPLX.   ..       SPEECH SWALLOW RECOMMENDATIONS.       ALLGY. ..    nka   WT. ..  12/22/2023 63  BMI. .. 12/22/2023 44      ABGS.   .  VS/ PO/IO/ VENT/ DRIPS.  12/26/2023 afeb 96 90/60   12/26/2023 ra 90%     SUMMARY.  . 12/22/2023  59 y/o F w/ pmh of cad, dm, mdd, gerd, IBS, cirrhosis 2/2 to nonalcoholic fatty liver, bipolar, afib on coumadin, today presented to [facility] for hypotension and tachycardia with new diffuse purpura to the LE ongoing the last few days and today morning waking up by R>L orbital swelling. In the ED pt was worked up and was found to have a INR of 3.83, diffuse LE ecchymosis and R>L orbital swelling and CTH finding of 8mm aneurysm. ED team requested ICU consult given pt was complaining of UE weakness for vasculitis concern  . 12/22/2023   It appears the patient was started on erythromycin ointment to the left eye on 1218.  . 12/22/2023 Pulm consulktd     . PMHx . cad, dm, mdd, gerd, IBS, cirrhosis 2/2 to nonalcoholic fatty liver, bipolar, afib on coumadin,    PROBLEM/ASSESMENT/PLAN.  . SEPSIS  . PREORBITAL CELLULITIS  . PANCOLITIS   .. 12/25/2023 Cellulitis orbits continues to improve   .. 12/23/2023 Cellulitis much improved   .. w 12/22-12/23-12/25/2023 w 12- 7.5 - 7.7  .. CT CHEST ABD 12/22/2023  .... Mild pancolitis Dependent atelectasis   .. RVP 12/22/2023 RVP (-)   .. 12/22-12/24/2023 Rocephin Dr EMELINA Urbano   .. 12/22/2023 Flagul dced  .. 12/22 rifaximin Dr Ramírez   .. 12/22 emycin oitment eyes Dr Ramírez     . CAD   .. CK 12/22/2023 CK 40     . A fib ON COUMADIN PMH  .. inr 12/23/2023 inr 3.5  .. coumadin held as purpuric rash     . CHF  .. 12/22 spironolacton 100     . ANEMIA   .. Hb 12/22-12/23-12/25-12/26/2023 Hb 8.2 - 7.2 - 7.8 - 7.1  .. monitor    . PURPURA  .. Plt 12/22/2023 plt 399   .. INR 12/22-12/25/2023 INR 3.8 - 1.6      . DIARRHEA   .. 12/24 C diff (-)   .. 12/24 GI PCR (-)     RENAL   .. Na 12/22/2023 Na 136  .. CO2 12/22/2023 CO2 24  .. Cr 12/22/2023 Cr .6     . RO CVA   .. CT h 12/22/2023   .... chr l post frontal infacrt     . RO VTE   .. V duplx 12/22/2023 (-)     . ORBITAL CELLULITIS  .. CT ORBITS 12/22/2023   .... r periorbital and r facial soft tissue swelling  .... mild l periorbital soft tissue swelling   .... patchy paranasal sinus mucosal thickening and opacn charissa ethmoid air cells   .... l laterally projecting 8 x 6 x 6 mm aneurysm betw takeoff of l sup cerebellar artery and l post cerebral artery   .... NO POST SEPTAL/ORBITAL CELLULITIS   .. 12/25/2023 Considerably improved     . BRAIN ANEURYSM  .. CT ORBITS 12/22/2023   .... l laterally projecting 8 x 6 x 6 mm aneurysm betw takeoff of l sup cerebellar artery and l post cerebral artery   .... NS CONSULT IS RECOMMENDED   .. CTA Head 12/23/2023  .... no sah  .... l sup cerebellar artery aneurusm 7.7 mm with neck 5.6 mm   .... small l M1 aneurysm at origin of a lenticulostriate vessel 2.7 mm  .. 12/23/2023 3:32 PM called and informed Dr Tovar   .. NS was called in ER at time of admission and no immediate intervention was recommendedby NS   .. awaits transfer     . SKIN RASH   .... Palpable purpura lower extremities   .... BL purple discoloration of eyelids with local swelling   .. 12/23/2023 rash seems to be dissipating   .. DD includes vasculitis eg Henoch Schonlein Severe infection eg meningococcal, Staph Strep RMSF (but no ho travel [place])   .. AEC 12/23 AEC 50  . AMM 12/23/2023 AMM 22   .. LUME 12/22 (-)   .. CMV 12/22 (-)   .. HSV IGG (+)   .. BABESIA PCR 12/22 (-)   .. 12/25/2023 skin rash is disspiating     . VAGINAL BLEED 12/25/2023   .. 12/25/2023 4:17 PM Message left with Gyne on call Dr Rivera     OVERALL  . 59 y/o F w/ pmh of cad, dm, mdd, gerd, IBS, cirrhosis 2/2 to nonalcoholic fatty liver, bipolar, afib on coumadin admitted 12/22/2023 with orbital cellulitis and rash extremities loked like palpable purpura  incidental finding of brain aneurysm   . CENTRIPETAL SKIN RASH (More in periphery)  ID Hemat eval were called No Rheum available so transfer was requested 12/22 to tertiary facility   . PRE ORBITAL CELLULITIS ID on case 12/22/2023  Given rocephin 12/22-12/24/2023 abio dced by ID Improvd   . PANCOLITIS 12/22/2023 CT 12/22/2023 ID on case Given flagyl 12/22-12/23/2023  Seen by GI   . A fib ON COUMADIN PMH Coumadin deferred because of bleed (vaginal)   . ANEMIA 12/22/2023  hemat on case    . BRAIN ANEURYSM 12/22/2023  Transfer requested 12/22 to get NS eval although case was dw NS by ER MD at admission and no urgent intervention was recommended by NS   . DIARRHEA 12/24/2023 C diff GI PCR were (-)   . VAGINAL BLEED ON 12/25/2023 Gyne consult requested case davida Rivera [phone]  . 12/26/2023 Pulm status stable  I will sign off Please reconsult as needed    . TRANSFER   .. 12/22/2023 DAVIDA HOSPITALIST ON CALLDR AMARIS [id number]   .. 12/22/2023 TRANSFER NEEDED AS WE DO NOIT HAVE RHEUMATOLOGY DERMATOLOGY OR OPHTHALMOLOGY AVAILABLE AT [facility] AND THESE CONSULTANTS ARE NEEDED TO CARE FOR THIS PATIENYT  .. 12/22/2023 DAVIDA ROMAN AT [facility] ACCEPTING MD HE WILL ACCEPT PT TO TELE AND WE WILLPLACE PT ON CARDIAC MONMITOR   .. 12/23/2023 awaits bed in tertiary hosp     TIME SPENT.  . Over 36 minutes aggregate care time spent on encounter; activities included   direct patient care, counseling and/or coordinating care reviewing notes, lab data/ imaging , discussion with multidisciplinary team/ patient  /family and explaining in detail risks, benefits, alternatives  of the recommendations     PATIENT.  . SPECCHIO   GENERAL DATA .   GOC.  ..  12/22/2023 full code   ICU STAY.  ..   COVID. .. scv2 12/22/2023 (-)       BEST PRACTICE ISSUES.    HOB ELEVATN.  .. Yes  DVT PPLX.  ..   12/25/2023 Vaginal bleed so coumadin deferred         DIET.   ..  12/22/2023 soft bite size   IV fl. .. 12/22/2023 d5 1/2 100   BOLUS. ..      STRESS ULCER PPLX.   .  ..   12/22/2023 protonix 40   INFECTION PPLX.   ..       SPEECH SWALLOW RECOMMENDATIONS.       ALLGY. ..    nka   WT. ..  12/22/2023 63  BMI. .. 12/22/2023 44      ABGS.   .  VS/ PO/IO/ VENT/ DRIPS.  12/26/2023 afeb 96 90/60   12/26/2023 ra 90%     SUMMARY.  . 12/22/2023  57 y/o F w/ pmh of cad, dm, mdd, gerd, IBS, cirrhosis 2/2 to nonalcoholic fatty liver, bipolar, afib on coumadin, today presented to [facility] for hypotension and tachycardia with new diffuse purpura to the LE ongoing the last few days and today morning waking up by R>L orbital swelling. In the ED pt was worked up and was found to have a INR of 3.83, diffuse LE ecchymosis and R>L orbital swelling and CTH finding of 8mm aneurysm. ED team requested ICU consult given pt was complaining of UE weakness for vasculitis concern  . 12/22/2023   It appears the patient was started on erythromycin ointment to the left eye on 1218.  . 12/22/2023 Pulm consulktd     . PMHx . cad, dm, mdd, gerd, IBS, cirrhosis 2/2 to nonalcoholic fatty liver, bipolar, afib on coumadin,    PROBLEM/ASSESMENT/PLAN.  . SEPSIS  . PREORBITAL CELLULITIS  . PANCOLITIS   .. 12/25/2023 Cellulitis orbits continues to improve   .. 12/23/2023 Cellulitis much improved   .. w 12/22-12/23-12/25/2023 w 12- 7.5 - 7.7  .. CT CHEST ABD 12/22/2023  .... Mild pancolitis Dependent atelectasis   .. RVP 12/22/2023 RVP (-)   .. 12/22-12/24/2023 Rocephin Dr EMELINA Urbano   .. 12/22/2023 Flagul dced  .. 12/22 rifaximin Dr Ramírez   .. 12/22 emycin oitment eyes Dr Ramírez     . CAD   .. CK 12/22/2023 CK 40     . A fib ON COUMADIN PMH  .. inr 12/23/2023 inr 3.5  .. coumadin held as purpuric rash     . CHF  .. 12/22 spironolacton 100     . ANEMIA   .. Hb 12/22-12/23-12/25-12/26/2023 Hb 8.2 - 7.2 - 7.8 - 7.1  .. monitor    . PURPURA  .. Plt 12/22/2023 plt 399   .. INR 12/22-12/25/2023 INR 3.8 - 1.6      . DIARRHEA   .. 12/24 C diff (-)   .. 12/24 GI PCR (-)     RENAL   .. Na 12/22/2023 Na 136  .. CO2 12/22/2023 CO2 24  .. Cr 12/22/2023 Cr .6     . RO CVA   .. CT h 12/22/2023   .... chr l post frontal infacrt     . RO VTE   .. V duplx 12/22/2023 (-)     . ORBITAL CELLULITIS  .. CT ORBITS 12/22/2023   .... r periorbital and r facial soft tissue swelling  .... mild l periorbital soft tissue swelling   .... patchy paranasal sinus mucosal thickening and opacn charissa ethmoid air cells   .... l laterally projecting 8 x 6 x 6 mm aneurysm betw takeoff of l sup cerebellar artery and l post cerebral artery   .... NO POST SEPTAL/ORBITAL CELLULITIS   .. 12/25/2023 Considerably improved     . BRAIN ANEURYSM  .. CT ORBITS 12/22/2023   .... l laterally projecting 8 x 6 x 6 mm aneurysm betw takeoff of l sup cerebellar artery and l post cerebral artery   .... NS CONSULT IS RECOMMENDED   .. CTA Head 12/23/2023  .... no sah  .... l sup cerebellar artery aneurusm 7.7 mm with neck 5.6 mm   .... small l M1 aneurysm at origin of a lenticulostriate vessel 2.7 mm  .. 12/23/2023 3:32 PM called and informed Dr Tovar   .. NS was called in ER at time of admission and no immediate intervention was recommendedby NS   .. awaits transfer     . SKIN RASH   .... Palpable purpura lower extremities   .... BL purple discoloration of eyelids with local swelling   .. 12/23/2023 rash seems to be dissipating   .. DD includes vasculitis eg Henoch Schonlein Severe infection eg meningococcal, Staph Strep RMSF (but no ho travel [place])   .. AEC 12/23 AEC 50  . AMM 12/23/2023 AMM 22   .. LUME 12/22 (-)   .. CMV 12/22 (-)   .. HSV IGG (+)   .. BABESIA PCR 12/22 (-)   .. 12/25/2023 skin rash is disspiating     . VAGINAL BLEED 12/25/2023   .. 12/25/2023 4:17 PM Message left with Gyne on call Dr Rivera     OVERALL  . 57 y/o F w/ pmh of cad, dm, mdd, gerd, IBS, cirrhosis 2/2 to nonalcoholic fatty liver, bipolar, afib on coumadin admitted 12/22/2023 with orbital cellulitis and rash extremities loked like palpable purpura  incidental finding of brain aneurysm   . CENTRIPETAL SKIN RASH (More in periphery)  ID Hemat eval were called No Rheum available so transfer was requested 12/22 to tertiary facility   . PRE ORBITAL CELLULITIS ID on case 12/22/2023  Given rocephin 12/22-12/24/2023 abio dced by ID Improvd   . PANCOLITIS 12/22/2023 CT 12/22/2023 ID on case Given flagyl 12/22-12/23/2023  Seen by GI   . A fib ON COUMADIN PMH Coumadin deferred because of bleed (vaginal)   . ANEMIA 12/22/2023  hemat on case    . BRAIN ANEURYSM 12/22/2023  Transfer requested 12/22 to get NS eval although case was dw NS by ER MD at admission and no urgent intervention was recommended by NS   . DIARRHEA 12/24/2023 C diff GI PCR were (-)   . VAGINAL BLEED ON 12/25/2023 Gyne consult requested case davida Rivera [phone]  . 12/26/2023 Pulm status stable  I will sign off Please reconsult as needed    . TRANSFER   .. 12/22/2023 DAVIDA HOSPITALIST ON CALLDR AMARIS [id number]   .. 12/22/2023 TRANSFER NEEDED AS WE DO NOIT HAVE RHEUMATOLOGY DERMATOLOGY OR OPHTHALMOLOGY AVAILABLE AT [facility] AND THESE CONSULTANTS ARE NEEDED TO CARE FOR THIS PATIENYT  .. 12/22/2023 DAVIDA ROMAN AT [facility] ACCEPTING MD HE WILL ACCEPT PT TO TELE AND WE WILLPLACE PT ON CARDIAC MONMITOR   .. 12/23/2023 awaits bed in tertiary hosp     TIME SPENT.  . Over 36 minutes aggregate care time spent on encounter; activities included   direct patient care, counseling and/or coordinating care reviewing notes, lab data/ imaging , discussion with multidisciplinary team/ patient  /family and explaining in detail risks, benefits, alternatives  of the recommendations     PATIENT.  . SPECCHIO

## 2023-12-26 NOTE — PROGRESS NOTE ADULT - ASSESSMENT
57 y/o F w/ pmh of cad, dm, mdd, gerd, IBS, cirrhosis 2/2 to nonalcoholic fatty liver, bipolar, afib on coumadin, today presented to \[facility]\"" hospital for hypotension and tachycardia with new diffuse purpura to the LE ongoing the last few days and today morning waking up by R>L orbital swelling. R>L orbital swelling. Febrile in the ED Tm 101.4F  CT orbits: Nonspecific bilateral periorbital soft tissue swelling, right greater than left, as well as right facial soft tissue swelling. No discrete drainable fluid collection. No evidence of post septal involvement/orbital cellulitis.  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 Neurosurgical consultation is recommended.  CT A/P: Mild pancolitis, of infectious or inflammatory etiology.    RECOMMENDATIONS  Rash unlikely infectious and higher suspicion for inflammatory disorder and improvement may be in response to steroids  with neg micro stopped ceftriaxone   a number of studies still pending  Vaginal US done today report pending    Patient's biggest concern is leaking and tape involved with rectal tube so will defer to hospitalist as this is not likely related to an infectious process    Thank you for consulting us and involving us in the management of this most interesting and challenging case.  Please call us at [phone] or text me directly on my cell#[phone] with any concerns or further questions.     59 y/o F w/ pmh of cad, dm, mdd, gerd, IBS, cirrhosis 2/2 to nonalcoholic fatty liver, bipolar, afib on coumadin, today presented to [facility] hospital for hypotension and tachycardia with new diffuse purpura to the LE ongoing the last few days and today morning waking up by R>L orbital swelling. R>L orbital swelling. Febrile in the ED Tm 101.4F  CT orbits: Nonspecific bilateral periorbital soft tissue swelling, right greater than left, as well as right facial soft tissue swelling. No discrete drainable fluid collection. No evidence of post septal involvement/orbital cellulitis.  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 Neurosurgical consultation is recommended.  CT A/P: Mild pancolitis, of infectious or inflammatory etiology.    RECOMMENDATIONS  Rash unlikely infectious and higher suspicion for inflammatory disorder and improvement may be in response to steroids  with neg micro stopped ceftriaxone   a number of studies still pending  Vaginal US done today report pending    Patient's biggest concern is leaking and tape involved with rectal tube so will defer to hospitalist as this is not likely related to an infectious process    Thank you for consulting us and involving us in the management of this most interesting and challenging case.  Please call us at [phone] or text me directly on my cell#[phone] with any concerns or further questions.

## 2023-12-26 NOTE — PROGRESS NOTE ADULT - SUBJECTIVE AND OBJECTIVE BOX
Neurology Follow up note    ANAYA [id number]    HPI:   Patient is a 58-year-old female  ,[facility] resident who presents to the emergency room with multiple medical issues.  Past medical history of CAD diabetes insomnia major depressive disorder GERD hypertension irritable bowel syndrome with diarrhea cirrhosis of the liver nonalcoholic fatty liver hepatic encephalopathy bipolar disorder chronic A-fib on Coumadin blepharitis of the left eye.    Per PCP signout patient was transferred to [facility] for reported hypotension and tachycardia diffuse purpura.  Patient had a temperature of 99.3 with a heart rate of 116 and a blood pressure of 88/60.  Patient was FEBRILE later in ER with TEMP 101 , septic workup sent ,  started on iv abx and fluids .CT abd showed pancolitis , seen by GI and ID . Found to have cellulitis of lids b/l R>L  (22 Dec 2023 10:37)      Interval History -no ha/dizziness/new weakness    Patient is seen, chart was reviewed and case was discussed with the treatment team.  Pt is not in any distress.   Lying on bed comfortably.       Vital Signs Last 24 Hrs  T(C): 36.3 (26 Dec 2023 11:26), Max: 36.6 (26 Dec 2023 00:31)  T(F): 97.4 (26 Dec 2023 11:26), Max: 97.9 (26 Dec 2023 00:31)  HR: 88 (26 Dec 2023 11:26) (88 - 101)  BP: 94/60 (26 Dec 2023 11:26) (90/60 - 103/67)  BP(mean): --  RR: 18 (26 Dec 2023 11:26) (18 - 18)  SpO2: 98% (26 Dec 2023 11:26) (96% - 98%)    Parameters below as of 26 Dec 2023 11:26  Patient On (Oxygen Delivery Method): room air                REVIEW OF SYSTEMS:    Constitutional: No fever,   Eyes: No eye pain, visual disturbances, or discharge  ENT:  No difficulty hearing, tinnitus, vertigo; No sinus or throat pain  Neck: No pain or stiffness  Respiratory: No , wheezing, chills or hemoptysis  Cardiovascular: No chest pain, palpitations,  Gastrointestinal: No abdominal or epigastric pain. No nausea, vomiting  Genitourinary: No dysuria, frequency, hematuria or incontinence  Neurological: No headaches, , numbness or tremors  Psychiatric: No , mood swings or difficulty sleeping  Skin: No itching, burning,   Lymph Nodes: No enlarged glands  Endocrine: No heat or cold intolerance;  Allergy and Immunologic: No hives or eczema    On Neurological Examination:    Mental Status - Pt is alert, awake, Follows simple  commands      Speech -  Normal.     Cranial Nerves - Pupils 3 mm equal and reactive to light, extraocular eye movements intact. Pt has no visual field deficit.  Pt has no  facial asymmetry. Facial sensation is intact.Tongue - is in midline.    Muscle tone - is normal     Motor Exam -left side 4/5  right deltoid- 2/5 elbow 3/5; hand grasp 4/5  right hip 2/5 rest 3/5    Sensory Exam - . Pt withdraws all extremities equally on stimulation. No asymmetry seen. No complaints of tingling, numbness.        coordination:    Finger to nose: normal-left        Deep tendon Reflexes - 2 plus all over.            Neck Supple -  Yes.     MEDICATIONS  (STANDING):  dextrose 5% + sodium chloride 0.45% with potassium chloride 20 mEq/L 1000 milliLiter(s) (50 mL/Hr) IV Continuous <Continuous>  dextrose 5%. 1000 milliLiter(s) (100 mL/Hr) IV Continuous <Continuous>  dextrose 5%. 1000 milliLiter(s) (50 mL/Hr) IV Continuous <Continuous>  dextrose 50% Injectable 25 Gram(s) IV Push once  dextrose 50% Injectable 12.5 Gram(s) IV Push once  dextrose 50% Injectable 25 Gram(s) IV Push once  erythromycin   Ointment 1 Application(s) Both EYES two times a day  folic acid 1 milliGRAM(s) Oral daily  glucagon  Injectable 1 milliGRAM(s) IntraMuscular once  insulin lispro (ADMELOG) corrective regimen sliding scale   SubCutaneous three times a day before meals  lactobacillus acidophilus 1 Tablet(s) Oral every 12 hours  mesalamine DR Capsule 400 milliGRAM(s) Oral three times a day  mirtazapine 30 milliGRAM(s) Oral at bedtime  pantoprazole    Tablet 40 milliGRAM(s) Oral daily  rifAXIMin 550 milliGRAM(s) Oral two times a day  spironolactone 100 milliGRAM(s) Oral daily    MEDICATIONS  (PRN):  acetaminophen     Tablet .. 650 milliGRAM(s) Oral every 6 hours PRN Temp greater or equal to 38C (100.4F), Mild Pain (1 - 3)  aluminum hydroxide/magnesium hydroxide/simethicone Suspension 30 milliLiter(s) Oral every 4 hours PRN Dyspepsia  dextrose Oral Gel 15 Gram(s) Oral once PRN Blood Glucose LESS THAN 70 milliGRAM(s)/deciliter  magnesium hydroxide Suspension 30 milliLiter(s) Oral daily PRN Constipation  melatonin 3 milliGRAM(s) Oral at bedtime PRN Insomnia  midodrine. 5 milliGRAM(s) Oral three times a day PRN SBP below 90  ondansetron Injectable 4 milliGRAM(s) IV Push every 8 hours PRN Nausea and/or Vomiting  traMADol 50 milliGRAM(s) Oral three times a day PRN Moderate Pain (4 - 6)    Allergies    No Known Allergies    Intolerances                            7.1    9.15  )-----------( 437      ( 26 Dec 2023 09:40 )             21.4     12-26    142  |  114<H>  |  4<L>  ----------------------------<  123<H>  3.1<L>   |  22  |  0.61    Ca    8.1<L>      26 Dec 2023 09:40    TPro  5.3<L>  /  Alb  2.2<L>  /  TBili  0.3  /  DBili  x   /  AST  15  /  ALT  16  /  AlkPhos  107  12-26      Hemoglobin A1C:     Vitamin B12     RADIOLOGY    < from: CT Orbit w/ IV Cont (12.22.23 @ 02:23) >    ACC: [id number] EXAM:  CT ORBITS IC   ORDERED BY: FRANKI PATEL     ACC: [id number] EXAM:  CT BRAIN   ORDERED BY: FRANKI PATEL     PROCEDURE DATE:  12/22/2023          INTERPRETATION:  CLINICAL INFORMATION: Right arm weakness. Right eye   redness and swelling.    COMPARISON: None    PROCEDURE:  Noncontrast CT of the Head was followed by contrast-enhanced CT of the   orbits. Coronal and Sagittal reformats were obtained.    CONTRAST/COMPLICATIONS:  IV Contrast: NONE (accession [id number]), IV contrast documented in   unlinked concurrent exam (accession [id number])  90 cc administered   (accession [id number]), 0 cc administered (accession [id number])   10 cc   discarded (accession [id number]), 0 cc discarded (accession [id number])  Complications: None reported at time of study completion    FINDINGS:    CT HEAD:    There is no acute intracranial hemorrhage, mass effect, midline shift,   extra-axial collection, hydrocephalus, or evidence of acute vascular   territorial infarction. Chronic left posterior frontal infarct. Mild   patchy hypodensities within the periventricular and subcortical white   matter, although nonspecific, likely reflect chronic microvascular   disease. Cerebral volume loss contributes to prominence of the ventricles   and sulci.    Mastoid air cells are clear. Calvarium is intact.    CT ORBITS:    Right periorbital and right facial soft tissue swelling. Mild left   periorbital soft tissue swelling. No discrete drainable fluid collection.   Intraorbital contents including the globes, extraocular muscles, and   optic nerves are intact. No infiltration of the retrobulbar fat.    Patchy paranasal sinus mucosal thickening and opacification most   prominent involving the ethmoid air cells.    Left laterally projecting8 x 6 x 6 mm aneurysm situated between the   takeoff of the left superior cerebellar artery and left posterior   cerebral arteries.    IMPRESSION:    CT head: No acute intracranial hemorrhage, mass effect, or evidence of   acute vascular territorial infarction. If clinical symptoms persist or   worsen, more sensitive evaluation with brain MRI may be obtained, if no   contraindications exist.    CT orbits: Nonspecific bilateral periorbital soft tissue swelling, right   greater than left, as well as right facial soft tissue swelling. No   discrete drainable fluid collection.    No evidence of post septal involvement/orbital cellulitis.    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 Neurosurgical   consultation is recommended.    Dr. Ovalle discussed the above findings with Dr. Patel at 3:20 AM via   Teams on 12/22/2023 with read back            VIRGINIA OVALLE MD; Attending Radiologist  This document has been electronically signed. Dec 22 2023  3:21AM    < from: CT Angio Head w/ IV Cont (12.23.23 @ 14:03) >    ACC: [id number] EXAM:  CT ANGIO NECK (W)AW IC   ORDERED BY: YONI MUNOZ     ACC: [id number] EXAM:  CT ANGIO BRAIN (W)AW IC   ORDERED BY: YONI MUNOZ     *** ADDENDUM # 1 ***    Dr. Pino discussed these findings with Dr. Franklin on 12/23/2023 2:44 PM   with read back.    --- End of Report ---    *** END OF ADDENDUM # 1 ***      PROCEDURE DATE:  12/23/2023          INTERPRETATION:  Clinical Indication: Periorbital pain, bilateral eyelid   cellulitis, hypotension tachycardia, diffusepurpura    5mm axial sections of the brain were obtained from base to vertex,   without the intravenous administration of contrast material. Coronal and   sagittal computer generated reconstructed views are available.    Comparison is made with the prior CT of 12/22/2023 and demonstrates no   significant interval change.      The ventricles and sulci are prominent consistent with mild atrophy.   Small vessel white matter ischemic changes are noted. There there is a   small old left frontal cortical infarct. Is no hemorrhage, mass, or shift   of midline structures. Bone window examination is unremarkable.      After the intravenous power injection of 90 cc of Omnipaque 350 using a   bolus fabiana timing run serial thin sections were obtained through the   neck from the thoracic inlet through the intracranial circulation   centered at the [id number] on a multislice CT scanner reformatted   with coronal and sagittal 2 D-MIP projections, including 3 D   reconstructions using a separate [id number] software workstation.A total   of 90 cc of Omnipaque were intravenously injected. 10 cc were discarded.    The origins of the carotid and vertebral arteries are normal. The left   vertebral artery is dominant. There is calcification at the carotid   bifurcations bilaterally without significant stenosis.      The distal vertebral arteries are well identified as are the   posterior-inferior cerebellar arteries bilaterally. The region of the   vertebral basilar junction is normal. The basilarartery is normal. At   the distal basilar artery between the posterior cerebral and superior   cerebellar artery there is a left-sided projecting aneurysm measuring 7.7   mm in length with a relatively wide neck of 5.6 mm. This is consistent   with aleft-sided superior cerebellar artery aneurysm. The aneurysm   shares the origin of the left posterior cerebral artery.    Distal cervical, petrous, cavernous and supraclinoid internal carotid   arteries. The anterior cerebral arteries and intercommunicating arteries   and right middle cerebral arteries are normal. There is a small wide   necked aneurysm of the left M1 segment at the origin of 8   lenticulostriate vessel measuring approximately 2.7 mm in craniocaudal   diameter.     The normal intracranial venous circulation is identified. The right   transverse sinus is dominant. The superior sagittal sinus, internal   cerebral veins, vein of Jose Alejandro, straight sinus, transverse sinuses,   sigmoid sinuses and internal jugular veins are normal. Cortical veins are   normal.    IMPRESSION: Mild atrophy and small vessel white matter ischemic changes.   No subarachnoid hemorrhage is identified. 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 Small   left M1 aneurysm at the origin of a lenticulostriate vessel measuring 2.7   mm.    ***Please see the addendum at the top of this report. It may contain   additional important information or changes.****    ERNY PINO MD; Attending Radiologist  This document has been electronically signed. Dec 23 2023  2:30PM  1st Addendum: ERYN PINO MD; Attending Radiologist  The first addendum was electronically signed on: Dec 23 2023  2:44PM.        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  OOB to chair/ambulation with assistance only.  Pain is accessed and addressed.  Would continue to follow.             Neurology Follow up note    ANAYA [id number]    HPI:   Patient is a 58-year-old female  ,[facility] resident who presents to the emergency room with multiple medical issues.  Past medical history of CAD diabetes insomnia major depressive disorder GERD hypertension irritable bowel syndrome with diarrhea cirrhosis of the liver nonalcoholic fatty liver hepatic encephalopathy bipolar disorder chronic A-fib on Coumadin blepharitis of the left eye.    Per PCP signout patient was transferred to [facility] for reported hypotension and tachycardia diffuse purpura.  Patient had a temperature of 99.3 with a heart rate of 116 and a blood pressure of 88/60.  Patient was FEBRILE later in ER with TEMP 101 , septic workup sent ,  started on iv abx and fluids .CT abd showed pancolitis , seen by GI and ID . Found to have cellulitis of lids b/l R>L  (22 Dec 2023 10:37)      Interval History -no ha/dizziness/new weakness    Patient is seen, chart was reviewed and case was discussed with the treatment team.  Pt is not in any distress.   Lying on bed comfortably.       Vital Signs Last 24 Hrs  T(C): 36.3 (26 Dec 2023 11:26), Max: 36.6 (26 Dec 2023 00:31)  T(F): 97.4 (26 Dec 2023 11:26), Max: 97.9 (26 Dec 2023 00:31)  HR: 88 (26 Dec 2023 11:26) (88 - 101)  BP: 94/60 (26 Dec 2023 11:26) (90/60 - 103/67)  BP(mean): --  RR: 18 (26 Dec 2023 11:26) (18 - 18)  SpO2: 98% (26 Dec 2023 11:26) (96% - 98%)    Parameters below as of 26 Dec 2023 11:26  Patient On (Oxygen Delivery Method): room air                REVIEW OF SYSTEMS:    Constitutional: No fever,   Eyes: No eye pain, visual disturbances, or discharge  ENT:  No difficulty hearing, tinnitus, vertigo; No sinus or throat pain  Neck: No pain or stiffness  Respiratory: No , wheezing, chills or hemoptysis  Cardiovascular: No chest pain, palpitations,  Gastrointestinal: No abdominal or epigastric pain. No nausea, vomiting  Genitourinary: No dysuria, frequency, hematuria or incontinence  Neurological: No headaches, , numbness or tremors  Psychiatric: No , mood swings or difficulty sleeping  Skin: No itching, burning,   Lymph Nodes: No enlarged glands  Endocrine: No heat or cold intolerance;  Allergy and Immunologic: No hives or eczema    On Neurological Examination:    Mental Status - Pt is alert, awake, Follows simple  commands      Speech -  Normal.     Cranial Nerves - Pupils 3 mm equal and reactive to light, extraocular eye movements intact. Pt has no visual field deficit.  Pt has no  facial asymmetry. Facial sensation is intact.Tongue - is in midline.    Muscle tone - is normal     Motor Exam -left side 4/5  right deltoid- 2/5 elbow 3/5; hand grasp 4/5  right hip 2/5 rest 3/5    Sensory Exam - . Pt withdraws all extremities equally on stimulation. No asymmetry seen. No complaints of tingling, numbness.        coordination:    Finger to nose: normal-left        Deep tendon Reflexes - 2 plus all over.            Neck Supple -  Yes.     MEDICATIONS  (STANDING):  dextrose 5% + sodium chloride 0.45% with potassium chloride 20 mEq/L 1000 milliLiter(s) (50 mL/Hr) IV Continuous <Continuous>  dextrose 5%. 1000 milliLiter(s) (100 mL/Hr) IV Continuous <Continuous>  dextrose 5%. 1000 milliLiter(s) (50 mL/Hr) IV Continuous <Continuous>  dextrose 50% Injectable 25 Gram(s) IV Push once  dextrose 50% Injectable 12.5 Gram(s) IV Push once  dextrose 50% Injectable 25 Gram(s) IV Push once  erythromycin   Ointment 1 Application(s) Both EYES two times a day  folic acid 1 milliGRAM(s) Oral daily  glucagon  Injectable 1 milliGRAM(s) IntraMuscular once  insulin lispro (ADMELOG) corrective regimen sliding scale   SubCutaneous three times a day before meals  lactobacillus acidophilus 1 Tablet(s) Oral every 12 hours  mesalamine DR Capsule 400 milliGRAM(s) Oral three times a day  mirtazapine 30 milliGRAM(s) Oral at bedtime  pantoprazole    Tablet 40 milliGRAM(s) Oral daily  rifAXIMin 550 milliGRAM(s) Oral two times a day  spironolactone 100 milliGRAM(s) Oral daily    MEDICATIONS  (PRN):  acetaminophen     Tablet .. 650 milliGRAM(s) Oral every 6 hours PRN Temp greater or equal to 38C (100.4F), Mild Pain (1 - 3)  aluminum hydroxide/magnesium hydroxide/simethicone Suspension 30 milliLiter(s) Oral every 4 hours PRN Dyspepsia  dextrose Oral Gel 15 Gram(s) Oral once PRN Blood Glucose LESS THAN 70 milliGRAM(s)/deciliter  magnesium hydroxide Suspension 30 milliLiter(s) Oral daily PRN Constipation  melatonin 3 milliGRAM(s) Oral at bedtime PRN Insomnia  midodrine. 5 milliGRAM(s) Oral three times a day PRN SBP below 90  ondansetron Injectable 4 milliGRAM(s) IV Push every 8 hours PRN Nausea and/or Vomiting  traMADol 50 milliGRAM(s) Oral three times a day PRN Moderate Pain (4 - 6)    Allergies    No Known Allergies    Intolerances                            7.1    9.15  )-----------( 437      ( 26 Dec 2023 09:40 )             21.4     12-26    142  |  114<H>  |  4<L>  ----------------------------<  123<H>  3.1<L>   |  22  |  0.61    Ca    8.1<L>      26 Dec 2023 09:40    TPro  5.3<L>  /  Alb  2.2<L>  /  TBili  0.3  /  DBili  x   /  AST  15  /  ALT  16  /  AlkPhos  107  12-26      Hemoglobin A1C:     Vitamin B12     RADIOLOGY    < from: CT Orbit w/ IV Cont (12.22.23 @ 02:23) >    ACC: [id number] EXAM:  CT ORBITS IC   ORDERED BY: FRANKI PATEL     ACC: [id number] EXAM:  CT BRAIN   ORDERED BY: FRANKI PATEL     PROCEDURE DATE:  12/22/2023          INTERPRETATION:  CLINICAL INFORMATION: Right arm weakness. Right eye   redness and swelling.    COMPARISON: None    PROCEDURE:  Noncontrast CT of the Head was followed by contrast-enhanced CT of the   orbits. Coronal and Sagittal reformats were obtained.    CONTRAST/COMPLICATIONS:  IV Contrast: NONE (accession [id number]), IV contrast documented in   unlinked concurrent exam (accession [id number])  90 cc administered   (accession [id number]), 0 cc administered (accession [id number])   10 cc   discarded (accession [id number]), 0 cc discarded (accession [id number])  Complications: None reported at time of study completion    FINDINGS:    CT HEAD:    There is no acute intracranial hemorrhage, mass effect, midline shift,   extra-axial collection, hydrocephalus, or evidence of acute vascular   territorial infarction. Chronic left posterior frontal infarct. Mild   patchy hypodensities within the periventricular and subcortical white   matter, although nonspecific, likely reflect chronic microvascular   disease. Cerebral volume loss contributes to prominence of the ventricles   and sulci.    Mastoid air cells are clear. Calvarium is intact.    CT ORBITS:    Right periorbital and right facial soft tissue swelling. Mild left   periorbital soft tissue swelling. No discrete drainable fluid collection.   Intraorbital contents including the globes, extraocular muscles, and   optic nerves are intact. No infiltration of the retrobulbar fat.    Patchy paranasal sinus mucosal thickening and opacification most   prominent involving the ethmoid air cells.    Left laterally projecting8 x 6 x 6 mm aneurysm situated between the   takeoff of the left superior cerebellar artery and left posterior   cerebral arteries.    IMPRESSION:    CT head: No acute intracranial hemorrhage, mass effect, or evidence of   acute vascular territorial infarction. If clinical symptoms persist or   worsen, more sensitive evaluation with brain MRI may be obtained, if no   contraindications exist.    CT orbits: Nonspecific bilateral periorbital soft tissue swelling, right   greater than left, as well as right facial soft tissue swelling. No   discrete drainable fluid collection.    No evidence of post septal involvement/orbital cellulitis.    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 Neurosurgical   consultation is recommended.    Dr. Ovalle discussed the above findings with Dr. Patel at 3:20 AM via   Teams on 12/22/2023 with read back            VIRGINIA OVALLE MD; Attending Radiologist  This document has been electronically signed. Dec 22 2023  3:21AM    < from: CT Angio Head w/ IV Cont (12.23.23 @ 14:03) >    ACC: [id number] EXAM:  CT ANGIO NECK (W)AW IC   ORDERED BY: YONI MUNOZ     ACC: [id number] EXAM:  CT ANGIO BRAIN (W)AW IC   ORDERED BY: YONI MUNOZ     *** ADDENDUM # 1 ***    Dr. Pino discussed these findings with Dr. Franklin on 12/23/2023 2:44 PM   with read back.    --- End of Report ---    *** END OF ADDENDUM # 1 ***      PROCEDURE DATE:  12/23/2023          INTERPRETATION:  Clinical Indication: Periorbital pain, bilateral eyelid   cellulitis, hypotension tachycardia, diffusepurpura    5mm axial sections of the brain were obtained from base to vertex,   without the intravenous administration of contrast material. Coronal and   sagittal computer generated reconstructed views are available.    Comparison is made with the prior CT of 12/22/2023 and demonstrates no   significant interval change.      The ventricles and sulci are prominent consistent with mild atrophy.   Small vessel white matter ischemic changes are noted. There there is a   small old left frontal cortical infarct. Is no hemorrhage, mass, or shift   of midline structures. Bone window examination is unremarkable.      After the intravenous power injection of 90 cc of Omnipaque 350 using a   bolus fabiana timing run serial thin sections were obtained through the   neck from the thoracic inlet through the intracranial circulation   centered at the [id number] on a multislice CT scanner reformatted   with coronal and sagittal 2 D-MIP projections, including 3 D   reconstructions using a separate [id number] software workstation.A total   of 90 cc of Omnipaque were intravenously injected. 10 cc were discarded.    The origins of the carotid and vertebral arteries are normal. The left   vertebral artery is dominant. There is calcification at the carotid   bifurcations bilaterally without significant stenosis.      The distal vertebral arteries are well identified as are the   posterior-inferior cerebellar arteries bilaterally. The region of the   vertebral basilar junction is normal. The basilarartery is normal. At   the distal basilar artery between the posterior cerebral and superior   cerebellar artery there is a left-sided projecting aneurysm measuring 7.7   mm in length with a relatively wide neck of 5.6 mm. This is consistent   with aleft-sided superior cerebellar artery aneurysm. The aneurysm   shares the origin of the left posterior cerebral artery.    Distal cervical, petrous, cavernous and supraclinoid internal carotid   arteries. The anterior cerebral arteries and intercommunicating arteries   and right middle cerebral arteries are normal. There is a small wide   necked aneurysm of the left M1 segment at the origin of 8   lenticulostriate vessel measuring approximately 2.7 mm in craniocaudal   diameter.     The normal intracranial venous circulation is identified. The right   transverse sinus is dominant. The superior sagittal sinus, internal   cerebral veins, vein of Jose Alejandro, straight sinus, transverse sinuses,   sigmoid sinuses and internal jugular veins are normal. Cortical veins are   normal.    IMPRESSION: Mild atrophy and small vessel white matter ischemic changes.   No subarachnoid hemorrhage is identified. 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 Small   left M1 aneurysm at the origin of a lenticulostriate vessel measuring 2.7   mm.    ***Please see the addendum at the top of this report. It may contain   additional important information or changes.****    ERYN PINO MD; Attending Radiologist  This document has been electronically signed. Dec 23 2023  2:30PM  1st Addendum: ERYN PINO MD; Attending Radiologist  The first addendum was electronically signed on: Dec 23 2023  2:44PM.        ASSESSMENT AND PLAN:      seen for aneurysm originating between Left SCA and PCOM  LIKELY INCIDENTAL  chronic weakness of upper and lower with rash etiology unclear-  ?inflammatory myositis/?vasculitis    cta of the head/neck - confirmed sca/pcom aneurysm  also tiny left aneurysm  for transfer to [facility]  Physical therapy evaluation.  OOB to chair/ambulation with assistance only.  Pain is accessed and addressed.  Would continue to follow.

## 2023-12-26 NOTE — PROGRESS NOTE ADULT - SUBJECTIVE AND OBJECTIVE BOX
[facility]  Tez Olviera PA-C  [address] [place] [address]  [phone]      INTERVAL HPI/OVERNIGHT EVENTS:  Pt s/e  +loose bloody BM in rectal tube bag    MEDICATIONS  (STANDING):  dextrose 5% + sodium chloride 0.45% with potassium chloride 20 mEq/L 1000 milliLiter(s) (50 mL/Hr) IV Continuous <Continuous>  dextrose 5%. 1000 milliLiter(s) (100 mL/Hr) IV Continuous <Continuous>  dextrose 5%. 1000 milliLiter(s) (50 mL/Hr) IV Continuous <Continuous>  dextrose 50% Injectable 25 Gram(s) IV Push once  dextrose 50% Injectable 25 Gram(s) IV Push once  dextrose 50% Injectable 12.5 Gram(s) IV Push once  erythromycin   Ointment 1 Application(s) Both EYES two times a day  folic acid 1 milliGRAM(s) Oral daily  glucagon  Injectable 1 milliGRAM(s) IntraMuscular once  insulin lispro (ADMELOG) corrective regimen sliding scale   SubCutaneous three times a day before meals  lactobacillus acidophilus 1 Tablet(s) Oral every 12 hours  mesalamine DR Capsule 400 milliGRAM(s) Oral three times a day  mirtazapine 30 milliGRAM(s) Oral at bedtime  pantoprazole    Tablet 40 milliGRAM(s) Oral daily  rifAXIMin 550 milliGRAM(s) Oral two times a day  spironolactone 100 milliGRAM(s) Oral daily    MEDICATIONS  (PRN):  acetaminophen     Tablet .. 650 milliGRAM(s) Oral every 6 hours PRN Temp greater or equal to 38C (100.4F), Mild Pain (1 - 3)  aluminum hydroxide/magnesium hydroxide/simethicone Suspension 30 milliLiter(s) Oral every 4 hours PRN Dyspepsia  dextrose Oral Gel 15 Gram(s) Oral once PRN Blood Glucose LESS THAN 70 milliGRAM(s)/deciliter  magnesium hydroxide Suspension 30 milliLiter(s) Oral daily PRN Constipation  melatonin 3 milliGRAM(s) Oral at bedtime PRN Insomnia  midodrine. 5 milliGRAM(s) Oral three times a day PRN SBP below 90  ondansetron Injectable 4 milliGRAM(s) IV Push every 8 hours PRN Nausea and/or Vomiting  traMADol 50 milliGRAM(s) Oral three times a day PRN Moderate Pain (4 - 6)      Allergies    No Known Allergies          PHYSICAL EXAM:   Vital Signs:  Vital Signs Last 24 Hrs  T(C): 36.3 (26 Dec 2023 11:26), Max: 36.6 (26 Dec 2023 00:31)  T(F): 97.4 (26 Dec 2023 11:26), Max: 97.9 (26 Dec 2023 00:31)  HR: 88 (26 Dec 2023 11:26) (88 - 101)  BP: 94/60 (26 Dec 2023 11:26) (90/60 - 103/67)  BP(mean): --  RR: 18 (26 Dec 2023 11:26) (18 - 18)  SpO2: 98% (26 Dec 2023 11:26) (96% - 98%)    Parameters below as of 26 Dec 2023 11:26  Patient On (Oxygen Delivery Method): room air      Daily     Daily     GENERAL:  Appears stated age  HEENT:  NC/AT  CHEST:  Full & symmetric excursion  HEART:  Regular rhythm  ABDOMEN:  Soft, non-tender, non-distended  EXTEREMITIES:  no cyanosis  SKIN:  No rash  NEURO:  Alert      LABS:                        7.1    9.15  )-----------( 437      ( 26 Dec 2023 09:40 )             21.4     12-26    142  |  114<H>  |  4<L>  ----------------------------<  123<H>  3.1<L>   |  22  |  0.61    Ca    8.1<L>      26 Dec 2023 09:40    TPro  5.3<L>  /  Alb  2.2<L>  /  TBili  0.3  /  DBili  x   /  AST  15  /  ALT  16  /  AlkPhos  107  12-26    PT/INR - ( 26 Dec 2023 09:40 )   PT: 20.3 sec;   INR: 1.76 ratio           Urinalysis Basic - ( 26 Dec 2023 09:40 )    Color: x / Appearance: x / SG: x / pH: x  Gluc: 123 mg/dL / Ketone: x  / Bili: x / Urobili: x   Blood: x / Protein: x / Nitrite: x   Leuk Esterase: x / RBC: x / WBC x   Sq Epi: x / Non Sq Epi: x / Bacteria: x   [facility]  Tez Olivera PA-C  [address] [place] [address]  [phone]      INTERVAL HPI/OVERNIGHT EVENTS:  Pt s/e  +loose bloody BM in rectal tube bag    MEDICATIONS  (STANDING):  dextrose 5% + sodium chloride 0.45% with potassium chloride 20 mEq/L 1000 milliLiter(s) (50 mL/Hr) IV Continuous <Continuous>  dextrose 5%. 1000 milliLiter(s) (100 mL/Hr) IV Continuous <Continuous>  dextrose 5%. 1000 milliLiter(s) (50 mL/Hr) IV Continuous <Continuous>  dextrose 50% Injectable 25 Gram(s) IV Push once  dextrose 50% Injectable 25 Gram(s) IV Push once  dextrose 50% Injectable 12.5 Gram(s) IV Push once  erythromycin   Ointment 1 Application(s) Both EYES two times a day  folic acid 1 milliGRAM(s) Oral daily  glucagon  Injectable 1 milliGRAM(s) IntraMuscular once  insulin lispro (ADMELOG) corrective regimen sliding scale   SubCutaneous three times a day before meals  lactobacillus acidophilus 1 Tablet(s) Oral every 12 hours  mesalamine DR Capsule 400 milliGRAM(s) Oral three times a day  mirtazapine 30 milliGRAM(s) Oral at bedtime  pantoprazole    Tablet 40 milliGRAM(s) Oral daily  rifAXIMin 550 milliGRAM(s) Oral two times a day  spironolactone 100 milliGRAM(s) Oral daily    MEDICATIONS  (PRN):  acetaminophen     Tablet .. 650 milliGRAM(s) Oral every 6 hours PRN Temp greater or equal to 38C (100.4F), Mild Pain (1 - 3)  aluminum hydroxide/magnesium hydroxide/simethicone Suspension 30 milliLiter(s) Oral every 4 hours PRN Dyspepsia  dextrose Oral Gel 15 Gram(s) Oral once PRN Blood Glucose LESS THAN 70 milliGRAM(s)/deciliter  magnesium hydroxide Suspension 30 milliLiter(s) Oral daily PRN Constipation  melatonin 3 milliGRAM(s) Oral at bedtime PRN Insomnia  midodrine. 5 milliGRAM(s) Oral three times a day PRN SBP below 90  ondansetron Injectable 4 milliGRAM(s) IV Push every 8 hours PRN Nausea and/or Vomiting  traMADol 50 milliGRAM(s) Oral three times a day PRN Moderate Pain (4 - 6)      Allergies    No Known Allergies          PHYSICAL EXAM:   Vital Signs:  Vital Signs Last 24 Hrs  T(C): 36.3 (26 Dec 2023 11:26), Max: 36.6 (26 Dec 2023 00:31)  T(F): 97.4 (26 Dec 2023 11:26), Max: 97.9 (26 Dec 2023 00:31)  HR: 88 (26 Dec 2023 11:26) (88 - 101)  BP: 94/60 (26 Dec 2023 11:26) (90/60 - 103/67)  BP(mean): --  RR: 18 (26 Dec 2023 11:26) (18 - 18)  SpO2: 98% (26 Dec 2023 11:26) (96% - 98%)    Parameters below as of 26 Dec 2023 11:26  Patient On (Oxygen Delivery Method): room air      Daily     Daily     GENERAL:  Appears stated age  HEENT:  NC/AT  CHEST:  Full & symmetric excursion  HEART:  Regular rhythm  ABDOMEN:  Soft, non-tender, non-distended  EXTEREMITIES:  no cyanosis  SKIN:  No rash  NEURO:  Alert      LABS:                        7.1    9.15  )-----------( 437      ( 26 Dec 2023 09:40 )             21.4     12-26    142  |  114<H>  |  4<L>  ----------------------------<  123<H>  3.1<L>   |  22  |  0.61    Ca    8.1<L>      26 Dec 2023 09:40    TPro  5.3<L>  /  Alb  2.2<L>  /  TBili  0.3  /  DBili  x   /  AST  15  /  ALT  16  /  AlkPhos  107  12-26    PT/INR - ( 26 Dec 2023 09:40 )   PT: 20.3 sec;   INR: 1.76 ratio           Urinalysis Basic - ( 26 Dec 2023 09:40 )    Color: x / Appearance: x / SG: x / pH: x  Gluc: 123 mg/dL / Ketone: x  / Bili: x / Urobili: x   Blood: x / Protein: x / Nitrite: x   Leuk Esterase: x / RBC: x / WBC x   Sq Epi: x / Non Sq Epi: x / Bacteria: x

## 2023-12-26 NOTE — PROGRESS NOTE ADULT - ASSESSMENT
Patient is a 58-year-old female  ,[facility] resident who presents to the emergency room with multiple medical issues.  Past medical history of CAD diabetes insomnia major depressive disorder GERD hypertension irritable bowel syndrome with diarrhea cirrhosis of the liver nonalcoholic fatty liver hepatic encephalopathy bipolar disorder chronic A-fib on Coumadin blepharitis of the left eye.    Per PCP signout patient was transferred to [place] for reported hypotension and tachycardia diffuse purpura.  Patient had a temperature of 99.3 with a heart rate of 116 and a blood pressure of 88/60.  Patient was FEBRILE later in ER with TEMP 101 , septic workup sent ,  started on iv abx and fluids .CT abd showed pancolitis , seen by GI and ID . Found to have cellulitis of lids b/l R>L     Patient is a 58-year-old female  ,[facility] resident who presents to the emergency room with multiple medical issues.  Past medical history of CAD diabetes insomnia major depressive disorder GERD hypertension irritable bowel syndrome with diarrhea cirrhosis of the liver nonalcoholic fatty liver hepatic encephalopathy bipolar disorder chronic A-fib on Coumadin blepharitis of the left eye.    Per PCP signout patient was transferred to [place] for reported hypotension and tachycardia diffuse purpura.  Patient had a temperature of 99.3 with a heart rate of 116 and a blood pressure of 88/60.  Patient was FEBRILE later in ER with TEMP 101 , septic workup sent ,  started on iv abx and fluids .CT abd showed pancolitis , seen by GI and ID . Found to have cellulitis of lids b/l R>L

## 2023-12-26 NOTE — PROGRESS NOTE ADULT - SUBJECTIVE AND OBJECTIVE BOX
CHIEF COMPLAINT/ REASON FOR VISIT  .. Patient was seen to address the  issue listed under PROBLEM LIST which is located toward bottom of this note     ANAYA GONZALES 3WES 364 W1    Allergies    No Known Allergies    Intolerances        PAST MEDICAL & SURGICAL HISTORY:  MDD (major depressive disorder)      GERD (gastroesophageal reflux disease)      Ulcerative colitis      HTN (hypertension)      DM (diabetes mellitus)      Arteriosclerotic heart disease (ASHD)      IBS (irritable bowel syndrome)      History of ataxia      Liver cirrhosis      Nonalcoholic fatty liver disease without nonalcoholic steatohepatitis (PATEL)      Hepatic encephalopathy      Bipolar illness      Chronic atrial fibrillation      Moderate protein-calorie malnutrition      OA (osteoarthritis)      Blepharitis, bilateral      Brain aneurysm      Uterine fibroid      History of cholecystectomy          FAMILY HISTORY:      Home Medications:  Acidophilus oral tablet: 1 tab(s) orally 2 times a day (23 Dec 2023 15:42)  Aspercreme with Lidocaine 4% topical cream: Apply topically to affected area 2 times a day (23 Dec 2023 15:42)  carvedilol 3.125 mg oral tablet: 1 tab(s) orally 2 times a day (23 Dec 2023 15:42)  Entresto 24 mg-26 mg oral tablet: 1 tab(s) orally 2 times a day (23 Dec 2023 15:42)  erythromycin 0.5% ophthalmic ointment: 1 application in each affected eye once a day (23 Dec 2023 15:42)  folic acid 1 mg oral tablet: 1 tab(s) orally once a day (23 Dec 2023 15:42)  Lantus Solostar Pen 100 units/mL subcutaneous solution: 4 unit(s) subcutaneous once a day (at bedtime) (23 Dec 2023 15:42)  melatonin 3 mg oral tablet: 1 tab(s) orally once a day as needed for  insomnia (23 Dec 2023 15:42)  mirtazapine 30 mg oral tablet: 1 tab(s) orally once a day (23 Dec 2023 15:42)  pantoprazole 40 mg oral delayed release tablet: 1 tab(s) orally once a day (23 Dec 2023 15:42)  spironolactone 100 mg oral tablet: 1 tab(s) orally once a day (23 Dec 2023 15:42)  warfarin 6 mg oral tablet: 1 tab(s) orally once a day (23 Dec 2023 15:42)  Xifaxan 550 mg oral tablet: 1 tab(s) orally 2 times a day (23 Dec 2023 15:42)      MEDICATIONS  (STANDING):  dextrose 5% + sodium chloride 0.45%. 1000 milliLiter(s) (100 mL/Hr) IV Continuous <Continuous>  dextrose 5%. 1000 milliLiter(s) (100 mL/Hr) IV Continuous <Continuous>  dextrose 5%. 1000 milliLiter(s) (50 mL/Hr) IV Continuous <Continuous>  dextrose 50% Injectable 25 Gram(s) IV Push once  dextrose 50% Injectable 12.5 Gram(s) IV Push once  dextrose 50% Injectable 25 Gram(s) IV Push once  erythromycin   Ointment 1 Application(s) Both EYES two times a day  folic acid 1 milliGRAM(s) Oral daily  glucagon  Injectable 1 milliGRAM(s) IntraMuscular once  insulin lispro (ADMELOG) corrective regimen sliding scale   SubCutaneous three times a day before meals  lactobacillus acidophilus 1 Tablet(s) Oral every 12 hours  mirtazapine 30 milliGRAM(s) Oral at bedtime  pantoprazole    Tablet 40 milliGRAM(s) Oral daily  rifAXIMin 550 milliGRAM(s) Oral two times a day  senna 2 Tablet(s) Oral at bedtime  spironolactone 100 milliGRAM(s) Oral daily    MEDICATIONS  (PRN):  acetaminophen     Tablet .. 650 milliGRAM(s) Oral every 6 hours PRN Temp greater or equal to 38C (100.4F), Mild Pain (1 - 3)  aluminum hydroxide/magnesium hydroxide/simethicone Suspension 30 milliLiter(s) Oral every 4 hours PRN Dyspepsia  dextrose Oral Gel 15 Gram(s) Oral once PRN Blood Glucose LESS THAN 70 milliGRAM(s)/deciliter  magnesium hydroxide Suspension 30 milliLiter(s) Oral daily PRN Constipation  melatonin 3 milliGRAM(s) Oral at bedtime PRN Insomnia  midodrine. 5 milliGRAM(s) Oral three times a day PRN SBP below 90  ondansetron Injectable 4 milliGRAM(s) IV Push every 8 hours PRN Nausea and/or Vomiting  traMADol 50 milliGRAM(s) Oral three times a day PRN Moderate Pain (4 - 6)      Diet, Clear Liquid:   Supplement Feeding Modality:  Oral  Ensure Clear Cans or Servings Per Day:  1       Frequency:  Three Times a day (12-24-23 @ 11:09) [Pending Verification By Attending]          Vital Signs Last 24 Hrs  T(C): 36.4 (26 Dec 2023 05:15), Max: 36.7 (25 Dec 2023 12:00)  T(F): 97.5 (26 Dec 2023 05:15), Max: 98 (25 Dec 2023 12:00)  HR: 96 (26 Dec 2023 05:15) (88 - 101)  BP: 90/60 (26 Dec 2023 05:15) (90/60 - 103/67)  BP(mean): --  RR: 18 (26 Dec 2023 05:15) (18 - 18)  SpO2: 96% (26 Dec 2023 05:15) (96% - 98%)    Parameters below as of 26 Dec 2023 05:15  Patient On (Oxygen Delivery Method): room air          12-25-23 @ 07:01  -  12-26-23 @ 07:00  --------------------------------------------------------  IN: 0 mL / OUT: 501 mL / NET: -501 mL              LABS:                        7.8    7.73  )-----------( 339      ( 25 Dec 2023 07:12 )             24.1     12-25    140  |  110<H>  |  9   ----------------------------<  106<H>  3.2<L>   |  22  |  0.58    Ca    8.0<L>      25 Dec 2023 07:12    TPro  5.1<L>  /  Alb  2.2<L>  /  TBili  0.4  /  DBili  x   /  AST  21  /  ALT  17  /  AlkPhos  100  12-25    PT/INR - ( 25 Dec 2023 07:12 )   PT: 18.9 sec;   INR: 1.64 ratio           Urinalysis Basic - ( 25 Dec 2023 07:12 )    Color: x / Appearance: x / SG: x / pH: x  Gluc: 106 mg/dL / Ketone: x  / Bili: x / Urobili: x   Blood: x / Protein: x / Nitrite: x   Leuk Esterase: x / RBC: x / WBC x   Sq Epi: x / Non Sq Epi: x / Bacteria: x            WBC:  WBC Count: 7.73 K/uL (12-25 @ 07:12)  WBC Count: 7.59 K/uL (12-23 @ 07:30)      MICROBIOLOGY:  RECENT CULTURES:  12-23 Clean Catch Clean Catch (Midstream) XXXX XXXX   No growth    12-21 .Blood Blood-Peripheral XXXX XXXX   No growth at 4 days    12-21 .Blood Blood-Peripheral XXXX XXXX   No growth at 4 days                PT/INR - ( 25 Dec 2023 07:12 )   PT: 18.9 sec;   INR: 1.64 ratio             Sodium:  Sodium: 140 mmol/L (12-25 @ 07:12)  Sodium: 141 mmol/L (12-24 @ 05:03)  Sodium: 138 mmol/L (12-23 @ 07:30)      0.58 mg/dL 12-25 @ 07:12  0.61 mg/dL 12-24 @ 05:03  0.53 mg/dL 12-23 @ 07:30      Hemoglobin:  Hemoglobin: 7.8 g/dL (12-25 @ 07:12)  Hemoglobin: 7.2 g/dL (12-23 @ 07:30)      Platelets: Platelet Count - Automated: 339 K/uL (12-25 @ 07:12)  Platelet Count - Automated: 365 K/uL (12-23 @ 07:30)      LIVER FUNCTIONS - ( 25 Dec 2023 07:12 )  Alb: 2.2 g/dL / Pro: 5.1 g/dL / ALK PHOS: 100 U/L / ALT: 17 U/L / AST: 21 U/L / GGT: x             Urinalysis Basic - ( 25 Dec 2023 07:12 )    Color: x / Appearance: x / SG: x / pH: x  Gluc: 106 mg/dL / Ketone: x  / Bili: x / Urobili: x   Blood: x / Protein: x / Nitrite: x   Leuk Esterase: x / RBC: x / WBC x   Sq Epi: x / Non Sq Epi: x / Bacteria: x        RADIOLOGY & ADDITIONAL STUDIES:      MICROBIOLOGY:  RECENT CULTURES:  12-23 Clean Catch Clean Catch (Midstream) XXXX XXXX   No growth    12-21 .Blood Blood-Peripheral XXXX XXXX   No growth at 4 days    12-21 .Blood Blood-Peripheral XXXX XXXX   No growth at 4 days

## 2023-12-26 NOTE — PROGRESS NOTE ADULT - ASSESSMENT
ulcerative colitis  abnormal ct  pancolitis    pt having loose bloody BM with hx UC  starting mesalamine 400mg tid  reg diet  will follow     Advanced care planning was discussed with patient and family.  Advanced care planning forms were reviewed and discussed.  Risks, benefits and alternatives of gastroenterologic procedures were discussed in detail and all questions were answered.    30 minutes spent.

## 2023-12-26 NOTE — PROGRESS NOTE ADULT - SUBJECTIVE AND OBJECTIVE BOX
[facility] DIV[facility] of INFECTIOUS DISEASE  Eddie Werner MD PhD, Alexa Hill MD, Emily Urbano MD, Kirti Garza MD, Brando Arguelles MD  and providing coverage with Brandi Carter MD  Providing Infectious Disease Consultations at [facility], [facility], [facility]'s    Office# [phone] to schedule follow up appointments  Answering Service for urgent calls or New Consults [phone]  Cell# to text for urgent issues Eddie Werner [phone]     infectious diseases progress note:    ANAYA JOINER is a 58y y. o. Female patient    Overnight and events of the last 24hrs reviewed    Allergies    No Known Allergies    Intolerances        ANTIBIOTICS/RELEVANT:  antimicrobials  rifAXIMin 550 milliGRAM(s) Oral two times a day    immunologic:    OTHER:  acetaminophen     Tablet .. 650 milliGRAM(s) Oral every 6 hours PRN  aluminum hydroxide/magnesium hydroxide/simethicone Suspension 30 milliLiter(s) Oral every 4 hours PRN  dextrose 5%. 1000 milliLiter(s) IV Continuous <Continuous>  dextrose 5%. 1000 milliLiter(s) IV Continuous <Continuous>  dextrose 50% Injectable 25 Gram(s) IV Push once  dextrose 50% Injectable 25 Gram(s) IV Push once  dextrose 50% Injectable 12.5 Gram(s) IV Push once  dextrose Oral Gel 15 Gram(s) Oral once PRN  erythromycin   Ointment 1 Application(s) Both EYES two times a day  folic acid 1 milliGRAM(s) Oral daily  glucagon  Injectable 1 milliGRAM(s) IntraMuscular once  insulin lispro (ADMELOG) corrective regimen sliding scale   SubCutaneous three times a day before meals  lactobacillus acidophilus 1 Tablet(s) Oral every 12 hours  magnesium hydroxide Suspension 30 milliLiter(s) Oral daily PRN  melatonin 3 milliGRAM(s) Oral at bedtime PRN  mesalamine DR Capsule 400 milliGRAM(s) Oral three times a day  midodrine. 5 milliGRAM(s) Oral three times a day PRN  mirtazapine 30 milliGRAM(s) Oral at bedtime  ondansetron Injectable 4 milliGRAM(s) IV Push every 8 hours PRN  pantoprazole    Tablet 40 milliGRAM(s) Oral daily  senna 2 Tablet(s) Oral at bedtime  spironolactone 100 milliGRAM(s) Oral daily  traMADol 50 milliGRAM(s) Oral three times a day PRN      Objective:  Vital Signs Last 24 Hrs  T(C): 36.3 (26 Dec 2023 11:26), Max: 36.6 (26 Dec 2023 00:31)  T(F): 97.4 (26 Dec 2023 11:26), Max: 97.9 (26 Dec 2023 00:31)  HR: 88 (26 Dec 2023 11:26) (88 - 101)  BP: 94/60 (26 Dec 2023 11:26) (90/60 - 103/67)  BP(mean): --  RR: 18 (26 Dec 2023 11:26) (18 - 18)  SpO2: 98% (26 Dec 2023 11:26) (96% - 98%)    Parameters below as of 26 Dec 2023 11:26  Patient On (Oxygen Delivery Method): room air        T(C): 36.3 (12-26-23 @ 11:26), Max: 36.9 (12-25-23 @ 05:04)  T(C): 36.3 (12-26-23 @ 11:26), Max: 37.4 (12-24-23 @ 06:01)  T(C): 36.3 (12-26-23 @ 11:26), Max: 37.4 (12-24-23 @ 06:01)    PHYSICAL EXAM:  HEENT: NC atraumatic  Neck: supple  Respiratory: no accessory muscle use, breathing comfortably  Cardiovascular: distant  Gastrointestinal: normal appearing, nondistended  Extremities: no clubbing, no cyanosis, some decrease in swelling  Skin: reduced rash        LABS:                          7.1    9.15  )-----------( 437      ( 26 Dec 2023 09:40 )             21.4       WBC  9.15 12-26 @ 09:40  7.73 12-25 @ 07:12  7.59 12-23 @ 07:30  12.06 12-21 @ 22:30      12-26    142  |  114<H>  |  4<L>  ----------------------------<  123<H>  3.1<L>   |  22  |  0.61    Ca    8.1<L>      26 Dec 2023 09:40    TPro  5.3<L>  /  Alb  2.2<L>  /  TBili  0.3  /  DBili  x   /  AST  15  /  ALT  16  /  AlkPhos  107  12-26      Creatinine: 0.61 mg/dL (12-26-23 @ 09:40)  Creatinine: 0.58 mg/dL (12-25-23 @ 07:12)  Creatinine: 0.61 mg/dL (12-24-23 @ 05:03)  Creatinine: 0.53 mg/dL (12-23-23 @ 07:30)  Creatinine: 0.68 mg/dL (12-21-23 @ 22:30)      PT/INR - ( 26 Dec 2023 09:40 )   PT: 20.3 sec;   INR: 1.76 ratio           Urinalysis Basic - ( 26 Dec 2023 09:40 )    Color: x / Appearance: x / SG: x / pH: x  Gluc: 123 mg/dL / Ketone: x  / Bili: x / Urobili: x   Blood: x / Protein: x / Nitrite: x   Leuk Esterase: x / RBC: x / WBC x   Sq Epi: x / Non Sq Epi: x / Bacteria: x            INFLAMMATORY MARKERS      MICROBIOLOGY:              RADIOLOGY & ADDITIONAL STUDIES:   [facility] DIV[facility] of INFECTIOUS DISEASE  Eddie Werner MD PhD, Alexa Hill MD, Emily Urbano MD, Kirti Garza MD, Branod Arguelles MD  and providing coverage with Brandi Carter MD  Providing Infectious Disease Consultations at [facility], [facility], [facility]'s    Office# [phone] to schedule follow up appointments  Answering Service for urgent calls or New Consults [phone]  Cell# to text for urgent issues Eddie Werner [phone]     infectious diseases progress note:    ANAYA JOINER is a 58y y. o. Female patient    Overnight and events of the last 24hrs reviewed    Allergies    No Known Allergies    Intolerances        ANTIBIOTICS/RELEVANT:  antimicrobials  rifAXIMin 550 milliGRAM(s) Oral two times a day    immunologic:    OTHER:  acetaminophen     Tablet .. 650 milliGRAM(s) Oral every 6 hours PRN  aluminum hydroxide/magnesium hydroxide/simethicone Suspension 30 milliLiter(s) Oral every 4 hours PRN  dextrose 5%. 1000 milliLiter(s) IV Continuous <Continuous>  dextrose 5%. 1000 milliLiter(s) IV Continuous <Continuous>  dextrose 50% Injectable 25 Gram(s) IV Push once  dextrose 50% Injectable 25 Gram(s) IV Push once  dextrose 50% Injectable 12.5 Gram(s) IV Push once  dextrose Oral Gel 15 Gram(s) Oral once PRN  erythromycin   Ointment 1 Application(s) Both EYES two times a day  folic acid 1 milliGRAM(s) Oral daily  glucagon  Injectable 1 milliGRAM(s) IntraMuscular once  insulin lispro (ADMELOG) corrective regimen sliding scale   SubCutaneous three times a day before meals  lactobacillus acidophilus 1 Tablet(s) Oral every 12 hours  magnesium hydroxide Suspension 30 milliLiter(s) Oral daily PRN  melatonin 3 milliGRAM(s) Oral at bedtime PRN  mesalamine DR Capsule 400 milliGRAM(s) Oral three times a day  midodrine. 5 milliGRAM(s) Oral three times a day PRN  mirtazapine 30 milliGRAM(s) Oral at bedtime  ondansetron Injectable 4 milliGRAM(s) IV Push every 8 hours PRN  pantoprazole    Tablet 40 milliGRAM(s) Oral daily  senna 2 Tablet(s) Oral at bedtime  spironolactone 100 milliGRAM(s) Oral daily  traMADol 50 milliGRAM(s) Oral three times a day PRN      Objective:  Vital Signs Last 24 Hrs  T(C): 36.3 (26 Dec 2023 11:26), Max: 36.6 (26 Dec 2023 00:31)  T(F): 97.4 (26 Dec 2023 11:26), Max: 97.9 (26 Dec 2023 00:31)  HR: 88 (26 Dec 2023 11:26) (88 - 101)  BP: 94/60 (26 Dec 2023 11:26) (90/60 - 103/67)  BP(mean): --  RR: 18 (26 Dec 2023 11:26) (18 - 18)  SpO2: 98% (26 Dec 2023 11:26) (96% - 98%)    Parameters below as of 26 Dec 2023 11:26  Patient On (Oxygen Delivery Method): room air        T(C): 36.3 (12-26-23 @ 11:26), Max: 36.9 (12-25-23 @ 05:04)  T(C): 36.3 (12-26-23 @ 11:26), Max: 37.4 (12-24-23 @ 06:01)  T(C): 36.3 (12-26-23 @ 11:26), Max: 37.4 (12-24-23 @ 06:01)    PHYSICAL EXAM:  HEENT: NC atraumatic  Neck: supple  Respiratory: no accessory muscle use, breathing comfortably  Cardiovascular: distant  Gastrointestinal: normal appearing, nondistended  Extremities: no clubbing, no cyanosis, some decrease in swelling  Skin: reduced rash        LABS:                          7.1    9.15  )-----------( 437      ( 26 Dec 2023 09:40 )             21.4       WBC  9.15 12-26 @ 09:40  7.73 12-25 @ 07:12  7.59 12-23 @ 07:30  12.06 12-21 @ 22:30      12-26    142  |  114<H>  |  4<L>  ----------------------------<  123<H>  3.1<L>   |  22  |  0.61    Ca    8.1<L>      26 Dec 2023 09:40    TPro  5.3<L>  /  Alb  2.2<L>  /  TBili  0.3  /  DBili  x   /  AST  15  /  ALT  16  /  AlkPhos  107  12-26      Creatinine: 0.61 mg/dL (12-26-23 @ 09:40)  Creatinine: 0.58 mg/dL (12-25-23 @ 07:12)  Creatinine: 0.61 mg/dL (12-24-23 @ 05:03)  Creatinine: 0.53 mg/dL (12-23-23 @ 07:30)  Creatinine: 0.68 mg/dL (12-21-23 @ 22:30)      PT/INR - ( 26 Dec 2023 09:40 )   PT: 20.3 sec;   INR: 1.76 ratio           Urinalysis Basic - ( 26 Dec 2023 09:40 )    Color: x / Appearance: x / SG: x / pH: x  Gluc: 123 mg/dL / Ketone: x  / Bili: x / Urobili: x   Blood: x / Protein: x / Nitrite: x   Leuk Esterase: x / RBC: x / WBC x   Sq Epi: x / Non Sq Epi: x / Bacteria: x            INFLAMMATORY MARKERS      MICROBIOLOGY:              RADIOLOGY & ADDITIONAL STUDIES:

## 2023-12-26 NOTE — PROGRESS NOTE ADULT - ASSESSMENT
[ASSESSMENT and  PLAN]  D63. 8    Anemia chronic disease  R70. 0    elevated elevated ESR  R79. 82  elevated CRP  K51. 90  hx ulcerative colitis  L51. 9    rash    58-year-old female ,[facility] resident who presents to the emergency room with multiple medical issues.  Past medical history of CAD diabetes insomnia major depressive disorder GERD hypertension irritable bowel syndrome with diarrhea cirrhosis of the liver nonalcoholic fatty liver hepatic encephalopathy bipolar disorder chronic A-fib on Coumadin blepharitis of the left eye.    Per PCP signout patient was transferred to [place] for reported hypotension and tachycardia diffuse purpura.  Patient had a temperature of 99.3 with a heart rate of 116 and a blood pressure of 88/60.  Patient was FEBRILE later in ER with TEMP 101 , septic workup sent ,  started on iv abx and fluids .CT abd showed pancolitis , seen by GI and ID . Found to have cellulitis of lids b/l R>L       ? inflammatory rash due to ? Erythema multiforme or vasculitis, or dermatomyositis or MCTD  Bl Eyelid redness appear oddly semi-symmetric. R>L.   Similarly with BL periungual reddish to slight purple discoloration of hands, without nail involvement, and to some extent toes.   Doubt purpura  Petechiae not seen.     WBC with mild leukocytosis. Pt non-toxic appearing.   mod anemia, with Hgb 8. likely anemia due to chronic disease  plts normal.     CMV IgG neg  LDH normal  Babesia neg  Lyme neg  RPR neg    ESR 50  [id number]  HSV1 IgG+, HSV2 IgG neg     CATHERINE negative, anti-DS-DNA PENDING     AntiRo negative      Anemia   Hgb 7.8g/dL  FOBT+      RECOMMENDATIONS    -anemia-iron studies more c/w anemia of chronic disease. B12, folate normal. --symptomatic management, can transfuse as clinically indicated    -skin lesions/?purpura-plts 300+, INR/PTT prolonge on admssion due to Coumadin effect. ?vasculitis(elevated ESR, CRP), Rheum studies were ordered(CATHERINE negative, others still pending)          [ASSESSMENT and  PLAN]  D63. 8    Anemia chronic disease  R70. 0    elevated elevated ESR  R79. 82  elevated CRP  K51. 90  hx ulcerative colitis  L51. 9    rash    58-year-old female ,[facility] resident who presents to the emergency room with multiple medical issues.  Past medical history of CAD diabetes insomnia major depressive disorder GERD hypertension irritable bowel syndrome with diarrhea cirrhosis of the liver nonalcoholic fatty liver hepatic encephalopathy bipolar disorder chronic A-fib on Coumadin blepharitis of the left eye.    Per PCP signout patient was transferred to [place] for reported hypotension and tachycardia diffuse purpura.  Patient had a temperature of 99.3 with a heart rate of 116 and a blood pressure of 88/60.  Patient was FEBRILE later in ER with TEMP 101 , septic workup sent ,  started on iv abx and fluids .CT abd showed pancolitis , seen by GI and ID . Found to have cellulitis of lids b/l R>L       ? inflammatory rash due to ? Erythema multiforme or vasculitis, or dermatomyositis or MCTD  Bl Eyelid redness appear oddly semi-symmetric. R>L.   Similarly with BL periungual reddish to slight purple discoloration of hands, without nail involvement, and to some extent toes.   Doubt purpura  Petechiae not seen.     WBC with mild leukocytosis. Pt non-toxic appearing.   mod anemia, with Hgb 8. likely anemia due to chronic disease  plts normal.     CMV IgG neg  LDH normal  Babesia neg  Lyme neg  RPR neg    ESR 50  [id number]  HSV1 IgG+, HSV2 IgG neg     CATHERINE negative, anti-DS-DNA PENDING     AntiRo negative      Anemia   Hgb 7.8g/dL  FOBT+      RECOMMENDATIONS    -anemia-iron studies more c/w anemia of chronic disease. B12, folate normal. --symptomatic management, can transfuse as clinically indicated    -skin lesions/?purpura-plts 300+, INR/PTT prolonge on admssion due to Coumadin effect. ?vasculitis(elevated ESR, CRP), Rheum studies were ordered(CATHERINE negative, others still pending)

## 2023-12-27 LAB
ABO RH CONFIRMATION: SIGNIFICANT CHANGE UP
ABO RH CONFIRMATION: SIGNIFICANT CHANGE UP
ALBUMIN SERPL ELPH-MCNC: 2.2 G/DL — LOW (ref 3.3–5)
ALBUMIN SERPL ELPH-MCNC: 2.2 G/DL — LOW (ref 3.3–5)
ALP SERPL-CCNC: 114 U/L — SIGNIFICANT CHANGE UP (ref 40–120)
ALP SERPL-CCNC: 114 U/L — SIGNIFICANT CHANGE UP (ref 40–120)
ALT FLD-CCNC: 13 U/L — SIGNIFICANT CHANGE UP (ref 12–78)
ALT FLD-CCNC: 13 U/L — SIGNIFICANT CHANGE UP (ref 12–78)
AMMONIA BLD-MCNC: 28 UMOL/L — SIGNIFICANT CHANGE UP (ref 11–32)
AMMONIA BLD-MCNC: 28 UMOL/L — SIGNIFICANT CHANGE UP (ref 11–32)
ANION GAP SERPL CALC-SCNC: 6 MMOL/L — SIGNIFICANT CHANGE UP (ref 5–17)
ANION GAP SERPL CALC-SCNC: 6 MMOL/L — SIGNIFICANT CHANGE UP (ref 5–17)
AST SERPL-CCNC: 12 U/L — LOW (ref 15–37)
AST SERPL-CCNC: 12 U/L — LOW (ref 15–37)
AUTO DIFF PNL BLD: NEGATIVE — SIGNIFICANT CHANGE UP
BILIRUB DIRECT SERPL-MCNC: <0.1 MG/DL — SIGNIFICANT CHANGE UP (ref 0–0.3)
BILIRUB DIRECT SERPL-MCNC: <0.1 MG/DL — SIGNIFICANT CHANGE UP (ref 0–0.3)
BILIRUB INDIRECT FLD-MCNC: >0.2 MG/DL — SIGNIFICANT CHANGE UP (ref 0.2–1)
BILIRUB INDIRECT FLD-MCNC: >0.2 MG/DL — SIGNIFICANT CHANGE UP (ref 0.2–1)
BILIRUB SERPL-MCNC: 0.3 MG/DL — SIGNIFICANT CHANGE UP (ref 0.2–1.2)
BILIRUB SERPL-MCNC: 0.3 MG/DL — SIGNIFICANT CHANGE UP (ref 0.2–1.2)
BUN SERPL-MCNC: 7 MG/DL — SIGNIFICANT CHANGE UP (ref 7–23)
BUN SERPL-MCNC: 7 MG/DL — SIGNIFICANT CHANGE UP (ref 7–23)
C-ANCA SER-ACNC: POSITIVE
C-ANCA TITR SER: ABNORMAL
CALCIUM SERPL-MCNC: 8 MG/DL — LOW (ref 8.5–10.1)
CALCIUM SERPL-MCNC: 8 MG/DL — LOW (ref 8.5–10.1)
CHLORIDE SERPL-SCNC: 112 MMOL/L — HIGH (ref 96–108)
CHLORIDE SERPL-SCNC: 112 MMOL/L — HIGH (ref 96–108)
CO2 SERPL-SCNC: 22 MMOL/L — SIGNIFICANT CHANGE UP (ref 22–31)
CO2 SERPL-SCNC: 22 MMOL/L — SIGNIFICANT CHANGE UP (ref 22–31)
CREAT SERPL-MCNC: 0.59 MG/DL — SIGNIFICANT CHANGE UP (ref 0.5–1.3)
CREAT SERPL-MCNC: 0.59 MG/DL — SIGNIFICANT CHANGE UP (ref 0.5–1.3)
CULTURE RESULTS: SIGNIFICANT CHANGE UP
EGFR: 106 ML/MIN/1.73M2 — SIGNIFICANT CHANGE UP
EGFR: 106 ML/MIN/1.73M2 — SIGNIFICANT CHANGE UP
GLUCOSE SERPL-MCNC: 106 MG/DL — HIGH (ref 70–99)
GLUCOSE SERPL-MCNC: 106 MG/DL — HIGH (ref 70–99)
HCT VFR BLD CALC: 21.2 % — LOW (ref 34.5–45)
HCT VFR BLD CALC: 21.2 % — LOW (ref 34.5–45)
HGB BLD-MCNC: 6.9 G/DL — CRITICAL LOW (ref 11.5–15.5)
HGB BLD-MCNC: 6.9 G/DL — CRITICAL LOW (ref 11.5–15.5)
INR BLD: 1.69 RATIO — HIGH (ref 0.85–1.18)
INR BLD: 1.69 RATIO — HIGH (ref 0.85–1.18)
MAGNESIUM SERPL-MCNC: 1.7 MG/DL — SIGNIFICANT CHANGE UP (ref 1.6–2.6)
MAGNESIUM SERPL-MCNC: 1.7 MG/DL — SIGNIFICANT CHANGE UP (ref 1.6–2.6)
MCHC RBC-ENTMCNC: 31.4 PG — SIGNIFICANT CHANGE UP (ref 27–34)
MCHC RBC-ENTMCNC: 31.4 PG — SIGNIFICANT CHANGE UP (ref 27–34)
MCHC RBC-ENTMCNC: 32.5 GM/DL — SIGNIFICANT CHANGE UP (ref 32–36)
MCHC RBC-ENTMCNC: 32.5 GM/DL — SIGNIFICANT CHANGE UP (ref 32–36)
MCV RBC AUTO: 96.4 FL — SIGNIFICANT CHANGE UP (ref 80–100)
MCV RBC AUTO: 96.4 FL — SIGNIFICANT CHANGE UP (ref 80–100)
NRBC # BLD: 0 /100 WBCS — SIGNIFICANT CHANGE UP (ref 0–0)
NRBC # BLD: 0 /100 WBCS — SIGNIFICANT CHANGE UP (ref 0–0)
P-ANCA SER-ACNC: NEGATIVE — SIGNIFICANT CHANGE UP
PHOSPHATE SERPL-MCNC: 2.9 MG/DL — SIGNIFICANT CHANGE UP (ref 2.5–4.5)
PHOSPHATE SERPL-MCNC: 2.9 MG/DL — SIGNIFICANT CHANGE UP (ref 2.5–4.5)
PLATELET # BLD AUTO: 474 K/UL — HIGH (ref 150–400)
PLATELET # BLD AUTO: 474 K/UL — HIGH (ref 150–400)
POTASSIUM SERPL-MCNC: 3.3 MMOL/L — LOW (ref 3.5–5.3)
POTASSIUM SERPL-MCNC: 3.3 MMOL/L — LOW (ref 3.5–5.3)
POTASSIUM SERPL-SCNC: 3.3 MMOL/L — LOW (ref 3.5–5.3)
POTASSIUM SERPL-SCNC: 3.3 MMOL/L — LOW (ref 3.5–5.3)
PROT SERPL-MCNC: 5.1 G/DL — LOW (ref 6–8.3)
PROT SERPL-MCNC: 5.1 G/DL — LOW (ref 6–8.3)
PROTHROM AB SERPL-ACNC: 19.5 SEC — HIGH (ref 9.5–13)
PROTHROM AB SERPL-ACNC: 19.5 SEC — HIGH (ref 9.5–13)
RBC # BLD: 2.2 M/UL — LOW (ref 3.8–5.2)
RBC # BLD: 2.2 M/UL — LOW (ref 3.8–5.2)
RBC # FLD: 13.8 % — SIGNIFICANT CHANGE UP (ref 10.3–14.5)
RBC # FLD: 13.8 % — SIGNIFICANT CHANGE UP (ref 10.3–14.5)
SODIUM SERPL-SCNC: 140 MMOL/L — SIGNIFICANT CHANGE UP (ref 135–145)
SODIUM SERPL-SCNC: 140 MMOL/L — SIGNIFICANT CHANGE UP (ref 135–145)
SPECIMEN SOURCE: SIGNIFICANT CHANGE UP
WBC # BLD: 11.87 K/UL — HIGH (ref 3.8–10.5)
WBC # BLD: 11.87 K/UL — HIGH (ref 3.8–10.5)
WBC # FLD AUTO: 11.87 K/UL — HIGH (ref 3.8–10.5)
WBC # FLD AUTO: 11.87 K/UL — HIGH (ref 3.8–10.5)

## 2023-12-27 RX ORDER — DEXTROSE MONOHYDRATE, SODIUM CHLORIDE, AND POTASSIUM CHLORIDE 50; .745; 4.5 G/1000ML; G/1000ML; G/1000ML
1000 INJECTION, SOLUTION INTRAVENOUS
Refills: 0 | Status: DISCONTINUED | OUTPATIENT
Start: 2023-12-27 | End: 2023-12-28

## 2023-12-27 RX ORDER — POTASSIUM CHLORIDE 20 MEQ
20 PACKET (EA) ORAL ONCE
Refills: 0 | Status: COMPLETED | OUTPATIENT
Start: 2023-12-27 | End: 2023-12-27

## 2023-12-27 RX ADMIN — DEXTROSE MONOHYDRATE, SODIUM CHLORIDE, AND POTASSIUM CHLORIDE 50 MILLILITER(S): 50; .745; 4.5 INJECTION, SOLUTION INTRAVENOUS at 01:03

## 2023-12-27 RX ADMIN — Medication 1 TABLET(S): at 18:27

## 2023-12-27 RX ADMIN — Medication 1 TABLET(S): at 05:52

## 2023-12-27 RX ADMIN — MIRTAZAPINE 30 MILLIGRAM(S): 45 TABLET, ORALLY DISINTEGRATING ORAL at 21:45

## 2023-12-27 RX ADMIN — Medication 400 MILLIGRAM(S): at 05:54

## 2023-12-27 RX ADMIN — Medication 20 MILLIEQUIVALENT(S): at 18:26

## 2023-12-27 RX ADMIN — Medication 400 MILLIGRAM(S): at 21:45

## 2023-12-27 RX ADMIN — Medication 1 APPLICATION(S): at 05:53

## 2023-12-27 NOTE — PROGRESS NOTE ADULT - PROBLEM SELECTOR PLAN 7
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 Neurosurgical   consultation is recommended.
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 Neurosurgical   consultation is recommended.

## 2023-12-27 NOTE — PROGRESS NOTE ADULT - NUTRITIONAL ASSESSMENT
This patient has been assessed with a concern for Malnutrition and has been determined to have a diagnosis/diagnoses of Moderate protein-calorie malnutrition.    This patient is being managed with:   Diet Soft and Bite Sized-  Entered: Dec 22 2023  8:30AM    The following pending diet order is being considered for treatment of Moderate protein-calorie malnutrition:  Diet Clear Liquid-  Supplement Feeding Modality:  Oral  Ensure Clear Cans or Servings Per Day:  1       Frequency:  Three Times a day  Entered: Dec 24 2023 11:09AM  
This patient has been assessed with a concern for Malnutrition and has been determined to have a diagnosis/diagnoses of Moderate protein-calorie malnutrition.    This patient is being managed with:   Diet Soft and Bite Sized-  Entered: Dec 22 2023  8:30AM    The following pending diet order is being considered for treatment of Moderate protein-calorie malnutrition:  Diet Clear Liquid-  Supplement Feeding Modality:  Oral  Ensure Clear Cans or Servings Per Day:  1       Frequency:  Three Times a day  Entered: Dec 24 2023 11:09AM  
This patient has been assessed with a concern for Malnutrition and has been determined to have a diagnosis/diagnoses of Moderate protein-calorie malnutrition.    This patient is being managed with:   Diet Clear Liquid-  Supplement Feeding Modality:  Oral  Ensure Clear Cans or Servings Per Day:  1       Frequency:  Three Times a day  Entered: Dec 24 2023 11:09AM

## 2023-12-27 NOTE — PROGRESS NOTE ADULT - PROBLEM SELECTOR PLAN 11
Accuchecks monitoring and insulin corrective regimen  sliding scale coverage with short acting inslulin, add longacting insulin as needed ,no concentrated sweets diet, serial labs ,HbA1C,education
Accuchecks monitoring and insulin corrective regimen  sliding scale coverage with short acting inslulin, add longacting insulin as needed ,no concentrated sweets diet, serial labs ,HbA1C,education

## 2023-12-27 NOTE — PROGRESS NOTE ADULT - ASSESSMENT
[ASSESSMENT and  PLAN]  D63. 8    Anemia chronic disease  R70. 0    elevated elevated ESR  R79. 82  elevated CRP  K51. 90  hx ulcerative colitis  L51. 9    rash    58-year-old female ,[facility] resident who presents to the emergency room with multiple medical issues.  Past medical history of CAD diabetes insomnia major depressive disorder GERD hypertension irritable bowel syndrome with diarrhea cirrhosis of the liver nonalcoholic fatty liver hepatic encephalopathy bipolar disorder chronic A-fib on Coumadin blepharitis of the left eye.    Per PCP signout patient was transferred to [place] for reported hypotension and tachycardia diffuse purpura.  Patient had a temperature of 99.3 with a heart rate of 116 and a blood pressure of 88/60.  Patient was FEBRILE later in ER with TEMP 101 , septic workup sent ,  started on iv abx and fluids .CT abd showed pancolitis , seen by GI and ID . Found to have cellulitis of lids b/l R>L       ? inflammatory rash due to ? Erythema multiforme or vasculitis, or dermatomyositis or MCTD  Bl Eyelid redness appear oddly semi-symmetric. R>L.   Similarly with BL periungual reddish to slight purple discoloration of hands, without nail involvement, and to some extent toes.   Doubt purpura  Petechiae not seen.     WBC with mild leukocytosis. Pt non-toxic appearing.   mod anemia, with Hgb 8. likely anemia due to chronic disease  plts normal.     CMV IgG neg  LDH normal  Babesia neg  Lyme neg  RPR neg  Anaplasmosis PCR negative  Ehrlichia PCR negative      ESR 50  [id number]  HSV1 IgG+, HSV2 IgG neg     CATHERINE negative, anti-DS-DNA PENDING     AntiRo negative      Anemia due to hemorrhage  Last ferriitn 379, Iron 27, TIBC 172, sat 16% on [12/25/2023]  Hgb 7.8g/dL  FOBT+  progressed to bloody diarrhea    C-ANCA positive with 1: 80 titer.  P-ANCA negative  Atypical-ANCA negative  Myeloperoxidase antibody negative  Proteinase-3 antibody negative  Streptococcus pneumonia urine antigen negative      RECOMMENDATIONS    rash better post dose Solumedrol    Follow CBC  S/p PRBC transfusion in progress.  Transfuse PRBC as clinically indicated.   Transfuse PRBC if Hgb <7.0 or if symptomatic.     Follow up Anemia studies.      ferriitn 379, Iron 27, TIBC 172, sat 16% on [12/25/2023]     B12 856, Folate >20     ESR 51,      Will need to reassess ferritin at future date with current bleeding may become iron deficient over time.    Await rheum studies     CATHERINE negative, anti-DS-DNA <12     ANCA Positive C-ANCA but titer only 1: 80.  Unclear significance.     Aldolase, LDH, CPK    Consider eval with opth and derm as unusual constellation of sx,  Consider rheum eval   GI evaluating for inflammatory bowel disease. and FOBT+  HIGH MELD score. CT Scan liver WNL    DVT Prophylaxis  DC SQ Lovenox or SQ heparin  Cferriitn 379, Iron 27, TIBC 172, sat 16% on [12/25/2023]  SCD    Discussed plan of care with patient and in detail.   Pt/Family expressed understanding of the treatment plan.   Opportunity given for questions and discussion.   Questions or concerns all addressed and answered to their satisfaction, and in lay terms.     Thank you for consulting us.      [ASSESSMENT and  PLAN]  D63. 8    Anemia chronic disease  R70. 0    elevated elevated ESR  R79. 82  elevated CRP  K51. 90  hx ulcerative colitis  L51. 9    rash    58-year-old female ,[facility] resident who presents to the emergency room with multiple medical issues.  Past medical history of CAD diabetes insomnia major depressive disorder GERD hypertension irritable bowel syndrome with diarrhea cirrhosis of the liver nonalcoholic fatty liver hepatic encephalopathy bipolar disorder chronic A-fib on Coumadin blepharitis of the left eye.    Per PCP signout patient was transferred to [place] for reported hypotension and tachycardia diffuse purpura.  Patient had a temperature of 99.3 with a heart rate of 116 and a blood pressure of 88/60.  Patient was FEBRILE later in ER with TEMP 101 , septic workup sent ,  started on iv abx and fluids .CT abd showed pancolitis , seen by GI and ID . Found to have cellulitis of lids b/l R>L       ? inflammatory rash due to ? Erythema multiforme or vasculitis, or dermatomyositis or MCTD  Bl Eyelid redness appear oddly semi-symmetric. R>L.   Similarly with BL periungual reddish to slight purple discoloration of hands, without nail involvement, and to some extent toes.   Doubt purpura  Petechiae not seen.     WBC with mild leukocytosis. Pt non-toxic appearing.   mod anemia, with Hgb 8. likely anemia due to chronic disease  plts normal.     CMV IgG neg  LDH normal  Babesia neg  Lyme neg  RPR neg  Anaplasmosis PCR negative  Ehrlichia PCR negative      ESR 50  [id number]  HSV1 IgG+, HSV2 IgG neg     CATHERINE negative, anti-DS-DNA PENDING     AntiRo negative      Anemia due to hemorrhage  Last ferriitn 379, Iron 27, TIBC 172, sat 16% on [12/25/2023]  Hgb 7.8g/dL  FOBT+  progressed to bloody diarrhea    C-ANCA positive with 1: 80 titer.  P-ANCA negative  Atypical-ANCA negative  Myeloperoxidase antibody negative  Proteinase-3 antibody negative  Streptococcus pneumonia urine antigen negative      RECOMMENDATIONS    rash better post dose Solumedrol    Follow CBC  S/p PRBC transfusion in progress.  Transfuse PRBC as clinically indicated.   Transfuse PRBC if Hgb <7.0 or if symptomatic.     Follow up Anemia studies.      ferriitn 379, Iron 27, TIBC 172, sat 16% on [12/25/2023]     B12 856, Folate >20     ESR 51,      Will need to reassess ferritin at future date with current bleeding may become iron deficient over time.    Await rheum studies     CATHERINE negative, anti-DS-DNA <12     ANCA Positive C-ANCA but titer only 1: 80.  Unclear significance.     Aldolase, LDH, CPK    Consider eval with opth and derm as unusual constellation of sx,  Consider rheum eval   GI evaluating for inflammatory bowel disease. and FOBT+  HIGH MELD score. CT Scan liver WNL    DVT Prophylaxis  DC SQ Lovenox or SQ heparin  Cferriitn 379, Iron 27, TIBC 172, sat 16% on [12/25/2023]  SCD    Discussed plan of care with patient and in detail.   Pt/Family expressed understanding of the treatment plan.   Opportunity given for questions and discussion.   Questions or concerns all addressed and answered to their satisfaction, and in lay terms.     Thank you for consulting us.

## 2023-12-27 NOTE — PROGRESS NOTE ADULT - PROBLEM SELECTOR PLAN 2
also FOBT positive status  likely 2/2 to ulcerative colitis  -hemonc and GI are following ulcerative colitis  abnormal ct  pancolitis  pt having loose bloody BM with hx UC  starting mesalamine 400mg tid
also FOBT positive status  likely 2/2 to ulcerative colitis  -hemonc and GI are following ulcerative colitis  abnormal ct  pancolitis  pt having loose bloody BM with hx UC  starting mesalamine 400mg tid

## 2023-12-27 NOTE — PROGRESS NOTE ADULT - REASON FOR ADMISSION
hypotension and tachycardia

## 2023-12-27 NOTE — PROGRESS NOTE ADULT - ASSESSMENT
ulcerative colitis  abnormal ct  pancolitis    pt having loose bloody BM with hx UC  cont mesalamine 400mg tid  reg diet  will follow     Advanced care planning was discussed with patient and family.  Advanced care planning forms were reviewed and discussed.  Risks, benefits and alternatives of gastroenterologic procedures were discussed in detail and all questions were answered.    30 minutes spent.

## 2023-12-27 NOTE — CHART NOTE - NSCHARTNOTEFT_GEN_A_CORE
Assessment:   brief history: Reason for Admission: hypotension and tachycardia  History of Present Illness:    Patient is a 58-year-old female  ,[facility] resident who presents to the emergency room with multiple medical issues.  Past medical history of CAD diabetes insomnia major depressive disorder GERD hypertension irritable bowel syndrome with diarrhea cirrhosis of the liver nonalcoholic fatty liver hepatic encephalopathy bipolar disorder chronic A-fib on Coumadin blepharitis of the left eye.    Per PCP signout patient was transferred to [facility] for reported hypotension and tachycardia diffuse purpura.  Patient had a temperature of 99.3 with a heart rate of 116 and a blood pressure of 88/60.  Patient was FEBRILE later in ER with TEMP 101 , septic workup sent ,  started on iv abx and fluids .CT abd showed pancolitis , seen by GI and ID . Found to have cellulitis of lids b/l R>       Patient seen for malnutrition follow up; unable to engage patient in interview at this time. Spoke with nursing assistants who report patient did not consume anything at breakfast, PO intake was fair yesterday. Patient unable to provide food preferences at this time. Note GI following with recs for regular diet.    Factors impacting intake: [ ] none [ ] nausea  [ ] vomiting [ ] diarrhea [ ] constipation  [ ]chewing problems [ ] swallowing issues  [ x] other: variable appetite    Diet Presciption: soft and bite sized  Intake: PO intake recorded as variable 0-75%     Current Weight: Weight (kg): 63.5 (12-21 @ 22:25)  % Weight Change  weights recorded noted as variable, although weight from  (139.9 pounds) likely inaccurate weights ) 102.9 lb () 95.9 lb , weight change noted, may be fluid related will continue to monitor    Pertinent Medications: MEDICATIONS  (STANDING):  dextrose 5% + sodium chloride 0.45% with potassium chloride 20 mEq/L 1000 milliLiter(s) (50 mL/Hr) IV Continuous <Continuous>  dextrose 5%. 1000 milliLiter(s) (100 mL/Hr) IV Continuous <Continuous>  dextrose 5%. 1000 milliLiter(s) (50 mL/Hr) IV Continuous <Continuous>  dextrose 50% Injectable 25 Gram(s) IV Push once  dextrose 50% Injectable 25 Gram(s) IV Push once  dextrose 50% Injectable 12.5 Gram(s) IV Push once  folic acid 1 milliGRAM(s) Oral daily  glucagon  Injectable 1 milliGRAM(s) IntraMuscular once  insulin lispro (ADMELOG) corrective regimen sliding scale   SubCutaneous three times a day before meals  lactobacillus acidophilus 1 Tablet(s) Oral every 12 hours  mesalamine DR Capsule 400 milliGRAM(s) Oral three times a day  mirtazapine 30 milliGRAM(s) Oral at bedtime  pantoprazole    Tablet 40 milliGRAM(s) Oral daily  rifAXIMin 550 milliGRAM(s) Oral two times a day  spironolactone 100 milliGRAM(s) Oral daily    MEDICATIONS  (PRN):  acetaminophen     Tablet .. 650 milliGRAM(s) Oral every 6 hours PRN Temp greater or equal to 38C (100.4F), Mild Pain (1 - 3)  aluminum hydroxide/magnesium hydroxide/simethicone Suspension 30 milliLiter(s) Oral every 4 hours PRN Dyspepsia  dextrose Oral Gel 15 Gram(s) Oral once PRN Blood Glucose LESS THAN 70 milliGRAM(s)/deciliter  magnesium hydroxide Suspension 30 milliLiter(s) Oral daily PRN Constipation  melatonin 3 milliGRAM(s) Oral at bedtime PRN Insomnia  midodrine. 5 milliGRAM(s) Oral three times a day PRN SBP below 90  ondansetron Injectable 4 milliGRAM(s) IV Push every 8 hours PRN Nausea and/or Vomiting  traMADol 50 milliGRAM(s) Oral three times a day PRN Moderate Pain (4 - 6)    Pertinent Labs:  Na140 mmol/L Glu 106 mg/dL<H> K+ 3.3 mmol/L<L> Cr  0.59 mg/dL BUN 7 mg/dL  Phos 2.9 mg/dL  Alb 2.2 g/dL<L>     CAPILLARY BLOOD GLUCOSE      POCT Blood Glucose.: 106 mg/dL (27 Dec 2023 08:16)  POCT Blood Glucose.: 104 mg/dL (26 Dec 2023 20:58)  POCT Blood Glucose.: 117 mg/dL (26 Dec 2023 17:13)  POCT Blood Glucose.: 132 mg/dL (26 Dec 2023 12:11)    Skin: pressure injuries documented bilateral heels and sacrum stage I, no edema  per chart    Estimated Needs:   [x ] no change since previous assessment continue with previously calculated needs using IBW (25-30 calories.[id number] calories,1.2-1.4 g protein/k-63 g protein)  [ ] recalculated:     Previous Nutrition Diagnosis:   [ ] Inadequate Energy Intake [ ]Inadequate Oral Intake [ ] Excessive Energy Intake   [ ] Underweight [ ] Increased Nutrient Needs [ ] Overweight/Obesity   [x ] Altered GI Function [ ] Unintended Weight Loss [ ] Food & Nutrition Related Knowledge Deficit [x ] Malnutrition     Nutrition Diagnosis is [ x] ongoing  [ ] resolved [ ] not applicable     New Nutrition Diagnosis: [ x] not applicable       Interventions:   Recommend  [ ] Change Diet To:  [ ] Nutrition Supplement  [ ] Nutrition Support  [ x] Other: provide nutrient dense snacks to optimize PO Intake    Monitoring and Evaluation:   [x ] PO intake [ x ] Tolerance to diet prescription [ x ] weights [ x ] labs[ x ] follow up per protocol  [ ] other: Assessment:   brief history: Reason for Admission: hypotension and tachycardia  History of Present Illness:    Patient is a 58-year-old female  ,[facility] resident who presents to the emergency room with multiple medical issues.  Past medical history of CAD diabetes insomnia major depressive disorder GERD hypertension irritable bowel syndrome with diarrhea cirrhosis of the liver nonalcoholic fatty liver hepatic encephalopathy bipolar disorder chronic A-fib on Coumadin blepharitis of the left eye.    Per PCP signout patient was transferred to [place] for reported hypotension and tachycardia diffuse purpura.  Patient had a temperature of 99.3 with a heart rate of 116 and a blood pressure of 88/60.  Patient was FEBRILE later in ER with TEMP 101 , septic workup sent ,  started on iv abx and fluids .CT abd showed pancolitis , seen by GI and ID . Found to have cellulitis of lids b/l R>       Patient seen for malnutrition follow up; unable to engage patient in interview at this time. Spoke with nursing assistants who report patient did not consume anything at breakfast, PO intake was fair yesterday. Patient unable to provide food preferences at this time. Note GI following with recs for regular diet.    Factors impacting intake: [ ] none [ ] nausea  [ ] vomiting [ ] diarrhea [ ] constipation  [ ]chewing problems [ ] swallowing issues  [ x] other: variable appetite    Diet Presciption: soft and bite sized  Intake: PO intake recorded as variable 0-75%     Current Weight: Weight (kg): 63.5 (12-21 @ 22:25)  % Weight Change  weights recorded noted as variable, although weight from  (139.9 pounds) likely inaccurate weights ) 102.9 lb () 95.9 lb , weight change noted, may be fluid related will continue to monitor    Pertinent Medications: MEDICATIONS  (STANDING):  dextrose 5% + sodium chloride 0.45% with potassium chloride 20 mEq/L 1000 milliLiter(s) (50 mL/Hr) IV Continuous <Continuous>  dextrose 5%. 1000 milliLiter(s) (100 mL/Hr) IV Continuous <Continuous>  dextrose 5%. 1000 milliLiter(s) (50 mL/Hr) IV Continuous <Continuous>  dextrose 50% Injectable 25 Gram(s) IV Push once  dextrose 50% Injectable 25 Gram(s) IV Push once  dextrose 50% Injectable 12.5 Gram(s) IV Push once  folic acid 1 milliGRAM(s) Oral daily  glucagon  Injectable 1 milliGRAM(s) IntraMuscular once  insulin lispro (ADMELOG) corrective regimen sliding scale   SubCutaneous three times a day before meals  lactobacillus acidophilus 1 Tablet(s) Oral every 12 hours  mesalamine DR Capsule 400 milliGRAM(s) Oral three times a day  mirtazapine 30 milliGRAM(s) Oral at bedtime  pantoprazole    Tablet 40 milliGRAM(s) Oral daily  rifAXIMin 550 milliGRAM(s) Oral two times a day  spironolactone 100 milliGRAM(s) Oral daily    MEDICATIONS  (PRN):  acetaminophen     Tablet .. 650 milliGRAM(s) Oral every 6 hours PRN Temp greater or equal to 38C (100.4F), Mild Pain (1 - 3)  aluminum hydroxide/magnesium hydroxide/simethicone Suspension 30 milliLiter(s) Oral every 4 hours PRN Dyspepsia  dextrose Oral Gel 15 Gram(s) Oral once PRN Blood Glucose LESS THAN 70 milliGRAM(s)/deciliter  magnesium hydroxide Suspension 30 milliLiter(s) Oral daily PRN Constipation  melatonin 3 milliGRAM(s) Oral at bedtime PRN Insomnia  midodrine. 5 milliGRAM(s) Oral three times a day PRN SBP below 90  ondansetron Injectable 4 milliGRAM(s) IV Push every 8 hours PRN Nausea and/or Vomiting  traMADol 50 milliGRAM(s) Oral three times a day PRN Moderate Pain (4 - 6)    Pertinent Labs:  Na140 mmol/L Glu 106 mg/dL<H> K+ 3.3 mmol/L<L> Cr  0.59 mg/dL BUN 7 mg/dL  Phos 2.9 mg/dL  Alb 2.2 g/dL<L>     CAPILLARY BLOOD GLUCOSE      POCT Blood Glucose.: 106 mg/dL (27 Dec 2023 08:16)  POCT Blood Glucose.: 104 mg/dL (26 Dec 2023 20:58)  POCT Blood Glucose.: 117 mg/dL (26 Dec 2023 17:13)  POCT Blood Glucose.: 132 mg/dL (26 Dec 2023 12:11)    Skin: pressure injuries documented bilateral heels and sacrum stage I, no edema  per chart    Estimated Needs:   [x ] no change since previous assessment continue with previously calculated needs using IBW (25-30 calories.k9698-9508 calories,1.2-1.4 g protein/k-63 g protein)  [ ] recalculated:     Previous Nutrition Diagnosis:   [ ] Inadequate Energy Intake [ ]Inadequate Oral Intake [ ] Excessive Energy Intake   [ ] Underweight [ ] Increased Nutrient Needs [ ] Overweight/Obesity   [x ] Altered GI Function [ ] Unintended Weight Loss [ ] Food & Nutrition Related Knowledge Deficit [x ] Malnutrition     Nutrition Diagnosis is [ x] ongoing  [ ] resolved [ ] not applicable     New Nutrition Diagnosis: [ x] not applicable       Interventions:   Recommend  [ ] Change Diet To:  [ ] Nutrition Supplement  [ ] Nutrition Support  [ x] Other: provide nutrient dense snacks to optimize PO Intake    Monitoring and Evaluation:   [x ] PO intake [ x ] Tolerance to diet prescription [ x ] weights [ x ] labs[ x ] follow up per protocol  [ ] other:

## 2023-12-27 NOTE — PROGRESS NOTE ADULT - PROBLEM SELECTOR PLAN 3
Primary source of infection is most likely  periorbital cellulitis  Pt also w/ pancolitis on CT, unclear if infectious or inflammatory; abdomen exam benign  S/p zosyn in the ED   ceftriaxone 1gm q24h and flagyl 500mg q8h (ordered)  F/u pending cx until completion  Trend temps/WBC  Supportive care and additional management
Primary source of infection is most likely  periorbital cellulitis  Pt also w/ pancolitis on CT, unclear if infectious or inflammatory; abdomen exam benign  S/p zosyn in the ED   ceftriaxone 1gm q24h and flagyl 500mg q8h (ordered)  F/u pending cx until completion  Trend temps/WBC  Supportive care and additional management

## 2023-12-27 NOTE — PROGRESS NOTE ADULT - SUBJECTIVE AND OBJECTIVE BOX
[facility]  Tez Olivera PA-C  [address] [place] [address]  [phone]      INTERVAL HPI/OVERNIGHT EVENTS:  Pt s/e  C/o loose bloody BM in rectal tube    MEDICATIONS  (STANDING):  dextrose 5% + sodium chloride 0.45% with potassium chloride 20 mEq/L 1000 milliLiter(s) (50 mL/Hr) IV Continuous <Continuous>  dextrose 5%. 1000 milliLiter(s) (50 mL/Hr) IV Continuous <Continuous>  dextrose 5%. 1000 milliLiter(s) (100 mL/Hr) IV Continuous <Continuous>  dextrose 50% Injectable 25 Gram(s) IV Push once  dextrose 50% Injectable 25 Gram(s) IV Push once  dextrose 50% Injectable 12.5 Gram(s) IV Push once  folic acid 1 milliGRAM(s) Oral daily  glucagon  Injectable 1 milliGRAM(s) IntraMuscular once  insulin lispro (ADMELOG) corrective regimen sliding scale   SubCutaneous three times a day before meals  lactobacillus acidophilus 1 Tablet(s) Oral every 12 hours  mesalamine DR Capsule 400 milliGRAM(s) Oral three times a day  mirtazapine 30 milliGRAM(s) Oral at bedtime  pantoprazole    Tablet 40 milliGRAM(s) Oral daily  rifAXIMin 550 milliGRAM(s) Oral two times a day  spironolactone 100 milliGRAM(s) Oral daily    MEDICATIONS  (PRN):  acetaminophen     Tablet .. 650 milliGRAM(s) Oral every 6 hours PRN Temp greater or equal to 38C (100.4F), Mild Pain (1 - 3)  aluminum hydroxide/magnesium hydroxide/simethicone Suspension 30 milliLiter(s) Oral every 4 hours PRN Dyspepsia  dextrose Oral Gel 15 Gram(s) Oral once PRN Blood Glucose LESS THAN 70 milliGRAM(s)/deciliter  magnesium hydroxide Suspension 30 milliLiter(s) Oral daily PRN Constipation  melatonin 3 milliGRAM(s) Oral at bedtime PRN Insomnia  midodrine. 5 milliGRAM(s) Oral three times a day PRN SBP below 90  ondansetron Injectable 4 milliGRAM(s) IV Push every 8 hours PRN Nausea and/or Vomiting  traMADol 50 milliGRAM(s) Oral three times a day PRN Moderate Pain (4 - 6)      Allergies  No Known Allergies      PHYSICAL EXAM:   Vital Signs:  Vital Signs Last 24 Hrs  T(C): 37.1 (27 Dec 2023 11:37), Max: 37.2 (27 Dec 2023 06:00)  T(F): 98.8 (27 Dec 2023 11:37), Max: 99 (27 Dec 2023 06:00)  HR: 104 (27 Dec 2023 11:37) (94 - 104)  BP: 99/60 (27 Dec 2023 11:37) (90/56 - 106/70)  BP(mean): --  RR: 18 (27 Dec 2023 11:37) (18 - 18)  SpO2: 95% (27 Dec 2023 11:37) (95% - 99%)    Parameters below as of 27 Dec 2023 11:37  Patient On (Oxygen Delivery Method): room air    GENERAL:  Appears stated age  HEENT:  NC/AT  CHEST:  Full & symmetric excursion  HEART:  Regular rhythm  ABDOMEN:  Soft, non-tender, non-distended  EXTEREMITIES:  no cyanosis  SKIN:  No rash  NEURO:  Alert      LABS:                        6.9    11.87 )-----------( 474      ( 27 Dec 2023 09:09 )             21.2     12-27    140  |  112<H>  |  7   ----------------------------<  106<H>  3.3<L>   |  22  |  0.59    Ca    8.0<L>      27 Dec 2023 09:09  Phos  2.9     12-27  Mg     1.7     12-27    TPro  5.1<L>  /  Alb  2.2<L>  /  TBili  0.3  /  DBili  <0.1  /  AST  12<L>  /  ALT  13  /  AlkPhos  114  12-27    PT/INR - ( 27 Dec 2023 09:09 )   PT: 19.5 sec;   INR: 1.69 ratio           Urinalysis Basic - ( 27 Dec 2023 09:09 )    Color: x / Appearance: x / SG: x / pH: x  Gluc: 106 mg/dL / Ketone: x  / Bili: x / Urobili: x   Blood: x / Protein: x / Nitrite: x   Leuk Esterase: x / RBC: x / WBC x   Sq Epi: x / Non Sq Epi: x / Bacteria: x   [facility]  Tez Olivera PA-C  [address] [place] [address]  [phone]      INTERVAL HPI/OVERNIGHT EVENTS:  Pt s/e  C/o loose bloody BM in rectal tube    MEDICATIONS  (STANDING):  dextrose 5% + sodium chloride 0.45% with potassium chloride 20 mEq/L 1000 milliLiter(s) (50 mL/Hr) IV Continuous <Continuous>  dextrose 5%. 1000 milliLiter(s) (50 mL/Hr) IV Continuous <Continuous>  dextrose 5%. 1000 milliLiter(s) (100 mL/Hr) IV Continuous <Continuous>  dextrose 50% Injectable 25 Gram(s) IV Push once  dextrose 50% Injectable 25 Gram(s) IV Push once  dextrose 50% Injectable 12.5 Gram(s) IV Push once  folic acid 1 milliGRAM(s) Oral daily  glucagon  Injectable 1 milliGRAM(s) IntraMuscular once  insulin lispro (ADMELOG) corrective regimen sliding scale   SubCutaneous three times a day before meals  lactobacillus acidophilus 1 Tablet(s) Oral every 12 hours  mesalamine DR Capsule 400 milliGRAM(s) Oral three times a day  mirtazapine 30 milliGRAM(s) Oral at bedtime  pantoprazole    Tablet 40 milliGRAM(s) Oral daily  rifAXIMin 550 milliGRAM(s) Oral two times a day  spironolactone 100 milliGRAM(s) Oral daily    MEDICATIONS  (PRN):  acetaminophen     Tablet .. 650 milliGRAM(s) Oral every 6 hours PRN Temp greater or equal to 38C (100.4F), Mild Pain (1 - 3)  aluminum hydroxide/magnesium hydroxide/simethicone Suspension 30 milliLiter(s) Oral every 4 hours PRN Dyspepsia  dextrose Oral Gel 15 Gram(s) Oral once PRN Blood Glucose LESS THAN 70 milliGRAM(s)/deciliter  magnesium hydroxide Suspension 30 milliLiter(s) Oral daily PRN Constipation  melatonin 3 milliGRAM(s) Oral at bedtime PRN Insomnia  midodrine. 5 milliGRAM(s) Oral three times a day PRN SBP below 90  ondansetron Injectable 4 milliGRAM(s) IV Push every 8 hours PRN Nausea and/or Vomiting  traMADol 50 milliGRAM(s) Oral three times a day PRN Moderate Pain (4 - 6)      Allergies  No Known Allergies      PHYSICAL EXAM:   Vital Signs:  Vital Signs Last 24 Hrs  T(C): 37.1 (27 Dec 2023 11:37), Max: 37.2 (27 Dec 2023 06:00)  T(F): 98.8 (27 Dec 2023 11:37), Max: 99 (27 Dec 2023 06:00)  HR: 104 (27 Dec 2023 11:37) (94 - 104)  BP: 99/60 (27 Dec 2023 11:37) (90/56 - 106/70)  BP(mean): --  RR: 18 (27 Dec 2023 11:37) (18 - 18)  SpO2: 95% (27 Dec 2023 11:37) (95% - 99%)    Parameters below as of 27 Dec 2023 11:37  Patient On (Oxygen Delivery Method): room air    GENERAL:  Appears stated age  HEENT:  NC/AT  CHEST:  Full & symmetric excursion  HEART:  Regular rhythm  ABDOMEN:  Soft, non-tender, non-distended  EXTEREMITIES:  no cyanosis  SKIN:  No rash  NEURO:  Alert      LABS:                        6.9    11.87 )-----------( 474      ( 27 Dec 2023 09:09 )             21.2     12-27    140  |  112<H>  |  7   ----------------------------<  106<H>  3.3<L>   |  22  |  0.59    Ca    8.0<L>      27 Dec 2023 09:09  Phos  2.9     12-27  Mg     1.7     12-27    TPro  5.1<L>  /  Alb  2.2<L>  /  TBili  0.3  /  DBili  <0.1  /  AST  12<L>  /  ALT  13  /  AlkPhos  114  12-27    PT/INR - ( 27 Dec 2023 09:09 )   PT: 19.5 sec;   INR: 1.69 ratio           Urinalysis Basic - ( 27 Dec 2023 09:09 )    Color: x / Appearance: x / SG: x / pH: x  Gluc: 106 mg/dL / Ketone: x  / Bili: x / Urobili: x   Blood: x / Protein: x / Nitrite: x   Leuk Esterase: x / RBC: x / WBC x   Sq Epi: x / Non Sq Epi: x / Bacteria: x

## 2023-12-27 NOTE — PROGRESS NOTE ADULT - SUBJECTIVE AND OBJECTIVE BOX
[INTERVAL HX: ]  Patient seen and examined;  Chart reviewed and events noted;     +bloody diarrhea  s/p rectal tube placement  s/p PRBC transfusion but with difficulty getting blood due to antibodies    [MEDICATIONS]  MEDICATIONS  (STANDING):  dextrose 5% + sodium chloride 0.45% with potassium chloride 20 mEq/L 1000 milliLiter(s) (50 mL/Hr) IV Continuous <Continuous>  dextrose 5%. 1000 milliLiter(s) (50 mL/Hr) IV Continuous <Continuous>  dextrose 5%. 1000 milliLiter(s) (100 mL/Hr) IV Continuous <Continuous>  dextrose 50% Injectable 25 Gram(s) IV Push once  dextrose 50% Injectable 25 Gram(s) IV Push once  dextrose 50% Injectable 12.5 Gram(s) IV Push once  folic acid 1 milliGRAM(s) Oral daily  glucagon  Injectable 1 milliGRAM(s) IntraMuscular once  insulin lispro (ADMELOG) corrective regimen sliding scale   SubCutaneous three times a day before meals  lactobacillus acidophilus 1 Tablet(s) Oral every 12 hours  mesalamine DR Capsule 400 milliGRAM(s) Oral three times a day  mirtazapine 30 milliGRAM(s) Oral at bedtime  pantoprazole    Tablet 40 milliGRAM(s) Oral daily  rifAXIMin 550 milliGRAM(s) Oral two times a day  spironolactone 100 milliGRAM(s) Oral daily    MEDICATIONS  (PRN):  acetaminophen     Tablet .. 650 milliGRAM(s) Oral every 6 hours PRN Temp greater or equal to 38C (100.4F), Mild Pain (1 - 3)  aluminum hydroxide/magnesium hydroxide/simethicone Suspension 30 milliLiter(s) Oral every 4 hours PRN Dyspepsia  dextrose Oral Gel 15 Gram(s) Oral once PRN Blood Glucose LESS THAN 70 milliGRAM(s)/deciliter  magnesium hydroxide Suspension 30 milliLiter(s) Oral daily PRN Constipation  melatonin 3 milliGRAM(s) Oral at bedtime PRN Insomnia  midodrine. 5 milliGRAM(s) Oral three times a day PRN SBP below 90  ondansetron Injectable 4 milliGRAM(s) IV Push every 8 hours PRN Nausea and/or Vomiting  traMADol 50 milliGRAM(s) Oral three times a day PRN Moderate Pain (4 - 6)        [VITALS]  Vital Signs:  Vital Signs Last 24 Hrs  T(C): 37.1 (27 Dec 2023 11:37), Max: 37.2 (27 Dec 2023 06:00)  T(F): 98.8 (27 Dec 2023 11:37), Max: 99 (27 Dec 2023 06:00)  HR: 104 (27 Dec 2023 11:37) (94 - 104)  BP: 99/60 (27 Dec 2023 11:37) (90/56 - 106/70)  BP(mean): --  RR: 18 (27 Dec 2023 11:37) (18 - 18)  SpO2: 95% (27 Dec 2023 11:37) (95% - 99%)    Parameters below as of 27 Dec 2023 11:37  Patient On (Oxygen Delivery Method): room air        [WT/HT]  Daily     Daily   [VENT]      [PHYSICAL EXAM]  GEN: NAD  HEENT: normocephalic and atraumatic. EOMI. PERRL.    NECK: Supple.  No lymphadenopathy   LUNGS: Clear to auscultation.  HEART: Regular rate and rhythm,  no MRG  ABDOMEN: Soft, nontender, and nondistended.  Positive bowel sounds.    : No CVA tenderness  EXTREMITIES: Without edema.  NEUROLOGIC: grossly intact.  PSYCHIATRIC: Appropriate affect .  SKIN: No rash     [LABS:]                        6.9    11.87 )-----------( 474      ( 27 Dec 2023 09:09 )             21.2     12-27    140  |  112<H>  |  7   ----------------------------<  106<H>  3.3<L>   |  22  |  0.59    Ca    8.0<L>      27 Dec 2023 09:09  Phos  2.9     12-27  Mg     1.7     12-27    TPro  5.1<L>  /  Alb  2.2<L>  /  TBili  0.3  /  DBili  <0.1  /  AST  12<L>  /  ALT  13  /  AlkPhos  114  12-27    PT/INR - ( 27 Dec 2023 09:09 )   PT: 19.5 sec;   INR: 1.69 ratio               Ferritin: 379 ng/mL *H* [13 - 330] (12-25-23 @ 14:30)    Iron - Total Binding Capacity.: 172 ug/dL *L* [220 - 430] (12-25-23 @ 14:30)    Folate, Serum: >20.0 ng/mL (12-23-23 @ 07:30)    Vitamin B12, Serum: 856 pg/mL [232 - 1245] (12-23-23 @ 07:30)    Sedimentation Rate, Erythrocyte: 51 mm/hr *H* [0 - 20] (12-21-23 @ 22:30)      Urinalysis Basic - ( 27 Dec 2023 09:09 )    Color: x / Appearance: x / SG: x / pH: x  Gluc: 106 mg/dL / Ketone: x  / Bili: x / Urobili: x   Blood: x / Protein: x / Nitrite: x   Leuk Esterase: x / RBC: x / WBC x   Sq Epi: x / Non Sq Epi: x / Bacteria: x        SARS-CoV-2: NotDetec (22 Dec 2023 00:55)          [RADIOLOGY STUDIES:]

## 2023-12-27 NOTE — PROGRESS NOTE ADULT - SUBJECTIVE AND OBJECTIVE BOX
PROGRESS NOTE  Patient is a 58y old  Female who presents with a chief complaint of hypotension and tachycardia (27 Dec 2023 12:24)  Chart and available morning labs /imaging are reviewed electronically , urgent issues addressed . More information  is being added upon completion of rounds , when more information is collected and management discussed with consultants , medical staff and social service/case management on the floor     OVERNIGHT  Hb 6.9 reported by medical staff . All above noted Patient is resting in a bed comfortably .Confused ,poor mentation .No distress noted     HPI:   Patient is a 58-year-old female  ,[facility] resident who presents to the emergency room with multiple medical issues.  Past medical history of CAD diabetes insomnia major depressive disorder GERD hypertension irritable bowel syndrome with diarrhea cirrhosis of the liver nonalcoholic fatty liver hepatic encephalopathy bipolar disorder chronic A-fib on Coumadin blepharitis of the left eye.    Per PCP signout patient was transferred to [place] for reported hypotension and tachycardia diffuse purpura.  Patient had a temperature of 99.3 with a heart rate of 116 and a blood pressure of 88/60.  Patient was FEBRILE later in ER with TEMP 101 , septic workup sent ,  started on iv abx and fluids .CT abd showed pancolitis , seen by GI and ID . Found to have cellulitis of lids b/l R>L  (22 Dec 2023 10:37)    PAST MEDICAL & SURGICAL HISTORY:  MDD (major depressive disorder)      GERD (gastroesophageal reflux disease)      Ulcerative colitis      HTN (hypertension)      DM (diabetes mellitus)      Arteriosclerotic heart disease (ASHD)      IBS (irritable bowel syndrome)      History of ataxia      Liver cirrhosis      Nonalcoholic fatty liver disease without nonalcoholic steatohepatitis (PATEL)      Hepatic encephalopathy      Bipolar illness      Chronic atrial fibrillation      Moderate protein-calorie malnutrition      OA (osteoarthritis)      Blepharitis, bilateral      Brain aneurysm      Uterine fibroid      History of cholecystectomy          MEDICATIONS  (STANDING):  dextrose 5% + sodium chloride 0.45% with potassium chloride 20 mEq/L 1000 milliLiter(s) (50 mL/Hr) IV Continuous <Continuous>  dextrose 5%. 1000 milliLiter(s) (100 mL/Hr) IV Continuous <Continuous>  dextrose 5%. 1000 milliLiter(s) (50 mL/Hr) IV Continuous <Continuous>  dextrose 50% Injectable 25 Gram(s) IV Push once  dextrose 50% Injectable 25 Gram(s) IV Push once  dextrose 50% Injectable 12.5 Gram(s) IV Push once  folic acid 1 milliGRAM(s) Oral daily  glucagon  Injectable 1 milliGRAM(s) IntraMuscular once  insulin lispro (ADMELOG) corrective regimen sliding scale   SubCutaneous three times a day before meals  lactobacillus acidophilus 1 Tablet(s) Oral every 12 hours  mesalamine DR Capsule 400 milliGRAM(s) Oral three times a day  mirtazapine 30 milliGRAM(s) Oral at bedtime  pantoprazole    Tablet 40 milliGRAM(s) Oral daily  rifAXIMin 550 milliGRAM(s) Oral two times a day  spironolactone 100 milliGRAM(s) Oral daily    MEDICATIONS  (PRN):  acetaminophen     Tablet .. 650 milliGRAM(s) Oral every 6 hours PRN Temp greater or equal to 38C (100.4F), Mild Pain (1 - 3)  aluminum hydroxide/magnesium hydroxide/simethicone Suspension 30 milliLiter(s) Oral every 4 hours PRN Dyspepsia  dextrose Oral Gel 15 Gram(s) Oral once PRN Blood Glucose LESS THAN 70 milliGRAM(s)/deciliter  magnesium hydroxide Suspension 30 milliLiter(s) Oral daily PRN Constipation  melatonin 3 milliGRAM(s) Oral at bedtime PRN Insomnia  midodrine. 5 milliGRAM(s) Oral three times a day PRN SBP below 90  ondansetron Injectable 4 milliGRAM(s) IV Push every 8 hours PRN Nausea and/or Vomiting  traMADol 50 milliGRAM(s) Oral three times a day PRN Moderate Pain (4 - 6)      OBJECTIVE    T(C): 37.1 (12-27-23 @ 11:37), Max: 37.2 (12-27-23 @ 06:00)  HR: 104 (12-27-23 @ 11:37) (94 - 104)  BP: 99/60 (12-27-23 @ 11:37) (90/56 - 106/70)  RR: 18 (12-27-23 @ 11:37) (18 - 18)  SpO2: 95% (12-27-23 @ 11:37) (95% - 99%)  Wt(kg): --  I&O's Summary    27 Dec 2023 07:01  -  27 Dec 2023 19:26  --------------------------------------------------------  IN: 0 mL / OUT: 601 mL / NET: -601 mL          REVIEW OF SYSTEMS:  Patient is  unable to provide any information/ROS  due to baseline mental status.     PHYSICAL EXAM:  Appearance: NAD. VS past 24 hrs -as above   HEENT:   Moist oral mucosa. Conjunctiva clear b/l.   Neck : supple  Respiratory: Lungs CTAB.  Gastrointestinal:  Soft, nontender. No rebound. No rigidity. BS present	  Cardiovascular: RRR ,S1S2 present  Neurologic: Non-focal. Moving all extremities.  Extremities: No edema. No erythema. No calf tenderness.  Skin: No rashes, No ecchymoses, No cyanosis.	  wounds ,skin lesions-See skin assesment flow sheet   LABS:                        6.9    11.87 )-----------( 474      ( 27 Dec 2023 09:09 )             21.2     12-27    140  |  112<H>  |  7   ----------------------------<  106<H>  3.3<L>   |  22  |  0.59    Ca    8.0<L>      27 Dec 2023 09:09  Phos  2.9     12-27  Mg     1.7     12-27    TPro  5.1<L>  /  Alb  2.2<L>  /  TBili  0.3  /  DBili  <0.1  /  AST  12<L>  /  ALT  13  /  AlkPhos  114  12-27    CAPILLARY BLOOD GLUCOSE      POCT Blood Glucose.: 126 mg/dL (27 Dec 2023 16:54)  POCT Blood Glucose.: 105 mg/dL (27 Dec 2023 12:07)  POCT Blood Glucose.: 106 mg/dL (27 Dec 2023 08:16)  POCT Blood Glucose.: 104 mg/dL (26 Dec 2023 20:58)    PT/INR - ( 27 Dec 2023 09:09 )   PT: 19.5 sec;   INR: 1.69 ratio           Urinalysis Basic - ( 27 Dec 2023 09:09 )    Color: x / Appearance: x / SG: x / pH: x  Gluc: 106 mg/dL / Ketone: x  / Bili: x / Urobili: x   Blood: x / Protein: x / Nitrite: x   Leuk Esterase: x / RBC: x / WBC x   Sq Epi: x / Non Sq Epi: x / Bacteria: x        Culture - Urine (collected 23 Dec 2023 06:30)  Source: Clean Catch Clean Catch (Midstream)  Final Report (24 Dec 2023 10:27):    No growth    Babesia microti PCR, Bld (collected 22 Dec 2023 23:30)    Culture - Blood (collected 21 Dec 2023 22:40)  Source: .Blood Blood-Peripheral  Final Report (27 Dec 2023 07:01):    No growth at 5 days    Culture - Blood (collected 21 Dec 2023 22:30)  Source: .Blood Blood-Peripheral  Final Report (27 Dec 2023 07:01):    No growth at 5 days      RADIOLOGY & ADDITIONAL TESTS:   reviewed elctronically  ASSESSMENT/PLAN: 	    25 minutes aggregate time was spent on this visit, 50% visit time spent in care co-ordination with other attendings and counselling patient .I have discussed care plan with patient / HCP/family member sister  ,who expressed understanding of problems treatment and their effect and side effects, alternatives in details. I have asked if they have any questions and concerns and appropriately addressed them to best of my ability. ACP-Advance care planning was discussed , pallitaive care issues ,CMO ,GOC ,MOLST  form ,advance directives were reviewed .All questions were answered to the best of my knowledge - 25 m Spoke to Dr Woodall , PCP from [facility]  PROGRESS NOTE  Patient is a 58y old  Female who presents with a chief complaint of hypotension and tachycardia (27 Dec 2023 12:24)  Chart and available morning labs /imaging are reviewed electronically , urgent issues addressed . More information  is being added upon completion of rounds , when more information is collected and management discussed with consultants , medical staff and social service/case management on the floor     OVERNIGHT  Hb 6.9 reported by medical staff . All above noted Patient is resting in a bed comfortably .Confused ,poor mentation .No distress noted     HPI:   Patient is a 58-year-old female  ,[facility] resident who presents to the emergency room with multiple medical issues.  Past medical history of CAD diabetes insomnia major depressive disorder GERD hypertension irritable bowel syndrome with diarrhea cirrhosis of the liver nonalcoholic fatty liver hepatic encephalopathy bipolar disorder chronic A-fib on Coumadin blepharitis of the left eye.    Per PCP signout patient was transferred to [place] for reported hypotension and tachycardia diffuse purpura.  Patient had a temperature of 99.3 with a heart rate of 116 and a blood pressure of 88/60.  Patient was FEBRILE later in ER with TEMP 101 , septic workup sent ,  started on iv abx and fluids .CT abd showed pancolitis , seen by GI and ID . Found to have cellulitis of lids b/l R>L  (22 Dec 2023 10:37)    PAST MEDICAL & SURGICAL HISTORY:  MDD (major depressive disorder)      GERD (gastroesophageal reflux disease)      Ulcerative colitis      HTN (hypertension)      DM (diabetes mellitus)      Arteriosclerotic heart disease (ASHD)      IBS (irritable bowel syndrome)      History of ataxia      Liver cirrhosis      Nonalcoholic fatty liver disease without nonalcoholic steatohepatitis (PATEL)      Hepatic encephalopathy      Bipolar illness      Chronic atrial fibrillation      Moderate protein-calorie malnutrition      OA (osteoarthritis)      Blepharitis, bilateral      Brain aneurysm      Uterine fibroid      History of cholecystectomy          MEDICATIONS  (STANDING):  dextrose 5% + sodium chloride 0.45% with potassium chloride 20 mEq/L 1000 milliLiter(s) (50 mL/Hr) IV Continuous <Continuous>  dextrose 5%. 1000 milliLiter(s) (100 mL/Hr) IV Continuous <Continuous>  dextrose 5%. 1000 milliLiter(s) (50 mL/Hr) IV Continuous <Continuous>  dextrose 50% Injectable 25 Gram(s) IV Push once  dextrose 50% Injectable 25 Gram(s) IV Push once  dextrose 50% Injectable 12.5 Gram(s) IV Push once  folic acid 1 milliGRAM(s) Oral daily  glucagon  Injectable 1 milliGRAM(s) IntraMuscular once  insulin lispro (ADMELOG) corrective regimen sliding scale   SubCutaneous three times a day before meals  lactobacillus acidophilus 1 Tablet(s) Oral every 12 hours  mesalamine DR Capsule 400 milliGRAM(s) Oral three times a day  mirtazapine 30 milliGRAM(s) Oral at bedtime  pantoprazole    Tablet 40 milliGRAM(s) Oral daily  rifAXIMin 550 milliGRAM(s) Oral two times a day  spironolactone 100 milliGRAM(s) Oral daily    MEDICATIONS  (PRN):  acetaminophen     Tablet .. 650 milliGRAM(s) Oral every 6 hours PRN Temp greater or equal to 38C (100.4F), Mild Pain (1 - 3)  aluminum hydroxide/magnesium hydroxide/simethicone Suspension 30 milliLiter(s) Oral every 4 hours PRN Dyspepsia  dextrose Oral Gel 15 Gram(s) Oral once PRN Blood Glucose LESS THAN 70 milliGRAM(s)/deciliter  magnesium hydroxide Suspension 30 milliLiter(s) Oral daily PRN Constipation  melatonin 3 milliGRAM(s) Oral at bedtime PRN Insomnia  midodrine. 5 milliGRAM(s) Oral three times a day PRN SBP below 90  ondansetron Injectable 4 milliGRAM(s) IV Push every 8 hours PRN Nausea and/or Vomiting  traMADol 50 milliGRAM(s) Oral three times a day PRN Moderate Pain (4 - 6)      OBJECTIVE    T(C): 37.1 (12-27-23 @ 11:37), Max: 37.2 (12-27-23 @ 06:00)  HR: 104 (12-27-23 @ 11:37) (94 - 104)  BP: 99/60 (12-27-23 @ 11:37) (90/56 - 106/70)  RR: 18 (12-27-23 @ 11:37) (18 - 18)  SpO2: 95% (12-27-23 @ 11:37) (95% - 99%)  Wt(kg): --  I&O's Summary    27 Dec 2023 07:01  -  27 Dec 2023 19:26  --------------------------------------------------------  IN: 0 mL / OUT: 601 mL / NET: -601 mL          REVIEW OF SYSTEMS:  Patient is  unable to provide any information/ROS  due to baseline mental status.     PHYSICAL EXAM:  Appearance: NAD. VS past 24 hrs -as above   HEENT:   Moist oral mucosa. Conjunctiva clear b/l.   Neck : supple  Respiratory: Lungs CTAB.  Gastrointestinal:  Soft, nontender. No rebound. No rigidity. BS present	  Cardiovascular: RRR ,S1S2 present  Neurologic: Non-focal. Moving all extremities.  Extremities: No edema. No erythema. No calf tenderness.  Skin: No rashes, No ecchymoses, No cyanosis.	  wounds ,skin lesions-See skin assesment flow sheet   LABS:                        6.9    11.87 )-----------( 474      ( 27 Dec 2023 09:09 )             21.2     12-27    140  |  112<H>  |  7   ----------------------------<  106<H>  3.3<L>   |  22  |  0.59    Ca    8.0<L>      27 Dec 2023 09:09  Phos  2.9     12-27  Mg     1.7     12-27    TPro  5.1<L>  /  Alb  2.2<L>  /  TBili  0.3  /  DBili  <0.1  /  AST  12<L>  /  ALT  13  /  AlkPhos  114  12-27    CAPILLARY BLOOD GLUCOSE      POCT Blood Glucose.: 126 mg/dL (27 Dec 2023 16:54)  POCT Blood Glucose.: 105 mg/dL (27 Dec 2023 12:07)  POCT Blood Glucose.: 106 mg/dL (27 Dec 2023 08:16)  POCT Blood Glucose.: 104 mg/dL (26 Dec 2023 20:58)    PT/INR - ( 27 Dec 2023 09:09 )   PT: 19.5 sec;   INR: 1.69 ratio           Urinalysis Basic - ( 27 Dec 2023 09:09 )    Color: x / Appearance: x / SG: x / pH: x  Gluc: 106 mg/dL / Ketone: x  / Bili: x / Urobili: x   Blood: x / Protein: x / Nitrite: x   Leuk Esterase: x / RBC: x / WBC x   Sq Epi: x / Non Sq Epi: x / Bacteria: x        Culture - Urine (collected 23 Dec 2023 06:30)  Source: Clean Catch Clean Catch (Midstream)  Final Report (24 Dec 2023 10:27):    No growth    Babesia microti PCR, Bld (collected 22 Dec 2023 23:30)    Culture - Blood (collected 21 Dec 2023 22:40)  Source: .Blood Blood-Peripheral  Final Report (27 Dec 2023 07:01):    No growth at 5 days    Culture - Blood (collected 21 Dec 2023 22:30)  Source: .Blood Blood-Peripheral  Final Report (27 Dec 2023 07:01):    No growth at 5 days      RADIOLOGY & ADDITIONAL TESTS:   reviewed elctronically  ASSESSMENT/PLAN: 	    25 minutes aggregate time was spent on this visit, 50% visit time spent in care co-ordination with other attendings and counselling patient .I have discussed care plan with patient / HCP/family member sister  ,who expressed understanding of problems treatment and their effect and side effects, alternatives in details. I have asked if they have any questions and concerns and appropriately addressed them to best of my ability. ACP-Advance care planning was discussed , pallitaive care issues ,CMO ,GOC ,MOLST  form ,advance directives were reviewed .All questions were answered to the best of my knowledge - 25 m Spoke to Dr Woodall , PCP from [facility]

## 2023-12-27 NOTE — PROGRESS NOTE ADULT - ASSESSMENT
Patient is a 58-year-old female  ,[facility] resident who presents to the emergency room with multiple medical issues.  Past medical history of CAD diabetes insomnia major depressive disorder GERD hypertension irritable bowel syndrome with diarrhea cirrhosis of the liver nonalcoholic fatty liver hepatic encephalopathy bipolar disorder chronic A-fib on Coumadin blepharitis of the left eye.    Per PCP signout patient was transferred to [place] for reported hypotension and tachycardia diffuse purpura.  Patient had a temperature of 99.3 with a heart rate of 116 and a blood pressure of 88/60.  Patient was FEBRILE later in ER with TEMP 101 , septic workup sent ,  started on iv abx and fluids .CT abd showed pancolitis , seen by GI and ID . Found to have cellulitis of lids b/l R>L     Patient is a 58-year-old female  ,[facility] resident who presents to the emergency room with multiple medical issues.  Past medical history of CAD diabetes insomnia major depressive disorder GERD hypertension irritable bowel syndrome with diarrhea cirrhosis of the liver nonalcoholic fatty liver hepatic encephalopathy bipolar disorder chronic A-fib on Coumadin blepharitis of the left eye.    Per PCP signout patient was transferred to [place] for reported hypotension and tachycardia diffuse purpura.  Patient had a temperature of 99.3 with a heart rate of 116 and a blood pressure of 88/60.  Patient was FEBRILE later in ER with TEMP 101 , septic workup sent ,  started on iv abx and fluids .CT abd showed pancolitis , seen by GI and ID . Found to have cellulitis of lids b/l R>L

## 2023-12-27 NOTE — PROGRESS NOTE ADULT - PROBLEM SELECTOR PLAN 5
Seen by GI -hx of ulcerative colitis  abnormal ct  pancolitis  check fecal calprotectin  if elevated can start mesalamine 400mg tid  reg diet  Case d/w Dr Maldonado
Seen by GI -hx of ulcerative colitis  abnormal ct  pancolitis  check fecal calprotectin  if elevated can start mesalamine 400mg tid  reg diet  Case d/w Dr Maldonado

## 2023-12-28 ENCOUNTER — INPATIENT (INPATIENT)
Facility: HOSPITAL | Age: 58
LOS: 14 days | Discharge: NURSING, REHAB OR LONG-TERM CARE | End: 2024-01-12
Attending: INTERNAL MEDICINE | Admitting: INTERNAL MEDICINE
Payer: MEDICAID

## 2023-12-28 VITALS
RESPIRATION RATE: 17 BRPM | DIASTOLIC BLOOD PRESSURE: 73 MMHG | TEMPERATURE: 98 F | SYSTOLIC BLOOD PRESSURE: 103 MMHG | OXYGEN SATURATION: 100 % | HEART RATE: 86 BPM

## 2023-12-28 VITALS
RESPIRATION RATE: 16 BRPM | TEMPERATURE: 99 F | HEART RATE: 101 BPM | SYSTOLIC BLOOD PRESSURE: 124 MMHG | OXYGEN SATURATION: 93 % | DIASTOLIC BLOOD PRESSURE: 85 MMHG

## 2023-12-28 DIAGNOSIS — I25.10 ATHEROSCLEROTIC HEART DISEASE OF NATIVE CORONARY ARTERY WITHOUT ANGINA PECTORIS: ICD-10-CM

## 2023-12-28 DIAGNOSIS — E11.9 TYPE 2 DIABETES MELLITUS WITHOUT COMPLICATIONS: ICD-10-CM

## 2023-12-28 DIAGNOSIS — Z29.9 ENCOUNTER FOR PROPHYLACTIC MEASURES, UNSPECIFIED: ICD-10-CM

## 2023-12-28 DIAGNOSIS — K74.60 UNSPECIFIED CIRRHOSIS OF LIVER: ICD-10-CM

## 2023-12-28 DIAGNOSIS — I48.91 UNSPECIFIED ATRIAL FIBRILLATION: ICD-10-CM

## 2023-12-28 DIAGNOSIS — D63.8 ANEMIA IN OTHER CHRONIC DISEASES CLASSIFIED ELSEWHERE: ICD-10-CM

## 2023-12-28 DIAGNOSIS — F32.9 MAJOR DEPRESSIVE DISORDER, SINGLE EPISODE, UNSPECIFIED: ICD-10-CM

## 2023-12-28 DIAGNOSIS — Z90.49 ACQUIRED ABSENCE OF OTHER SPECIFIED PARTS OF DIGESTIVE TRACT: Chronic | ICD-10-CM

## 2023-12-28 DIAGNOSIS — K21.9 GASTRO-ESOPHAGEAL REFLUX DISEASE WITHOUT ESOPHAGITIS: ICD-10-CM

## 2023-12-28 DIAGNOSIS — H05.019 CELLULITIS OF UNSPECIFIED ORBIT: ICD-10-CM

## 2023-12-28 DIAGNOSIS — R21 RASH AND OTHER NONSPECIFIC SKIN ERUPTION: ICD-10-CM

## 2023-12-28 DIAGNOSIS — K51.00 ULCERATIVE (CHRONIC) PANCOLITIS WITHOUT COMPLICATIONS: ICD-10-CM

## 2023-12-28 DIAGNOSIS — H57.9 UNSPECIFIED DISORDER OF EYE AND ADNEXA: ICD-10-CM

## 2023-12-28 LAB
ALBUMIN SERPL ELPH-MCNC: 2.7 G/DL — LOW (ref 3.3–5)
ALBUMIN SERPL ELPH-MCNC: 2.7 G/DL — LOW (ref 3.3–5)
ALP SERPL-CCNC: 113 U/L — SIGNIFICANT CHANGE UP (ref 40–120)
ALP SERPL-CCNC: 113 U/L — SIGNIFICANT CHANGE UP (ref 40–120)
ALT FLD-CCNC: 5 U/L — SIGNIFICANT CHANGE UP (ref 4–33)
ALT FLD-CCNC: 5 U/L — SIGNIFICANT CHANGE UP (ref 4–33)
ANION GAP SERPL CALC-SCNC: 11 MMOL/L — SIGNIFICANT CHANGE UP (ref 7–14)
ANION GAP SERPL CALC-SCNC: 11 MMOL/L — SIGNIFICANT CHANGE UP (ref 7–14)
ANISOCYTOSIS BLD QL: SLIGHT — SIGNIFICANT CHANGE UP
ANISOCYTOSIS BLD QL: SLIGHT — SIGNIFICANT CHANGE UP
APTT BLD: 27.8 SEC — SIGNIFICANT CHANGE UP (ref 24.5–35.6)
APTT BLD: 27.8 SEC — SIGNIFICANT CHANGE UP (ref 24.5–35.6)
AST SERPL-CCNC: 14 U/L — SIGNIFICANT CHANGE UP (ref 4–32)
AST SERPL-CCNC: 14 U/L — SIGNIFICANT CHANGE UP (ref 4–32)
BASOPHILS # BLD AUTO: 0 K/UL — SIGNIFICANT CHANGE UP (ref 0–0.2)
BASOPHILS # BLD AUTO: 0 K/UL — SIGNIFICANT CHANGE UP (ref 0–0.2)
BASOPHILS NFR BLD AUTO: 0 % — SIGNIFICANT CHANGE UP (ref 0–2)
BASOPHILS NFR BLD AUTO: 0 % — SIGNIFICANT CHANGE UP (ref 0–2)
BILIRUB SERPL-MCNC: 0.3 MG/DL — SIGNIFICANT CHANGE UP (ref 0.2–1.2)
BILIRUB SERPL-MCNC: 0.3 MG/DL — SIGNIFICANT CHANGE UP (ref 0.2–1.2)
BLD GP AB SCN SERPL QL: NEGATIVE — SIGNIFICANT CHANGE UP
BLD GP AB SCN SERPL QL: NEGATIVE — SIGNIFICANT CHANGE UP
BUN SERPL-MCNC: 10 MG/DL — SIGNIFICANT CHANGE UP (ref 7–23)
BUN SERPL-MCNC: 10 MG/DL — SIGNIFICANT CHANGE UP (ref 7–23)
CALCIUM SERPL-MCNC: 8.1 MG/DL — LOW (ref 8.4–10.5)
CALCIUM SERPL-MCNC: 8.1 MG/DL — LOW (ref 8.4–10.5)
CHLORIDE SERPL-SCNC: 108 MMOL/L — HIGH (ref 98–107)
CHLORIDE SERPL-SCNC: 108 MMOL/L — HIGH (ref 98–107)
CO2 SERPL-SCNC: 19 MMOL/L — LOW (ref 22–31)
CO2 SERPL-SCNC: 19 MMOL/L — LOW (ref 22–31)
CREAT SERPL-MCNC: 0.67 MG/DL — SIGNIFICANT CHANGE UP (ref 0.5–1.3)
CREAT SERPL-MCNC: 0.67 MG/DL — SIGNIFICANT CHANGE UP (ref 0.5–1.3)
DACRYOCYTES BLD QL SMEAR: SLIGHT — SIGNIFICANT CHANGE UP
DACRYOCYTES BLD QL SMEAR: SLIGHT — SIGNIFICANT CHANGE UP
EGFR: 103 ML/MIN/1.73M2 — SIGNIFICANT CHANGE UP
EGFR: 103 ML/MIN/1.73M2 — SIGNIFICANT CHANGE UP
ELLIPTOCYTES BLD QL SMEAR: SLIGHT — SIGNIFICANT CHANGE UP
ELLIPTOCYTES BLD QL SMEAR: SLIGHT — SIGNIFICANT CHANGE UP
EOSINOPHIL # BLD AUTO: 0 K/UL — SIGNIFICANT CHANGE UP (ref 0–0.5)
EOSINOPHIL # BLD AUTO: 0 K/UL — SIGNIFICANT CHANGE UP (ref 0–0.5)
EOSINOPHIL NFR BLD AUTO: 0 % — SIGNIFICANT CHANGE UP (ref 0–6)
EOSINOPHIL NFR BLD AUTO: 0 % — SIGNIFICANT CHANGE UP (ref 0–6)
GLUCOSE BLDC GLUCOMTR-MCNC: 109 MG/DL — HIGH (ref 70–99)
GLUCOSE BLDC GLUCOMTR-MCNC: 109 MG/DL — HIGH (ref 70–99)
GLUCOSE SERPL-MCNC: 100 MG/DL — HIGH (ref 70–99)
GLUCOSE SERPL-MCNC: 100 MG/DL — HIGH (ref 70–99)
HCT VFR BLD CALC: 29 % — LOW (ref 34.5–45)
HCT VFR BLD CALC: 29 % — LOW (ref 34.5–45)
HGB BLD-MCNC: 9.5 G/DL — LOW (ref 11.5–15.5)
HGB BLD-MCNC: 9.5 G/DL — LOW (ref 11.5–15.5)
HYPOCHROMIA BLD QL: SLIGHT — SIGNIFICANT CHANGE UP
HYPOCHROMIA BLD QL: SLIGHT — SIGNIFICANT CHANGE UP
IANC: 6.15 K/UL — SIGNIFICANT CHANGE UP (ref 1.8–7.4)
IANC: 6.15 K/UL — SIGNIFICANT CHANGE UP (ref 1.8–7.4)
INR BLD: 1.75 RATIO — HIGH (ref 0.85–1.18)
INR BLD: 1.75 RATIO — HIGH (ref 0.85–1.18)
LYMPHOCYTES # BLD AUTO: 1.57 K/UL — SIGNIFICANT CHANGE UP (ref 1–3.3)
LYMPHOCYTES # BLD AUTO: 1.57 K/UL — SIGNIFICANT CHANGE UP (ref 1–3.3)
LYMPHOCYTES # BLD AUTO: 19.1 % — SIGNIFICANT CHANGE UP (ref 13–44)
LYMPHOCYTES # BLD AUTO: 19.1 % — SIGNIFICANT CHANGE UP (ref 13–44)
MACROCYTES BLD QL: SLIGHT — SIGNIFICANT CHANGE UP
MACROCYTES BLD QL: SLIGHT — SIGNIFICANT CHANGE UP
MAGNESIUM SERPL-MCNC: 1.6 MG/DL — SIGNIFICANT CHANGE UP (ref 1.6–2.6)
MAGNESIUM SERPL-MCNC: 1.6 MG/DL — SIGNIFICANT CHANGE UP (ref 1.6–2.6)
MCHC RBC-ENTMCNC: 30.6 PG — SIGNIFICANT CHANGE UP (ref 27–34)
MCHC RBC-ENTMCNC: 30.6 PG — SIGNIFICANT CHANGE UP (ref 27–34)
MCHC RBC-ENTMCNC: 32.8 GM/DL — SIGNIFICANT CHANGE UP (ref 32–36)
MCHC RBC-ENTMCNC: 32.8 GM/DL — SIGNIFICANT CHANGE UP (ref 32–36)
MCV RBC AUTO: 93.5 FL — SIGNIFICANT CHANGE UP (ref 80–100)
MCV RBC AUTO: 93.5 FL — SIGNIFICANT CHANGE UP (ref 80–100)
MONOCYTES # BLD AUTO: 0.21 K/UL — SIGNIFICANT CHANGE UP (ref 0–0.9)
MONOCYTES # BLD AUTO: 0.21 K/UL — SIGNIFICANT CHANGE UP (ref 0–0.9)
MONOCYTES NFR BLD AUTO: 2.6 % — SIGNIFICANT CHANGE UP (ref 2–14)
MONOCYTES NFR BLD AUTO: 2.6 % — SIGNIFICANT CHANGE UP (ref 2–14)
MYELOCYTES NFR BLD: 1.7 % — HIGH (ref 0–0)
MYELOCYTES NFR BLD: 1.7 % — HIGH (ref 0–0)
NEUTROPHILS # BLD AUTO: 6.08 K/UL — SIGNIFICANT CHANGE UP (ref 1.8–7.4)
NEUTROPHILS # BLD AUTO: 6.08 K/UL — SIGNIFICANT CHANGE UP (ref 1.8–7.4)
NEUTROPHILS NFR BLD AUTO: 67 % — SIGNIFICANT CHANGE UP (ref 43–77)
NEUTROPHILS NFR BLD AUTO: 67 % — SIGNIFICANT CHANGE UP (ref 43–77)
NEUTS BAND # BLD: 7 % — HIGH (ref 0–6)
NEUTS BAND # BLD: 7 % — HIGH (ref 0–6)
PHOSPHATE SERPL-MCNC: 3.1 MG/DL — SIGNIFICANT CHANGE UP (ref 2.5–4.5)
PHOSPHATE SERPL-MCNC: 3.1 MG/DL — SIGNIFICANT CHANGE UP (ref 2.5–4.5)
PLAT MORPH BLD: NORMAL — SIGNIFICANT CHANGE UP
PLAT MORPH BLD: NORMAL — SIGNIFICANT CHANGE UP
PLATELET # BLD AUTO: 486 K/UL — HIGH (ref 150–400)
PLATELET # BLD AUTO: 486 K/UL — HIGH (ref 150–400)
PLATELET COUNT - ESTIMATE: NORMAL — SIGNIFICANT CHANGE UP
PLATELET COUNT - ESTIMATE: NORMAL — SIGNIFICANT CHANGE UP
POIKILOCYTOSIS BLD QL AUTO: SIGNIFICANT CHANGE UP
POIKILOCYTOSIS BLD QL AUTO: SIGNIFICANT CHANGE UP
POLYCHROMASIA BLD QL SMEAR: SLIGHT — SIGNIFICANT CHANGE UP
POLYCHROMASIA BLD QL SMEAR: SLIGHT — SIGNIFICANT CHANGE UP
POTASSIUM SERPL-MCNC: 3.7 MMOL/L — SIGNIFICANT CHANGE UP (ref 3.5–5.3)
POTASSIUM SERPL-MCNC: 3.7 MMOL/L — SIGNIFICANT CHANGE UP (ref 3.5–5.3)
POTASSIUM SERPL-SCNC: 3.7 MMOL/L — SIGNIFICANT CHANGE UP (ref 3.5–5.3)
POTASSIUM SERPL-SCNC: 3.7 MMOL/L — SIGNIFICANT CHANGE UP (ref 3.5–5.3)
PROT SERPL-MCNC: 5.2 G/DL — LOW (ref 6–8.3)
PROT SERPL-MCNC: 5.2 G/DL — LOW (ref 6–8.3)
PROTHROM AB SERPL-ACNC: 19.3 SEC — HIGH (ref 9.5–13)
PROTHROM AB SERPL-ACNC: 19.3 SEC — HIGH (ref 9.5–13)
R RICKETTSI AB SER-ACNC: NEGATIVE — SIGNIFICANT CHANGE UP
R RICKETTSI AB SER-ACNC: NEGATIVE — SIGNIFICANT CHANGE UP
R RICKETTSI IGM SER-ACNC: 1.11 INDEX — HIGH (ref 0–0.89)
R RICKETTSI IGM SER-ACNC: 1.11 INDEX — HIGH (ref 0–0.89)
RBC # BLD: 3.1 M/UL — LOW (ref 3.8–5.2)
RBC # BLD: 3.1 M/UL — LOW (ref 3.8–5.2)
RBC # FLD: 14.4 % — SIGNIFICANT CHANGE UP (ref 10.3–14.5)
RBC # FLD: 14.4 % — SIGNIFICANT CHANGE UP (ref 10.3–14.5)
RBC BLD AUTO: ABNORMAL
RBC BLD AUTO: ABNORMAL
RH IG SCN BLD-IMP: POSITIVE — SIGNIFICANT CHANGE UP
RH IG SCN BLD-IMP: POSITIVE — SIGNIFICANT CHANGE UP
RICK SF IGG TITR SER IF: NEGATIVE — SIGNIFICANT CHANGE UP
RICK SF IGG TITR SER IF: NEGATIVE — SIGNIFICANT CHANGE UP
RICK SF IGM TITR SER IF: 1.11 INDEX — HIGH (ref 0–0.89)
RICK SF IGM TITR SER IF: 1.11 INDEX — HIGH (ref 0–0.89)
SODIUM SERPL-SCNC: 138 MMOL/L — SIGNIFICANT CHANGE UP (ref 135–145)
SODIUM SERPL-SCNC: 138 MMOL/L — SIGNIFICANT CHANGE UP (ref 135–145)
VARIANT LYMPHS # BLD: 2.6 % — SIGNIFICANT CHANGE UP (ref 0–6)
VARIANT LYMPHS # BLD: 2.6 % — SIGNIFICANT CHANGE UP (ref 0–6)
WBC # BLD: 8.21 K/UL — SIGNIFICANT CHANGE UP (ref 3.8–10.5)
WBC # BLD: 8.21 K/UL — SIGNIFICANT CHANGE UP (ref 3.8–10.5)
WBC # FLD AUTO: 8.21 K/UL — SIGNIFICANT CHANGE UP (ref 3.8–10.5)
WBC # FLD AUTO: 8.21 K/UL — SIGNIFICANT CHANGE UP (ref 3.8–10.5)

## 2023-12-28 PROCEDURE — 86753 PROTOZOA ANTIBODY NOS: CPT

## 2023-12-28 PROCEDURE — 86618 LYME DISEASE ANTIBODY: CPT

## 2023-12-28 PROCEDURE — 83520 IMMUNOASSAY QUANT NOS NONAB: CPT

## 2023-12-28 PROCEDURE — 82785 ASSAY OF IGE: CPT

## 2023-12-28 PROCEDURE — 86160 COMPLEMENT ANTIGEN: CPT

## 2023-12-28 PROCEDURE — 83550 IRON BINDING TEST: CPT

## 2023-12-28 PROCEDURE — 86645 CMV ANTIBODY IGM: CPT

## 2023-12-28 PROCEDURE — P9016: CPT

## 2023-12-28 PROCEDURE — 85730 THROMBOPLASTIN TIME PARTIAL: CPT

## 2023-12-28 PROCEDURE — 82164 ANGIOTENSIN I ENZYME TEST: CPT

## 2023-12-28 PROCEDURE — 87484 EHRLICHA CHAFFEENSIS AMP PRB: CPT

## 2023-12-28 PROCEDURE — 86905 BLOOD TYPING RBC ANTIGENS: CPT

## 2023-12-28 PROCEDURE — 83735 ASSAY OF MAGNESIUM: CPT

## 2023-12-28 PROCEDURE — 86900 BLOOD TYPING SEROLOGIC ABO: CPT

## 2023-12-28 PROCEDURE — 82085 ASSAY OF ALDOLASE: CPT

## 2023-12-28 PROCEDURE — 83993 ASSAY FOR CALPROTECTIN FECAL: CPT

## 2023-12-28 PROCEDURE — 82248 BILIRUBIN DIRECT: CPT

## 2023-12-28 PROCEDURE — 83540 ASSAY OF IRON: CPT

## 2023-12-28 PROCEDURE — 36430 TRANSFUSION BLD/BLD COMPNT: CPT

## 2023-12-28 PROCEDURE — 82140 ASSAY OF AMMONIA: CPT

## 2023-12-28 PROCEDURE — 99285 EMERGENCY DEPT VISIT HI MDM: CPT

## 2023-12-28 PROCEDURE — 83516 IMMUNOASSAY NONANTIBODY: CPT

## 2023-12-28 PROCEDURE — 82962 GLUCOSE BLOOD TEST: CPT

## 2023-12-28 PROCEDURE — 86757 RICKETTSIA ANTIBODY: CPT

## 2023-12-28 PROCEDURE — 81001 URINALYSIS AUTO W/SCOPE: CPT

## 2023-12-28 PROCEDURE — 76856 US EXAM PELVIC COMPLETE: CPT

## 2023-12-28 PROCEDURE — 82272 OCCULT BLD FECES 1-3 TESTS: CPT

## 2023-12-28 PROCEDURE — 86140 C-REACTIVE PROTEIN: CPT

## 2023-12-28 PROCEDURE — 96365 THER/PROPH/DIAG IV INF INIT: CPT

## 2023-12-28 PROCEDURE — 84100 ASSAY OF PHOSPHORUS: CPT

## 2023-12-28 PROCEDURE — 85025 COMPLETE CBC W/AUTO DIFF WBC: CPT

## 2023-12-28 PROCEDURE — 80053 COMPREHEN METABOLIC PANEL: CPT

## 2023-12-28 PROCEDURE — 83036 HEMOGLOBIN GLYCOSYLATED A1C: CPT

## 2023-12-28 PROCEDURE — 82550 ASSAY OF CK (CPK): CPT

## 2023-12-28 PROCEDURE — 74177 CT ABD & PELVIS W/CONTRAST: CPT | Mod: MA

## 2023-12-28 PROCEDURE — 80076 HEPATIC FUNCTION PANEL: CPT

## 2023-12-28 PROCEDURE — 70450 CT HEAD/BRAIN W/O DYE: CPT | Mod: MA

## 2023-12-28 PROCEDURE — 93306 TTE W/DOPPLER COMPLETE: CPT

## 2023-12-28 PROCEDURE — 86644 CMV ANTIBODY: CPT

## 2023-12-28 PROCEDURE — 83615 LACTATE (LD) (LDH) ENZYME: CPT

## 2023-12-28 PROCEDURE — 93005 ELECTROCARDIOGRAM TRACING: CPT

## 2023-12-28 PROCEDURE — 86880 COOMBS TEST DIRECT: CPT

## 2023-12-28 PROCEDURE — 99223 1ST HOSP IP/OBS HIGH 75: CPT | Mod: GC

## 2023-12-28 PROCEDURE — 86235 NUCLEAR ANTIGEN ANTIBODY: CPT

## 2023-12-28 PROCEDURE — 85048 AUTOMATED LEUKOCYTE COUNT: CPT

## 2023-12-28 PROCEDURE — 87507 IADNA-DNA/RNA PROBE TQ 12-25: CPT

## 2023-12-28 PROCEDURE — 70481 CT ORBIT/EAR/FOSSA W/DYE: CPT | Mod: MA

## 2023-12-28 PROCEDURE — 36415 COLL VENOUS BLD VENIPUNCTURE: CPT

## 2023-12-28 PROCEDURE — 86695 HERPES SIMPLEX TYPE 1 TEST: CPT

## 2023-12-28 PROCEDURE — 70498 CT ANGIOGRAPHY NECK: CPT

## 2023-12-28 PROCEDURE — 86036 ANCA SCREEN EACH ANTIBODY: CPT

## 2023-12-28 PROCEDURE — 87040 BLOOD CULTURE FOR BACTERIA: CPT

## 2023-12-28 PROCEDURE — 86901 BLOOD TYPING SEROLOGIC RH(D): CPT

## 2023-12-28 PROCEDURE — 83690 ASSAY OF LIPASE: CPT

## 2023-12-28 PROCEDURE — 86696 HERPES SIMPLEX TYPE 2 TEST: CPT

## 2023-12-28 PROCEDURE — 96375 TX/PRO/DX INJ NEW DRUG ADDON: CPT

## 2023-12-28 PROCEDURE — 71260 CT THORAX DX C+: CPT | Mod: MA

## 2023-12-28 PROCEDURE — 0225U NFCT DS DNA&RNA 21 SARSCOV2: CPT

## 2023-12-28 PROCEDURE — 85027 COMPLETE CBC AUTOMATED: CPT

## 2023-12-28 PROCEDURE — 87086 URINE CULTURE/COLONY COUNT: CPT

## 2023-12-28 PROCEDURE — 86225 DNA ANTIBODY NATIVE: CPT

## 2023-12-28 PROCEDURE — 86256 FLUORESCENT ANTIBODY TITER: CPT

## 2023-12-28 PROCEDURE — 70496 CT ANGIOGRAPHY HEAD: CPT

## 2023-12-28 PROCEDURE — 86038 ANTINUCLEAR ANTIBODIES: CPT

## 2023-12-28 PROCEDURE — 87798 DETECT AGENT NOS DNA AMP: CPT

## 2023-12-28 PROCEDURE — 86870 RBC ANTIBODY IDENTIFICATION: CPT

## 2023-12-28 PROCEDURE — 83605 ASSAY OF LACTIC ACID: CPT

## 2023-12-28 PROCEDURE — 86922 COMPATIBILITY TEST ANTIGLOB: CPT

## 2023-12-28 PROCEDURE — 84484 ASSAY OF TROPONIN QUANT: CPT

## 2023-12-28 PROCEDURE — 87468 ANAPLSMA PHGCYTOPHLM AMP PRB: CPT

## 2023-12-28 PROCEDURE — 86162 COMPLEMENT TOTAL (CH50): CPT

## 2023-12-28 PROCEDURE — 86703 HIV-1/HIV-2 1 RESULT ANTBDY: CPT

## 2023-12-28 PROCEDURE — 82607 VITAMIN B-12: CPT

## 2023-12-28 PROCEDURE — 82746 ASSAY OF FOLIC ACID SERUM: CPT

## 2023-12-28 PROCEDURE — 86850 RBC ANTIBODY SCREEN: CPT

## 2023-12-28 PROCEDURE — 85652 RBC SED RATE AUTOMATED: CPT

## 2023-12-28 PROCEDURE — 84443 ASSAY THYROID STIM HORMONE: CPT

## 2023-12-28 PROCEDURE — 87899 AGENT NOS ASSAY W/OPTIC: CPT

## 2023-12-28 PROCEDURE — 86780 TREPONEMA PALLIDUM: CPT

## 2023-12-28 PROCEDURE — 80048 BASIC METABOLIC PNL TOTAL CA: CPT

## 2023-12-28 PROCEDURE — 87493 C DIFF AMPLIFIED PROBE: CPT

## 2023-12-28 PROCEDURE — 82728 ASSAY OF FERRITIN: CPT

## 2023-12-28 PROCEDURE — 93970 EXTREMITY STUDY: CPT

## 2023-12-28 PROCEDURE — 85610 PROTHROMBIN TIME: CPT

## 2023-12-28 RX ORDER — CARVEDILOL PHOSPHATE 80 MG/1
3.12 CAPSULE, EXTENDED RELEASE ORAL EVERY 12 HOURS
Refills: 0 | Status: DISCONTINUED | OUTPATIENT
Start: 2023-12-28 | End: 2024-01-06

## 2023-12-28 RX ORDER — RIFAXIMIN 200 MG/1
1 TABLET ORAL
Refills: 0 | DISCHARGE

## 2023-12-28 RX ORDER — PANTOPRAZOLE SODIUM 20 MG/1
40 TABLET, DELAYED RELEASE ORAL
Refills: 0 | Status: DISCONTINUED | OUTPATIENT
Start: 2023-12-28 | End: 2024-01-12

## 2023-12-28 RX ORDER — INFLUENZA VIRUS VACCINE 15; 15; 15; 15 UG/.5ML; UG/.5ML; UG/.5ML; UG/.5ML
0.5 SUSPENSION INTRAMUSCULAR ONCE
Refills: 0 | Status: DISCONTINUED | OUTPATIENT
Start: 2023-12-28 | End: 2024-01-12

## 2023-12-28 RX ORDER — DEXTROSE 50 % IN WATER 50 %
15 SYRINGE (ML) INTRAVENOUS ONCE
Refills: 0 | Status: DISCONTINUED | OUTPATIENT
Start: 2023-12-28 | End: 2024-01-12

## 2023-12-28 RX ORDER — DEXTROSE 50 % IN WATER 50 %
25 SYRINGE (ML) INTRAVENOUS ONCE
Refills: 0 | Status: DISCONTINUED | OUTPATIENT
Start: 2023-12-28 | End: 2024-01-12

## 2023-12-28 RX ORDER — INSULIN GLARGINE 100 [IU]/ML
4 INJECTION, SOLUTION SUBCUTANEOUS
Refills: 0 | DISCHARGE

## 2023-12-28 RX ORDER — SPIRONOLACTONE 25 MG/1
100 TABLET, FILM COATED ORAL DAILY
Refills: 0 | Status: DISCONTINUED | OUTPATIENT
Start: 2023-12-28 | End: 2024-01-12

## 2023-12-28 RX ORDER — MIRTAZAPINE 45 MG/1
1 TABLET, ORALLY DISINTEGRATING ORAL
Refills: 0 | DISCHARGE

## 2023-12-28 RX ORDER — GLUCAGON INJECTION, SOLUTION 0.5 MG/.1ML
1 INJECTION, SOLUTION SUBCUTANEOUS ONCE
Refills: 0 | Status: DISCONTINUED | OUTPATIENT
Start: 2023-12-28 | End: 2024-01-12

## 2023-12-28 RX ORDER — ACETAMINOPHEN 500 MG
650 TABLET ORAL EVERY 6 HOURS
Refills: 0 | Status: DISCONTINUED | OUTPATIENT
Start: 2023-12-28 | End: 2024-01-12

## 2023-12-28 RX ORDER — INSULIN LISPRO 100/ML
VIAL (ML) SUBCUTANEOUS
Refills: 0 | Status: DISCONTINUED | OUTPATIENT
Start: 2023-12-28 | End: 2024-01-12

## 2023-12-28 RX ORDER — SODIUM CHLORIDE 9 MG/ML
1000 INJECTION, SOLUTION INTRAVENOUS
Refills: 0 | Status: DISCONTINUED | OUTPATIENT
Start: 2023-12-28 | End: 2024-01-12

## 2023-12-28 RX ORDER — MIRTAZAPINE 45 MG/1
30 TABLET, ORALLY DISINTEGRATING ORAL DAILY
Refills: 0 | Status: DISCONTINUED | OUTPATIENT
Start: 2023-12-28 | End: 2024-01-12

## 2023-12-28 RX ORDER — INSULIN LISPRO 100/ML
VIAL (ML) SUBCUTANEOUS AT BEDTIME
Refills: 0 | Status: DISCONTINUED | OUTPATIENT
Start: 2023-12-28 | End: 2024-01-12

## 2023-12-28 RX ORDER — FOLIC ACID 0.8 MG
1 TABLET ORAL DAILY
Refills: 0 | Status: DISCONTINUED | OUTPATIENT
Start: 2023-12-28 | End: 2024-01-12

## 2023-12-28 RX ORDER — MESALAMINE 400 MG
400 TABLET, DELAYED RELEASE (ENTERIC COATED) ORAL THREE TIMES A DAY
Refills: 0 | Status: DISCONTINUED | OUTPATIENT
Start: 2023-12-28 | End: 2024-01-12

## 2023-12-28 RX ORDER — DEXTROSE 50 % IN WATER 50 %
12.5 SYRINGE (ML) INTRAVENOUS ONCE
Refills: 0 | Status: DISCONTINUED | OUTPATIENT
Start: 2023-12-28 | End: 2024-01-12

## 2023-12-28 RX ORDER — LANOLIN ALCOHOL/MO/W.PET/CERES
1 CREAM (GRAM) TOPICAL
Refills: 0 | DISCHARGE

## 2023-12-28 RX ORDER — CARVEDILOL PHOSPHATE 80 MG/1
1 CAPSULE, EXTENDED RELEASE ORAL
Refills: 0 | DISCHARGE

## 2023-12-28 RX ORDER — SPIRONOLACTONE 25 MG/1
1 TABLET, FILM COATED ORAL
Refills: 0 | DISCHARGE

## 2023-12-28 RX ORDER — WARFARIN SODIUM 2.5 MG/1
1 TABLET ORAL
Refills: 0 | DISCHARGE

## 2023-12-28 RX ADMIN — DEXTROSE MONOHYDRATE, SODIUM CHLORIDE, AND POTASSIUM CHLORIDE 50 MILLILITER(S): 50; .745; 4.5 INJECTION, SOLUTION INTRAVENOUS at 03:59

## 2023-12-28 RX ADMIN — Medication 1 TABLET(S): at 05:06

## 2023-12-28 RX ADMIN — Medication 400 MILLIGRAM(S): at 06:22

## 2023-12-28 NOTE — H&P ADULT - PROBLEM SELECTOR PLAN 5
Patient with history of liver cirrhosis   -start rifaximin 550mg BID Patient with history of liver cirrhosis   -start rifaximin 550mg BID  -spironolactone 100mg daily

## 2023-12-28 NOTE — H&P ADULT - PROBLEM SELECTOR PLAN 6
hold home warfarin 6mg daily in setting of hematochezia and recent need for transfusion   continue carvedilol 3.125 mg BID hold home warfarin 6mg daily in setting of hematochezia and recent need for transfusion   continue carvedilol 3.125 mg BID  assess for starting patient on a DOAC

## 2023-12-28 NOTE — H&P ADULT - PROBLEM SELECTOR PLAN 3
Patient with history of ulcerative colitis found to have pancolitis   -continue mesalamine 400mg TID   -clear liquid diet Patient with history of ulcerative colitis found to have pancolitis   -continue mesalamine 400mg TID   -regular diet

## 2023-12-28 NOTE — CHART NOTE - NSCHARTNOTEFT_GEN_A_CORE
Dermatology Chart Note    History:  58 y F w PMHx of CAD, diabetes, insomnia, MDD, GERD, hypertension, irritable bowel syndrome with diarrhea, cirrhosis, NAFLD, hepatic encephalopathy, bipolar disorder, chronic Afib on Coumadin, blepharitis of the left eye who initially presented to [place] due to reported hypotension, tachycardia and diffuse purpura. Patient had a temperature of 99.3 with a heart rate of 116 and blood pressure of 88/60. At presentation to [place] patient had a temperature of 101, septic work up was sent and patient was started on IV antibiotics and fluids. CT abdomen show pancolitis and patient was also found to have cellulitis of lids b/l R > L. Patient was deemed as requiring rheumatology, dermatology and opthalmology consults which were not available at [place] so patient was transferred for further work up.     On presentation to [facility] patient notes that her rash has improved drastically from her initial presentation.     Dermatology consulted for asymptomatic rash around eyes and extremities. Also reporting several month history of painful swelling of the wrists.     Physical Exam:   Light grecia-ocular ecchymotic patches   resolving ecchymotic patches on the upper and lower extremities   Purpuric patch on the left hip, resolving   palms and soles clear  trunk clear   oral mucosa   +right conjunctival hemorrhage     Differential favors _____.  At this time recommend:    The patient's chart was reviewed in addition to photos of the rash taken by the primary team with the permission of the patient.  Patient was seen at bedside and discussed remotely with the dermatology attending Dr. Gillespie.  Recommendations were communicated with the primary team.  Please page [phone] for further related questions.    Zeinab Borja MD  Resident Physician, PGY3  [facility] Dermatology  Pager: [phone]  Office: [phone] Dermatology Chart Note    History:  58 y F w PMHx of CAD, diabetes, insomnia, MDD, GERD, hypertension, irritable bowel syndrome with diarrhea, cirrhosis, NAFLD, hepatic encephalopathy, bipolar disorder, chronic Afib on Coumadin, blepharitis of the left eye who initially presented to [place] due to reported hypotension, tachycardia and diffuse purpura. Patient had a temperature of 99.3 with a heart rate of 116 and blood pressure of 88/60. At presentation to [place] patient had a temperature of 101, septic work up was sent and patient was started on IV antibiotics and fluids. CT abdomen show pancolitis and patient was also found to have cellulitis of lids b/l R > L. Patient was deemed as requiring rheumatology, dermatology and opthalmology consults which were not available at [place] so patient was transferred for further work up.     On presentation to [facility] patient notes that her rash has improved drastically from her initial presentation.     Dermatology consulted for asymptomatic rash around eyes and extremities. Also reporting several month history of painful swelling of the wrists.     Physical Exam:   Light grecia-ocular ecchymotic patches   resolving ecchymotic patches on the upper and lower extremities   Purpuric patch on the left hip, resolving   palms and soles clear  trunk clear   oral mucosa   +right conjunctival hemorrhage     Differential favors _____.  At this time recommend:    The patient's chart was reviewed in addition to photos of the rash taken by the primary team with the permission of the patient.  Patient was seen at bedside and discussed remotely with the dermatology attending Dr. Gillespie.  Recommendations were communicated with the primary team.  Please page [phone] for further related questions.    Zeinab Borja MD  Resident Physician, PGY3  [facility] Dermatology  Pager: [phone]  Office: [phone] Dermatology Chart Note    History:  58 y F w PMHx of CAD, diabetes, insomnia, MDD, GERD, hypertension, irritable bowel syndrome with diarrhea, cirrhosis, NAFLD, hepatic encephalopathy, bipolar disorder, chronic Afib on Coumadin, blepharitis of the left eye who initially presented to [place] due to reported hypotension, tachycardia and diffuse purpura. Patient had a temperature of 99.3 with a heart rate of 116 and blood pressure of 88/60. At presentation to [place] patient had a temperature of 101, septic work up was sent and patient was started on IV antibiotics and fluids. CT abdomen show pancolitis and patient was also found to have cellulitis of lids b/l R > L. Patient was deemed as requiring rheumatology, dermatology and opthalmology consults which were not available at [place] so patient was transferred for further work up.     On presentation to [facility] patient notes that her rash has improved drastically from her initial presentation.     Dermatology consulted for asymptomatic rash around eyes and extremities. Also reporting several month history of painful swelling of the wrists.     Physical Exam:   Light grecia-ocular ecchymotic patches   resolving ecchymotic patches on the upper and lower extremities   Purpuric patch on the left hip, resolving   palms and soles clear  trunk clear   oral mucosa     Differential favors resolving ecchymosis i/s/o coumadin use  At this time recommend:  - no further dermatology workup warranted at this time   Thank you for allowing us to participate in the care of this pt. Dermatology to sign off at this time. Please notify us and re-consult as needed for new or concerning changes to skin exam.     The patient's chart was reviewed in addition to photos of the rash taken by the primary team with the permission of the patient.  Patient was seen at bedside and discussed remotely with the dermatology attending Dr. Gillespie.  Recommendations were communicated with the primary team.  Please page [phone] for further related questions.    Zeinab Borja MD  Resident Physician, PGY3  [facility] Dermatology  Pager: [phone]  Office: [phone] Dermatology Chart Note    History:  58 y F w PMHx of CAD, diabetes, insomnia, MDD, GERD, hypertension, irritable bowel syndrome with diarrhea, cirrhosis, NAFLD, hepatic encephalopathy, bipolar disorder, chronic Afib on Coumadin, blepharitis of the left eye who initially presented to [place] due to reported hypotension, tachycardia and diffuse purpura. Patient had a temperature of 99.3 with a heart rate of 116 and blood pressure of 88/60. At presentation to [place] patient had a temperature of 101, septic work up was sent and patient was started on IV antibiotics and fluids. CT abdomen show pancolitis and patient was also found to have cellulitis of lids b/l R > L. Patient was deemed as requiring rheumatology, dermatology and opthalmology consults which were not available at [place] so patient was transferred for further work up.     On presentation to [facility] patient notes that her rash has improved drastically from her initial presentation.     Dermatology consulted for asymptomatic rash around eyes and extremities. Also reporting several month history of painful swelling of the wrists.     Physical Exam:   Light grecia-ocular ecchymotic patches   resolving ecchymotic patches on the upper and lower extremities   Purpuric patch on the left hip, resolving   palms and soles clear  trunk clear   oral mucosa     Differential favors resolving ecchymosis i/s/o coumadin use  At this time recommend:  - no further dermatology workup warranted at this time   Thank you for allowing us to participate in the care of this pt. Dermatology to sign off at this time. Please notify us and re-consult as needed for new or concerning changes to skin exam.     The patient's chart was reviewed in addition to photos of the rash taken by the primary team with the permission of the patient.  Patient was seen at bedside and discussed remotely with the dermatology attending Dr. Gillespie.  Recommendations were communicated with the primary team.  Please page [phone] for further related questions.    Zeinab Borja MD  Resident Physician, PGY3  [facility] Dermatology  Pager: [phone]  Office: [phone] Dermatology Chart Note    History:  58 y F w PMHx of CAD, diabetes, insomnia, MDD, GERD, hypertension, irritable bowel syndrome with diarrhea, cirrhosis, NAFLD, hepatic encephalopathy, bipolar disorder, chronic Afib on Coumadin, blepharitis of the left eye who initially presented to [facility] due to reported hypotension, tachycardia and diffuse purpura. Patient had a temperature of 99.3 with a heart rate of 116 and blood pressure of 88/60. At presentation to [facility] patient had a temperature of 101, septic work up was sent and patient was started on IV antibiotics and fluids. CT abdomen show pancolitis and patient was also found to have cellulitis of lids b/l R > L. Patient was deemed as requiring rheumatology, dermatology and opthalmology consults which were not available at [facility] so patient was transferred for further work up.     On presentation to [facility] patient notes that her rash has improved drastically from her initial presentation.     Dermatology consulted for asymptomatic rash around eyes and extremities. Also reporting several month history of painful swelling of the wrists.     Physical Exam:   Light grecia-ocular ecchymotic patches   resolving ecchymotic patches on the upper and lower extremities   Purpuric patch on the left hip, resolving   palms and soles clear  trunk clear   oral mucosa     Differential favors resolving ecchymosis i/s/o coumadin use  resolving per history    At this time recommend:   no further dermatology workup warranted at this time   Thank you for allowing us to participate in the care of this pt. Dermatology to sign off at this time. Please notify us and re-consult as needed for new or concerning changes to skin exam.     The patient's chart was reviewed in addition to photos of the rash taken by the primary team with the permission of the patient.  Patient was seen at bedside and discussed remotely with the dermatology attending Dr. Gillespie.  Recommendations were communicated with the primary team.  Please page [phone] for further related questions.    Zeinab Borja MD  Resident Physician, PGY3  [facility] Dermatology  Pager: [phone]  Office: [phone] Dermatology Chart Note    History:  58 y F w PMHx of CAD, diabetes, insomnia, MDD, GERD, hypertension, irritable bowel syndrome with diarrhea, cirrhosis, NAFLD, hepatic encephalopathy, bipolar disorder, chronic Afib on Coumadin, blepharitis of the left eye who initially presented to [place] due to reported hypotension, tachycardia and diffuse purpura. Patient had a temperature of 99.3 with a heart rate of 116 and blood pressure of 88/60. At presentation to [place] patient had a temperature of 101, septic work up was sent and patient was started on IV antibiotics and fluids. CT abdomen show pancolitis and patient was also found to have cellulitis of lids b/l R > L. Patient was deemed as requiring rheumatology, dermatology and opthalmology consults which were not available at [place] so patient was transferred for further work up.     On presentation to [facility] patient notes that her rash has improved drastically from her initial presentation.     Dermatology consulted for asymptomatic rash around eyes and extremities. Also reporting several month history of painful swelling of the wrists.     Physical Exam:   Light grecia-ocular ecchymotic patches   resolving ecchymotic patches on the upper and lower extremities   Purpuric patch on the left hip, resolving   palms and soles clear  trunk clear   oral mucosa     Differential favors resolving ecchymosis i/s/o coumadin use  resolving per history    At this time recommend:   no further dermatology workup warranted at this time   Thank you for allowing us to participate in the care of this pt. Dermatology to sign off at this time. Please notify us and re-consult as needed for new or concerning changes to skin exam.     The patient's chart was reviewed in addition to photos of the rash taken by the primary team with the permission of the patient.  Patient was seen at bedside and discussed remotely with the dermatology attending Dr. Gillespie.  Recommendations were communicated with the primary team.  Please page [phone] for further related questions.    Zeinab Borja MD  Resident Physician, PGY3  [facility] Dermatology  Pager: [phone]  Office: [phone]

## 2023-12-28 NOTE — H&P ADULT - ATTENDING COMMENTS
58F CAD, diabetes, insomnia, MDD, GERD, hypertension, irritable bowel syndrome with diarrhea, ulcerative colitis, cirrhosis, NAFLD, hepatic encephalopathy, bipolar disorder, chronic Afib on Coumadin, blepharitis of the left eye who initially presented to [facility] with periorbital swelling, purpuric rash and pancolitis, transferred for derm and rheum eval  --rash and periorbital swelling has greatly improved, appreciate derm eval   --required 1 U PRBC at [place] 12/27, monitor Hb, continue mesalamine  --GI follow up   --holding warfarin, consider DOAC when stable to resume CVA prevention for afib 58F CAD, diabetes, insomnia, MDD, GERD, hypertension, irritable bowel syndrome with diarrhea, ulcerative colitis, cirrhosis, NAFLD, hepatic encephalopathy, bipolar disorder, chronic Afib on Coumadin, blepharitis of the left eye who initially presented to [facility] with periorbital swelling, purpuric rash and pancolitis, transferred for derm and rheum eval  --rash and periorbital swelling has greatly improved, appreciate derm eval   --required 1 U PRBC at [place] 12/27, monitor Hb, continue mesalamine  --GI follow up   --holding warfarin, consider DOAC when stable to resume CVA prevention for afib

## 2023-12-28 NOTE — H&P ADULT - PROBLEM SELECTOR PLAN 4
Patient with anemia with iron studies more consistent with anemia of chronic disease   -B12 and folate WNL   -patient had drop of Hgb to 6.9 at [place] prior to transfer, transfused with 1pRBC with response to 9.5  -continue to monitor   -transfuse for Hgb >7 Patient with anemia with iron studies more consistent with anemia of chronic disease   -B12 and folate WNL   -patient had drop of Hgb to 6.9 at [place] prior to transfer, transfused with 1pRBC with response to 9.5  -continue to monitor   -transfuse for Hgb >7

## 2023-12-28 NOTE — H&P ADULT - ASSESSMENT
58 y F w PMHx of CAD, diabetes, insomnia, MDD, GERD, hypertension, irritable bowel syndrome with diarrhea, ulcerative colitis, cirrhosis, NAFLD, hepatic encephalopathy, bipolar disorder, chronic Afib on Coumadin, blepharitis of the left eye who initially presented to [place] due to reported hypotension, tachycardia and diffuse purpura. Patient transferred to [facility] for further workup and consultations for her body rash.  58 y F w PMHx of CAD, diabetes, insomnia, MDD, GERD, hypertension, irritable bowel syndrome with diarrhea, ulcerative colitis, cirrhosis, NAFLD, hepatic encephalopathy, bipolar disorder, chronic Afib on Coumadin, blepharitis of the left eye who initially presented to [place] due to reported hypotension, tachycardia and diffuse purpura. Patient transferred to [facility] for further workup and consultations for her body rash.

## 2023-12-28 NOTE — H&P ADULT - NSHPPHYSICALEXAM_GEN_ALL_CORE
VITALS:   T(C): 36.6 (12-28-23 @ 13:59), Max: 36.8 (12-28-23 @ 10:30)  HR: 87 (12-28-23 @ 13:59) (86 - 87)  BP: 117/70 (12-28-23 @ 13:59) (103/73 - 117/70)  RR: 18 (12-28-23 @ 13:59) (17 - 18)  SpO2: 100% (12-28-23 @ 13:59) (100% - 100%)    GENERAL: NAD, lying in bed comfortably   HEAD:  Atraumatic, Normocephalic  EYES: EOMI, PERRLA, conjunctiva and sclera clear, signs of orbital cellulitis b/l R>L   ENT: Moist mucous membranes  NECK: Supple, No JVD  CHEST/LUNG: Clear to auscultation bilaterally; No rales, rhonchi, wheezing, or rubs. Unlabored respirations  HEART: Regular rate and rhythm; No murmurs, rubs, or gallops  ABDOMEN: BSx4; Soft, nondistended, slightly tender on exam   EXTREMITIES:  2+ Peripheral Pulses, brisk capillary refill. No clubbing, cyanosis, or edema  NERVOUS SYSTEM:  A&Ox3, no focal deficits   SKIN: rashes present on b/l upper extremity, rash around b/l eyes R > L, rashes presents on b/l lower extremity

## 2023-12-28 NOTE — H&P ADULT - NSICDXPASTMEDICALHX_GEN_ALL_CORE_FT
PAST MEDICAL HISTORY:  Anxiety hospitalization x1 in psych in 2013    CAD (coronary artery disease) non onstructing as per patient, not on aspirin, had cardiac imaging done in [facility] 12/2013 due to family history    Cholelithiasis     Clostridium difficile diarrhea 2-3 months ago was treated with vanco/flagyl outpatient , currently  free of diarrhea.    Current every day smoker     HLD (hyperlipidemia)     HTN (hypertension) >5 years, not on any meds    Lumbar herniated disc L4-L5 , no sx yet.    UTI (urinary tract infection) 2014     PAST MEDICAL HISTORY:  Anxiety hospitalization x1 in psych in 2013    CAD (coronary artery disease) non onstructing as per patient, not on aspirin, had cardiac imaging done in [facility] 12/2013 due to family history    Cholelithiasis     Clostridium difficile diarrhea 2-3 months ago was treated with vanco/flagyl outpatient , currently  free of diarrhea.    Current every day smoker     HLD (hyperlipidemia)     HTN (hypertension) >5 years, not on any meds    Lumbar herniated disc L4-L5 , no sx yet.    UTI (urinary tract infection) 2014

## 2023-12-28 NOTE — PATIENT PROFILE ADULT - FALL HARM RISK - HARM RISK INTERVENTIONS
Assistance with ambulation/Assistance OOB with selected safe patient handling equipment/Communicate Risk of Fall with Harm to all staff/Reinforce activity limits and safety measures with patient and family/Tailored Fall Risk Interventions/Visual Cue: Yellow wristband and red socks/Bed in lowest position, wheels locked, appropriate side rails in place/Call bell, personal items and telephone in reach/Instruct patient to call for assistance before getting out of bed or chair/Non-slip footwear when patient is out of bed/[place] to call system/Physically safe environment - no spills, clutter or unnecessary equipment/Purposeful Proactive Rounding/Room/bathroom lighting operational, light cord in reach Assistance with ambulation/Assistance OOB with selected safe patient handling equipment/Communicate Risk of Fall with Harm to all staff/Reinforce activity limits and safety measures with patient and family/Tailored Fall Risk Interventions/Visual Cue: Yellow wristband and red socks/Bed in lowest position, wheels locked, appropriate side rails in place/Call bell, personal items and telephone in reach/Instruct patient to call for assistance before getting out of bed or chair/Non-slip footwear when patient is out of bed/[place] to call system/Physically safe environment - no spills, clutter or unnecessary equipment/Purposeful Proactive Rounding/Room/bathroom lighting operational, light cord in reach

## 2023-12-28 NOTE — H&P ADULT - HISTORY OF PRESENT ILLNESS
58 y F w PMHx of CAD, diabetes, insomnia, MDD, GERD, hypertension, irritable bowel syndrome with diarrhea, cirrhosis, NAFLD, hepatic encephalopathy, bipolar disorder, chronic Afib on Coumadin, blepharitis of the left eye who initially presented to [facility] due to reported hypotension, tachycardia and diffuse purpura. Patient had a temperature of 99.3 with a heart rate of 116 and blood pressure of 88/60. At presentation to [facility] patient had a temperature of 101, septic work up was sent and patient was started on IV antibiotics and fluids. CT abdomen show pancolitis and patient was also found to have cellulitis of lids b/l R > L. Patient was deemed as requiring rheumatology, dermatology and opthalmology consults which were not  58 y F w PMHx of CAD, diabetes, insomnia, MDD, GERD, hypertension, irritable bowel syndrome with diarrhea, cirrhosis, NAFLD, hepatic encephalopathy, bipolar disorder, chronic Afib on Coumadin, blepharitis of the left eye who initially presented to [place] due to reported hypotension, tachycardia and diffuse purpura. Patient had a temperature of 99.3 with a heart rate of 116 and blood pressure of 88/60. At presentation to [place] patient had a temperature of 101, septic work up was sent and patient was started on IV antibiotics and fluids. CT abdomen show pancolitis and patient was also found to have cellulitis of lids b/l R > L. Patient was deemed as requiring rheumatology, dermatology and opthalmology consults which were not  58 y F w PMHx of CAD, diabetes, insomnia, MDD, GERD, hypertension, irritable bowel syndrome with diarrhea, cirrhosis, NAFLD, hepatic encephalopathy, bipolar disorder, chronic Afib on Coumadin, blepharitis of the left eye who initially presented to [place] due to reported hypotension, tachycardia and diffuse purpura. Patient had a temperature of 99.3 with a heart rate of 116 and blood pressure of 88/60. At presentation to [facility] [place] patient had a temperature of 101, septic work up was sent and patient was started on IV antibiotics and fluids. CT abdomen show pancolitis and patient was also found to have cellulitis of lids b/l R > L. Patient was deemed as requiring rheumatology, dermatology and opthalmology consults which were not available at [place] so patient was transferred for further work up.     On presentation to [facility] patient notes that her rash has improved drastically from her initial presentation.  58 y F w PMHx of CAD, diabetes, insomnia, MDD, GERD, hypertension, irritable bowel syndrome with diarrhea, cirrhosis, NAFLD, hepatic encephalopathy, bipolar disorder, chronic Afib on Coumadin, blepharitis of the left eye who initially presented to [place] due to reported hypotension, tachycardia and diffuse purpura. Patient had a temperature of 99.3 with a heart rate of 116 and blood pressure of 88/60. At presentation to [place] patient had a temperature of 101, septic work up was sent and patient was started on IV antibiotics and fluids. CT abdomen show pancolitis and patient was also found to have cellulitis of lids b/l R > L. Patient was deemed as requiring rheumatology, dermatology and opthalmology consults which were not available at [place] so patient was transferred for further work up.     On presentation to [facility] patient notes that her rash has improved drastically from her initial presentation.  58 y F w PMHx of CAD, diabetes, insomnia, MDD, GERD, hypertension, irritable bowel syndrome with diarrhea, ulcerative colitis, cirrhosis, NAFLD, hepatic encephalopathy, bipolar disorder, chronic Afib on Coumadin, blepharitis of the left eye who initially presented to [place] due to reported hypotension, tachycardia and diffuse purpura. Patient had a temperature of 99.3 with a heart rate of 116 and blood pressure of 88/60. At presentation to [facility] patient had a temperature of 101, septic work up was sent and patient was started on IV antibiotics and fluids. CT abdomen show pancolitis and patient was also found to have cellulitis of lids b/l R > L. Patient was deemed as requiring rheumatology, dermatology and opthalmology consults which were not available at [facility] so patient was transferred for further work up. Of note prior to transfer, yesterday patient's hemoglobin was 6.9 and patient was transfused with 1pRBC.     On presentation to [facility] patient notes that her rash has improved drastically from her initial presentation. She notes watery stool with blood with her last bowel movement occurring in the morning prior to her transfer and notes diffuse abdominal pain which she states is her baseline due to her ulcerative colitis.  58 y F w PMHx of CAD, diabetes, insomnia, MDD, GERD, hypertension, irritable bowel syndrome with diarrhea, ulcerative colitis, cirrhosis, NAFLD, hepatic encephalopathy, bipolar disorder, chronic Afib on Coumadin, blepharitis of the left eye who initially presented to [facility] due to reported hypotension, tachycardia and diffuse purpura. Patient had a temperature of 99.3 with a heart rate of 116 and blood pressure of 88/60. At presentation to [facility] patient had a temperature of 101, septic work up was sent and patient was started on IV antibiotics and fluids. CT abdomen show pancolitis and patient was also found to have cellulitis of lids b/l R > L. Patient was deemed as requiring rheumatology, dermatology and opthalmology consults which were not available at [facility] so patient was transferred for further work up. Of note prior to transfer, yesterday patient's hemoglobin was 6.9 and patient was transfused with 1pRBC.     On presentation to [facility] patient notes that her rash has improved drastically from her initial presentation. She notes watery stool with blood with her last bowel movement occurring in the morning prior to her transfer and notes diffuse abdominal pain which she states is her baseline due to her ulcerative colitis.

## 2023-12-28 NOTE — PATIENT PROFILE ADULT - NSPROGENPREVTRANSF_GEN_A_NUR
This office note has been dictated  Chief complaint, HPI, and Full ROS completed as part of intake  25 minutes of the 45 minutes was spent counseling the patient  --Medications reviewed per EPIC / pt. Interview  --BMI evaluated-->Counselled as appropriate  --Tobacco use history reviewed-->Counselled as appropriate     no

## 2023-12-28 NOTE — H&P ADULT - PROBLEM SELECTOR PLAN 1
Patient presented with body rash of b/l upper extremity and lower extremity of unknown etiology that is improving from initial presentation   Rash may be inflammatory v erythema multiforme v vasculitis v dermatomyositis v MCTD   work up at [place] includes:   CMV IgG negative   LDH normal   Babesia negative   Lyme negative   RPR negative   ESR, CRP elevated   -consult rheumatology for possible vasculitis, appreciate recs for workup  -consult dermatology, appreciate recs Patient presented with body rash of b/l upper extremity and lower extremity of unknown etiology that is improving from initial presentation   Rash may be inflammatory v erythema multiforme v vasculitis v dermatomyositis v MCTD   work up at [place] includes:   CMV IgG negative   LDH normal   Babesia negative   Lyme negative   RPR negative   ESR, CRP elevated   -consult rheumatology for possible vasculitis, appreciate recs for workup  -consult dermatology, appreciate recs

## 2023-12-28 NOTE — H&P ADULT - PROBLEM SELECTOR PLAN 10
DVT ppx: SCDs   Diet: clear liquid   Dispo: pending medical work up DVT ppx: SCDs   Diet: regular   Dispo: pending medical work up

## 2023-12-28 NOTE — PATIENT PROFILE ADULT - NURSING HOMES
[facility]

## 2023-12-28 NOTE — H&P ADULT - PROBLEM SELECTOR PLAN 2
Patient presented with b/l orbital cellulitis which is improved from initial presentation   -patient was managed with ceftriaxone 1gm q24h and flagyl 500 mg q8h at [facility]   -blood cultures no growth in 5 days at [facility]   -trend temps/WBC   -ophthalmology consult, appreciate recs  -ID recs from [facility] did not believe it was infectious and recommended d/c antibiotics   -will monitor off antibiotics Patient presented with b/l orbital cellulitis which is improved from initial presentation   -patient was managed with ceftriaxone 1gm q24h and flagyl 500 mg q8h at [facility]   -blood cultures no growth in 5 days at [facility]   -trend temps/WBC   -ophthalmology consult, appreciate recs  -ID recs from [facility] did not believe it was infectious and recommended d/c antibiotics   -will monitor off antibiotics Patient presented with b/l orbital cellulitis which is improved from initial presentation   -patient was managed with ceftriaxone 1gm q24h and flagyl 500 mg q8h at [facility]   -blood cultures no growth in 5 days at [facility]   -trend temps/WBC   -ID recs from [facility] did not believe it was infectious and recommended d/c antibiotics   -will monitor off antibiotics Patient presented with b/l orbital cellulitis which is improved from initial presentation   -patient was managed with ceftriaxone 1gm q24h and flagyl 500 mg q8h at [facility]   -blood cultures no growth in 5 days at [facility]   -trend temps/WBC   -ID recs from [facility] did not believe it was infectious and recommended d/c antibiotics   -will monitor off antibiotics

## 2023-12-28 NOTE — H&P ADULT - NSICDXFAMILYHX_GEN_ALL_CORE_FT
FAMILY HISTORY:  Father  Still living? No  Family history of heart disease, Age at diagnosis: Age Unknown    Mother  Still living? Yes, Estimated age: Age Unknown  Family history of CABG, Age at diagnosis: Age Unknown

## 2023-12-28 NOTE — H&P ADULT - NSHPLABSRESULTS_GEN_ALL_CORE
LABS:                          9.5    8.21  )-----------( 486      ( 28 Dec 2023 14:28 )             29.0     12-28    138  |  108<H>  |  10  ----------------------------<  100<H>  3.7   |  19<L>  |  0.67    Ca    8.1<L>      28 Dec 2023 14:28  Phos  3.1     12-28  Mg     1.60     12-28    TPro  5.2<L>  /  Alb  2.7<L>  /  TBili  0.3  /  DBili  x   /  AST  14  /  ALT  5   /  AlkPhos  113  12-28    LIVER FUNCTIONS - ( 28 Dec 2023 14:28 )  Alb: 2.7 g/dL / Pro: 5.2 g/dL / ALK PHOS: 113 U/L / ALT: 5 U/L / AST: 14 U/L / GGT: x           PT/INR - ( 28 Dec 2023 14:28 )   PT: 19.3 sec;   INR: 1.75 ratio         PTT - ( 28 Dec 2023 14:28 )  PTT:27.8 sec          Urinalysis Basic - ( 28 Dec 2023 14:28 )    Color: x / Appearance: x / SG: x / pH: x  Gluc: 100 mg/dL / Ketone: x  / Bili: x / Urobili: x   Blood: x / Protein: x / Nitrite: x   Leuk Esterase: x / RBC: x / WBC x   Sq Epi: x / Non Sq Epi: x / Bacteria: x

## 2023-12-28 NOTE — H&P ADULT - REASON FOR ADMISSION
transfer from [place] for tertiary level care in the setting of diffuse body rash transfer from [place] for tertiary level care in the setting of diffuse body rash

## 2023-12-28 NOTE — H&P ADULT - NSHPREVIEWOFSYSTEMS_GEN_ALL_CORE
REVIEW OF SYSTEMS:  CONSTITUTIONAL: No fevers, chills, or unintended weight loss  EYES: No visual changes or vertigo  ENT: No throat pain, rhinorrhea, or hearing loss   NECK: No pain or stiffness  RESPIRATORY: No cough, wheezing, hemoptysis; No shortness of breath  CARDIOVASCULAR: No chest pain or palpitations  GASTROINTESTINAL: endorses abdominal pain. No nausea, vomiting, or hematemesis; Notes diarrhea  GENITOURINARY: No dysuria, frequency or hematuria  NEUROLOGICAL: No numbness or weakness  SKIN: No itching, rashes, or bruises  Psych: Good mood, no substance use
Age

## 2023-12-29 LAB
A1C WITH ESTIMATED AVERAGE GLUCOSE RESULT: 4.8 % — SIGNIFICANT CHANGE UP (ref 4–5.6)
A1C WITH ESTIMATED AVERAGE GLUCOSE RESULT: 4.8 % — SIGNIFICANT CHANGE UP (ref 4–5.6)
ALBUMIN SERPL ELPH-MCNC: 2.7 G/DL — LOW (ref 3.3–5)
ALBUMIN SERPL ELPH-MCNC: 2.7 G/DL — LOW (ref 3.3–5)
ALP SERPL-CCNC: 115 U/L — SIGNIFICANT CHANGE UP (ref 40–120)
ALP SERPL-CCNC: 115 U/L — SIGNIFICANT CHANGE UP (ref 40–120)
ALT FLD-CCNC: 6 U/L — SIGNIFICANT CHANGE UP (ref 4–33)
ALT FLD-CCNC: 6 U/L — SIGNIFICANT CHANGE UP (ref 4–33)
ANION GAP SERPL CALC-SCNC: 11 MMOL/L — SIGNIFICANT CHANGE UP (ref 7–14)
ANION GAP SERPL CALC-SCNC: 11 MMOL/L — SIGNIFICANT CHANGE UP (ref 7–14)
AST SERPL-CCNC: 13 U/L — SIGNIFICANT CHANGE UP (ref 4–32)
AST SERPL-CCNC: 13 U/L — SIGNIFICANT CHANGE UP (ref 4–32)
BILIRUB SERPL-MCNC: 0.3 MG/DL — SIGNIFICANT CHANGE UP (ref 0.2–1.2)
BILIRUB SERPL-MCNC: 0.3 MG/DL — SIGNIFICANT CHANGE UP (ref 0.2–1.2)
BUN SERPL-MCNC: 12 MG/DL — SIGNIFICANT CHANGE UP (ref 7–23)
BUN SERPL-MCNC: 12 MG/DL — SIGNIFICANT CHANGE UP (ref 7–23)
CALCIUM SERPL-MCNC: 7.8 MG/DL — LOW (ref 8.4–10.5)
CALCIUM SERPL-MCNC: 7.8 MG/DL — LOW (ref 8.4–10.5)
CHLORIDE SERPL-SCNC: 107 MMOL/L — SIGNIFICANT CHANGE UP (ref 98–107)
CHLORIDE SERPL-SCNC: 107 MMOL/L — SIGNIFICANT CHANGE UP (ref 98–107)
CO2 SERPL-SCNC: 19 MMOL/L — LOW (ref 22–31)
CO2 SERPL-SCNC: 19 MMOL/L — LOW (ref 22–31)
CREAT SERPL-MCNC: 0.75 MG/DL — SIGNIFICANT CHANGE UP (ref 0.5–1.3)
CREAT SERPL-MCNC: 0.75 MG/DL — SIGNIFICANT CHANGE UP (ref 0.5–1.3)
EGFR: 93 ML/MIN/1.73M2 — SIGNIFICANT CHANGE UP
EGFR: 93 ML/MIN/1.73M2 — SIGNIFICANT CHANGE UP
ESTIMATED AVERAGE GLUCOSE: 91 — SIGNIFICANT CHANGE UP
ESTIMATED AVERAGE GLUCOSE: 91 — SIGNIFICANT CHANGE UP
GLUCOSE BLDC GLUCOMTR-MCNC: 107 MG/DL — HIGH (ref 70–99)
GLUCOSE BLDC GLUCOMTR-MCNC: 107 MG/DL — HIGH (ref 70–99)
GLUCOSE BLDC GLUCOMTR-MCNC: 124 MG/DL — HIGH (ref 70–99)
GLUCOSE BLDC GLUCOMTR-MCNC: 124 MG/DL — HIGH (ref 70–99)
GLUCOSE BLDC GLUCOMTR-MCNC: 90 MG/DL — SIGNIFICANT CHANGE UP (ref 70–99)
GLUCOSE BLDC GLUCOMTR-MCNC: 90 MG/DL — SIGNIFICANT CHANGE UP (ref 70–99)
GLUCOSE BLDC GLUCOMTR-MCNC: 93 MG/DL — SIGNIFICANT CHANGE UP (ref 70–99)
GLUCOSE BLDC GLUCOMTR-MCNC: 93 MG/DL — SIGNIFICANT CHANGE UP (ref 70–99)
GLUCOSE SERPL-MCNC: 106 MG/DL — HIGH (ref 70–99)
GLUCOSE SERPL-MCNC: 106 MG/DL — HIGH (ref 70–99)
MAGNESIUM SERPL-MCNC: 1.7 MG/DL — SIGNIFICANT CHANGE UP (ref 1.6–2.6)
MAGNESIUM SERPL-MCNC: 1.7 MG/DL — SIGNIFICANT CHANGE UP (ref 1.6–2.6)
PHOSPHATE SERPL-MCNC: 3.2 MG/DL — SIGNIFICANT CHANGE UP (ref 2.5–4.5)
PHOSPHATE SERPL-MCNC: 3.2 MG/DL — SIGNIFICANT CHANGE UP (ref 2.5–4.5)
POTASSIUM SERPL-MCNC: 3.8 MMOL/L — SIGNIFICANT CHANGE UP (ref 3.5–5.3)
POTASSIUM SERPL-MCNC: 3.8 MMOL/L — SIGNIFICANT CHANGE UP (ref 3.5–5.3)
POTASSIUM SERPL-SCNC: 3.8 MMOL/L — SIGNIFICANT CHANGE UP (ref 3.5–5.3)
POTASSIUM SERPL-SCNC: 3.8 MMOL/L — SIGNIFICANT CHANGE UP (ref 3.5–5.3)
PROT SERPL-MCNC: 5 G/DL — LOW (ref 6–8.3)
PROT SERPL-MCNC: 5 G/DL — LOW (ref 6–8.3)
SODIUM SERPL-SCNC: 137 MMOL/L — SIGNIFICANT CHANGE UP (ref 135–145)
SODIUM SERPL-SCNC: 137 MMOL/L — SIGNIFICANT CHANGE UP (ref 135–145)

## 2023-12-29 PROCEDURE — 99223 1ST HOSP IP/OBS HIGH 75: CPT

## 2023-12-29 PROCEDURE — 99233 SBSQ HOSP IP/OBS HIGH 50: CPT | Mod: GC

## 2023-12-29 PROCEDURE — 99232 SBSQ HOSP IP/OBS MODERATE 35: CPT | Mod: GC

## 2023-12-29 RX ORDER — WARFARIN SODIUM 2.5 MG/1
1 TABLET ORAL
Refills: 0 | DISCHARGE

## 2023-12-29 RX ORDER — SPIRONOLACTONE 25 MG/1
1 TABLET, FILM COATED ORAL
Refills: 0 | DISCHARGE

## 2023-12-29 RX ORDER — PANTOPRAZOLE SODIUM 20 MG/1
1 TABLET, DELAYED RELEASE ORAL
Refills: 0 | DISCHARGE

## 2023-12-29 RX ORDER — INSULIN GLARGINE 100 [IU]/ML
4 INJECTION, SOLUTION SUBCUTANEOUS
Refills: 0 | DISCHARGE

## 2023-12-29 RX ORDER — LIDOCAINE 4 G/100G
1 CREAM TOPICAL
Refills: 0 | DISCHARGE

## 2023-12-29 RX ORDER — FOLIC ACID 0.8 MG
1 TABLET ORAL
Refills: 0 | DISCHARGE

## 2023-12-29 RX ORDER — LANOLIN ALCOHOL/MO/W.PET/CERES
1 CREAM (GRAM) TOPICAL
Refills: 0 | DISCHARGE

## 2023-12-29 RX ORDER — MIRTAZAPINE 45 MG/1
1 TABLET, ORALLY DISINTEGRATING ORAL
Refills: 0 | DISCHARGE

## 2023-12-29 RX ORDER — CARVEDILOL PHOSPHATE 80 MG/1
1 CAPSULE, EXTENDED RELEASE ORAL
Refills: 0 | DISCHARGE

## 2023-12-29 RX ORDER — RIFAXIMIN 200 MG/1
1 TABLET ORAL
Refills: 0 | DISCHARGE

## 2023-12-29 RX ADMIN — MIRTAZAPINE 30 MILLIGRAM(S): 45 TABLET, ORALLY DISINTEGRATING ORAL at 12:38

## 2023-12-29 RX ADMIN — SPIRONOLACTONE 100 MILLIGRAM(S): 25 TABLET, FILM COATED ORAL at 05:29

## 2023-12-29 RX ADMIN — Medication 400 MILLIGRAM(S): at 13:13

## 2023-12-29 RX ADMIN — CARVEDILOL PHOSPHATE 3.12 MILLIGRAM(S): 80 CAPSULE, EXTENDED RELEASE ORAL at 05:29

## 2023-12-29 RX ADMIN — Medication 400 MILLIGRAM(S): at 05:29

## 2023-12-29 RX ADMIN — Medication 1 MILLIGRAM(S): at 12:38

## 2023-12-29 RX ADMIN — Medication 400 MILLIGRAM(S): at 21:35

## 2023-12-29 RX ADMIN — PANTOPRAZOLE SODIUM 40 MILLIGRAM(S): 20 TABLET, DELAYED RELEASE ORAL at 05:29

## 2023-12-29 NOTE — PROGRESS NOTE ADULT - PROBLEM SELECTOR PLAN 6
hold home warfarin 6mg daily in setting of hematochezia and recent need for transfusion   continue carvedilol 3.125 mg BID  assess for starting patient on a DOAC

## 2023-12-29 NOTE — CONSULT NOTE ADULT - REASON FOR ADMISSION
transfer from [place] for tertiary level care in the setting of diffuse body rash transfer from [place] for tertiary level care in the setting of diffuse body rash Transfer for rash and fever

## 2023-12-29 NOTE — PROGRESS NOTE ADULT - PROBLEM SELECTOR PLAN 1
Patient presented with body rash of b/l upper extremity and lower extremity of unknown etiology that is improving from initial presentation   Rash may be inflammatory v erythema multiforme v vasculitis v dermatomyositis v MCTD   work up at [place] includes:   CMV IgG negative   LDH normal   Babesia negative   Lyme negative   RPR negative   ESR, CRP elevated   -consult rheumatology for possible vasculitis, appreciate recs for workup  -consult dermatology, appreciate recs Patient presented with body rash of b/l upper extremity and lower extremity of unknown etiology that is improving from initial presentation   Rash may be inflammatory v erythema multiforme v vasculitis v dermatomyositis v MCTD   work up at [place] includes:   CMV IgG negative   LDH normal   Babesia negative   Lyme negative   RPR negative   ESR, CRP elevated   -consult rheumatology for possible vasculitis, appreciate recs for workup  -consult dermatology, appreciate recs Patient presented with body rash of b/l upper extremity and lower extremity of unknown etiology that is improving from initial presentation   Rash may be inflammatory v erythema multiforme v vasculitis v dermatomyositis v MCTD   work up at [place] includes:   CMV IgG negative   LDH normal   Babesia negative   Lyme negative   RPR negative   ESR, CRP elevated   -consult rheumatology for possible vasculitis, appreciate recs for workup  -consult dermatology: likely resolving ecchymosis i/s/o coumadin use Patient presented with body rash of b/l upper extremity and lower extremity of unknown etiology that is improving from initial presentation   Rash may be inflammatory v erythema multiforme v vasculitis v dermatomyositis v MCTD   work up at [place] includes:   CMV IgG negative   LDH normal   Babesia negative   Lyme negative   RPR negative   ESR, CRP elevated   -consult rheumatology for possible vasculitis, appreciate recs for workup  -consult dermatology: likely resolving ecchymosis i/s/o coumadin use

## 2023-12-29 NOTE — CONSULT NOTE ADULT - REASON FOR ADMISSION
transfer from [place] for tertiary level care in the setting of diffuse body rash transfer from [place] for tertiary level care in the setting of diffuse body rash

## 2023-12-29 NOTE — PROGRESS NOTE ADULT - ASSESSMENT
58 y F w PMHx of CAD, diabetes, insomnia, MDD, GERD, hypertension, irritable bowel syndrome with diarrhea, ulcerative colitis, cirrhosis, NAFLD, hepatic encephalopathy, bipolar disorder, chronic Afib on Coumadin, blepharitis of the left eye who initially presented to [place] due to reported hypotension, tachycardia and diffuse purpura. Patient transferred to [facility] for further workup and consultations for her body rash.  58 y F w PMHx of CAD, diabetes, insomnia, MDD, GERD, hypertension, irritable bowel syndrome with diarrhea, ulcerative colitis, cirrhosis, NAFLD, hepatic encephalopathy, bipolar disorder, chronic Afib on Coumadin, blepharitis of the left eye who initially presented to [place] due to reported hypotension, tachycardia and diffuse purpura. Patient transferred to [facility] for further workup and consultations for her body rash.

## 2023-12-29 NOTE — PROGRESS NOTE ADULT - PROBLEM SELECTOR PLAN 4
Patient with anemia with iron studies more consistent with anemia of chronic disease   -B12 and folate WNL   -patient had drop of Hgb to 6.9 at [place] prior to transfer, transfused with 1pRBC with response to 9.5  -continue to monitor   -transfuse for Hgb >7 Patient with anemia with iron studies more consistent with anemia of chronic disease   -B12 and folate WNL   -patient had drop of Hgb to 6.9 at [place] prior to transfer, transfused with 1pRBC with response to 9.5  -continue to monitor   -transfuse for Hgb >7

## 2023-12-29 NOTE — CONSULT NOTE ADULT - SUBJECTIVE AND OBJECTIVE BOX
ANAYA RHYS  [id number]    HISTORY OF PRESENT ILLNESS:   58 y F w PMHx of CAD, diabetes, insomnia, MDD, GERD, hypertension, irritable bowel syndrome with diarrhea, ulcerative colitis, cirrhosis, NAFLD, hepatic encephalopathy, bipolar disorder, chronic Afib on Coumadin, blepharitis of the left eye who initially presented to [facility] due to reported hypotension, tachycardia and diffuse purpura. Patient had a temperature of 99.3 with a heart rate of 116 and blood pressure of 88/60. At presentation to [facility] patient had a temperature of 101, septic work up was sent and patient was started on IV antibiotics and fluids. CT abdomen show pancolitis and patient was also found to have cellulitis of lids b/l R > L. Patient was deemed as requiring rheumatology, dermatology and opthalmology consults which were not available at [facility] so patient was transferred for further work up. Of note prior to transfer, yesterday patient's hemoglobin was 6.9 and patient was transfused with 1pRBC.     On presentation to [facility] patient notes that her rash has improved drastically from her initial presentation. She notes watery stool with blood with her last bowel movement occurring in the morning prior to her transfer and notes diffuse abdominal pain which she states is her baseline due to her ulcerative colitis.  (28 Dec 2023 14:43)    Rheumatology consulted for evaluation of autoimmune disease given rash. Dermatology saw the patient and believe the rash is resolving ecchymoses in the setting of coumadin use.     Patient denies fevers, chills, night sweats, alopecia, oral ulcers, Raynaud's, vision changes, VTE, sicca symptoms, or morning stiffness  Endorses occasional b/l wrist pain and swelling and reports pain all over her body. Endorses loose stools and generalized abdominal pain       MEDICATIONS  (STANDING):  carvedilol 3.125 milliGRAM(s) Oral every 12 hours  dextrose 5%. 1000 milliLiter(s) (50 mL/Hr) IV Continuous <Continuous>  dextrose 5%. 1000 milliLiter(s) (100 mL/Hr) IV Continuous <Continuous>  dextrose 50% Injectable 25 Gram(s) IV Push once  dextrose 50% Injectable 25 Gram(s) IV Push once  dextrose 50% Injectable 12.5 Gram(s) IV Push once  folic acid 1 milliGRAM(s) Oral daily  glucagon  Injectable 1 milliGRAM(s) IntraMuscular once  influenza   Vaccine 0.5 milliLiter(s) IntraMuscular once  insulin lispro (ADMELOG) corrective regimen sliding scale   SubCutaneous at bedtime  insulin lispro (ADMELOG) corrective regimen sliding scale   SubCutaneous three times a day before meals  mesalamine DR Capsule 400 milliGRAM(s) Oral three times a day  mirtazapine 30 milliGRAM(s) Oral daily  pantoprazole    Tablet 40 milliGRAM(s) Oral before breakfast  rifAXIMin 550 milliGRAM(s) Oral two times a day  spironolactone 100 milliGRAM(s) Oral daily    MEDICATIONS  (PRN):  acetaminophen     Tablet .. 650 milliGRAM(s) Oral every 6 hours PRN Temp greater or equal to 38C (100.4F), Mild Pain (1 - 3)  dextrose Oral Gel 15 Gram(s) Oral once PRN Blood Glucose LESS THAN 70 milliGRAM(s)/deciliter      Allergies    No Known Allergies    Intolerances        PERTINENT MEDICATION HISTORY: refer to [facility]     SOCIAL HISTORY: lives at a facility since early 2023   OCCUPATION: unemployed   TRAVEL HISTORY: no recent travel     FAMILY HISTORY:  Family history of CABG (Mother)    Family history of heart disease (Father)        Vital Signs Last 24 Hrs  T(C): 36.6 (29 Dec 2023 14:09), Max: 36.6 (29 Dec 2023 14:09)  T(F): 97.8 (29 Dec 2023 14:09), Max: 97.8 (29 Dec 2023 14:09)  HR: 93 (29 Dec 2023 14:09) (93 - 115)  BP: 104/61 (29 Dec 2023 14:09) (104/61 - 126/79)  BP(mean): --  RR: 18 (29 Dec 2023 14:09) (18 - 18)  SpO2: 98% (29 Dec 2023 14:09) (98% - 100%)    Parameters below as of 29 Dec 2023 14:09  Patient On (Oxygen Delivery Method): room air        Physical Exam:  General: No apparent distress, alert   HEENT: EOMI, MMM  CVS: +S1/S2, RRR, no murmurs  Resp: CTA b/l. No crackles/wheezing  GI: generalized abdominal tenderness, no distension, has rectal tube which has dark brown watery stool   MSK: mild tenderness and swelling of R wrist, otherwise no swelling/warmth/erythema of the joints of the UE/LE  Neuro: AAOx3  Skin: resolving ecchymoses of upper and lower extremities, grecia-ocular ecchymotic patches     LABS:                        9.5    8.21  )-----------( 486      ( 28 Dec 2023 14:28 )             29.0     12-29    137  |  107  |  12  ----------------------------<  106<H>  3.8   |  19<L>  |  0.75    Ca    7.8<L>      29 Dec 2023 06:27  Phos  3.2     12-29  Mg     1.70     12-29    TPro  5.0<L>  /  Alb  2.7<L>  /  TBili  0.3  /  DBili  x   /  AST  13  /  ALT  6   /  AlkPhos  115  12-29    PT/INR - ( 28 Dec 2023 14:28 )   PT: 19.3 sec;   INR: 1.75 ratio         PTT - ( 28 Dec 2023 14:28 )  PTT:27.8 sec  Urinalysis Basic - ( 29 Dec 2023 06:27 )    Color: x / Appearance: x / SG: x / pH: x  Gluc: 106 mg/dL / Ketone: x  / Bili: x / Urobili: x   Blood: x / Protein: x / Nitrite: x   Leuk Esterase: x / RBC: x / WBC x   Sq Epi: x / Non Sq Epi: x / Bacteria: x            Rheumatology Work Up    Cytoplasmic (c-ANCA) Antibody: Positive *!* (12-23-23 @ 07:30)  c-ANCA Titer: 1:80 *!* (12-23-23 @ 07:30)  Perinuclear (p-ANCA) Antibody: Negative (12-23-23 @ 07:30)  C4 Complement, Serum: 28 mg/dL [13 - 39] (12-23-23 @ 07:30)            RADIOLOGY & ADDITIONAL STUDIES:  < from: CT Abdomen and Pelvis w/ IV Cont (12.22.23 @ 02:23) >  IMPRESSION:  Mild pancolitis, of infectious or inflammatory etiology.        --- End of Report ---    < end of copied text >     ANAYA RHYS  [id number]    HISTORY OF PRESENT ILLNESS:   58 y F w PMHx of CAD, diabetes, insomnia, MDD, GERD, hypertension, irritable bowel syndrome with diarrhea, ulcerative colitis, cirrhosis, NAFLD, hepatic encephalopathy, bipolar disorder, chronic Afib on Coumadin, blepharitis of the left eye who initially presented to [facility] due to reported hypotension, tachycardia and diffuse purpura. Patient had a temperature of 99.3 with a heart rate of 116 and blood pressure of 88/60. At presentation to [facility] patient had a temperature of 101, septic work up was sent and patient was started on IV antibiotics and fluids. CT abdomen show pancolitis and patient was also found to have cellulitis of lids b/l R > L. Patient was deemed as requiring rheumatology, dermatology and opthalmology consults which were not available at [facility] so patient was transferred for further work up. Of note prior to transfer, yesterday patient's hemoglobin was 6.9 and patient was transfused with 1pRBC.     On presentation to [facility] patient notes that her rash has improved drastically from her initial presentation. She notes watery stool with blood with her last bowel movement occurring in the morning prior to her transfer and notes diffuse abdominal pain which she states is her baseline due to her ulcerative colitis.  (28 Dec 2023 14:43)    Rheumatology consulted for evaluation of autoimmune disease given rash. Dermatology saw the patient and believe the rash is resolving ecchymoses in the setting of coumadin use.     Patient denies fevers, chills, night sweats, alopecia, oral ulcers, Raynaud's, vision changes, VTE, sicca symptoms, or morning stiffness  Endorses occasional b/l wrist pain and swelling and reports pain all over her body. Endorses loose stools and generalized abdominal pain       MEDICATIONS  (STANDING):  carvedilol 3.125 milliGRAM(s) Oral every 12 hours  dextrose 5%. 1000 milliLiter(s) (50 mL/Hr) IV Continuous <Continuous>  dextrose 5%. 1000 milliLiter(s) (100 mL/Hr) IV Continuous <Continuous>  dextrose 50% Injectable 25 Gram(s) IV Push once  dextrose 50% Injectable 25 Gram(s) IV Push once  dextrose 50% Injectable 12.5 Gram(s) IV Push once  folic acid 1 milliGRAM(s) Oral daily  glucagon  Injectable 1 milliGRAM(s) IntraMuscular once  influenza   Vaccine 0.5 milliLiter(s) IntraMuscular once  insulin lispro (ADMELOG) corrective regimen sliding scale   SubCutaneous at bedtime  insulin lispro (ADMELOG) corrective regimen sliding scale   SubCutaneous three times a day before meals  mesalamine DR Capsule 400 milliGRAM(s) Oral three times a day  mirtazapine 30 milliGRAM(s) Oral daily  pantoprazole    Tablet 40 milliGRAM(s) Oral before breakfast  rifAXIMin 550 milliGRAM(s) Oral two times a day  spironolactone 100 milliGRAM(s) Oral daily    MEDICATIONS  (PRN):  acetaminophen     Tablet .. 650 milliGRAM(s) Oral every 6 hours PRN Temp greater or equal to 38C (100.4F), Mild Pain (1 - 3)  dextrose Oral Gel 15 Gram(s) Oral once PRN Blood Glucose LESS THAN 70 milliGRAM(s)/deciliter      Allergies    No Known Allergies    Intolerances        PERTINENT MEDICATION HISTORY: refer to [facility]     SOCIAL HISTORY: lives at a facility since early 2023   OCCUPATION: unemployed   TRAVEL HISTORY: no recent travel     FAMILY HISTORY:  Family history of CABG (Mother)    Family history of heart disease (Father)        Vital Signs Last 24 Hrs  T(C): 36.6 (29 Dec 2023 14:09), Max: 36.6 (29 Dec 2023 14:09)  T(F): 97.8 (29 Dec 2023 14:09), Max: 97.8 (29 Dec 2023 14:09)  HR: 93 (29 Dec 2023 14:09) (93 - 115)  BP: 104/61 (29 Dec 2023 14:09) (104/61 - 126/79)  BP(mean): --  RR: 18 (29 Dec 2023 14:09) (18 - 18)  SpO2: 98% (29 Dec 2023 14:09) (98% - 100%)    Parameters below as of 29 Dec 2023 14:09  Patient On (Oxygen Delivery Method): room air        Physical Exam:  General: No apparent distress, alert   HEENT: EOMI, MMM  CVS: +S1/S2, RRR, no murmurs  Resp: CTA b/l. No crackles/wheezing  GI: generalized abdominal tenderness, no distension, has rectal tube which has dark brown watery stool   MSK: mild tenderness and swelling of R wrist, otherwise no swelling/warmth/erythema of the joints of the UE/LE  Neuro: AAOx3  Skin: resolving ecchymoses of upper and lower extremities, grecia-ocular ecchymotic patches     LABS:                        9.5    8.21  )-----------( 486      ( 28 Dec 2023 14:28 )             29.0     12-29    137  |  107  |  12  ----------------------------<  106<H>  3.8   |  19<L>  |  0.75    Ca    7.8<L>      29 Dec 2023 06:27  Phos  3.2     12-29  Mg     1.70     12-29    TPro  5.0<L>  /  Alb  2.7<L>  /  TBili  0.3  /  DBili  x   /  AST  13  /  ALT  6   /  AlkPhos  115  12-29    PT/INR - ( 28 Dec 2023 14:28 )   PT: 19.3 sec;   INR: 1.75 ratio         PTT - ( 28 Dec 2023 14:28 )  PTT:27.8 sec  Urinalysis Basic - ( 29 Dec 2023 06:27 )    Color: x / Appearance: x / SG: x / pH: x  Gluc: 106 mg/dL / Ketone: x  / Bili: x / Urobili: x   Blood: x / Protein: x / Nitrite: x   Leuk Esterase: x / RBC: x / WBC x   Sq Epi: x / Non Sq Epi: x / Bacteria: x            Rheumatology Work Up    Cytoplasmic (c-ANCA) Antibody: Positive *!* (12-23-23 @ 07:30)  c-ANCA Titer: 1:80 *!* (12-23-23 @ 07:30)  Perinuclear (p-ANCA) Antibody: Negative (12-23-23 @ 07:30)  C4 Complement, Serum: 28 mg/dL [13 - 39] (12-23-23 @ 07:30)            RADIOLOGY & ADDITIONAL STUDIES:  < from: CT Abdomen and Pelvis w/ IV Cont (12.22.23 @ 02:23) >  IMPRESSION:  Mild pancolitis, of infectious or inflammatory etiology.        --- End of Report ---    < end of copied text >

## 2023-12-29 NOTE — CONSULT NOTE ADULT - SUBJECTIVE AND OBJECTIVE BOX
HPI:    Allergies:  No Known Allergies      Home Medications:    Hospital Medications:  acetaminophen     Tablet .. 650 milliGRAM(s) Oral every 6 hours PRN  carvedilol 3.125 milliGRAM(s) Oral every 12 hours  dextrose 5%. 1000 milliLiter(s) IV Continuous <Continuous>  dextrose 5%. 1000 milliLiter(s) IV Continuous <Continuous>  dextrose 50% Injectable 12.5 Gram(s) IV Push once  dextrose 50% Injectable 25 Gram(s) IV Push once  dextrose 50% Injectable 25 Gram(s) IV Push once  dextrose Oral Gel 15 Gram(s) Oral once PRN  folic acid 1 milliGRAM(s) Oral daily  glucagon  Injectable 1 milliGRAM(s) IntraMuscular once  influenza   Vaccine 0.5 milliLiter(s) IntraMuscular once  insulin lispro (ADMELOG) corrective regimen sliding scale   SubCutaneous at bedtime  insulin lispro (ADMELOG) corrective regimen sliding scale   SubCutaneous three times a day before meals  mesalamine DR Capsule 400 milliGRAM(s) Oral three times a day  mirtazapine 30 milliGRAM(s) Oral daily  pantoprazole    Tablet 40 milliGRAM(s) Oral before breakfast  rifAXIMin 550 milliGRAM(s) Oral two times a day  spironolactone 100 milliGRAM(s) Oral daily      PMHX/PSHX:  HTN (hypertension)    HLD (hyperlipidemia)    CAD (coronary artery disease)    Lumbar herniated disc    UTI (urinary tract infection)    Cholelithiasis    Current every day smoker    Anxiety    Clostridium difficile diarrhea    H/O oral surgery    H/O thumb surgery        Family history:  Family history of CABG (Mother)    Family history of heart disease (Father)        Denies family history of colon cancer/polyps, stomach cancer/polyps, pancreatic cancer/masses, liver cancer/disease, ovarian cancer and endometrial cancer.    Social History:   Tob: Denies  EtOH: Denies  Illicit Drugs: Denies    ROS:     General:  No wt loss, fevers, chills, night sweats, fatigue  Eyes:  Good vision, no reported pain  ENT:  No sore throat, pain, runny nose, dysphagia  CV:  No pain, palpitations, hypo/hypertension  Pulm:  No dyspnea, cough, tachypnea, wheezing  GI:  see HPI  :  No pain, bleeding, incontinence, nocturia  Muscle:  No pain, weakness  Neuro:  No weakness, tingling, memory problems  Psych:  No fatigue, insomnia, mood problems, depression  Endocrine:  No polyuria, polydipsia, cold/heat intolerance  Heme:  No petechiae, ecchymosis, easy bruisability  Skin:  No rash, tattoos, scars, edema    PHYSICAL EXAM:     GENERAL:  No acute distress  HEENT:  NCAT, no scleral icterus   CHEST:  no respiratory distress  HEART:  Regular rate and rhythm  ABDOMEN:  Soft, non-tender, non-distended, normoactive bowel sounds,  no masses  EXTREMITIES: No edema  SKIN:  No rash/erythema/ecchymoses/petechiae/wounds/abscess/warm/dry  NEURO:  Alert and oriented x 3, no asterixis    Vital Signs:  Vital Signs Last 24 Hrs  T(C): 36.5 (29 Dec 2023 05:31), Max: 36.9 (28 Dec 2023 17:45)  T(F): 97.7 (29 Dec 2023 05:31), Max: 98.4 (28 Dec 2023 17:45)  HR: 115 (29 Dec 2023 05:31) (86 - 115)  BP: 126/79 (29 Dec 2023 05:31) (103/73 - 126/79)  BP(mean): --  RR: 18 (29 Dec 2023 05:31) (17 - 18)  SpO2: 100% (29 Dec 2023 05:31) (100% - 100%)    Parameters below as of 29 Dec 2023 05:31  Patient On (Oxygen Delivery Method): room air      Daily Height in cm: 152.4 (28 Dec 2023 13:59)    Daily     LABS:                        9.5    8.21  )-----------( 486      ( 28 Dec 2023 14:28 )             29.0     Mean Cell Volume: 93.5 fL (12-28-23 @ 14:28)    12-29    137  |  107  |  12  ----------------------------<  106<H>  3.8   |  19<L>  |  0.75    Ca    7.8<L>      29 Dec 2023 06:27  Phos  3.2     12-29  Mg     1.70     12-29    TPro  5.0<L>  /  Alb  2.7<L>  /  TBili  0.3  /  DBili  x   /  AST  13  /  ALT  6   /  AlkPhos  115  12-29    LIVER FUNCTIONS - ( 29 Dec 2023 06:27 )  Alb: 2.7 g/dL / Pro: 5.0 g/dL / ALK PHOS: 115 U/L / ALT: 6 U/L / AST: 13 U/L / GGT: x           PT/INR - ( 28 Dec 2023 14:28 )   PT: 19.3 sec;   INR: 1.75 ratio         PTT - ( 28 Dec 2023 14:28 )  PTT:27.8 sec  Urinalysis Basic - ( 29 Dec 2023 06:27 )    Color: x / Appearance: x / SG: x / pH: x  Gluc: 106 mg/dL / Ketone: x  / Bili: x / Urobili: x   Blood: x / Protein: x / Nitrite: x   Leuk Esterase: x / RBC: x / WBC x   Sq Epi: x / Non Sq Epi: x / Bacteria: x                              9.5    8.21  )-----------( 486      ( 28 Dec 2023 14:28 )             29.0       Imaging:             HPI:    Mr. Marion is a 58 yrs old male w/ hx of CAD (not on anti-platelets), insomnia, DM, GERD, MDD, HTN, Ulcerative colitis, Bipolar disorder, cirrhosis (unknown etiology, NAFLD?) c/w HE, Afib (on Coumadin), and Blepharitis who initially presented to [facility] for hypotension, and diffuse body rashes. She also developed fever at OSH, so there was concern for infection. Blood cultures have been neg, obtain CT A/P which showed mild pancolitis, and cellulitis of the right lid. Patient needed further evaluation by rheum, derm and optho, so transferred her for further care. Patient reported she was dx w/ UC about one year ago, when she had abd pain, and bloody stools. Underwent colonoscopy which was incomplete due to severe inflammation. She was treated with steroids but not on any medications since then and not been seen by GI doctor, reported she was unable to go because she has been in the facility.     Allergies:  No Known Allergies      Home Medications:    Hospital Medications:  acetaminophen     Tablet .. 650 milliGRAM(s) Oral every 6 hours PRN  carvedilol 3.125 milliGRAM(s) Oral every 12 hours  dextrose 5%. 1000 milliLiter(s) IV Continuous <Continuous>  dextrose 5%. 1000 milliLiter(s) IV Continuous <Continuous>  dextrose 50% Injectable 12.5 Gram(s) IV Push once  dextrose 50% Injectable 25 Gram(s) IV Push once  dextrose 50% Injectable 25 Gram(s) IV Push once  dextrose Oral Gel 15 Gram(s) Oral once PRN  folic acid 1 milliGRAM(s) Oral daily  glucagon  Injectable 1 milliGRAM(s) IntraMuscular once  influenza   Vaccine 0.5 milliLiter(s) IntraMuscular once  insulin lispro (ADMELOG) corrective regimen sliding scale   SubCutaneous at bedtime  insulin lispro (ADMELOG) corrective regimen sliding scale   SubCutaneous three times a day before meals  mesalamine DR Capsule 400 milliGRAM(s) Oral three times a day  mirtazapine 30 milliGRAM(s) Oral daily  pantoprazole    Tablet 40 milliGRAM(s) Oral before breakfast  rifAXIMin 550 milliGRAM(s) Oral two times a day  spironolactone 100 milliGRAM(s) Oral daily      PMHX/PSHX:  HTN (hypertension)    HLD (hyperlipidemia)    CAD (coronary artery disease)    Lumbar herniated disc    UTI (urinary tract infection)    Cholelithiasis    Current every day smoker    Anxiety    Clostridium difficile diarrhea    H/O oral surgery    H/O thumb surgery        Family history:  Family history of CABG (Mother)    Family history of heart disease (Father)        Denies family history of colon cancer/polyps, stomach cancer/polyps, pancreatic cancer/masses, liver cancer/disease, ovarian cancer and endometrial cancer.    Social History:   Tob: Denies  EtOH: Denies  Illicit Drugs: Denies    ROS:     General:  No wt loss, fevers, chills, night sweats, fatigue  Eyes:  Good vision, no reported pain  ENT:  No sore throat, pain, runny nose, dysphagia  CV:  No pain, palpitations, hypo/hypertension  Pulm:  No dyspnea, cough, tachypnea, wheezing  GI:  see HPI  :  No pain, bleeding, incontinence, nocturia  Muscle:  No pain, weakness  Neuro:  No weakness, tingling, memory problems  Psych:  No fatigue, insomnia, mood problems, depression  Endocrine:  No polyuria, polydipsia, cold/heat intolerance  Heme:  No petechiae, ecchymosis, easy bruisability  Skin:  No rash, tattoos, scars, edema    PHYSICAL EXAM:     GENERAL:  No acute distress  HEENT:  NCAT, no scleral icterus   CHEST:  no respiratory distress  HEART:  Regular rate and rhythm  ABDOMEN:  Soft, non-tender, non-distended, normoactive bowel sounds,  no masses  EXTREMITIES: No edema  SKIN:  No rash/erythema/ecchymoses/petechiae/wounds/abscess/warm/dry  NEURO:  Alert and oriented x 3, no asterixis    Vital Signs:  Vital Signs Last 24 Hrs  T(C): 36.5 (29 Dec 2023 05:31), Max: 36.9 (28 Dec 2023 17:45)  T(F): 97.7 (29 Dec 2023 05:31), Max: 98.4 (28 Dec 2023 17:45)  HR: 115 (29 Dec 2023 05:31) (86 - 115)  BP: 126/79 (29 Dec 2023 05:31) (103/73 - 126/79)  BP(mean): --  RR: 18 (29 Dec 2023 05:31) (17 - 18)  SpO2: 100% (29 Dec 2023 05:31) (100% - 100%)    Parameters below as of 29 Dec 2023 05:31  Patient On (Oxygen Delivery Method): room air      Daily Height in cm: 152.4 (28 Dec 2023 13:59)    Daily     LABS:                        9.5    8.21  )-----------( 486      ( 28 Dec 2023 14:28 )             29.0     Mean Cell Volume: 93.5 fL (12-28-23 @ 14:28)    12-29    137  |  107  |  12  ----------------------------<  106<H>  3.8   |  19<L>  |  0.75    Ca    7.8<L>      29 Dec 2023 06:27  Phos  3.2     12-29  Mg     1.70     12-29    TPro  5.0<L>  /  Alb  2.7<L>  /  TBili  0.3  /  DBili  x   /  AST  13  /  ALT  6   /  AlkPhos  115  12-29    LIVER FUNCTIONS - ( 29 Dec 2023 06:27 )  Alb: 2.7 g/dL / Pro: 5.0 g/dL / ALK PHOS: 115 U/L / ALT: 6 U/L / AST: 13 U/L / GGT: x           PT/INR - ( 28 Dec 2023 14:28 )   PT: 19.3 sec;   INR: 1.75 ratio         PTT - ( 28 Dec 2023 14:28 )  PTT:27.8 sec  Urinalysis Basic - ( 29 Dec 2023 06:27 )    Color: x / Appearance: x / SG: x / pH: x  Gluc: 106 mg/dL / Ketone: x  / Bili: x / Urobili: x   Blood: x / Protein: x / Nitrite: x   Leuk Esterase: x / RBC: x / WBC x   Sq Epi: x / Non Sq Epi: x / Bacteria: x                              9.5    8.21  )-----------( 486      ( 28 Dec 2023 14:28 )             29.0       Imaging:             HPI:    Mr. Marion is a 58 yrs old male w/ hx of CAD (not on anti-platelets), insomnia, DM, GERD, MDD, HTN, Ulcerative colitis, Bipolar disorder, cirrhosis (unknown etiology, NAFLD?) c/w HE, Afib (on Coumadin), and Blepharitis who initially presented to [facility] for hypotension, and diffuse body rashes. She also developed fever at OSH, so there was concern for infection. Blood cultures have been neg, obtain CT A/P which showed mild pancolitis, and cellulitis of the right lid. Patient needed further evaluation by rheum, derm and optho, so transferred her for further care. Patient reported she was dx w/ UC about one year ago, when she had abd pain, and bloody stools. Underwent colonoscopy which was incomplete due to severe inflammation. She was treated with steroids but not on any medications since then and not been seen by GI doctor, reported she was unable to go because she has been in the facility.     Allergies:  No Known Allergies      Home Medications:    Hospital Medications:  acetaminophen     Tablet .. 650 milliGRAM(s) Oral every 6 hours PRN  carvedilol 3.125 milliGRAM(s) Oral every 12 hours  dextrose 5%. 1000 milliLiter(s) IV Continuous <Continuous>  dextrose 5%. 1000 milliLiter(s) IV Continuous <Continuous>  dextrose 50% Injectable 12.5 Gram(s) IV Push once  dextrose 50% Injectable 25 Gram(s) IV Push once  dextrose 50% Injectable 25 Gram(s) IV Push once  dextrose Oral Gel 15 Gram(s) Oral once PRN  folic acid 1 milliGRAM(s) Oral daily  glucagon  Injectable 1 milliGRAM(s) IntraMuscular once  influenza   Vaccine 0.5 milliLiter(s) IntraMuscular once  insulin lispro (ADMELOG) corrective regimen sliding scale   SubCutaneous at bedtime  insulin lispro (ADMELOG) corrective regimen sliding scale   SubCutaneous three times a day before meals  mesalamine DR Capsule 400 milliGRAM(s) Oral three times a day  mirtazapine 30 milliGRAM(s) Oral daily  pantoprazole    Tablet 40 milliGRAM(s) Oral before breakfast  rifAXIMin 550 milliGRAM(s) Oral two times a day  spironolactone 100 milliGRAM(s) Oral daily      PMHX/PSHX:  HTN (hypertension)    HLD (hyperlipidemia)    CAD (coronary artery disease)    Lumbar herniated disc    UTI (urinary tract infection)    Cholelithiasis    Current every day smoker    Anxiety    Clostridium difficile diarrhea    H/O oral surgery    H/O thumb surgery        Family history:  Family history of CABG (Mother)    Family history of heart disease (Father)        Denies family history of colon cancer/polyps, stomach cancer/polyps, pancreatic cancer/masses, liver cancer/disease, ovarian cancer and endometrial cancer.    Social History:   Tob: Denies  EtOH: Denies  Illicit Drugs: Denies    ROS:     General:  No wt loss, fevers, chills, night sweats, fatigue  Eyes:  Good vision, no reported pain  ENT:  No sore throat, pain, runny nose, dysphagia  CV:  No pain, palpitations, hypo/hypertension  Pulm:  No dyspnea, cough, tachypnea, wheezing  GI:  see HPI  :  No pain, bleeding, incontinence, nocturia  Muscle:  No pain, weakness  Neuro:  No weakness, tingling, memory problems  Psych:  No fatigue, insomnia, mood problems, depression  Endocrine:  No polyuria, polydipsia, cold/heat intolerance  Heme:  No petechiae, ecchymosis, easy bruisability  Skin:  No rash, tattoos, scars, edema    PHYSICAL EXAM:     GENERAL:  No acute distress  HEENT:  NCAT, no scleral icterus   CHEST:  no respiratory distress  HEART:  Regular rate and rhythm  ABDOMEN:  Soft, non-tender, non-distended, normoactive bowel sounds,  no masses  EXTREMITIES: No edema  SKIN:  No rash/erythema/ecchymoses/petechiae/wounds/abscess/warm/dry  NEURO:  Alert and oriented x 3, no asterixis    Vital Signs:  Vital Signs Last 24 Hrs  T(C): 36.5 (29 Dec 2023 05:31), Max: 36.9 (28 Dec 2023 17:45)  T(F): 97.7 (29 Dec 2023 05:31), Max: 98.4 (28 Dec 2023 17:45)  HR: 115 (29 Dec 2023 05:31) (86 - 115)  BP: 126/79 (29 Dec 2023 05:31) (103/73 - 126/79)  BP(mean): --  RR: 18 (29 Dec 2023 05:31) (17 - 18)  SpO2: 100% (29 Dec 2023 05:31) (100% - 100%)    Parameters below as of 29 Dec 2023 05:31  Patient On (Oxygen Delivery Method): room air      Daily Height in cm: 152.4 (28 Dec 2023 13:59)    Daily     LABS:                        9.5    8.21  )-----------( 486      ( 28 Dec 2023 14:28 )             29.0     Mean Cell Volume: 93.5 fL (12-28-23 @ 14:28)    12-29    137  |  107  |  12  ----------------------------<  106<H>  3.8   |  19<L>  |  0.75    Ca    7.8<L>      29 Dec 2023 06:27  Phos  3.2     12-29  Mg     1.70     12-29    TPro  5.0<L>  /  Alb  2.7<L>  /  TBili  0.3  /  DBili  x   /  AST  13  /  ALT  6   /  AlkPhos  115  12-29    LIVER FUNCTIONS - ( 29 Dec 2023 06:27 )  Alb: 2.7 g/dL / Pro: 5.0 g/dL / ALK PHOS: 115 U/L / ALT: 6 U/L / AST: 13 U/L / GGT: x           PT/INR - ( 28 Dec 2023 14:28 )   PT: 19.3 sec;   INR: 1.75 ratio         PTT - ( 28 Dec 2023 14:28 )  PTT:27.8 sec  Urinalysis Basic - ( 29 Dec 2023 06:27 )    Color: x / Appearance: x / SG: x / pH: x  Gluc: 106 mg/dL / Ketone: x  / Bili: x / Urobili: x   Blood: x / Protein: x / Nitrite: x   Leuk Esterase: x / RBC: x / WBC x   Sq Epi: x / Non Sq Epi: x / Bacteria: x                              9.5    8.21  )-----------( 486      ( 28 Dec 2023 14:28 )             29.0       Imaging:             HPI:    Mr. Marion is a 58 yrs old male w/ hx of CAD (not on anti-platelets), insomnia, DM, GERD, MDD, HTN, Ulcerative colitis, Bipolar disorder, cirrhosis (unknown etiology, NAFLD?) c/w HE, Afib (on Coumadin), and Blepharitis who initially presented to [facility] for hypotension, and diffuse body rashes. She also developed fever at OSH, so there was concern for infection. Blood cultures have been neg, obtain CT A/P which showed mild pancolitis, and cellulitis of the right lid. Patient needed further evaluation by rheum, derm and optho, so transferred her for further care. Patient reported she was dx w/ UC about one year ago, when she had abd pain, and bloody stools. Underwent colonoscopy which was incomplete due to severe inflammation. She was treated with steroids but not on any medications since then and not been seen by GI doctor, reported she was unable to go because she has been in the facility for one year. Reported initially she had pasty stools but now has watery stools w/ fecal incontinence which are bloody sometimes. On admission, she was afebrile, tachycardic and BP stable. Labs showed hgb 9.5 which is stable from baseline, at OSH her hgb decreased to 6.7 yesterday. GI consulted for pancolitis.     Allergies:  No Known Allergies    Home Medications:  · 	warfarin 6 mg oral tablet: Last Dose Taken:  , 1 tab(s) orally once a day  · 	Xifaxan 550 mg oral tablet: Last Dose Taken:  , 1 tab(s) orally 2 times a day  · 	Acidophilus oral tablet: Last Dose Taken:  , 1 tab(s) orally 2 times a day  · 	carvedilol 3.125 mg oral tablet: Last Dose Taken:  , 1 tab(s) orally 2 times a day  · 	Entresto 24 mg-26 mg oral tablet: Last Dose Taken:  , 1 tab(s) orally 2 times a day  · 	erythromycin 0.5% ophthalmic ointment: Last Dose Taken:  , 1 application in each affected eye  · 	Lantus 100 units/mL subcutaneous solution: Last Dose Taken:  , 4 unit(s) subcutaneous once a day (at bedtime)  · 	melatonin 3 mg oral tablet: Last Dose Taken:  , 1 tab(s) orally once a day as needed for  insomnia  · 	mirtazapine 30 mg oral tablet: 1 tab(s) orally once a day  · 	pantoprazole 40 mg oral delayed release tablet: Last Dose Taken:  , 1 tab(s) orally once a day  · 	spironolactone 100 mg oral tablet: Last Dose Taken:  , 1 tab(s) orally once a day    Hospital Medications:  acetaminophen     Tablet .. 650 milliGRAM(s) Oral every 6 hours PRN  carvedilol 3.125 milliGRAM(s) Oral every 12 hours  dextrose 5%. 1000 milliLiter(s) IV Continuous <Continuous>  dextrose 5%. 1000 milliLiter(s) IV Continuous <Continuous>  dextrose 50% Injectable 12.5 Gram(s) IV Push once  dextrose 50% Injectable 25 Gram(s) IV Push once  dextrose 50% Injectable 25 Gram(s) IV Push once  dextrose Oral Gel 15 Gram(s) Oral once PRN  folic acid 1 milliGRAM(s) Oral daily  glucagon  Injectable 1 milliGRAM(s) IntraMuscular once  influenza   Vaccine 0.5 milliLiter(s) IntraMuscular once  insulin lispro (ADMELOG) corrective regimen sliding scale   SubCutaneous at bedtime  insulin lispro (ADMELOG) corrective regimen sliding scale   SubCutaneous three times a day before meals  mesalamine DR Capsule 400 milliGRAM(s) Oral three times a day  mirtazapine 30 milliGRAM(s) Oral daily  pantoprazole    Tablet 40 milliGRAM(s) Oral before breakfast  rifAXIMin 550 milliGRAM(s) Oral two times a day  spironolactone 100 milliGRAM(s) Oral daily      PMHX/PSHX:  HTN (hypertension)    HLD (hyperlipidemia)    CAD (coronary artery disease)    Lumbar herniated disc    UTI (urinary tract infection)    Cholelithiasis    Current every day smoker    Anxiety    Clostridium difficile diarrhea    H/O oral surgery    H/O thumb surgery      Family history:  CABG (Mother), heart disease (Father)    Social History:   Tob: Denies  EtOH: Denies  Illicit Drugs: Denies    ROS:     General:  No wt loss, fevers, chills, night sweats, fatigue  Eyes:  Good vision, no reported pain  ENT:  No sore throat, pain, runny nose, dysphagia  CV:  No pain, palpitations, hypo/hypertension  Pulm:  No dyspnea, cough, tachypnea, wheezing  GI:  see HPI  :  No pain, bleeding, incontinence, nocturia  Muscle:  No pain, weakness  Neuro:  No weakness, tingling, memory problems  Psych:  No fatigue, insomnia, mood problems, depression  Endocrine:  No polyuria, polydipsia, cold/heat intolerance  Heme:  No petechiae, ecchymosis, easy bruisability  Skin:  No rash, tattoos, scars, edema    PHYSICAL EXAM:     GENERAL:  No acute distress, chronically ill appearing, lying in bed.   HEENT:  NCAT, no scleral icterus   CHEST:  no respiratory distress  HEART:  Regular rate and rhythm  ABDOMEN:  Soft, mild diffuse tenderness, non-distended, no palpable masses.   RECTUM: has rectal tube which has dark brown watery stool w/ no blood.   EXTREMITIES: No LE edema b/l  SKIN: No visible rashes seen.   NEURO:  Alert and oriented x 3, no tremors, no asterixis.     Vital Signs:  Vital Signs Last 24 Hrs  T(C): 36.5 (29 Dec 2023 05:31), Max: 36.9 (28 Dec 2023 17:45)  T(F): 97.7 (29 Dec 2023 05:31), Max: 98.4 (28 Dec 2023 17:45)  HR: 115 (29 Dec 2023 05:31) (86 - 115)  BP: 126/79 (29 Dec 2023 05:31) (103/73 - 126/79)  BP(mean): --  RR: 18 (29 Dec 2023 05:31) (17 - 18)  SpO2: 100% (29 Dec 2023 05:31) (100% - 100%)    Parameters below as of 29 Dec 2023 05:31  Patient On (Oxygen Delivery Method): room air      Daily Height in cm: 152.4 (28 Dec 2023 13:59)    Daily     LABS:                        9.5    8.21  )-----------( 486      ( 28 Dec 2023 14:28 )             29.0     Mean Cell Volume: 93.5 fL (12-28-23 @ 14:28)    12-29    137  |  107  |  12  ----------------------------<  106<H>  3.8   |  19<L>  |  0.75    Ca    7.8<L>      29 Dec 2023 06:27  Phos  3.2     12-29  Mg     1.70     12-29    TPro  5.0<L>  /  Alb  2.7<L>  /  TBili  0.3  /  DBili  x   /  AST  13  /  ALT  6   /  AlkPhos  115  12-29    LIVER FUNCTIONS - ( 29 Dec 2023 06:27 )  Alb: 2.7 g/dL / Pro: 5.0 g/dL / ALK PHOS: 115 U/L / ALT: 6 U/L / AST: 13 U/L / GGT: x           PT/INR - ( 28 Dec 2023 14:28 )   PT: 19.3 sec;   INR: 1.75 ratio         PTT - ( 28 Dec 2023 14:28 )  PTT:27.8 sec  Urinalysis Basic - ( 29 Dec 2023 06:27 )    Color: x / Appearance: x / SG: x / pH: x  Gluc: 106 mg/dL / Ketone: x  / Bili: x / Urobili: x   Blood: x / Protein: x / Nitrite: x   Leuk Esterase: x / RBC: x / WBC x   Sq Epi: x / Non Sq Epi: x / Bacteria: x                              9.5    8.21  )-----------( 486      ( 28 Dec 2023 14:28 )             29.0       Imaging:      CT A/P on 12/22/23 from OSH.     FINDINGS:  CHEST:  LUNGS AND LARGE AIRWAYS: Patent central airways. Dependent atelectatic changes at the lung bases.  PLEURA: No pleural effusion.  VESSELS: Within normal limits.  HEART: Heart size is normal. No pericardial effusion.  MEDIASTINUM AND FOREST: No lymphadenopathy.  CHEST WALL AND LOWER NECK: Within normal limits.    ABDOMEN AND PELVIS:  LIVER: Within normal limits.  BILE DUCTS: Normal caliber.  GALLBLADDER: Cholecystectomy.  SPLEEN: Within normal limits.  PANCREAS: Within normal limits.  ADRENALS: Within normal limits.  KIDNEYS/URETERS: Within normal limits.    BLADDER: Within normal limits.  REPRODUCTIVE ORGANS: Posterior calcified fibroid.    BOWEL: No bowel obstruction. Appendix is unremarkable. There is thickening, thumbprinting and hyperemia of the colon from the cecum through rectum compatible with pancolitis.  PERITONEUM: No ascites.  VESSELS: Aorta is not dilated. Moderate atherosclerotic vascular calcification.  No perivascular inflammation as clinically questioned.  RETROPERITONEUM/LYMPH NODES: No lymphadenopathy.  ABDOMINAL WALL: Within normal limits.  BONES: Within normal limits.    IMPRESSION:  Mild pancolitis, of infectious or inflammatory etiology.       HPI:    Mr. Marion is a 58 yrs old male w/ hx of CAD (not on anti-platelets), insomnia, DM, GERD, MDD, HTN, Ulcerative colitis, Bipolar disorder, cirrhosis (unknown etiology, NAFLD?) c/w HE, Afib (on Coumadin), and Blepharitis who initially presented to [facility] for hypotension, and diffuse body rashes. She also developed fever at OSH, so there was concern for infection. Blood cultures have been neg, obtain CT A/P which showed mild pancolitis, and cellulitis of the right lid. Patient needed further evaluation by rheum, derm and optho, so transferred her for further care. Patient reported she was dx w/ UC about one year ago, when she had abd pain, and bloody stools. Underwent colonoscopy which was incomplete due to severe inflammation. She was treated with steroids but not on any medications since then and not been seen by GI doctor, reported she was unable to go because she has been in the facility for one year. Reported initially she had pasty stools but now has watery stools w/ fecal incontinence which are bloody sometimes. On admission, she was afebrile, tachycardic and BP stable. Labs showed hgb 9.5 which is stable from baseline, at OSH her hgb decreased to 6.7 yesterday. GI consulted for pancolitis.     Allergies:  No Known Allergies    Home Medications:  · 	warfarin 6 mg oral tablet: Last Dose Taken:  , 1 tab(s) orally once a day  · 	Xifaxan 550 mg oral tablet: Last Dose Taken:  , 1 tab(s) orally 2 times a day  · 	Acidophilus oral tablet: Last Dose Taken:  , 1 tab(s) orally 2 times a day  · 	carvedilol 3.125 mg oral tablet: Last Dose Taken:  , 1 tab(s) orally 2 times a day  · 	Entresto 24 mg-26 mg oral tablet: Last Dose Taken:  , 1 tab(s) orally 2 times a day  · 	erythromycin 0.5% ophthalmic ointment: Last Dose Taken:  , 1 application in each affected eye  · 	Lantus 100 units/mL subcutaneous solution: Last Dose Taken:  , 4 unit(s) subcutaneous once a day (at bedtime)  · 	melatonin 3 mg oral tablet: Last Dose Taken:  , 1 tab(s) orally once a day as needed for  insomnia  · 	mirtazapine 30 mg oral tablet: 1 tab(s) orally once a day  · 	pantoprazole 40 mg oral delayed release tablet: Last Dose Taken:  , 1 tab(s) orally once a day  · 	spironolactone 100 mg oral tablet: Last Dose Taken:  , 1 tab(s) orally once a day    Hospital Medications:  acetaminophen     Tablet .. 650 milliGRAM(s) Oral every 6 hours PRN  carvedilol 3.125 milliGRAM(s) Oral every 12 hours  dextrose 5%. 1000 milliLiter(s) IV Continuous <Continuous>  dextrose 5%. 1000 milliLiter(s) IV Continuous <Continuous>  dextrose 50% Injectable 12.5 Gram(s) IV Push once  dextrose 50% Injectable 25 Gram(s) IV Push once  dextrose 50% Injectable 25 Gram(s) IV Push once  dextrose Oral Gel 15 Gram(s) Oral once PRN  folic acid 1 milliGRAM(s) Oral daily  glucagon  Injectable 1 milliGRAM(s) IntraMuscular once  influenza   Vaccine 0.5 milliLiter(s) IntraMuscular once  insulin lispro (ADMELOG) corrective regimen sliding scale   SubCutaneous at bedtime  insulin lispro (ADMELOG) corrective regimen sliding scale   SubCutaneous three times a day before meals  mesalamine DR Capsule 400 milliGRAM(s) Oral three times a day  mirtazapine 30 milliGRAM(s) Oral daily  pantoprazole    Tablet 40 milliGRAM(s) Oral before breakfast  rifAXIMin 550 milliGRAM(s) Oral two times a day  spironolactone 100 milliGRAM(s) Oral daily      PMHX/PSHX:  HTN (hypertension)    HLD (hyperlipidemia)    CAD (coronary artery disease)    Lumbar herniated disc    UTI (urinary tract infection)    Cholelithiasis    Current every day smoker    Anxiety    Clostridium difficile diarrhea    H/O oral surgery    H/O thumb surgery      Family history:  CABG (Mother), heart disease (Father)    Social History:   Tob: Denies  EtOH: Denies  Illicit Drugs: Denies    ROS:     General:  No wt loss, fevers, chills, night sweats, fatigue  Eyes:  Good vision, no reported pain  ENT:  No sore throat, pain, runny nose, dysphagia  CV:  No pain, palpitations, hypo/hypertension  Pulm:  No dyspnea, cough, tachypnea, wheezing  GI:  see HPI  :  No pain, bleeding, incontinence, nocturia  Muscle:  No pain, weakness  Neuro:  No weakness, tingling, memory problems  Psych:  No fatigue, insomnia, mood problems, depression  Endocrine:  No polyuria, polydipsia, cold/heat intolerance  Heme:  No petechiae, ecchymosis, easy bruisability  Skin:  No rash, tattoos, scars, edema    PHYSICAL EXAM:     GENERAL:  No acute distress, chronically ill appearing, lying in bed.   HEENT:  NCAT, no scleral icterus   CHEST:  no respiratory distress  HEART:  Regular rate and rhythm  ABDOMEN:  Soft, mild diffuse tenderness, non-distended, no palpable masses.   RECTUM: has rectal tube which has dark brown watery stool w/ no blood.   EXTREMITIES: No LE edema b/l  SKIN: No visible rashes seen.   NEURO:  Alert and oriented x 3, no tremors, no asterixis.     Vital Signs:  Vital Signs Last 24 Hrs  T(C): 36.5 (29 Dec 2023 05:31), Max: 36.9 (28 Dec 2023 17:45)  T(F): 97.7 (29 Dec 2023 05:31), Max: 98.4 (28 Dec 2023 17:45)  HR: 115 (29 Dec 2023 05:31) (86 - 115)  BP: 126/79 (29 Dec 2023 05:31) (103/73 - 126/79)  BP(mean): --  RR: 18 (29 Dec 2023 05:31) (17 - 18)  SpO2: 100% (29 Dec 2023 05:31) (100% - 100%)    Parameters below as of 29 Dec 2023 05:31  Patient On (Oxygen Delivery Method): room air      Daily Height in cm: 152.4 (28 Dec 2023 13:59)    Daily     LABS:                        9.5    8.21  )-----------( 486      ( 28 Dec 2023 14:28 )             29.0     Mean Cell Volume: 93.5 fL (12-28-23 @ 14:28)    12-29    137  |  107  |  12  ----------------------------<  106<H>  3.8   |  19<L>  |  0.75    Ca    7.8<L>      29 Dec 2023 06:27  Phos  3.2     12-29  Mg     1.70     12-29    TPro  5.0<L>  /  Alb  2.7<L>  /  TBili  0.3  /  DBili  x   /  AST  13  /  ALT  6   /  AlkPhos  115  12-29    LIVER FUNCTIONS - ( 29 Dec 2023 06:27 )  Alb: 2.7 g/dL / Pro: 5.0 g/dL / ALK PHOS: 115 U/L / ALT: 6 U/L / AST: 13 U/L / GGT: x           PT/INR - ( 28 Dec 2023 14:28 )   PT: 19.3 sec;   INR: 1.75 ratio         PTT - ( 28 Dec 2023 14:28 )  PTT:27.8 sec  Urinalysis Basic - ( 29 Dec 2023 06:27 )    Color: x / Appearance: x / SG: x / pH: x  Gluc: 106 mg/dL / Ketone: x  / Bili: x / Urobili: x   Blood: x / Protein: x / Nitrite: x   Leuk Esterase: x / RBC: x / WBC x   Sq Epi: x / Non Sq Epi: x / Bacteria: x                              9.5    8.21  )-----------( 486      ( 28 Dec 2023 14:28 )             29.0       Imaging:      CT A/P on 12/22/23 from OSH.     FINDINGS:  CHEST:  LUNGS AND LARGE AIRWAYS: Patent central airways. Dependent atelectatic changes at the lung bases.  PLEURA: No pleural effusion.  VESSELS: Within normal limits.  HEART: Heart size is normal. No pericardial effusion.  MEDIASTINUM AND FOREST: No lymphadenopathy.  CHEST WALL AND LOWER NECK: Within normal limits.    ABDOMEN AND PELVIS:  LIVER: Within normal limits.  BILE DUCTS: Normal caliber.  GALLBLADDER: Cholecystectomy.  SPLEEN: Within normal limits.  PANCREAS: Within normal limits.  ADRENALS: Within normal limits.  KIDNEYS/URETERS: Within normal limits.    BLADDER: Within normal limits.  REPRODUCTIVE ORGANS: Posterior calcified fibroid.    BOWEL: No bowel obstruction. Appendix is unremarkable. There is thickening, thumbprinting and hyperemia of the colon from the cecum through rectum compatible with pancolitis.  PERITONEUM: No ascites.  VESSELS: Aorta is not dilated. Moderate atherosclerotic vascular calcification.  No perivascular inflammation as clinically questioned.  RETROPERITONEUM/LYMPH NODES: No lymphadenopathy.  ABDOMINAL WALL: Within normal limits.  BONES: Within normal limits.    IMPRESSION:  Mild pancolitis, of infectious or inflammatory etiology.

## 2023-12-29 NOTE — CONSULT NOTE ADULT - ASSESSMENT
58 yrs old female w/ hx of CAD (not on anti-platelets), insomnia, DM, GERD, MDD, HTN, Ulcerative colitis, Bipolar disorder, cirrhosis (unknown etiology, NAFLD?) c/w HE, Afib (on Coumadin), and Blepharitis who initially presented to [facility] for hypotension, diffuse body rashes and pancolitis. Rheumatology consulted for rash.     #Pancolitis  #Ulcerative colitis hx  - Bloody stools with fecal incontinence worse over the last year   - CT abdomen showed thickening, hyperemia and mild inflammation throughout the colon  - Does not follow with GI outpatient and never was on biologics   - Negative GI PCR and C. diff PCR   - High suspicion for UC flare  - R wrist pain and swelling could be enteropathic arthritis, no other synovitis noted     #Rash   - Resolving, ecchymosis in the setting of Coumadin use as per Dermatology     #c-ANCA 1:80   - Low suspicion for vasculitis with negative PR3 noted and no suggestive clinical manifestations  - Additional rheumatologic serologies (CATHERINE, dsDNA, p-ANCA, and MPO) all negative     Recommendations:   - Appreciate GI evaluation and agree with steroids as per GI  - Obtain quantiferon, hepatitis screen, U/A and urine protein/Cr     Rheumatology to sign off. Please call back if any questions or concerns    Discussed with Dr. Chanel-Yosvany Menezes MD  Rheumatology Fellow  Available on TEAMS      58 yrs old female w/ hx of CAD (not on anti-platelets), insomnia, DM, GERD, MDD, HTN, Ulcerative colitis, Bipolar disorder, cirrhosis (unknown etiology, NAFLD?) c/w HE, Afib (on Coumadin), and Blepharitis who initially presented to [facility] for hypotension, diffuse body rashes and pancolitis. Rheumatology consulted for rash.     #Pancolitis  #Ulcerative colitis hx  - Bloody stools with fecal incontinence worse over the last year   - CT abdomen showed thickening, hyperemia and mild inflammation throughout the colon  - Does not follow with GI outpatient and never was on biologics   - Negative GI PCR and C. diff PCR   - High suspicion for UC flare  - R wrist pain and swelling could be enteropathic arthritis, no other synovitis noted     #Rash   - Resolving, ecchymosis in the setting of Coumadin use as per Dermatology     #c-ANCA 1:80   - Low suspicion for vasculitis with negative PR3 noted and no suggestive clinical manifestations  - Additional rheumatologic serologies (CATHERINE, dsDNA, p-ANCA, and MPO) all negative     Recommendations:   - Appreciate GI evaluation and agree with steroids as per GI  - Obtain quantiferon, hepatitis screen, U/A and urine protein/Cr     Rheumatology to sign off. Please call back if any questions or concerns    Discussed with Dr. Chanel-Yosvany Menezes MD  Rheumatology Fellow  Available on TEAMS

## 2023-12-29 NOTE — PROGRESS NOTE ADULT - REASON FOR ADMISSION
transfer from [place] for tertiary level care in the setting of diffuse body rash transfer from [place] for tertiary level care in the setting of diffuse body rash

## 2023-12-29 NOTE — PROGRESS NOTE ADULT - PROBLEM SELECTOR PLAN 3
Patient with history of ulcerative colitis found to have pancolitis   -continue mesalamine 400mg TID   -regular diet Patient with history of ulcerative colitis found to have pancolitis   -continue mesalamine 400mg TID   -regular diet  -GI consulted, appreciate recs

## 2023-12-29 NOTE — PROGRESS NOTE ADULT - PROBLEM SELECTOR PLAN 2
Patient presented with b/l orbital cellulitis which is improved from initial presentation   -patient was managed with ceftriaxone 1gm q24h and flagyl 500 mg q8h at [facility]   -blood cultures no growth in 5 days at [facility]   -trend temps/WBC   -ID recs from [facility] did not believe it was infectious and recommended d/c antibiotics   -will monitor off antibiotics Patient presented with b/l orbital cellulitis which is improved from initial presentation   -patient was managed with ceftriaxone 1gm q24h and flagyl 500 mg q8h at [facility]   -blood cultures no growth in 5 days at [facility]   -trend temps/WBC   -ID recs from [facility] did not believe it was infectious and recommended d/c antibiotics   -will monitor off antibiotics

## 2023-12-29 NOTE — PROGRESS NOTE ADULT - ATTENDING COMMENTS
58F CAD, diabetes, insomnia, MDD, GERD, hypertension, irritable bowel syndrome with diarrhea, ulcerative colitis, cirrhosis, NAFLD, hepatic encephalopathy, bipolar disorder, chronic Afib on Coumadin, blepharitis of the left eye who initially presented to [facility] with periorbital swelling, purpuric rash and pancolitis, transferred for derm and rheum eval  --rash and periorbital swelling has greatly improved, appreciate derm eval   --required 1 U PRBC at [place] 12/27, monitor Hb, continue mesalamine  --GI follow up   --holding warfarin, consider DOAC when stable to resume CVA prevention for afib .     Time-based billing (NON-critical care).     75 minutes spent on total encounter. The necessity of the time spent during the encounter on this date of service was due to:     Time spent includes direct patient care  (interview and examination of patient), discussion with other providers, support staff and/or patient's family members, review of medical records, ordering diagnostic tests and analyzing results, and documentation. 58F CAD, diabetes, insomnia, MDD, GERD, hypertension, irritable bowel syndrome with diarrhea, ulcerative colitis, cirrhosis, NAFLD, hepatic encephalopathy, bipolar disorder, chronic Afib on Coumadin, blepharitis of the left eye who initially presented to [facility] with periorbital swelling, purpuric rash and pancolitis, transferred for derm and rheum eval  --rash and periorbital swelling has greatly improved, appreciate derm eval   --required 1 U PRBC at [place] 12/27, monitor Hb, continue mesalamine  --GI follow up   --holding warfarin, consider DOAC when stable to resume CVA prevention for afib .     Time-based billing (NON-critical care).     75 minutes spent on total encounter. The necessity of the time spent during the encounter on this date of service was due to:     Time spent includes direct patient care  (interview and examination of patient), discussion with other providers, support staff and/or patient's family members, review of medical records, ordering diagnostic tests and analyzing results, and documentation. Pt seen and examined, agree with the note done by HS, briefly this is a 58F CAD, diabetes, insomnia, MDD, GERD, hypertension, irritable bowel syndrome with diarrhea, ulcerative colitis, cirrhosis, NAFLD, hepatic encephalopathy, bipolar disorder, chronic Afib on Coumadin, blepharitis of the left eye who initially presented to [facility] with periorbital swelling, purpuric rash and pancolitis, transferred for derm and rheum eval  --rash and periorbital swelling has greatly improved, appreciate derm eval   --required 1 U PRBC at [place] 12/27, monitor Hb, continue mesalamine  --GI follow up   --holding warfarin, consider DOAC when stable to resume CVA prevention for afib .   -f/u UE doppler  -f/u cbc    Plan discussed with HS. Pt seen and examined, agree with the note done by HS, briefly this is a 58F CAD, diabetes, insomnia, MDD, GERD, hypertension, irritable bowel syndrome with diarrhea, ulcerative colitis, cirrhosis, NAFLD, hepatic encephalopathy, bipolar disorder, chronic Afib on Coumadin, blepharitis of the left eye who initially presented to [facility] with periorbital swelling, purpuric rash and pancolitis, transferred for derm and rheum eval  --rash and periorbital swelling has greatly improved, appreciate derm eval   --required 1 U PRBC at [place] 12/27, monitor Hb, continue mesalamine  --GI follow up   --holding warfarin, consider DOAC when stable to resume CVA prevention for afib .   -f/u UE doppler  -f/u cbc    Plan discussed with HS.

## 2023-12-29 NOTE — PROGRESS NOTE ADULT - SUBJECTIVE AND OBJECTIVE BOX
ANAYA JOINER 58y Female      Patient is a 58y old  Female who presents with a chief complaint of transfer from [place] for tertiary level care in the setting of diffuse body rash (28 Dec 2023 14:43)        INTERVAL HPI/OVERNIGHT EVENTS: No acute events overnight. Patient was seen and evaluated at the bedside. The patient denies pain. Vitals stable. Patient denies fever/chills, chest pain, shortness of breath, abdominal pain, headaches, nausea/vomiting, and diarrhea/constipation.      PHYSICAL EXAM:  GENERAL: NAD  HEAD:  Normocephalic  EYES:  conjunctiva and sclera clear  ENMT: Moist mucous membranes  NECK: Supple  NERVOUS SYSTEM:  Alert, awake  CHEST/LUNG: Good air exchange bilaterally, no wheeze  HEART: Regular rate and rhythm        Vital Signs Last 24 Hrs  T(C): 36.5 (29 Dec 2023 05:31), Max: 36.9 (28 Dec 2023 17:45)  T(F): 97.7 (29 Dec 2023 05:31), Max: 98.4 (28 Dec 2023 17:45)  HR: 115 (29 Dec 2023 05:31) (86 - 115)  BP: 126/79 (29 Dec 2023 05:31) (103/73 - 126/79)  BP(mean): --  RR: 18 (29 Dec 2023 05:31) (17 - 18)  SpO2: 100% (29 Dec 2023 05:31) (100% - 100%)    Parameters below as of 29 Dec 2023 05:31  Patient On (Oxygen Delivery Method): room air          Consultant(s) Notes Reviewed:  [X] YES  [ ] NO  Care Discussed with Consultants/Other Providers [X] YES  [ ] NO  Imaging Personally Reviewed:  [X] YES  [ ] NO      LABS:                        9.5    8.21  )-----------( 486      ( 28 Dec 2023 14:28 )             29.0     12-28    138  |  108<H>  |  10  ----------------------------<  100<H>  3.7   |  19<L>  |  0.67    Ca    8.1<L>      28 Dec 2023 14:28  Phos  3.1     12-28  Mg     1.60     12-28    TPro  5.2<L>  /  Alb  2.7<L>  /  TBili  0.3  /  DBili  x   /  AST  14  /  ALT  5   /  AlkPhos  113  12-28    PT/INR - ( 28 Dec 2023 14:28 )   PT: 19.3 sec;   INR: 1.75 ratio         PTT - ( 28 Dec 2023 14:28 )  PTT:27.8 sec  Urinalysis Basic - ( 28 Dec 2023 14:28 )    Color: x / Appearance: x / SG: x / pH: x  Gluc: 100 mg/dL / Ketone: x  / Bili: x / Urobili: x   Blood: x / Protein: x / Nitrite: x   Leuk Esterase: x / RBC: x / WBC x   Sq Epi: x / Non Sq Epi: x / Bacteria: x        CAPILLARY BLOOD GLUCOSE      POCT Blood Glucose.: 109 mg/dL (28 Dec 2023 21:21)           ANAYA JOINER 58y Female      Patient is a 58y old  Female who presents with a chief complaint of transfer from [place] for tertiary level care in the setting of diffuse body rash (28 Dec 2023 14:43)        INTERVAL HPI/OVERNIGHT EVENTS: No acute events overnight. Patient was seen and evaluated at the bedside. The patient denies pain. Vitals stable. Patient denies fever/chills, chest pain, shortness of breath, abdominal pain, headaches, nausea/vomiting, and diarrhea/constipation.      PHYSICAL EXAM:  GENERAL: NAD  HEAD:  Normocephalic  EYES:  conjunctiva and sclera clear  ENMT: Moist mucous membranes  NECK: Supple  NERVOUS SYSTEM:  Alert, awake  CHEST/LUNG: Good air exchange bilaterally, no wheeze  HEART: Regular rate and rhythm        Vital Signs Last 24 Hrs  T(C): 36.5 (29 Dec 2023 05:31), Max: 36.9 (28 Dec 2023 17:45)  T(F): 97.7 (29 Dec 2023 05:31), Max: 98.4 (28 Dec 2023 17:45)  HR: 115 (29 Dec 2023 05:31) (86 - 115)  BP: 126/79 (29 Dec 2023 05:31) (103/73 - 126/79)  BP(mean): --  RR: 18 (29 Dec 2023 05:31) (17 - 18)  SpO2: 100% (29 Dec 2023 05:31) (100% - 100%)    Parameters below as of 29 Dec 2023 05:31  Patient On (Oxygen Delivery Method): room air          Consultant(s) Notes Reviewed:  [X] YES  [ ] NO  Care Discussed with Consultants/Other Providers [X] YES  [ ] NO  Imaging Personally Reviewed:  [X] YES  [ ] NO      LABS:                        9.5    8.21  )-----------( 486      ( 28 Dec 2023 14:28 )             29.0     12-28    138  |  108<H>  |  10  ----------------------------<  100<H>  3.7   |  19<L>  |  0.67    Ca    8.1<L>      28 Dec 2023 14:28  Phos  3.1     12-28  Mg     1.60     12-28    TPro  5.2<L>  /  Alb  2.7<L>  /  TBili  0.3  /  DBili  x   /  AST  14  /  ALT  5   /  AlkPhos  113  12-28    PT/INR - ( 28 Dec 2023 14:28 )   PT: 19.3 sec;   INR: 1.75 ratio         PTT - ( 28 Dec 2023 14:28 )  PTT:27.8 sec  Urinalysis Basic - ( 28 Dec 2023 14:28 )    Color: x / Appearance: x / SG: x / pH: x  Gluc: 100 mg/dL / Ketone: x  / Bili: x / Urobili: x   Blood: x / Protein: x / Nitrite: x   Leuk Esterase: x / RBC: x / WBC x   Sq Epi: x / Non Sq Epi: x / Bacteria: x        CAPILLARY BLOOD GLUCOSE      POCT Blood Glucose.: 109 mg/dL (28 Dec 2023 21:21)           ANAYA JOINER 58y Female      Patient is a 58y old  Female who presents with a chief complaint of transfer from [place] for tertiary level care in the setting of diffuse body rash (28 Dec 2023 14:43)        INTERVAL HPI/OVERNIGHT EVENTS: No acute events overnight. Patient was seen and evaluated at the bedside. The patient denies pain. Vitals stable. Patient denies fever/chills, chest pain, shortness of breath, abdominal pain, headaches, nausea/vomiting, and diarrhea/constipation.      PHYSICAL EXAM:  GENERAL: NAD  HEAD:  Normocephalic  EYES:  conjunctiva and sclera clear  ENMT: Moist mucous membranes  NECK: Supple  NERVOUS SYSTEM:  Alert, awake  CHEST/LUNG: Good air exchange bilaterally, no wheeze  HEART: Regular rate and rhythym  GI: soft, mild diffuse tenderness, non distended        Vital Signs Last 24 Hrs  T(C): 36.5 (29 Dec 2023 05:31), Max: 36.9 (28 Dec 2023 17:45)  T(F): 97.7 (29 Dec 2023 05:31), Max: 98.4 (28 Dec 2023 17:45)  HR: 115 (29 Dec 2023 05:31) (86 - 115)  BP: 126/79 (29 Dec 2023 05:31) (103/73 - 126/79)  BP(mean): --  RR: 18 (29 Dec 2023 05:31) (17 - 18)  SpO2: 100% (29 Dec 2023 05:31) (100% - 100%)    Parameters below as of 29 Dec 2023 05:31  Patient On (Oxygen Delivery Method): room air          Consultant(s) Notes Reviewed:  [X] YES  [ ] NO  Care Discussed with Consultants/Other Providers [X] YES  [ ] NO  Imaging Personally Reviewed:  [X] YES  [ ] NO      LABS:                        9.5    8.21  )-----------( 486      ( 28 Dec 2023 14:28 )             29.0     12-28    138  |  108<H>  |  10  ----------------------------<  100<H>  3.7   |  19<L>  |  0.67    Ca    8.1<L>      28 Dec 2023 14:28  Phos  3.1     12-28  Mg     1.60     12-28    TPro  5.2<L>  /  Alb  2.7<L>  /  TBili  0.3  /  DBili  x   /  AST  14  /  ALT  5   /  AlkPhos  113  12-28    PT/INR - ( 28 Dec 2023 14:28 )   PT: 19.3 sec;   INR: 1.75 ratio         PTT - ( 28 Dec 2023 14:28 )  PTT:27.8 sec  Urinalysis Basic - ( 28 Dec 2023 14:28 )    Color: x / Appearance: x / SG: x / pH: x  Gluc: 100 mg/dL / Ketone: x  / Bili: x / Urobili: x   Blood: x / Protein: x / Nitrite: x   Leuk Esterase: x / RBC: x / WBC x   Sq Epi: x / Non Sq Epi: x / Bacteria: x        CAPILLARY BLOOD GLUCOSE      POCT Blood Glucose.: 109 mg/dL (28 Dec 2023 21:21)           ANAYA JOINER 58y Female      Patient is a 58y old  Female who presents with a chief complaint of transfer from [place] for tertiary level care in the setting of diffuse body rash (28 Dec 2023 14:43)        INTERVAL HPI/OVERNIGHT EVENTS: No acute events overnight. Patient was seen and evaluated at the bedside. The patient denies pain. Vitals stable. Patient denies fever/chills, chest pain, shortness of breath, abdominal pain, headaches, nausea/vomiting, and diarrhea/constipation.      PHYSICAL EXAM:  GENERAL: NAD  HEAD:  Normocephalic  EYES:  conjunctiva and sclera clear  ENMT: Moist mucous membranes  NECK: Supple  NERVOUS SYSTEM:  Alert, awake  CHEST/LUNG: Good air exchange bilaterally, no wheeze  HEART: Regular rate and rhythym  GI: soft, mild diffuse tenderness, non distended        Vital Signs Last 24 Hrs  T(C): 36.5 (29 Dec 2023 05:31), Max: 36.9 (28 Dec 2023 17:45)  T(F): 97.7 (29 Dec 2023 05:31), Max: 98.4 (28 Dec 2023 17:45)  HR: 115 (29 Dec 2023 05:31) (86 - 115)  BP: 126/79 (29 Dec 2023 05:31) (103/73 - 126/79)  BP(mean): --  RR: 18 (29 Dec 2023 05:31) (17 - 18)  SpO2: 100% (29 Dec 2023 05:31) (100% - 100%)    Parameters below as of 29 Dec 2023 05:31  Patient On (Oxygen Delivery Method): room air          Consultant(s) Notes Reviewed:  [X] YES  [ ] NO  Care Discussed with Consultants/Other Providers [X] YES  [ ] NO  Imaging Personally Reviewed:  [X] YES  [ ] NO      LABS:                        9.5    8.21  )-----------( 486      ( 28 Dec 2023 14:28 )             29.0     12-28    138  |  108<H>  |  10  ----------------------------<  100<H>  3.7   |  19<L>  |  0.67    Ca    8.1<L>      28 Dec 2023 14:28  Phos  3.1     12-28  Mg     1.60     12-28    TPro  5.2<L>  /  Alb  2.7<L>  /  TBili  0.3  /  DBili  x   /  AST  14  /  ALT  5   /  AlkPhos  113  12-28    PT/INR - ( 28 Dec 2023 14:28 )   PT: 19.3 sec;   INR: 1.75 ratio         PTT - ( 28 Dec 2023 14:28 )  PTT:27.8 sec  Urinalysis Basic - ( 28 Dec 2023 14:28 )    Color: x / Appearance: x / SG: x / pH: x  Gluc: 100 mg/dL / Ketone: x  / Bili: x / Urobili: x   Blood: x / Protein: x / Nitrite: x   Leuk Esterase: x / RBC: x / WBC x   Sq Epi: x / Non Sq Epi: x / Bacteria: x        CAPILLARY BLOOD GLUCOSE      POCT Blood Glucose.: 109 mg/dL (28 Dec 2023 21:21)           ANAYA JOINER 58y Female      Patient is a 58y old  Female who presents with a chief complaint of transfer from [place] for tertiary level care in the setting of diffuse body rash (28 Dec 2023 14:43)        INTERVAL HPI/OVERNIGHT EVENTS: No acute events overnight. Patient was seen and evaluated at the bedside. The patient notes abdominal pain and describes it as her stomach feeling twisted. Vitals stable. Patient denies fever/chills, chest pain, shortness of breath, headaches, nausea/vomiting.       PHYSICAL EXAM:  GENERAL: NAD  HEAD:  Normocephalic  EYES:  conjunctiva and sclera clear  ENMT: Moist mucous membranes  NECK: Supple  NERVOUS SYSTEM:  Alert, awake  CHEST/LUNG: Good air exchange bilaterally, no wheeze  HEART: Regular rate and rhythym  GI: soft, mild diffuse tenderness, non distended  ABD: diffusely tender to palpation, soft, nondistended         Vital Signs Last 24 Hrs  T(C): 36.5 (29 Dec 2023 05:31), Max: 36.9 (28 Dec 2023 17:45)  T(F): 97.7 (29 Dec 2023 05:31), Max: 98.4 (28 Dec 2023 17:45)  HR: 115 (29 Dec 2023 05:31) (86 - 115)  BP: 126/79 (29 Dec 2023 05:31) (103/73 - 126/79)  BP(mean): --  RR: 18 (29 Dec 2023 05:31) (17 - 18)  SpO2: 100% (29 Dec 2023 05:31) (100% - 100%)    Parameters below as of 29 Dec 2023 05:31  Patient On (Oxygen Delivery Method): room air          Consultant(s) Notes Reviewed:  [X] YES  [ ] NO  Care Discussed with Consultants/Other Providers [X] YES  [ ] NO  Imaging Personally Reviewed:  [X] YES  [ ] NO      LABS:                        9.5    8.21  )-----------( 486      ( 28 Dec 2023 14:28 )             29.0     12-28    138  |  108<H>  |  10  ----------------------------<  100<H>  3.7   |  19<L>  |  0.67    Ca    8.1<L>      28 Dec 2023 14:28  Phos  3.1     12-28  Mg     1.60     12-28    TPro  5.2<L>  /  Alb  2.7<L>  /  TBili  0.3  /  DBili  x   /  AST  14  /  ALT  5   /  AlkPhos  113  12-28    PT/INR - ( 28 Dec 2023 14:28 )   PT: 19.3 sec;   INR: 1.75 ratio         PTT - ( 28 Dec 2023 14:28 )  PTT:27.8 sec  Urinalysis Basic - ( 28 Dec 2023 14:28 )    Color: x / Appearance: x / SG: x / pH: x  Gluc: 100 mg/dL / Ketone: x  / Bili: x / Urobili: x   Blood: x / Protein: x / Nitrite: x   Leuk Esterase: x / RBC: x / WBC x   Sq Epi: x / Non Sq Epi: x / Bacteria: x        CAPILLARY BLOOD GLUCOSE      POCT Blood Glucose.: 109 mg/dL (28 Dec 2023 21:21)           ANAYA JOINER 58y Female      Patient is a 58y old  Female who presents with a chief complaint of transfer from [place] for tertiary level care in the setting of diffuse body rash (28 Dec 2023 14:43)        INTERVAL HPI/OVERNIGHT EVENTS: No acute events overnight. Patient was seen and evaluated at the bedside. The patient notes abdominal pain and describes it as her stomach feeling twisted. Vitals stable. Patient denies fever/chills, chest pain, shortness of breath, headaches, nausea/vomiting.       PHYSICAL EXAM:  GENERAL: NAD  HEAD:  Normocephalic  EYES:  conjunctiva and sclera clear  ENMT: Moist mucous membranes  NECK: Supple  NERVOUS SYSTEM:  Alert, awake  CHEST/LUNG: Good air exchange bilaterally, no wheeze  HEART: Regular rate and rhythym  GI: soft, mild diffuse tenderness, non distended  ABD: diffusely tender to palpation, soft, nondistended         Vital Signs Last 24 Hrs  T(C): 36.5 (29 Dec 2023 05:31), Max: 36.9 (28 Dec 2023 17:45)  T(F): 97.7 (29 Dec 2023 05:31), Max: 98.4 (28 Dec 2023 17:45)  HR: 115 (29 Dec 2023 05:31) (86 - 115)  BP: 126/79 (29 Dec 2023 05:31) (103/73 - 126/79)  BP(mean): --  RR: 18 (29 Dec 2023 05:31) (17 - 18)  SpO2: 100% (29 Dec 2023 05:31) (100% - 100%)    Parameters below as of 29 Dec 2023 05:31  Patient On (Oxygen Delivery Method): room air          Consultant(s) Notes Reviewed:  [X] YES  [ ] NO  Care Discussed with Consultants/Other Providers [X] YES  [ ] NO  Imaging Personally Reviewed:  [X] YES  [ ] NO      LABS:                        9.5    8.21  )-----------( 486      ( 28 Dec 2023 14:28 )             29.0     12-28    138  |  108<H>  |  10  ----------------------------<  100<H>  3.7   |  19<L>  |  0.67    Ca    8.1<L>      28 Dec 2023 14:28  Phos  3.1     12-28  Mg     1.60     12-28    TPro  5.2<L>  /  Alb  2.7<L>  /  TBili  0.3  /  DBili  x   /  AST  14  /  ALT  5   /  AlkPhos  113  12-28    PT/INR - ( 28 Dec 2023 14:28 )   PT: 19.3 sec;   INR: 1.75 ratio         PTT - ( 28 Dec 2023 14:28 )  PTT:27.8 sec  Urinalysis Basic - ( 28 Dec 2023 14:28 )    Color: x / Appearance: x / SG: x / pH: x  Gluc: 100 mg/dL / Ketone: x  / Bili: x / Urobili: x   Blood: x / Protein: x / Nitrite: x   Leuk Esterase: x / RBC: x / WBC x   Sq Epi: x / Non Sq Epi: x / Bacteria: x        CAPILLARY BLOOD GLUCOSE      POCT Blood Glucose.: 109 mg/dL (28 Dec 2023 21:21)           ANAYA JOINER 58y Female      Patient is a 58y old  Female who presents with a chief complaint of transfer from [place] for tertiary level care in the setting of diffuse body rash (28 Dec 2023 14:43)        INTERVAL HPI/OVERNIGHT EVENTS: No acute events overnight. Patient was seen and evaluated at the bedside. The patient notes abdominal pain and describes it as her stomach feeling twisted. Vitals stable. Patient denies fever/chills, chest pain, shortness of breath, headaches, nausea/vomiting.       PHYSICAL EXAM:  GENERAL: NAD  HEAD:  Normocephalic  EYES:  conjunctiva and sclera clear  ENMT: Moist mucous membranes  NECK: Supple  NERVOUS SYSTEM:  Alert, awake  CHEST/LUNG: Good air exchange bilaterally, no wheeze  HEART: Regular rate and rhythm  ABD: diffusely tender to palpation, soft, nondistended   EXT: no LE edema  SKIN: resolving echymotic patches on the extremities        Vital Signs Last 24 Hrs  T(C): 36.5 (29 Dec 2023 05:31), Max: 36.9 (28 Dec 2023 17:45)  T(F): 97.7 (29 Dec 2023 05:31), Max: 98.4 (28 Dec 2023 17:45)  HR: 115 (29 Dec 2023 05:31) (86 - 115)  BP: 126/79 (29 Dec 2023 05:31) (103/73 - 126/79)  BP(mean): --  RR: 18 (29 Dec 2023 05:31) (17 - 18)  SpO2: 100% (29 Dec 2023 05:31) (100% - 100%)    Parameters below as of 29 Dec 2023 05:31  Patient On (Oxygen Delivery Method): room air          Consultant(s) Notes Reviewed:  [X] YES  [ ] NO  Care Discussed with Consultants/Other Providers [X] YES  [ ] NO  Imaging Personally Reviewed:  [X] YES  [ ] NO      LABS:                        9.5    8.21  )-----------( 486      ( 28 Dec 2023 14:28 )             29.0     12-28    138  |  108<H>  |  10  ----------------------------<  100<H>  3.7   |  19<L>  |  0.67    Ca    8.1<L>      28 Dec 2023 14:28  Phos  3.1     12-28  Mg     1.60     12-28    TPro  5.2<L>  /  Alb  2.7<L>  /  TBili  0.3  /  DBili  x   /  AST  14  /  ALT  5   /  AlkPhos  113  12-28    PT/INR - ( 28 Dec 2023 14:28 )   PT: 19.3 sec;   INR: 1.75 ratio         PTT - ( 28 Dec 2023 14:28 )  PTT:27.8 sec  Urinalysis Basic - ( 28 Dec 2023 14:28 )    Color: x / Appearance: x / SG: x / pH: x  Gluc: 100 mg/dL / Ketone: x  / Bili: x / Urobili: x   Blood: x / Protein: x / Nitrite: x   Leuk Esterase: x / RBC: x / WBC x   Sq Epi: x / Non Sq Epi: x / Bacteria: x        CAPILLARY BLOOD GLUCOSE      POCT Blood Glucose.: 109 mg/dL (28 Dec 2023 21:21)           ANAYA JOINER 58y Female      Patient is a 58y old  Female who presents with a chief complaint of transfer from [place] for tertiary level care in the setting of diffuse body rash (28 Dec 2023 14:43)        INTERVAL HPI/OVERNIGHT EVENTS: No acute events overnight. Patient was seen and evaluated at the bedside. The patient notes abdominal pain and describes it as her stomach feeling twisted. Vitals stable. Patient denies fever/chills, chest pain, shortness of breath, headaches, nausea/vomiting.       PHYSICAL EXAM:  GENERAL: NAD  HEAD:  Normocephalic  EYES:  conjunctiva and sclera clear  ENMT: Moist mucous membranes  NECK: Supple  NERVOUS SYSTEM:  Alert, awake  CHEST/LUNG: Good air exchange bilaterally, no wheeze  HEART: Regular rate and rhythm  ABD: diffusely tender to palpation, soft, nondistended   EXT: no LE edema  SKIN: resolving echymotic patches on the extremities        Vital Signs Last 24 Hrs  T(C): 36.5 (29 Dec 2023 05:31), Max: 36.9 (28 Dec 2023 17:45)  T(F): 97.7 (29 Dec 2023 05:31), Max: 98.4 (28 Dec 2023 17:45)  HR: 115 (29 Dec 2023 05:31) (86 - 115)  BP: 126/79 (29 Dec 2023 05:31) (103/73 - 126/79)  BP(mean): --  RR: 18 (29 Dec 2023 05:31) (17 - 18)  SpO2: 100% (29 Dec 2023 05:31) (100% - 100%)    Parameters below as of 29 Dec 2023 05:31  Patient On (Oxygen Delivery Method): room air          Consultant(s) Notes Reviewed:  [X] YES  [ ] NO  Care Discussed with Consultants/Other Providers [X] YES  [ ] NO  Imaging Personally Reviewed:  [X] YES  [ ] NO      LABS:                        9.5    8.21  )-----------( 486      ( 28 Dec 2023 14:28 )             29.0     12-28    138  |  108<H>  |  10  ----------------------------<  100<H>  3.7   |  19<L>  |  0.67    Ca    8.1<L>      28 Dec 2023 14:28  Phos  3.1     12-28  Mg     1.60     12-28    TPro  5.2<L>  /  Alb  2.7<L>  /  TBili  0.3  /  DBili  x   /  AST  14  /  ALT  5   /  AlkPhos  113  12-28    PT/INR - ( 28 Dec 2023 14:28 )   PT: 19.3 sec;   INR: 1.75 ratio         PTT - ( 28 Dec 2023 14:28 )  PTT:27.8 sec  Urinalysis Basic - ( 28 Dec 2023 14:28 )    Color: x / Appearance: x / SG: x / pH: x  Gluc: 100 mg/dL / Ketone: x  / Bili: x / Urobili: x   Blood: x / Protein: x / Nitrite: x   Leuk Esterase: x / RBC: x / WBC x   Sq Epi: x / Non Sq Epi: x / Bacteria: x        CAPILLARY BLOOD GLUCOSE      POCT Blood Glucose.: 109 mg/dL (28 Dec 2023 21:21)

## 2023-12-29 NOTE — CONSULT NOTE ADULT - ASSESSMENT
Impression:   58 yrs old male w/ hx of CAD (not on anti-platelets), insomnia, DM, GERD, MDD, HTN, Ulcerative colitis, Bipolar disorder, cirrhosis (unknown etiology, NAFLD?) c/w HE, Afib (on Coumadin), and Blepharitis who initially presented to [facility] for hypotension, diffuse body rashes and pancolitis.     #Pancolitis  #Ulcerative colitis?   - Reported bloody stools w/ fecal incontinence worsening for the past one year. Underwent two colonoscopies on 11/2022 and 3/2023 but no records present. CT A/P showed thickening, hyperemia and mild inflammation throughout the colon. Does not follow w/ GI as o/p. No prior biologic use. Previously responded to steroids.   - Ideally would need colonoscopy report or repeat colonoscopy to confirm her hx of UC.   - GI PCR and c diff testing neg from 12/25.   - On oral mesalamine 400 mg TID.     #Cirrhosis?   - Unclear if the patient has cirrhosis b/c the CT imaging showed normal liver w/ no splenomegaly. Her INR is elevated in the setting of Coumadin use. She has thrombocytopenia in the setting of possible infection.   - Less concerned for cirrhosis based on the clinical picture, not been seen by hepatologist.   - On rifaximin and Aldactone per home meds.     Recommendations:   - Recommended the patient to get colonoscopy/flex sig to assess the inflammation but she declined it.         GI will continue to follow.     All recommendations are tentative until note is attested by an attending.     Moisés Colin, PGY-5  Gastroenterology/Hepatology Fellow  Available on [employer] Teams  [phone] (Short Range Pager)  [phone] (Long Range Pager)    After 5pm, please contact the on-call GI fellow. [phone]- Please obtain CRP levels.   - Obtain fecal calprotectin.        Impression:   58 yrs old male w/ hx of CAD (not on anti-platelets), insomnia, DM, GERD, MDD, HTN, Ulcerative colitis, Bipolar disorder, cirrhosis (unknown etiology, NAFLD?) c/w HE, Afib (on Coumadin), and Blepharitis who initially presented to [facility] for hypotension, diffuse body rashes and pancolitis.     #Pancolitis  #Ulcerative colitis?   - Reported bloody stools w/ fecal incontinence worsening for the past one year. Underwent two colonoscopies on 11/2022 and 3/2023 but no records present. CT A/P showed thickening, hyperemia and mild inflammation throughout the colon. Does not follow w/ GI as o/p. No prior biologic use. Previously responded to steroids.   - Ideally would need colonoscopy report or repeat colonoscopy to confirm her hx of UC.   - GI PCR and c diff testing neg from 12/25.   - On oral mesalamine 400 mg TID.     #Cirrhosis?   - Unclear if the patient has cirrhosis b/c the CT imaging showed normal liver w/ no splenomegaly. Her INR is elevated in the setting of Coumadin use. She has thrombocytopenia in the setting of possible infection.   - Less concerned for cirrhosis based on the clinical picture, not been seen by hepatologist.   - On rifaximin and Aldactone per home meds.     Recommendations:   - Recommended the patient to get colonoscopy/flex sig to assess the inflammation but she declined it.         GI will continue to follow.     All recommendations are tentative until note is attested by an attending.     Moisés Colin, PGY-5  Gastroenterology/Hepatology Fellow  Available on [employer] Teams  [phone] (Short Range Pager)  [phone] (Long Range Pager)    After 5pm, please contact the on-call GI fellow. [phone]- Please obtain CRP levels.   - Obtain fecal calprotectin.        Impression:   58 yrs old male w/ hx of CAD (not on anti-platelets), insomnia, DM, GERD, MDD, HTN, Ulcerative colitis, Bipolar disorder, cirrhosis (unknown etiology, NAFLD?) c/w HE, Afib (on Coumadin), and Blepharitis who initially presented to [facility] for hypotension, diffuse body rashes and pancolitis.     #Pancolitis  #Ulcerative colitis?   - Reported bloody stools w/ fecal incontinence worsening for the past one year. Underwent two colonoscopies on 11/2022 and 3/2023 but no records present. CT A/P showed thickening, hyperemia and mild inflammation throughout the colon. Does not follow w/ GI as o/p. No prior biologic use. Previously responded to steroids.   - Ideally would need colonoscopy report or repeat colonoscopy to confirm her hx of UC.   - GI PCR and c diff testing neg from 12/25.   - On oral mesalamine 400 mg TID.   - Concerned for UC flare, infectious work up neg.     #Cirrhosis?   - Unclear if the patient has cirrhosis b/c the CT imaging showed normal liver w/ no splenomegaly. Her INR is elevated in the setting of Coumadin use. She has thrombocytopenia in the setting of possible infection.   - Less concerned for cirrhosis based on the clinical picture, not been seen by hepatologist.   - On rifaximin and Aldactone per home meds.     Recommendations:   - Recommended the patient to get colonoscopy/flex sig to assess the inflammation but she declined it.   - Can start her on oral prednisone 40 mg daily if cleared by ID due to periorbital cellulitis.   - Continue with mesalamine 400 mg TID for now.   - Please obtain fecal calprotectin.   - Please obtain ESR and CRP daily.   - Please obtain hep B serologies.   - Please obtain Quantiferon TB testing, must be done before starting her on steroids.   - DVT PPx  - CBC daily.     GI will continue to follow.     All recommendations are tentative until note is attested by an attending.     Moisés Colin, PGY-5  Gastroenterology/Hepatology Fellow  Available on [employer] Teams  [phone] (Short Range Pager)  [phone] (Long Range Pager)    After 5pm, please contact the on-call GI fellow. [phone]- Please obtain CRP levels.   - Obtain fecal calprotectin.        Impression:   58 yrs old male w/ hx of CAD (not on anti-platelets), insomnia, DM, GERD, MDD, HTN, Ulcerative colitis, Bipolar disorder, cirrhosis (unknown etiology, NAFLD?) c/w HE, Afib (on Coumadin), and Blepharitis who initially presented to [facility] for hypotension, diffuse body rashes and pancolitis.     #Pancolitis  #Ulcerative colitis?   - Reported bloody stools w/ fecal incontinence worsening for the past one year. Underwent two colonoscopies on 11/2022 and 3/2023 but no records present. CT A/P showed thickening, hyperemia and mild inflammation throughout the colon. Does not follow w/ GI as o/p. No prior biologic use. Previously responded to steroids.   - Ideally would need colonoscopy report or repeat colonoscopy to confirm her hx of UC.   - GI PCR and c diff testing neg from 12/25.   - On oral mesalamine 400 mg TID.   - Concerned for UC flare, infectious work up neg.     #Cirrhosis?   - Unclear if the patient has cirrhosis b/c the CT imaging showed normal liver w/ no splenomegaly. Her INR is elevated in the setting of Coumadin use. She has thrombocytopenia in the setting of possible infection.   - Less concerned for cirrhosis based on the clinical picture, not been seen by hepatologist.   - On rifaximin and Aldactone per home meds.     Recommendations:   - Recommended the patient to get colonoscopy/flex sig to assess the inflammation but she declined it.   - Can start her on oral prednisone 40 mg daily if cleared by ID due to periorbital cellulitis.   - Continue with mesalamine 400 mg TID for now.   - Please obtain fecal calprotectin.   - Please obtain ESR and CRP daily.   - Please obtain hep B serologies.   - Please obtain Quantiferon TB testing, must be done before starting her on steroids.   - DVT PPx  - CBC daily.     GI will continue to follow.     All recommendations are tentative until note is attested by an attending.     Moisés Colin, PGY-5  Gastroenterology/Hepatology Fellow  Available on [employer] Teams  [phone] (Short Range Pager)  [phone] (Long Range Pager)    After 5pm, please contact the on-call GI fellow. [phone]- Please obtain CRP levels.   - Obtain fecal calprotectin.

## 2023-12-29 NOTE — CONSULT NOTE ADULT - ATTENDING COMMENTS
pt seen and examined by me personally   agree w/ above   she is aware of results  no indication for further rheum w/u  please call w/ questions or acute status updates via teams
Patient seen and examined with the GI fellow. I agree with the above assessment and plan. Thank you for allowing us to care for your patient.    UC flare. Start topical 5-asa and steroids. Start oral steroids once cleared by ID (given ongoing periorbital cellulitis).

## 2023-12-30 LAB
ALBUMIN SERPL ELPH-MCNC: 2.8 G/DL — LOW (ref 3.3–5)
ALBUMIN SERPL ELPH-MCNC: 2.8 G/DL — LOW (ref 3.3–5)
ALP SERPL-CCNC: 114 U/L — SIGNIFICANT CHANGE UP (ref 40–120)
ALP SERPL-CCNC: 114 U/L — SIGNIFICANT CHANGE UP (ref 40–120)
ALT FLD-CCNC: <5 U/L — SIGNIFICANT CHANGE UP (ref 4–33)
ALT FLD-CCNC: <5 U/L — SIGNIFICANT CHANGE UP (ref 4–33)
ANION GAP SERPL CALC-SCNC: 15 MMOL/L — HIGH (ref 7–14)
ANION GAP SERPL CALC-SCNC: 15 MMOL/L — HIGH (ref 7–14)
AST SERPL-CCNC: 10 U/L — SIGNIFICANT CHANGE UP (ref 4–32)
AST SERPL-CCNC: 10 U/L — SIGNIFICANT CHANGE UP (ref 4–32)
BILIRUB SERPL-MCNC: 0.2 MG/DL — SIGNIFICANT CHANGE UP (ref 0.2–1.2)
BILIRUB SERPL-MCNC: 0.2 MG/DL — SIGNIFICANT CHANGE UP (ref 0.2–1.2)
BUN SERPL-MCNC: 17 MG/DL — SIGNIFICANT CHANGE UP (ref 7–23)
BUN SERPL-MCNC: 17 MG/DL — SIGNIFICANT CHANGE UP (ref 7–23)
CALCIUM SERPL-MCNC: 8.2 MG/DL — LOW (ref 8.4–10.5)
CALCIUM SERPL-MCNC: 8.2 MG/DL — LOW (ref 8.4–10.5)
CALPROTECTIN STL-MCNT: 2050 UG/G — HIGH (ref 0–120)
CALPROTECTIN STL-MCNT: 2050 UG/G — HIGH (ref 0–120)
CHLORIDE SERPL-SCNC: 107 MMOL/L — SIGNIFICANT CHANGE UP (ref 98–107)
CHLORIDE SERPL-SCNC: 107 MMOL/L — SIGNIFICANT CHANGE UP (ref 98–107)
CO2 SERPL-SCNC: 18 MMOL/L — LOW (ref 22–31)
CO2 SERPL-SCNC: 18 MMOL/L — LOW (ref 22–31)
CREAT SERPL-MCNC: 0.74 MG/DL — SIGNIFICANT CHANGE UP (ref 0.5–1.3)
CREAT SERPL-MCNC: 0.74 MG/DL — SIGNIFICANT CHANGE UP (ref 0.5–1.3)
CRP SERPL-MCNC: 56.9 MG/L — HIGH
CRP SERPL-MCNC: 56.9 MG/L — HIGH
EGFR: 95 ML/MIN/1.73M2 — SIGNIFICANT CHANGE UP
EGFR: 95 ML/MIN/1.73M2 — SIGNIFICANT CHANGE UP
ERYTHROCYTE [SEDIMENTATION RATE] IN BLOOD: 11 MM/HR — SIGNIFICANT CHANGE UP (ref 4–25)
ERYTHROCYTE [SEDIMENTATION RATE] IN BLOOD: 11 MM/HR — SIGNIFICANT CHANGE UP (ref 4–25)
GLUCOSE BLDC GLUCOMTR-MCNC: 110 MG/DL — HIGH (ref 70–99)
GLUCOSE BLDC GLUCOMTR-MCNC: 110 MG/DL — HIGH (ref 70–99)
GLUCOSE BLDC GLUCOMTR-MCNC: 126 MG/DL — HIGH (ref 70–99)
GLUCOSE BLDC GLUCOMTR-MCNC: 126 MG/DL — HIGH (ref 70–99)
GLUCOSE BLDC GLUCOMTR-MCNC: 134 MG/DL — HIGH (ref 70–99)
GLUCOSE BLDC GLUCOMTR-MCNC: 134 MG/DL — HIGH (ref 70–99)
GLUCOSE BLDC GLUCOMTR-MCNC: 83 MG/DL — SIGNIFICANT CHANGE UP (ref 70–99)
GLUCOSE BLDC GLUCOMTR-MCNC: 83 MG/DL — SIGNIFICANT CHANGE UP (ref 70–99)
GLUCOSE SERPL-MCNC: 81 MG/DL — SIGNIFICANT CHANGE UP (ref 70–99)
GLUCOSE SERPL-MCNC: 81 MG/DL — SIGNIFICANT CHANGE UP (ref 70–99)
HAV IGM SER-ACNC: SIGNIFICANT CHANGE UP
HAV IGM SER-ACNC: SIGNIFICANT CHANGE UP
HBV CORE AB SER-ACNC: SIGNIFICANT CHANGE UP
HBV CORE AB SER-ACNC: SIGNIFICANT CHANGE UP
HBV CORE IGM SER-ACNC: SIGNIFICANT CHANGE UP
HBV CORE IGM SER-ACNC: SIGNIFICANT CHANGE UP
HBV SURFACE AB SER-ACNC: <3 MIU/ML — LOW
HBV SURFACE AB SER-ACNC: <3 MIU/ML — LOW
HBV SURFACE AG SER-ACNC: SIGNIFICANT CHANGE UP
HBV SURFACE AG SER-ACNC: SIGNIFICANT CHANGE UP
HCT VFR BLD CALC: 30.9 % — LOW (ref 34.5–45)
HCT VFR BLD CALC: 30.9 % — LOW (ref 34.5–45)
HCV AB S/CO SERPL IA: 0.08 S/CO — SIGNIFICANT CHANGE UP (ref 0–0.99)
HCV AB S/CO SERPL IA: 0.08 S/CO — SIGNIFICANT CHANGE UP (ref 0–0.99)
HCV AB SERPL-IMP: SIGNIFICANT CHANGE UP
HCV AB SERPL-IMP: SIGNIFICANT CHANGE UP
HGB BLD-MCNC: 10 G/DL — LOW (ref 11.5–15.5)
HGB BLD-MCNC: 10 G/DL — LOW (ref 11.5–15.5)
INR BLD: 1.56 RATIO — HIGH (ref 0.85–1.18)
INR BLD: 1.56 RATIO — HIGH (ref 0.85–1.18)
MAGNESIUM SERPL-MCNC: 1.7 MG/DL — SIGNIFICANT CHANGE UP (ref 1.6–2.6)
MAGNESIUM SERPL-MCNC: 1.7 MG/DL — SIGNIFICANT CHANGE UP (ref 1.6–2.6)
MCHC RBC-ENTMCNC: 30.5 PG — SIGNIFICANT CHANGE UP (ref 27–34)
MCHC RBC-ENTMCNC: 30.5 PG — SIGNIFICANT CHANGE UP (ref 27–34)
MCHC RBC-ENTMCNC: 32.4 GM/DL — SIGNIFICANT CHANGE UP (ref 32–36)
MCHC RBC-ENTMCNC: 32.4 GM/DL — SIGNIFICANT CHANGE UP (ref 32–36)
MCV RBC AUTO: 94.2 FL — SIGNIFICANT CHANGE UP (ref 80–100)
MCV RBC AUTO: 94.2 FL — SIGNIFICANT CHANGE UP (ref 80–100)
MELD SCORE WITH DIALYSIS: 25 POINTS — SIGNIFICANT CHANGE UP
MELD SCORE WITH DIALYSIS: 25 POINTS — SIGNIFICANT CHANGE UP
MELD SCORE WITHOUT DIALYSIS: 11 POINTS — SIGNIFICANT CHANGE UP
MELD SCORE WITHOUT DIALYSIS: 11 POINTS — SIGNIFICANT CHANGE UP
NRBC # BLD: 0 /100 WBCS — SIGNIFICANT CHANGE UP (ref 0–0)
NRBC # BLD: 0 /100 WBCS — SIGNIFICANT CHANGE UP (ref 0–0)
NRBC # FLD: 0 K/UL — SIGNIFICANT CHANGE UP (ref 0–0)
NRBC # FLD: 0 K/UL — SIGNIFICANT CHANGE UP (ref 0–0)
PHOSPHATE SERPL-MCNC: 3.3 MG/DL — SIGNIFICANT CHANGE UP (ref 2.5–4.5)
PHOSPHATE SERPL-MCNC: 3.3 MG/DL — SIGNIFICANT CHANGE UP (ref 2.5–4.5)
PLATELET # BLD AUTO: 485 K/UL — HIGH (ref 150–400)
PLATELET # BLD AUTO: 485 K/UL — HIGH (ref 150–400)
POTASSIUM SERPL-MCNC: 3.4 MMOL/L — LOW (ref 3.5–5.3)
POTASSIUM SERPL-MCNC: 3.4 MMOL/L — LOW (ref 3.5–5.3)
POTASSIUM SERPL-SCNC: 3.4 MMOL/L — LOW (ref 3.5–5.3)
POTASSIUM SERPL-SCNC: 3.4 MMOL/L — LOW (ref 3.5–5.3)
PROT SERPL-MCNC: 5.3 G/DL — LOW (ref 6–8.3)
PROT SERPL-MCNC: 5.3 G/DL — LOW (ref 6–8.3)
PROTHROM AB SERPL-ACNC: 17.2 SEC — HIGH (ref 9.5–13)
PROTHROM AB SERPL-ACNC: 17.2 SEC — HIGH (ref 9.5–13)
RBC # BLD: 3.28 M/UL — LOW (ref 3.8–5.2)
RBC # BLD: 3.28 M/UL — LOW (ref 3.8–5.2)
RBC # FLD: 14.6 % — HIGH (ref 10.3–14.5)
RBC # FLD: 14.6 % — HIGH (ref 10.3–14.5)
SODIUM SERPL-SCNC: 140 MMOL/L — SIGNIFICANT CHANGE UP (ref 135–145)
SODIUM SERPL-SCNC: 140 MMOL/L — SIGNIFICANT CHANGE UP (ref 135–145)
WBC # BLD: 7.81 K/UL — SIGNIFICANT CHANGE UP (ref 3.8–10.5)
WBC # BLD: 7.81 K/UL — SIGNIFICANT CHANGE UP (ref 3.8–10.5)
WBC # FLD AUTO: 7.81 K/UL — SIGNIFICANT CHANGE UP (ref 3.8–10.5)
WBC # FLD AUTO: 7.81 K/UL — SIGNIFICANT CHANGE UP (ref 3.8–10.5)

## 2023-12-30 PROCEDURE — 99232 SBSQ HOSP IP/OBS MODERATE 35: CPT

## 2023-12-30 PROCEDURE — 99232 SBSQ HOSP IP/OBS MODERATE 35: CPT | Mod: GC

## 2023-12-30 PROCEDURE — 93970 EXTREMITY STUDY: CPT | Mod: 26

## 2023-12-30 PROCEDURE — 99233 SBSQ HOSP IP/OBS HIGH 50: CPT | Mod: GC

## 2023-12-30 RX ORDER — POTASSIUM CHLORIDE 20 MEQ
40 PACKET (EA) ORAL ONCE
Refills: 0 | Status: COMPLETED | OUTPATIENT
Start: 2023-12-30 | End: 2023-12-30

## 2023-12-30 RX ORDER — SODIUM CHLORIDE 9 MG/ML
500 INJECTION, SOLUTION INTRAVENOUS ONCE
Refills: 0 | Status: COMPLETED | OUTPATIENT
Start: 2023-12-30 | End: 2023-12-30

## 2023-12-30 RX ORDER — SODIUM CHLORIDE 9 MG/ML
500 INJECTION, SOLUTION INTRAVENOUS
Refills: 0 | Status: DISCONTINUED | OUTPATIENT
Start: 2023-12-30 | End: 2023-12-30

## 2023-12-30 RX ORDER — SODIUM CHLORIDE 9 MG/ML
1000 INJECTION, SOLUTION INTRAVENOUS
Refills: 0 | Status: DISCONTINUED | OUTPATIENT
Start: 2023-12-30 | End: 2024-01-08

## 2023-12-30 RX ORDER — WARFARIN SODIUM 2.5 MG/1
6 TABLET ORAL ONCE
Refills: 0 | Status: COMPLETED | OUTPATIENT
Start: 2023-12-30 | End: 2023-12-30

## 2023-12-30 RX ADMIN — SODIUM CHLORIDE 100 MILLILITER(S): 9 INJECTION, SOLUTION INTRAVENOUS at 18:03

## 2023-12-30 RX ADMIN — SODIUM CHLORIDE 500 MILLILITER(S): 9 INJECTION, SOLUTION INTRAVENOUS at 07:12

## 2023-12-30 RX ADMIN — SPIRONOLACTONE 100 MILLIGRAM(S): 25 TABLET, FILM COATED ORAL at 06:23

## 2023-12-30 RX ADMIN — Medication 400 MILLIGRAM(S): at 22:31

## 2023-12-30 RX ADMIN — CARVEDILOL PHOSPHATE 3.12 MILLIGRAM(S): 80 CAPSULE, EXTENDED RELEASE ORAL at 17:57

## 2023-12-30 RX ADMIN — MIRTAZAPINE 30 MILLIGRAM(S): 45 TABLET, ORALLY DISINTEGRATING ORAL at 11:52

## 2023-12-30 RX ADMIN — Medication 400 MILLIGRAM(S): at 11:51

## 2023-12-30 RX ADMIN — Medication 40 MILLIEQUIVALENT(S): at 17:57

## 2023-12-30 RX ADMIN — PANTOPRAZOLE SODIUM 40 MILLIGRAM(S): 20 TABLET, DELAYED RELEASE ORAL at 06:23

## 2023-12-30 RX ADMIN — Medication 400 MILLIGRAM(S): at 06:23

## 2023-12-30 RX ADMIN — WARFARIN SODIUM 6 MILLIGRAM(S): 2.5 TABLET ORAL at 22:31

## 2023-12-30 RX ADMIN — Medication 1 MILLIGRAM(S): at 11:52

## 2023-12-30 NOTE — CONSULT NOTE ADULT - REASON FOR ADMISSION
transfer from [place] for tertiary level care in the setting of diffuse body rash transfer from [place] for tertiary level care in the setting of diffuse body rash

## 2023-12-30 NOTE — PROGRESS NOTE ADULT - REASON FOR ADMISSION
transfer from [place] for tertiary level care in the setting of diffuse body rash transfer from [place] for tertiary level care in the setting of diffuse body rash

## 2023-12-30 NOTE — PROGRESS NOTE ADULT - PROBLEM SELECTOR PLAN 3
Patient with history of ulcerative colitis found to have pancolitis   -continue mesalamine 400mg TID   -regular diet  -GI consulted, appreciate recs Patient with anemia with iron studies more consistent with anemia of chronic disease   -B12 and folate WNL   -patient had drop of Hgb to 6.9 at [place] prior to transfer, transfused with 1pRBC with response to 9.5  -continue to monitor   -transfuse for Hgb >7 Patient with anemia with iron studies more consistent with anemia of chronic disease   -B12 and folate WNL   -patient had drop of Hgb to 6.9 at [place] prior to transfer, transfused with 1pRBC with response to 9.5  -continue to monitor   -transfuse for Hgb >7

## 2023-12-30 NOTE — CONSULT NOTE ADULT - SUBJECTIVE AND OBJECTIVE BOX
[facility] DEPARTMENT OF OPHTHALMOLOGY - INITIAL ADULT CONSULT  ----------------------------------------------------------------------------------------------------  Rashaad Garza PGY-2  Available on teams  ----------------------------------------------------------------------------------------------------    HPI:  58 y F w PMHx of CAD, diabetes, insomnia, MDD, GERD, hypertension, irritable bowel syndrome with diarrhea, ulcerative colitis, cirrhosis, NAFLD, hepatic encephalopathy, bipolar disorder, chronic Afib on Coumadin, blepharitis of the left eye who initially presented to [facility] due to reported hypotension, tachycardia and diffuse purpura. Patient had a temperature of 99.3 with a heart rate of 116 and blood pressure of 88/60. At presentation to [facility] patient had a temperature of 101, septic work up was sent and patient was started on IV antibiotics and fluids. CT abdomen show pancolitis and patient was also found to have cellulitis of lids b/l R > L. Patient was deemed as requiring rheumatology, dermatology and opthalmology consults which were not available at [facility] so patient was transferred for further work up. Of note prior to transfer, yesterday patient's hemoglobin was 6.9 and patient was transfused with 1pRBC.     On presentation to [facility] patient notes that her rash has improved drastically from her initial presentation. She notes watery stool with blood with her last bowel movement occurring in the morning prior to her transfer and notes diffuse abdominal pain which she states is her baseline due to her ulcerative colitis.  (28 Dec 2023 14:43)    Interval History: Consulted as GI seeking clearance from ophtho for the patient to be started on steroids for UC given prior findings of preseptal cellulitis. Pt reports improvement of rash around eyes. Denies eye pain, vision loss, flashes, floaters or blurry vision.     Patient seen at bedside at 2:00PM on 12/30/23  and patient refused the use of any eye drops for ophthalmologic exam. I explained to patient that lack of dilated fundus exam may lead to delayed diagnosis of ocular pathology, delayed treatment, possible vision loss, and / or need for surgery. The patient voiced understanding and stated he accepts these risks.       Imaging:  CT ORBITS IC    CT BRAIN    PROCEDURE DATE:  12/22/2023    IMPRESSION:    CT head: No acute intracranial hemorrhage, mass effect, or evidence of   acute vascular territorial infarction. If clinical symptoms persist or   worsen, more sensitive evaluation with brain MRI may be obtained, if no   contraindications exist.    CT orbits: Nonspecific bilateral periorbital soft tissue swelling, right   greater than left, as well as right facial soft tissue swelling. No   discrete drainable fluid collection.    No evidence of post septal involvement/orbital cellulitis.    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 Neurosurgical   consultation is recommended.    PAST MEDICAL & SURGICAL HISTORY:  HTN (hypertension)  >5 years, not on any meds      HLD (hyperlipidemia)      CAD (coronary artery disease)  non onstructing as per patient, not on aspirin, had cardiac imaging done in [facility] 12/2013 due to family history      Lumbar herniated disc  L4-L5 , no sx yet.      UTI (urinary tract infection)  2014      Cholelithiasis      Current every day smoker      Anxiety  hospitalization x1 in psych in 2013      Clostridium difficile diarrhea  2-3 months ago was treated with vanco/flagyl outpatient , currently  free of diarrhea.      MDD (major depressive disorder)      GERD (gastroesophageal reflux disease)      Ulcerative colitis      HTN (hypertension)      DM (diabetes mellitus)      Arteriosclerotic heart disease (ASHD)      IBS (irritable bowel syndrome)      History of ataxia      Liver cirrhosis      Nonalcoholic fatty liver disease without nonalcoholic steatohepatitis (PATEL)      Hepatic encephalopathy      Bipolar illness      Chronic atrial fibrillation      Moderate protein-calorie malnutrition      OA (osteoarthritis)      Blepharitis, bilateral      Brain aneurysm      Uterine fibroid      H/O oral surgery  >5 years ago      H/O thumb surgery      History of cholecystectomy        Past Ocular History: None  Ophthalmic Medications: None  FAMILY HISTORY:  Family history of CABG (Mother)    Family history of heart disease (Father)        MEDICATIONS  (STANDING):  carvedilol 3.125 milliGRAM(s) Oral every 12 hours  dextrose 5%. 1000 milliLiter(s) (100 mL/Hr) IV Continuous <Continuous>  dextrose 5%. 1000 milliLiter(s) (50 mL/Hr) IV Continuous <Continuous>  dextrose 50% Injectable 25 Gram(s) IV Push once  dextrose 50% Injectable 12.5 Gram(s) IV Push once  dextrose 50% Injectable 25 Gram(s) IV Push once  folic acid 1 milliGRAM(s) Oral daily  glucagon  Injectable 1 milliGRAM(s) IntraMuscular once  influenza   Vaccine 0.5 milliLiter(s) IntraMuscular once  insulin lispro (ADMELOG) corrective regimen sliding scale   SubCutaneous three times a day before meals  insulin lispro (ADMELOG) corrective regimen sliding scale   SubCutaneous at bedtime  lactated ringers. 1000 milliLiter(s) (100 mL/Hr) IV Continuous <Continuous>  mesalamine DR Capsule 400 milliGRAM(s) Oral three times a day  mirtazapine 30 milliGRAM(s) Oral daily  pantoprazole    Tablet 40 milliGRAM(s) Oral before breakfast  rifAXIMin 550 milliGRAM(s) Oral two times a day  spironolactone 100 milliGRAM(s) Oral daily  warfarin 6 milliGRAM(s) Oral once    MEDICATIONS  (PRN):  acetaminophen     Tablet .. 650 milliGRAM(s) Oral every 6 hours PRN Temp greater or equal to 38C (100.4F), Mild Pain (1 - 3)  aluminum hydroxide/magnesium hydroxide/simethicone Suspension 30 milliLiter(s) Oral every 6 hours PRN Dyspepsia  dextrose Oral Gel 15 Gram(s) Oral once PRN Blood Glucose LESS THAN 70 milliGRAM(s)/deciliter    Allergies & Intolerances:   No Known Allergies    Review of Systems:  Constitutional: No fever, chills  Eyes: See HPI  Neuro: No tremors  Cardiovascular: No chest pain, palpitations  Respiratory: No SOB, no cough  GI: No nausea, vomiting, abdominal pain  : No dysuria  Skin: +rash  Psych: no depression  Endocrine: no polyuria, polydipsia  Heme/lymph: no swelling    VITALS: T(C): 37.2 (12-30-23 @ 17:02)  T(F): 99 (12-30-23 @ 17:02), Max: 99.7 (12-30-23 @ 06:20)  HR: 100 (12-30-23 @ 17:02) (97 - 100)  BP: 112/74 (12-30-23 @ 17:02) (90/41 - 112/74)  RR:  (18 - 19)  SpO2:  (95% - 97%)  Wt(kg): --  General: AAO x 3, appropriate mood and affect    Ophthalmology Exam:  Visual acuity (cc): 20/25 OD/OS  Pupils: PERRL OU, no APD  Ttono: KEILA as pt refuses proparacaine drops  Extraocular movements (EOMs): Full OU, no pain, no diplopia  Confrontational Visual Field (CVF): Full OU  Color Plates: 12/12 OU    Pen Light Exam (PLE)  External: tr erythema and dry skin around eyes  Lids/Lashes/Lacrimal Ducts: Flat OU    Sclera/Conjunctiva: W+Q OU  Cornea: Cl OU  Anterior Chamber: D+F OU    Iris: Flat OU  Lens: Cl OU    Patient seen at bedside at 2:00PM on 12/30/23  and patient refused the use of any eye drops for ophthalmologic exam. I explained to patient that lack of dilated fundus exam may lead to delayed diagnosis of ocular pathology, delayed treatment, possible vision loss, and / or need for surgery. The patient voiced understanding and stated he accepts these risks.  [facility] DEPARTMENT OF OPHTHALMOLOGY - INITIAL ADULT CONSULT  ----------------------------------------------------------------------------------------------------  Rashaad Garza PGY-2  Available on teams  ----------------------------------------------------------------------------------------------------    HPI:  58 y F w PMHx of CAD, diabetes, insomnia, MDD, GERD, hypertension, irritable bowel syndrome with diarrhea, ulcerative colitis, cirrhosis, NAFLD, hepatic encephalopathy, bipolar disorder, chronic Afib on Coumadin, blepharitis of the left eye who initially presented to [place] due to reported hypotension, tachycardia and diffuse purpura. Patient had a temperature of 99.3 with a heart rate of 116 and blood pressure of 88/60. At presentation to [place] patient had a temperature of 101, septic work up was sent and patient was started on IV antibiotics and fluids. CT abdomen show pancolitis and patient was also found to have cellulitis of lids b/l R > L. Patient was deemed as requiring rheumatology, dermatology and opthalmology consults which were not available at [place] so patient was transferred for further work up. Of note prior to transfer, yesterday patient's hemoglobin was 6.9 and patient was transfused with 1pRBC.     On presentation to [facility] patient notes that her rash has improved drastically from her initial presentation. She notes watery stool with blood with her last bowel movement occurring in the morning prior to her transfer and notes diffuse abdominal pain which she states is her baseline due to her ulcerative colitis.  (28 Dec 2023 14:43)    Interval History: Consulted as GI seeking clearance from ophtho for the patient to be started on steroids for UC given prior findings of preseptal cellulitis. Pt reports improvement of rash around eyes. Denies eye pain, vision loss, flashes, floaters or blurry vision.     Patient seen at bedside at 2:00PM on 12/30/23  and patient refused the use of any eye drops for ophthalmologic exam. I explained to patient that lack of dilated fundus exam may lead to delayed diagnosis of ocular pathology, delayed treatment, possible vision loss, and / or need for surgery. The patient voiced understanding and stated he accepts these risks.       Imaging:  CT ORBITS IC    CT BRAIN    PROCEDURE DATE:  12/22/2023    IMPRESSION:    CT head: No acute intracranial hemorrhage, mass effect, or evidence of   acute vascular territorial infarction. If clinical symptoms persist or   worsen, more sensitive evaluation with brain MRI may be obtained, if no   contraindications exist.    CT orbits: Nonspecific bilateral periorbital soft tissue swelling, right   greater than left, as well as right facial soft tissue swelling. No   discrete drainable fluid collection.    No evidence of post septal involvement/orbital cellulitis.    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 Neurosurgical   consultation is recommended.    PAST MEDICAL & SURGICAL HISTORY:  HTN (hypertension)  >5 years, not on any meds      HLD (hyperlipidemia)      CAD (coronary artery disease)  non onstructing as per patient, not on aspirin, had cardiac imaging done in [facility] 12/2013 due to family history      Lumbar herniated disc  L4-L5 , no sx yet.      UTI (urinary tract infection)  2014      Cholelithiasis      Current every day smoker      Anxiety  hospitalization x1 in psych in 2013      Clostridium difficile diarrhea  2-3 months ago was treated with vanco/flagyl outpatient , currently  free of diarrhea.      MDD (major depressive disorder)      GERD (gastroesophageal reflux disease)      Ulcerative colitis      HTN (hypertension)      DM (diabetes mellitus)      Arteriosclerotic heart disease (ASHD)      IBS (irritable bowel syndrome)      History of ataxia      Liver cirrhosis      Nonalcoholic fatty liver disease without nonalcoholic steatohepatitis (PATEL)      Hepatic encephalopathy      Bipolar illness      Chronic atrial fibrillation      Moderate protein-calorie malnutrition      OA (osteoarthritis)      Blepharitis, bilateral      Brain aneurysm      Uterine fibroid      H/O oral surgery  >5 years ago      H/O thumb surgery      History of cholecystectomy        Past Ocular History: None  Ophthalmic Medications: None  FAMILY HISTORY:  Family history of CABG (Mother)    Family history of heart disease (Father)        MEDICATIONS  (STANDING):  carvedilol 3.125 milliGRAM(s) Oral every 12 hours  dextrose 5%. 1000 milliLiter(s) (100 mL/Hr) IV Continuous <Continuous>  dextrose 5%. 1000 milliLiter(s) (50 mL/Hr) IV Continuous <Continuous>  dextrose 50% Injectable 25 Gram(s) IV Push once  dextrose 50% Injectable 12.5 Gram(s) IV Push once  dextrose 50% Injectable 25 Gram(s) IV Push once  folic acid 1 milliGRAM(s) Oral daily  glucagon  Injectable 1 milliGRAM(s) IntraMuscular once  influenza   Vaccine 0.5 milliLiter(s) IntraMuscular once  insulin lispro (ADMELOG) corrective regimen sliding scale   SubCutaneous three times a day before meals  insulin lispro (ADMELOG) corrective regimen sliding scale   SubCutaneous at bedtime  lactated ringers. 1000 milliLiter(s) (100 mL/Hr) IV Continuous <Continuous>  mesalamine DR Capsule 400 milliGRAM(s) Oral three times a day  mirtazapine 30 milliGRAM(s) Oral daily  pantoprazole    Tablet 40 milliGRAM(s) Oral before breakfast  rifAXIMin 550 milliGRAM(s) Oral two times a day  spironolactone 100 milliGRAM(s) Oral daily  warfarin 6 milliGRAM(s) Oral once    MEDICATIONS  (PRN):  acetaminophen     Tablet .. 650 milliGRAM(s) Oral every 6 hours PRN Temp greater or equal to 38C (100.4F), Mild Pain (1 - 3)  aluminum hydroxide/magnesium hydroxide/simethicone Suspension 30 milliLiter(s) Oral every 6 hours PRN Dyspepsia  dextrose Oral Gel 15 Gram(s) Oral once PRN Blood Glucose LESS THAN 70 milliGRAM(s)/deciliter    Allergies & Intolerances:   No Known Allergies    Review of Systems:  Constitutional: No fever, chills  Eyes: See HPI  Neuro: No tremors  Cardiovascular: No chest pain, palpitations  Respiratory: No SOB, no cough  GI: No nausea, vomiting, abdominal pain  : No dysuria  Skin: +rash  Psych: no depression  Endocrine: no polyuria, polydipsia  Heme/lymph: no swelling    VITALS: T(C): 37.2 (12-30-23 @ 17:02)  T(F): 99 (12-30-23 @ 17:02), Max: 99.7 (12-30-23 @ 06:20)  HR: 100 (12-30-23 @ 17:02) (97 - 100)  BP: 112/74 (12-30-23 @ 17:02) (90/41 - 112/74)  RR:  (18 - 19)  SpO2:  (95% - 97%)  Wt(kg): --  General: AAO x 3, appropriate mood and affect    Ophthalmology Exam:  Visual acuity (cc): 20/25 OD/OS  Pupils: PERRL OU, no APD  Ttono: KEILA as pt refuses proparacaine drops  Extraocular movements (EOMs): Full OU, no pain, no diplopia  Confrontational Visual Field (CVF): Full OU  Color Plates: 12/12 OU    Pen Light Exam (PLE)  External: tr erythema and dry skin around eyes  Lids/Lashes/Lacrimal Ducts: Flat OU    Sclera/Conjunctiva: W+Q OU  Cornea: Cl OU  Anterior Chamber: D+F OU    Iris: Flat OU  Lens: Cl OU    Patient seen at bedside at 2:00PM on 12/30/23  and patient refused the use of any eye drops for ophthalmologic exam. I explained to patient that lack of dilated fundus exam may lead to delayed diagnosis of ocular pathology, delayed treatment, possible vision loss, and / or need for surgery. The patient voiced understanding and stated he accepts these risks.

## 2023-12-30 NOTE — PROGRESS NOTE ADULT - ASSESSMENT
57 y/o F w/ pmh of cad, dm, mdd, gerd, IBS, cirrhosis 2/2 to nonalcoholic fatty liver, bipolar, afib on coumadin, today presented to [facility] hospital for hypotension and tachycardia with new diffuse purpura to the LE ongoing the last few days and today morning waking up by R>L orbital swelling. R>L orbital swelling. Febrile in the ED Tm 101.4F  CT orbits: Nonspecific bilateral periorbital soft tissue swelling, right greater than left, as well as right facial soft tissue swelling. No discrete drainable fluid collection. No evidence of post septal involvement/orbital cellulitis.  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 Neurosurgical consultation is recommended.  CT A/P: Mild pancolitis, of infectious or inflammatory etiology.  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 R>L orbital swelling. Febrile in the ED Tm 101.4F  CT orbits: Nonspecific bilateral periorbital soft tissue swelling, right greater than left, as well as right facial soft tissue swelling. No discrete drainable fluid collection. No evidence of post septal involvement/orbital cellulitis.  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 Neurosurgical consultation is recommended.  CT A/P: Mild pancolitis, of infectious or inflammatory etiology.  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

## 2023-12-30 NOTE — PROGRESS NOTE ADULT - ASSESSMENT
ASSESSMENT AND PLAN:  NOTE IS INCOMPLETE. PLEASE CHECK BACK LATER FOR FINAL ASSESSMENT AND RECOMMENDATIONS.    Differential favors resolving ecchymosis i/s/o coumadin use  - low clinical concern for infection  Thank you for allowing us to participate in the care of this pt. Dermatology to sign off at this time. Please notify us and re-consult as needed for new or concerning changes to skin exam.    The patient's chart was reviewed in addition to being seen and examined at bedside with the dermatology attending Dr. Dallas.  Recommendations were communicated with the primary team.  Please page [phone] with a 10-digit call-back number for further related questions.    Wale Cade MD  Resident Physician, PGY-2  [facility] Dermatology  Pager: [phone]  Office: [phone] ASSESSMENT AND PLAN:  NOTE IS INCOMPLETE. PLEASE CHECK BACK LATER FOR FINAL ASSESSMENT AND RECOMMENDATIONS.    Differential favors resolving ecchymosis i/s/o coumadin use  - low clinical concern for infection  Thank you for allowing us to participate in the care of this pt. Dermatology to sign off at this time. Please notify us and re-consult as needed for new or concerning changes to skin exam.    The patient's chart was reviewed in addition to being seen and examined at bedside with the dermatology attending Dr. Dallas.  Recommendations were communicated with the primary team.  Please page [phone] with a 10-digit call-back number for further related questions.    Wale Cade MD  Resident Physician, PGY-2  [facility] Dermatology  Pager: [phone]  Office: [phone] ASSESSMENT AND PLAN:  NOTE IS INCOMPLETE. PLEASE CHECK BACK LATER FOR FINAL ASSESSMENT AND RECOMMENDATIONS.    Differential favors resolving ecchymosis i/s/o coumadin use  - low clinical concern for infection, no contraindications for systemic steroids from a dermatologic perspective  Thank you for allowing us to participate in the care of this pt. Dermatology to sign off at this time. Please notify us and re-consult as needed for new or concerning changes to skin exam.    The patient's chart was reviewed in addition to being seen and examined at bedside with the dermatology attending Dr. Dallas.  Recommendations were communicated with the primary team.  Please page [phone] with a 10-digit call-back number for further related questions.    Wale Cade MD  Resident Physician, PGY-2  [facility] Dermatology  Pager: [phone]  Office: [phone] ASSESSMENT AND PLAN:  NOTE IS INCOMPLETE. PLEASE CHECK BACK LATER FOR FINAL ASSESSMENT AND RECOMMENDATIONS.    Differential favors resolving ecchymosis i/s/o coumadin use  - low clinical concern for infection, no contraindications for systemic steroids from a dermatologic perspective  Thank you for allowing us to participate in the care of this pt. Dermatology to sign off at this time. Please notify us and re-consult as needed for new or concerning changes to skin exam.    The patient's chart was reviewed in addition to being seen and examined at bedside with the dermatology attending Dr. Dallas.  Recommendations were communicated with the primary team.  Please page [phone] with a 10-digit call-back number for further related questions.    Wale Cade MD  Resident Physician, PGY-2  [facility] Dermatology  Pager: [phone]  Office: [phone] ASSESSMENT AND PLAN:    Differential favors resolving ecchymosis i/s/o coumadin use  - low clinical concern for infection, no contraindications for systemic steroids from a dermatologic perspective  Thank you for allowing us to participate in the care of this pt. Dermatology to sign off at this time. Please notify us and re-consult as needed for new or concerning changes to skin exam.    The patient's chart was reviewed in addition to being seen and examined at bedside with the dermatology attending Dr. Dallas.  Recommendations were communicated with the primary team.  Please page [phone] with a 10-digit call-back number for further related questions.    Wale Cade MD  Resident Physician, PGY-2  [facility] Dermatology  Pager: [phone]  Office: [phone] ASSESSMENT AND PLAN:    Differential favors resolving ecchymosis i/s/o coumadin use  - low clinical concern for infection, no contraindications for systemic steroids from a dermatologic perspective  Thank you for allowing us to participate in the care of this pt. Dermatology to sign off at this time. Please notify us and re-consult as needed for new or concerning changes to skin exam.    The patient's chart was reviewed in addition to being seen and examined at bedside with the dermatology attending Dr. Dallas.  Recommendations were communicated with the primary team.  Please page [phone] with a 10-digit call-back number for further related questions.    Wale Cade MD  Resident Physician, PGY-2  [facility] Dermatology  Pager: [phone]  Office: [phone]

## 2023-12-30 NOTE — PROGRESS NOTE ADULT - ATTENDING COMMENTS
GI following for hx of UC   Transferred from [place] for optho, derm, rheum eval   "Mild" pan colitis on CT scan   Liquid brown stool     If cleared by ID, optho, derm can start on oral prednisone   please send fecal ana luisa   trend crp   continue mesalamine   send quant gold and hep B serologies   DVT ppx     GI To follow, please call with questions GI following for hx of UC   Transferred from [place] for optho, derm, rheum eval   "Mild" pan colitis on CT scan   Liquid brown stool     If cleared by ID, optho, derm can start on oral prednisone   please send fecal ana luisa   trend crp   continue mesalamine   send quant gold and hep B serologies   DVT ppx     GI To follow, please call with questions

## 2023-12-30 NOTE — PROGRESS NOTE ADULT - ASSESSMENT
58 y F w PMHx of CAD, diabetes, insomnia, MDD, GERD, hypertension, irritable bowel syndrome with diarrhea, ulcerative colitis, cirrhosis, NAFLD, hepatic encephalopathy, bipolar disorder, chronic Afib on Coumadin, blepharitis of the left eye who initially presented to [facility] due to reported hypotension, tachycardia and diffuse purpura. Patient transferred to [facility] for further workup and consultations for her body rash.  58 y F w PMHx of CAD, diabetes, insomnia, MDD, GERD, hypertension, irritable bowel syndrome with diarrhea, ulcerative colitis, cirrhosis, NAFLD, hepatic encephalopathy, bipolar disorder, chronic Afib on Coumadin, blepharitis of the left eye who initially presented to [place] due to reported hypotension, tachycardia and diffuse purpura. Patient transferred to [facility] for further workup and consultations for her body rash.

## 2023-12-30 NOTE — PROGRESS NOTE ADULT - PROBLEM SELECTOR PLAN 4
Patient with anemia with iron studies more consistent with anemia of chronic disease   -B12 and folate WNL   -patient had drop of Hgb to 6.9 at [place] prior to transfer, transfused with 1pRBC with response to 9.5  -continue to monitor   -transfuse for Hgb >7 Patient with anemia with iron studies more consistent with anemia of chronic disease   -B12 and folate WNL   -patient had drop of Hgb to 6.9 at [place] prior to transfer, transfused with 1pRBC with response to 9.5  -continue to monitor   -transfuse for Hgb >7 Patient with history of liver cirrhosis   -start rifaximin 550mg BID  -spironolactone 100mg daily

## 2023-12-30 NOTE — PROGRESS NOTE ADULT - SUBJECTIVE AND OBJECTIVE BOX
HPI:  58 y F w PMHx of CAD, diabetes, insomnia, MDD, GERD, hypertension, irritable bowel syndrome with diarrhea, ulcerative colitis, cirrhosis, NAFLD, hepatic encephalopathy, bipolar disorder, chronic Afib on Coumadin, blepharitis of the left eye who initially presented to [place] due to reported hypotension, tachycardia and diffuse purpura. Patient had a temperature of 99.3 with a heart rate of 116 and blood pressure of 88/60. At presentation to [place] patient had a temperature of 101, septic work up was sent and patient was started on IV antibiotics and fluids. CT abdomen show pancolitis and patient was also found to have cellulitis of lids b/l R > L. Patient was deemed as requiring rheumatology, dermatology and opthalmology consults which were not available at [place] so patient was transferred for further work up. Of note prior to transfer, yesterday patient's hemoglobin was 6.9 and patient was transfused with 1pRBC.     On presentation to [facility] patient notes that her rash has improved drastically from her initial presentation. She notes watery stool with blood with her last bowel movement occurring in the morning prior to her transfer and notes diffuse abdominal pain which she states is her baseline due to her ulcerative colitis.  (28 Dec 2023 14:43)    dermhx; seen 12/28 with light grecia-ocular ecchymotic patches that are favored to be i/s/o coumadin use. Dermatology reconsulted per GI request for starting steroids in setting of UC flare for c/f infection.     PAST MEDICAL & SURGICAL HISTORY:  HTN (hypertension)  >5 years, not on any meds      HLD (hyperlipidemia)      CAD (coronary artery disease)  non onstructing as per patient, not on aspirin, had cardiac imaging done in [facility] 12/2013 due to family history      Lumbar herniated disc  L4-L5 , no sx yet.      UTI (urinary tract infection)  2014      Cholelithiasis      Current every day smoker      Anxiety  hospitalization x1 in psych in 2013      Clostridium difficile diarrhea  2-3 months ago was treated with vanco/flagyl outpatient , currently  free of diarrhea.      MDD (major depressive disorder)      GERD (gastroesophageal reflux disease)      Ulcerative colitis      HTN (hypertension)      DM (diabetes mellitus)      Arteriosclerotic heart disease (ASHD)      IBS (irritable bowel syndrome)      History of ataxia      Liver cirrhosis      Nonalcoholic fatty liver disease without nonalcoholic steatohepatitis (PATEL)      Hepatic encephalopathy      Bipolar illness      Chronic atrial fibrillation      Moderate protein-calorie malnutrition      OA (osteoarthritis)      Blepharitis, bilateral      Brain aneurysm      Uterine fibroid      H/O oral surgery  >5 years ago      H/O thumb surgery      History of cholecystectomy          REVIEW OF SYSTEMS:  General: no fevers/chills; no lethargy  Skin/Breast: see HPI  Ophthalmologic: see HPI  ENT: no dysphagia or changes in hearing  Respiratory: no SOB or cough  Cardiovascular: no palpitations or chest pain  Gastrointestinal: see HPI  Genitourinary: no dysuria or frequency  Musculoskeletal: no joint pains or weakness  Neurological: no weakness, numbness, or tingling    MEDICATIONS  (STANDING):  carvedilol 3.125 milliGRAM(s) Oral every 12 hours  dextrose 5%. 1000 milliLiter(s) (50 mL/Hr) IV Continuous <Continuous>  dextrose 5%. 1000 milliLiter(s) (100 mL/Hr) IV Continuous <Continuous>  dextrose 50% Injectable 12.5 Gram(s) IV Push once  dextrose 50% Injectable 25 Gram(s) IV Push once  dextrose 50% Injectable 25 Gram(s) IV Push once  folic acid 1 milliGRAM(s) Oral daily  glucagon  Injectable 1 milliGRAM(s) IntraMuscular once  influenza   Vaccine 0.5 milliLiter(s) IntraMuscular once  insulin lispro (ADMELOG) corrective regimen sliding scale   SubCutaneous at bedtime  insulin lispro (ADMELOG) corrective regimen sliding scale   SubCutaneous three times a day before meals  lactated ringers. 1000 milliLiter(s) (100 mL/Hr) IV Continuous <Continuous>  mesalamine DR Capsule 400 milliGRAM(s) Oral three times a day  mirtazapine 30 milliGRAM(s) Oral daily  pantoprazole    Tablet 40 milliGRAM(s) Oral before breakfast  potassium chloride   Powder 40 milliEquivalent(s) Oral once  rifAXIMin 550 milliGRAM(s) Oral two times a day  spironolactone 100 milliGRAM(s) Oral daily    MEDICATIONS  (PRN):  acetaminophen     Tablet .. 650 milliGRAM(s) Oral every 6 hours PRN Temp greater or equal to 38C (100.4F), Mild Pain (1 - 3)  dextrose Oral Gel 15 Gram(s) Oral once PRN Blood Glucose LESS THAN 70 milliGRAM(s)/deciliter      Allergies    No Known Allergies    Intolerances        SOCIAL HISTORY:     hx smoking  non-smoker    FAMILY HISTORY:  Family history of CABG (Mother)    Family history of heart disease (Father)        Vital Signs Last 24 Hrs  T(C): 37.6 (30 Dec 2023 06:20), Max: 37.6 (30 Dec 2023 06:20)  T(F): 99.7 (30 Dec 2023 06:20), Max: 99.7 (30 Dec 2023 06:20)  HR: 97 (30 Dec 2023 06:20) (91 - 97)  BP: 90/41 (30 Dec 2023 06:20) (90/41 - 104/61)  BP(mean): --  RR: 18 (30 Dec 2023 06:20) (18 - 18)  SpO2: 97% (30 Dec 2023 06:20) (97% - 98%)    Parameters below as of 30 Dec 2023 06:20  Patient On (Oxygen Delivery Method): room air        PHYSICAL EXAM:  The patient was AOx3, well nourished, and in NAD.  OP showed no ulcerations.  There was no visible LAD.  Conjunctiva were non-injected.  There was no clubbing or edema of extremities.   The scalp, hair, face, eyebrows, lips, OP, neck, chest, back, extremities x4, and nails were examined.  There was no hyperhidrosis or bromhidrosis.     Of note on skin exam:     LABS:                        10.0   7.81  )-----------( 485      ( 30 Dec 2023 05:52 )             30.9     12-30    140  |  107  |  17  ----------------------------<  81  3.4<L>   |  18<L>  |  0.74    Ca    8.2<L>      30 Dec 2023 05:52  Phos  3.3     12-30  Mg     1.70     12-30    TPro  5.3<L>  /  Alb  2.8<L>  /  TBili  0.2  /  DBili  x   /  AST  10  /  ALT  <5  /  AlkPhos  114  12-30    PT/INR - ( 30 Dec 2023 05:52 )   PT: 17.2 sec;   INR: 1.56 ratio         PTT - ( 28 Dec 2023 14:28 )  PTT:27.8 sec  Urinalysis Basic - ( 30 Dec 2023 05:52 )    Color: x / Appearance: x / SG: x / pH: x  Gluc: 81 mg/dL / Ketone: x  / Bili: x / Urobili: x   Blood: x / Protein: x / Nitrite: x   Leuk Esterase: x / RBC: x / WBC x   Sq Epi: x / Non Sq Epi: x / Bacteria: x        RADIOLOGY & ADDITIONAL STUDIES: reviewed; no pertinent findings HPI:  58 y F w PMHx of CAD, diabetes, insomnia, MDD, GERD, hypertension, irritable bowel syndrome with diarrhea, ulcerative colitis, cirrhosis, NAFLD, hepatic encephalopathy, bipolar disorder, chronic Afib on Coumadin, blepharitis of the left eye who initially presented to [place] due to reported hypotension, tachycardia and diffuse purpura. Patient had a temperature of 99.3 with a heart rate of 116 and blood pressure of 88/60. At presentation to [place] patient had a temperature of 101, septic work up was sent and patient was started on IV antibiotics and fluids. CT abdomen show pancolitis and patient was also found to have cellulitis of lids b/l R > L. Patient was deemed as requiring rheumatology, dermatology and opthalmology consults which were not available at [place] so patient was transferred for further work up. Of note prior to transfer, yesterday patient's hemoglobin was 6.9 and patient was transfused with 1pRBC.     On presentation to [facility] patient notes that her rash has improved drastically from her initial presentation. She notes watery stool with blood with her last bowel movement occurring in the morning prior to her transfer and notes diffuse abdominal pain which she states is her baseline due to her ulcerative colitis.  (28 Dec 2023 14:43)    dermhx; seen 12/28 with light grecia-ocular ecchymotic patches that are favored to be i/s/o coumadin use. Dermatology reconsulted per GI request for starting steroids in setting of UC flare for c/f infection.     PAST MEDICAL & SURGICAL HISTORY:  HTN (hypertension)  >5 years, not on any meds      HLD (hyperlipidemia)      CAD (coronary artery disease)  non onstructing as per patient, not on aspirin, had cardiac imaging done in [facility] 12/2013 due to family history      Lumbar herniated disc  L4-L5 , no sx yet.      UTI (urinary tract infection)  2014      Cholelithiasis      Current every day smoker      Anxiety  hospitalization x1 in [facility] in 2013      Clostridium difficile diarrhea  2-3 months ago was treated with vanco/flagyl outpatient , currently  free of diarrhea.      MDD (major depressive disorder)      GERD (gastroesophageal reflux disease)      Ulcerative colitis      HTN (hypertension)      DM (diabetes mellitus)      Arteriosclerotic heart disease (ASHD)      IBS (irritable bowel syndrome)      History of ataxia      Liver cirrhosis      Nonalcoholic fatty liver disease without nonalcoholic steatohepatitis (PATEL)      Hepatic encephalopathy      Bipolar illness      Chronic atrial fibrillation      Moderate protein-calorie malnutrition      OA (osteoarthritis)      Blepharitis, bilateral      Brain aneurysm      Uterine fibroid      H/O oral surgery  >5 years ago      H/O thumb surgery      History of cholecystectomy          REVIEW OF SYSTEMS:  General: no fevers/chills; no lethargy  Skin/Breast: see HPI  Ophthalmologic: see HPI  ENT: no dysphagia or changes in hearing  Respiratory: no SOB or cough  Cardiovascular: no palpitations or chest pain  Gastrointestinal: see HPI  Genitourinary: no dysuria or frequency  Musculoskeletal: no joint pains or weakness  Neurological: no weakness, numbness, or tingling    MEDICATIONS  (STANDING):  carvedilol 3.125 milliGRAM(s) Oral every 12 hours  dextrose 5%. 1000 milliLiter(s) (50 mL/Hr) IV Continuous <Continuous>  dextrose 5%. 1000 milliLiter(s) (100 mL/Hr) IV Continuous <Continuous>  dextrose 50% Injectable 12.5 Gram(s) IV Push once  dextrose 50% Injectable 25 Gram(s) IV Push once  dextrose 50% Injectable 25 Gram(s) IV Push once  folic acid 1 milliGRAM(s) Oral daily  glucagon  Injectable 1 milliGRAM(s) IntraMuscular once  influenza   Vaccine 0.5 milliLiter(s) IntraMuscular once  insulin lispro (ADMELOG) corrective regimen sliding scale   SubCutaneous at bedtime  insulin lispro (ADMELOG) corrective regimen sliding scale   SubCutaneous three times a day before meals  lactated ringers. 1000 milliLiter(s) (100 mL/Hr) IV Continuous <Continuous>  mesalamine DR Capsule 400 milliGRAM(s) Oral three times a day  mirtazapine 30 milliGRAM(s) Oral daily  pantoprazole    Tablet 40 milliGRAM(s) Oral before breakfast  potassium chloride   Powder 40 milliEquivalent(s) Oral once  rifAXIMin 550 milliGRAM(s) Oral two times a day  spironolactone 100 milliGRAM(s) Oral daily    MEDICATIONS  (PRN):  acetaminophen     Tablet .. 650 milliGRAM(s) Oral every 6 hours PRN Temp greater or equal to 38C (100.4F), Mild Pain (1 - 3)  dextrose Oral Gel 15 Gram(s) Oral once PRN Blood Glucose LESS THAN 70 milliGRAM(s)/deciliter      Allergies    No Known Allergies    Intolerances        SOCIAL HISTORY:     hx smoking  non-smoker    FAMILY HISTORY:  Family history of CABG (Mother)    Family history of heart disease (Father)        Vital Signs Last 24 Hrs  T(C): 37.6 (30 Dec 2023 06:20), Max: 37.6 (30 Dec 2023 06:20)  T(F): 99.7 (30 Dec 2023 06:20), Max: 99.7 (30 Dec 2023 06:20)  HR: 97 (30 Dec 2023 06:20) (91 - 97)  BP: 90/41 (30 Dec 2023 06:20) (90/41 - 104/61)  BP(mean): --  RR: 18 (30 Dec 2023 06:20) (18 - 18)  SpO2: 97% (30 Dec 2023 06:20) (97% - 98%)    Parameters below as of 30 Dec 2023 06:20  Patient On (Oxygen Delivery Method): room air        PHYSICAL EXAM:  The patient was AOx3, well nourished, and in NAD.  OP showed no ulcerations.  There was no visible LAD.  Conjunctiva were non-injected.  There was no clubbing or edema of extremities.   The scalp, hair, face, eyebrows, lips, OP, neck, chest, back, extremities x4, and nails were examined.  There was no hyperhidrosis or bromhidrosis.     Of note on skin exam:     LABS:                        10.0   7.81  )-----------( 485      ( 30 Dec 2023 05:52 )             30.9     12-30    140  |  107  |  17  ----------------------------<  81  3.4<L>   |  18<L>  |  0.74    Ca    8.2<L>      30 Dec 2023 05:52  Phos  3.3     12-30  Mg     1.70     12-30    TPro  5.3<L>  /  Alb  2.8<L>  /  TBili  0.2  /  DBili  x   /  AST  10  /  ALT  <5  /  AlkPhos  114  12-30    PT/INR - ( 30 Dec 2023 05:52 )   PT: 17.2 sec;   INR: 1.56 ratio         PTT - ( 28 Dec 2023 14:28 )  PTT:27.8 sec  Urinalysis Basic - ( 30 Dec 2023 05:52 )    Color: x / Appearance: x / SG: x / pH: x  Gluc: 81 mg/dL / Ketone: x  / Bili: x / Urobili: x   Blood: x / Protein: x / Nitrite: x   Leuk Esterase: x / RBC: x / WBC x   Sq Epi: x / Non Sq Epi: x / Bacteria: x        RADIOLOGY & ADDITIONAL STUDIES: reviewed; no pertinent findings HPI:  58 y F w PMHx of CAD, diabetes, insomnia, MDD, GERD, hypertension, irritable bowel syndrome with diarrhea, ulcerative colitis, cirrhosis, NAFLD, hepatic encephalopathy, bipolar disorder, chronic Afib on Coumadin, blepharitis of the left eye who initially presented to [place] due to reported hypotension, tachycardia and diffuse purpura. Patient had a temperature of 99.3 with a heart rate of 116 and blood pressure of 88/60. At presentation to [place] patient had a temperature of 101, septic work up was sent and patient was started on IV antibiotics and fluids. CT abdomen show pancolitis and patient was also found to have cellulitis of lids b/l R > L. Patient was deemed as requiring rheumatology, dermatology and opthalmology consults which were not available at [place] so patient was transferred for further work up. Of note prior to transfer, yesterday patient's hemoglobin was 6.9 and patient was transfused with 1pRBC.     On presentation to [facility] patient notes that her rash has improved drastically from her initial presentation. She notes watery stool with blood with her last bowel movement occurring in the morning prior to her transfer and notes diffuse abdominal pain which she states is her baseline due to her ulcerative colitis.  (28 Dec 2023 14:43)    dermhx; seen 12/28 with light grecia-ocular ecchymotic patches that are favored to be i/s/o coumadin use.   Dermatology reconsulted per GI request for starting steroids in setting of UC flare for c/f infection.     PAST MEDICAL & SURGICAL HISTORY:  HTN (hypertension)  >5 years, not on any meds      HLD (hyperlipidemia)      CAD (coronary artery disease)  non onstructing as per patient, not on aspirin, had cardiac imaging done in [facility] 12/2013 due to family history      Lumbar herniated disc  L4-L5 , no sx yet.      UTI (urinary tract infection)  2014      Cholelithiasis      Current every day smoker      Anxiety  hospitalization x1 in psych in 2013      Clostridium difficile diarrhea  2-3 months ago was treated with vanco/flagyl outpatient , currently  free of diarrhea.      MDD (major depressive disorder)      GERD (gastroesophageal reflux disease)      Ulcerative colitis      HTN (hypertension)      DM (diabetes mellitus)      Arteriosclerotic heart disease (ASHD)      IBS (irritable bowel syndrome)      History of ataxia      Liver cirrhosis      Nonalcoholic fatty liver disease without nonalcoholic steatohepatitis (PATEL)      Hepatic encephalopathy      Bipolar illness      Chronic atrial fibrillation      Moderate protein-calorie malnutrition      OA (osteoarthritis)      Blepharitis, bilateral      Brain aneurysm      Uterine fibroid      H/O oral surgery  >5 years ago      H/O thumb surgery      History of cholecystectomy          REVIEW OF SYSTEMS:  General: no fevers/chills; no lethargy  Skin/Breast: see HPI  Ophthalmologic: see HPI  ENT: no dysphagia or changes in hearing  Respiratory: no SOB or cough  Cardiovascular: no palpitations or chest pain  Gastrointestinal: see HPI  Genitourinary: no dysuria or frequency  Musculoskeletal: no joint pains or weakness  Neurological: no weakness, numbness, or tingling    MEDICATIONS  (STANDING):  carvedilol 3.125 milliGRAM(s) Oral every 12 hours  dextrose 5%. 1000 milliLiter(s) (50 mL/Hr) IV Continuous <Continuous>  dextrose 5%. 1000 milliLiter(s) (100 mL/Hr) IV Continuous <Continuous>  dextrose 50% Injectable 12.5 Gram(s) IV Push once  dextrose 50% Injectable 25 Gram(s) IV Push once  dextrose 50% Injectable 25 Gram(s) IV Push once  folic acid 1 milliGRAM(s) Oral daily  glucagon  Injectable 1 milliGRAM(s) IntraMuscular once  influenza   Vaccine 0.5 milliLiter(s) IntraMuscular once  insulin lispro (ADMELOG) corrective regimen sliding scale   SubCutaneous at bedtime  insulin lispro (ADMELOG) corrective regimen sliding scale   SubCutaneous three times a day before meals  lactated ringers. 1000 milliLiter(s) (100 mL/Hr) IV Continuous <Continuous>  mesalamine DR Capsule 400 milliGRAM(s) Oral three times a day  mirtazapine 30 milliGRAM(s) Oral daily  pantoprazole    Tablet 40 milliGRAM(s) Oral before breakfast  potassium chloride   Powder 40 milliEquivalent(s) Oral once  rifAXIMin 550 milliGRAM(s) Oral two times a day  spironolactone 100 milliGRAM(s) Oral daily    MEDICATIONS  (PRN):  acetaminophen     Tablet .. 650 milliGRAM(s) Oral every 6 hours PRN Temp greater or equal to 38C (100.4F), Mild Pain (1 - 3)  dextrose Oral Gel 15 Gram(s) Oral once PRN Blood Glucose LESS THAN 70 milliGRAM(s)/deciliter      Allergies    No Known Allergies    Intolerances        SOCIAL HISTORY:     hx smoking  non-smoker    FAMILY HISTORY:  Family history of CABG (Mother)    Family history of heart disease (Father)        Vital Signs Last 24 Hrs  T(C): 37.6 (30 Dec 2023 06:20), Max: 37.6 (30 Dec 2023 06:20)  T(F): 99.7 (30 Dec 2023 06:20), Max: 99.7 (30 Dec 2023 06:20)  HR: 97 (30 Dec 2023 06:20) (91 - 97)  BP: 90/41 (30 Dec 2023 06:20) (90/41 - 104/61)  BP(mean): --  RR: 18 (30 Dec 2023 06:20) (18 - 18)  SpO2: 97% (30 Dec 2023 06:20) (97% - 98%)    Parameters below as of 30 Dec 2023 06:20  Patient On (Oxygen Delivery Method): room air        PHYSICAL EXAM:  The patient was AOx3, well nourished, and in NAD.  OP showed no ulcerations.  There was no visible LAD.  Conjunctiva were non-injected.  There was no clubbing or edema of extremities.   The scalp, hair, face, eyebrows, lips, OP, neck, chest, back, extremities x4, and nails were examined.  There was no hyperhidrosis or bromhidrosis.     Of note on skin exam: very faint grecia-ocular ecchymotic patches  resolving ecchymotic patches on the upper and lower extremities     LABS:                        10.0   7.81  )-----------( 485      ( 30 Dec 2023 05:52 )             30.9     12-30    140  |  107  |  17  ----------------------------<  81  3.4<L>   |  18<L>  |  0.74    Ca    8.2<L>      30 Dec 2023 05:52  Phos  3.3     12-30  Mg     1.70     12-30    TPro  5.3<L>  /  Alb  2.8<L>  /  TBili  0.2  /  DBili  x   /  AST  10  /  ALT  <5  /  AlkPhos  114  12-30    PT/INR - ( 30 Dec 2023 05:52 )   PT: 17.2 sec;   INR: 1.56 ratio         PTT - ( 28 Dec 2023 14:28 )  PTT:27.8 sec  Urinalysis Basic - ( 30 Dec 2023 05:52 )    Color: x / Appearance: x / SG: x / pH: x  Gluc: 81 mg/dL / Ketone: x  / Bili: x / Urobili: x   Blood: x / Protein: x / Nitrite: x   Leuk Esterase: x / RBC: x / WBC x   Sq Epi: x / Non Sq Epi: x / Bacteria: x        RADIOLOGY & ADDITIONAL STUDIES: reviewed; no pertinent findings HPI:  58 y F w PMHx of CAD, diabetes, insomnia, MDD, GERD, hypertension, irritable bowel syndrome with diarrhea, ulcerative colitis, cirrhosis, NAFLD, hepatic encephalopathy, bipolar disorder, chronic Afib on Coumadin, blepharitis of the left eye who initially presented to [place] due to reported hypotension, tachycardia and diffuse purpura. Patient had a temperature of 99.3 with a heart rate of 116 and blood pressure of 88/60. At presentation to [place] patient had a temperature of 101, septic work up was sent and patient was started on IV antibiotics and fluids. CT abdomen show pancolitis and patient was also found to have cellulitis of lids b/l R > L. Patient was deemed as requiring rheumatology, dermatology and opthalmology consults which were not available at [place] so patient was transferred for further work up. Of note prior to transfer, yesterday patient's hemoglobin was 6.9 and patient was transfused with 1pRBC.     On presentation to [facility] patient notes that her rash has improved drastically from her initial presentation. She notes watery stool with blood with her last bowel movement occurring in the morning prior to her transfer and notes diffuse abdominal pain which she states is her baseline due to her ulcerative colitis.  (28 Dec 2023 14:43)    dermhx; seen 12/28 with light grecia-ocular ecchymotic patches that are favored to be i/s/o coumadin use.   Dermatology reconsulted per GI request for starting steroids in setting of UC flare for c/f infection.     PAST MEDICAL & SURGICAL HISTORY:  HTN (hypertension)  >5 years, not on any meds      HLD (hyperlipidemia)      CAD (coronary artery disease)  non onstructing as per patient, not on aspirin, had cardiac imaging done in [facility] 12/2013 due to family history      Lumbar herniated disc  L4-L5 , no sx yet.      UTI (urinary tract infection)  2014      Cholelithiasis      Current every day smoker      Anxiety  hospitalization x1 in [facility] in [facility]      Clostridium difficile diarrhea  2-3 months ago was treated with vanco/flagyl outpatient , currently  free of diarrhea.      MDD (major depressive disorder)      GERD (gastroesophageal reflux disease)      Ulcerative colitis      HTN (hypertension)      DM (diabetes mellitus)      Arteriosclerotic heart disease (ASHD)      IBS (irritable bowel syndrome)      History of ataxia      Liver cirrhosis      Nonalcoholic fatty liver disease without nonalcoholic steatohepatitis (PATEL)      Hepatic encephalopathy      Bipolar illness      Chronic atrial fibrillation      Moderate protein-calorie malnutrition      OA (osteoarthritis)      Blepharitis, bilateral      Brain aneurysm      Uterine fibroid      H/O oral surgery  >5 years ago      H/O thumb surgery      History of cholecystectomy          REVIEW OF SYSTEMS:  General: no fevers/chills; no lethargy  Skin/Breast: see HPI  Ophthalmologic: see HPI  ENT: no dysphagia or changes in hearing  Respiratory: no SOB or cough  Cardiovascular: no palpitations or chest pain  Gastrointestinal: see HPI  Genitourinary: no dysuria or frequency  Musculoskeletal: no joint pains or weakness  Neurological: no weakness, numbness, or tingling    MEDICATIONS  (STANDING):  carvedilol 3.125 milliGRAM(s) Oral every 12 hours  dextrose 5%. 1000 milliLiter(s) (50 mL/Hr) IV Continuous <Continuous>  dextrose 5%. 1000 milliLiter(s) (100 mL/Hr) IV Continuous <Continuous>  dextrose 50% Injectable 12.5 Gram(s) IV Push once  dextrose 50% Injectable 25 Gram(s) IV Push once  dextrose 50% Injectable 25 Gram(s) IV Push once  folic acid 1 milliGRAM(s) Oral daily  glucagon  Injectable 1 milliGRAM(s) IntraMuscular once  influenza   Vaccine 0.5 milliLiter(s) IntraMuscular once  insulin lispro (ADMELOG) corrective regimen sliding scale   SubCutaneous at bedtime  insulin lispro (ADMELOG) corrective regimen sliding scale   SubCutaneous three times a day before meals  lactated ringers. 1000 milliLiter(s) (100 mL/Hr) IV Continuous <Continuous>  mesalamine DR Capsule 400 milliGRAM(s) Oral three times a day  mirtazapine 30 milliGRAM(s) Oral daily  pantoprazole    Tablet 40 milliGRAM(s) Oral before breakfast  potassium chloride   Powder 40 milliEquivalent(s) Oral once  rifAXIMin 550 milliGRAM(s) Oral two times a day  spironolactone 100 milliGRAM(s) Oral daily    MEDICATIONS  (PRN):  acetaminophen     Tablet .. 650 milliGRAM(s) Oral every 6 hours PRN Temp greater or equal to 38C (100.4F), Mild Pain (1 - 3)  dextrose Oral Gel 15 Gram(s) Oral once PRN Blood Glucose LESS THAN 70 milliGRAM(s)/deciliter      Allergies    No Known Allergies    Intolerances        SOCIAL HISTORY:     hx smoking  non-smoker    FAMILY HISTORY:  Family history of CABG (Mother)    Family history of heart disease (Father)        Vital Signs Last 24 Hrs  T(C): 37.6 (30 Dec 2023 06:20), Max: 37.6 (30 Dec 2023 06:20)  T(F): 99.7 (30 Dec 2023 06:20), Max: 99.7 (30 Dec 2023 06:20)  HR: 97 (30 Dec 2023 06:20) (91 - 97)  BP: 90/41 (30 Dec 2023 06:20) (90/41 - 104/61)  BP(mean): --  RR: 18 (30 Dec 2023 06:20) (18 - 18)  SpO2: 97% (30 Dec 2023 06:20) (97% - 98%)    Parameters below as of 30 Dec 2023 06:20  Patient On (Oxygen Delivery Method): room air        PHYSICAL EXAM:  The patient was AOx3, well nourished, and in NAD.  OP showed no ulcerations.  There was no visible LAD.  Conjunctiva were non-injected.  There was no clubbing or edema of extremities.   The scalp, hair, face, eyebrows, lips, OP, neck, chest, back, extremities x4, and nails were examined.  There was no hyperhidrosis or bromhidrosis.     Of note on skin exam: very faint grecia-ocular ecchymotic patches  resolving ecchymotic patches on the upper and lower extremities     LABS:                        10.0   7.81  )-----------( 485      ( 30 Dec 2023 05:52 )             30.9     12-30    140  |  107  |  17  ----------------------------<  81  3.4<L>   |  18<L>  |  0.74    Ca    8.2<L>      30 Dec 2023 05:52  Phos  3.3     12-30  Mg     1.70     12-30    TPro  5.3<L>  /  Alb  2.8<L>  /  TBili  0.2  /  DBili  x   /  AST  10  /  ALT  <5  /  AlkPhos  114  12-30    PT/INR - ( 30 Dec 2023 05:52 )   PT: 17.2 sec;   INR: 1.56 ratio         PTT - ( 28 Dec 2023 14:28 )  PTT:27.8 sec  Urinalysis Basic - ( 30 Dec 2023 05:52 )    Color: x / Appearance: x / SG: x / pH: x  Gluc: 81 mg/dL / Ketone: x  / Bili: x / Urobili: x   Blood: x / Protein: x / Nitrite: x   Leuk Esterase: x / RBC: x / WBC x   Sq Epi: x / Non Sq Epi: x / Bacteria: x        RADIOLOGY & ADDITIONAL STUDIES: reviewed; no pertinent findings Rhomboid Transposition Flap Text: The defect edges were debeveled with a #15 scalpel blade.  Given the location of the defect and the proximity to free margins a rhomboid transposition flap was deemed most appropriate.  Using a sterile surgical marker, an appropriate rhomboid flap was drawn incorporating the defect.    The area thus outlined was incised deep to adipose tissue with a #15 scalpel blade.  The skin margins were undermined to an appropriate distance in all directions utilizing iris scissors.

## 2023-12-30 NOTE — PROGRESS NOTE ADULT - SUBJECTIVE AND OBJECTIVE BOX
[facility], Division of Infectious   Dr Werner, Dr Hlil, Dr Urbano, ABELARDO Cortez Blane  [phone]  after hours and weekends [phone]    Name: ANAYA JOINER  Age: 58y  Gender: Female  MRN: [id number]    Interval History--  Notes reviewed  pt known from [facility]  states she doesnt feel well        Allergies    No Known Allergies    Intolerances        Medications--  Antibiotics:  rifAXIMin 550 milliGRAM(s) Oral two times a day    Immunologic:  influenza   Vaccine 0.5 milliLiter(s) IntraMuscular once    Other:  acetaminophen     Tablet .. PRN  carvedilol  dextrose 5%.  dextrose 5%.  dextrose 50% Injectable  dextrose 50% Injectable  dextrose 50% Injectable  dextrose Oral Gel PRN  folic acid  glucagon  Injectable  insulin lispro (ADMELOG) corrective regimen sliding scale  insulin lispro (ADMELOG) corrective regimen sliding scale  lactated ringers.  mesalamine DR Capsule  mirtazapine  pantoprazole    Tablet  potassium chloride   Powder  spironolactone  warfarin      Review of Systems--  A 10-point review of systems was obtained.     Pertinent positives and negatives--  Constitutional: No fevers. No Chills. No Rigors.   Cardiovascular: No chest pain. No palpitations.  Respiratory: No shortness of breath. No cough.  Gastrointestinal: No nausea or vomiting. No diarrhea or constipation.   Psychiatric: Pleasant. Appropriate affect.    Review of systems otherwise negative except as previously noted.    Physical Examination--  Vital Signs: T(F): 99.7 (12-30-23 @ 06:20), Max: 99.7 (12-30-23 @ 06:20)  HR: 97 (12-30-23 @ 06:20)  BP: 90/41 (12-30-23 @ 06:20)  RR: 18 (12-30-23 @ 06:20)  SpO2: 97% (12-30-23 @ 06:20)  Wt(kg): --  General: Nontoxic-appearing Female in no acute distress.  HEENT: AT/NC.   Neck: Not rigid. No sense of mass.  Nodes: None palpable.  Lungs: Clear bilaterally without rales, wheezing or rhonchi  Heart: Regular rate and rhythm.   Abdomen: Bowel sounds present and normoactive. Soft. Nondistended. Nontender.   Back: No spinal tenderness. No costovertebral angle tenderness.   Extremities: No cyanosis or clubbing. No edema.   Skin: Warm. Dry. Good turgor. + rash. No vasculitic stigmata.  Psychiatric: Appropriate affect and mood for situation.         Laboratory Studies--  CBC                        10.0   7.81  )-----------( 485      ( 30 Dec 2023 05:52 )             30.9       Chemistries  12-30    140  |  107  |  17  ----------------------------<  81  3.4<L>   |  18<L>  |  0.74    Ca    8.2<L>      30 Dec 2023 05:52  Phos  3.3     12-30  Mg     1.70     12-30    TPro  5.3<L>  /  Alb  2.8<L>  /  TBili  0.2  /  DBili  x   /  AST  10  /  ALT  <5  /  AlkPhos  114  12-30      Culture Data    [place] Spotted Fever IgG, Serum (12.22.23 @ 23:30)   [facility] Spotted Fever Antibody: Negative: **Effective [facility], 2024 [facility], Qn   will** be made non-orderable. [facility] offers [id number]   Spotted Fever Group Antibodies to aid in the diagnosis of   infections with [place] Spotted Fever and other   closely related Rickettsia. This will affect any existing   profile. For further information, please contact your   [facility] Representative.  [facility] Spotted Fever Antibody IgM: 1.11: Negative <0.90   Equivocal 0.90 - 1.10        [facility], Division of Infectious   Dr Werner, Dr Hill, Dr Urbano, ABELARDO Cortez Blane  [phone]  after hours and weekends [phone]    Name: ANAYA JOINER  Age: 58y  Gender: Female  MRN: [id number]    Interval History--  Notes reviewed  pt known from [facility]  states she doesnt feel well        Allergies    No Known Allergies    Intolerances        Medications--  Antibiotics:  rifAXIMin 550 milliGRAM(s) Oral two times a day    Immunologic:  influenza   Vaccine 0.5 milliLiter(s) IntraMuscular once    Other:  acetaminophen     Tablet .. PRN  carvedilol  dextrose 5%.  dextrose 5%.  dextrose 50% Injectable  dextrose 50% Injectable  dextrose 50% Injectable  dextrose Oral Gel PRN  folic acid  glucagon  Injectable  insulin lispro (ADMELOG) corrective regimen sliding scale  insulin lispro (ADMELOG) corrective regimen sliding scale  lactated ringers.  mesalamine DR Capsule  mirtazapine  pantoprazole    Tablet  potassium chloride   Powder  spironolactone  warfarin      Review of Systems--  A 10-point review of systems was obtained.     Pertinent positives and negatives--  Constitutional: No fevers. No Chills. No Rigors.   Cardiovascular: No chest pain. No palpitations.  Respiratory: No shortness of breath. No cough.  Gastrointestinal: No nausea or vomiting. No diarrhea or constipation.   Psychiatric: Pleasant. Appropriate affect.    Review of systems otherwise negative except as previously noted.    Physical Examination--  Vital Signs: T(F): 99.7 (12-30-23 @ 06:20), Max: 99.7 (12-30-23 @ 06:20)  HR: 97 (12-30-23 @ 06:20)  BP: 90/41 (12-30-23 @ 06:20)  RR: 18 (12-30-23 @ 06:20)  SpO2: 97% (12-30-23 @ 06:20)  Wt(kg): --  General: Nontoxic-appearing Female in no acute distress.  HEENT: AT/NC.   Neck: Not rigid. No sense of mass.  Nodes: None palpable.  Lungs: Clear bilaterally without rales, wheezing or rhonchi  Heart: Regular rate and rhythm.   Abdomen: Bowel sounds present and normoactive. Soft. Nondistended. Nontender.   Back: No spinal tenderness. No costovertebral angle tenderness.   Extremities: No cyanosis or clubbing. No edema.   Skin: Warm. Dry. Good turgor. + rash. No vasculitic stigmata.  Psychiatric: Appropriate affect and mood for situation.         Laboratory Studies--  CBC                        10.0   7.81  )-----------( 485      ( 30 Dec 2023 05:52 )             30.9       Chemistries  12-30    140  |  107  |  17  ----------------------------<  81  3.4<L>   |  18<L>  |  0.74    Ca    8.2<L>      30 Dec 2023 05:52  Phos  3.3     12-30  Mg     1.70     12-30    TPro  5.3<L>  /  Alb  2.8<L>  /  TBili  0.2  /  DBili  x   /  AST  10  /  ALT  <5  /  AlkPhos  114  12-30      Culture Data    [place] Spotted Fever IgG, Serum (12.22.23 @ 23:30)   [facility] Spotted Fever Antibody: Negative: **Effective [facility], 20[facility], [facility]   will** be made non-orderable. [facility] offers [id number]   Spotted Fever Group Antibodies to aid in the diagnosis of   infections with [place] Spotted Fever and other   closely related Rickettsia. This will affect any existing   profile. For further information, please contact your   [facility] Representative.  [facility] Spotted Fever Antibody IgM: 1.11: Negative <0.90   Equivocal 0.90 - 1.10

## 2023-12-30 NOTE — PROGRESS NOTE ADULT - PROBLEM SELECTOR PLAN 6
hold home warfarin 6mg daily in setting of hematochezia and recent need for transfusion   continue carvedilol 3.125 mg BID  assess for starting patient on a DOAC continue mirtazapine 30mg daily

## 2023-12-30 NOTE — PROGRESS NOTE ADULT - SUBJECTIVE AND OBJECTIVE BOX
Gastroenterology/Hepatology Progress Note    Interval Events:   - patient with continued abd pain   - brown stool in flex seal     Allergies:  No Known Allergies      Hospital Medications:  acetaminophen     Tablet .. 650 milliGRAM(s) Oral every 6 hours PRN  carvedilol 3.125 milliGRAM(s) Oral every 12 hours  dextrose 5%. 1000 milliLiter(s) IV Continuous <Continuous>  dextrose 5%. 1000 milliLiter(s) IV Continuous <Continuous>  dextrose 50% Injectable 25 Gram(s) IV Push once  dextrose 50% Injectable 12.5 Gram(s) IV Push once  dextrose 50% Injectable 25 Gram(s) IV Push once  dextrose Oral Gel 15 Gram(s) Oral once PRN  folic acid 1 milliGRAM(s) Oral daily  glucagon  Injectable 1 milliGRAM(s) IntraMuscular once  influenza   Vaccine 0.5 milliLiter(s) IntraMuscular once  insulin lispro (ADMELOG) corrective regimen sliding scale   SubCutaneous three times a day before meals  insulin lispro (ADMELOG) corrective regimen sliding scale   SubCutaneous at bedtime  lactated ringers. 1000 milliLiter(s) IV Continuous <Continuous>  mesalamine DR Capsule 400 milliGRAM(s) Oral three times a day  mirtazapine 30 milliGRAM(s) Oral daily  pantoprazole    Tablet 40 milliGRAM(s) Oral before breakfast  potassium chloride   Powder 40 milliEquivalent(s) Oral once  rifAXIMin 550 milliGRAM(s) Oral two times a day  spironolactone 100 milliGRAM(s) Oral daily  warfarin 6 milliGRAM(s) Oral once      ROS: 14 point ROS negative unless otherwise state in subjective    PHYSICAL EXAM:   Vital Signs:  Vital Signs Last 24 Hrs  T(C): 37.6 (30 Dec 2023 06:20), Max: 37.6 (30 Dec 2023 06:20)  T(F): 99.7 (30 Dec 2023 06:20), Max: 99.7 (30 Dec 2023 06:20)  HR: 97 (30 Dec 2023 06:20) (91 - 97)  BP: 90/41 (30 Dec 2023 06:20) (90/41 - 104/61)  BP(mean): --  RR: 18 (30 Dec 2023 06:20) (18 - 18)  SpO2: 97% (30 Dec 2023 06:20) (97% - 98%)    Parameters below as of 30 Dec 2023 06:20  Patient On (Oxygen Delivery Method): room air      Daily     Daily     GENERAL:  No acute distress, chronically ill appearing, lying in bed.   HEENT:  NCAT, no scleral icterus   CHEST:  no respiratory distress  HEART:  Regular rate and rhythm  ABDOMEN:  Soft, mild diffuse tenderness, non-distended, no palpable masses.   RECTUM: has rectal tube which has dark brown watery stool w/ no blood.   EXTREMITIES: No LE edema b/l  SKIN: No visible rashes seen.   NEURO:  Alert and oriented x 3, no tremors, no asterixis.     LABS:                        10.0   7.81  )-----------( 485      ( 30 Dec 2023 05:52 )             30.9     Mean Cell Volume: 94.2 fL (12-30-23 @ 05:52)    12-30    140  |  107  |  17  ----------------------------<  81  3.4<L>   |  18<L>  |  0.74    Ca    8.2<L>      30 Dec 2023 05:52  Phos  3.3     12-30  Mg     1.70     12-30    TPro  5.3<L>  /  Alb  2.8<L>  /  TBili  0.2  /  DBili  x   /  AST  10  /  ALT  <5  /  AlkPhos  114  12-30    LIVER FUNCTIONS - ( 30 Dec 2023 05:52 )  Alb: 2.8 g/dL / Pro: 5.3 g/dL / ALK PHOS: 114 U/L / ALT: <5 U/L / AST: 10 U/L / GGT: x           PT/INR - ( 30 Dec 2023 05:52 )   PT: 17.2 sec;   INR: 1.56 ratio         PTT - ( 28 Dec 2023 14:28 )  PTT:27.8 sec  Urinalysis Basic - ( 30 Dec 2023 05:52 )    Color: x / Appearance: x / SG: x / pH: x  Gluc: 81 mg/dL / Ketone: x  / Bili: x / Urobili: x   Blood: x / Protein: x / Nitrite: x   Leuk Esterase: x / RBC: x / WBC x   Sq Epi: x / Non Sq Epi: x / Bacteria: x            Imaging:

## 2023-12-30 NOTE — PROGRESS NOTE ADULT - PROBLEM SELECTOR PLAN 5
Patient with history of liver cirrhosis   -start rifaximin 550mg BID  -spironolactone 100mg daily dose warfarin 6mg  continue carvedilol 3.125 mg BID  assess for starting patient on a DOAC

## 2023-12-30 NOTE — PROGRESS NOTE ADULT - REASON FOR ADMISSION
transfer from [place] for tertiary level care in the setting of diffuse body rash transfer from [place] for tertiary level care in the setting of diffuse body rash

## 2023-12-30 NOTE — PROGRESS NOTE ADULT - ASSESSMENT
Impression:   58 yrs old male w/ hx of CAD (not on anti-platelets), insomnia, DM, GERD, MDD, HTN, Ulcerative colitis, Bipolar disorder, cirrhosis (unknown etiology, NAFLD?) c/w HE, Afib (on Coumadin), and Blepharitis who initially presented to [facility] for hypotension, diffuse body rashes and pancolitis.     #Pancolitis  #Ulcerative colitis?   - Reported bloody stools w/ fecal incontinence worsening for the past one year. Underwent two colonoscopies on 11/2022 and 3/2023 but no records present. CT A/P showed thickening, hyperemia and mild inflammation throughout the colon. Does not follow w/ GI as o/p. No prior biologic use. Previously responded to steroids.   - Ideally would need colonoscopy report or repeat colonoscopy to confirm her hx of UC.   - GI PCR and c diff testing neg from 12/25.   - On oral mesalamine 400 mg TID.   - Concerned for UC flare, infectious work up neg.   - declined flex sig/colonoscopy     #Cirrhosis?   - Unclear if the patient has cirrhosis b/c the CT imaging showed normal liver w/ no splenomegaly. Her INR is elevated in the setting of Coumadin use. She has thrombocytopenia in the setting of possible infection.   - Less concerned for cirrhosis based on the clinical picture, not been seen by hepatologist.   - On rifaximin and Aldactone per home meds.     Recommendations:   - Can start her on oral prednisone 40 mg daily if cleared by ID/optho/derm due to periorbital cellulitis and rash.   - Continue with mesalamine 400 mg TID for now.   - Please obtain fecal calprotectin.   - Please obtain ESR and CRP daily.   - Please obtain hep B serologies.   - Please obtain Quantiferon TB testing, must be done before starting her on steroids.   - DVT PPx   - CBC daily.     GI will continue to follow.     All recommendations are tentative until note is attested by an attending.    Impression:   58 yrs old male w/ hx of CAD (not on anti-platelets), insomnia, DM, GERD, MDD, HTN, Ulcerative colitis, Bipolar disorder, cirrhosis (unknown etiology, NAFLD?) c/w HE, Afib (on Coumadin), and Blepharitis who initially presented to [facility] for hypotension, diffuse body rashes and pancolitis.     #Pancolitis  #Ulcerative colitis?   - Reported bloody stools w/ fecal incontinence worsening for the past one year. Underwent two colonoscopies on 11/2022 and 3/2023 but no records present. CT A/P showed thickening, hyperemia and mild inflammation throughout the colon. Does not follow w/ GI as o/p. No prior biologic use. Previously responded to steroids.   - Ideally would need colonoscopy report or repeat colonoscopy to confirm her hx of UC.   - GI PCR and c diff testing neg from 12/25.   - On oral mesalamine 400 mg TID.   - Concerned for UC flare, infectious work up neg.   - declined flex sig/colonoscopy     #Cirrhosis?   - Unclear if the patient has cirrhosis b/c the CT imaging showed normal liver w/ no splenomegaly. Her INR is elevated in the setting of Coumadin use. She has thrombocytopenia in the setting of possible infection.   - Less concerned for cirrhosis based on the clinical picture, not been seen by hepatologist.   - On rifaximin and Aldactone per home meds.     Recommendations:   - Can start her on oral prednisone 40 mg daily if cleared by ID/optho/derm due to periorbital cellulitis and rash.   - Continue with mesalamine 400 mg TID for now.   - Please obtain fecal calprotectin.   - Please obtain ESR and CRP daily.   - Please obtain hep B serologies.   - Please obtain Quantiferon TB testing, must be done before starting her on steroids.   - DVT PPx   - CBC daily.     GI will continue to follow.     All recommendations are tentative until note is attested by an attending.    Impression:   58 yrs old male w/ hx of CAD (not on anti-platelets), insomnia, DM, GERD, MDD, HTN, Ulcerative colitis, Bipolar disorder, cirrhosis (unknown etiology, NAFLD?) c/w HE, Afib (on Coumadin), and Blepharitis who initially presented to [facility] for hypotension, diffuse body rashes and pancolitis.     #"mild" Pancolitis  #Ulcerative colitis?   - Reported bloody stools w/ fecal incontinence worsening for the past one year. Underwent two colonoscopies on 11/2022 and 3/2023 but no records present. CT A/P showed thickening, hyperemia and mild inflammation throughout the colon. Does not follow w/ GI as o/p. No prior biologic use. Previously responded to steroids.   - Ideally would need colonoscopy report or repeat colonoscopy to confirm her hx of UC.   - GI PCR and c diff testing neg from 12/25.   - On oral mesalamine 400 mg TID.   - Concerned for UC flare, infectious work up neg.   - declined flex sig/colonoscopy     #Cirrhosis?   - Unclear if the patient has cirrhosis b/c the CT imaging showed normal liver w/ no splenomegaly. Her INR is elevated in the setting of Coumadin use. She has thrombocytopenia in the setting of possible infection.   - Less concerned for cirrhosis based on the clinical picture, not been seen by hepatologist.   - On rifaximin and Aldactone per home meds.     Recommendations:   - Can start her on oral prednisone 40 mg daily if cleared by ID/optho/derm due to periorbital cellulitis and rash.   - Continue with mesalamine 400 mg TID for now.   - Please obtain fecal calprotectin.   - Please obtain ESR and CRP daily.   - Please obtain hep B serologies.   - Please obtain Quantiferon TB testing, must be done before starting her on steroids.   - DVT PPx   - CBC daily.     GI will continue to follow.     All recommendations are tentative until note is attested by an attending.    Impression:   58 yrs old male w/ hx of CAD (not on anti-platelets), insomnia, DM, GERD, MDD, HTN, Ulcerative colitis, Bipolar disorder, cirrhosis (unknown etiology, NAFLD?) c/w HE, Afib (on Coumadin), and Blepharitis who initially presented to [facility] for hypotension, diffuse body rashes and pancolitis.     #"mild" Pancolitis  #Ulcerative colitis?   - Reported bloody stools w/ fecal incontinence worsening for the past one year. Underwent two colonoscopies on 11/2022 and 3/2023 but no records present. CT A/P showed thickening, hyperemia and mild inflammation throughout the colon. Does not follow w/ GI as o/p. No prior biologic use. Previously responded to steroids.   - Ideally would need colonoscopy report or repeat colonoscopy to confirm her hx of UC.   - GI PCR and c diff testing neg from 12/25.   - On oral mesalamine 400 mg TID.   - Concerned for UC flare, infectious work up neg.   - declined flex sig/colonoscopy     #Cirrhosis?   - Unclear if the patient has cirrhosis b/c the CT imaging showed normal liver w/ no splenomegaly. Her INR is elevated in the setting of Coumadin use. She has thrombocytopenia in the setting of possible infection.   - Less concerned for cirrhosis based on the clinical picture, not been seen by hepatologist.   - On rifaximin and Aldactone per home meds.     Recommendations:   - Can start her on oral prednisone 40 mg daily if cleared by ID/optho/derm due to periorbital cellulitis and rash.   - Continue with mesalamine 400 mg TID for now.   - Please obtain fecal calprotectin.   - Please obtain ESR and CRP daily.   - Please obtain hep B serologies.   - Please obtain Quantiferon TB testing, must be done before starting her on steroids.   - DVT PPx   - CBC daily.     GI will continue to follow.     All recommendations are tentative until note is attested by an attending.

## 2023-12-30 NOTE — PROGRESS NOTE ADULT - REASON FOR ADMISSION
transfer from [place] for tertiary level care in the setting of diffuse body rash transfer from [place] for tertiary level care in the setting of diffuse body rash

## 2023-12-30 NOTE — PROGRESS NOTE ADULT - PROBLEM SELECTOR PLAN 10
DVT ppx: SCDs   Diet: regular   Dispo: pending medical work up
DVT ppx: SCDs   Diet: regular   Dispo: pending medical work up

## 2023-12-30 NOTE — PROGRESS NOTE ADULT - REASON FOR ADMISSION
transfer from [place] for tertiary level care in the setting of diffuse body rash transfer from [place] for tertiary level care in the setting of diffuse body rash

## 2023-12-30 NOTE — CONSULT NOTE ADULT - ASSESSMENT
58 y F w PMHx of CAD, diabetes, insomnia, MDD, GERD, hypertension, irritable bowel syndrome with diarrhea, ulcerative colitis, cirrhosis, NAFLD, hepatic encephalopathy, bipolar disorder, chronic Afib on Coumadin, blepharitis of the left eye who initially presented to [place] due to reported hypotension, tachycardia and diffuse purpura, purpuric rash initially affecting skin around eyes, now resolving.    # Skin rash around eyes  - 58F with faint purpuric rash around eyes, resolving, low suspicion for infectious cause  - CT orbits on 12/22 with NO evidence of post-septal involvement or radiographic concern for orbital cellulitis, CT with nonspecific periorbital swelling  - Low suspicion for infectious cause, cleared by ophthalmology for systemic steroids  - Anterior exam otherwise wnl  - Patient refuses dilating and anesthetic eye drops. Unable to perform DFE. Explained risks of not performing DFE or using anesthetic drops (to check eye pressure) including missed pathology that could lead to vision loss, blindness or eye pain. Patient accepted risks.     Outpatient Follow-up: Patient should follow-up with his/her ophthalmologist or with [facility] Department of Ophthalmology within 1 week of after discharge at:    [address]  [place] [address]  [phone] 58 y F w PMHx of CAD, diabetes, insomnia, MDD, GERD, hypertension, irritable bowel syndrome with diarrhea, ulcerative colitis, cirrhosis, NAFLD, hepatic encephalopathy, bipolar disorder, chronic Afib on Coumadin, blepharitis of the left eye who initially presented to [place] due to reported hypotension, tachycardia and diffuse purpura, purpuric rash initially affecting skin around eyes, now resolving.    # Skin rash around eyes  - 58F with faint purpuric rash around eyes, resolving, low suspicion for infectious cause  - CT orbits on 12/22 with NO evidence of post-septal involvement or radiographic concern for orbital cellulitis, CT with nonspecific periorbital swelling  - Low suspicion for infectious cause, cleared by ophthalmology for systemic steroids  - Anterior exam otherwise wnl  - Patient refuses dilating and anesthetic eye drops. Unable to perform DFE. Explained risks of not performing DFE or using anesthetic drops (to check eye pressure) including missed pathology that could lead to vision loss, blindness or eye pain. Patient accepted risks.     Outpatient Follow-up: Patient should follow-up with his/her ophthalmologist or with [facility] Department of Ophthalmology within 1 week of after discharge at:    [address]  [place] [address]  [phone]

## 2023-12-30 NOTE — PROGRESS NOTE ADULT - PROBLEM SELECTOR PLAN 9
Patient on 4units lantus at home   -ISS DVT ppx: warfarin  Diet: regular   Dispo: pending medical work up

## 2023-12-30 NOTE — PROGRESS NOTE ADULT - SUBJECTIVE AND OBJECTIVE BOX
Enrrique Monroe MD  PGY3  Preferred contact via Microsoft Teams      Patient is a 58y old  Female who presents with a chief complaint of transfer from [place] for tertiary level care in the setting of diffuse body rash (29 Dec 2023 18:06)      SUBJECTIVE / OVERNIGHT EVENTS: NAEON.     MEDICATIONS  (STANDING):  carvedilol 3.125 milliGRAM(s) Oral every 12 hours  dextrose 5%. 1000 milliLiter(s) (50 mL/Hr) IV Continuous <Continuous>  dextrose 5%. 1000 milliLiter(s) (100 mL/Hr) IV Continuous <Continuous>  dextrose 50% Injectable 25 Gram(s) IV Push once  dextrose 50% Injectable 12.5 Gram(s) IV Push once  dextrose 50% Injectable 25 Gram(s) IV Push once  folic acid 1 milliGRAM(s) Oral daily  glucagon  Injectable 1 milliGRAM(s) IntraMuscular once  influenza   Vaccine 0.5 milliLiter(s) IntraMuscular once  insulin lispro (ADMELOG) corrective regimen sliding scale   SubCutaneous three times a day before meals  insulin lispro (ADMELOG) corrective regimen sliding scale   SubCutaneous at bedtime  mesalamine DR Capsule 400 milliGRAM(s) Oral three times a day  mirtazapine 30 milliGRAM(s) Oral daily  pantoprazole    Tablet 40 milliGRAM(s) Oral before breakfast  potassium chloride   Powder 40 milliEquivalent(s) Oral once  predniSONE   Tablet 40 milliGRAM(s) Oral daily  rifAXIMin 550 milliGRAM(s) Oral two times a day  spironolactone 100 milliGRAM(s) Oral daily    MEDICATIONS  (PRN):  acetaminophen     Tablet .. 650 milliGRAM(s) Oral every 6 hours PRN Temp greater or equal to 38C (100.4F), Mild Pain (1 - 3)  dextrose Oral Gel 15 Gram(s) Oral once PRN Blood Glucose LESS THAN 70 milliGRAM(s)/deciliter      CAPILLARY BLOOD GLUCOSE      POCT Blood Glucose.: 90 mg/dL (29 Dec 2023 22:54)  POCT Blood Glucose.: 93 mg/dL (29 Dec 2023 17:07)  POCT Blood Glucose.: 107 mg/dL (29 Dec 2023 12:30)  POCT Blood Glucose.: 124 mg/dL (29 Dec 2023 09:11)    I&O's Summary    29 Dec 2023 07:01  -  30 Dec 2023 07:00  --------------------------------------------------------  IN: 0 mL / OUT: 500 mL / NET: -500 mL        PHYSICAL EXAM:      LABS:                        10.0   7.81  )-----------( 485      ( 30 Dec 2023 05:52 )             30.9      12-30    140  |  107  |  17  ----------------------------<  81  3.4<L>   |  18<L>  |  0.74    Ca    8.2<L>      30 Dec 2023 05:52  Phos  3.3     12-30  Mg     1.70     12-30    TPro  5.3<L>  /  Alb  2.8<L>  /  TBili  0.2  /  DBili  x   /  AST  10  /  ALT  <5  /  AlkPhos  114  12-30    PT/INR - ( 30 Dec 2023 05:52 )   PT: 17.2 sec;   INR: 1.56 ratio         PTT - ( 28 Dec 2023 14:28 )  PTT:27.8 sec      Urinalysis Basic - ( 30 Dec 2023 05:52 )    Color: x / Appearance: x / SG: x / pH: x  Gluc: 81 mg/dL / Ketone: x  / Bili: x / Urobili: x   Blood: x / Protein: x / Nitrite: x   Leuk Esterase: x / RBC: x / WBC x   Sq Epi: x / Non Sq Epi: x / Bacteria: x        RADIOLOGY & ADDITIONAL TESTS:    Imaging Personally Reviewed:    Consultant(s) Notes Reviewed:      Care Discussed with Consultants/Other Providers:   Enrrique Monroe MD  PGY3  Preferred contact via Microsoft Teams      Patient is a 58y old  Female who presents with a chief complaint of transfer from [place] for tertiary level care in the setting of diffuse body rash (29 Dec 2023 18:06)      SUBJECTIVE / OVERNIGHT EVENTS: NAEON.     MEDICATIONS  (STANDING):  carvedilol 3.125 milliGRAM(s) Oral every 12 hours  dextrose 5%. 1000 milliLiter(s) (50 mL/Hr) IV Continuous <Continuous>  dextrose 5%. 1000 milliLiter(s) (100 mL/Hr) IV Continuous <Continuous>  dextrose 50% Injectable 25 Gram(s) IV Push once  dextrose 50% Injectable 12.5 Gram(s) IV Push once  dextrose 50% Injectable 25 Gram(s) IV Push once  folic acid 1 milliGRAM(s) Oral daily  glucagon  Injectable 1 milliGRAM(s) IntraMuscular once  influenza   Vaccine 0.5 milliLiter(s) IntraMuscular once  insulin lispro (ADMELOG) corrective regimen sliding scale   SubCutaneous three times a day before meals  insulin lispro (ADMELOG) corrective regimen sliding scale   SubCutaneous at bedtime  mesalamine DR Capsule 400 milliGRAM(s) Oral three times a day  mirtazapine 30 milliGRAM(s) Oral daily  pantoprazole    Tablet 40 milliGRAM(s) Oral before breakfast  potassium chloride   Powder 40 milliEquivalent(s) Oral once  predniSONE   Tablet 40 milliGRAM(s) Oral daily  rifAXIMin 550 milliGRAM(s) Oral two times a day  spironolactone 100 milliGRAM(s) Oral daily    MEDICATIONS  (PRN):  acetaminophen     Tablet .. 650 milliGRAM(s) Oral every 6 hours PRN Temp greater or equal to 38C (100.4F), Mild Pain (1 - 3)  dextrose Oral Gel 15 Gram(s) Oral once PRN Blood Glucose LESS THAN 70 milliGRAM(s)/deciliter      CAPILLARY BLOOD GLUCOSE      POCT Blood Glucose.: 90 mg/dL (29 Dec 2023 22:54)  POCT Blood Glucose.: 93 mg/dL (29 Dec 2023 17:07)  POCT Blood Glucose.: 107 mg/dL (29 Dec 2023 12:30)  POCT Blood Glucose.: 124 mg/dL (29 Dec 2023 09:11)    I&O's Summary    29 Dec 2023 07:01  -  30 Dec 2023 07:00  --------------------------------------------------------  IN: 0 mL / OUT: 500 mL / NET: -500 mL        PHYSICAL EXAM:      LABS:                        10.0   7.81  )-----------( 485      ( 30 Dec 2023 05:52 )             30.9      12-30    140  |  107  |  17  ----------------------------<  81  3.4<L>   |  18<L>  |  0.74    Ca    8.2<L>      30 Dec 2023 05:52  Phos  3.3     12-30  Mg     1.70     12-30    TPro  5.3<L>  /  Alb  2.8<L>  /  TBili  0.2  /  DBili  x   /  AST  10  /  ALT  <5  /  AlkPhos  114  12-30    PT/INR - ( 30 Dec 2023 05:52 )   PT: 17.2 sec;   INR: 1.56 ratio         PTT - ( 28 Dec 2023 14:28 )  PTT:27.8 sec      Urinalysis Basic - ( 30 Dec 2023 05:52 )    Color: x / Appearance: x / SG: x / pH: x  Gluc: 81 mg/dL / Ketone: x  / Bili: x / Urobili: x   Blood: x / Protein: x / Nitrite: x   Leuk Esterase: x / RBC: x / WBC x   Sq Epi: x / Non Sq Epi: x / Bacteria: x        RADIOLOGY & ADDITIONAL TESTS:    Imaging Personally Reviewed:    Consultant(s) Notes Reviewed:      Care Discussed with Consultants/Other Providers:   Enrrique Monroe MD  PGY3  Preferred contact via Microsoft Teams      Patient is a 58y old  Female who presents with a chief complaint of transfer from [place] for tertiary level care in the setting of diffuse body rash (29 Dec 2023 18:06)      SUBJECTIVE / OVERNIGHT EVENTS: NAEON. Patient w/ continued BMs overnight. Otherwise asymptomatic with improving rash. Denies chest pain, N/V, headache, SOB, cough.     MEDICATIONS  (STANDING):  carvedilol 3.125 milliGRAM(s) Oral every 12 hours  dextrose 5%. 1000 milliLiter(s) (50 mL/Hr) IV Continuous <Continuous>  dextrose 5%. 1000 milliLiter(s) (100 mL/Hr) IV Continuous <Continuous>  dextrose 50% Injectable 25 Gram(s) IV Push once  dextrose 50% Injectable 12.5 Gram(s) IV Push once  dextrose 50% Injectable 25 Gram(s) IV Push once  folic acid 1 milliGRAM(s) Oral daily  glucagon  Injectable 1 milliGRAM(s) IntraMuscular once  influenza   Vaccine 0.5 milliLiter(s) IntraMuscular once  insulin lispro (ADMELOG) corrective regimen sliding scale   SubCutaneous three times a day before meals  insulin lispro (ADMELOG) corrective regimen sliding scale   SubCutaneous at bedtime  mesalamine DR Capsule 400 milliGRAM(s) Oral three times a day  mirtazapine 30 milliGRAM(s) Oral daily  pantoprazole    Tablet 40 milliGRAM(s) Oral before breakfast  potassium chloride   Powder 40 milliEquivalent(s) Oral once  predniSONE   Tablet 40 milliGRAM(s) Oral daily  rifAXIMin 550 milliGRAM(s) Oral two times a day  spironolactone 100 milliGRAM(s) Oral daily    MEDICATIONS  (PRN):  acetaminophen     Tablet .. 650 milliGRAM(s) Oral every 6 hours PRN Temp greater or equal to 38C (100.4F), Mild Pain (1 - 3)  dextrose Oral Gel 15 Gram(s) Oral once PRN Blood Glucose LESS THAN 70 milliGRAM(s)/deciliter      CAPILLARY BLOOD GLUCOSE      POCT Blood Glucose.: 90 mg/dL (29 Dec 2023 22:54)  POCT Blood Glucose.: 93 mg/dL (29 Dec 2023 17:07)  POCT Blood Glucose.: 107 mg/dL (29 Dec 2023 12:30)  POCT Blood Glucose.: 124 mg/dL (29 Dec 2023 09:11)    I&O's Summary    29 Dec 2023 07:01  -  30 Dec 2023 07:00  --------------------------------------------------------  IN: 0 mL / OUT: 500 mL / NET: -500 mL        Vital Signs Last 24 Hrs  T(C): 37.6 (30 Dec 2023 06:20), Max: 37.6 (30 Dec 2023 06:20)  T(F): 99.7 (30 Dec 2023 06:20), Max: 99.7 (30 Dec 2023 06:20)  HR: 97 (30 Dec 2023 06:20) (91 - 97)  BP: 90/41 (30 Dec 2023 06:20) (90/41 - 104/61)  BP(mean): --  RR: 18 (30 Dec 2023 06:20) (18 - 18)  SpO2: 97% (30 Dec 2023 06:20) (97% - 98%)    Parameters below as of 30 Dec 2023 06:20  Patient On (Oxygen Delivery Method): room air        PHYSICAL EXAM:  GENERAL: NAD  HEAD:  Atraumatic, Normocephalic  EYES: EOMI, PERRLA, conjunctiva and sclera clear  ENMT: MMM  NECK: Supple, No JVD  NERVOUS SYSTEM: AOX3, motor and sensation grossly intact in b/l UE and b/l LE  PSYCHIATRIC: Appropriate affect and mood  CHEST/LUNG: Clear to auscultation bilaterally; No rales, rhonchi, wheezing, or rubs  HEART: Regular rate and rhythm; No murmurs, rubs, or gallops. No LE edema  ABDOMEN: Soft, mild epigastric tenderness, Nondistended; Bowel sounds present  EXTREMITIES:  2+ Peripheral Pulses, No clubbing, cyanosis  SKIN: No rashes or lesions      LABS:                        10.0   7.81  )-----------( 485      ( 30 Dec 2023 05:52 )             30.9      12-30    140  |  107  |  17  ----------------------------<  81  3.4<L>   |  18<L>  |  0.74    Ca    8.2<L>      30 Dec 2023 05:52  Phos  3.3     12-30  Mg     1.70     12-30    TPro  5.3<L>  /  Alb  2.8<L>  /  TBili  0.2  /  DBili  x   /  AST  10  /  ALT  <5  /  AlkPhos  114  12-30    PT/INR - ( 30 Dec 2023 05:52 )   PT: 17.2 sec;   INR: 1.56 ratio         PTT - ( 28 Dec 2023 14:28 )  PTT:27.8 sec      Urinalysis Basic - ( 30 Dec 2023 05:52 )    Color: x / Appearance: x / SG: x / pH: x  Gluc: 81 mg/dL / Ketone: x  / Bili: x / Urobili: x   Blood: x / Protein: x / Nitrite: x   Leuk Esterase: x / RBC: x / WBC x   Sq Epi: x / Non Sq Epi: x / Bacteria: x        RADIOLOGY & ADDITIONAL TESTS:    Imaging Personally Reviewed:    Consultant(s) Notes Reviewed:      Care Discussed with Consultants/Other Providers:   Enrrique Monroe MD  PGY3  Preferred contact via [employer] Teams      Patient is a 58y old  Female who presents with a chief complaint of transfer from [place] for tertiary level care in the setting of diffuse body rash (29 Dec 2023 18:06)      SUBJECTIVE / OVERNIGHT EVENTS: NAEON. Patient w/ continued BMs overnight. Otherwise asymptomatic with improving rash. Denies chest pain, N/V, headache, SOB, cough.     MEDICATIONS  (STANDING):  carvedilol 3.125 milliGRAM(s) Oral every 12 hours  dextrose 5%. 1000 milliLiter(s) (50 mL/Hr) IV Continuous <Continuous>  dextrose 5%. 1000 milliLiter(s) (100 mL/Hr) IV Continuous <Continuous>  dextrose 50% Injectable 25 Gram(s) IV Push once  dextrose 50% Injectable 12.5 Gram(s) IV Push once  dextrose 50% Injectable 25 Gram(s) IV Push once  folic acid 1 milliGRAM(s) Oral daily  glucagon  Injectable 1 milliGRAM(s) IntraMuscular once  influenza   Vaccine 0.5 milliLiter(s) IntraMuscular once  insulin lispro (ADMELOG) corrective regimen sliding scale   SubCutaneous three times a day before meals  insulin lispro (ADMELOG) corrective regimen sliding scale   SubCutaneous at bedtime  mesalamine DR Capsule 400 milliGRAM(s) Oral three times a day  mirtazapine 30 milliGRAM(s) Oral daily  pantoprazole    Tablet 40 milliGRAM(s) Oral before breakfast  potassium chloride   Powder 40 milliEquivalent(s) Oral once  predniSONE   Tablet 40 milliGRAM(s) Oral daily  rifAXIMin 550 milliGRAM(s) Oral two times a day  spironolactone 100 milliGRAM(s) Oral daily    MEDICATIONS  (PRN):  acetaminophen     Tablet .. 650 milliGRAM(s) Oral every 6 hours PRN Temp greater or equal to 38C (100.4F), Mild Pain (1 - 3)  dextrose Oral Gel 15 Gram(s) Oral once PRN Blood Glucose LESS THAN 70 milliGRAM(s)/deciliter      CAPILLARY BLOOD GLUCOSE      POCT Blood Glucose.: 90 mg/dL (29 Dec 2023 22:54)  POCT Blood Glucose.: 93 mg/dL (29 Dec 2023 17:07)  POCT Blood Glucose.: 107 mg/dL (29 Dec 2023 12:30)  POCT Blood Glucose.: 124 mg/dL (29 Dec 2023 09:11)    I&O's Summary    29 Dec 2023 07:01  -  30 Dec 2023 07:00  --------------------------------------------------------  IN: 0 mL / OUT: 500 mL / NET: -500 mL        Vital Signs Last 24 Hrs  T(C): 37.6 (30 Dec 2023 06:20), Max: 37.6 (30 Dec 2023 06:20)  T(F): 99.7 (30 Dec 2023 06:20), Max: 99.7 (30 Dec 2023 06:20)  HR: 97 (30 Dec 2023 06:20) (91 - 97)  BP: 90/41 (30 Dec 2023 06:20) (90/41 - 104/61)  BP(mean): --  RR: 18 (30 Dec 2023 06:20) (18 - 18)  SpO2: 97% (30 Dec 2023 06:20) (97% - 98%)    Parameters below as of 30 Dec 2023 06:20  Patient On (Oxygen Delivery Method): room air        PHYSICAL EXAM:  GENERAL: NAD  HEAD:  Atraumatic, Normocephalic  EYES: EOMI, PERRLA, conjunctiva and sclera clear  ENMT: MMM  NECK: Supple, No JVD  NERVOUS SYSTEM: AOX3, motor and sensation grossly intact in b/l UE and b/l LE  PSYCHIATRIC: Appropriate affect and mood  CHEST/LUNG: Clear to auscultation bilaterally; No rales, rhonchi, wheezing, or rubs  HEART: Regular rate and rhythm; No murmurs, rubs, or gallops. No LE edema  ABDOMEN: Soft, mild epigastric tenderness, Nondistended; Bowel sounds present  EXTREMITIES:  2+ Peripheral Pulses, No clubbing, cyanosis  SKIN: No rashes or lesions      LABS:                        10.0   7.81  )-----------( 485      ( 30 Dec 2023 05:52 )             30.9      12-30    140  |  107  |  17  ----------------------------<  81  3.4<L>   |  18<L>  |  0.74    Ca    8.2<L>      30 Dec 2023 05:52  Phos  3.3     12-30  Mg     1.70     12-30    TPro  5.3<L>  /  Alb  2.8<L>  /  TBili  0.2  /  DBili  x   /  AST  10  /  ALT  <5  /  AlkPhos  114  12-30    PT/INR - ( 30 Dec 2023 05:52 )   PT: 17.2 sec;   INR: 1.56 ratio         PTT - ( 28 Dec 2023 14:28 )  PTT:27.8 sec      Urinalysis Basic - ( 30 Dec 2023 05:52 )    Color: x / Appearance: x / SG: x / pH: x  Gluc: 81 mg/dL / Ketone: x  / Bili: x / Urobili: x   Blood: x / Protein: x / Nitrite: x   Leuk Esterase: x / RBC: x / WBC x   Sq Epi: x / Non Sq Epi: x / Bacteria: x        RADIOLOGY & ADDITIONAL TESTS:    Imaging Personally Reviewed:    Consultant(s) Notes Reviewed:      Care Discussed with Consultants/Other Providers:   Enrrique Monroe MD  PGY3  Preferred contact via [employer] Teams      Patient is a 58y old  Female who presents with a chief complaint of transfer from [place] for tertiary level care in the setting of diffuse body rash (29 Dec 2023 18:06)      SUBJECTIVE / OVERNIGHT EVENTS: NAEON. Patient w/ continued BMs overnight. Otherwise asymptomatic with improving rash. Has some nausea, no vomiting. Denies chest pain, N/V, headache, SOB, cough.     MEDICATIONS  (STANDING):  carvedilol 3.125 milliGRAM(s) Oral every 12 hours  dextrose 5%. 1000 milliLiter(s) (50 mL/Hr) IV Continuous <Continuous>  dextrose 5%. 1000 milliLiter(s) (100 mL/Hr) IV Continuous <Continuous>  dextrose 50% Injectable 25 Gram(s) IV Push once  dextrose 50% Injectable 12.5 Gram(s) IV Push once  dextrose 50% Injectable 25 Gram(s) IV Push once  folic acid 1 milliGRAM(s) Oral daily  glucagon  Injectable 1 milliGRAM(s) IntraMuscular once  influenza   Vaccine 0.5 milliLiter(s) IntraMuscular once  insulin lispro (ADMELOG) corrective regimen sliding scale   SubCutaneous three times a day before meals  insulin lispro (ADMELOG) corrective regimen sliding scale   SubCutaneous at bedtime  mesalamine DR Capsule 400 milliGRAM(s) Oral three times a day  mirtazapine 30 milliGRAM(s) Oral daily  pantoprazole    Tablet 40 milliGRAM(s) Oral before breakfast  potassium chloride   Powder 40 milliEquivalent(s) Oral once  predniSONE   Tablet 40 milliGRAM(s) Oral daily  rifAXIMin 550 milliGRAM(s) Oral two times a day  spironolactone 100 milliGRAM(s) Oral daily    MEDICATIONS  (PRN):  acetaminophen     Tablet .. 650 milliGRAM(s) Oral every 6 hours PRN Temp greater or equal to 38C (100.4F), Mild Pain (1 - 3)  dextrose Oral Gel 15 Gram(s) Oral once PRN Blood Glucose LESS THAN 70 milliGRAM(s)/deciliter      CAPILLARY BLOOD GLUCOSE      POCT Blood Glucose.: 90 mg/dL (29 Dec 2023 22:54)  POCT Blood Glucose.: 93 mg/dL (29 Dec 2023 17:07)  POCT Blood Glucose.: 107 mg/dL (29 Dec 2023 12:30)  POCT Blood Glucose.: 124 mg/dL (29 Dec 2023 09:11)    I&O's Summary    29 Dec 2023 07:01  -  30 Dec 2023 07:00  --------------------------------------------------------  IN: 0 mL / OUT: 500 mL / NET: -500 mL        Vital Signs Last 24 Hrs  T(C): 37.6 (30 Dec 2023 06:20), Max: 37.6 (30 Dec 2023 06:20)  T(F): 99.7 (30 Dec 2023 06:20), Max: 99.7 (30 Dec 2023 06:20)  HR: 97 (30 Dec 2023 06:20) (91 - 97)  BP: 90/41 (30 Dec 2023 06:20) (90/41 - 104/61)  BP(mean): --  RR: 18 (30 Dec 2023 06:20) (18 - 18)  SpO2: 97% (30 Dec 2023 06:20) (97% - 98%)    Parameters below as of 30 Dec 2023 06:20  Patient On (Oxygen Delivery Method): room air        PHYSICAL EXAM:  GENERAL: NAD  HEAD:  Atraumatic, Normocephalic  EYES: EOMI, PERRLA, conjunctiva and sclera clear  ENMT: MMM  NECK: Supple, No JVD  NERVOUS SYSTEM: AOX3, motor and sensation grossly intact in b/l UE and b/l LE  PSYCHIATRIC: Appropriate affect and mood  CHEST/LUNG: Clear to auscultation bilaterally; No rales, rhonchi, wheezing, or rubs  HEART: Regular rate and rhythm; No murmurs, rubs, or gallops. No LE edema  ABDOMEN: Soft, mild epigastric tenderness, Nondistended; Bowel sounds present  EXTREMITIES:  2+ Peripheral Pulses, No clubbing, cyanosis  SKIN:  rash improving      LABS:                        10.0   7.81  )-----------( 485      ( 30 Dec 2023 05:52 )             30.9      12-30    140  |  107  |  17  ----------------------------<  81  3.4<L>   |  18<L>  |  0.74    Ca    8.2<L>      30 Dec 2023 05:52  Phos  3.3     12-30  Mg     1.70     12-30    TPro  5.3<L>  /  Alb  2.8<L>  /  TBili  0.2  /  DBili  x   /  AST  10  /  ALT  <5  /  AlkPhos  114  12-30    PT/INR - ( 30 Dec 2023 05:52 )   PT: 17.2 sec;   INR: 1.56 ratio         PTT - ( 28 Dec 2023 14:28 )  PTT:27.8 sec      Urinalysis Basic - ( 30 Dec 2023 05:52 )    Color: x / Appearance: x / SG: x / pH: x  Gluc: 81 mg/dL / Ketone: x  / Bili: x / Urobili: x   Blood: x / Protein: x / Nitrite: x   Leuk Esterase: x / RBC: x / WBC x   Sq Epi: x / Non Sq Epi: x / Bacteria: x        RADIOLOGY & ADDITIONAL TESTS:    Imaging Personally Reviewed:    Consultant(s) Notes Reviewed:      Care Discussed with Consultants/Other Providers:   Enrrique Monroe MD  PGY3  Preferred contact via [employer] Teams      Patient is a 58y old  Female who presents with a chief complaint of transfer from [place] for tertiary level care in the setting of diffuse body rash (29 Dec 2023 18:06)      SUBJECTIVE / OVERNIGHT EVENTS: NAEON. Patient w/ continued BMs overnight. Otherwise asymptomatic with improving rash. Has some nausea, no vomiting. Denies chest pain, N/V, headache, SOB, cough.     MEDICATIONS  (STANDING):  carvedilol 3.125 milliGRAM(s) Oral every 12 hours  dextrose 5%. 1000 milliLiter(s) (50 mL/Hr) IV Continuous <Continuous>  dextrose 5%. 1000 milliLiter(s) (100 mL/Hr) IV Continuous <Continuous>  dextrose 50% Injectable 25 Gram(s) IV Push once  dextrose 50% Injectable 12.5 Gram(s) IV Push once  dextrose 50% Injectable 25 Gram(s) IV Push once  folic acid 1 milliGRAM(s) Oral daily  glucagon  Injectable 1 milliGRAM(s) IntraMuscular once  influenza   Vaccine 0.5 milliLiter(s) IntraMuscular once  insulin lispro (ADMELOG) corrective regimen sliding scale   SubCutaneous three times a day before meals  insulin lispro (ADMELOG) corrective regimen sliding scale   SubCutaneous at bedtime  mesalamine DR Capsule 400 milliGRAM(s) Oral three times a day  mirtazapine 30 milliGRAM(s) Oral daily  pantoprazole    Tablet 40 milliGRAM(s) Oral before breakfast  potassium chloride   Powder 40 milliEquivalent(s) Oral once  predniSONE   Tablet 40 milliGRAM(s) Oral daily  rifAXIMin 550 milliGRAM(s) Oral two times a day  spironolactone 100 milliGRAM(s) Oral daily    MEDICATIONS  (PRN):  acetaminophen     Tablet .. 650 milliGRAM(s) Oral every 6 hours PRN Temp greater or equal to 38C (100.4F), Mild Pain (1 - 3)  dextrose Oral Gel 15 Gram(s) Oral once PRN Blood Glucose LESS THAN 70 milliGRAM(s)/deciliter      CAPILLARY BLOOD GLUCOSE      POCT Blood Glucose.: 90 mg/dL (29 Dec 2023 22:54)  POCT Blood Glucose.: 93 mg/dL (29 Dec 2023 17:07)  POCT Blood Glucose.: 107 mg/dL (29 Dec 2023 12:30)  POCT Blood Glucose.: 124 mg/dL (29 Dec 2023 09:11)    I&O's Summary    29 Dec 2023 07:01  -  30 Dec 2023 07:00  --------------------------------------------------------  IN: 0 mL / OUT: 500 mL / NET: -500 mL        Vital Signs Last 24 Hrs  T(C): 37.6 (30 Dec 2023 06:20), Max: 37.6 (30 Dec 2023 06:20)  T(F): 99.7 (30 Dec 2023 06:20), Max: 99.7 (30 Dec 2023 06:20)  HR: 97 (30 Dec 2023 06:20) (91 - 97)  BP: 90/41 (30 Dec 2023 06:20) (90/41 - 104/61)  BP(mean): --  RR: 18 (30 Dec 2023 06:20) (18 - 18)  SpO2: 97% (30 Dec 2023 06:20) (97% - 98%)    Parameters below as of 30 Dec 2023 06:20  Patient On (Oxygen Delivery Method): room air        PHYSICAL EXAM:  GENERAL: NAD  HEAD:  Atraumatic, Normocephalic  EYES: EOMI, PERRLA, conjunctiva and sclera clear  ENMT: MMM  NECK: Supple, No JVD  NERVOUS SYSTEM: AOX3, motor and sensation grossly intact in b/l UE and b/l LE  PSYCHIATRIC: Appropriate affect and mood  CHEST/LUNG: Clear to auscultation bilaterally; No rales, rhonchi, wheezing, or rubs  HEART: Regular rate and rhythm; No murmurs, rubs, or gallops. No LE edema  ABDOMEN: Soft, mild epigastric tenderness, Nondistended; Bowel sounds present  EXTREMITIES:  2+ Peripheral Pulses, No clubbing, cyanosis  SKIN:  rash improving      LABS:                        10.0   7.81  )-----------( 485      ( 30 Dec 2023 05:52 )             30.9      12-30    140  |  107  |  17  ----------------------------<  81  3.4<L>   |  18<L>  |  0.74    Ca    8.2<L>      30 Dec 2023 05:52  Phos  3.3     12-30  Mg     1.70     12-30    TPro  5.3<L>  /  Alb  2.8<L>  /  TBili  0.2  /  DBili  x   /  AST  10  /  ALT  <5  /  AlkPhos  114  12-30    PT/INR - ( 30 Dec 2023 05:52 )   PT: 17.2 sec;   INR: 1.56 ratio         PTT - ( 28 Dec 2023 14:28 )  PTT:27.8 sec      Urinalysis Basic - ( 30 Dec 2023 05:52 )    Color: x / Appearance: x / SG: x / pH: x  Gluc: 81 mg/dL / Ketone: x  / Bili: x / Urobili: x   Blood: x / Protein: x / Nitrite: x   Leuk Esterase: x / RBC: x / WBC x   Sq Epi: x / Non Sq Epi: x / Bacteria: x        RADIOLOGY & ADDITIONAL TESTS:    Imaging Personally Reviewed:    Consultant(s) Notes Reviewed:      Care Discussed with Consultants/Other Providers:

## 2023-12-30 NOTE — PROGRESS NOTE ADULT - PROBLEM SELECTOR PLAN 2
Patient presented with b/l orbital cellulitis which is improved from initial presentation   -patient was managed with ceftriaxone 1gm q24h and flagyl 500 mg q8h at [facility]   -blood cultures no growth in 5 days at [facility]   -trend temps/WBC   -ID recs from [facility] did not believe it was infectious and recommended d/c antibiotics   -will monitor off antibiotics Patient presented with b/l orbital cellulitis which is improved from initial presentation   -patient was managed with ceftriaxone 1gm q24h and flagyl 500 mg q8h at [facility]   -blood cultures no growth in 5 days at [facility]   -trend temps/WBC   -ID recs from [facility] did not believe it was infectious and recommended d/c antibiotics   -will monitor off antibiotics Patient with history of ulcerative colitis found to have pancolitis   -continue mesalamine 400mg TID   -regular diet  -GI consulted, appreciate recs

## 2023-12-30 NOTE — PROGRESS NOTE ADULT - PROBLEM SELECTOR PLAN 1
Patient presented with body rash of b/l upper extremity and lower extremity of unknown etiology that is improving from initial presentation   Rash may be inflammatory v erythema multiforme v vasculitis v dermatomyositis v MCTD   work up at [place] includes:   CMV IgG negative   LDH normal   Babesia negative   Lyme negative   RPR negative   ESR, CRP elevated   -consult rheumatology for possible vasculitis, appreciate recs for workup  -consult dermatology: likely resolving ecchymosis i/s/o coumadin use Patient presented with body rash of b/l upper extremity and lower extremity of unknown etiology that is improving from initial presentation   Rash may be inflammatory v erythema multiforme v vasculitis v dermatomyositis v MCTD   work up at [place] includes:   CMV IgG negative   LDH normal   Babesia negative   Lyme negative   RPR negative   ESR, CRP elevated   -consult rheumatology for possible vasculitis, appreciate recs for workup  -consult dermatology: likely resolving ecchymosis i/s/o coumadin use Patient presented with body rash of b/l upper extremity and lower extremity of unknown etiology that is improving from initial presentation   Rash of unclear etiology, initiall ifnectious versus inflammatory now derm believes resolving ecchymosis  work up at [place] includes:   CMV IgG negative   LDH normal   Babesia negative   Lyme negative   RPR negative   ESR, CRP elevated   -consult rheumatology for possible vasculitis, appreciate recs for workup  -consult dermatology: likely resolving ecchymosis i/s/o coumadin use  -ophto consulted, recs pending Patient presented with body rash of b/l upper extremity and lower extremity of unknown etiology that is improving from initial presentation   Rash of unclear etiology, initiall ifnectious versus inflammatory now derm believes resolving ecchymosis  work up at [place] includes:   CMV IgG negative   LDH normal   Babesia negative   Lyme negative   RPR negative   ESR, CRP elevated   -consult rheumatology for possible vasculitis, appreciate recs for workup  -consult dermatology: likely resolving ecchymosis i/s/o coumadin use  -ophto consulted, recs pending

## 2023-12-30 NOTE — PROGRESS NOTE ADULT - ATTENDING COMMENTS
Pt seen and examined, agree with the note done by HS, briefly this is a 58F CAD, diabetes, insomnia, MDD, GERD, hypertension, irritable bowel syndrome with diarrhea, ulcerative colitis, cirrhosis, NAFLD, hepatic encephalopathy, bipolar disorder, chronic Afib on Coumadin, blepharitis of the left eye who initially presented to [facility] with periorbital swelling, purpuric rash and pancolitis, transferred for derm and rheum eval  --rash and periorbital swelling has greatly improved, appreciate derm eval   --required 1 U PRBC at [place] 12/27, monitor Hb, continue mesalamine  --GI following  per GI concern for UC flare, to get clearance from ID, derm and optho prior to starting steroids, quant gold pending, f/u calprotectin, hep panel  --starting on warfarin, consider DOAC when stable to resume CVA prevention for afib .   -Had hypotension earlier, received 500ml ivf bolus, cont maintainence ivf ofr now  -Francis mountain spotted fever igm ab+ ID consulted, f/u consult recs  -f/u optho recs  -f/u UE doppler  -Hypokalemia- replete k    Plan discussed with HS Pt seen and examined, agree with the note done by HS, briefly this is a 58F CAD, diabetes, insomnia, MDD, GERD, hypertension, irritable bowel syndrome with diarrhea, ulcerative colitis, cirrhosis, NAFLD, hepatic encephalopathy, bipolar disorder, chronic Afib on Coumadin, blepharitis of the left eye who initially presented to [facility] with periorbital swelling, purpuric rash and pancolitis, transferred for derm and rheum eval  --rash and periorbital swelling has greatly improved, appreciate derm eval   --required 1 U PRBC at [place] 12/27, monitor Hb, continue mesalamine  --GI following  per GI concern for UC flare, to get clearance from ID, derm and optho prior to starting steroids, quant gold pending, f/u calprotectin, hep panel  --starting on warfarin, consider DOAC when stable to resume CVA prevention for afib .   -Had hypotension earlier, received 500ml ivf bolus, cont maintainence ivf ofr now  -Francis mountain spotted fever igm ab+ ID consulted, f/u consult recs  -f/u optho recs  -f/u UE doppler  -Hypokalemia- replete k    Plan discussed with HS

## 2023-12-31 ENCOUNTER — TRANSCRIPTION ENCOUNTER (OUTPATIENT)
Age: 58
End: 2023-12-31

## 2023-12-31 LAB
ALBUMIN SERPL ELPH-MCNC: 2.3 G/DL — LOW (ref 3.3–5)
ALBUMIN SERPL ELPH-MCNC: 2.3 G/DL — LOW (ref 3.3–5)
ALP SERPL-CCNC: 98 U/L — SIGNIFICANT CHANGE UP (ref 40–120)
ALP SERPL-CCNC: 98 U/L — SIGNIFICANT CHANGE UP (ref 40–120)
ALT FLD-CCNC: 6 U/L — SIGNIFICANT CHANGE UP (ref 4–33)
ALT FLD-CCNC: 6 U/L — SIGNIFICANT CHANGE UP (ref 4–33)
ANION GAP SERPL CALC-SCNC: 10 MMOL/L — SIGNIFICANT CHANGE UP (ref 7–14)
ANION GAP SERPL CALC-SCNC: 10 MMOL/L — SIGNIFICANT CHANGE UP (ref 7–14)
APTT BLD: 30 SEC — SIGNIFICANT CHANGE UP (ref 24.5–35.6)
APTT BLD: 30 SEC — SIGNIFICANT CHANGE UP (ref 24.5–35.6)
AST SERPL-CCNC: 15 U/L — SIGNIFICANT CHANGE UP (ref 4–32)
AST SERPL-CCNC: 15 U/L — SIGNIFICANT CHANGE UP (ref 4–32)
BASOPHILS # BLD AUTO: 0.01 K/UL — SIGNIFICANT CHANGE UP (ref 0–0.2)
BASOPHILS # BLD AUTO: 0.01 K/UL — SIGNIFICANT CHANGE UP (ref 0–0.2)
BASOPHILS NFR BLD AUTO: 0.1 % — SIGNIFICANT CHANGE UP (ref 0–2)
BASOPHILS NFR BLD AUTO: 0.1 % — SIGNIFICANT CHANGE UP (ref 0–2)
BILIRUB SERPL-MCNC: 0.2 MG/DL — SIGNIFICANT CHANGE UP (ref 0.2–1.2)
BILIRUB SERPL-MCNC: 0.2 MG/DL — SIGNIFICANT CHANGE UP (ref 0.2–1.2)
BUN SERPL-MCNC: 13 MG/DL — SIGNIFICANT CHANGE UP (ref 7–23)
BUN SERPL-MCNC: 13 MG/DL — SIGNIFICANT CHANGE UP (ref 7–23)
CALCIUM SERPL-MCNC: 7.6 MG/DL — LOW (ref 8.4–10.5)
CALCIUM SERPL-MCNC: 7.6 MG/DL — LOW (ref 8.4–10.5)
CHLORIDE SERPL-SCNC: 106 MMOL/L — SIGNIFICANT CHANGE UP (ref 98–107)
CHLORIDE SERPL-SCNC: 106 MMOL/L — SIGNIFICANT CHANGE UP (ref 98–107)
CO2 SERPL-SCNC: 21 MMOL/L — LOW (ref 22–31)
CO2 SERPL-SCNC: 21 MMOL/L — LOW (ref 22–31)
CREAT SERPL-MCNC: 0.57 MG/DL — SIGNIFICANT CHANGE UP (ref 0.5–1.3)
CREAT SERPL-MCNC: 0.57 MG/DL — SIGNIFICANT CHANGE UP (ref 0.5–1.3)
CRP SERPL-MCNC: 51.4 MG/L — HIGH
CRP SERPL-MCNC: 51.4 MG/L — HIGH
EGFR: 107 ML/MIN/1.73M2 — SIGNIFICANT CHANGE UP
EGFR: 107 ML/MIN/1.73M2 — SIGNIFICANT CHANGE UP
EOSINOPHIL # BLD AUTO: 0.01 K/UL — SIGNIFICANT CHANGE UP (ref 0–0.5)
EOSINOPHIL # BLD AUTO: 0.01 K/UL — SIGNIFICANT CHANGE UP (ref 0–0.5)
EOSINOPHIL NFR BLD AUTO: 0.1 % — SIGNIFICANT CHANGE UP (ref 0–6)
EOSINOPHIL NFR BLD AUTO: 0.1 % — SIGNIFICANT CHANGE UP (ref 0–6)
ERYTHROCYTE [SEDIMENTATION RATE] IN BLOOD: 7 MM/HR — SIGNIFICANT CHANGE UP (ref 4–25)
ERYTHROCYTE [SEDIMENTATION RATE] IN BLOOD: 7 MM/HR — SIGNIFICANT CHANGE UP (ref 4–25)
GLUCOSE BLDC GLUCOMTR-MCNC: 113 MG/DL — HIGH (ref 70–99)
GLUCOSE BLDC GLUCOMTR-MCNC: 113 MG/DL — HIGH (ref 70–99)
GLUCOSE BLDC GLUCOMTR-MCNC: 136 MG/DL — HIGH (ref 70–99)
GLUCOSE BLDC GLUCOMTR-MCNC: 136 MG/DL — HIGH (ref 70–99)
GLUCOSE BLDC GLUCOMTR-MCNC: 183 MG/DL — HIGH (ref 70–99)
GLUCOSE BLDC GLUCOMTR-MCNC: 183 MG/DL — HIGH (ref 70–99)
GLUCOSE BLDC GLUCOMTR-MCNC: 191 MG/DL — HIGH (ref 70–99)
GLUCOSE BLDC GLUCOMTR-MCNC: 191 MG/DL — HIGH (ref 70–99)
GLUCOSE SERPL-MCNC: 86 MG/DL — SIGNIFICANT CHANGE UP (ref 70–99)
GLUCOSE SERPL-MCNC: 86 MG/DL — SIGNIFICANT CHANGE UP (ref 70–99)
HCT VFR BLD CALC: 26.7 % — LOW (ref 34.5–45)
HCT VFR BLD CALC: 26.7 % — LOW (ref 34.5–45)
HGB BLD-MCNC: 8.9 G/DL — LOW (ref 11.5–15.5)
HGB BLD-MCNC: 8.9 G/DL — LOW (ref 11.5–15.5)
IANC: 6.16 K/UL — SIGNIFICANT CHANGE UP (ref 1.8–7.4)
IANC: 6.16 K/UL — SIGNIFICANT CHANGE UP (ref 1.8–7.4)
IMM GRANULOCYTES NFR BLD AUTO: 0.9 % — SIGNIFICANT CHANGE UP (ref 0–0.9)
IMM GRANULOCYTES NFR BLD AUTO: 0.9 % — SIGNIFICANT CHANGE UP (ref 0–0.9)
INR BLD: 1.36 RATIO — HIGH (ref 0.85–1.18)
INR BLD: 1.36 RATIO — HIGH (ref 0.85–1.18)
LYMPHOCYTES # BLD AUTO: 1.19 K/UL — SIGNIFICANT CHANGE UP (ref 1–3.3)
LYMPHOCYTES # BLD AUTO: 1.19 K/UL — SIGNIFICANT CHANGE UP (ref 1–3.3)
LYMPHOCYTES # BLD AUTO: 15.1 % — SIGNIFICANT CHANGE UP (ref 13–44)
LYMPHOCYTES # BLD AUTO: 15.1 % — SIGNIFICANT CHANGE UP (ref 13–44)
MAGNESIUM SERPL-MCNC: 1.6 MG/DL — SIGNIFICANT CHANGE UP (ref 1.6–2.6)
MAGNESIUM SERPL-MCNC: 1.6 MG/DL — SIGNIFICANT CHANGE UP (ref 1.6–2.6)
MCHC RBC-ENTMCNC: 31.3 PG — SIGNIFICANT CHANGE UP (ref 27–34)
MCHC RBC-ENTMCNC: 31.3 PG — SIGNIFICANT CHANGE UP (ref 27–34)
MCHC RBC-ENTMCNC: 33.3 GM/DL — SIGNIFICANT CHANGE UP (ref 32–36)
MCHC RBC-ENTMCNC: 33.3 GM/DL — SIGNIFICANT CHANGE UP (ref 32–36)
MCV RBC AUTO: 94 FL — SIGNIFICANT CHANGE UP (ref 80–100)
MCV RBC AUTO: 94 FL — SIGNIFICANT CHANGE UP (ref 80–100)
MONOCYTES # BLD AUTO: 0.42 K/UL — SIGNIFICANT CHANGE UP (ref 0–0.9)
MONOCYTES # BLD AUTO: 0.42 K/UL — SIGNIFICANT CHANGE UP (ref 0–0.9)
MONOCYTES NFR BLD AUTO: 5.3 % — SIGNIFICANT CHANGE UP (ref 2–14)
MONOCYTES NFR BLD AUTO: 5.3 % — SIGNIFICANT CHANGE UP (ref 2–14)
NEUTROPHILS # BLD AUTO: 6.16 K/UL — SIGNIFICANT CHANGE UP (ref 1.8–7.4)
NEUTROPHILS # BLD AUTO: 6.16 K/UL — SIGNIFICANT CHANGE UP (ref 1.8–7.4)
NEUTROPHILS NFR BLD AUTO: 78.5 % — HIGH (ref 43–77)
NEUTROPHILS NFR BLD AUTO: 78.5 % — HIGH (ref 43–77)
NRBC # BLD: 0 /100 WBCS — SIGNIFICANT CHANGE UP (ref 0–0)
NRBC # BLD: 0 /100 WBCS — SIGNIFICANT CHANGE UP (ref 0–0)
NRBC # FLD: 0 K/UL — SIGNIFICANT CHANGE UP (ref 0–0)
NRBC # FLD: 0 K/UL — SIGNIFICANT CHANGE UP (ref 0–0)
PHOSPHATE SERPL-MCNC: 2.1 MG/DL — LOW (ref 2.5–4.5)
PHOSPHATE SERPL-MCNC: 2.1 MG/DL — LOW (ref 2.5–4.5)
PLATELET # BLD AUTO: 422 K/UL — HIGH (ref 150–400)
PLATELET # BLD AUTO: 422 K/UL — HIGH (ref 150–400)
POTASSIUM SERPL-MCNC: 3.9 MMOL/L — SIGNIFICANT CHANGE UP (ref 3.5–5.3)
POTASSIUM SERPL-MCNC: 3.9 MMOL/L — SIGNIFICANT CHANGE UP (ref 3.5–5.3)
POTASSIUM SERPL-SCNC: 3.9 MMOL/L — SIGNIFICANT CHANGE UP (ref 3.5–5.3)
POTASSIUM SERPL-SCNC: 3.9 MMOL/L — SIGNIFICANT CHANGE UP (ref 3.5–5.3)
PROT SERPL-MCNC: 4.4 G/DL — LOW (ref 6–8.3)
PROT SERPL-MCNC: 4.4 G/DL — LOW (ref 6–8.3)
PROTHROM AB SERPL-ACNC: 15.2 SEC — HIGH (ref 9.5–13)
PROTHROM AB SERPL-ACNC: 15.2 SEC — HIGH (ref 9.5–13)
RBC # BLD: 2.84 M/UL — LOW (ref 3.8–5.2)
RBC # BLD: 2.84 M/UL — LOW (ref 3.8–5.2)
RBC # FLD: 14.8 % — HIGH (ref 10.3–14.5)
RBC # FLD: 14.8 % — HIGH (ref 10.3–14.5)
SODIUM SERPL-SCNC: 137 MMOL/L — SIGNIFICANT CHANGE UP (ref 135–145)
SODIUM SERPL-SCNC: 137 MMOL/L — SIGNIFICANT CHANGE UP (ref 135–145)
WBC # BLD: 7.86 K/UL — SIGNIFICANT CHANGE UP (ref 3.8–10.5)
WBC # BLD: 7.86 K/UL — SIGNIFICANT CHANGE UP (ref 3.8–10.5)
WBC # FLD AUTO: 7.86 K/UL — SIGNIFICANT CHANGE UP (ref 3.8–10.5)
WBC # FLD AUTO: 7.86 K/UL — SIGNIFICANT CHANGE UP (ref 3.8–10.5)

## 2023-12-31 PROCEDURE — 99233 SBSQ HOSP IP/OBS HIGH 50: CPT | Mod: GC

## 2023-12-31 PROCEDURE — 99232 SBSQ HOSP IP/OBS MODERATE 35: CPT | Mod: GC

## 2023-12-31 RX ORDER — ONDANSETRON 8 MG/1
4 TABLET, FILM COATED ORAL ONCE
Refills: 0 | Status: DISCONTINUED | OUTPATIENT
Start: 2023-12-31 | End: 2024-01-12

## 2023-12-31 RX ORDER — POTASSIUM PHOSPHATE, MONOBASIC POTASSIUM PHOSPHATE, DIBASIC 236; 224 MG/ML; MG/ML
30 INJECTION, SOLUTION INTRAVENOUS ONCE
Refills: 0 | Status: COMPLETED | OUTPATIENT
Start: 2023-12-31 | End: 2023-12-31

## 2023-12-31 RX ORDER — WARFARIN SODIUM 2.5 MG/1
6 TABLET ORAL ONCE
Refills: 0 | Status: COMPLETED | OUTPATIENT
Start: 2023-12-31 | End: 2023-12-31

## 2023-12-31 RX ADMIN — Medication 400 MILLIGRAM(S): at 16:38

## 2023-12-31 RX ADMIN — Medication 20 MILLIGRAM(S): at 22:19

## 2023-12-31 RX ADMIN — MIRTAZAPINE 30 MILLIGRAM(S): 45 TABLET, ORALLY DISINTEGRATING ORAL at 11:11

## 2023-12-31 RX ADMIN — Medication 40 MILLIGRAM(S): at 05:58

## 2023-12-31 RX ADMIN — Medication 400 MILLIGRAM(S): at 22:19

## 2023-12-31 RX ADMIN — Medication 400 MILLIGRAM(S): at 05:57

## 2023-12-31 RX ADMIN — PANTOPRAZOLE SODIUM 40 MILLIGRAM(S): 20 TABLET, DELAYED RELEASE ORAL at 05:59

## 2023-12-31 RX ADMIN — Medication 1 MILLIGRAM(S): at 11:11

## 2023-12-31 RX ADMIN — POTASSIUM PHOSPHATE, MONOBASIC POTASSIUM PHOSPHATE, DIBASIC 83.33 MILLIMOLE(S): 236; 224 INJECTION, SOLUTION INTRAVENOUS at 11:11

## 2023-12-31 RX ADMIN — Medication 20 MILLIGRAM(S): at 15:05

## 2023-12-31 RX ADMIN — Medication 1: at 17:27

## 2023-12-31 RX ADMIN — WARFARIN SODIUM 6 MILLIGRAM(S): 2.5 TABLET ORAL at 23:18

## 2023-12-31 RX ADMIN — CARVEDILOL PHOSPHATE 3.12 MILLIGRAM(S): 80 CAPSULE, EXTENDED RELEASE ORAL at 18:53

## 2023-12-31 RX ADMIN — SPIRONOLACTONE 100 MILLIGRAM(S): 25 TABLET, FILM COATED ORAL at 06:00

## 2023-12-31 NOTE — PROGRESS NOTE ADULT - ATTENDING COMMENTS
Pt seen and examined, agree with the note done by HS, briefly this is a 58F CAD, diabetes, insomnia, MDD, GERD, hypertension, irritable bowel syndrome with diarrhea, ulcerative colitis, cirrhosis, NAFLD, hepatic encephalopathy, bipolar disorder, chronic Afib on Coumadin, blepharitis of the left eye who initially presented to [facility] with periorbital swelling, purpuric rash and pancolitis, transferred for derm and rheum eval  --rash and periorbital swelling has greatly improved, appreciate derm eval   --required 1 U PRBC at [place] 12/27, monitor Hb, continue mesalamine  --GI following  per GI concern for UC flare, cleared by ID, derm and optho for steroids, f/u quant gold pending and calprotectin  - hep panel  neg, started on iv steroids per GI recs  --Cont coumadin.   -Francis mountain spotted fever igm ab+ ID consult appreciated, Per ID not significant  -monitor left eye erythema  -UE doppler neg for dvt  -Hypophosphatemia- replete phos    Plan discussed with HS . Pt seen and examined, agree with the note done by HS, briefly this is a 58F CAD, diabetes, insomnia, MDD, GERD, hypertension, irritable bowel syndrome with diarrhea, ulcerative colitis, cirrhosis, NAFLD, hepatic encephalopathy, bipolar disorder, chronic Afib on Coumadin, blepharitis of the left eye who initially presented to [facility] with periorbital swelling, purpuric rash and pancolitis, transferred for derm and rheum eval  --rash and periorbital swelling has greatly improved, appreciate derm eval   --required 1 U PRBC at [place] 12/27, monitor Hb, continue mesalamine  --GI following  per GI concern for UC flare, cleared by ID, derm and optho for steroids, f/u quant gold pending and calprotectin  - hep panel  neg, started on iv steroids per GI recs  --Cont coumadin.   -Francis mountain spotted fever igm ab+ ID consult appreciated, Per ID not significant  -monitor left eye erythema  -UE doppler neg for dvt  -Hypophosphatemia- replete phos    Plan discussed with HS .

## 2023-12-31 NOTE — DISCHARGE NOTE PROVIDER - NSDCCPCAREPLAN_GEN_ALL_CORE_FT
PRINCIPAL DISCHARGE DIAGNOSIS  Diagnosis: Body rash  Assessment and Plan of Treatment: You were transferred to this hospital becaus of findings of a whole body rash that you developed suddenly. There was concern that a multitude of things may have led to this and so you were transferred here for evaluation by dermatology and rheumatology. Our dermatologist saw you and believe that your skin findings are ecchymosis caused by the use of a drug called coumadin which you are on for your Afib. Our rheumatologist also saw you and there was low concern for a rheumatologic pathology causing your skin findings. Going forward it is important to be careful while you are on coumadin and try to prevent any trauma or falls. Also please speak with your primary care provider about potentially switching you from coumadin to another drug to manage your AFib.      SECONDARY DISCHARGE DIAGNOSES  Diagnosis: Colitis, ulcerative  Assessment and Plan of Treatment: While admitted you continued to have diarrhea. This was likely in the setting of an ulcerative colitis flare. Our GI team was on board during your care and they started you on steroids which helped with the flare. As you were on the steroids your symptoms began to improve. Please follow up with a gastroentrologist to continue further management of your ulcerative colitis.     PRINCIPAL DISCHARGE DIAGNOSIS  Diagnosis: Body rash  Assessment and Plan of Treatment: You were transferred to this hospital becaus of findings of a whole body rash that you developed suddenly. There was concern that a multitude of things may have led to this and so you were transferred here for evaluation by dermatology and rheumatology. Our dermatologist saw you and believe that your skin findings are ecchymosis caused by the use of a drug called coumadin which you are on for your Afib. Our rheumatologist also saw you and there was low concern for a rheumatologic pathology causing your skin findings. Going forward it is important to be careful while you are on coumadin and try to prevent any trauma or falls. Also please speak with your primary care provider about potentially switching you from coumadin to another drug to manage your AFib.      SECONDARY DISCHARGE DIAGNOSES  Diagnosis: Colitis, ulcerative  Assessment and Plan of Treatment: While admitted you continued to have diarrhea. This was likely in the setting of an ulcerative colitis flare. Our GI team was on board during your care and they started you on steroids which helped with the flare. As you were on the steroids your symptoms began to improve. Please follow up with a gastroentrologist to continue further management of your ulcerative colitis.  Please continue to take your predinsone as documented in this discharge paper work.   If you experience recurrent diarrhea accompanied by severe abdominal pain, please return to the emergency room for evaluation.     PRINCIPAL DISCHARGE DIAGNOSIS  Diagnosis: Body rash  Assessment and Plan of Treatment: You were transferred to this hospital becaus of findings of a whole body rash that you developed suddenly. There was concern that a multitude of things may have led to this and so you were transferred here for evaluation by dermatology and rheumatology. Our dermatologist saw you and believe that your skin findings are ecchymosis caused by the use of a drug called coumadin which you are on for your Afib. Our rheumatologist also saw you and there was low concern for a rheumatologic pathology causing your skin findings. Going forward it is important to be careful while you are on coumadin and try to prevent any trauma or falls. Also please speak with your primary care provider about potentially switching you from coumadin to another drug to manage your AFib.      SECONDARY DISCHARGE DIAGNOSES  Diagnosis: Colitis, ulcerative  Assessment and Plan of Treatment: While admitted you continued to have diarrhea. This was likely in the setting of an ulcerative colitis flare. Our GI team was on board during your care and they started you on steroids which helped with the flare. As you were on the steroids your symptoms began to improve. Please follow up with a gastroentrologist to continue further management of your ulcerative colitis.  Please continue your 40mg of prednisone for 2 additional days after discharge. Afterwards, decrease the dose by 5mg every week (ie 35mg for one week, 30 mg for one week)  Please obtain full details regarding steroid taper at your appointment with [facility].   If you experience recurrent diarrhea accompanied by severe abdominal pain, please return to the emergency room for evaluation.    Diagnosis: 2019 novel coronavirus disease (COVID-19)  Assessment and Plan of Treatment: You were diagnosed with COVID while you were in the hospital. You had mild symptoms and did not require additional medications. Please drink lots of wtaer and take tylenol if you have a fever.   Please return to the hospital if you have difficulty breathing, extremely high fevers or confusion.     PRINCIPAL DISCHARGE DIAGNOSIS  Diagnosis: Body rash  Assessment and Plan of Treatment: You were transferred to this hospital becaus of findings of a whole body rash that you developed suddenly. There was concern that a multitude of things may have led to this and so you were transferred here for evaluation by dermatology and rheumatology. Our dermatologist saw you and believe that your skin findings are ecchymosis caused by the use of a drug called coumadin which you are on for your Afib. Our rheumatologist also saw you and there was low concern for a rheumatologic pathology causing your skin findings. Going forward it is important to be careful while you are on coumadin and try to prevent any trauma or falls. Also please speak with your primary care provider about potentially switching you from coumadin to another drug to manage your AFib.      SECONDARY DISCHARGE DIAGNOSES  Diagnosis: Colitis, ulcerative  Assessment and Plan of Treatment: While admitted you continued to have diarrhea. This was likely in the setting of an ulcerative colitis flare. Our GI team was on board during your care and they started you on steroids which helped with the flare. As you were on the steroids your symptoms began to improve. Please follow up with a gastroentrologist to continue further management of your ulcerative colitis.  Please continue your 40mg of prednisone for 2 additional days after discharge. Afterwards, decrease the dose by 5mg every week (ie 35mg for one week, 30 mg for one week)  Please obtain full details regarding steroid taper at your appointment with [facility].   If you experience recurrent diarrhea accompanied by severe abdominal pain, please return to the emergency room for evaluation.    Diagnosis: 2019 novel coronavirus disease (COVID-19)  Assessment and Plan of Treatment: You were diagnosed with COVID while you were in the hospital. You had mild symptoms and did not require additional medications. Please drink lots of wtaer and take tylenol if you have a fever.   Please return to the hospital if you have difficulty breathing, extremely high fevers or confusion.

## 2023-12-31 NOTE — DISCHARGE NOTE PROVIDER - HOSPITAL COURSE
58 y F w PMHx of CAD, diabetes, insomnia, MDD, GERD, hypertension, irritable bowel syndrome with diarrhea, ulcerative colitis, cirrhosis, NAFLD, hepatic encephalopathy, bipolar disorder, chronic Afib on Coumadin, blepharitis of the left eye who initially presented to [place] due to reported hypotension, tachycardia and diffuse purpura. Patient had a temperature of 99.3 with a heart rate of 116 and blood pressure of 88/60. At presentation to [place] patient had a temperature of 101, septic work up was sent and patient was started on IV antibiotics and fluids. CT abdomen show pancolitis and patient was also found to have cellulitis of lids b/l R > L. Patient was deemed as requiring rheumatology, dermatology and opthalmology consults which were not available at [place] so patient was transferred for further work up. Of note prior to transfer, yesterday patient's hemoglobin was 6.9 and patient was transfused with 1pRBC.     On presentation to [facility] patient notes that her rash has improved drastically from her initial presentation. She notes watery stool with blood with her last bowel movement occurring in the morning prior to her transfer and notes diffuse abdominal pain which she states is her baseline due to her ulcerative colitis.     Patient was seen by dermatology and based on findings it was likely that her rash was ecchymosis in the setting of coumadin use. Patient was also seen by rheumatology and there was a low suspicion for vasculitis. While admitted patient continued to struggle with loose stools and had a rectal tube. Patient was seen by GI and it was likely that she was in a ulcerative colitis flare. For this she was continue on the mesalamine 400mg TID that she was started on at [place] and was also started on IV steroids 20mg q8 after she received clearance from ID, derm, and ophthalmology. [INSERT WHAT HAPPENED AFTER] 58 y F w PMHx of CAD, diabetes, insomnia, MDD, GERD, hypertension, irritable bowel syndrome with diarrhea, ulcerative colitis, cirrhosis, NAFLD, hepatic encephalopathy, bipolar disorder, chronic Afib on Coumadin, blepharitis of the left eye who initially presented to [place] due to reported hypotension, tachycardia and diffuse purpura. Patient had a temperature of 99.3 with a heart rate of 116 and blood pressure of 88/60. At presentation to [place] patient had a temperature of 101, septic work up was sent and patient was started on IV antibiotics and fluids. CT abdomen show pancolitis and patient was also found to have cellulitis of lids b/l R > L. Patient was deemed as requiring rheumatology, dermatology and opthalmology consults which were not available at [place] so patient was transferred for further work up. Of note prior to transfer, yesterday patient's hemoglobin was 6.9 and patient was transfused with 1pRBC.     On presentation to [facility] patient notes that her rash has improved drastically from her initial presentation. She notes watery stool with blood with her last bowel movement occurring in the morning prior to her transfer and notes diffuse abdominal pain which she states is her baseline due to her ulcerative colitis.     Patient was seen by dermatology and based on findings it was likely that her rash was ecchymosis in the setting of coumadin use. Patient was also seen by rheumatology and there was a low suspicion for vasculitis. While admitted patient continued to struggle with loose stools and had a rectal tube. Patient was seen by GI and it was likely that she was in a ulcerative colitis flare. For this she was continue on the mesalamine 400mg TID that she was started on at [place] and was also started on IV steroids 20mg q8 after she received clearance from ID, derm, and ophthalmology. [INSERT WHAT HAPPENED AFTER] 58 y F w PMHx of CAD, diabetes, insomnia, MDD, GERD, hypertension, irritable bowel syndrome with diarrhea, ulcerative colitis, cirrhosis, NAFLD, hepatic encephalopathy, bipolar disorder, chronic Afib on Coumadin, blepharitis of the left eye who initially presented to [place] due to reported hypotension, tachycardia and diffuse purpura. Patient had a temperature of 99.3 with a heart rate of 116 and blood pressure of 88/60. At presentation to [place] patient had a temperature of 101, septic work up was sent and patient was started on IV antibiotics and fluids. CT abdomen show pancolitis and patient was also found to have cellulitis of lids b/l R > L. Patient was deemed as requiring rheumatology, dermatology and opthalmology consults which were not available at [place] so patient was transferred for further work up. Of note prior to transfer, yesterday patient's hemoglobin was 6.9 and patient was transfused with 1pRBC.     On presentation to [facility] patient notes that her rash has improved drastically from her initial presentation. She notes watery stool with blood with her last bowel movement occurring in the morning prior to her transfer and notes diffuse abdominal pain which she states is her baseline due to her ulcerative colitis.     Patient was seen by dermatology and based on findings it was likely that her rash was ecchymosis in the setting of coumadin use. Patient was also seen by rheumatology and there was a low suspicion for vasculitis. While admitted patient continued to struggle with loose stools and had a rectal tube. Patient was seen by GI and it was likely that she was in a ulcerative colitis flare. For this she was continue on the mesalamine 400mg TID that she was started on at [place] and was also started on IV steroids 20mg q8 after she received clearance from ID, derm, and ophthalmology. Pt's bowel movements decreased in frequency and she was transitioned to prednisone 40 mg oral. She continued to improve. 58 y F w PMHx of CAD, diabetes, insomnia, MDD, GERD, hypertension, irritable bowel syndrome with diarrhea, ulcerative colitis, cirrhosis, NAFLD, hepatic encephalopathy, bipolar disorder, chronic Afib on Coumadin, blepharitis of the left eye who initially presented to [place] due to reported hypotension, tachycardia and diffuse purpura. Patient had a temperature of 99.3 with a heart rate of 116 and blood pressure of 88/60. At presentation to [place] patient had a temperature of 101, septic work up was sent and patient was started on IV antibiotics and fluids. CT abdomen show pancolitis and patient was also found to have cellulitis of lids b/l R > L. Patient was deemed as requiring rheumatology, dermatology and opthalmology consults which were not available at [place] so patient was transferred for further work up. Of note prior to transfer, yesterday patient's hemoglobin was 6.9 and patient was transfused with 1pRBC.     On presentation to [facility] patient notes that her rash has improved drastically from her initial presentation. She notes watery stool with blood with her last bowel movement occurring in the morning prior to her transfer and notes diffuse abdominal pain which she states is her baseline due to her ulcerative colitis.     Patient was seen by dermatology and based on findings it was likely that her rash was ecchymosis in the setting of coumadin use. Patient was also seen by rheumatology and there was a low suspicion for vasculitis. While admitted patient continued to struggle with loose stools and had a rectal tube. Patient was seen by GI and it was likely that she was in a ulcerative colitis flare. For this she was continue on the mesalamine 400mg TID that she was started on at [place] and was also started on IV steroids 20mg q8 after she received clearance from ID, derm, and ophthalmology. Pt's bowel movements decreased in frequency and she was transitioned to prednisone 40 mg oral. She continued to improve. 58 y F w PMHx of CAD, diabetes, insomnia, MDD, GERD, hypertension, irritable bowel syndrome with diarrhea, ulcerative colitis, cirrhosis, NAFLD, hepatic encephalopathy, bipolar disorder, chronic Afib on Coumadin, blepharitis of the left eye who initially presented to [place] due to reported hypotension, tachycardia and diffuse purpura. Patient had a temperature of 99.3 with a heart rate of 116 and blood pressure of 88/60. At presentation to [place] patient had a temperature of 101, septic work up was sent and patient was started on IV antibiotics and fluids. CT abdomen show pancolitis and patient was also found to have cellulitis of lids b/l R > L. Patient was deemed as requiring rheumatology, dermatology and opthalmology consults which were not available at [place] so patient was transferred for further work up. Of note prior to transfer, yesterday patient's hemoglobin was 6.9 and patient was transfused with 1pRBC.     On presentation to [facility] patient notes that her rash has improved drastically from her initial presentation. She notes watery stool with blood with her last bowel movement occurring in the morning prior to her transfer and notes diffuse abdominal pain which she states is her baseline due to her ulcerative colitis.     Patient was seen by dermatology and based on findings it was likely that her rash was ecchymosis in the setting of coumadin use. Patient was also seen by rheumatology and there was a low suspicion for vasculitis. While admitted patient continued to struggle with loose stools and had a rectal tube. Patient was seen by GI and it was likely that she was in a ulcerative colitis flare. For this she was continue on the mesalamine 400mg TID that she was started on at [place] and was also started on IV steroids 20mg q8 after she received clearance from ID, derm, and ophthalmology. Pt's bowel movements decreased in frequency and she was transitioned to prednisone 40 mg oral. She continued to improve. Her course was complicated by COVID infection, however it was mild in nature and did not require additional treatment beyond supportive care.     By the time of discharge, patient has had improving symptoms with downtrending inflammatory markers. She was cleared by the gastroenterology team for discharge. Discharge to skilled nursing facility.     Discharge medications changes   > Prednisone 40mg for two additional days   Taper as follows   - discontinue ENTRESTO     Follow ups   > Follow up with [facility] regarding colonoscopy and steroid tapering dose    Discharge diagnosis : UC flair and COVID infection.    58 y F w PMHx of CAD, diabetes, insomnia, MDD, GERD, hypertension, irritable bowel syndrome with diarrhea, ulcerative colitis, cirrhosis, NAFLD, hepatic encephalopathy, bipolar disorder, chronic Afib on Coumadin, blepharitis of the left eye who initially presented to [place] due to reported hypotension, tachycardia and diffuse purpura. Patient had a temperature of 99.3 with a heart rate of 116 and blood pressure of 88/60. At presentation to [place] patient had a temperature of 101, septic work up was sent and patient was started on IV antibiotics and fluids. CT abdomen show pancolitis and patient was also found to have cellulitis of lids b/l R > L. Patient was deemed as requiring rheumatology, dermatology and opthalmology consults which were not available at [place] so patient was transferred for further work up. Of note prior to transfer, yesterday patient's hemoglobin was 6.9 and patient was transfused with 1pRBC.     On presentation to [facility] patient notes that her rash has improved drastically from her initial presentation. She notes watery stool with blood with her last bowel movement occurring in the morning prior to her transfer and notes diffuse abdominal pain which she states is her baseline due to her ulcerative colitis.     Patient was seen by dermatology and based on findings it was likely that her rash was ecchymosis in the setting of coumadin use. Patient was also seen by rheumatology and there was a low suspicion for vasculitis. While admitted patient continued to struggle with loose stools and had a rectal tube. Patient was seen by GI and it was likely that she was in a ulcerative colitis flare. For this she was continue on the mesalamine 400mg TID that she was started on at [place] and was also started on IV steroids 20mg q8 after she received clearance from ID, derm, and ophthalmology. Pt's bowel movements decreased in frequency and she was transitioned to prednisone 40 mg oral. She continued to improve. Her course was complicated by COVID infection, however it was mild in nature and did not require additional treatment beyond supportive care.     By the time of discharge, patient has had improving symptoms with downtrending inflammatory markers. She was cleared by the gastroenterology team for discharge. Discharge to skilled nursing facility.     Discharge medications changes   > Prednisone 40mg for two additional days   Taper as follows   - discontinue ENTRESTO     Follow ups   > Follow up with [facility] regarding colonoscopy and steroid tapering dose    Discharge diagnosis : UC flair and COVID infection.    58 y F w PMHx of CAD, diabetes, insomnia, MDD, GERD, hypertension, irritable bowel syndrome with diarrhea, ulcerative colitis, cirrhosis, NAFLD, hepatic encephalopathy, bipolar disorder, chronic Afib on Coumadin, blepharitis of the left eye who initially presented to [place] due to reported hypotension, tachycardia and diffuse purpura. Patient had a temperature of 99.3 with a heart rate of 116 and blood pressure of 88/60. At presentation to [place] patient had a temperature of 101, septic work up was sent and patient was started on IV antibiotics and fluids. CT abdomen show pancolitis and patient was also found to have cellulitis of lids b/l R > L. Patient was deemed as requiring rheumatology, dermatology and opthalmology consults which were not available at [place] so patient was transferred for further work up. Of note prior to transfer, yesterday patient's hemoglobin was 6.9 and patient was transfused with 1pRBC.     On presentation to [facility] patient notes that her rash has improved drastically from her initial presentation. She notes watery stool with blood with her last bowel movement occurring in the morning prior to her transfer and notes diffuse abdominal pain which she states is her baseline due to her ulcerative colitis.     Patient was seen by dermatology and based on findings it was likely that her rash was ecchymosis in the setting of coumadin use. Patient was also seen by rheumatology and there was a low suspicion for vasculitis. While admitted patient continued to struggle with loose stools and had a rectal tube. Patient was seen by GI and it was likely that she was in a ulcerative colitis flare. For this she was continue on the mesalamine 400mg TID that she was started on at [place] and was also started on IV steroids 20mg q8 after she received clearance from ID, derm, and ophthalmology. Pt's bowel movements decreased in frequency and she was transitioned to prednisone 40 mg oral. She continued to improve. Her course was complicated by COVID infection, however it was mild in nature and did not require additional treatment beyond supportive care.     By the time of discharge, patient has had improving symptoms with downtrending inflammatory markers. She was cleared by the gastroenterology team for discharge. Discharge to skilled nursing facility.     Discharge medications changes   > Prednisone 40mg for two additional days   > Taper by 5mg every weeks after 40mg is completed   Taper as follows   - discontinue ENTRESTO     Follow ups   > Follow up with [facility] regarding colonoscopy and steroid tapering dose    Discharge diagnosis : UC flair and COVID infection.    58 y F w PMHx of CAD, diabetes, insomnia, MDD, GERD, hypertension, irritable bowel syndrome with diarrhea, ulcerative colitis, cirrhosis, NAFLD, hepatic encephalopathy, bipolar disorder, chronic Afib on Coumadin, blepharitis of the left eye who initially presented to [place] due to reported hypotension, tachycardia and diffuse purpura. Patient had a temperature of 99.3 with a heart rate of 116 and blood pressure of 88/60. At presentation to [place] patient had a temperature of 101, septic work up was sent and patient was started on IV antibiotics and fluids. CT abdomen show pancolitis and patient was also found to have cellulitis of lids b/l R > L. Patient was deemed as requiring rheumatology, dermatology and opthalmology consults which were not available at [place] so patient was transferred for further work up. Of note prior to transfer, yesterday patient's hemoglobin was 6.9 and patient was transfused with 1pRBC.     On presentation to [facility] patient notes that her rash has improved drastically from her initial presentation. She notes watery stool with blood with her last bowel movement occurring in the morning prior to her transfer and notes diffuse abdominal pain which she states is her baseline due to her ulcerative colitis.     Patient was seen by dermatology and based on findings it was likely that her rash was ecchymosis in the setting of coumadin use. Patient was also seen by rheumatology and there was a low suspicion for vasculitis. While admitted patient continued to struggle with loose stools and had a rectal tube. Patient was seen by GI and it was likely that she was in a ulcerative colitis flare. For this she was continue on the mesalamine 400mg TID that she was started on at [place] and was also started on IV steroids 20mg q8 after she received clearance from ID, derm, and ophthalmology. Pt's bowel movements decreased in frequency and she was transitioned to prednisone 40 mg oral. She continued to improve. Her course was complicated by COVID infection, however it was mild in nature and did not require additional treatment beyond supportive care.     By the time of discharge, patient has had improving symptoms with downtrending inflammatory markers. She was cleared by the gastroenterology team for discharge. Discharge to skilled nursing facility.     Discharge medications changes   > Prednisone 40mg for two additional days   > Taper by 5mg every weeks after 40mg is completed   Taper as follows   - discontinue ENTRESTO     Follow ups   > Follow up with [facility] regarding colonoscopy and steroid tapering dose    Discharge diagnosis : UC flair and COVID infection.    58 y F w PMHx of CAD, diabetes, insomnia, MDD, GERD, hypertension, irritable bowel syndrome with diarrhea, ulcerative colitis, cirrhosis, NAFLD, hepatic encephalopathy, bipolar disorder, chronic Afib on Coumadin, blepharitis of the left eye who initially presented to [place] due to reported hypotension, tachycardia and diffuse purpura. Patient had a temperature of 99.3 with a heart rate of 116 and blood pressure of 88/60. At presentation to [place] patient had a temperature of 101, septic work up was sent and patient was started on IV antibiotics and fluids. CT abdomen show pancolitis and patient was also found to have cellulitis of lids b/l R > L. Patient was deemed as requiring rheumatology, dermatology and opthalmology consults which were not available at [place] so patient was transferred for further work up. Of note prior to transfer, yesterday patient's hemoglobin was 6.9 and patient was transfused with 1pRBC.     On presentation to [facility] patient notes that her rash has improved drastically from her initial presentation. She notes watery stool with blood with her last bowel movement occurring in the morning prior to her transfer and notes diffuse abdominal pain which she states is her baseline due to her ulcerative colitis.     Patient was seen by dermatology and based on findings it was likely that her rash was ecchymosis in the setting of coumadin use. Patient was also seen by rheumatology and there was a low suspicion for vasculitis. While admitted patient continued to struggle with loose stools and had a rectal tube. Patient was seen by GI and it was likely that she was in a ulcerative colitis flare. For this she was continue on the mesalamine 400mg TID that she was started on at [place] and was also started on IV steroids 20mg q8 after she received clearance from ID, derm, and ophthalmology. Pt's bowel movements decreased in frequency and she was transitioned to prednisone 40 mg oral. She continued to improve. Her course was complicated by COVID infection, however it was mild in nature and did not require additional treatment beyond supportive care.     By the time of discharge, patient has had improving symptoms with downtrending inflammatory markers. She was cleared by the gastroenterology team for discharge. Discharge to skilled nursing facility.     Discharge medications changes   > Prednisone 40mg for two additional days   > Taper prednisone by 5mg every week after 40mg is completed   Taper as follows   - discontinue ENTRESTO     Follow ups   > Follow up with [facility] regarding colonoscopy and steroid tapering dose    Discharge diagnosis : UC flair and COVID infection.    58 y F w PMHx of CAD, diabetes, insomnia, MDD, GERD, hypertension, irritable bowel syndrome with diarrhea, ulcerative colitis, cirrhosis, NAFLD, hepatic encephalopathy, bipolar disorder, chronic Afib on Coumadin, blepharitis of the left eye who initially presented to [facility] due to reported hypotension, tachycardia and diffuse purpura. Patient had a temperature of 99.3 with a heart rate of 116 and blood pressure of 88/60. At presentation to [facility] patient had a temperature of 101, septic work up was sent and patient was started on IV antibiotics and fluids. CT abdomen show pancolitis and patient was also found to have cellulitis of lids b/l R > L. Patient was deemed as requiring rheumatology, dermatology and opthalmology consults which were not available at [facility] so patient was transferred for further work up. Of note prior to transfer, yesterday patient's hemoglobin was 6.9 and patient was transfused with 1pRBC.     On presentation to [facility] patient notes that her rash has improved drastically from her initial presentation. She notes watery stool with blood with her last bowel movement occurring in the morning prior to her transfer and notes diffuse abdominal pain which she states is her baseline due to her ulcerative colitis.     Patient was seen by dermatology and based on findings it was likely that her rash was ecchymosis in the setting of coumadin use. Patient was also seen by rheumatology and there was a low suspicion for vasculitis. While admitted patient continued to struggle with loose stools and had a rectal tube. Patient was seen by GI and it was likely that she was in a ulcerative colitis flare. For this she was continue on the mesalamine 400mg TID that she was started on at [facility] and was also started on IV steroids 20mg q8 after she received clearance from ID, derm, and ophthalmology. Pt's bowel movements decreased in frequency and she was transitioned to prednisone 40 mg oral. She continued to improve. Her course was complicated by COVID infection, however it was mild in nature and did not require additional treatment beyond supportive care.     By the time of discharge, patient has had improving symptoms with downtrending inflammatory markers. She was cleared by the gastroenterology team for discharge. Discharge to skilled nursing facility.     Discharge medications changes   > Prednisone 40mg for two additional days   > Taper prednisone by 5mg every week after 40mg is completed   Taper as follows   - discontinue ENTRESTO     Follow ups   > Follow up with [facility] regarding colonoscopy and steroid tapering dose    Discharge diagnosis : UC flair and COVID infection.

## 2023-12-31 NOTE — PROGRESS NOTE ADULT - REASON FOR ADMISSION
transfer from [place] for tertiary level care in the setting of diffuse body rash transfer from [place] for tertiary level care in the setting of diffuse body rash

## 2023-12-31 NOTE — PROGRESS NOTE ADULT - SUBJECTIVE AND OBJECTIVE BOX
Gastroenterology/Hepatology Progress Note    Interval Events:   - patient with continued abd pain     Allergies:  No Known Allergies      Hospital Medications:  acetaminophen     Tablet .. 650 milliGRAM(s) Oral every 6 hours PRN  aluminum hydroxide/magnesium hydroxide/simethicone Suspension 30 milliLiter(s) Oral every 6 hours PRN  carvedilol 3.125 milliGRAM(s) Oral every 12 hours  dextrose 5%. 1000 milliLiter(s) IV Continuous <Continuous>  dextrose 5%. 1000 milliLiter(s) IV Continuous <Continuous>  dextrose 50% Injectable 12.5 Gram(s) IV Push once  dextrose 50% Injectable 25 Gram(s) IV Push once  dextrose 50% Injectable 25 Gram(s) IV Push once  dextrose Oral Gel 15 Gram(s) Oral once PRN  folic acid 1 milliGRAM(s) Oral daily  glucagon  Injectable 1 milliGRAM(s) IntraMuscular once  influenza   Vaccine 0.5 milliLiter(s) IntraMuscular once  insulin lispro (ADMELOG) corrective regimen sliding scale   SubCutaneous at bedtime  insulin lispro (ADMELOG) corrective regimen sliding scale   SubCutaneous three times a day before meals  lactated ringers. 1000 milliLiter(s) IV Continuous <Continuous>  mesalamine DR Capsule 400 milliGRAM(s) Oral three times a day  mirtazapine 30 milliGRAM(s) Oral daily  pantoprazole    Tablet 40 milliGRAM(s) Oral before breakfast  predniSONE   Tablet 40 milliGRAM(s) Oral daily  rifAXIMin 550 milliGRAM(s) Oral two times a day  spironolactone 100 milliGRAM(s) Oral daily      ROS: 14 point ROS negative unless otherwise state in subjective    PHYSICAL EXAM:   Vital Signs:  Vital Signs Last 24 Hrs  T(C): 37 (31 Dec 2023 10:20), Max: 37.2 (30 Dec 2023 17:02)  T(F): 98.6 (31 Dec 2023 10:20), Max: 99 (30 Dec 2023 17:02)  HR: 86 (31 Dec 2023 10:20) (84 - 100)  BP: 103/63 (31 Dec 2023 10:20) (101/63 - 128/69)  BP(mean): 85 (30 Dec 2023 17:02) (85 - 85)  RR: 18 (31 Dec 2023 10:20) (18 - 19)  SpO2: 98% (31 Dec 2023 10:20) (96% - 99%)    Parameters below as of 31 Dec 2023 10:20  Patient On (Oxygen Delivery Method): room air      Daily     Daily     GENERAL:  No acute distress, chronically ill appearing, lying in bed.   HEENT:  NCAT, no scleral icterus   CHEST:  no respiratory distress  HEART:  Regular rate and rhythm  ABDOMEN:  Soft, mild diffuse tenderness, non-distended, no palpable masses.   RECTUM: has rectal tube which has dark brown watery stool w/ no blood.   EXTREMITIES: No LE edema b/l  SKIN: No visible rashes seen.   NEURO:  Alert and oriented x 3, no tremors, no asterixis.       LABS:                        8.9    7.86  )-----------( 422      ( 31 Dec 2023 06:09 )             26.7     Mean Cell Volume: 94.0 fL (12-31-23 @ 06:09)    12-31    137  |  106  |  13  ----------------------------<  86  3.9   |  21<L>  |  0.57    Ca    7.6<L>      31 Dec 2023 06:09  Phos  2.1     12-31  Mg     1.60     12-31    TPro  4.4<L>  /  Alb  2.3<L>  /  TBili  0.2  /  DBili  x   /  AST  15  /  ALT  6   /  AlkPhos  98  12-31    LIVER FUNCTIONS - ( 31 Dec 2023 06:09 )  Alb: 2.3 g/dL / Pro: 4.4 g/dL / ALK PHOS: 98 U/L / ALT: 6 U/L / AST: 15 U/L / GGT: x           PT/INR - ( 31 Dec 2023 06:09 )   PT: 15.2 sec;   INR: 1.36 ratio         PTT - ( 31 Dec 2023 06:09 )  PTT:30.0 sec  Urinalysis Basic - ( 31 Dec 2023 06:09 )    Color: x / Appearance: x / SG: x / pH: x  Gluc: 86 mg/dL / Ketone: x  / Bili: x / Urobili: x   Blood: x / Protein: x / Nitrite: x   Leuk Esterase: x / RBC: x / WBC x   Sq Epi: x / Non Sq Epi: x / Bacteria: x            Imaging:

## 2023-12-31 NOTE — DISCHARGE NOTE PROVIDER - NSDCMRMEDTOKEN_GEN_ALL_CORE_FT
acetaminophen 325 mg oral tablet: 2 tab(s) orally every 6 hours As needed Temp greater or equal to 38C (100.4F), Mild Pain (1 - 3)  Acidophilus oral tablet: 1 tab(s) orally 2 times a day  Acidophilus oral tablet: 1 tab(s) orally 2 times a day  carvedilol 3.125 mg oral tablet: 1 tab(s) orally 2 times a day  Coreg 3.125 mg oral tablet: 1 tab(s) orally 2 times a day  Entresto 24 mg-26 mg oral tablet: 1 tab(s) orally 2 times a day  Entresto 24 mg-26 mg oral tablet: 1 tab(s) orally 2 times a day  erythromycin 0.5% ophthalmic ointment: 1 application in each affected eye  erythromycin 0.5% ophthalmic ointment: 1 application in each affected eye once a day (at bedtime) for 7 days (apply to upper and lower eyelids for 7 days. Start date 12/19/2023)  folic acid 1 mg oral tablet: 1 tab(s) orally once a day  folic acid 1 mg oral tablet: 1 tab(s) orally once a day  insulin lispro 100 units/mL injectable solution: injectable 3 times a day  Lantus 100 units/mL subcutaneous solution: 4 unit(s) subcutaneous once a day (at bedtime)  Lantus 100 units/mL subcutaneous solution: 4 unit(s) subcutaneous once a day (at bedtime)  melatonin 3 mg oral tablet: 1 tab(s) orally once a day as needed for  insomnia  melatonin 3 mg oral tablet: 1 tab(s) orally once a day (at bedtime) As needed Insomnia  mesalamine 400 mg oral delayed release capsule: 1 cap(s) orally 3 times a day  midodrine 5 mg oral tablet: 1 tab(s) orally 3 times a day As needed SBP below 90  mirtazapine 30 mg oral tablet: 1 tab(s) orally once a day  mirtazapine 30 mg oral tablet: 1 tab(s) orally once a day (at bedtime)  pantoprazole 40 mg oral delayed release tablet: 1 tab(s) orally once a day  pantoprazole 40 mg oral delayed release tablet: 1 tab(s) orally once a day  rifAXIMin 550 mg oral tablet: 1 tab(s) orally 2 times a day  spironolactone 100 mg oral tablet: 1 tab(s) orally once a day  spironolactone 25 mg oral tablet: 4 tab(s) orally once a day  warfarin 6 mg oral tablet: 1 tab(s) orally once a day  warfarin 6 mg oral tablet: 1 tab(s) orally once a day (at bedtime)  Xifaxan 550 mg oral tablet: 1 tab(s) orally 2 times a day   acetaminophen 325 mg oral tablet: 2 tab(s) orally every 6 hours As needed Temp greater or equal to 38C (100.4F), Mild Pain (1 - 3)  carvedilol 3.125 mg oral tablet: 1 tab(s) orally 2 times a day  Coreg 3.125 mg oral tablet: 1 tab(s) orally 2 times a day  folic acid 1 mg oral tablet: 1 tab(s) orally once a day  folic acid 1 mg oral tablet: 1 tab(s) orally once a day  insulin lispro 100 units/mL injectable solution: injectable 3 times a day  Lantus 100 units/mL subcutaneous solution: 4 unit(s) subcutaneous once a day (at bedtime)  Lantus 100 units/mL subcutaneous solution: 4 unit(s) subcutaneous once a day (at bedtime)  melatonin 3 mg oral tablet: 1 tab(s) orally once a day as needed for  insomnia  melatonin 3 mg oral tablet: 1 tab(s) orally once a day (at bedtime) As needed Insomnia  mesalamine 400 mg oral delayed release capsule: 1 cap(s) orally 3 times a day  mirtazapine 30 mg oral tablet: 1 tab(s) orally once a day  mirtazapine 30 mg oral tablet: 1 tab(s) orally once a day (at bedtime)  pantoprazole 40 mg oral delayed release tablet: 1 tab(s) orally once a day  pantoprazole 40 mg oral delayed release tablet: 1 tab(s) orally once a day  rifAXIMin 550 mg oral tablet: 1 tab(s) orally 2 times a day  spironolactone 100 mg oral tablet: 1 tab(s) orally once a day  spironolactone 25 mg oral tablet: 4 tab(s) orally once a day  warfarin 6 mg oral tablet: 1 tab(s) orally once a day  warfarin 6 mg oral tablet: 1 tab(s) orally once a day (at bedtime)  Xifaxan 550 mg oral tablet: 1 tab(s) orally 2 times a day   acetaminophen 325 mg oral tablet: 2 tab(s) orally every 6 hours As needed Temp greater or equal to 38C (100.4F), Mild Pain (1 - 3)  carvedilol 3.125 mg oral tablet: 1 tab(s) orally 2 times a day  folic acid 1 mg oral tablet: 1 tab(s) orally once a day  insulin lispro 100 units/mL injectable solution: injectable 3 times a day  Lantus 100 units/mL subcutaneous solution: 4 unit(s) subcutaneous once a day (at bedtime)  melatonin 3 mg oral tablet: 1 tab(s) orally once a day as needed for  insomnia  mesalamine 400 mg oral delayed release capsule: 1 cap(s) orally 3 times a day  mirtazapine 30 mg oral tablet: 1 tab(s) orally once a day  pantoprazole 40 mg oral delayed release tablet: 1 tab(s) orally once a day  predniSONE 20 mg oral tablet: 2 tab(s) orally every 24 hours Please complete 2 additional days of Prednisone (until 1/14). Then decrease by 5 mg each week (ie 35mg for a week, 30 for a week) Will follow up with outpatient gastroenterology for full taper.  spironolactone 100 mg oral tablet: 1 tab(s) orally once a day  warfarin 6 mg oral tablet: 1 tab(s) orally once a day  Xifaxan 550 mg oral tablet: 1 tab(s) orally 2 times a day

## 2023-12-31 NOTE — PROGRESS NOTE ADULT - ASSESSMENT
58 y F w PMHx of CAD, diabetes, insomnia, MDD, GERD, hypertension, irritable bowel syndrome with diarrhea, ulcerative colitis, cirrhosis, NAFLD, hepatic encephalopathy, bipolar disorder, chronic Afib on Coumadin, blepharitis of the left eye who initially presented to [place] due to reported hypotension, tachycardia and diffuse purpura. Patient transferred to [facility] for further workup and consultations for her body rash.  58 y F w PMHx of CAD, diabetes, insomnia, MDD, GERD, hypertension, irritable bowel syndrome with diarrhea, ulcerative colitis, cirrhosis, NAFLD, hepatic encephalopathy, bipolar disorder, chronic Afib on Coumadin, blepharitis of the left eye who initially presented to [place] due to reported hypotension, tachycardia and diffuse purpura. Patient transferred to [facility] for further workup and consultations for her body rash.

## 2023-12-31 NOTE — PROGRESS NOTE ADULT - SUBJECTIVE AND OBJECTIVE BOX
ANAYA JOINER 58y Female      Patient is a 58y old  Female who presents with a chief complaint of transfer from [place] for tertiary level care in the setting of diffuse body rash (30 Dec 2023 22:18)        INTERVAL HPI/OVERNIGHT EVENTS: No acute events overnight. Patient was seen and evaluated at the bedside. The patient denies pain. Vitals stable. Patient denies fever/chills, chest pain, shortness of breath, abdominal pain, headaches, nausea/vomiting, and diarrhea/constipation.      PHYSICAL EXAM:  GENERAL: NAD  HEAD:  Normocephalic  EYES:  conjunctiva and sclera clear  ENMT: Moist mucous membranes  NECK: Supple  NERVOUS SYSTEM:  Alert, awake  CHEST/LUNG: Good air exchange bilaterally, no wheeze  HEART: Regular rate and rhythm        Vital Signs Last 24 Hrs  T(C): 37 (31 Dec 2023 05:51), Max: 37.2 (30 Dec 2023 17:02)  T(F): 98.6 (31 Dec 2023 05:51), Max: 99 (30 Dec 2023 17:02)  HR: 84 (31 Dec 2023 05:51) (84 - 100)  BP: 101/63 (31 Dec 2023 05:51) (100/60 - 128/69)  BP(mean): 85 (30 Dec 2023 17:02) (65 - 85)  RR: 18 (31 Dec 2023 05:51) (18 - 19)  SpO2: 98% (31 Dec 2023 05:51) (95% - 99%)    Parameters below as of 31 Dec 2023 05:51  Patient On (Oxygen Delivery Method): room air          Consultant(s) Notes Reviewed:  [X] YES  [ ] NO  Care Discussed with Consultants/Other Providers [X] YES  [ ] NO  Imaging Personally Reviewed:  [X] YES  [ ] NO      LABS:                        10.0   7.81  )-----------( 485      ( 30 Dec 2023 05:52 )             30.9     12-30    140  |  107  |  17  ----------------------------<  81  3.4<L>   |  18<L>  |  0.74    Ca    8.2<L>      30 Dec 2023 05:52  Phos  3.3     12-30  Mg     1.70     12-30    TPro  5.3<L>  /  Alb  2.8<L>  /  TBili  0.2  /  DBili  x   /  AST  10  /  ALT  <5  /  AlkPhos  114  12-30    PT/INR - ( 30 Dec 2023 05:52 )   PT: 17.2 sec;   INR: 1.56 ratio           Urinalysis Basic - ( 30 Dec 2023 05:52 )    Color: x / Appearance: x / SG: x / pH: x  Gluc: 81 mg/dL / Ketone: x  / Bili: x / Urobili: x   Blood: x / Protein: x / Nitrite: x   Leuk Esterase: x / RBC: x / WBC x   Sq Epi: x / Non Sq Epi: x / Bacteria: x        CAPILLARY BLOOD GLUCOSE      POCT Blood Glucose.: 134 mg/dL (30 Dec 2023 21:11)  POCT Blood Glucose.: 110 mg/dL (30 Dec 2023 17:28)  POCT Blood Glucose.: 126 mg/dL (30 Dec 2023 15:23)  POCT Blood Glucose.: 83 mg/dL (30 Dec 2023 08:44)           ANAYA JOINER 58y Female      Patient is a 58y old  Female who presents with a chief complaint of transfer from [place] for tertiary level care in the setting of diffuse body rash (30 Dec 2023 22:18)        INTERVAL HPI/OVERNIGHT EVENTS: No acute events overnight. Patient was seen and evaluated at the bedside. The patient denies pain. Vitals stable. Patient denies fever/chills, chest pain, shortness of breath, abdominal pain, headaches, nausea/vomiting, and diarrhea/constipation.      PHYSICAL EXAM:  GENERAL: NAD  HEAD:  Normocephalic  EYES:  conjunctiva and sclera clear  ENMT: Moist mucous membranes  NECK: Supple  NERVOUS SYSTEM:  Alert, awake  CHEST/LUNG: Good air exchange bilaterally, no wheeze  HEART: Regular rate and rhythm        Vital Signs Last 24 Hrs  T(C): 37 (31 Dec 2023 05:51), Max: 37.2 (30 Dec 2023 17:02)  T(F): 98.6 (31 Dec 2023 05:51), Max: 99 (30 Dec 2023 17:02)  HR: 84 (31 Dec 2023 05:51) (84 - 100)  BP: 101/63 (31 Dec 2023 05:51) (100/60 - 128/69)  BP(mean): 85 (30 Dec 2023 17:02) (65 - 85)  RR: 18 (31 Dec 2023 05:51) (18 - 19)  SpO2: 98% (31 Dec 2023 05:51) (95% - 99%)    Parameters below as of 31 Dec 2023 05:51  Patient On (Oxygen Delivery Method): room air          Consultant(s) Notes Reviewed:  [X] YES  [ ] NO  Care Discussed with Consultants/Other Providers [X] YES  [ ] NO  Imaging Personally Reviewed:  [X] YES  [ ] NO      LABS:                        10.0   7.81  )-----------( 485      ( 30 Dec 2023 05:52 )             30.9     12-30    140  |  107  |  17  ----------------------------<  81  3.4<L>   |  18<L>  |  0.74    Ca    8.2<L>      30 Dec 2023 05:52  Phos  3.3     12-30  Mg     1.70     12-30    TPro  5.3<L>  /  Alb  2.8<L>  /  TBili  0.2  /  DBili  x   /  AST  10  /  ALT  <5  /  AlkPhos  114  12-30    PT/INR - ( 30 Dec 2023 05:52 )   PT: 17.2 sec;   INR: 1.56 ratio           Urinalysis Basic - ( 30 Dec 2023 05:52 )    Color: x / Appearance: x / SG: x / pH: x  Gluc: 81 mg/dL / Ketone: x  / Bili: x / Urobili: x   Blood: x / Protein: x / Nitrite: x   Leuk Esterase: x / RBC: x / WBC x   Sq Epi: x / Non Sq Epi: x / Bacteria: x        CAPILLARY BLOOD GLUCOSE      POCT Blood Glucose.: 134 mg/dL (30 Dec 2023 21:11)  POCT Blood Glucose.: 110 mg/dL (30 Dec 2023 17:28)  POCT Blood Glucose.: 126 mg/dL (30 Dec 2023 15:23)  POCT Blood Glucose.: 83 mg/dL (30 Dec 2023 08:44)           ANAYA JOINER 58y Female      Patient is a 58y old  Female who presents with a chief complaint of transfer from [place] for tertiary level care in the setting of diffuse body rash (30 Dec 2023 22:18)        INTERVAL HPI/OVERNIGHT EVENTS: No acute events overnight. Patient was seen and evaluated at the bedside. The patient endorses abdominal discomfort and diarrhea. Vitals stable. Patient denies fever/chills, chest pain, shortness of breath,, headaches, nausea/vomiting, or visual changes.       PHYSICAL EXAM:  GENERAL: NAD  HEAD:  Atraumatic, Normocephalic  EYES: EOMI, PERRLA, conjunctiva and sclera clear  ENMT: MMM  NECK: Supple, No JVD  NERVOUS SYSTEM: AOX3, motor and sensation grossly intact in b/l UE and b/l LE  PSYCHIATRIC: Appropriate affect and mood  CHEST/LUNG: Clear to auscultation bilaterally; No rales, rhonchi, wheezing, or rubs  HEART: Regular rate and rhythm; No murmurs, rubs, or gallops. No LE edema  ABDOMEN: Soft, mild epigastric tenderness, Nondistended; Bowel sounds present  EXTREMITIES:  2+ Peripheral Pulses, No clubbing, cyanosis  SKIN:  rash improving        Vital Signs Last 24 Hrs  T(C): 37 (31 Dec 2023 05:51), Max: 37.2 (30 Dec 2023 17:02)  T(F): 98.6 (31 Dec 2023 05:51), Max: 99 (30 Dec 2023 17:02)  HR: 84 (31 Dec 2023 05:51) (84 - 100)  BP: 101/63 (31 Dec 2023 05:51) (100/60 - 128/69)  BP(mean): 85 (30 Dec 2023 17:02) (65 - 85)  RR: 18 (31 Dec 2023 05:51) (18 - 19)  SpO2: 98% (31 Dec 2023 05:51) (95% - 99%)    Parameters below as of 31 Dec 2023 05:51  Patient On (Oxygen Delivery Method): room air          Consultant(s) Notes Reviewed:  [X] YES  [ ] NO  Care Discussed with Consultants/Other Providers [X] YES  [ ] NO  Imaging Personally Reviewed:  [X] YES  [ ] NO      LABS:                        10.0   7.81  )-----------( 485      ( 30 Dec 2023 05:52 )             30.9     12-30    140  |  107  |  17  ----------------------------<  81  3.4<L>   |  18<L>  |  0.74    Ca    8.2<L>      30 Dec 2023 05:52  Phos  3.3     12-30  Mg     1.70     12-30    TPro  5.3<L>  /  Alb  2.8<L>  /  TBili  0.2  /  DBili  x   /  AST  10  /  ALT  <5  /  AlkPhos  114  12-30    PT/INR - ( 30 Dec 2023 05:52 )   PT: 17.2 sec;   INR: 1.56 ratio           Urinalysis Basic - ( 30 Dec 2023 05:52 )    Color: x / Appearance: x / SG: x / pH: x  Gluc: 81 mg/dL / Ketone: x  / Bili: x / Urobili: x   Blood: x / Protein: x / Nitrite: x   Leuk Esterase: x / RBC: x / WBC x   Sq Epi: x / Non Sq Epi: x / Bacteria: x        CAPILLARY BLOOD GLUCOSE      POCT Blood Glucose.: 134 mg/dL (30 Dec 2023 21:11)  POCT Blood Glucose.: 110 mg/dL (30 Dec 2023 17:28)  POCT Blood Glucose.: 126 mg/dL (30 Dec 2023 15:23)  POCT Blood Glucose.: 83 mg/dL (30 Dec 2023 08:44)           ANAYA JOINER 58y Female      Patient is a 58y old  Female who presents with a chief complaint of transfer from [place] for tertiary level care in the setting of diffuse body rash (30 Dec 2023 22:18)        INTERVAL HPI/OVERNIGHT EVENTS: No acute events overnight. Patient was seen and evaluated at the bedside. The patient endorses abdominal discomfort and diarrhea. Vitals stable. Patient denies fever/chills, chest pain, shortness of breath,, headaches, nausea/vomiting, or visual changes.       PHYSICAL EXAM:  GENERAL: NAD  HEAD:  Atraumatic, Normocephalic  EYES: EOMI, PERRLA, conjunctiva and sclera clear  ENMT: MMM  NECK: Supple, No JVD  NERVOUS SYSTEM: AOX3, motor and sensation grossly intact in b/l UE and b/l LE  PSYCHIATRIC: Appropriate affect and mood  CHEST/LUNG: Clear to auscultation bilaterally; No rales, rhonchi, wheezing, or rubs  HEART: Regular rate and rhythm; No murmurs, rubs, or gallops. No LE edema  ABDOMEN: Soft, mild epigastric tenderness, Nondistended; Bowel sounds present  EXTREMITIES:  2+ Peripheral Pulses, No clubbing, cyanosis  SKIN:  rash improving        Vital Signs Last 24 Hrs  T(C): 37 (31 Dec 2023 05:51), Max: 37.2 (30 Dec 2023 17:02)  T(F): 98.6 (31 Dec 2023 05:51), Max: 99 (30 Dec 2023 17:02)  HR: 84 (31 Dec 2023 05:51) (84 - 100)  BP: 101/63 (31 Dec 2023 05:51) (100/60 - 128/69)  BP(mean): 85 (30 Dec 2023 17:02) (65 - 85)  RR: 18 (31 Dec 2023 05:51) (18 - 19)  SpO2: 98% (31 Dec 2023 05:51) (95% - 99%)    Parameters below as of 31 Dec 2023 05:51  Patient On (Oxygen Delivery Method): room air          Consultant(s) Notes Reviewed:  [X] YES  [ ] NO  Care Discussed with Consultants/Other Providers [X] YES  [ ] NO  Imaging Personally Reviewed:  [X] YES  [ ] NO      LABS:                        10.0   7.81  )-----------( 485      ( 30 Dec 2023 05:52 )             30.9     12-30    140  |  107  |  17  ----------------------------<  81  3.4<L>   |  18<L>  |  0.74    Ca    8.2<L>      30 Dec 2023 05:52  Phos  3.3     12-30  Mg     1.70     12-30    TPro  5.3<L>  /  Alb  2.8<L>  /  TBili  0.2  /  DBili  x   /  AST  10  /  ALT  <5  /  AlkPhos  114  12-30    PT/INR - ( 30 Dec 2023 05:52 )   PT: 17.2 sec;   INR: 1.56 ratio           Urinalysis Basic - ( 30 Dec 2023 05:52 )    Color: x / Appearance: x / SG: x / pH: x  Gluc: 81 mg/dL / Ketone: x  / Bili: x / Urobili: x   Blood: x / Protein: x / Nitrite: x   Leuk Esterase: x / RBC: x / WBC x   Sq Epi: x / Non Sq Epi: x / Bacteria: x        CAPILLARY BLOOD GLUCOSE      POCT Blood Glucose.: 134 mg/dL (30 Dec 2023 21:11)  POCT Blood Glucose.: 110 mg/dL (30 Dec 2023 17:28)  POCT Blood Glucose.: 126 mg/dL (30 Dec 2023 15:23)  POCT Blood Glucose.: 83 mg/dL (30 Dec 2023 08:44)           ANAYA JOINER 58y Female      Patient is a 58y old  Female who presents with a chief complaint of transfer from [place] for tertiary level care in the setting of diffuse body rash (30 Dec 2023 22:18)        INTERVAL HPI/OVERNIGHT EVENTS: No acute events overnight. Patient was seen and evaluated at the bedside. The patient endorses abdominal discomfort and diarrhea. Vitals stable. Patient denies fever/chills, chest pain, shortness of breath,, headaches, nausea/vomiting, or visual changes.       PHYSICAL EXAM:  GENERAL: NAD  HEAD:  Atraumatic, Normocephalic  EYES: left eye on the lateral side with mild redness+ no drainage, rt eye no redness  ENMT: MMM  NECK: Supple, No JVD  NERVOUS SYSTEM: AOX3, motor and sensation grossly intact in b/l UE and b/l LE  PSYCHIATRIC: Appropriate affect and mood  CHEST/LUNG: Clear to auscultation bilaterally; No rales, rhonchi, wheezing, or rubs  HEART: Regular rate and rhythm; No murmurs, rubs, or gallops. No LE edema  ABDOMEN: Soft, mild epigastric tenderness, Nondistended; Bowel sounds present  EXTREMITIES:  2+ Peripheral Pulses, No clubbing, cyanosis  SKIN:  rash improving        Vital Signs Last 24 Hrs  T(C): 37 (31 Dec 2023 05:51), Max: 37.2 (30 Dec 2023 17:02)  T(F): 98.6 (31 Dec 2023 05:51), Max: 99 (30 Dec 2023 17:02)  HR: 84 (31 Dec 2023 05:51) (84 - 100)  BP: 101/63 (31 Dec 2023 05:51) (100/60 - 128/69)  BP(mean): 85 (30 Dec 2023 17:02) (65 - 85)  RR: 18 (31 Dec 2023 05:51) (18 - 19)  SpO2: 98% (31 Dec 2023 05:51) (95% - 99%)    Parameters below as of 31 Dec 2023 05:51  Patient On (Oxygen Delivery Method): room air          Consultant(s) Notes Reviewed:  [X] YES  [ ] NO  Care Discussed with Consultants/Other Providers [X] YES  [ ] NO  Imaging Personally Reviewed:  [X] YES  [ ] NO      LABS:                        10.0   7.81  )-----------( 485      ( 30 Dec 2023 05:52 )             30.9     12-30    140  |  107  |  17  ----------------------------<  81  3.4<L>   |  18<L>  |  0.74    Ca    8.2<L>      30 Dec 2023 05:52  Phos  3.3     12-30  Mg     1.70     12-30    TPro  5.3<L>  /  Alb  2.8<L>  /  TBili  0.2  /  DBili  x   /  AST  10  /  ALT  <5  /  AlkPhos  114  12-30    PT/INR - ( 30 Dec 2023 05:52 )   PT: 17.2 sec;   INR: 1.56 ratio           Urinalysis Basic - ( 30 Dec 2023 05:52 )    Color: x / Appearance: x / SG: x / pH: x  Gluc: 81 mg/dL / Ketone: x  / Bili: x / Urobili: x   Blood: x / Protein: x / Nitrite: x   Leuk Esterase: x / RBC: x / WBC x   Sq Epi: x / Non Sq Epi: x / Bacteria: x        CAPILLARY BLOOD GLUCOSE      POCT Blood Glucose.: 134 mg/dL (30 Dec 2023 21:11)  POCT Blood Glucose.: 110 mg/dL (30 Dec 2023 17:28)  POCT Blood Glucose.: 126 mg/dL (30 Dec 2023 15:23)  POCT Blood Glucose.: 83 mg/dL (30 Dec 2023 08:44)           ANAYA JOINER 58y Female      Patient is a 58y old  Female who presents with a chief complaint of transfer from [place] for tertiary level care in the setting of diffuse body rash (30 Dec 2023 22:18)        INTERVAL HPI/OVERNIGHT EVENTS: No acute events overnight. Patient was seen and evaluated at the bedside. The patient endorses abdominal discomfort and diarrhea. Vitals stable. Patient denies fever/chills, chest pain, shortness of breath,, headaches, nausea/vomiting, or visual changes.       PHYSICAL EXAM:  GENERAL: NAD  HEAD:  Atraumatic, Normocephalic  EYES: left eye on the lateral side with mild redness+ no drainage, rt eye no redness  ENMT: MMM  NECK: Supple, No JVD  NERVOUS SYSTEM: AOX3, motor and sensation grossly intact in b/l UE and b/l LE  PSYCHIATRIC: Appropriate affect and mood  CHEST/LUNG: Clear to auscultation bilaterally; No rales, rhonchi, wheezing, or rubs  HEART: Regular rate and rhythm; No murmurs, rubs, or gallops. No LE edema  ABDOMEN: Soft, mild epigastric tenderness, Nondistended; Bowel sounds present  EXTREMITIES:  2+ Peripheral Pulses, No clubbing, cyanosis  SKIN:  rash improving        Vital Signs Last 24 Hrs  T(C): 37 (31 Dec 2023 05:51), Max: 37.2 (30 Dec 2023 17:02)  T(F): 98.6 (31 Dec 2023 05:51), Max: 99 (30 Dec 2023 17:02)  HR: 84 (31 Dec 2023 05:51) (84 - 100)  BP: 101/63 (31 Dec 2023 05:51) (100/60 - 128/69)  BP(mean): 85 (30 Dec 2023 17:02) (65 - 85)  RR: 18 (31 Dec 2023 05:51) (18 - 19)  SpO2: 98% (31 Dec 2023 05:51) (95% - 99%)    Parameters below as of 31 Dec 2023 05:51  Patient On (Oxygen Delivery Method): room air          Consultant(s) Notes Reviewed:  [X] YES  [ ] NO  Care Discussed with Consultants/Other Providers [X] YES  [ ] NO  Imaging Personally Reviewed:  [X] YES  [ ] NO      LABS:                        10.0   7.81  )-----------( 485      ( 30 Dec 2023 05:52 )             30.9     12-30    140  |  107  |  17  ----------------------------<  81  3.4<L>   |  18<L>  |  0.74    Ca    8.2<L>      30 Dec 2023 05:52  Phos  3.3     12-30  Mg     1.70     12-30    TPro  5.3<L>  /  Alb  2.8<L>  /  TBili  0.2  /  DBili  x   /  AST  10  /  ALT  <5  /  AlkPhos  114  12-30    PT/INR - ( 30 Dec 2023 05:52 )   PT: 17.2 sec;   INR: 1.56 ratio           Urinalysis Basic - ( 30 Dec 2023 05:52 )    Color: x / Appearance: x / SG: x / pH: x  Gluc: 81 mg/dL / Ketone: x  / Bili: x / Urobili: x   Blood: x / Protein: x / Nitrite: x   Leuk Esterase: x / RBC: x / WBC x   Sq Epi: x / Non Sq Epi: x / Bacteria: x        CAPILLARY BLOOD GLUCOSE      POCT Blood Glucose.: 134 mg/dL (30 Dec 2023 21:11)  POCT Blood Glucose.: 110 mg/dL (30 Dec 2023 17:28)  POCT Blood Glucose.: 126 mg/dL (30 Dec 2023 15:23)  POCT Blood Glucose.: 83 mg/dL (30 Dec 2023 08:44)

## 2023-12-31 NOTE — PROGRESS NOTE ADULT - REASON FOR ADMISSION
transfer from [place] for tertiary level care in the setting of diffuse body rash transfer from [place] for tertiary level care in the setting of diffuse body rash

## 2023-12-31 NOTE — DISCHARGE NOTE PROVIDER - CARE PROVIDER_API CALL
[facility],   Phone: [phone]  Fax: (   )    -  Established Patient  Follow Up Time: 1 week   [facility],   Phone: [phone]  Fax: (   )    -  Established Patient  Follow Up Time: 1 week   [facility],   Phone: [phone]  Fax: (   )    -  Established Patient  Scheduled Appointment: 01/17/2024 08:00 AM   [facility],   Phone: [phone]  Fax: (   )    -  Established Patient  Scheduled Appointment: 01/17/2024 08:00 AM

## 2023-12-31 NOTE — PROGRESS NOTE ADULT - PROBLEM SELECTOR PLAN 1
Patient presented with body rash of b/l upper extremity and lower extremity of unknown etiology that is improving from initial presentation   Rash of unclear etiology, initiall ifnectious versus inflammatory now derm believes resolving ecchymosis  work up at [facility] includes:   CMV IgG negative   LDH normal   Babesia negative   Lyme negative   RPR negative   ESR, CRP elevated   -consult rheumatology for possible vasculitis, appreciate recs for workup  -consult dermatology: likely resolving ecchymosis i/s/o coumadin use  -ophto consulted, recs pending Patient presented with body rash of b/l upper extremity and lower extremity of unknown etiology that is improving from initial presentation   Rash of unclear etiology, initiall ifnectious versus inflammatory now derm believes resolving ecchymosis  work up at [facility] includes:   CMV IgG negative   LDH normal   Babesia negative   Lyme negative   RPR negative   ESR, CRP elevated   -consult rheumatology for possible vasculitis, appreciate recs for workup  -consult dermatology: likely resolving ecchymosis i/s/o coumadin use  -ophto consulted, recs pending

## 2023-12-31 NOTE — DISCHARGE NOTE PROVIDER - NSFOLLOWUPCLINICS_GEN_ALL_ED_FT
[facility] [place]  Internal Medicine  [address] [place] [address]  Phone: [phone]  Fax: [phone]     [facility] [place]  Internal Medicine  [address] [place] [address]  Phone: [phone]  Fax: [phone]

## 2023-12-31 NOTE — DISCHARGE NOTE PROVIDER - PROVIDER TOKENS
Patient was notified of [facility] senior living and agreed to switch over to [facility]. Patient's scheduled procedure w/ [facility] on 4/1/24 has been cancelled. Egd/Colonoscopy scheduled w/ [facility] Friday 2/23/24 at [facility]. [facility] schedule updated. A new order has been made. - dmp      FREE:[LAST:[[facility]],PHONE:[phone]],FAX:[(   )    -],FOLLOWUP:[1 week],ESTABLISHEDPATIENT:[T]] FREE:[LAST:[[facility]],PHONE:[phone]],FAX:[(   )    -],FOLLOWUP:[1 week],ESTABLISHEDPATIENT:[T]] FREE:[LAST:[[facility]],PHONE:[phone]],FAX:[(   )    -],SCHEDULEDAPPT:[01/17/2024],SCHEDULEDAPPTTIME:[08:00 AM],ESTABLISHEDPATIENT:[T]] FREE:[LAST:[[facility]],PHONE:[phone]],FAX:[(   )    -],SCHEDULEDAPPT:[01/17/2024],SCHEDULEDAPPTTIME:[08:00 AM],ESTABLISHEDPATIENT:[T]]

## 2023-12-31 NOTE — PROGRESS NOTE ADULT - ATTENDING COMMENTS
GI following for hx of UC   Transferred from [place] for optho, derm, rheum eval   "Mild" pan colitis on CT scan   Liquid brown stool   cleared by ID and optho for steriods     start IV steroids for possible UC flare   PPI BID   please send fecal ana luisa   trend crp   continue mesalamine   send quant gold and hep B serologies   DVT ppx     GI To follow, please call with questions . GI following for hx of UC   Transferred from [place] for optho, derm, rheum eval   "Mild" pan colitis on CT scan   Liquid brown stool   cleared by ID and optho for steriods     start IV steroids for possible UC flare   PPI BID   please send fecal ana luisa   trend crp   continue mesalamine   send quant gold and hep B serologies   DVT ppx     GI To follow, please call with questions .

## 2023-12-31 NOTE — PROGRESS NOTE ADULT - PROBLEM SELECTOR PLAN 3
Patient with anemia with iron studies more consistent with anemia of chronic disease   -B12 and folate WNL   -patient had drop of Hgb to 6.9 at [facility] prior to transfer, transfused with 1pRBC with response to 9.5  -continue to monitor   -transfuse for Hgb >7 Patient with anemia with iron studies more consistent with anemia of chronic disease   -B12 and folate WNL   -patient had drop of Hgb to 6.9 at [facility] prior to transfer, transfused with 1pRBC with response to 9.5  -continue to monitor   -transfuse for Hgb >7

## 2023-12-31 NOTE — DISCHARGE NOTE PROVIDER - DETAILS OF MALNUTRITION DIAGNOSIS/DIAGNOSES
This patient has been assessed with a concern for Malnutrition and was treated during this hospitalization for the following Nutrition diagnosis/diagnoses:     -  01/02/2024: Severe protein-calorie malnutrition   -  01/02/2024: Underweight (BMI < 19)

## 2023-12-31 NOTE — PROGRESS NOTE ADULT - PROBLEM SELECTOR PLAN 2
Patient with history of ulcerative colitis found to have pancolitis   -continue mesalamine 400mg TID   -regular diet  -GI consulted, appreciate recs Patient with history of ulcerative colitis found to have pancolitis   -likely ulcerative colitis flare   -continue mesalamine 400mg TID   -start IV steroids 20 q8h   -start PPI 40mg daily   -regular diet  -GI consulted, appreciate recs

## 2023-12-31 NOTE — DISCHARGE NOTE PROVIDER - NSDCCPTREATMENT_GEN_ALL_CORE_FT
PRINCIPAL PROCEDURE  Procedure: Xray left 5th finger  Findings and Treatment:   < end of copied text >  ACC: [id number] EXAM:  XR HAND MIN 3 VIEWS LT   ORDERED BY: VIRGINIA ALMARAZ   PROCEDURE DATE:  01/02/2024    INTERPRETATION:  CLINICAL INDICATION: left 5th metacarpal swelling and   bruising  EXAM:  Frontal oblique lateral left hand from 1/2/2024 at 1207. Compared to left   wrist radiographs from 11/20/2022.  IMPRESSION:  No fractures or dislocations.  Preserved joint spaces and no joint margin erosions.  Carpal bones normally aligned.  Neutral ulnar variance.  No lytic or blastic lesions.  IV cannula with attached tubing in dorsoulnar wrist region.  --- End of Report ---  YAEL MESA MD; Attending Radiologist< from: Xray Hand 3 Views, Left (01.02.24 @ 12:07) >       PRINCIPAL PROCEDURE  Procedure: Xray left 5th finger  Findings and Treatment:   < end of copied text >  ACC: [id number] EXAM:  XR HAND MIN 3 VIEWS LT   ORDERED BY: VIRGINIA ALMARAZ   PROCEDURE DATE:  01/02/2024    INTERPRETATION:  CLINICAL INDICATION: left 5th metacarpal swelling and   bruising  EXAM:  Frontal oblique lateral left hand from 1/2/2024 at 1207. Compared to left   wrist radiographs from 11/20/2022.  IMPRESSION:  No fractures or dislocations.  Preserved joint spaces and no joint margin erosions.  Carpal bones normally aligned.  Neutral ulnar variance.  No lytic or blastic lesions.  IV cannula with attached tubing in dorsoulnar wrist region.  --- End of Report ---  YAEL MESA MD; Attending Radiologist< from: Xray Hand 3 Views, Left (01.02.24 @ 12:07) >

## 2023-12-31 NOTE — PROGRESS NOTE ADULT - ASSESSMENT
Impression:   58 yrs old male w/ hx of CAD (not on anti-platelets), insomnia, DM, GERD, MDD, HTN, Ulcerative colitis, Bipolar disorder, cirrhosis (unknown etiology, NAFLD?) c/w HE, Afib (on Coumadin), and Blepharitis who initially presented to [facility] for hypotension, diffuse body rashes and pancolitis.     #"mild" Pancolitis  #Ulcerative colitis?   - Reported bloody stools w/ fecal incontinence worsening for the past one year. Underwent two colonoscopies on 11/2022 and 3/2023 but no records present. CT A/P showed thickening, hyperemia and mild inflammation throughout the colon. Does not follow w/ GI as o/p. No prior biologic use. Previously responded to steroids.   - Ideally would need colonoscopy report or repeat colonoscopy to confirm her hx of UC.   - GI PCR and c diff testing neg from 12/25.   - On oral mesalamine 400 mg TID.   - Concerned for UC flare, infectious work up neg.   - declined flex sig/colonoscopy     #Cirrhosis?   - Unclear if the patient has cirrhosis b/c the CT imaging showed normal liver w/ no splenomegaly. Her INR is elevated in the setting of Coumadin use. She has thrombocytopenia in the setting of possible infection.   - Less concerned for cirrhosis based on the clinical picture, not been seen by hepatologist.   - On rifaximin and Aldactone per home meds.     Recommendations:   - start on IV steroids 20 q8h   - start PPI 40mg daily   - Continue with mesalamine 400 mg TID for now.   - Please obtain fecal calprotectin.   - Please obtain ESR and CRP daily.   - Please obtain hep B serologies.   - Please obtain Quantiferon TB testing, must be done before starting her on steroids.   - DVT PPx   - CBC daily.     GI will continue to follow.     All recommendations are tentative until note is attested by an attending.    Impression:   58 yrs old male w/ hx of CAD (not on anti-platelets), insomnia, DM, GERD, MDD, HTN, Ulcerative colitis, Bipolar disorder, cirrhosis (unknown etiology, NAFLD?) c/w HE, Afib (on Coumadin), and Blepharitis who initially presented to [facility] for hypotension, diffuse body rashes and pancolitis.     #"mild" Pancolitis  #Ulcerative colitis?   - Reported bloody stools w/ fecal incontinence worsening for the past one year. Underwent two colonoscopies on 11/2022 and 3/2023 but no records present. CT A/P showed thickening, hyperemia and mild inflammation throughout the colon. Does not follow w/ GI as o/p. No prior biologic use. Previously responded to steroids.   - Ideally would need colonoscopy report or repeat colonoscopy to confirm her hx of UC.   - GI PCR and c diff testing neg from 12/25.   - On oral mesalamine 400 mg TID.   - Concerned for UC flare, infectious work up neg.   - declined flex sig/colonoscopy     #Cirrhosis?   - Unclear if the patient has cirrhosis b/c the CT imaging showed normal liver w/ no splenomegaly. Her INR is elevated in the setting of Coumadin use. She has thrombocytopenia in the setting of possible infection.   - Less concerned for cirrhosis based on the clinical picture, not been seen by hepatologist.   - On rifaximin and Aldactone per home meds.     Recommendations:   - start on IV steroids 20 q8h   - start PPI 40mg daily   - Continue with mesalamine 400 mg TID for now.   - Please obtain fecal calprotectin.   - Please obtain ESR and CRP daily.   - Please obtain hep B serologies.   - Please obtain Quantiferon TB testing, must be done before starting her on steroids.   - DVT PPx   - CBC daily.     GI will continue to follow.     All recommendations are tentative until note is attested by an attending.

## 2023-12-31 NOTE — DISCHARGE NOTE PROVIDER - REASON FOR ADMISSION
transfer from [place] for tertiary level care in the setting of diffuse body rash transfer from [place] for tertiary level care in the setting of diffuse body rash

## 2024-01-01 LAB
ALBUMIN SERPL ELPH-MCNC: 2.7 G/DL — LOW (ref 3.3–5)
ALBUMIN SERPL ELPH-MCNC: 2.7 G/DL — LOW (ref 3.3–5)
ALP SERPL-CCNC: 100 U/L — SIGNIFICANT CHANGE UP (ref 40–120)
ALP SERPL-CCNC: 100 U/L — SIGNIFICANT CHANGE UP (ref 40–120)
ALT FLD-CCNC: 6 U/L — SIGNIFICANT CHANGE UP (ref 4–33)
ALT FLD-CCNC: 6 U/L — SIGNIFICANT CHANGE UP (ref 4–33)
ANION GAP SERPL CALC-SCNC: 10 MMOL/L — SIGNIFICANT CHANGE UP (ref 7–14)
ANION GAP SERPL CALC-SCNC: 10 MMOL/L — SIGNIFICANT CHANGE UP (ref 7–14)
AST SERPL-CCNC: 14 U/L — SIGNIFICANT CHANGE UP (ref 4–32)
AST SERPL-CCNC: 14 U/L — SIGNIFICANT CHANGE UP (ref 4–32)
BILIRUB SERPL-MCNC: <0.2 MG/DL — SIGNIFICANT CHANGE UP (ref 0.2–1.2)
BILIRUB SERPL-MCNC: <0.2 MG/DL — SIGNIFICANT CHANGE UP (ref 0.2–1.2)
BUN SERPL-MCNC: 17 MG/DL — SIGNIFICANT CHANGE UP (ref 7–23)
BUN SERPL-MCNC: 17 MG/DL — SIGNIFICANT CHANGE UP (ref 7–23)
CALCIUM SERPL-MCNC: 8.1 MG/DL — LOW (ref 8.4–10.5)
CALCIUM SERPL-MCNC: 8.1 MG/DL — LOW (ref 8.4–10.5)
CHLORIDE SERPL-SCNC: 106 MMOL/L — SIGNIFICANT CHANGE UP (ref 98–107)
CHLORIDE SERPL-SCNC: 106 MMOL/L — SIGNIFICANT CHANGE UP (ref 98–107)
CO2 SERPL-SCNC: 20 MMOL/L — LOW (ref 22–31)
CO2 SERPL-SCNC: 20 MMOL/L — LOW (ref 22–31)
CREAT SERPL-MCNC: 0.61 MG/DL — SIGNIFICANT CHANGE UP (ref 0.5–1.3)
CREAT SERPL-MCNC: 0.61 MG/DL — SIGNIFICANT CHANGE UP (ref 0.5–1.3)
CRP SERPL-MCNC: 38.7 MG/L — HIGH
CRP SERPL-MCNC: 38.7 MG/L — HIGH
EGFR: 105 ML/MIN/1.73M2 — SIGNIFICANT CHANGE UP
EGFR: 105 ML/MIN/1.73M2 — SIGNIFICANT CHANGE UP
ERYTHROCYTE [SEDIMENTATION RATE] IN BLOOD: 13 MM/HR — SIGNIFICANT CHANGE UP (ref 4–25)
ERYTHROCYTE [SEDIMENTATION RATE] IN BLOOD: 13 MM/HR — SIGNIFICANT CHANGE UP (ref 4–25)
GLUCOSE BLDC GLUCOMTR-MCNC: 125 MG/DL — HIGH (ref 70–99)
GLUCOSE BLDC GLUCOMTR-MCNC: 125 MG/DL — HIGH (ref 70–99)
GLUCOSE BLDC GLUCOMTR-MCNC: 131 MG/DL — HIGH (ref 70–99)
GLUCOSE BLDC GLUCOMTR-MCNC: 131 MG/DL — HIGH (ref 70–99)
GLUCOSE BLDC GLUCOMTR-MCNC: 134 MG/DL — HIGH (ref 70–99)
GLUCOSE BLDC GLUCOMTR-MCNC: 134 MG/DL — HIGH (ref 70–99)
GLUCOSE BLDC GLUCOMTR-MCNC: 205 MG/DL — HIGH (ref 70–99)
GLUCOSE BLDC GLUCOMTR-MCNC: 205 MG/DL — HIGH (ref 70–99)
GLUCOSE SERPL-MCNC: 158 MG/DL — HIGH (ref 70–99)
GLUCOSE SERPL-MCNC: 158 MG/DL — HIGH (ref 70–99)
HCT VFR BLD CALC: 26.1 % — LOW (ref 34.5–45)
HCT VFR BLD CALC: 26.1 % — LOW (ref 34.5–45)
HGB BLD-MCNC: 8.5 G/DL — LOW (ref 11.5–15.5)
HGB BLD-MCNC: 8.5 G/DL — LOW (ref 11.5–15.5)
INR BLD: 1.95 RATIO — HIGH (ref 0.85–1.18)
INR BLD: 1.95 RATIO — HIGH (ref 0.85–1.18)
MAGNESIUM SERPL-MCNC: 1.8 MG/DL — SIGNIFICANT CHANGE UP (ref 1.6–2.6)
MAGNESIUM SERPL-MCNC: 1.8 MG/DL — SIGNIFICANT CHANGE UP (ref 1.6–2.6)
MCHC RBC-ENTMCNC: 30.4 PG — SIGNIFICANT CHANGE UP (ref 27–34)
MCHC RBC-ENTMCNC: 30.4 PG — SIGNIFICANT CHANGE UP (ref 27–34)
MCHC RBC-ENTMCNC: 32.6 GM/DL — SIGNIFICANT CHANGE UP (ref 32–36)
MCHC RBC-ENTMCNC: 32.6 GM/DL — SIGNIFICANT CHANGE UP (ref 32–36)
MCV RBC AUTO: 93.2 FL — SIGNIFICANT CHANGE UP (ref 80–100)
MCV RBC AUTO: 93.2 FL — SIGNIFICANT CHANGE UP (ref 80–100)
NRBC # BLD: 0 /100 WBCS — SIGNIFICANT CHANGE UP (ref 0–0)
NRBC # BLD: 0 /100 WBCS — SIGNIFICANT CHANGE UP (ref 0–0)
NRBC # FLD: 0 K/UL — SIGNIFICANT CHANGE UP (ref 0–0)
NRBC # FLD: 0 K/UL — SIGNIFICANT CHANGE UP (ref 0–0)
PHOSPHATE SERPL-MCNC: 3.5 MG/DL — SIGNIFICANT CHANGE UP (ref 2.5–4.5)
PHOSPHATE SERPL-MCNC: 3.5 MG/DL — SIGNIFICANT CHANGE UP (ref 2.5–4.5)
PLATELET # BLD AUTO: 415 K/UL — HIGH (ref 150–400)
PLATELET # BLD AUTO: 415 K/UL — HIGH (ref 150–400)
POTASSIUM SERPL-MCNC: 4.6 MMOL/L — SIGNIFICANT CHANGE UP (ref 3.5–5.3)
POTASSIUM SERPL-MCNC: 4.6 MMOL/L — SIGNIFICANT CHANGE UP (ref 3.5–5.3)
POTASSIUM SERPL-SCNC: 4.6 MMOL/L — SIGNIFICANT CHANGE UP (ref 3.5–5.3)
POTASSIUM SERPL-SCNC: 4.6 MMOL/L — SIGNIFICANT CHANGE UP (ref 3.5–5.3)
PROT SERPL-MCNC: 5.1 G/DL — LOW (ref 6–8.3)
PROT SERPL-MCNC: 5.1 G/DL — LOW (ref 6–8.3)
PROTHROM AB SERPL-ACNC: 21.4 SEC — HIGH (ref 9.5–13)
PROTHROM AB SERPL-ACNC: 21.4 SEC — HIGH (ref 9.5–13)
RBC # BLD: 2.8 M/UL — LOW (ref 3.8–5.2)
RBC # BLD: 2.8 M/UL — LOW (ref 3.8–5.2)
RBC # FLD: 14.9 % — HIGH (ref 10.3–14.5)
RBC # FLD: 14.9 % — HIGH (ref 10.3–14.5)
SODIUM SERPL-SCNC: 136 MMOL/L — SIGNIFICANT CHANGE UP (ref 135–145)
SODIUM SERPL-SCNC: 136 MMOL/L — SIGNIFICANT CHANGE UP (ref 135–145)
WBC # BLD: 9.8 K/UL — SIGNIFICANT CHANGE UP (ref 3.8–10.5)
WBC # BLD: 9.8 K/UL — SIGNIFICANT CHANGE UP (ref 3.8–10.5)
WBC # FLD AUTO: 9.8 K/UL — SIGNIFICANT CHANGE UP (ref 3.8–10.5)
WBC # FLD AUTO: 9.8 K/UL — SIGNIFICANT CHANGE UP (ref 3.8–10.5)

## 2024-01-01 PROCEDURE — 99232 SBSQ HOSP IP/OBS MODERATE 35: CPT | Mod: GC

## 2024-01-01 PROCEDURE — 99233 SBSQ HOSP IP/OBS HIGH 50: CPT

## 2024-01-01 RX ORDER — WARFARIN SODIUM 2.5 MG/1
6 TABLET ORAL ONCE
Refills: 0 | Status: COMPLETED | OUTPATIENT
Start: 2024-01-01 | End: 2024-01-01

## 2024-01-01 RX ADMIN — Medication 400 MILLIGRAM(S): at 22:36

## 2024-01-01 RX ADMIN — CARVEDILOL PHOSPHATE 3.12 MILLIGRAM(S): 80 CAPSULE, EXTENDED RELEASE ORAL at 06:14

## 2024-01-01 RX ADMIN — MIRTAZAPINE 30 MILLIGRAM(S): 45 TABLET, ORALLY DISINTEGRATING ORAL at 13:15

## 2024-01-01 RX ADMIN — Medication 400 MILLIGRAM(S): at 13:13

## 2024-01-01 RX ADMIN — Medication 20 MILLIGRAM(S): at 13:15

## 2024-01-01 RX ADMIN — Medication 400 MILLIGRAM(S): at 06:15

## 2024-01-01 RX ADMIN — Medication 20 MILLIGRAM(S): at 22:35

## 2024-01-01 RX ADMIN — Medication 20 MILLIGRAM(S): at 06:15

## 2024-01-01 RX ADMIN — Medication 1 MILLIGRAM(S): at 13:14

## 2024-01-01 RX ADMIN — CARVEDILOL PHOSPHATE 3.12 MILLIGRAM(S): 80 CAPSULE, EXTENDED RELEASE ORAL at 18:19

## 2024-01-01 RX ADMIN — SPIRONOLACTONE 100 MILLIGRAM(S): 25 TABLET, FILM COATED ORAL at 06:15

## 2024-01-01 RX ADMIN — PANTOPRAZOLE SODIUM 40 MILLIGRAM(S): 20 TABLET, DELAYED RELEASE ORAL at 06:16

## 2024-01-01 NOTE — PROGRESS NOTE ADULT - PROBLEM SELECTOR PLAN 1
Patient presented with body rash of b/l upper extremity and lower extremity of unknown etiology that is improving from initial presentation   Rash of unclear etiology, initiall ifnectious versus inflammatory now derm believes resolving ecchymosis  work up at [place] includes:   CMV IgG negative   LDH normal   Babesia negative   Lyme negative   RPR negative   ESR, CRP elevated   -consult rheumatology for possible vasculitis, appreciate recs for workup  -consult dermatology: likely resolving ecchymosis i/s/o coumadin use  -ophto consulted, recs pending Patient presented with body rash of b/l upper extremity and lower extremity of unknown etiology that is improving from initial presentation   Rash of unclear etiology, initiall ifnectious versus inflammatory now derm believes resolving ecchymosis  work up at [place] includes:   CMV IgG negative   LDH normal   Babesia negative   Lyme negative   RPR negative   ESR, CRP elevated   -consult rheumatology for possible vasculitis, appreciate recs for workup  -consult dermatology: likely resolving ecchymosis i/s/o coumadin use  -ophto consulted, recs pending Patient presented with body rash of b/l upper extremity and lower extremity of unknown etiology that is improving from initial presentation   Rash of unclear etiology, initiall ifnectious versus inflammatory now derm believes resolving ecchymosis  work up at [place] includes:   CMV IgG negative   LDH normal   Babesia negative   Lyme negative   RPR negative   ESR, CRP elevated   -consult rheumatology for possible vasculitis, appreciate recs for workup  -consult dermatology: likely resolving ecchymosis i/s/o coumadin use  -ophtho consulted, recs pending Patient presented with body rash of b/l upper extremity and lower extremity of unknown etiology that is improving from initial presentation   Rash of unclear etiology, initiall ifnectious versus inflammatory now derm believes resolving ecchymosis  work up at [place] includes:   CMV IgG negative   LDH normal   Babesia negative   Lyme negative   RPR negative   ESR, CRP elevated   -consult rheumatology for possible vasculitis, appreciate recs for workup  -consult dermatology: likely resolving ecchymosis i/s/o coumadin use  -ophtho consulted, recs pending Patient presented with body rash of b/l upper extremity and lower extremity of unknown etiology that is improving from initial presentation   Rash of unclear etiology, initiall ifnectious versus inflammatory now derm believes resolving ecchymosis  work up at [place] includes:   CMV IgG negative   LDH normal   Babesia negative   Lyme negative   RPR negative   ESR, CRP elevated   -consult rheumatology for possible vasculitis, appreciate recs for workup  -consult dermatology: likely resolving ecchymosis i/s/o coumadin use  -ophtho consulted for erythema around iris, likely subconjunctival hemorrhage no need to stop coumadin, will reassess tomorrow Patient presented with body rash of b/l upper extremity and lower extremity of unknown etiology that is improving from initial presentation   Rash of unclear etiology, initiall ifnectious versus inflammatory now derm believes resolving ecchymosis  work up at [place] includes:   CMV IgG negative   LDH normal   Babesia negative   Lyme negative   RPR negative   ESR, CRP elevated   -consult rheumatology for possible vasculitis, appreciate recs for workup  -consult dermatology: likely resolving ecchymosis i/s/o coumadin use  -ophtho consulted for erythema around iris, likely subconjunctival hemorrhage no need to stop coumadin, will reassess tomorrow

## 2024-01-01 NOTE — PROGRESS NOTE ADULT - PROBLEM SELECTOR PLAN 4
Patient with history of liver cirrhosis   -start rifaximin 550mg BID  -spironolactone 100mg daily Patient with history of liver cirrhosis   -c/w rifaximin 550mg BID  -spironolactone 100mg daily

## 2024-01-01 NOTE — PROGRESS NOTE ADULT - SUBJECTIVE AND OBJECTIVE BOX
ANAYA JOINER 58y Female      Patient is a 58y old  Female who presents with a chief complaint of transfer from [place] for tertiary level care in the setting of diffuse body rash (30 Dec 2023 22:18)        INTERVAL HPI/OVERNIGHT EVENTS: No acute events overnight. Patient was seen and evaluated at the bedside. The patient endorses abdominal discomfort and diarrhea. Vitals stable. Patient denies fever/chills, chest pain, shortness of breath,, headaches, nausea/vomiting, or visual changes.       PHYSICAL EXAM:  GENERAL: NAD  HEAD:  Atraumatic, Normocephalic  EYES: left eye on the lateral side with mild redness+ no drainage, rt eye no redness  ENMT: MMM  NECK: Supple, No JVD  NERVOUS SYSTEM: AOX3, motor and sensation grossly intact in b/l UE and b/l LE  PSYCHIATRIC: Appropriate affect and mood  CHEST/LUNG: Clear to auscultation bilaterally; No rales, rhonchi, wheezing, or rubs  HEART: Regular rate and rhythm; No murmurs, rubs, or gallops. No LE edema  ABDOMEN: Soft, mild epigastric tenderness, Nondistended; Bowel sounds present  EXTREMITIES:  2+ Peripheral Pulses, No clubbing, cyanosis  SKIN:  rash improving        Vital Signs Last 24 Hrs  T(C): 37 (31 Dec 2023 05:51), Max: 37.2 (30 Dec 2023 17:02)  T(F): 98.6 (31 Dec 2023 05:51), Max: 99 (30 Dec 2023 17:02)  HR: 84 (31 Dec 2023 05:51) (84 - 100)  BP: 101/63 (31 Dec 2023 05:51) (100/60 - 128/69)  BP(mean): 85 (30 Dec 2023 17:02) (65 - 85)  RR: 18 (31 Dec 2023 05:51) (18 - 19)  SpO2: 98% (31 Dec 2023 05:51) (95% - 99%)    Parameters below as of 31 Dec 2023 05:51  Patient On (Oxygen Delivery Method): room air          Consultant(s) Notes Reviewed:  [X] YES  [ ] NO  Care Discussed with Consultants/Other Providers [X] YES  [ ] NO  Imaging Personally Reviewed:  [X] YES  [ ] NO      LABS:                        10.0   7.81  )-----------( 485      ( 30 Dec 2023 05:52 )             30.9     12-30    140  |  107  |  17  ----------------------------<  81  3.4<L>   |  18<L>  |  0.74    Ca    8.2<L>      30 Dec 2023 05:52  Phos  3.3     12-30  Mg     1.70     12-30    TPro  5.3<L>  /  Alb  2.8<L>  /  TBili  0.2  /  DBili  x   /  AST  10  /  ALT  <5  /  AlkPhos  114  12-30    PT/INR - ( 30 Dec 2023 05:52 )   PT: 17.2 sec;   INR: 1.56 ratio           Urinalysis Basic - ( 30 Dec 2023 05:52 )    Color: x / Appearance: x / SG: x / pH: x  Gluc: 81 mg/dL / Ketone: x  / Bili: x / Urobili: x   Blood: x / Protein: x / Nitrite: x   Leuk Esterase: x / RBC: x / WBC x   Sq Epi: x / Non Sq Epi: x / Bacteria: x        CAPILLARY BLOOD GLUCOSE      POCT Blood Glucose.: 134 mg/dL (30 Dec 2023 21:11)  POCT Blood Glucose.: 110 mg/dL (30 Dec 2023 17:28)  POCT Blood Glucose.: 126 mg/dL (30 Dec 2023 15:23)  POCT Blood Glucose.: 83 mg/dL (30 Dec 2023 08:44)           ANAYA JOINER 58y Female      Patient is a 58y old  Female who presents with a chief complaint of transfer from [place] for tertiary level care in the setting of diffuse body rash (30 Dec 2023 22:18)        INTERVAL HPI/OVERNIGHT EVENTS: No acute events overnight. Patient was seen and evaluated at the bedside. The patient endorses abdominal discomfort and diarrhea. Vitals stable. Patient denies fever/chills, chest pain, shortness of breath,, headaches, nausea/vomiting, or visual changes.       PHYSICAL EXAM:  GENERAL: NAD  HEAD:  Atraumatic, Normocephalic  EYES: left eye on the lateral side with mild redness+ no drainage, rt eye no redness  ENMT: MMM  NECK: Supple, No JVD  NERVOUS SYSTEM: AOX3, motor and sensation grossly intact in b/l UE and b/l LE  PSYCHIATRIC: Appropriate affect and mood  CHEST/LUNG: Clear to auscultation bilaterally; No rales, rhonchi, wheezing, or rubs  HEART: Regular rate and rhythm; No murmurs, rubs, or gallops. No LE edema  ABDOMEN: Soft, mild epigastric tenderness, Nondistended; Bowel sounds present  EXTREMITIES:  2+ Peripheral Pulses, No clubbing, cyanosis  SKIN:  rash improving        Vital Signs Last 24 Hrs  T(C): 37 (31 Dec 2023 05:51), Max: 37.2 (30 Dec 2023 17:02)  T(F): 98.6 (31 Dec 2023 05:51), Max: 99 (30 Dec 2023 17:02)  HR: 84 (31 Dec 2023 05:51) (84 - 100)  BP: 101/63 (31 Dec 2023 05:51) (100/60 - 128/69)  BP(mean): 85 (30 Dec 2023 17:02) (65 - 85)  RR: 18 (31 Dec 2023 05:51) (18 - 19)  SpO2: 98% (31 Dec 2023 05:51) (95% - 99%)    Parameters below as of 31 Dec 2023 05:51  Patient On (Oxygen Delivery Method): room air          Consultant(s) Notes Reviewed:  [X] YES  [ ] NO  Care Discussed with Consultants/Other Providers [X] YES  [ ] NO  Imaging Personally Reviewed:  [X] YES  [ ] NO      LABS:                        10.0   7.81  )-----------( 485      ( 30 Dec 2023 05:52 )             30.9     12-30    140  |  107  |  17  ----------------------------<  81  3.4<L>   |  18<L>  |  0.74    Ca    8.2<L>      30 Dec 2023 05:52  Phos  3.3     12-30  Mg     1.70     12-30    TPro  5.3<L>  /  Alb  2.8<L>  /  TBili  0.2  /  DBili  x   /  AST  10  /  ALT  <5  /  AlkPhos  114  12-30    PT/INR - ( 30 Dec 2023 05:52 )   PT: 17.2 sec;   INR: 1.56 ratio           Urinalysis Basic - ( 30 Dec 2023 05:52 )    Color: x / Appearance: x / SG: x / pH: x  Gluc: 81 mg/dL / Ketone: x  / Bili: x / Urobili: x   Blood: x / Protein: x / Nitrite: x   Leuk Esterase: x / RBC: x / WBC x   Sq Epi: x / Non Sq Epi: x / Bacteria: x        CAPILLARY BLOOD GLUCOSE      POCT Blood Glucose.: 134 mg/dL (30 Dec 2023 21:11)  POCT Blood Glucose.: 110 mg/dL (30 Dec 2023 17:28)  POCT Blood Glucose.: 126 mg/dL (30 Dec 2023 15:23)  POCT Blood Glucose.: 83 mg/dL (30 Dec 2023 08:44)           ANAYA JOINER 58y Female      Patient is a 58y old  Female who presents with a chief complaint of transfer from [place] for tertiary level care in the setting of diffuse body rash (30 Dec 2023 22:18)        INTERVAL HPI/OVERNIGHT EVENTS: No acute events overnight. Patient was seen and evaluated at the bedside. The patient still endorses abdominal discomfort and diarrhea. She stated she has had no improvement on the steroids. Vitals stable. Patient denies fever/chills, chest pain, shortness of breath,, headaches, nausea/vomiting, or visual changes.       PHYSICAL EXAM:  GENERAL: NAD  HEAD:  Atraumatic, Normocephalic  EYES: left eye on the lateral side with mild redness+ no drainage, rt eye no redness  ENMT: MMM  NECK: Supple, No JVD  NERVOUS SYSTEM: AOX3, motor and sensation grossly intact in b/l UE and b/l LE  PSYCHIATRIC: Appropriate affect and mood  CHEST/LUNG: Clear to auscultation bilaterally; No rales, rhonchi, wheezing, or rubs  HEART: Regular rate and rhythm; No murmurs, rubs, or gallops. No LE edema  ABDOMEN: Soft, mild epigastric tenderness, Nondistended; Bowel sounds present  EXTREMITIES:  2+ Peripheral Pulses, No clubbing, cyanosis  SKIN:  rash improving    Vital Signs Last 24 Hrs  T(C): 37.1 (01 Jan 2024 06:12), Max: 37.1 (01 Jan 2024 06:12)  T(F): 98.8 (01 Jan 2024 06:12), Max: 98.8 (01 Jan 2024 06:12)  HR: 69 (01 Jan 2024 06:12) (69 - 86)  BP: 118/73 (01 Jan 2024 06:12) (103/63 - 150/55)  BP(mean): --  RR: 16 (01 Jan 2024 06:12) (16 - 18)  SpO2: 100% (01 Jan 2024 06:12) (98% - 100%)    Parameters below as of 01 Jan 2024 06:12  Patient On (Oxygen Delivery Method): room air      Consultant(s) Notes Reviewed:  [X] YES  [ ] NO  Care Discussed with Consultants/Other Providers [X] YES  [ ] NO  Imaging Personally Reviewed:  [X] YES  [ ] NO      LABS:    CBC Full  -  ( 01 Jan 2024 06:27 )  WBC Count : 9.80 K/uL  RBC Count : 2.80 M/uL  Hemoglobin : 8.5 g/dL  Hematocrit : 26.1 %  Platelet Count - Automated : 415 K/uL  Mean Cell Volume : 93.2 fL  Mean Cell Hemoglobin : 30.4 pg  Mean Cell Hemoglobin Concentration : 32.6 gm/dL  Auto Neutrophil # : x  Auto Lymphocyte # : x  Auto Monocyte # : x  Auto Eosinophil # : x  Auto Basophil # : x  Auto Neutrophil % : x  Auto Lymphocyte % : x  Auto Monocyte % : x  Auto Eosinophil % : x  Auto Basophil % : x    01-01    136  |  106  |  17  ----------------------------<  158<H>  4.6   |  20<L>  |  0.61    Ca    8.1<L>      01 Jan 2024 06:27  Phos  3.5     01-01  Mg     1.80     01-01    TPro  5.1<L>  /  Alb  2.7<L>  /  TBili  <0.2  /  DBili  x   /  AST  14  /  ALT  6   /  AlkPhos  100  01-01       Urinalysis Basic - ( 30 Dec 2023 05:52 )    Color: x / Appearance: x / SG: x / pH: x  Gluc: 81 mg/dL / Ketone: x  / Bili: x / Urobili: x   Blood: x / Protein: x / Nitrite: x   Leuk Esterase: x / RBC: x / WBC x   Sq Epi: x / Non Sq Epi: x / Bacteria: x        CAPILLARY BLOOD GLUCOSE      POCT Blood Glucose.: 134 mg/dL (30 Dec 2023 21:11)  POCT Blood Glucose.: 110 mg/dL (30 Dec 2023 17:28)  POCT Blood Glucose.: 126 mg/dL (30 Dec 2023 15:23)  POCT Blood Glucose.: 83 mg/dL (30 Dec 2023 08:44)           ANAYA JOINER 58y Female      Patient is a 58y old  Female who presents with a chief complaint of transfer from [place] for tertiary level care in the setting of diffuse body rash (30 Dec 2023 22:18)        INTERVAL HPI/OVERNIGHT EVENTS: No acute events overnight. Patient was seen and evaluated at the bedside. The patient still endorses abdominal discomfort and diarrhea. She stated she has had no improvement on the steroids. Vitals stable. Patient denies fever/chills, chest pain, shortness of breath,, headaches, nausea/vomiting, or visual changes.       PHYSICAL EXAM:  GENERAL: NAD  HEAD:  Atraumatic, Normocephalic  EYES: left eye on the lateral side with mild redness+ no drainage, rt eye no redness  ENMT: MMM  NECK: Supple, No JVD  NERVOUS SYSTEM: AOX3, motor and sensation grossly intact in b/l UE and b/l LE  PSYCHIATRIC: Appropriate affect and mood  CHEST/LUNG: Clear to auscultation bilaterally; No rales, rhonchi, wheezing, or rubs  HEART: Regular rate and rhythm; No murmurs, rubs, or gallops. No LE edema  ABDOMEN: Soft, mild epigastric tenderness, Nondistended; Bowel sounds present  EXTREMITIES:  2+ Peripheral Pulses, No clubbing, cyanosis  SKIN:  rash improving    Vital Signs Last 24 Hrs  T(C): 37.1 (01 Jan 2024 06:12), Max: 37.1 (01 Jan 2024 06:12)  T(F): 98.8 (01 Jan 2024 06:12), Max: 98.8 (01 Jan 2024 06:12)  HR: 69 (01 Jan 2024 06:12) (69 - 86)  BP: 118/73 (01 Jan 2024 06:12) (103/63 - 150/55)  BP(mean): --  RR: 16 (01 Jan 2024 06:12) (16 - 18)  SpO2: 100% (01 Jan 2024 06:12) (98% - 100%)    Parameters below as of 01 Jan 2024 06:12  Patient On (Oxygen Delivery Method): room air      Consultant(s) Notes Reviewed:  [X] YES  [ ] NO  Care Discussed with Consultants/Other Providers [X] YES  [ ] NO  Imaging Personally Reviewed:  [X] YES  [ ] NO      LABS:    CBC Full  -  ( 01 Jan 2024 06:27 )  WBC Count : 9.80 K/uL  RBC Count : 2.80 M/uL  Hemoglobin : 8.5 g/dL  Hematocrit : 26.1 %  Platelet Count - Automated : 415 K/uL  Mean Cell Volume : 93.2 fL  Mean Cell Hemoglobin : 30.4 pg  Mean Cell Hemoglobin Concentration : 32.6 gm/dL  Auto Neutrophil # : x  Auto Lymphocyte # : x  Auto Monocyte # : x  Auto Eosinophil # : x  Auto Basophil # : x  Auto Neutrophil % : x  Auto Lymphocyte % : x  Auto Monocyte % : x  Auto Eosinophil % : x  Auto Basophil % : x    01-01    136  |  106  |  17  ----------------------------<  158<H>  4.6   |  20<L>  |  0.61    Ca    8.1<L>      01 Jan 2024 06:27  Phos  3.5     01-01  Mg     1.80     01-01    TPro  5.1<L>  /  Alb  2.7<L>  /  TBili  <0.2  /  DBili  x   /  AST  14  /  ALT  6   /  AlkPhos  100  01-01       Urinalysis Basic - ( 30 Dec 2023 05:52 )    Color: x / Appearance: x / SG: x / pH: x  Gluc: 81 mg/dL / Ketone: x  / Bili: x / Urobili: x   Blood: x / Protein: x / Nitrite: x   Leuk Esterase: x / RBC: x / WBC x   Sq Epi: x / Non Sq Epi: x / Bacteria: x        CAPILLARY BLOOD GLUCOSE      POCT Blood Glucose.: 134 mg/dL (30 Dec 2023 21:11)  POCT Blood Glucose.: 110 mg/dL (30 Dec 2023 17:28)  POCT Blood Glucose.: 126 mg/dL (30 Dec 2023 15:23)  POCT Blood Glucose.: 83 mg/dL (30 Dec 2023 08:44)           ANAYA JOINER 58y Female      Patient is a 58y old  Female who presents with a chief complaint of transfer from [place] for tertiary level care in the setting of diffuse body rash (30 Dec 2023 22:18)        INTERVAL HPI/OVERNIGHT EVENTS: No acute events overnight. Patient was seen and evaluated at the bedside. The patient still endorses abdominal discomfort and diarrhea. She stated she has had no improvement on the steroids. Vitals stable. Patient denies fever/chills, chest pain, shortness of breath,, headaches, nausea/vomiting, or visual changes.       PHYSICAL EXAM:  GENERAL: NAD  HEAD:  Atraumatic, Normocephalic  EYES: left eye and rt eye with mild redness+ no drainage  ENMT: MMM  NECK: Supple, No JVD  NERVOUS SYSTEM: AOX3, motor and sensation grossly intact in b/l UE and b/l LE  PSYCHIATRIC: Appropriate affect and mood  CHEST/LUNG: Clear to auscultation bilaterally; No rales, rhonchi, wheezing, or rubs  HEART: Regular rate and rhythm; No murmurs, rubs, or gallops. No LE edema  ABDOMEN: Soft, mild epigastric tenderness, Nondistended; Bowel sounds present  EXTREMITIES:  2+ Peripheral Pulses, No clubbing, cyanosis  SKIN:  rash improving    Vital Signs Last 24 Hrs  T(C): 37.1 (01 Jan 2024 06:12), Max: 37.1 (01 Jan 2024 06:12)  T(F): 98.8 (01 Jan 2024 06:12), Max: 98.8 (01 Jan 2024 06:12)  HR: 69 (01 Jan 2024 06:12) (69 - 86)  BP: 118/73 (01 Jan 2024 06:12) (103/63 - 150/55)  BP(mean): --  RR: 16 (01 Jan 2024 06:12) (16 - 18)  SpO2: 100% (01 Jan 2024 06:12) (98% - 100%)    Parameters below as of 01 Jan 2024 06:12  Patient On (Oxygen Delivery Method): room air      Consultant(s) Notes Reviewed:  [X] YES  [ ] NO  Care Discussed with Consultants/Other Providers [X] YES  [ ] NO  Imaging Personally Reviewed:  [X] YES  [ ] NO      LABS:    CBC Full  -  ( 01 Jan 2024 06:27 )  WBC Count : 9.80 K/uL  RBC Count : 2.80 M/uL  Hemoglobin : 8.5 g/dL  Hematocrit : 26.1 %  Platelet Count - Automated : 415 K/uL  Mean Cell Volume : 93.2 fL  Mean Cell Hemoglobin : 30.4 pg  Mean Cell Hemoglobin Concentration : 32.6 gm/dL  Auto Neutrophil # : x  Auto Lymphocyte # : x  Auto Monocyte # : x  Auto Eosinophil # : x  Auto Basophil # : x  Auto Neutrophil % : x  Auto Lymphocyte % : x  Auto Monocyte % : x  Auto Eosinophil % : x  Auto Basophil % : x    01-01    136  |  106  |  17  ----------------------------<  158<H>  4.6   |  20<L>  |  0.61    Ca    8.1<L>      01 Jan 2024 06:27  Phos  3.5     01-01  Mg     1.80     01-01    TPro  5.1<L>  /  Alb  2.7<L>  /  TBili  <0.2  /  DBili  x   /  AST  14  /  ALT  6   /  AlkPhos  100  01-01       Urinalysis Basic - ( 30 Dec 2023 05:52 )    Color: x / Appearance: x / SG: x / pH: x  Gluc: 81 mg/dL / Ketone: x  / Bili: x / Urobili: x   Blood: x / Protein: x / Nitrite: x   Leuk Esterase: x / RBC: x / WBC x   Sq Epi: x / Non Sq Epi: x / Bacteria: x        CAPILLARY BLOOD GLUCOSE      POCT Blood Glucose.: 134 mg/dL (30 Dec 2023 21:11)  POCT Blood Glucose.: 110 mg/dL (30 Dec 2023 17:28)  POCT Blood Glucose.: 126 mg/dL (30 Dec 2023 15:23)  POCT Blood Glucose.: 83 mg/dL (30 Dec 2023 08:44)           ANAYA JOINER 58y Female      Patient is a 58y old  Female who presents with a chief complaint of transfer from [place] for tertiary level care in the setting of diffuse body rash (30 Dec 2023 22:18)        INTERVAL HPI/OVERNIGHT EVENTS: No acute events overnight. Patient was seen and evaluated at the bedside. The patient still endorses abdominal discomfort and diarrhea. She stated she has had no improvement on the steroids. Vitals stable. Patient denies fever/chills, chest pain, shortness of breath,, headaches, nausea/vomiting, or visual changes.       PHYSICAL EXAM:  GENERAL: NAD  HEAD:  Atraumatic, Normocephalic  EYES: left eye and rt eye with mild redness+ no drainage  ENMT: MMM  NECK: Supple, No JVD  NERVOUS SYSTEM: AOX3, motor and sensation grossly intact in b/l UE and b/l LE  PSYCHIATRIC: Appropriate affect and mood  CHEST/LUNG: Clear to auscultation bilaterally; No rales, rhonchi, wheezing, or rubs  HEART: Regular rate and rhythm; No murmurs, rubs, or gallops. No LE edema  ABDOMEN: Soft, mild epigastric tenderness, Nondistended; Bowel sounds present  EXTREMITIES:  2+ Peripheral Pulses, No clubbing, cyanosis  SKIN:  rash improving    Vital Signs Last 24 Hrs  T(C): 37.1 (01 Jan 2024 06:12), Max: 37.1 (01 Jan 2024 06:12)  T(F): 98.8 (01 Jan 2024 06:12), Max: 98.8 (01 Jan 2024 06:12)  HR: 69 (01 Jan 2024 06:12) (69 - 86)  BP: 118/73 (01 Jan 2024 06:12) (103/63 - 150/55)  BP(mean): --  RR: 16 (01 Jan 2024 06:12) (16 - 18)  SpO2: 100% (01 Jan 2024 06:12) (98% - 100%)    Parameters below as of 01 Jan 2024 06:12  Patient On (Oxygen Delivery Method): room air      Consultant(s) Notes Reviewed:  [X] YES  [ ] NO  Care Discussed with Consultants/Other Providers [X] YES  [ ] NO  Imaging Personally Reviewed:  [X] YES  [ ] NO      LABS:    CBC Full  -  ( 01 Jan 2024 06:27 )  WBC Count : 9.80 K/uL  RBC Count : 2.80 M/uL  Hemoglobin : 8.5 g/dL  Hematocrit : 26.1 %  Platelet Count - Automated : 415 K/uL  Mean Cell Volume : 93.2 fL  Mean Cell Hemoglobin : 30.4 pg  Mean Cell Hemoglobin Concentration : 32.6 gm/dL  Auto Neutrophil # : x  Auto Lymphocyte # : x  Auto Monocyte # : x  Auto Eosinophil # : x  Auto Basophil # : x  Auto Neutrophil % : x  Auto Lymphocyte % : x  Auto Monocyte % : x  Auto Eosinophil % : x  Auto Basophil % : x    01-01    136  |  106  |  17  ----------------------------<  158<H>  4.6   |  20<L>  |  0.61    Ca    8.1<L>      01 Jan 2024 06:27  Phos  3.5     01-01  Mg     1.80     01-01    TPro  5.1<L>  /  Alb  2.7<L>  /  TBili  <0.2  /  DBili  x   /  AST  14  /  ALT  6   /  AlkPhos  100  01-01       Urinalysis Basic - ( 30 Dec 2023 05:52 )    Color: x / Appearance: x / SG: x / pH: x  Gluc: 81 mg/dL / Ketone: x  / Bili: x / Urobili: x   Blood: x / Protein: x / Nitrite: x   Leuk Esterase: x / RBC: x / WBC x   Sq Epi: x / Non Sq Epi: x / Bacteria: x        CAPILLARY BLOOD GLUCOSE      POCT Blood Glucose.: 134 mg/dL (30 Dec 2023 21:11)  POCT Blood Glucose.: 110 mg/dL (30 Dec 2023 17:28)  POCT Blood Glucose.: 126 mg/dL (30 Dec 2023 15:23)  POCT Blood Glucose.: 83 mg/dL (30 Dec 2023 08:44)

## 2024-01-01 NOTE — PROGRESS NOTE ADULT - ATTENDING COMMENTS
Pt seen and examined, agree with the note done by HS, briefly this is a 58F CAD, diabetes, insomnia, MDD, GERD, hypertension, irritable bowel syndrome with diarrhea, ulcerative colitis, cirrhosis, NAFLD, hepatic encephalopathy, bipolar disorder, chronic Afib on Coumadin, blepharitis of the left eye who initially presented to [facility] with periorbital swelling, purpuric rash and pancolitis, transferred for derm and rheum eval  --rash and periorbital swelling has greatly improved, appreciate derm eval   --required 1 U PRBC at [place] 12/27, monitor Hb, continue mesalamine  --GI following  per GI concern for UC flare, cleared by ID, derm and optho for steroids, f/u quant gold pending and calprotectin  - hep panel  neg, Cont iv steroids per GI recs  --Cont coumadin.   -Francis mountain spotted fever igm ab+ ID consult appreciated, Per ID not significant  -Redness of both eyes- per optho likely subconjunctival hemorrhage, no need to stop coumadin, monitor closely, f/u optho recs  -UE doppler neg for dvt      Plan discussed with HS . Pt seen and examined, agree with the note done by HS, briefly this is a 58F CAD, diabetes, insomnia, MDD, GERD, hypertension, irritable bowel syndrome with diarrhea, ulcerative colitis, cirrhosis, NAFLD, hepatic encephalopathy, bipolar disorder, chronic Afib on Coumadin, blepharitis of the left eye who initially presented to [facility] with periorbital swelling, purpuric rash and pancolitis, transferred for derm and rheum eval  --rash and periorbital swelling has greatly improved, appreciate derm eval   --required 1 U PRBC at [place] 12/27, monitor Hb, continue mesalamine  --GI following  per GI concern for UC flare, cleared by ID, derm and optho for steroids, f/u quant gold pending and calprotectin  - hep panel  neg, Cont iv steroids per GI recs  --Cont coumadin.   -Francis mountain spotted fever igm ab+ ID consult appreciated, Per ID not significant  -Redness of both eyes- per optho likely subconjunctival hemorrhage, no need to stop coumadin, monitor closely, f/u optho recs  -UE doppler neg for dvt      Plan discussed with HS .

## 2024-01-01 NOTE — PROGRESS NOTE ADULT - ASSESSMENT
Impression:   58 yrs old male w/ hx of CAD (not on anti-platelets), insomnia, DM, GERD, MDD, HTN, Ulcerative colitis, Bipolar disorder, cirrhosis (unknown etiology, NAFLD?) c/w HE, Afib (on Coumadin), and Blepharitis who initially presented to [facility] for hypotension, diffuse body rashes and pancolitis.     #"mild" Pancolitis  #Ulcerative colitis?   - Reported bloody stools w/ fecal incontinence worsening for the past one year. Underwent two colonoscopies on 11/2022 and 3/2023 but no records present. CT A/P showed thickening, hyperemia and mild inflammation throughout the colon. Does not follow w/ GI as o/p. No prior biologic use. Previously responded to steroids.   - Ideally would need colonoscopy report or repeat colonoscopy to confirm her hx of UC.   - GI PCR and c diff testing neg from 12/25.   - On oral mesalamine 400 mg TID.   - Concerned for UC flare, infectious work up neg.   - declined flex sig/colonoscopy   - hepatitis B serologies negative   - fecal calprotectin 2050   - CRP downtrending     #Cirrhosis?   - Unclear if the patient has cirrhosis b/c the CT imaging showed normal liver w/ no splenomegaly. Her INR is elevated in the setting of Coumadin use. She has thrombocytopenia in the setting of possible infection.   - Less concerned for cirrhosis based on the clinical picture, not been seen by hepatologist.   - On rifaximin and Aldactone per home meds.     Recommendations:   - c/w IV steroids 20 q8h (12/31)   - PPI 40mg daily   - Continue with mesalamine 400 mg TID for now  - please document stool volume daily   - Please obtain ESR and CRP daily.   - Quantiferon TB testing pending   - DVT PPx   - CBC daily.     GI will continue to follow.     All recommendations are tentative until note is attested by an attending.    Impression:   58 yrs old male w/ hx of CAD (not on anti-platelets), insomnia, DM, GERD, MDD, HTN, Ulcerative colitis, Bipolar disorder, cirrhosis (unknown etiology, NAFLD?) c/w HE, Afib (on Coumadin), and Blepharitis who initially presented to [facility] for hypotension, diffuse body rashes and pancolitis.     #"mild" Pancolitis  #Ulcerative colitis?   - Reported bloody stools w/ fecal incontinence worsening for the past one year. Underwent two colonoscopies on 11/2022 and 3/2023 but no records present. CT A/P showed thickening, hyperemia and mild inflammation throughout the colon. Does not follow w/ GI as o/p. No prior biologic use. Previously responded to steroids.   - Ideally would need colonoscopy report or repeat colonoscopy to confirm her hx of UC.   - GI PCR and c diff testing neg from 12/25.   - On oral mesalamine 400 mg TID.   - Concerned for UC flare, infectious work up neg.   - declined flex sig/colonoscopy   - hepatitis B serologies negative   - fecal calprotectin 2050   - CRP downtrending     #Cirrhosis?   - Unclear if the patient has cirrhosis b/c the CT imaging showed normal liver w/ no splenomegaly. Her INR is elevated in the setting of Coumadin use. She has thrombocytopenia in the setting of possible infection.   - Less concerned for cirrhosis based on the clinical picture, not been seen by hepatologist.   - On rifaximin and Aldactone per home meds.     Recommendations:   - c/w IV steroids 20 q8h (12/31)   - PPI 40mg daily   - Continue with mesalamine 400 mg TID for now  - please document stool volume daily   - Please obtain ESR and CRP daily.   - Quantiferon TB testing pending   - DVT PPx   - CBC daily.     GI will continue to follow.     All recommendations are tentative until note is attested by an attending.

## 2024-01-01 NOTE — PROGRESS NOTE ADULT - SUBJECTIVE AND OBJECTIVE BOX
Gastroenterology/Hepatology Progress Note    Interval Events:   - patient with logged 500ml in rectal tube   - pt with continued abd pain     Allergies:  No Known Allergies      Hospital Medications:  acetaminophen     Tablet .. 650 milliGRAM(s) Oral every 6 hours PRN  aluminum hydroxide/magnesium hydroxide/simethicone Suspension 30 milliLiter(s) Oral every 6 hours PRN  carvedilol 3.125 milliGRAM(s) Oral every 12 hours  dextrose 5%. 1000 milliLiter(s) IV Continuous <Continuous>  dextrose 5%. 1000 milliLiter(s) IV Continuous <Continuous>  dextrose 50% Injectable 12.5 Gram(s) IV Push once  dextrose 50% Injectable 25 Gram(s) IV Push once  dextrose 50% Injectable 25 Gram(s) IV Push once  dextrose Oral Gel 15 Gram(s) Oral once PRN  folic acid 1 milliGRAM(s) Oral daily  glucagon  Injectable 1 milliGRAM(s) IntraMuscular once  influenza   Vaccine 0.5 milliLiter(s) IntraMuscular once  insulin lispro (ADMELOG) corrective regimen sliding scale   SubCutaneous at bedtime  insulin lispro (ADMELOG) corrective regimen sliding scale   SubCutaneous three times a day before meals  lactated ringers. 1000 milliLiter(s) IV Continuous <Continuous>  mesalamine DR Capsule 400 milliGRAM(s) Oral three times a day  methylPREDNISolone sodium succinate Injectable 20 milliGRAM(s) IV Push every 8 hours  mirtazapine 30 milliGRAM(s) Oral daily  ondansetron Injectable 4 milliGRAM(s) IV Push once  pantoprazole    Tablet 40 milliGRAM(s) Oral before breakfast  rifAXIMin 550 milliGRAM(s) Oral two times a day  spironolactone 100 milliGRAM(s) Oral daily      ROS: 14 point ROS negative unless otherwise state in subjective    PHYSICAL EXAM:   Vital Signs:  Vital Signs Last 24 Hrs  T(C): 37.1 (01 Jan 2024 06:12), Max: 37.1 (01 Jan 2024 06:12)  T(F): 98.8 (01 Jan 2024 06:12), Max: 98.8 (01 Jan 2024 06:12)  HR: 69 (01 Jan 2024 06:12) (69 - 86)  BP: 118/73 (01 Jan 2024 06:12) (103/63 - 150/55)  BP(mean): --  RR: 16 (01 Jan 2024 06:12) (16 - 18)  SpO2: 100% (01 Jan 2024 06:12) (98% - 100%)    Parameters below as of 01 Jan 2024 06:12  Patient On (Oxygen Delivery Method): room air      Daily     Daily     GENERAL:  No acute distress, chronically ill appearing, lying in bed.   HEENT:  NCAT, no scleral icterus   CHEST:  no respiratory distress  HEART:  Regular rate and rhythm  ABDOMEN:  Soft, mild diffuse tenderness, non-distended, no palpable masses.   RECTUM: has rectal tube which has dark brown watery stool w/ no blood.   EXTREMITIES: No LE edema b/l  SKIN: No visible rashes seen.   NEURO:  Alert and oriented x 3, no tremors, no asterixis.     LABS:                        8.5    9.80  )-----------( 415      ( 01 Jan 2024 06:27 )             26.1     Mean Cell Volume: 93.2 fL (01-01-24 @ 06:27)    01-01    136  |  106  |  17  ----------------------------<  158<H>  4.6   |  20<L>  |  0.61    Ca    8.1<L>      01 Jan 2024 06:27  Phos  3.5     01-01  Mg     1.80     01-01    TPro  5.1<L>  /  Alb  2.7<L>  /  TBili  <0.2  /  DBili  x   /  AST  14  /  ALT  6   /  AlkPhos  100  01-01    LIVER FUNCTIONS - ( 01 Jan 2024 06:27 )  Alb: 2.7 g/dL / Pro: 5.1 g/dL / ALK PHOS: 100 U/L / ALT: 6 U/L / AST: 14 U/L / GGT: x           PT/INR - ( 31 Dec 2023 06:09 )   PT: 15.2 sec;   INR: 1.36 ratio         PTT - ( 31 Dec 2023 06:09 )  PTT:30.0 sec  Urinalysis Basic - ( 01 Jan 2024 06:27 )    Color: x / Appearance: x / SG: x / pH: x  Gluc: 158 mg/dL / Ketone: x  / Bili: x / Urobili: x   Blood: x / Protein: x / Nitrite: x   Leuk Esterase: x / RBC: x / WBC x   Sq Epi: x / Non Sq Epi: x / Bacteria: x            Imaging:

## 2024-01-01 NOTE — PROGRESS NOTE ADULT - PROBLEM SELECTOR PLAN 2
Patient with history of ulcerative colitis found to have pancolitis   -likely ulcerative colitis flare   -continue mesalamine 400mg TID   -start IV steroids 20 q8h   -start PPI 40mg daily   -regular diet  -GI consulted, appreciate recs Patient with history of ulcerative colitis found to have pancolitis   -likely ulcerative colitis flare   -continue mesalamine 400mg TID    c/w IV steroids 20 q8h (12/31)   - PPI 40mg daily   -Continue with mesalamine 400 mg TID for now  -regular diet  -GI following

## 2024-01-01 NOTE — PROGRESS NOTE ADULT - PROBLEM SELECTOR PLAN 3
Patient with anemia with iron studies more consistent with anemia of chronic disease   -B12 and folate WNL   -patient had drop of Hgb to 6.9 at [place] prior to transfer, transfused with 1pRBC with response to 9.5  -continue to monitor   -transfuse for Hgb >7 Patient with anemia with iron studies more consistent with anemia of chronic disease   -B12 and folate WNL   -patient had drop of Hgb to 6.9 at [place] prior to transfer, transfused with 1pRBC with response to 9.5  -continue to monitor   -transfuse for Hgb >7

## 2024-01-01 NOTE — PROGRESS NOTE ADULT - ASSESSMENT
58 y F w PMHx of CAD, diabetes, insomnia, MDD, GERD, hypertension, irritable bowel syndrome with diarrhea, ulcerative colitis, cirrhosis, NAFLD, hepatic encephalopathy, bipolar disorder, chronic Afib on Coumadin, blepharitis of the left eye who initially presented to [place] due to reported hypotension, tachycardia and diffuse purpura. Patient transferred to [facility] for further workup and consultations for her body rash.  58 y F w PMHx of CAD, diabetes, insomnia, MDD, GERD, hypertension, irritable bowel syndrome with diarrhea, ulcerative colitis, cirrhosis, NAFLD, hepatic encephalopathy, bipolar disorder, chronic Afib on Coumadin, blepharitis of the left eye who initially presented to [place] due to reported hypotension, tachycardia and diffuse purpura. Patient transferred to [facility] for further workup and consultations for her body rash.

## 2024-01-01 NOTE — PROGRESS NOTE ADULT - REASON FOR ADMISSION
transfer from [place] for tertiary level care in the setting of diffuse body rash transfer from [place] for tertiary level care in the setting of diffuse body rash

## 2024-01-01 NOTE — PROGRESS NOTE ADULT - ATTENDING COMMENTS
GI following for hx of UC   Transferred from [place] for optho, derm, rheum eval   "Mild" pan colitis on CT scan   Liquid brown stool   cleared by ID and optho for steriods   CRP downtrending today   calprotectin - 2050    continue IV steroids for possible UC flare   PPI BID   trend crp   continue mesalamine   send quant gold and hep B serologies   DVT ppx   please obtain prior cscope records   patient declines repeat cscope or flex sig R/B discussed. If she agrees, can plan for eval this week.     GI To follow, please call with questions GI following for hx of UC   Transferred from [place] for optho, derm, rheum eval   "Mild" pan colitis on CT scan   Liquid brown stool   cleared by ID and optho for steriods   CRP downtrending today   calprotectin - 2050    continue IV steroids for possible UC flare   PPI BID   trend crp   continue mesalamine   send quant gold and hep B serologies   DVT ppx   please obtain prior cscope records   patient declines repeat cscope or flex sig R/B discussed. If she agrees, can plan for eval this week.     GI To follow, please call with questions

## 2024-01-01 NOTE — PROGRESS NOTE ADULT - REASON FOR ADMISSION
transfer from [place] for tertiary level care in the setting of diffuse body rash transfer from [place] for tertiary level care in the setting of diffuse body rash

## 2024-01-02 LAB
ALBUMIN SERPL ELPH-MCNC: 2.6 G/DL — LOW (ref 3.3–5)
ALBUMIN SERPL ELPH-MCNC: 2.6 G/DL — LOW (ref 3.3–5)
ALP SERPL-CCNC: 90 U/L — SIGNIFICANT CHANGE UP (ref 40–120)
ALP SERPL-CCNC: 90 U/L — SIGNIFICANT CHANGE UP (ref 40–120)
ALT FLD-CCNC: 8 U/L — SIGNIFICANT CHANGE UP (ref 4–33)
ALT FLD-CCNC: 8 U/L — SIGNIFICANT CHANGE UP (ref 4–33)
ANION GAP SERPL CALC-SCNC: 10 MMOL/L — SIGNIFICANT CHANGE UP (ref 7–14)
ANION GAP SERPL CALC-SCNC: 10 MMOL/L — SIGNIFICANT CHANGE UP (ref 7–14)
APTT BLD: 34.9 SEC — SIGNIFICANT CHANGE UP (ref 24.5–35.6)
APTT BLD: 34.9 SEC — SIGNIFICANT CHANGE UP (ref 24.5–35.6)
AST SERPL-CCNC: 19 U/L — SIGNIFICANT CHANGE UP (ref 4–32)
AST SERPL-CCNC: 19 U/L — SIGNIFICANT CHANGE UP (ref 4–32)
BASOPHILS # BLD AUTO: 0.01 K/UL — SIGNIFICANT CHANGE UP (ref 0–0.2)
BASOPHILS # BLD AUTO: 0.01 K/UL — SIGNIFICANT CHANGE UP (ref 0–0.2)
BASOPHILS NFR BLD AUTO: 0.2 % — SIGNIFICANT CHANGE UP (ref 0–2)
BASOPHILS NFR BLD AUTO: 0.2 % — SIGNIFICANT CHANGE UP (ref 0–2)
BILIRUB SERPL-MCNC: <0.2 MG/DL — SIGNIFICANT CHANGE UP (ref 0.2–1.2)
BILIRUB SERPL-MCNC: <0.2 MG/DL — SIGNIFICANT CHANGE UP (ref 0.2–1.2)
BUN SERPL-MCNC: 20 MG/DL — SIGNIFICANT CHANGE UP (ref 7–23)
BUN SERPL-MCNC: 20 MG/DL — SIGNIFICANT CHANGE UP (ref 7–23)
CALCIUM SERPL-MCNC: 8 MG/DL — LOW (ref 8.4–10.5)
CALCIUM SERPL-MCNC: 8 MG/DL — LOW (ref 8.4–10.5)
CHLORIDE SERPL-SCNC: 106 MMOL/L — SIGNIFICANT CHANGE UP (ref 98–107)
CHLORIDE SERPL-SCNC: 106 MMOL/L — SIGNIFICANT CHANGE UP (ref 98–107)
CO2 SERPL-SCNC: 21 MMOL/L — LOW (ref 22–31)
CO2 SERPL-SCNC: 21 MMOL/L — LOW (ref 22–31)
CREAT SERPL-MCNC: 0.68 MG/DL — SIGNIFICANT CHANGE UP (ref 0.5–1.3)
CREAT SERPL-MCNC: 0.68 MG/DL — SIGNIFICANT CHANGE UP (ref 0.5–1.3)
CRP SERPL-MCNC: 18.3 MG/L — HIGH
CRP SERPL-MCNC: 18.3 MG/L — HIGH
EGFR: 102 ML/MIN/1.73M2 — SIGNIFICANT CHANGE UP
EGFR: 102 ML/MIN/1.73M2 — SIGNIFICANT CHANGE UP
EOSINOPHIL # BLD AUTO: 0 K/UL — SIGNIFICANT CHANGE UP (ref 0–0.5)
EOSINOPHIL # BLD AUTO: 0 K/UL — SIGNIFICANT CHANGE UP (ref 0–0.5)
EOSINOPHIL NFR BLD AUTO: 0 % — SIGNIFICANT CHANGE UP (ref 0–6)
EOSINOPHIL NFR BLD AUTO: 0 % — SIGNIFICANT CHANGE UP (ref 0–6)
ERYTHROCYTE [SEDIMENTATION RATE] IN BLOOD: 8 MM/HR — SIGNIFICANT CHANGE UP (ref 4–25)
ERYTHROCYTE [SEDIMENTATION RATE] IN BLOOD: 8 MM/HR — SIGNIFICANT CHANGE UP (ref 4–25)
GAMMA INTERFERON BACKGROUND BLD IA-ACNC: 0.01 IU/ML — SIGNIFICANT CHANGE UP
GAMMA INTERFERON BACKGROUND BLD IA-ACNC: 0.01 IU/ML — SIGNIFICANT CHANGE UP
GLUCOSE BLDC GLUCOMTR-MCNC: 159 MG/DL — HIGH (ref 70–99)
GLUCOSE BLDC GLUCOMTR-MCNC: 159 MG/DL — HIGH (ref 70–99)
GLUCOSE BLDC GLUCOMTR-MCNC: 175 MG/DL — HIGH (ref 70–99)
GLUCOSE BLDC GLUCOMTR-MCNC: 175 MG/DL — HIGH (ref 70–99)
GLUCOSE BLDC GLUCOMTR-MCNC: 177 MG/DL — HIGH (ref 70–99)
GLUCOSE BLDC GLUCOMTR-MCNC: 177 MG/DL — HIGH (ref 70–99)
GLUCOSE SERPL-MCNC: 148 MG/DL — HIGH (ref 70–99)
GLUCOSE SERPL-MCNC: 148 MG/DL — HIGH (ref 70–99)
HCT VFR BLD CALC: 28.1 % — LOW (ref 34.5–45)
HCT VFR BLD CALC: 28.1 % — LOW (ref 34.5–45)
HGB BLD-MCNC: 9 G/DL — LOW (ref 11.5–15.5)
HGB BLD-MCNC: 9 G/DL — LOW (ref 11.5–15.5)
IANC: 5.59 K/UL — SIGNIFICANT CHANGE UP (ref 1.8–7.4)
IANC: 5.59 K/UL — SIGNIFICANT CHANGE UP (ref 1.8–7.4)
IMM GRANULOCYTES NFR BLD AUTO: 0.6 % — SIGNIFICANT CHANGE UP (ref 0–0.9)
IMM GRANULOCYTES NFR BLD AUTO: 0.6 % — SIGNIFICANT CHANGE UP (ref 0–0.9)
INR BLD: 3.03 RATIO — HIGH (ref 0.85–1.18)
INR BLD: 3.03 RATIO — HIGH (ref 0.85–1.18)
LYMPHOCYTES # BLD AUTO: 0.73 K/UL — LOW (ref 1–3.3)
LYMPHOCYTES # BLD AUTO: 0.73 K/UL — LOW (ref 1–3.3)
LYMPHOCYTES # BLD AUTO: 11.2 % — LOW (ref 13–44)
LYMPHOCYTES # BLD AUTO: 11.2 % — LOW (ref 13–44)
M TB IFN-G BLD-IMP: NEGATIVE — SIGNIFICANT CHANGE UP
M TB IFN-G BLD-IMP: NEGATIVE — SIGNIFICANT CHANGE UP
M TB IFN-G CD4+ BCKGRND COR BLD-ACNC: 0 IU/ML — SIGNIFICANT CHANGE UP
M TB IFN-G CD4+ BCKGRND COR BLD-ACNC: 0 IU/ML — SIGNIFICANT CHANGE UP
M TB IFN-G CD4+CD8+ BCKGRND COR BLD-ACNC: 0 IU/ML — SIGNIFICANT CHANGE UP
M TB IFN-G CD4+CD8+ BCKGRND COR BLD-ACNC: 0 IU/ML — SIGNIFICANT CHANGE UP
MAGNESIUM SERPL-MCNC: 1.8 MG/DL — SIGNIFICANT CHANGE UP (ref 1.6–2.6)
MAGNESIUM SERPL-MCNC: 1.8 MG/DL — SIGNIFICANT CHANGE UP (ref 1.6–2.6)
MCHC RBC-ENTMCNC: 30.9 PG — SIGNIFICANT CHANGE UP (ref 27–34)
MCHC RBC-ENTMCNC: 30.9 PG — SIGNIFICANT CHANGE UP (ref 27–34)
MCHC RBC-ENTMCNC: 32 GM/DL — SIGNIFICANT CHANGE UP (ref 32–36)
MCHC RBC-ENTMCNC: 32 GM/DL — SIGNIFICANT CHANGE UP (ref 32–36)
MCV RBC AUTO: 96.6 FL — SIGNIFICANT CHANGE UP (ref 80–100)
MCV RBC AUTO: 96.6 FL — SIGNIFICANT CHANGE UP (ref 80–100)
MONOCYTES # BLD AUTO: 0.14 K/UL — SIGNIFICANT CHANGE UP (ref 0–0.9)
MONOCYTES # BLD AUTO: 0.14 K/UL — SIGNIFICANT CHANGE UP (ref 0–0.9)
MONOCYTES NFR BLD AUTO: 2.2 % — SIGNIFICANT CHANGE UP (ref 2–14)
MONOCYTES NFR BLD AUTO: 2.2 % — SIGNIFICANT CHANGE UP (ref 2–14)
NEUTROPHILS # BLD AUTO: 5.59 K/UL — SIGNIFICANT CHANGE UP (ref 1.8–7.4)
NEUTROPHILS # BLD AUTO: 5.59 K/UL — SIGNIFICANT CHANGE UP (ref 1.8–7.4)
NEUTROPHILS NFR BLD AUTO: 85.8 % — HIGH (ref 43–77)
NEUTROPHILS NFR BLD AUTO: 85.8 % — HIGH (ref 43–77)
NRBC # BLD: 0 /100 WBCS — SIGNIFICANT CHANGE UP (ref 0–0)
NRBC # BLD: 0 /100 WBCS — SIGNIFICANT CHANGE UP (ref 0–0)
NRBC # FLD: 0 K/UL — SIGNIFICANT CHANGE UP (ref 0–0)
NRBC # FLD: 0 K/UL — SIGNIFICANT CHANGE UP (ref 0–0)
PHOSPHATE SERPL-MCNC: 2.2 MG/DL — LOW (ref 2.5–4.5)
PHOSPHATE SERPL-MCNC: 2.2 MG/DL — LOW (ref 2.5–4.5)
PLATELET # BLD AUTO: 447 K/UL — HIGH (ref 150–400)
PLATELET # BLD AUTO: 447 K/UL — HIGH (ref 150–400)
POTASSIUM SERPL-MCNC: 4.2 MMOL/L — SIGNIFICANT CHANGE UP (ref 3.5–5.3)
POTASSIUM SERPL-MCNC: 4.2 MMOL/L — SIGNIFICANT CHANGE UP (ref 3.5–5.3)
POTASSIUM SERPL-SCNC: 4.2 MMOL/L — SIGNIFICANT CHANGE UP (ref 3.5–5.3)
POTASSIUM SERPL-SCNC: 4.2 MMOL/L — SIGNIFICANT CHANGE UP (ref 3.5–5.3)
PROT SERPL-MCNC: 4.8 G/DL — LOW (ref 6–8.3)
PROT SERPL-MCNC: 4.8 G/DL — LOW (ref 6–8.3)
PROTHROM AB SERPL-ACNC: 33.1 SEC — HIGH (ref 9.5–13)
PROTHROM AB SERPL-ACNC: 33.1 SEC — HIGH (ref 9.5–13)
QUANT TB PLUS MITOGEN MINUS NIL: 0.78 IU/ML — SIGNIFICANT CHANGE UP
QUANT TB PLUS MITOGEN MINUS NIL: 0.78 IU/ML — SIGNIFICANT CHANGE UP
RBC # BLD: 2.91 M/UL — LOW (ref 3.8–5.2)
RBC # BLD: 2.91 M/UL — LOW (ref 3.8–5.2)
RBC # FLD: 14.9 % — HIGH (ref 10.3–14.5)
RBC # FLD: 14.9 % — HIGH (ref 10.3–14.5)
SODIUM SERPL-SCNC: 137 MMOL/L — SIGNIFICANT CHANGE UP (ref 135–145)
SODIUM SERPL-SCNC: 137 MMOL/L — SIGNIFICANT CHANGE UP (ref 135–145)
WBC # BLD: 6.51 K/UL — SIGNIFICANT CHANGE UP (ref 3.8–10.5)
WBC # BLD: 6.51 K/UL — SIGNIFICANT CHANGE UP (ref 3.8–10.5)
WBC # FLD AUTO: 6.51 K/UL — SIGNIFICANT CHANGE UP (ref 3.8–10.5)
WBC # FLD AUTO: 6.51 K/UL — SIGNIFICANT CHANGE UP (ref 3.8–10.5)

## 2024-01-02 PROCEDURE — 99232 SBSQ HOSP IP/OBS MODERATE 35: CPT | Mod: GC

## 2024-01-02 PROCEDURE — 73130 X-RAY EXAM OF HAND: CPT | Mod: 26,LT

## 2024-01-02 RX ADMIN — Medication 20 MILLIGRAM(S): at 21:53

## 2024-01-02 RX ADMIN — Medication 400 MILLIGRAM(S): at 05:48

## 2024-01-02 RX ADMIN — Medication 1: at 17:50

## 2024-01-02 RX ADMIN — Medication 1: at 12:52

## 2024-01-02 RX ADMIN — Medication 400 MILLIGRAM(S): at 21:53

## 2024-01-02 RX ADMIN — Medication 1: at 09:24

## 2024-01-02 RX ADMIN — Medication 400 MILLIGRAM(S): at 13:13

## 2024-01-02 RX ADMIN — CARVEDILOL PHOSPHATE 3.12 MILLIGRAM(S): 80 CAPSULE, EXTENDED RELEASE ORAL at 17:50

## 2024-01-02 RX ADMIN — CARVEDILOL PHOSPHATE 3.12 MILLIGRAM(S): 80 CAPSULE, EXTENDED RELEASE ORAL at 05:48

## 2024-01-02 RX ADMIN — Medication 20 MILLIGRAM(S): at 05:51

## 2024-01-02 RX ADMIN — Medication 20 MILLIGRAM(S): at 13:13

## 2024-01-02 RX ADMIN — MIRTAZAPINE 30 MILLIGRAM(S): 45 TABLET, ORALLY DISINTEGRATING ORAL at 12:24

## 2024-01-02 RX ADMIN — Medication 63.75 MILLIMOLE(S): at 18:58

## 2024-01-02 RX ADMIN — Medication 1 MILLIGRAM(S): at 12:24

## 2024-01-02 RX ADMIN — SPIRONOLACTONE 100 MILLIGRAM(S): 25 TABLET, FILM COATED ORAL at 05:47

## 2024-01-02 RX ADMIN — PANTOPRAZOLE SODIUM 40 MILLIGRAM(S): 20 TABLET, DELAYED RELEASE ORAL at 05:47

## 2024-01-02 NOTE — DIETITIAN INITIAL EVALUATION ADULT - NSICDXPASTMEDICALHX_GEN_ALL_CORE_FT
PAST MEDICAL HISTORY:  Anxiety hospitalization x1 in psych in 2013    CAD (coronary artery disease) non onstructing as per patient, not on aspirin, had cardiac imaging done in [facility] 12/2013 due to family history    Cholelithiasis     Clostridium difficile diarrhea 2-3 months ago was treated with vanco/flagyl outpatient , currently  free of diarrhea.    Current every day smoker     HLD (hyperlipidemia)     HTN (hypertension) >5 years, not on any meds    Lumbar herniated disc L4-L5 , no sx yet.    UTI (urinary tract infection) 2014     PAST MEDICAL HISTORY:  Anxiety hospitalization x1 in psych in 2013    CAD (coronary artery disease) non onstructing as per patient, not on aspirin, had cardiac imaging done in [facility] 12/2013 due to family history    Cholelithiasis     Clostridium difficile diarrhea 2-3 months ago was treated with vanco/flagyl outpatient , currently  free of diarrhea.    Current every day smoker     HLD (hyperlipidemia)     HTN (hypertension) >5 years, not on any meds    Lumbar herniated disc L4-L5 , no sx yet.    UTI (urinary tract infection) 2014

## 2024-01-02 NOTE — PROGRESS NOTE ADULT - REASON FOR ADMISSION
transfer from [place] for tertiary level care in the setting of diffuse body rash transfer from [place] for tertiary level care in the setting of diffuse body rash diarrhea

## 2024-01-02 NOTE — PROGRESS NOTE ADULT - ATTENDING COMMENTS
Patient reports ongoing loose stools through rectal tubing. She also reports intermittent rectal sensation of ?tenesmus. No other new complaints.     # History of UC: Reports endoscopic evaluation at either [place] or [facility]. Reports "colonoscopy could not be completed" due to severity of inflammation. She reports receiving steroids, but never received biologic therapy and endoscopic evaluation was never repeated.  # Afib on Coumadin  # CAD  # Bipolar disease  # Reported history of cirrhosis, possibly NAFLD    --Discussed again the possibility of repeat flex sig to re-evaluate the severity of colitis, but patient again declined as she does not believe it would be feasible or assist with evaluation/management  --Please obtain prior records from either [place] or [facility]. Would be beneficial to obtain endoscopic reports, pathology reports, and any therapies attempted  --Continue steroids for now  --Without clear confirmation of prior diagnosis/severity, would be hesitant to initiate biologic therapy at this time  --Continue mesalamine  --Trend CRP, currently downtrending  --Given tenesmus symptoms, would benefit from ND mesalamine, but patient currently declining given rectal tube in place    Additional recommendations as above. Patient reports ongoing loose stools through rectal tubing. She also reports intermittent rectal sensation of ?tenesmus. No other new complaints.     # History of UC: Reports endoscopic evaluation at either [place] or [facility]. Reports "colonoscopy could not be completed" due to severity of inflammation. She reports receiving steroids, but never received biologic therapy and endoscopic evaluation was never repeated.  # Afib on Coumadin  # CAD  # Bipolar disease  # Reported history of cirrhosis, possibly NAFLD    --Discussed again the possibility of repeat flex sig to re-evaluate the severity of colitis, but patient again declined as she does not believe it would be feasible or assist with evaluation/management  --Please obtain prior records from either [place] or [facility]. Would be beneficial to obtain endoscopic reports, pathology reports, and any therapies attempted  --Continue steroids for now  --Without clear confirmation of prior diagnosis/severity, would be hesitant to initiate biologic therapy at this time  --Continue mesalamine  --Trend CRP, currently downtrending  --Given tenesmus symptoms, would benefit from MO mesalamine, but patient currently declining given rectal tube in place    Additional recommendations as above.

## 2024-01-02 NOTE — PROGRESS NOTE ADULT - ATTENDING COMMENTS
58W w/ type 2 diabetes, HTN, MDD vs bipolar disorder, UC (dx on colonoscopy at [place], previously treated with steroids), NAFLD c/b decompensated cirrhosis, AF (warfarin), admitted to [facility] with periorbital swelling, rash, and pancolitis, transferred to [facility] for derm/rheum/ophtho eval, overall felt to be have resolving bruising, subconjunctival hemorrhage, and UC flare currently receiving IV steroids.    Continues to have abdominal pain, diarrhea with rectal tube in place, new bruising at base of fingers on left hand and swelling of L hand/arm overnight. CRP downtrending    - X-ray ordered, f/up result, remove IV from this hand, UE doppler from 12/30 reviewed without clot  - Appreciate GI recommendations - reach out to [place] for records, continue IV steroids, I discussed colonoscopy with patient and she continues to decline 58W w/ type 2 diabetes, HTN, MDD vs bipolar disorder, UC (dx on colonoscopy at [place], previously treated with steroids), NAFLD c/b decompensated cirrhosis, AF (warfarin), admitted to Health system with periorbital swelling, rash, and pancolitis, transferred to [facility] for derm/rheum/ophtho eval, overall felt to be have resolving bruising, subconjunctival hemorrhage, and UC flare currently receiving IV steroids.    Continues to have abdominal pain, diarrhea with rectal tube in place, new bruising at base of fingers on left hand and swelling of L hand/arm overnight. CRP downtrending    - X-ray ordered, f/up result, remove IV from this hand, UE doppler from 12/30 reviewed without clot  - Appreciate GI recommendations - reach out to [place] for records, continue IV steroids, I discussed colonoscopy with patient and she continues to decline

## 2024-01-02 NOTE — PROGRESS NOTE ADULT - ASSESSMENT
Impression:   58 yrs old male w/ hx of CAD (not on anti-platelets), insomnia, DM, GERD, MDD, HTN, Ulcerative colitis, Bipolar disorder, cirrhosis (unknown etiology, NAFLD?) c/w HE, Afib (on Coumadin), and Blepharitis who initially presented to [facility] for hypotension, diffuse body rashes and pancolitis.     #"mild" Pancolitis  #Ulcerative colitis?   - Reported bloody stools w/ fecal incontinence worsening for the past one year. Underwent two colonoscopies on 11/2022 and 3/2023 but no records present. CT A/P showed thickening, hyperemia and mild inflammation throughout the colon. Does not follow w/ GI as o/p. No prior biologic use. Previously responded to steroids.   - Ideally would need colonoscopy report or repeat colonoscopy to confirm her hx of UC.   - GI PCR and c diff testing neg from 12/25.   - On oral mesalamine 400 mg TID. IV steroids on 12/31.   - Concerned for UC flare, infectious work up neg.   - declined flex sig/colonoscopy   - hepatitis B serologies negative. Quant TB testing neg.   - fecal calprotectin 2050   - CRP downtrending     #Cirrhosis?   - Unclear if the patient has cirrhosis b/c the CT imaging showed normal liver w/ no splenomegaly. Her INR is elevated in the setting of Coumadin use. She has thrombocytopenia in the setting of possible infection.   - Less concerned for cirrhosis based on the clinical picture, not been seen by hepatologist.   - On rifaximin and Aldactone per home meds.     Recommendations:   - Symptoms have been worsening, despite downtrending CRP levels, continue with IV steroids for now.   - Please obtain colonoscopy reports from [place], patient has been reporting they have incomplete colonoscopy due to severe inflammation. She is also declining repeating flex sig or colonoscopy which we already offered it. Will need the colonoscopy and pathology report. Will need to confirm UC diagnosis before we proceed w/ any biologic therapy. Discussed with the primary team.   - PPI 40mg daily   - Continue with mesalamine 400 mg TID for now  - please document stool volume daily   - DVT PPx   - CBC and CRP daily.     Discussed the case with Dr. Gonzalez.     GI will continue to follow.     All recommendations are tentative until the note is attested by an attending.     Moisés Colin, PGY-5  Gastroenterology/Hepatology Fellow  Available on Microsoft Teams  [phone] (Short Range Pager)  [phone] (Long Range Pager)    After 5pm, please contact the on-call GI fellow. [phone]       Impression:   58 yrs old male w/ hx of CAD (not on anti-platelets), insomnia, DM, GERD, MDD, HTN, Ulcerative colitis, Bipolar disorder, cirrhosis (unknown etiology, NAFLD?) c/w HE, Afib (on Coumadin), and Blepharitis who initially presented to [facility] for hypotension, diffuse body rashes and pancolitis.     #"mild" Pancolitis  #Ulcerative colitis?   - Reported bloody stools w/ fecal incontinence worsening for the past one year. Underwent two colonoscopies on 11/2022 and 3/2023 but no records present. CT A/P showed thickening, hyperemia and mild inflammation throughout the colon. Does not follow w/ GI as o/p. No prior biologic use. Previously responded to steroids.   - Ideally would need colonoscopy report or repeat colonoscopy to confirm her hx of UC.   - GI PCR and c diff testing neg from 12/25.   - On oral mesalamine 400 mg TID. IV steroids on 12/31.   - Concerned for UC flare, infectious work up neg.   - declined flex sig/colonoscopy   - hepatitis B serologies negative. Quant TB testing neg.   - fecal calprotectin 2050   - CRP downtrending     #Cirrhosis?   - Unclear if the patient has cirrhosis b/c the CT imaging showed normal liver w/ no splenomegaly. Her INR is elevated in the setting of Coumadin use. She has thrombocytopenia in the setting of possible infection.   - Less concerned for cirrhosis based on the clinical picture, not been seen by hepatologist.   - On rifaximin and Aldactone per home meds.     Recommendations:   - Symptoms have been worsening, despite downtrending CRP levels, continue with IV steroids for now.   - Please obtain colonoscopy reports from [place], patient has been reporting they have incomplete colonoscopy due to severe inflammation. She is also declining repeating flex sig or colonoscopy which we already offered it. Will need the colonoscopy and pathology report. Will need to confirm UC diagnosis before we proceed w/ any biologic therapy. Discussed with the primary team.   - PPI 40mg daily   - Continue with mesalamine 400 mg TID for now  - please document stool volume daily   - DVT PPx   - CBC and CRP daily.     Discussed the case with Dr. Gonzalez.     GI will continue to follow.     All recommendations are tentative until the note is attested by an attending.     Moisés Colin, PGY-5  Gastroenterology/Hepatology Fellow  Available on Microsoft Teams  [phone] (Short Range Pager)  [phone] (Long Range Pager)    After 5pm, please contact the on-call GI fellow. [phone]

## 2024-01-02 NOTE — DIETITIAN NUTRITION RISK NOTIFICATION - ADDITIONAL COMMENTS/DIETITIAN RECOMMENDATIONS
Please see Dietitian Initial Assessment for complete recommendations.  Kailyn Vega, MANSI, CDN #[id number]  Also available on Microsoft Teams  Please see Dietitian Initial Assessment for complete recommendations.  Kailyn Vega, MANSI, CDN #[id number]  Also available on Microsoft Teams

## 2024-01-02 NOTE — CHART NOTE - NSCHARTNOTEFT_GEN_A_CORE
Saw patient today at bedside. Patient states eyes are feeling good and that she refused dilated exam again. Discussed risks of refusing eye dilation including delay in diagnosis and possible loss of vision or of the eye.    Patient can followup outpatient. Low concern for ocular pathology given vision and anterior exam. Saw patient today at bedside. Patient states eyes are feeling good and that she refused dilated exam again. Discussed risks of refusing eye dilation including delay in diagnosis and possible loss of vision or of the eye itself.    Patient can followup outpatient. Low concern for ocular pathology given vision and anterior exam.

## 2024-01-02 NOTE — PROGRESS NOTE ADULT - PROBLEM SELECTOR PLAN 1
Patient presented with body rash of b/l upper extremity and lower extremity of unknown etiology that is improving from initial presentation   Rash of unclear etiology, initiall ifnectious versus inflammatory now derm believes resolving ecchymosis  work up at [place] includes:   CMV IgG negative   LDH normal   Babesia negative   Lyme negative   RPR negative   ESR, CRP elevated   -consult rheumatology for possible vasculitis, appreciate recs for workup  -consult dermatology: likely resolving ecchymosis i/s/o coumadin use  -ophtho consulted for erythema around iris, likely subconjunctival hemorrhage no need to stop coumadin, will reassess tomorrow Patient presented with body rash of b/l upper extremity and lower extremity of unknown etiology that is improving from initial presentation   Rash of unclear etiology, initiall ifnectious versus inflammatory now derm believes resolving ecchymosis  work up at [place] includes:   CMV IgG negative   LDH normal   Babesia negative   Lyme negative   RPR negative   ESR, CRP elevated   -consult rheumatology for possible vasculitis, appreciate recs for workup  -consult dermatology: likely resolving ecchymosis i/s/o coumadin use  -ophtho consulted for erythema around iris, likely subconjunctival hemorrhage no need to stop coumadin, will reassess tomorrow

## 2024-01-02 NOTE — DIETITIAN INITIAL EVALUATION ADULT - SIGNS/SYMPTOMS
hypophosphatemia, hyperglycemia severe muscle wasting/fat loss, PO intake </=75% of estimated needs for >/=1 month

## 2024-01-02 NOTE — DIETITIAN INITIAL EVALUATION ADULT - ENTER TO (G/KG)
1.6 ,DirectAddress_Unknown,[email or web address],DirectAddress_Unknown

## 2024-01-02 NOTE — DIETITIAN INITIAL EVALUATION ADULT - OTHER INFO
Per chart, pt is 58 year old female PMH CAD, type 2 DM, insomnia, MDD, GERD, HTN, IBS, UC, cirrhosis, NAFLD, hepatic encephalopathy, bipolar disorder, Afib, blepharitis of the left eye [facility] [place] due to reported hypotension, tachycardia and diffuse purpura transferred to [facility] for further workup of body rash. Found to have pancolitis. ID, Dermatology, Rheumatology and GI on board.    Pt with limited interaction, comprehensive chart review completed. Pt confirms NKFA, denies difficulties chewing/swallowing. Pt reports adherence to gluten free diet. History of type 2 DM, HbA1c (12/29) 4.8%. Notable medications PTA per H&P inclusive of pantoprazole, mirtazapine, spironolactone, Xifaxan, warfarin and Lantus 4U qHS. Pt reports generally poor appetite/PO intake PTA; endorses chronic abdominal discomfort, diarrhea. Pt has historically used Ensure Clear.    Pt continues with poor PO intake in house. No food preferences vocalized at this time. Pt amenable to trial of Liquid Protein Supplement. Rectal tube in place, 200 mL output thus far today per flowsheet documentation. Fingersticks mildly elevated (continues on steroids), hypophosphatemia noted. Per chart, pt is 58 year old female PMH CAD, type 2 DM, insomnia, MDD, GERD, HTN, IBS, UC, cirrhosis, NAFLD, hepatic encephalopathy, bipolar disorder, Afib, blepharitis of the left eye [facility] [place] due to reported hypotension, tachycardia and diffuse purpura transferred to [facility] for further workup of body rash. Found to have pancolitis. ID, Dermatology, Rheumatology and GI on board.    Pt with limited interaction, comprehensive chart review completed. Pt confirms NKFA, denies difficulties chewing/swallowing. Pt reports adherence to gluten free diet. History of type 2 DM, HbA1c (12/29) 4.8%. Notable medications PTA per H&P inclusive of pantoprazole, mirtazapine, spironolactone, Xifaxan, warfarin and Lantus 4U qHS. Pt reports generally poor appetite/PO intake PTA; endorses chronic abdominal discomfort, diarrhea. Pt has historically used Ensure Clear.    Pt continues with poor PO intake in house. No food preferences vocalized at this time. Pt amenable to trial of Liquid Protein Supplement. Rectal tube in place, 200 mL output thus far today per flowsheet documentation. Fingersticks mildly elevated (continues on steroids), hypophosphatemia noted.

## 2024-01-02 NOTE — PROGRESS NOTE ADULT - ASSESSMENT
58 y F w PMHx of CAD, diabetes, insomnia, MDD, GERD, hypertension, irritable bowel syndrome with diarrhea, ulcerative colitis, cirrhosis, NAFLD, hepatic encephalopathy, bipolar disorder, chronic Afib on Coumadin, blepharitis of the left eye who initially presented to [place] due to reported hypotension, tachycardia and diffuse purpura. Patient transferred to [facility] for further workup and consultations for her body rash.  58 y F w PMHx of CAD, diabetes, insomnia, MDD, GERD, hypertension, irritable bowel syndrome with diarrhea, ulcerative colitis, cirrhosis, NAFLD, hepatic encephalopathy, bipolar disorder, chronic Afib on Coumadin, blepharitis of the left eye who initially presented to [place] due to reported hypotension, tachycardia and diffuse purpura. Patient transferred to [facility] for further workup and consultations for her body rash.  58 y F w PMHx of CAD, diabetes, insomnia, MDD, GERD, hypertension, irritable bowel syndrome with diarrhea, ulcerative colitis, cirrhosis, NAFLD, hepatic encephalopathy, bipolar disorder, chronic Afib on Coumadin, blepharitis of the left eye who initially presented to [place] due to reported hypotension, tachycardia and diffuse purpura. Patient transferred to [facility] for further workup and consultations for her body rash.  58 y F w PMHx of CAD, diabetes, insomnia, MDD, GERD, hypertension, irritable bowel syndrome with diarrhea, ulcerative colitis, cirrhosis, NAFLD, hepatic encephalopathy, bipolar disorder, chronic Afib on Coumadin, blepharitis of the left eye who initially presented to [place] due to reported hypotension, tachycardia and diffuse purpura. Patient transferred to [facility] for further workup and consultations for her body rash.

## 2024-01-02 NOTE — PROGRESS NOTE ADULT - SUBJECTIVE AND OBJECTIVE BOX
ANAYA JOINER 58y Female      Patient is a 58y old  Female who presents with a chief complaint of transfer from [place] for tertiary level care in the setting of diffuse body rash (30 Dec 2023 22:18)        INTERVAL HPI/OVERNIGHT EVENTS: No acute events overnight. Patient was seen and evaluated at the bedside. The patient still endorses abdominal discomfort and diarrhea. She stated she has had no improvement on the steroids. Vitals stable. Patient denies fever/chills, chest pain, shortness of breath,, headaches, nausea/vomiting, or visual changes.       PHYSICAL EXAM:  GENERAL: NAD  HEAD:  Atraumatic, Normocephalic  EYES: left eye and rt eye with mild redness+ no drainage  ENMT: MMM  NECK: Supple, No JVD  NERVOUS SYSTEM: AOX3, motor and sensation grossly intact in b/l UE and b/l LE  PSYCHIATRIC: Appropriate affect and mood  CHEST/LUNG: Clear to auscultation bilaterally; No rales, rhonchi, wheezing, or rubs  HEART: Regular rate and rhythm; No murmurs, rubs, or gallops. No LE edema  ABDOMEN: Soft, mild epigastric tenderness, Nondistended; Bowel sounds present  EXTREMITIES:  2+ Peripheral Pulses, No clubbing, cyanosis  SKIN:  rash improving    Vital Signs Last 24 Hrs  T(C): 36.6 (02 Jan 2024 05:42), Max: 36.9 (01 Jan 2024 13:12)  T(F): 97.8 (02 Jan 2024 05:42), Max: 98.4 (01 Jan 2024 13:12)  HR: 69 (02 Jan 2024 05:42) (66 - 74)  BP: 121/79 (02 Jan 2024 05:42) (108/63 - 121/79)  BP(mean): --  RR: 18 (02 Jan 2024 05:42) (17 - 18)  SpO2: 99% (02 Jan 2024 05:42) (98% - 99%)    Parameters below as of 02 Jan 2024 05:42  Patient On (Oxygen Delivery Method): room air    Consultant(s) Notes Reviewed:  [X] YES  [ ] NO  Care Discussed with Consultants/Other Providers [X] YES  [ ] NO  Imaging Personally Reviewed:  [X] YES  [ ] NO      LABS:    CBC Full  -  ( 01 Jan 2024 06:27 )  WBC Count : 9.80 K/uL  RBC Count : 2.80 M/uL  Hemoglobin : 8.5 g/dL  Hematocrit : 26.1 %  Platelet Count - Automated : 415 K/uL  Mean Cell Volume : 93.2 fL  Mean Cell Hemoglobin : 30.4 pg  Mean Cell Hemoglobin Concentration : 32.6 gm/dL  Auto Neutrophil # : x  Auto Lymphocyte # : x  Auto Monocyte # : x  Auto Eosinophil # : x  Auto Basophil # : x  Auto Neutrophil % : x  Auto Lymphocyte % : x  Auto Monocyte % : x  Auto Eosinophil % : x  Auto Basophil % : x    01-01    136  |  106  |  17  ----------------------------<  158<H>  4.6   |  20<L>  |  0.61    Ca    8.1<L>      01 Jan 2024 06:27  Phos  3.5     01-01  Mg     1.80     01-01    TPro  5.1<L>  /  Alb  2.7<L>  /  TBili  <0.2  /  DBili  x   /  AST  14  /  ALT  6   /  AlkPhos  100  01-01       Urinalysis Basic - ( 30 Dec 2023 05:52 )    Color: x / Appearance: x / SG: x / pH: x  Gluc: 81 mg/dL / Ketone: x  / Bili: x / Urobili: x   Blood: x / Protein: x / Nitrite: x   Leuk Esterase: x / RBC: x / WBC x   Sq Epi: x / Non Sq Epi: x / Bacteria: x        CAPILLARY BLOOD GLUCOSE      POCT Blood Glucose.: 134 mg/dL (30 Dec 2023 21:11)  POCT Blood Glucose.: 110 mg/dL (30 Dec 2023 17:28)  POCT Blood Glucose.: 126 mg/dL (30 Dec 2023 15:23)  POCT Blood Glucose.: 83 mg/dL (30 Dec 2023 08:44)           ANAYA JOINER 58y Female      Patient is a 58y old  Female who presents with a chief complaint of transfer from [place] for tertiary level care in the setting of diffuse body rash (30 Dec 2023 22:18)        INTERVAL HPI/OVERNIGHT EVENTS: No acute events overnight. Patient was seen and evaluated at the bedside. The patient still endorses abdominal discomfort and diarrhea. She stated she has had no improvement on the steroids. Vitals stable. Patient denies fever/chills, chest pain, shortness of breath,, headaches, nausea/vomiting, or visual changes.       PHYSICAL EXAM:  GENERAL: NAD  HEAD:  Atraumatic, Normocephalic  EYES: left eye and rt eye with mild redness+ no drainage  ENMT: MMM  NECK: Supple, No JVD  NERVOUS SYSTEM: AOX3, motor and sensation grossly intact in b/l UE and b/l LE  PSYCHIATRIC: Appropriate affect and mood  CHEST/LUNG: Clear to auscultation bilaterally; No rales, rhonchi, wheezing, or rubs  HEART: Regular rate and rhythm; No murmurs, rubs, or gallops. No LE edema  ABDOMEN: Soft, mild epigastric tenderness, Nondistended; Bowel sounds present  EXTREMITIES:  2+ Peripheral Pulses, No clubbing, cyanosis  SKIN:  rash improving    Vital Signs Last 24 Hrs  T(C): 36.6 (02 Jan 2024 05:42), Max: 36.9 (01 Jan 2024 13:12)  T(F): 97.8 (02 Jan 2024 05:42), Max: 98.4 (01 Jan 2024 13:12)  HR: 69 (02 Jan 2024 05:42) (66 - 74)  BP: 121/79 (02 Jan 2024 05:42) (108/63 - 121/79)  BP(mean): --  RR: 18 (02 Jan 2024 05:42) (17 - 18)  SpO2: 99% (02 Jan 2024 05:42) (98% - 99%)    Parameters below as of 02 Jan 2024 05:42  Patient On (Oxygen Delivery Method): room air    Consultant(s) Notes Reviewed:  [X] YES  [ ] NO  Care Discussed with Consultants/Other Providers [X] YES  [ ] NO  Imaging Personally Reviewed:  [X] YES  [ ] NO      LABS:    CBC Full  -  ( 01 Jan 2024 06:27 )  WBC Count : 9.80 K/uL  RBC Count : 2.80 M/uL  Hemoglobin : 8.5 g/dL  Hematocrit : 26.1 %  Platelet Count - Automated : 415 K/uL  Mean Cell Volume : 93.2 fL  Mean Cell Hemoglobin : 30.4 pg  Mean Cell Hemoglobin Concentration : 32.6 gm/dL  Auto Neutrophil # : x  Auto Lymphocyte # : x  Auto Monocyte # : x  Auto Eosinophil # : x  Auto Basophil # : x  Auto Neutrophil % : x  Auto Lymphocyte % : x  Auto Monocyte % : x  Auto Eosinophil % : x  Auto Basophil % : x    01-01    136  |  106  |  17  ----------------------------<  158<H>  4.6   |  20<L>  |  0.61    Ca    8.1<L>      01 Jan 2024 06:27  Phos  3.5     01-01  Mg     1.80     01-01    TPro  5.1<L>  /  Alb  2.7<L>  /  TBili  <0.2  /  DBili  x   /  AST  14  /  ALT  6   /  AlkPhos  100  01-01       Urinalysis Basic - ( 30 Dec 2023 05:52 )    Color: x / Appearance: x / SG: x / pH: x  Gluc: 81 mg/dL / Ketone: x  / Bili: x / Urobili: x   Blood: x / Protein: x / Nitrite: x   Leuk Esterase: x / RBC: x / WBC x   Sq Epi: x / Non Sq Epi: x / Bacteria: x        CAPILLARY BLOOD GLUCOSE      POCT Blood Glucose.: 134 mg/dL (30 Dec 2023 21:11)  POCT Blood Glucose.: 110 mg/dL (30 Dec 2023 17:28)  POCT Blood Glucose.: 126 mg/dL (30 Dec 2023 15:23)  POCT Blood Glucose.: 83 mg/dL (30 Dec 2023 08:44)           ANAYA JOINER 58y Female      Patient is a 58y old  Female who presents with a chief complaint of transfer from [place] for tertiary level care in the setting of diffuse body rash (30 Dec 2023 22:18)        INTERVAL HPI/OVERNIGHT EVENTS: No acute events overnight. Patient was seen and evaluated at the bedside. The patient still endorses abdominal discomfort and diarrhea. She stated she has had no improvement on the steroids. Vitals stable. Patient denies fever/chills, chest pain, shortness of breath,, headaches, nausea/vomiting, or visual changes.       PHYSICAL EXAM:  GENERAL: NAD  HEAD:  Atraumatic, Normocephalic  EYES: left eye and rt eye with mild redness+ no drainage  ENMT: MMM  NECK: Supple, No JVD  NERVOUS SYSTEM: AOX3, motor and sensation grossly intact in b/l UE and b/l LE  PSYCHIATRIC: Appropriate affect and mood  CHEST/LUNG: Clear to auscultation bilaterally; No rales, rhonchi, wheezing, or rubs  HEART: Regular rate and rhythm; No murmurs, rubs, or gallops. No LE edema  ABDOMEN: Soft, mild epigastric tenderness, Nondistended; Bowel sounds present  EXTREMITIES:  2+ Peripheral Pulses, No clubbing, cyanosis  SKIN:  rash improving    Vital Signs Last 24 Hrs  T(C): 36.6 (02 Jan 2024 05:42), Max: 36.9 (01 Jan 2024 13:12)  T(F): 97.8 (02 Jan 2024 05:42), Max: 98.4 (01 Jan 2024 13:12)  HR: 69 (02 Jan 2024 05:42) (66 - 74)  BP: 121/79 (02 Jan 2024 05:42) (108/63 - 121/79)  BP(mean): --  RR: 18 (02 Jan 2024 05:42) (17 - 18)  SpO2: 99% (02 Jan 2024 05:42) (98% - 99%)    Parameters below as of 02 Jan 2024 05:42  Patient On (Oxygen Delivery Method): room air    Consultant(s) Notes Reviewed:  [X] YES  [ ] NO  Care Discussed with Consultants/Other Providers [X] YES  [ ] NO  Imaging Personally Reviewed:  [X] YES  [ ] NO      LABS:    CBC Full  -  ( 02 Jan 2024 07:00 )  WBC Count : 6.51 K/uL  RBC Count : 2.91 M/uL  Hemoglobin : 9.0 g/dL  Hematocrit : 28.1 %  Platelet Count - Automated : 447 K/uL  Mean Cell Volume : 96.6 fL  Mean Cell Hemoglobin : 30.9 pg  Mean Cell Hemoglobin Concentration : 32.0 gm/dL  Auto Neutrophil # : 5.59 K/uL  Auto Lymphocyte # : 0.73 K/uL  Auto Monocyte # : 0.14 K/uL  Auto Eosinophil # : 0.00 K/uL  Auto Basophil # : 0.01 K/uL  Auto Neutrophil % : 85.8 %  Auto Lymphocyte % : 11.2 %  Auto Monocyte % : 2.2 %  Auto Eosinophil % : 0.0 %  Auto Basophil % : 0.2 %    01-02    137  |  106  |  20  ----------------------------<  148<H>  4.2   |  21<L>  |  0.68    Ca    8.0<L>      02 Jan 2024 07:00  Phos  2.2     01-02  Mg     1.80     01-02    TPro  4.8<L>  /  Alb  2.6<L>  /  TBili  <0.2  /  DBili  x   /  AST  19  /  ALT  8   /  AlkPhos  90  01-02       Urinalysis Basic - ( 30 Dec 2023 05:52 )    Color: x / Appearance: x / SG: x / pH: x  Gluc: 81 mg/dL / Ketone: x  / Bili: x / Urobili: x   Blood: x / Protein: x / Nitrite: x   Leuk Esterase: x / RBC: x / WBC x   Sq Epi: x / Non Sq Epi: x / Bacteria: x        CAPILLARY BLOOD GLUCOSE      POCT Blood Glucose.: 134 mg/dL (30 Dec 2023 21:11)  POCT Blood Glucose.: 110 mg/dL (30 Dec 2023 17:28)  POCT Blood Glucose.: 126 mg/dL (30 Dec 2023 15:23)  POCT Blood Glucose.: 83 mg/dL (30 Dec 2023 08:44)           ANAYA JOINER 58y Female      Patient is a 58y old  Female who presents with a chief complaint of transfer from [place] for tertiary level care in the setting of diffuse body rash (30 Dec 2023 22:18)        INTERVAL HPI/OVERNIGHT EVENTS: No acute events overnight. Patient was seen and evaluated at the bedside. The patient still endorses abdominal discomfort and diarrhea. She stated she has had no improvement on the steroids. Vitals stable. Patient denies fever/chills, chest pain, shortness of breath,, headaches, nausea/vomiting, or visual changes.       PHYSICAL EXAM:  GENERAL: NAD  HEAD:  Atraumatic, Normocephalic  EYES: left eye and rt eye with mild redness+ no drainage  ENMT: MMM  NECK: Supple, No JVD  NERVOUS SYSTEM: AOX3, motor and sensation grossly intact in b/l UE and b/l LE  PSYCHIATRIC: Appropriate affect and mood  CHEST/LUNG: Clear to auscultation bilaterally; No rales, rhonchi, wheezing, or rubs  HEART: Regular rate and rhythm; No murmurs, rubs, or gallops. No LE edema  ABDOMEN: Soft, mild epigastric tenderness, Nondistended; Bowel sounds present  EXTREMITIES:  2+ Peripheral Pulses, No clubbing, cyanosis  SKIN:  rash improving    Vital Signs Last 24 Hrs  T(C): 36.6 (02 Jan 2024 05:42), Max: 36.9 (01 Jan 2024 13:12)  T(F): 97.8 (02 Jan 2024 05:42), Max: 98.4 (01 Jan 2024 13:12)  HR: 69 (02 Jan 2024 05:42) (66 - 74)  BP: 121/79 (02 Jan 2024 05:42) (108/63 - 121/79)  BP(mean): --  RR: 18 (02 Jan 2024 05:42) (17 - 18)  SpO2: 99% (02 Jan 2024 05:42) (98% - 99%)    Parameters below as of 02 Jan 2024 05:42  Patient On (Oxygen Delivery Method): room air    Consultant(s) Notes Reviewed:  [X] YES  [ ] NO  Care Discussed with Consultants/Other Providers [X] YES  [ ] NO  Imaging Personally Reviewed:  [X] YES  [ ] NO      LABS:    CBC Full  -  ( 02 Jan 2024 07:00 )  WBC Count : 6.51 K/uL  RBC Count : 2.91 M/uL  Hemoglobin : 9.0 g/dL  Hematocrit : 28.1 %  Platelet Count - Automated : 447 K/uL  Mean Cell Volume : 96.6 fL  Mean Cell Hemoglobin : 30.9 pg  Mean Cell Hemoglobin Concentration : 32.0 gm/dL  Auto Neutrophil # : 5.59 K/uL  Auto Lymphocyte # : 0.73 K/uL  Auto Monocyte # : 0.14 K/uL  Auto Eosinophil # : 0.00 K/uL  Auto Basophil # : 0.01 K/uL  Auto Neutrophil % : 85.8 %  Auto Lymphocyte % : 11.2 %  Auto Monocyte % : 2.2 %  Auto Eosinophil % : 0.0 %  Auto Basophil % : 0.2 %    01-02    137  |  106  |  20  ----------------------------<  148<H>  4.2   |  21<L>  |  0.68    Ca    8.0<L>      02 Jan 2024 07:00  Phos  2.2     01-02  Mg     1.80     01-02    TPro  4.8<L>  /  Alb  2.6<L>  /  TBili  <0.2  /  DBili  x   /  AST  19  /  ALT  8   /  AlkPhos  90  01-02       Urinalysis Basic - ( 30 Dec 2023 05:52 )    Color: x / Appearance: x / SG: x / pH: x  Gluc: 81 mg/dL / Ketone: x  / Bili: x / Urobili: x   Blood: x / Protein: x / Nitrite: x   Leuk Esterase: x / RBC: x / WBC x   Sq Epi: x / Non Sq Epi: x / Bacteria: x        CAPILLARY BLOOD GLUCOSE      POCT Blood Glucose.: 134 mg/dL (30 Dec 2023 21:11)  POCT Blood Glucose.: 110 mg/dL (30 Dec 2023 17:28)  POCT Blood Glucose.: 126 mg/dL (30 Dec 2023 15:23)  POCT Blood Glucose.: 83 mg/dL (30 Dec 2023 08:44)

## 2024-01-02 NOTE — PROGRESS NOTE ADULT - PROBLEM SELECTOR PLAN 3
Patient with anemia with iron studies more consistent with anemia of chronic disease   -B12 and folate WNL   -patient had drop of Hgb to 6.9 at [place] prior to transfer, transfused with 1pRBC with response to 9.5  -continue to monitor   -transfuse for Hgb >7 Patient with anemia with iron studies more consistent with anemia of chronic disease   -B12 and folate WNL   -patient had drop of Hgb to 6.9 at [place] prior to transfer, transfused with 1pRBC with response to 9.5  -continue to monitor   -transfuse for Hgb >7

## 2024-01-02 NOTE — DIETITIAN INITIAL EVALUATION ADULT - PERTINENT MEDS FT
folic acid   ADMELOG corrective regimen sliding scale   lactated ringers IV Continuous  mesalamine DR Capsule   methylPREDNISolone sodium succinate IV  mirtazapine  ondansetron IV  pantoprazole Tablet   rifAXIMin   spironolactone  aluminum hydroxide/magnesium hydroxide/simethicone Suspension PRN

## 2024-01-02 NOTE — PROGRESS NOTE ADULT - SUBJECTIVE AND OBJECTIVE BOX
Gastroenterology/Hepatology Progress Note      Interval Events:     - s/p IV steroids on 12/31, patient still has persistent watery brown stool through the rectal tube.   - Still has abd pain in the RLQ which is unchanged from before.   - No fevers or chills.     Allergies:  No Known Allergies      Hospital Medications:  acetaminophen     Tablet .. 650 milliGRAM(s) Oral every 6 hours PRN  aluminum hydroxide/magnesium hydroxide/simethicone Suspension 30 milliLiter(s) Oral every 6 hours PRN  carvedilol 3.125 milliGRAM(s) Oral every 12 hours  dextrose 5%. 1000 milliLiter(s) IV Continuous <Continuous>  dextrose 5%. 1000 milliLiter(s) IV Continuous <Continuous>  dextrose 50% Injectable 25 Gram(s) IV Push once  dextrose 50% Injectable 25 Gram(s) IV Push once  dextrose 50% Injectable 12.5 Gram(s) IV Push once  dextrose Oral Gel 15 Gram(s) Oral once PRN  folic acid 1 milliGRAM(s) Oral daily  glucagon  Injectable 1 milliGRAM(s) IntraMuscular once  influenza   Vaccine 0.5 milliLiter(s) IntraMuscular once  insulin lispro (ADMELOG) corrective regimen sliding scale   SubCutaneous three times a day before meals  insulin lispro (ADMELOG) corrective regimen sliding scale   SubCutaneous at bedtime  lactated ringers. 1000 milliLiter(s) IV Continuous <Continuous>  mesalamine DR Capsule 400 milliGRAM(s) Oral three times a day  methylPREDNISolone sodium succinate Injectable 20 milliGRAM(s) IV Push every 8 hours  mirtazapine 30 milliGRAM(s) Oral daily  ondansetron Injectable 4 milliGRAM(s) IV Push once  pantoprazole    Tablet 40 milliGRAM(s) Oral before breakfast  rifAXIMin 550 milliGRAM(s) Oral two times a day  spironolactone 100 milliGRAM(s) Oral daily      ROS: 14 point ROS negative unless otherwise state in subjective    PHYSICAL EXAM:   Vital Signs:  Vital Signs Last 24 Hrs  T(C): 36.6 (02 Jan 2024 05:42), Max: 36.9 (01 Jan 2024 13:12)  T(F): 97.8 (02 Jan 2024 05:42), Max: 98.4 (01 Jan 2024 13:12)  HR: 69 (02 Jan 2024 05:42) (66 - 74)  BP: 121/79 (02 Jan 2024 05:42) (108/63 - 121/79)  BP(mean): --  RR: 18 (02 Jan 2024 05:42) (17 - 18)  SpO2: 99% (02 Jan 2024 05:42) (98% - 99%)    Parameters below as of 02 Jan 2024 05:42  Patient On (Oxygen Delivery Method): room air      Daily     Daily     GENERAL:  No acute distress, chronically ill appearing, lying in bed.   HEENT:  NCAT, no scleral icterus   CHEST:  no respiratory distress  HEART:  Regular rate and rhythm  ABDOMEN:  Soft, mild diffuse tenderness, non-distended, no palpable masses.   RECTUM: has rectal tube which has dark brown watery stool w/ no blood.   EXTREMITIES: No LE edema b/l  SKIN: No visible rashes seen.   NEURO:  Alert and oriented x 3, no tremors, no asterixis.     LABS:                        9.0    6.51  )-----------( 447      ( 02 Jan 2024 07:00 )             28.1     Mean Cell Volume: 96.6 fL (01-02-24 @ 07:00)    01-02    137  |  106  |  20  ----------------------------<  148<H>  4.2   |  21<L>  |  0.68    Ca    8.0<L>      02 Jan 2024 07:00  Phos  2.2     01-02  Mg     1.80     01-02    TPro  4.8<L>  /  Alb  2.6<L>  /  TBili  <0.2  /  DBili  x   /  AST  19  /  ALT  8   /  AlkPhos  90  01-02    LIVER FUNCTIONS - ( 02 Jan 2024 07:00 )  Alb: 2.6 g/dL / Pro: 4.8 g/dL / ALK PHOS: 90 U/L / ALT: 8 U/L / AST: 19 U/L / GGT: x           PT/INR - ( 02 Jan 2024 07:00 )   PT: 33.1 sec;   INR: 3.03 ratio         PTT - ( 02 Jan 2024 07:00 )  PTT:34.9 sec  Urinalysis Basic - ( 02 Jan 2024 07:00 )    Color: x / Appearance: x / SG: x / pH: x  Gluc: 148 mg/dL / Ketone: x  / Bili: x / Urobili: x   Blood: x / Protein: x / Nitrite: x   Leuk Esterase: x / RBC: x / WBC x   Sq Epi: x / Non Sq Epi: x / Bacteria: x            Imaging:        FINDINGS:  CHEST:  LUNGS AND LARGE AIRWAYS: Patent central airways. Dependent atelectatic   changes at the lung bases.  PLEURA: No pleural effusion.  VESSELS: Within normal limits.  HEART: Heart size is normal. No pericardial effusion.  MEDIASTINUM AND FOREST: No lymphadenopathy.  CHEST WALL AND LOWER NECK: Within normal limits.    ABDOMEN AND PELVIS:  LIVER: Within normal limits.  BILE DUCTS: Normal caliber.  GALLBLADDER: Cholecystectomy.  SPLEEN: Within normal limits.  PANCREAS: Within normal limits.  ADRENALS: Within normal limits.  KIDNEYS/URETERS: Within normal limits.    BLADDER: Within normal limits.  REPRODUCTIVE ORGANS: Posterior calcified fibroid.    BOWEL: No bowel obstruction. Appendix is unremarkable. There is   thickening, thumbprinting and hyperemia of the colon from the cecum   through rectum compatible with pancolitis.  PERITONEUM: No ascites.  VESSELS: Aorta is not dilated. Moderate atherosclerotic vascular   calcification.  No perivascular inflammation as clinically questioned.  RETROPERITONEUM/LYMPH NODES: No lymphadenopathy.  ABDOMINAL WALL: Within normal limits.  BONES: Within normal limits.    IMPRESSION:  Mild pancolitis, of infectious or inflammatory etiology.   Gastroenterology/Hepatology Progress Note      Interval Events:     - s/p IV steroids on 12/31, patient still has persistent watery brown stool through the rectal tube.   - Still has abd pain in the RLQ which is unchanged from before.   - No fevers or chills.     Allergies:  No Known Allergies      Hospital Medications:  acetaminophen     Tablet .. 650 milliGRAM(s) Oral every 6 hours PRN  aluminum hydroxide/magnesium hydroxide/simethicone Suspension 30 milliLiter(s) Oral every 6 hours PRN  carvedilol 3.125 milliGRAM(s) Oral every 12 hours  dextrose 5%. 1000 milliLiter(s) IV Continuous <Continuous>  dextrose 5%. 1000 milliLiter(s) IV Continuous <Continuous>  dextrose 50% Injectable 25 Gram(s) IV Push once  dextrose 50% Injectable 25 Gram(s) IV Push once  dextrose 50% Injectable 12.5 Gram(s) IV Push once  dextrose Oral Gel 15 Gram(s) Oral once PRN  folic acid 1 milliGRAM(s) Oral daily  glucagon  Injectable 1 milliGRAM(s) IntraMuscular once  influenza   Vaccine 0.5 milliLiter(s) IntraMuscular once  insulin lispro (ADMELOG) corrective regimen sliding scale   SubCutaneous three times a day before meals  insulin lispro (ADMELOG) corrective regimen sliding scale   SubCutaneous at bedtime  lactated ringers. 1000 milliLiter(s) IV Continuous <Continuous>  mesalamine DR Capsule 400 milliGRAM(s) Oral three times a day  methylPREDNISolone sodium succinate Injectable 20 milliGRAM(s) IV Push every 8 hours  mirtazapine 30 milliGRAM(s) Oral daily  ondansetron Injectable 4 milliGRAM(s) IV Push once  pantoprazole    Tablet 40 milliGRAM(s) Oral before breakfast  rifAXIMin 550 milliGRAM(s) Oral two times a day  spironolactone 100 milliGRAM(s) Oral daily      ROS: 14 point ROS negative unless otherwise state in subjective    PHYSICAL EXAM:   Vital Signs:  Vital Signs Last 24 Hrs  T(C): 36.6 (02 Jan 2024 05:42), Max: 36.9 (01 Jan 2024 13:12)  T(F): 97.8 (02 Jan 2024 05:42), Max: 98.4 (01 Jan 2024 13:12)  HR: 69 (02 Jan 2024 05:42) (66 - 74)  BP: 121/79 (02 Jan 2024 05:42) (108/63 - 121/79)  BP(mean): --  RR: 18 (02 Jan 2024 05:42) (17 - 18)  SpO2: 99% (02 Jan 2024 05:42) (98% - 99%)    Parameters below as of 02 Jan 2024 05:42  Patient On (Oxygen Delivery Method): room air      Daily     Daily     GENERAL:  No acute distress, chronically ill appearing, lying in bed.   HEENT:  NCAT, no scleral icterus   CHEST:  no respiratory distress  HEART:  Regular rate and rhythm  ABDOMEN:  Soft, mild diffuse tenderness, non-distended, no palpable masses.   RECTUM: has rectal tube which has dark brown watery stool w/ no blood.   EXTREMITIES: No LE edema b/l  SKIN: No visible rashes seen.   NEURO:  Alert and oriented x 3, no tremors, no asterixis.     LABS:                        9.0    6.51  )-----------( 447      ( 02 Jan 2024 07:00 )             28.1     Mean Cell Volume: 96.6 fL (01-02-24 @ 07:00)    01-02    137  |  106  |  20  ----------------------------<  148<H>  4.2   |  21<L>  |  0.68    Ca    8.0<L>      02 Jan 2024 07:00  Phos  2.2     01-02  Mg     1.80     01-02    TPro  4.8<L>  /  Alb  2.6<L>  /  TBili  <0.2  /  DBili  x   /  AST  19  /  ALT  8   /  AlkPhos  90  01-02    LIVER FUNCTIONS - ( 02 Jan 2024 07:00 )  Alb: 2.6 g/dL / Pro: 4.8 g/dL / ALK PHOS: 90 U/L / ALT: 8 U/L / AST: 19 U/L / GGT: x           PT/INR - ( 02 Jan 2024 07:00 )   PT: 33.1 sec;   INR: 3.03 ratio         PTT - ( 02 Jan 2024 07:00 )  PTT:34.9 sec  Urinalysis Basic - ( 02 Jan 2024 07:00 )    Color: x / Appearance: x / SG: x / pH: x  Gluc: 148 mg/dL / Ketone: x  / Bili: x / Urobili: x   Blood: x / Protein: x / Nitrite: x   Leuk Esterase: x / RBC: x / WBC x   Sq Epi: x / Non Sq Epi: x / Bacteria: x      Imaging:    FINDINGS:  CHEST:  LUNGS AND LARGE AIRWAYS: Patent central airways. Dependent atelectatic   changes at the lung bases.  PLEURA: No pleural effusion.  VESSELS: Within normal limits.  HEART: Heart size is normal. No pericardial effusion.  MEDIASTINUM AND FOREST: No lymphadenopathy.  CHEST WALL AND LOWER NECK: Within normal limits.    ABDOMEN AND PELVIS:  LIVER: Within normal limits.  BILE DUCTS: Normal caliber.  GALLBLADDER: Cholecystectomy.  SPLEEN: Within normal limits.  PANCREAS: Within normal limits.  ADRENALS: Within normal limits.  KIDNEYS/URETERS: Within normal limits.    BLADDER: Within normal limits.  REPRODUCTIVE ORGANS: Posterior calcified fibroid.    BOWEL: No bowel obstruction. Appendix is unremarkable. There is   thickening, thumbprinting and hyperemia of the colon from the cecum   through rectum compatible with pancolitis.  PERITONEUM: No ascites.  VESSELS: Aorta is not dilated. Moderate atherosclerotic vascular   calcification.  No perivascular inflammation as clinically questioned.  RETROPERITONEUM/LYMPH NODES: No lymphadenopathy.  ABDOMINAL WALL: Within normal limits.  BONES: Within normal limits.    IMPRESSION:  Mild pancolitis, of infectious or inflammatory etiology.

## 2024-01-02 NOTE — DIETITIAN INITIAL EVALUATION ADULT - PERTINENT LABORATORY DATA
(1/2) Na 137, BUN 20, Cr 0.68, <H>, K+ 4.2, Phos 2.2<L>, Mg 1.80, Alk Phos 90, ALT/SGPT 8, AST/SGOT 19  (12/29) HbA1c 4.8%  POCT: (1/2) 177, (1/1) 125-205

## 2024-01-02 NOTE — PROGRESS NOTE ADULT - SUBJECTIVE AND OBJECTIVE BOX
[facility], Division of Infectious Diseases  [facility] Gentile Y. Patel, S. Shah, G. Blane  [phone]  ([phone] - weekdays after 5pm and weekends)    Name: ANAYA JOINER  Age/Gender: 58y Female  MRN: [id number]    Interval History:  Notes reviewed.   No concerning overnight events.  Afebrile.   reports facial swelling decreased but now w/ L hand bruising    Allergies: No Known Allergies      Objective:  Vitals:   T(F): 97.8 (01-02-24 @ 05:42), Max: 98.2 (01-01-24 @ 22:30)  HR: 69 (01-02-24 @ 05:42) (69 - 74)  BP: 121/79 (01-02-24 @ 05:42) (108/63 - 121/79)  RR: 18 (01-02-24 @ 05:42) (17 - 18)  SpO2: 99% (01-02-24 @ 05:42) (98% - 99%)  Physical Examination:  General: no acute distress  HEENT: anicteric  Cardio: S1, S2, normal rate  Resp: breathing comfortably on RA   Abd: soft, NT, ND  Ext: L hand ecchymoses  Skin: warm, dry    Laboratory Studies:  CBC:                       9.0    6.51  )-----------( 447      ( 02 Jan 2024 07:00 )             28.1     WBC Trend:  6.51 01-02-24 @ 07:00  9.80 01-01-24 @ 06:27  7.86 12-31-23 @ 06:09  7.81 12-30-23 @ 05:52  8.21 12-28-23 @ 14:28  11.87 12-27-23 @ 09:09    CMP: 01-02    137  |  106  |  20  ----------------------------<  148<H>  4.2   |  21<L>  |  0.68    Ca    8.0<L>      02 Jan 2024 07:00  Phos  2.2     01-02  Mg     1.80     01-02    TPro  4.8<L>  /  Alb  2.6<L>  /  TBili  <0.2  /  DBili  x   /  AST  19  /  ALT  8   /  AlkPhos  90  01-02      LIVER FUNCTIONS - ( 02 Jan 2024 07:00 )  Alb: 2.6 g/dL / Pro: 4.8 g/dL / ALK PHOS: 90 U/L / ALT: 8 U/L / AST: 19 U/L / GGT: x             Urinalysis Basic - ( 02 Jan 2024 07:00 )    Color: x / Appearance: x / SG: x / pH: x  Gluc: 148 mg/dL / Ketone: x  / Bili: x / Urobili: x   Blood: x / Protein: x / Nitrite: x   Leuk Esterase: x / RBC: x / WBC x   Sq Epi: x / Non Sq Epi: x / Bacteria: x      Microbiology: reviewed     Culture - Urine (collected 12-23-23 @ 06:30)  Source: Clean Catch Clean Catch (Midstream)  Final Report (12-24-23 @ 10:27):    No growth    Culture - Blood (collected 12-21-23 @ 22:40)  Source: .Blood Blood-Peripheral  Final Report (12-27-23 @ 07:01):    No growth at 5 days    Culture - Blood (collected 12-21-23 @ 22:30)  Source: .Blood Blood-Peripheral  Final Report (12-27-23 @ 07:01):    No growth at 5 days        Radiology: reviewed     Medications:  acetaminophen     Tablet .. 650 milliGRAM(s) Oral every 6 hours PRN  aluminum hydroxide/magnesium hydroxide/simethicone Suspension 30 milliLiter(s) Oral every 6 hours PRN  carvedilol 3.125 milliGRAM(s) Oral every 12 hours  dextrose 5%. 1000 milliLiter(s) IV Continuous <Continuous>  dextrose 5%. 1000 milliLiter(s) IV Continuous <Continuous>  dextrose 50% Injectable 12.5 Gram(s) IV Push once  dextrose 50% Injectable 25 Gram(s) IV Push once  dextrose 50% Injectable 25 Gram(s) IV Push once  dextrose Oral Gel 15 Gram(s) Oral once PRN  folic acid 1 milliGRAM(s) Oral daily  glucagon  Injectable 1 milliGRAM(s) IntraMuscular once  influenza   Vaccine 0.5 milliLiter(s) IntraMuscular once  insulin lispro (ADMELOG) corrective regimen sliding scale   SubCutaneous three times a day before meals  insulin lispro (ADMELOG) corrective regimen sliding scale   SubCutaneous at bedtime  lactated ringers. 1000 milliLiter(s) IV Continuous <Continuous>  mesalamine DR Capsule 400 milliGRAM(s) Oral three times a day  methylPREDNISolone sodium succinate Injectable 20 milliGRAM(s) IV Push every 8 hours  mirtazapine 30 milliGRAM(s) Oral daily  ondansetron Injectable 4 milliGRAM(s) IV Push once  pantoprazole    Tablet 40 milliGRAM(s) Oral before breakfast  rifAXIMin 550 milliGRAM(s) Oral two times a day  sodium phosphate 15 milliMole(s)/250 mL IVPB 15 milliMole(s) IV Intermittent once  spironolactone 100 milliGRAM(s) Oral daily    Antimicrobials:  rifAXIMin 550 milliGRAM(s) Oral two times a day   [facility], Division of Infectious Diseases  [facility] Y. Patel, S. Shah, G. Blane  [phone]  ([phone] - weekdays after 5pm and weekends)    Name: ANAYA JOINER  Age/Gender: 58y Female  MRN: [id number]    Interval History:  Notes reviewed.   No concerning overnight events.  Afebrile.   reports facial swelling decreased but now w/ L hand bruising    Allergies: No Known Allergies      Objective:  Vitals:   T(F): 97.8 (01-02-24 @ 05:42), Max: 98.2 (01-01-24 @ 22:30)  HR: 69 (01-02-24 @ 05:42) (69 - 74)  BP: 121/79 (01-02-24 @ 05:42) (108/63 - 121/79)  RR: 18 (01-02-24 @ 05:42) (17 - 18)  SpO2: 99% (01-02-24 @ 05:42) (98% - 99%)  Physical Examination:  General: no acute distress  HEENT: anicteric  Cardio: S1, S2, normal rate  Resp: breathing comfortably on RA   Abd: soft, NT, ND  Ext: L hand ecchymoses  Skin: warm, dry    Laboratory Studies:  CBC:                       9.0    6.51  )-----------( 447      ( 02 Jan 2024 07:00 )             28.1     WBC Trend:  6.51 01-02-24 @ 07:00  9.80 01-01-24 @ 06:27  7.86 12-31-23 @ 06:09  7.81 12-30-23 @ 05:52  8.21 12-28-23 @ 14:28  11.87 12-27-23 @ 09:09    CMP: 01-02    137  |  106  |  20  ----------------------------<  148<H>  4.2   |  21<L>  |  0.68    Ca    8.0<L>      02 Jan 2024 07:00  Phos  2.2     01-02  Mg     1.80     01-02    TPro  4.8<L>  /  Alb  2.6<L>  /  TBili  <0.2  /  DBili  x   /  AST  19  /  ALT  8   /  AlkPhos  90  01-02      LIVER FUNCTIONS - ( 02 Jan 2024 07:00 )  Alb: 2.6 g/dL / Pro: 4.8 g/dL / ALK PHOS: 90 U/L / ALT: 8 U/L / AST: 19 U/L / GGT: x             Urinalysis Basic - ( 02 Jan 2024 07:00 )    Color: x / Appearance: x / SG: x / pH: x  Gluc: 148 mg/dL / Ketone: x  / Bili: x / Urobili: x   Blood: x / Protein: x / Nitrite: x   Leuk Esterase: x / RBC: x / WBC x   Sq Epi: x / Non Sq Epi: x / Bacteria: x      Microbiology: reviewed     Culture - Urine (collected 12-23-23 @ 06:30)  Source: Clean Catch Clean Catch (Midstream)  Final Report (12-24-23 @ 10:27):    No growth    Culture - Blood (collected 12-21-23 @ 22:40)  Source: .Blood Blood-Peripheral  Final Report (12-27-23 @ 07:01):    No growth at 5 days    Culture - Blood (collected 12-21-23 @ 22:30)  Source: .Blood Blood-Peripheral  Final Report (12-27-23 @ 07:01):    No growth at 5 days        Radiology: reviewed     Medications:  acetaminophen     Tablet .. 650 milliGRAM(s) Oral every 6 hours PRN  aluminum hydroxide/magnesium hydroxide/simethicone Suspension 30 milliLiter(s) Oral every 6 hours PRN  carvedilol 3.125 milliGRAM(s) Oral every 12 hours  dextrose 5%. 1000 milliLiter(s) IV Continuous <Continuous>  dextrose 5%. 1000 milliLiter(s) IV Continuous <Continuous>  dextrose 50% Injectable 12.5 Gram(s) IV Push once  dextrose 50% Injectable 25 Gram(s) IV Push once  dextrose 50% Injectable 25 Gram(s) IV Push once  dextrose Oral Gel 15 Gram(s) Oral once PRN  folic acid 1 milliGRAM(s) Oral daily  glucagon  Injectable 1 milliGRAM(s) IntraMuscular once  influenza   Vaccine 0.5 milliLiter(s) IntraMuscular once  insulin lispro (ADMELOG) corrective regimen sliding scale   SubCutaneous three times a day before meals  insulin lispro (ADMELOG) corrective regimen sliding scale   SubCutaneous at bedtime  lactated ringers. 1000 milliLiter(s) IV Continuous <Continuous>  mesalamine DR Capsule 400 milliGRAM(s) Oral three times a day  methylPREDNISolone sodium succinate Injectable 20 milliGRAM(s) IV Push every 8 hours  mirtazapine 30 milliGRAM(s) Oral daily  ondansetron Injectable 4 milliGRAM(s) IV Push once  pantoprazole    Tablet 40 milliGRAM(s) Oral before breakfast  rifAXIMin 550 milliGRAM(s) Oral two times a day  sodium phosphate 15 milliMole(s)/250 mL IVPB 15 milliMole(s) IV Intermittent once  spironolactone 100 milliGRAM(s) Oral daily    Antimicrobials:  rifAXIMin 550 milliGRAM(s) Oral two times a day

## 2024-01-02 NOTE — PROGRESS NOTE ADULT - REASON FOR ADMISSION
transfer from [place] for tertiary level care in the setting of diffuse body rash transfer from [place] for tertiary level care in the setting of diffuse body rash

## 2024-01-02 NOTE — DIETITIAN INITIAL EVALUATION ADULT - NAME AND PHONE
Kailyn Vega RDN, CDN #[id number]  Also available on [employer] Teams  Kailyn Vega RDN, CDN #[id number]  Also available on [employer] Teams

## 2024-01-02 NOTE — DIETITIAN INITIAL EVALUATION ADULT - ETIOLOGY
altered GI function, inability to meet increased protein-energy needs with catabolic illnesses endocrine, renal related organ dysfunction

## 2024-01-02 NOTE — PROGRESS NOTE ADULT - REASON FOR ADMISSION
transfer from [place] for tertiary level care in the setting of diffuse body rash transfer from [place] for tertiary level care in the setting of diffuse body rash

## 2024-01-02 NOTE — DIETITIAN INITIAL EVALUATION ADULT - ADD RECOMMEND
1) Recommend gluten free diet + Liquid Protein Supplement 3x daily.   2) Monitor electrolytes (K+, Mg, P) and replete to within desired limits as clinically indicated.   3) Recommend addition of multivitamin to provide micronutrient coverage.

## 2024-01-02 NOTE — CHART NOTE - NSCHARTNOTEFT_GEN_A_CORE
Resident called to bedside as patient had refused evening coumadin dosage and began complaining of pain in her L pinky finger. Patient endorsed wanting "to die" to nursing staff. When speaking with patient, noticed swelling and bruising on L 5th digit around MCP joint. The 5th digit was painful with active and passive motion for both flexion and extension. The patient denied any trauma to finger and stated it began to bruise and grow in pain while simply holding her smartphone. The patient refused her coumadin due to the increased bruising in her finger. Discussed the necessity to continue the medication due to risk of clot formation/embolization due to A Fib. Patient expressed she was not aware she had A Fib. Stated she understood the risks but would prefer to hold off on the coumadin anyways. Given new bruising in 5th digit and ongoing steroid treatment, ordered x-ray of hand.    Discussed patient's potential SI. Patient stated general frustration with her medical situation and dissatisfaction with the pace of her recovery. Denied any active plan or desire for suicide, simply frustration with her current circumstances. Will continue to monitor and consider non-emergent psych eval in AM, but does not appear to be acutely suicidal at this time.    George Mahmood MD | PGY-1

## 2024-01-02 NOTE — PROGRESS NOTE ADULT - TIME BILLING
Time-based billing (NON-critical care).     50 minutes spent on total encounter; more than 50% of the visit was spent counseling and / or coordinating care by the attending physician.  The necessity of the time spent during the encounter on this date of service was due to:     documentation in [place], reviewing chart and coordinating care with patient/resident and interdisciplinary staff (such as , social workers, etc) as well as reviewing vitals, laboratory data, radiology, medication list, consultants' recommendations and prior records. Interventions were performed as documented above. Time-based billing (NON-critical care).     50 minutes spent on total encounter; more than 50% of the visit was spent counseling and / or coordinating care by the attending physician.  The necessity of the time spent during the encounter on this date of service was due to:     documentation in [place], reviewing chart and coordinating care with patient/resident and interdisciplinary staff (such as , social workers, etc) as well as reviewing vitals, laboratory data, radiology, medication list, consultants' recommendations and prior records. Interventions were performed as documented above. Time-based billing (NON-critical care).     35 minutes spent on total encounter; more than 50% of the visit was spent counseling and / or coordinating care by the attending physician.  The necessity of the time spent during the encounter on this date of service was due to:     documentation in [place], reviewing chart and coordinating care with patient/resident and interdisciplinary staff (such as , social workers, etc) as well as reviewing vitals, laboratory data, radiology, medication list, consultants' recommendations and prior records. Interventions were performed as documented above. Time-based billing (NON-critical care).     35 minutes spent on total encounter; more than 50% of the visit was spent counseling and / or coordinating care by the attending physician.  The necessity of the time spent during the encounter on this date of service was due to:     documentation in [place], reviewing chart and coordinating care with patient/resident and interdisciplinary staff (such as , social workers, etc) as well as reviewing vitals, laboratory data, radiology, medication list, consultants' recommendations and prior records. Interventions were performed as documented above.

## 2024-01-02 NOTE — DIETITIAN INITIAL EVALUATION ADULT - OTHER CALCULATIONS
Dosing weight (12/28) 88.1 lbs / 40 kg.  No recent weight history available via chart.  Ideal Body Weight: 100 lbs / 45.5 kg +/-10%

## 2024-01-02 NOTE — PROGRESS NOTE ADULT - PROBLEM SELECTOR PLAN 2
Patient with history of ulcerative colitis found to have pancolitis   -likely ulcerative colitis flare   -continue mesalamine 400mg TID    c/w IV steroids 20 q8h (12/31)   - PPI 40mg daily   -Continue with mesalamine 400 mg TID for now  -regular diet  -GI following Patient with history of ulcerative colitis found to have pancolitis   -likely ulcerative colitis flare   -continue mesalamine 400mg TID    c/w IV steroids 20 q8h (12/31)   - PPI 40mg daily   -Continue with mesalamine 400 mg TID for now  -regular diet  -GI following  -in process of obtaining colonoscopy report from [facility] Patient with history of ulcerative colitis found to have pancolitis   -likely ulcerative colitis flare   -continue mesalamine 400mg TID    c/w IV steroids 20 q8h (12/31)   - PPI 40mg daily   -Continue with mesalamine 400 mg TID for now  -regular diet  -GI following  -in process of obtaining colonoscopy report from [facility]

## 2024-01-02 NOTE — PROGRESS NOTE ADULT - ASSESSMENT
57 y/o F w/ pmh of cad, dm, mdd, gerd, IBS, cirrhosis 2/2 to nonalcoholic fatty liver, bipolar, afib on coumadin, today presented to [facility] hospital for hypotension and tachycardia with new diffuse purpura to the LE ongoing the last few days and today morning waking up by R>L orbital swelling. R>L orbital swelling. Febrile in the ED Tm 101.4F  CT orbits: Nonspecific bilateral periorbital soft tissue swelling, right greater than left, as well as right facial soft tissue swelling. No discrete drainable fluid collection. No evidence of post septal involvement/orbital cellulitis.  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 Neurosurgical consultation is recommended.  CT A/P: Mild pancolitis, of infectious or inflammatory etiology.    periocular swelling resolved    hepatitis B serologies negative  quant gold negative  inflammatory markers decreasing    RECOMMENDATIONS  low concern for infection  continue off antibiotics  steroids per NOAM Arguelles M.D.  [facility], Division of Infectious Diseases  [phone]  After 5pm on weekdays and all day on weekends - please call [phone]  59 y/o F w/ pmh of cad, dm, mdd, gerd, IBS, cirrhosis 2/2 to nonalcoholic fatty liver, bipolar, afib on coumadin, today presented to [facility] hospital for hypotension and tachycardia with new diffuse purpura to the LE ongoing the last few days and today morning waking up by R>L orbital swelling. R>L orbital swelling. Febrile in the ED Tm 101.4F  CT orbits: Nonspecific bilateral periorbital soft tissue swelling, right greater than left, as well as right facial soft tissue swelling. No discrete drainable fluid collection. No evidence of post septal involvement/orbital cellulitis.  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 Neurosurgical consultation is recommended.  CT A/P: Mild pancolitis, of infectious or inflammatory etiology.    periocular swelling resolved    hepatitis B serologies negative  quant gold negative  inflammatory markers decreasing    RECOMMENDATIONS  low concern for infection  continue off antibiotics  steroids per NOAM Arguelles M.D.  [facility], Division of Infectious Diseases  [phone]  After 5pm on weekdays and all day on weekends - please call [phone]

## 2024-01-03 LAB
ALBUMIN SERPL ELPH-MCNC: 2.9 G/DL — LOW (ref 3.3–5)
ALBUMIN SERPL ELPH-MCNC: 2.9 G/DL — LOW (ref 3.3–5)
ALP SERPL-CCNC: 88 U/L — SIGNIFICANT CHANGE UP (ref 40–120)
ALP SERPL-CCNC: 88 U/L — SIGNIFICANT CHANGE UP (ref 40–120)
ALT FLD-CCNC: 12 U/L — SIGNIFICANT CHANGE UP (ref 4–33)
ALT FLD-CCNC: 12 U/L — SIGNIFICANT CHANGE UP (ref 4–33)
ANION GAP SERPL CALC-SCNC: 12 MMOL/L — SIGNIFICANT CHANGE UP (ref 7–14)
ANION GAP SERPL CALC-SCNC: 12 MMOL/L — SIGNIFICANT CHANGE UP (ref 7–14)
APTT BLD: 33.6 SEC — SIGNIFICANT CHANGE UP (ref 24.5–35.6)
APTT BLD: 33.6 SEC — SIGNIFICANT CHANGE UP (ref 24.5–35.6)
AST SERPL-CCNC: 22 U/L — SIGNIFICANT CHANGE UP (ref 4–32)
AST SERPL-CCNC: 22 U/L — SIGNIFICANT CHANGE UP (ref 4–32)
BILIRUB SERPL-MCNC: <0.2 MG/DL — SIGNIFICANT CHANGE UP (ref 0.2–1.2)
BILIRUB SERPL-MCNC: <0.2 MG/DL — SIGNIFICANT CHANGE UP (ref 0.2–1.2)
BUN SERPL-MCNC: 21 MG/DL — SIGNIFICANT CHANGE UP (ref 7–23)
BUN SERPL-MCNC: 21 MG/DL — SIGNIFICANT CHANGE UP (ref 7–23)
CALCIUM SERPL-MCNC: 8.3 MG/DL — LOW (ref 8.4–10.5)
CALCIUM SERPL-MCNC: 8.3 MG/DL — LOW (ref 8.4–10.5)
CHLORIDE SERPL-SCNC: 105 MMOL/L — SIGNIFICANT CHANGE UP (ref 98–107)
CHLORIDE SERPL-SCNC: 105 MMOL/L — SIGNIFICANT CHANGE UP (ref 98–107)
CO2 SERPL-SCNC: 20 MMOL/L — LOW (ref 22–31)
CO2 SERPL-SCNC: 20 MMOL/L — LOW (ref 22–31)
CREAT SERPL-MCNC: 0.57 MG/DL — SIGNIFICANT CHANGE UP (ref 0.5–1.3)
CREAT SERPL-MCNC: 0.57 MG/DL — SIGNIFICANT CHANGE UP (ref 0.5–1.3)
CRP SERPL-MCNC: 14 MG/L — HIGH
CRP SERPL-MCNC: 14 MG/L — HIGH
EGFR: 107 ML/MIN/1.73M2 — SIGNIFICANT CHANGE UP
EGFR: 107 ML/MIN/1.73M2 — SIGNIFICANT CHANGE UP
ERYTHROCYTE [SEDIMENTATION RATE] IN BLOOD: 5 MM/HR — SIGNIFICANT CHANGE UP (ref 4–25)
ERYTHROCYTE [SEDIMENTATION RATE] IN BLOOD: 5 MM/HR — SIGNIFICANT CHANGE UP (ref 4–25)
GLUCOSE BLDC GLUCOMTR-MCNC: 114 MG/DL — HIGH (ref 70–99)
GLUCOSE BLDC GLUCOMTR-MCNC: 114 MG/DL — HIGH (ref 70–99)
GLUCOSE BLDC GLUCOMTR-MCNC: 188 MG/DL — HIGH (ref 70–99)
GLUCOSE BLDC GLUCOMTR-MCNC: 188 MG/DL — HIGH (ref 70–99)
GLUCOSE BLDC GLUCOMTR-MCNC: 203 MG/DL — HIGH (ref 70–99)
GLUCOSE BLDC GLUCOMTR-MCNC: 203 MG/DL — HIGH (ref 70–99)
GLUCOSE BLDC GLUCOMTR-MCNC: 221 MG/DL — HIGH (ref 70–99)
GLUCOSE BLDC GLUCOMTR-MCNC: 221 MG/DL — HIGH (ref 70–99)
GLUCOSE BLDC GLUCOMTR-MCNC: 286 MG/DL — HIGH (ref 70–99)
GLUCOSE BLDC GLUCOMTR-MCNC: 286 MG/DL — HIGH (ref 70–99)
GLUCOSE SERPL-MCNC: 181 MG/DL — HIGH (ref 70–99)
GLUCOSE SERPL-MCNC: 181 MG/DL — HIGH (ref 70–99)
HCT VFR BLD CALC: 27.5 % — LOW (ref 34.5–45)
HCT VFR BLD CALC: 27.5 % — LOW (ref 34.5–45)
HGB BLD-MCNC: 8.6 G/DL — LOW (ref 11.5–15.5)
HGB BLD-MCNC: 8.6 G/DL — LOW (ref 11.5–15.5)
INR BLD: 2.18 RATIO — HIGH (ref 0.85–1.18)
INR BLD: 2.18 RATIO — HIGH (ref 0.85–1.18)
MAGNESIUM SERPL-MCNC: 2 MG/DL — SIGNIFICANT CHANGE UP (ref 1.6–2.6)
MAGNESIUM SERPL-MCNC: 2 MG/DL — SIGNIFICANT CHANGE UP (ref 1.6–2.6)
MCHC RBC-ENTMCNC: 30.3 PG — SIGNIFICANT CHANGE UP (ref 27–34)
MCHC RBC-ENTMCNC: 30.3 PG — SIGNIFICANT CHANGE UP (ref 27–34)
MCHC RBC-ENTMCNC: 31.3 GM/DL — LOW (ref 32–36)
MCHC RBC-ENTMCNC: 31.3 GM/DL — LOW (ref 32–36)
MCV RBC AUTO: 96.8 FL — SIGNIFICANT CHANGE UP (ref 80–100)
MCV RBC AUTO: 96.8 FL — SIGNIFICANT CHANGE UP (ref 80–100)
NRBC # BLD: 0 /100 WBCS — SIGNIFICANT CHANGE UP (ref 0–0)
NRBC # BLD: 0 /100 WBCS — SIGNIFICANT CHANGE UP (ref 0–0)
NRBC # FLD: 0 K/UL — SIGNIFICANT CHANGE UP (ref 0–0)
NRBC # FLD: 0 K/UL — SIGNIFICANT CHANGE UP (ref 0–0)
PHOSPHATE SERPL-MCNC: 2.4 MG/DL — LOW (ref 2.5–4.5)
PHOSPHATE SERPL-MCNC: 2.4 MG/DL — LOW (ref 2.5–4.5)
PLATELET # BLD AUTO: 420 K/UL — HIGH (ref 150–400)
PLATELET # BLD AUTO: 420 K/UL — HIGH (ref 150–400)
POTASSIUM SERPL-MCNC: 4.3 MMOL/L — SIGNIFICANT CHANGE UP (ref 3.5–5.3)
POTASSIUM SERPL-MCNC: 4.3 MMOL/L — SIGNIFICANT CHANGE UP (ref 3.5–5.3)
POTASSIUM SERPL-SCNC: 4.3 MMOL/L — SIGNIFICANT CHANGE UP (ref 3.5–5.3)
POTASSIUM SERPL-SCNC: 4.3 MMOL/L — SIGNIFICANT CHANGE UP (ref 3.5–5.3)
PROT SERPL-MCNC: 5.3 G/DL — LOW (ref 6–8.3)
PROT SERPL-MCNC: 5.3 G/DL — LOW (ref 6–8.3)
PROTHROM AB SERPL-ACNC: 23.9 SEC — HIGH (ref 9.5–13)
PROTHROM AB SERPL-ACNC: 23.9 SEC — HIGH (ref 9.5–13)
RBC # BLD: 2.84 M/UL — LOW (ref 3.8–5.2)
RBC # BLD: 2.84 M/UL — LOW (ref 3.8–5.2)
RBC # FLD: 14.7 % — HIGH (ref 10.3–14.5)
RBC # FLD: 14.7 % — HIGH (ref 10.3–14.5)
SODIUM SERPL-SCNC: 137 MMOL/L — SIGNIFICANT CHANGE UP (ref 135–145)
SODIUM SERPL-SCNC: 137 MMOL/L — SIGNIFICANT CHANGE UP (ref 135–145)
WBC # BLD: 5.13 K/UL — SIGNIFICANT CHANGE UP (ref 3.8–10.5)
WBC # BLD: 5.13 K/UL — SIGNIFICANT CHANGE UP (ref 3.8–10.5)
WBC # FLD AUTO: 5.13 K/UL — SIGNIFICANT CHANGE UP (ref 3.8–10.5)
WBC # FLD AUTO: 5.13 K/UL — SIGNIFICANT CHANGE UP (ref 3.8–10.5)

## 2024-01-03 PROCEDURE — 99232 SBSQ HOSP IP/OBS MODERATE 35: CPT | Mod: GC

## 2024-01-03 RX ORDER — WARFARIN SODIUM 2.5 MG/1
6 TABLET ORAL ONCE
Refills: 0 | Status: COMPLETED | OUTPATIENT
Start: 2024-01-03 | End: 2024-01-03

## 2024-01-03 RX ADMIN — Medication 20 MILLIGRAM(S): at 06:44

## 2024-01-03 RX ADMIN — PANTOPRAZOLE SODIUM 40 MILLIGRAM(S): 20 TABLET, DELAYED RELEASE ORAL at 06:54

## 2024-01-03 RX ADMIN — Medication 400 MILLIGRAM(S): at 06:46

## 2024-01-03 RX ADMIN — CARVEDILOL PHOSPHATE 3.12 MILLIGRAM(S): 80 CAPSULE, EXTENDED RELEASE ORAL at 06:44

## 2024-01-03 RX ADMIN — Medication 3: at 17:51

## 2024-01-03 RX ADMIN — Medication 400 MILLIGRAM(S): at 23:26

## 2024-01-03 RX ADMIN — Medication 20 MILLIGRAM(S): at 23:26

## 2024-01-03 RX ADMIN — WARFARIN SODIUM 6 MILLIGRAM(S): 2.5 TABLET ORAL at 17:33

## 2024-01-03 RX ADMIN — Medication 63.75 MILLIMOLE(S): at 11:07

## 2024-01-03 RX ADMIN — SPIRONOLACTONE 100 MILLIGRAM(S): 25 TABLET, FILM COATED ORAL at 06:45

## 2024-01-03 RX ADMIN — Medication 2: at 12:51

## 2024-01-03 RX ADMIN — Medication 20 MILLIGRAM(S): at 13:56

## 2024-01-03 RX ADMIN — Medication 400 MILLIGRAM(S): at 13:56

## 2024-01-03 RX ADMIN — Medication 1 MILLIGRAM(S): at 11:07

## 2024-01-03 RX ADMIN — MIRTAZAPINE 30 MILLIGRAM(S): 45 TABLET, ORALLY DISINTEGRATING ORAL at 11:07

## 2024-01-03 NOTE — PROGRESS NOTE ADULT - PROBLEM SELECTOR PLAN 1
Patient presented with body rash of b/l upper extremity and lower extremity of unknown etiology that is improving from initial presentation   Rash of unclear etiology, initiall ifnectious versus inflammatory now derm believes resolving ecchymosis  work up at [place] includes:   CMV IgG negative   LDH normal   Babesia negative   Lyme negative   RPR negative   ESR, CRP elevated   -consult rheumatology for possible vasculitis, appreciate recs for workup  -consult dermatology: likely resolving ecchymosis i/s/o coumadin use  -ophtho consulted for erythema around iris, likely subconjunctival hemorrhage no need to stop coumadin, will reassess tomorrow Patient presented with body rash of b/l upper extremity and lower extremity of unknown etiology that is improving from initial presentation   Rash of unclear etiology, initiall ifnectious versus inflammatory now derm believes resolving ecchymosis  work up at [place] includes:   CMV IgG negative   LDH normal   Babesia negative   Lyme negative   RPR negative   ESR, CRP elevated   -consult rheumatology for possible vasculitis, appreciate recs for workup  -consult dermatology: likely resolving ecchymosis i/s/o coumadin use  -ophtho consulted for erythema around iris, likely subconjunctival hemorrhage no need to stop coumadin, will reassess tomorrow Patient presented with body rash of b/l upper extremity and lower extremity of unknown etiology that is improving from initial presentation   Rash of unclear etiology, initiall ifnectious versus inflammatory now derm believes resolving ecchymosis  work up at [place] includes:   CMV IgG negative   LDH normal   Babesia negative   Lyme negative   RPR negative   ESR, CRP elevated   -rheumatology: likely not vasculitis  -dermatology: likely resolving ecchymosis i/s/o coumadin use  -ophtho: likely subconjunctival hemorrhage no need to stop coumadin Patient presented with body rash of b/l upper extremity and lower extremity of unknown etiology that is improving from initial presentation   Rash of unclear etiology, initiall ifnectious versus inflammatory now derm believes resolving ecchymosis  work up at [place] includes:   CMV IgG negative   LDH normal   Babesia negative   Lyme negative   RPR negative   ESR, CRP elevated   -rheumatology: likely not vasculitis  -dermatology: likely resolving ecchymosis i/s/o coumadin use  -ophtho: likely subconjunctival hemorrhage no need to stop coumadin

## 2024-01-03 NOTE — PROGRESS NOTE ADULT - PROBLEM SELECTOR PLAN 3
Patient with anemia with iron studies more consistent with anemia of chronic disease   -B12 and folate WNL   -patient had drop of Hgb to 6.9 at [place] prior to transfer, transfused with 1pRBC with response to 9.5  -continue to monitor   -transfuse for Hgb >7 Patient with anemia with iron studies more consistent with anemia of chronic disease   -B12 and folate WNL   -patient had drop of Hgb to 6.9 at [place] prior to transfer, transfused with 1pRBC with response to 9.5  -continue to monitor   -transfuse for Hgb >7

## 2024-01-03 NOTE — PROGRESS NOTE ADULT - ASSESSMENT
58 y F w PMHx of CAD, diabetes, insomnia, MDD, GERD, hypertension, irritable bowel syndrome with diarrhea, ulcerative colitis, cirrhosis, NAFLD, hepatic encephalopathy, bipolar disorder, chronic Afib on Coumadin, blepharitis of the left eye who initially presented to [facility] due to reported hypotension, tachycardia and diffuse purpura. Patient transferred to [facility] for further workup and consultations for her body rash.  58 y F w PMHx of CAD, diabetes, insomnia, MDD, GERD, hypertension, irritable bowel syndrome with diarrhea, ulcerative colitis, cirrhosis, NAFLD, hepatic encephalopathy, bipolar disorder, chronic Afib on Coumadin, blepharitis of the left eye who initially presented to [facility] due to reported hypotension, tachycardia and diffuse purpura. Patient transferred to [facility] for further workup and consultations for her body rash.

## 2024-01-03 NOTE — PROGRESS NOTE ADULT - REASON FOR ADMISSION
transfer from [place] for tertiary level care in the setting of diffuse body rash transfer from [place] for tertiary level care in the setting of diffuse body rash

## 2024-01-03 NOTE — PROGRESS NOTE ADULT - SUBJECTIVE AND OBJECTIVE BOX
[facility], Division of Infectious Diseases  GENESIS Gentile Y. Patel, S. Shah, G. Blane  [phone]  ([phone] - weekdays after 5pm and weekends)    Name: ANAYA JOINER  Age/Gender: 58y Female  MRN: [id number]    Interval History:  Notes reviewed.   No concerning overnight events.  Afebrile.   reports poor appetite  denies SOB, dysuria    Allergies: No Known Allergies      Objective:  Vitals:   T(F): 98.6 (01-03-24 @ 06:43), Max: 98.6 (01-03-24 @ 06:43)  HR: 70 (01-03-24 @ 06:43) (65 - 82)  BP: 118/64 (01-03-24 @ 06:43) (110/61 - 141/79)  RR: 16 (01-03-24 @ 06:43) (16 - 18)  SpO2: 100% (01-03-24 @ 06:43) (99% - 100%)  Physical Examination:  General: no acute distress  HEENT: anicteric  Cardio: S1, S2, normal rate  Resp: breathing comfortably on RA   Abd: soft, NT, ND  Ext: L hand ecchymoses  Skin: warm, dry    Laboratory Studies:  CBC:                       8.6    5.13  )-----------( 420      ( 03 Jan 2024 06:04 )             27.5     WBC Trend:  5.13 01-03-24 @ 06:04  6.51 01-02-24 @ 07:00  9.80 01-01-24 @ 06:27  7.86 12-31-23 @ 06:09  7.81 12-30-23 @ 05:52  8.21 12-28-23 @ 14:28    CMP: 01-03    137  |  105  |  21  ----------------------------<  181<H>  4.3   |  20<L>  |  0.57    Ca    8.3<L>      03 Jan 2024 06:04  Phos  2.4     01-03  Mg     2.00     01-03    TPro  5.3<L>  /  Alb  2.9<L>  /  TBili  <0.2  /  DBili  x   /  AST  22  /  ALT  12  /  AlkPhos  88  01-03      LIVER FUNCTIONS - ( 03 Jan 2024 06:04 )  Alb: 2.9 g/dL / Pro: 5.3 g/dL / ALK PHOS: 88 U/L / ALT: 12 U/L / AST: 22 U/L / GGT: x             Urinalysis Basic - ( 03 Jan 2024 06:04 )    Color: x / Appearance: x / SG: x / pH: x  Gluc: 181 mg/dL / Ketone: x  / Bili: x / Urobili: x   Blood: x / Protein: x / Nitrite: x   Leuk Esterase: x / RBC: x / WBC x   Sq Epi: x / Non Sq Epi: x / Bacteria: x      Microbiology: reviewed     Culture - Urine (collected 12-23-23 @ 06:30)  Source: Clean Catch Clean Catch (Midstream)  Final Report (12-24-23 @ 10:27):    No growth    Culture - Blood (collected 12-21-23 @ 22:40)  Source: .Blood Blood-Peripheral  Final Report (12-27-23 @ 07:01):    No growth at 5 days    Culture - Blood (collected 12-21-23 @ 22:30)  Source: .Blood Blood-Peripheral  Final Report (12-27-23 @ 07:01):    No growth at 5 days        Radiology: reviewed     Medications:  acetaminophen     Tablet .. 650 milliGRAM(s) Oral every 6 hours PRN  aluminum hydroxide/magnesium hydroxide/simethicone Suspension 30 milliLiter(s) Oral every 6 hours PRN  carvedilol 3.125 milliGRAM(s) Oral every 12 hours  dextrose 5%. 1000 milliLiter(s) IV Continuous <Continuous>  dextrose 5%. 1000 milliLiter(s) IV Continuous <Continuous>  dextrose 50% Injectable 12.5 Gram(s) IV Push once  dextrose 50% Injectable 25 Gram(s) IV Push once  dextrose 50% Injectable 25 Gram(s) IV Push once  dextrose Oral Gel 15 Gram(s) Oral once PRN  folic acid 1 milliGRAM(s) Oral daily  glucagon  Injectable 1 milliGRAM(s) IntraMuscular once  influenza   Vaccine 0.5 milliLiter(s) IntraMuscular once  insulin lispro (ADMELOG) corrective regimen sliding scale   SubCutaneous at bedtime  insulin lispro (ADMELOG) corrective regimen sliding scale   SubCutaneous three times a day before meals  lactated ringers. 1000 milliLiter(s) IV Continuous <Continuous>  mesalamine DR Capsule 400 milliGRAM(s) Oral three times a day  methylPREDNISolone sodium succinate Injectable 20 milliGRAM(s) IV Push every 8 hours  mirtazapine 30 milliGRAM(s) Oral daily  ondansetron Injectable 4 milliGRAM(s) IV Push once  pantoprazole    Tablet 40 milliGRAM(s) Oral before breakfast  rifAXIMin 550 milliGRAM(s) Oral two times a day  spironolactone 100 milliGRAM(s) Oral daily    Antimicrobials:  rifAXIMin 550 milliGRAM(s) Oral two times a day   [facility], Division of Infectious Diseases  GENESIS Gentile Y. Patel, S. Shah, G. Blane  [phone]  ([phone] - weekdays after 5pm and weekends)    Name: ANAYA JOINER  Age/Gender: 58y Female  MRN: [id number]    Interval History:  Notes reviewed.   No concerning overnight events.  Afebrile.   reports poor appetite  denies SOB, dysuria    Allergies: No Known Allergies      Objective:  Vitals:   T(F): 98.6 (01-03-24 @ 06:43), Max: 98.6 (01-03-24 @ 06:43)  HR: 70 (01-03-24 @ 06:43) (65 - 82)  BP: 118/64 (01-03-24 @ 06:43) (110/61 - 141/79)  RR: 16 (01-03-24 @ 06:43) (16 - 18)  SpO2: 100% (01-03-24 @ 06:43) (99% - 100%)  Physical Examination:  General: no acute distress  HEENT: anicteric  Cardio: S1, S2, normal rate  Resp: breathing comfortably on RA   Abd: soft, NT, ND  Ext: L hand ecchymoses  Skin: warm, dry    Laboratory Studies:  CBC:                       8.6    5.13  )-----------( 420      ( 03 Jan 2024 06:04 )             27.5     WBC Trend:  5.13 01-03-24 @ 06:04  6.51 01-02-24 @ 07:00  9.80 01-01-24 @ 06:27  7.86 12-31-23 @ 06:09  7.81 12-30-23 @ 05:52  8.21 12-28-23 @ 14:28    CMP: 01-03    137  |  105  |  21  ----------------------------<  181<H>  4.3   |  20<L>  |  0.57    Ca    8.3<L>      03 Jan 2024 06:04  Phos  2.4     01-03  Mg     2.00     01-03    TPro  5.3<L>  /  Alb  2.9<L>  /  TBili  <0.2  /  DBili  x   /  AST  22  /  ALT  12  /  AlkPhos  88  01-03      LIVER FUNCTIONS - ( 03 Jan 2024 06:04 )  Alb: 2.9 g/dL / Pro: 5.3 g/dL / ALK PHOS: 88 U/L / ALT: 12 U/L / AST: 22 U/L / GGT: x             Urinalysis Basic - ( 03 Jan 2024 06:04 )    Color: x / Appearance: x / SG: x / pH: x  Gluc: 181 mg/dL / Ketone: x  / Bili: x / Urobili: x   Blood: x / Protein: x / Nitrite: x   Leuk Esterase: x / RBC: x / WBC x   Sq Epi: x / Non Sq Epi: x / Bacteria: x      Microbiology: reviewed     Culture - Urine (collected 12-23-23 @ 06:30)  Source: Clean Catch Clean Catch (Midstream)  Final Report (12-24-23 @ 10:27):    No growth    Culture - Blood (collected 12-21-23 @ 22:40)  Source: .Blood Blood-Peripheral  Final Report (12-27-23 @ 07:01):    No growth at 5 days    Culture - Blood (collected 12-21-23 @ 22:30)  Source: .Blood Blood-Peripheral  Final Report (12-27-23 @ 07:01):    No growth at 5 days        Radiology: reviewed     Medications:  acetaminophen     Tablet .. 650 milliGRAM(s) Oral every 6 hours PRN  aluminum hydroxide/magnesium hydroxide/simethicone Suspension 30 milliLiter(s) Oral every 6 hours PRN  carvedilol 3.125 milliGRAM(s) Oral every 12 hours  dextrose 5%. 1000 milliLiter(s) IV Continuous <Continuous>  dextrose 5%. 1000 milliLiter(s) IV Continuous <Continuous>  dextrose 50% Injectable 12.5 Gram(s) IV Push once  dextrose 50% Injectable 25 Gram(s) IV Push once  dextrose 50% Injectable 25 Gram(s) IV Push once  dextrose Oral Gel 15 Gram(s) Oral once PRN  folic acid 1 milliGRAM(s) Oral daily  glucagon  Injectable 1 milliGRAM(s) IntraMuscular once  influenza   Vaccine 0.5 milliLiter(s) IntraMuscular once  insulin lispro (ADMELOG) corrective regimen sliding scale   SubCutaneous at bedtime  insulin lispro (ADMELOG) corrective regimen sliding scale   SubCutaneous three times a day before meals  lactated ringers. 1000 milliLiter(s) IV Continuous <Continuous>  mesalamine DR Capsule 400 milliGRAM(s) Oral three times a day  methylPREDNISolone sodium succinate Injectable 20 milliGRAM(s) IV Push every 8 hours  mirtazapine 30 milliGRAM(s) Oral daily  ondansetron Injectable 4 milliGRAM(s) IV Push once  pantoprazole    Tablet 40 milliGRAM(s) Oral before breakfast  rifAXIMin 550 milliGRAM(s) Oral two times a day  spironolactone 100 milliGRAM(s) Oral daily    Antimicrobials:  rifAXIMin 550 milliGRAM(s) Oral two times a day

## 2024-01-03 NOTE — PROGRESS NOTE ADULT - PROBLEM SELECTOR PLAN 2
Patient with history of ulcerative colitis found to have pancolitis   -likely ulcerative colitis flare   -continue mesalamine 400mg TID    c/w IV steroids 20 q8h (12/31)   - PPI 40mg daily   -Continue with mesalamine 400 mg TID for now  -regular diet  -GI following  -in process of obtaining colonoscopy report from [facility] Patient with history of ulcerative colitis found to have pancolitis   -likely ulcerative colitis flare   -continue mesalamine 400mg TID    c/w IV steroids 20 q8h (12/31)   - PPI 40mg daily   -Continue with mesalamine 400 mg TID for now  -regular diet  -GI following  -in process of obtaining colonoscopy report from [facility]

## 2024-01-03 NOTE — PROGRESS NOTE ADULT - TIME BILLING
Time-based billing (NON-critical care).     More than 50% of the visit was spent counseling and / or coordinating care by the attending physician.  The necessity of the time spent during the encounter on this date of service was due to:     documentation in [place], reviewing chart and coordinating care with patient/resident and interdisciplinary staff (such as , social workers, etc) as well as reviewing vitals, laboratory data, radiology, medication list, consultants' recommendations and prior records. Interventions were performed as documented above. Time-based billing (NON-critical care).     More than 50% of the visit was spent counseling and / or coordinating care by the attending physician.  The necessity of the time spent during the encounter on this date of service was due to:     documentation in [place], reviewing chart and coordinating care with patient/resident and interdisciplinary staff (such as , social workers, etc) as well as reviewing vitals, laboratory data, radiology, medication list, consultants' recommendations and prior records. Interventions were performed as documented above.

## 2024-01-03 NOTE — PROGRESS NOTE ADULT - REASON FOR ADMISSION
transfer from [place] for tertiary level care in the setting of diffuse body rash transfer from [place] for tertiary level care in the setting of diffuse body rash

## 2024-01-03 NOTE — PROGRESS NOTE ADULT - ASSESSMENT
Impression:   58 yrs old male w/ hx of CAD (not on anti-platelets), insomnia, DM, GERD, MDD, HTN, Ulcerative colitis, Bipolar disorder, cirrhosis (unknown etiology, NAFLD?) c/w HE, Afib (on Coumadin), and Blepharitis who initially presented to [facility] for hypotension, diffuse body rashes and pancolitis.     #"mild" Pancolitis  #Ulcerative colitis?   - Reported bloody stools w/ fecal incontinence worsening for the past one year. Underwent two colonoscopies on 11/2022 and 3/2023 but no records present. CT A/P showed thickening, hyperemia and mild inflammation throughout the colon. Does not follow w/ GI as o/p. No prior biologic use. Previously responded to steroids.   - Ideally would need colonoscopy report or repeat colonoscopy to confirm her hx of UC.   - GI PCR and c diff testing neg from 12/25.   - On oral mesalamine 400 mg TID. IV steroids on 12/31.   - Concerned for UC flare, infectious work up neg.   - declined flex sig/colonoscopy   - hepatitis B serologies negative. Quant TB testing neg.   - fecal calprotectin 2050   - CRP downtrending     #Cirrhosis?   - Unclear if the patient has cirrhosis b/c the CT imaging showed normal liver w/ no splenomegaly. Her INR is elevated in the setting of Coumadin use. She has thrombocytopenia in the setting of possible infection.   - Less concerned for cirrhosis based on the clinical picture, not been seen by hepatologist.   - On rifaximin and Aldactone per home meds.     Recommendations:   - Symptoms have been worsening, despite downtrending CRP levels, continue with IV steroids for now.   - Please obtain colonoscopy reports from [place], patient has been reporting they have incomplete colonoscopy due to severe inflammation. She is also declining repeating flex sig or colonoscopy which we already offered it. Will need the colonoscopy and pathology report. Will need to confirm UC diagnosis before we proceed w/ any biologic therapy. Discussed with the primary team. Still pending report.   - PPI 40mg daily   - Continue with mesalamine 400 mg TID for now  - please document stool volume daily   - DVT PPx   - CBC and CRP daily.     Discussed the case with Dr. Gonzalez.     GI will continue to follow.     All recommendations are tentative until note is attested by an attending.     Moisés Colin, PGY-5  Gastroenterology/Hepatology Fellow  Available on [employer] Teams  [phone] (Short Range Pager)  [phone] (Long Range Pager)    After 5pm, please contact the on-call GI fellow. [phone] Impression:   58 yrs old male w/ hx of CAD (not on anti-platelets), insomnia, DM, GERD, MDD, HTN, Ulcerative colitis, Bipolar disorder, cirrhosis (unknown etiology, NAFLD?) c/w HE, Afib (on Coumadin), and Blepharitis who initially presented to [facility] for hypotension, diffuse body rashes and pancolitis.     #"mild" Pancolitis  #Ulcerative colitis?   - Reported bloody stools w/ fecal incontinence worsening for the past one year. Underwent two colonoscopies on 11/2022 and 3/2023 but no records present. CT A/P showed thickening, hyperemia and mild inflammation throughout the colon. Does not follow w/ GI as o/p. No prior biologic use. Previously responded to steroids.   - Ideally would need colonoscopy report or repeat colonoscopy to confirm her hx of UC.   - GI PCR and c diff testing neg from 12/25.   - On oral mesalamine 400 mg TID. IV steroids on 12/31.   - Concerned for UC flare, infectious work up neg.   - declined flex sig/colonoscopy   - hepatitis B serologies negative. Quant TB testing neg.   - fecal calprotectin 2050   - CRP downtrending     #Cirrhosis?   - Unclear if the patient has cirrhosis b/c the CT imaging showed normal liver w/ no splenomegaly. Her INR is elevated in the setting of Coumadin use. She has thrombocytopenia in the setting of possible infection.   - Less concerned for cirrhosis based on the clinical picture, not been seen by hepatologist.   - On rifaximin and Aldactone per home meds.     Recommendations:   - Symptoms have been worsening, despite downtrending CRP levels, continue with IV steroids for now.   - Please obtain colonoscopy reports from [place], patient has been reporting they have incomplete colonoscopy due to severe inflammation. She is also declining repeating flex sig or colonoscopy which we already offered it. Will need the colonoscopy and pathology report. Will need to confirm UC diagnosis before we proceed w/ any biologic therapy. Discussed with the primary team. Still pending report.   - PPI 40mg daily   - Continue with mesalamine 400 mg TID for now  - please document stool volume daily   - DVT PPx   - CBC and CRP daily.     Discussed the case with Dr. Gonzalez.     GI will continue to follow.     All recommendations are tentative until note is attested by an attending.     Moisés Colin, PGY-5  Gastroenterology/Hepatology Fellow  Available on [employer] Teams  [phone] (Short Range Pager)  [phone] (Long Range Pager)    After 5pm, please contact the on-call GI fellow. [phone] Impression:   58 yrs old male w/ hx of CAD (not on anti-platelets), insomnia, DM, GERD, MDD, HTN, Ulcerative colitis, Bipolar disorder, cirrhosis (unknown etiology, NAFLD?) c/w HE, Afib (on Coumadin), and Blepharitis who initially presented to [facility] for hypotension, diffuse body rashes and pancolitis.     #"mild" Pancolitis  #Ulcerative colitis?   - Reported bloody stools w/ fecal incontinence worsening for the past one year. Underwent two colonoscopies on 11/2022 and 3/2023 but no records present. CT A/P showed thickening, hyperemia and mild inflammation throughout the colon. Does not follow w/ GI as o/p. No prior biologic use. Previously responded to steroids.   - Ideally would need colonoscopy report or repeat colonoscopy to confirm her hx of UC.   - GI PCR and c diff testing neg from 12/25.   - On oral mesalamine 400 mg TID. IV steroids on 12/31.   - Concerned for UC flare, infectious work up neg.   - declined flex sig/colonoscopy   - hepatitis B serologies negative. Quant TB testing neg.   - fecal calprotectin 2050   - CRP downtrending     #Cirrhosis?   - Unclear if the patient has cirrhosis b/c the CT imaging showed normal liver w/ no splenomegaly. Her INR is elevated in the setting of Coumadin use. She has thrombocytopenia in the setting of possible infection.   - Less concerned for cirrhosis based on the clinical picture, not been seen by hepatologist.   - On rifaximin and Aldactone per home meds.     Recommendations:   - Symptoms have been worsening, despite downtrending CRP levels, continue with IV steroids for now.   - Please obtain colonoscopy reports from [place], patient has been reporting they have incomplete colonoscopy due to severe inflammation. She is also declining repeating flex sig or colonoscopy which we already offered it. Will need the colonoscopy and pathology report. Will need to confirm UC diagnosis before we proceed w/ any biologic therapy. Discussed with the primary team. Still pending report.   - PPI 40mg daily   - Continue with mesalamine 400 mg TID for now  - please document stool volume daily   - DVT PPx   - CBC and CRP daily.     Discussed the case with Dr. Gonzalez.     GI will continue to follow.     All recommendations are tentative until note is attested by an attending.     Moisés Colin, PGY-5  Gastroenterology/Hepatology Fellow  Available on Microsoft Teams  [phone] (Short Range Pager)  [phone] (Long Range Pager)    After 5pm, please contact the on-call GI fellow. [phone] Impression:   58 yrs old male w/ hx of CAD (not on anti-platelets), insomnia, DM, GERD, MDD, HTN, Ulcerative colitis, Bipolar disorder, cirrhosis (unknown etiology, NAFLD?) c/w HE, Afib (on Coumadin), and Blepharitis who initially presented to [facility] for hypotension, diffuse body rashes and pancolitis.     #"mild" Pancolitis  #Ulcerative colitis?   - Reported bloody stools w/ fecal incontinence worsening for the past one year. Underwent two colonoscopies on 11/2022 and 3/2023 but no records present. CT A/P showed thickening, hyperemia and mild inflammation throughout the colon. Does not follow w/ GI as o/p. No prior biologic use. Previously responded to steroids.   - Ideally would need colonoscopy report or repeat colonoscopy to confirm her hx of UC.   - GI PCR and c diff testing neg from 12/25.   - On oral mesalamine 400 mg TID. IV steroids on 12/31.   - Concerned for UC flare, infectious work up neg.   - declined flex sig/colonoscopy   - hepatitis B serologies negative. Quant TB testing neg.   - fecal calprotectin 2050   - CRP downtrending     #Cirrhosis?   - Unclear if the patient has cirrhosis b/c the CT imaging showed normal liver w/ no splenomegaly. Her INR is elevated in the setting of Coumadin use. She has thrombocytopenia in the setting of possible infection.   - Less concerned for cirrhosis based on the clinical picture, not been seen by hepatologist.   - On rifaximin and Aldactone per home meds.     Recommendations:   - Symptoms have been worsening, despite downtrending CRP levels, continue with IV steroids for now.   - Please obtain colonoscopy reports from [place], patient has been reporting they have incomplete colonoscopy due to severe inflammation. She is also declining repeating flex sig or colonoscopy which we already offered it. Will need the colonoscopy and pathology report. Will need to confirm UC diagnosis before we proceed w/ any biologic therapy. Discussed with the primary team. Still pending report.   - PPI 40mg daily   - Continue with mesalamine 400 mg TID for now  - please document stool volume daily   - DVT PPx   - CBC and CRP daily.     Discussed the case with Dr. Gonzalez.     GI will continue to follow.     All recommendations are tentative until note is attested by an attending.     Moisés Colin, PGY-5  Gastroenterology/Hepatology Fellow  Available on Microsoft Teams  [phone] (Short Range Pager)  [phone] (Long Range Pager)    After 5pm, please contact the on-call GI fellow. [phone]

## 2024-01-03 NOTE — PROGRESS NOTE ADULT - REASON FOR ADMISSION
transfer from [place] for tertiary level care in the setting of diffuse body rash transfer from [place] for tertiary level care in the setting of diffuse body rash

## 2024-01-03 NOTE — PROGRESS NOTE ADULT - ASSESSMENT
57 y/o F w/ pmh of cad, dm, mdd, gerd, IBS, cirrhosis 2/2 to nonalcoholic fatty liver, bipolar, afib on coumadin, today presented to [facility] hospital for hypotension and tachycardia with new diffuse purpura to the LE ongoing the last few days and today morning waking up by R>L orbital swelling. R>L orbital swelling. Febrile in the ED Tm 101.4F  CT orbits: Nonspecific bilateral periorbital soft tissue swelling, right greater than left, as well as right facial soft tissue swelling. No discrete drainable fluid collection. No evidence of post septal involvement/orbital cellulitis.  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 Neurosurgical consultation is recommended.  CT A/P: Mild pancolitis, of infectious or inflammatory etiology.    periocular swelling resolved    hepatitis B serologies negative  quant gold negative  inflammatory markers decreasing  team in process of obtaining outpt records    RECOMMENDATIONS  low concern for infection  continue off antibiotics  steroids per GI  f/u GI recs    We will sign off. Thank you for allowing us to participate in the care of Ms. Marion. Please feel free to call with any questions or concerns.     Brando Arguelles M.D.  [facility], Division of Infectious Diseases  [phone]  After 5pm on weekdays and all day on weekends - please call [phone]  57 y/o F w/ pmh of cad, dm, mdd, gerd, IBS, cirrhosis 2/2 to nonalcoholic fatty liver, bipolar, afib on coumadin, today presented to [facility] hospital for hypotension and tachycardia with new diffuse purpura to the LE ongoing the last few days and today morning waking up by R>L orbital swelling. R>L orbital swelling. Febrile in the ED Tm 101.4F  CT orbits: Nonspecific bilateral periorbital soft tissue swelling, right greater than left, as well as right facial soft tissue swelling. No discrete drainable fluid collection. No evidence of post septal involvement/orbital cellulitis.  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 Neurosurgical consultation is recommended.  CT A/P: Mild pancolitis, of infectious or inflammatory etiology.    periocular swelling resolved    hepatitis B serologies negative  quant gold negative  inflammatory markers decreasing  team in process of obtaining outpt records    RECOMMENDATIONS  low concern for infection  continue off antibiotics  steroids per GI  f/u GI recs    We will sign off. Thank you for allowing us to participate in the care of Ms. Marion. Please feel free to call with any questions or concerns.     Brando Arguelles M.D.  [facility], Division of Infectious Diseases  [phone]  After 5pm on weekdays and all day on weekends - please call [phone]

## 2024-01-03 NOTE — PROGRESS NOTE ADULT - ATTENDING COMMENTS
No new clinical complaints. Still awaiting OSH colonoscopy reports for review. GI team again discussed limitations in escalation of therapy without confirmed diagnosis, but patient continues to declined repeat endoscopic evaluation (colonoscopy vs flex sig).       # History of UC: Reports endoscopic evaluation at either [place] or [facility]. Reports "colonoscopy could not be completed" due to severity of inflammation. She reports receiving steroids, but never received biologic therapy and endoscopic evaluation was never repeated.  # Afib on Coumadin  # CAD  # Bipolar disease  # Reported history of cirrhosis, possibly NAFLD    --Patient continues to decline endoscopic evaluation; will await reports from OSH. Would be beneficial to obtain endoscopic reports, pathology reports, and any therapies attempted  --Continue steroids for now  --Without clear confirmation of prior diagnosis/severity, would be hesitant to initiate biologic therapy at this time  --Continue mesalamine  --Trend CRP, currently downtrending  --Given tenesmus symptoms, would benefit from [place] mesalamine if patient amenable    Additional recommendations as above. No new clinical complaints. Still awaiting OSH colonoscopy reports for review. GI team again discussed limitations in escalation of therapy without confirmed diagnosis, but patient continues to declined repeat endoscopic evaluation (colonoscopy vs flex sig).       # History of UC: Reports endoscopic evaluation at either [place] or [facility]. Reports "colonoscopy could not be completed" due to severity of inflammation. She reports receiving steroids, but never received biologic therapy and endoscopic evaluation was never repeated.  # Afib on Coumadin  # CAD  # Bipolar disease  # Reported history of cirrhosis, possibly NAFLD    --Patient continues to decline endoscopic evaluation; will await reports from OSH. Would be beneficial to obtain endoscopic reports, pathology reports, and any therapies attempted  --Continue steroids for now  --Without clear confirmation of prior diagnosis/severity, would be hesitant to initiate biologic therapy at this time  --Continue mesalamine  --Trend CRP, currently downtrending  --Given tenesmus symptoms, would benefit from [place] mesalamine if patient amenable    Additional recommendations as above.

## 2024-01-03 NOTE — PROGRESS NOTE ADULT - SUBJECTIVE AND OBJECTIVE BOX
Gastroenterology/Hepatology Progress Note      Interval Events:     - Patient s/p IV steroids for 2 days, still continues to have watery diarrhea, about 200cc of rectal tube o/p. Prefers to have the rectal tube out.   - Also has persistent lower abd pain which is unchanged from before.   - No nausea, vomiting, or fevers.   - Per primary team - trying to get records from [place] regarding her colonoscopy.     Allergies:  No Known Allergies      Hospital Medications:  acetaminophen     Tablet .. 650 milliGRAM(s) Oral every 6 hours PRN  aluminum hydroxide/magnesium hydroxide/simethicone Suspension 30 milliLiter(s) Oral every 6 hours PRN  carvedilol 3.125 milliGRAM(s) Oral every 12 hours  dextrose 5%. 1000 milliLiter(s) IV Continuous <Continuous>  dextrose 5%. 1000 milliLiter(s) IV Continuous <Continuous>  dextrose 50% Injectable 25 Gram(s) IV Push once  dextrose 50% Injectable 25 Gram(s) IV Push once  dextrose 50% Injectable 12.5 Gram(s) IV Push once  dextrose Oral Gel 15 Gram(s) Oral once PRN  folic acid 1 milliGRAM(s) Oral daily  glucagon  Injectable 1 milliGRAM(s) IntraMuscular once  influenza   Vaccine 0.5 milliLiter(s) IntraMuscular once  insulin lispro (ADMELOG) corrective regimen sliding scale   SubCutaneous three times a day before meals  insulin lispro (ADMELOG) corrective regimen sliding scale   SubCutaneous at bedtime  lactated ringers. 1000 milliLiter(s) IV Continuous <Continuous>  mesalamine DR Capsule 400 milliGRAM(s) Oral three times a day  methylPREDNISolone sodium succinate Injectable 20 milliGRAM(s) IV Push every 8 hours  mirtazapine 30 milliGRAM(s) Oral daily  ondansetron Injectable 4 milliGRAM(s) IV Push once  pantoprazole    Tablet 40 milliGRAM(s) Oral before breakfast  rifAXIMin 550 milliGRAM(s) Oral two times a day  spironolactone 100 milliGRAM(s) Oral daily  warfarin 6 milliGRAM(s) Oral once      ROS: 14 point ROS negative unless otherwise state in subjective    PHYSICAL EXAM:   Vital Signs:  Vital Signs Last 24 Hrs  T(C): 36.4 (03 Jan 2024 13:38), Max: 37 (03 Jan 2024 06:43)  T(F): 97.6 (03 Jan 2024 13:38), Max: 98.6 (03 Jan 2024 06:43)  HR: 70 (03 Jan 2024 13:38) (68 - 82)  BP: 96/54 (03 Jan 2024 13:38) (96/54 - 141/79)  BP(mean): --  RR: 16 (03 Jan 2024 13:38) (16 - 18)  SpO2: 98% (03 Jan 2024 13:38) (98% - 100%)    Parameters below as of 03 Jan 2024 13:38  Patient On (Oxygen Delivery Method): room air      Daily     Daily       GENERAL:  No acute distress, chronically ill appearing, lying in bed.   HEENT:  NCAT, no scleral icterus   CHEST:  no respiratory distress  HEART:  Regular rate and rhythm  ABDOMEN:  Soft, mild diffuse tenderness, non-distended, no palpable masses.   RECTUM: has rectal tube which has dark brown watery stool w/ no blood.   EXTREMITIES: No LE edema b/l  NEURO:  Alert and oriented x 3, no tremors, no tremors.      LABS:                        8.6    5.13  )-----------( 420      ( 03 Jan 2024 06:04 )             27.5     Mean Cell Volume: 96.8 fL (01-03-24 @ 06:04)    01-03    137  |  105  |  21  ----------------------------<  181<H>  4.3   |  20<L>  |  0.57    Ca    8.3<L>      03 Jan 2024 06:04  Phos  2.4     01-03  Mg     2.00     01-03    TPro  5.3<L>  /  Alb  2.9<L>  /  TBili  <0.2  /  DBili  x   /  AST  22  /  ALT  12  /  AlkPhos  88  01-03    LIVER FUNCTIONS - ( 03 Jan 2024 06:04 )  Alb: 2.9 g/dL / Pro: 5.3 g/dL / ALK PHOS: 88 U/L / ALT: 12 U/L / AST: 22 U/L / GGT: x           PT/INR - ( 03 Jan 2024 06:04 )   PT: 23.9 sec;   INR: 2.18 ratio         PTT - ( 03 Jan 2024 06:04 )  PTT:33.6 sec  Urinalysis Basic - ( 03 Jan 2024 06:04 )    Color: x / Appearance: x / SG: x / pH: x  Gluc: 181 mg/dL / Ketone: x  / Bili: x / Urobili: x   Blood: x / Protein: x / Nitrite: x   Leuk Esterase: x / RBC: x / WBC x   Sq Epi: x / Non Sq Epi: x / Bacteria: x            Imaging:        FINDINGS:  CHEST:  LUNGS AND LARGE AIRWAYS: Patent central airways. Dependent atelectatic   changes at the lung bases.  PLEURA: No pleural effusion.  VESSELS: Within normal limits.  HEART: Heart size is normal. No pericardial effusion.  MEDIASTINUM AND FOREST: No lymphadenopathy.  CHEST WALL AND LOWER NECK: Within normal limits.    ABDOMEN AND PELVIS:  LIVER: Within normal limits.  BILE DUCTS: Normal caliber.  GALLBLADDER: Cholecystectomy.  SPLEEN: Within normal limits.  PANCREAS: Within normal limits.  ADRENALS: Within normal limits.  KIDNEYS/URETERS: Within normal limits.    BLADDER: Within normal limits.  REPRODUCTIVE ORGANS: Posterior calcified fibroid.    BOWEL: No bowel obstruction. Appendix is unremarkable. There is   thickening, thumbprinting and hyperemia of the colon from the cecum   through rectum compatible with pancolitis.  PERITONEUM: No ascites.  VESSELS: Aorta is not dilated. Moderate atherosclerotic vascular   calcification.  No perivascular inflammation as clinically questioned.  RETROPERITONEUM/LYMPH NODES: No lymphadenopathy.  ABDOMINAL WALL: Within normal limits.  BONES: Within normal limits.    IMPRESSION:  Mild pancolitis, of infectious or inflammatory etiology.         Gastroenterology/Hepatology Progress Note      Interval Events:     - Patient s/p IV steroids for 2 days, still continues to have watery diarrhea, about 200cc of rectal tube o/p. Prefers to have the rectal tube out.   - Also has persistent lower abd pain which is unchanged from before.   - No nausea, vomiting, or fevers.   - Per primary team - trying to get records from [place] regarding her colonoscopy.     Allergies:  No Known Allergies      Hospital Medications:  acetaminophen     Tablet .. 650 milliGRAM(s) Oral every 6 hours PRN  aluminum hydroxide/magnesium hydroxide/simethicone Suspension 30 milliLiter(s) Oral every 6 hours PRN  carvedilol 3.125 milliGRAM(s) Oral every 12 hours  dextrose 5%. 1000 milliLiter(s) IV Continuous <Continuous>  dextrose 5%. 1000 milliLiter(s) IV Continuous <Continuous>  dextrose 50% Injectable 25 Gram(s) IV Push once  dextrose 50% Injectable 25 Gram(s) IV Push once  dextrose 50% Injectable 12.5 Gram(s) IV Push once  dextrose Oral Gel 15 Gram(s) Oral once PRN  folic acid 1 milliGRAM(s) Oral daily  glucagon  Injectable 1 milliGRAM(s) IntraMuscular once  influenza   Vaccine 0.5 milliLiter(s) IntraMuscular once  insulin lispro (ADMELOG) corrective regimen sliding scale   SubCutaneous three times a day before meals  insulin lispro (ADMELOG) corrective regimen sliding scale   SubCutaneous at bedtime  lactated ringers. 1000 milliLiter(s) IV Continuous <Continuous>  mesalamine DR Capsule 400 milliGRAM(s) Oral three times a day  methylPREDNISolone sodium succinate Injectable 20 milliGRAM(s) IV Push every 8 hours  mirtazapine 30 milliGRAM(s) Oral daily  ondansetron Injectable 4 milliGRAM(s) IV Push once  pantoprazole    Tablet 40 milliGRAM(s) Oral before breakfast  rifAXIMin 550 milliGRAM(s) Oral two times a day  spironolactone 100 milliGRAM(s) Oral daily  warfarin 6 milliGRAM(s) Oral once      ROS: 14 point ROS negative unless otherwise state in subjective    PHYSICAL EXAM:   Vital Signs:  Vital Signs Last 24 Hrs  T(C): 36.4 (03 Jan 2024 13:38), Max: 37 (03 Jan 2024 06:43)  T(F): 97.6 (03 Jan 2024 13:38), Max: 98.6 (03 Jan 2024 06:43)  HR: 70 (03 Jan 2024 13:38) (68 - 82)  BP: 96/54 (03 Jan 2024 13:38) (96/54 - 141/79)  BP(mean): --  RR: 16 (03 Jan 2024 13:38) (16 - 18)  SpO2: 98% (03 Jan 2024 13:38) (98% - 100%)    Parameters below as of 03 Jan 2024 13:38  Patient On (Oxygen Delivery Method): room air      Daily     Daily       GENERAL:  No acute distress, chronically ill appearing, lying in bed.   HEENT:  NCAT, no scleral icterus   CHEST:  no respiratory distress  HEART:  Regular rate and rhythm  ABDOMEN:  Soft, mild diffuse tenderness, non-distended, no palpable masses.   RECTUM: has rectal tube which has dark brown watery stool w/ no blood.   EXTREMITIES: No LE edema b/l  NEURO:  Alert and oriented x 3, no tremors, no tremors.      LABS:                        8.6    5.13  )-----------( 420      ( 03 Jan 2024 06:04 )             27.5     Mean Cell Volume: 96.8 fL (01-03-24 @ 06:04)    01-03    137  |  105  |  21  ----------------------------<  181<H>  4.3   |  20<L>  |  0.57    Ca    8.3<L>      03 Jan 2024 06:04  Phos  2.4     01-03  Mg     2.00     01-03    TPro  5.3<L>  /  Alb  2.9<L>  /  TBili  <0.2  /  DBili  x   /  AST  22  /  ALT  12  /  AlkPhos  88  01-03    LIVER FUNCTIONS - ( 03 Jan 2024 06:04 )  Alb: 2.9 g/dL / Pro: 5.3 g/dL / ALK PHOS: 88 U/L / ALT: 12 U/L / AST: 22 U/L / GGT: x           PT/INR - ( 03 Jan 2024 06:04 )   PT: 23.9 sec;   INR: 2.18 ratio         PTT - ( 03 Jan 2024 06:04 )  PTT:33.6 sec  Urinalysis Basic - ( 03 Jan 2024 06:04 )    Color: x / Appearance: x / SG: x / pH: x  Gluc: 181 mg/dL / Ketone: x  / Bili: x / Urobili: x   Blood: x / Protein: x / Nitrite: x   Leuk Esterase: x / RBC: x / WBC x   Sq Epi: x / Non Sq Epi: x / Bacteria: x            Imaging:        FINDINGS:  CHEST:  LUNGS AND LARGE AIRWAYS: Patent central airways. Dependent atelectatic   changes at the lung bases.  PLEURA: No pleural effusion.  VESSELS: Within normal limits.  HEART: Heart size is normal. No pericardial effusion.  MEDIASTINUM AND FOREST: No lymphadenopathy.  CHEST WALL AND LOWER NECK: Within normal limits.    ABDOMEN AND PELVIS:  LIVER: Within normal limits.  BILE DUCTS: Normal caliber.  GALLBLADDER: Cholecystectomy.  SPLEEN: Within normal limits.  PANCREAS: Within normal limits.  ADRENALS: Within normal limits.  KIDNEYS/URETERS: Within normal limits.    BLADDER: Within normal limits.  REPRODUCTIVE ORGANS: Posterior calcified fibroid.    BOWEL: No bowel obstruction. Appendix is unremarkable. There is   thickening, thumbprinting and hyperemia of the colon from the cecum   through rectum compatible with pancolitis.  PERITONEUM: No ascites.  VESSELS: Aorta is not dilated. Moderate atherosclerotic vascular   calcification.  No perivascular inflammation as clinically questioned.  RETROPERITONEUM/LYMPH NODES: No lymphadenopathy.  ABDOMINAL WALL: Within normal limits.  BONES: Within normal limits.    IMPRESSION:  Mild pancolitis, of infectious or inflammatory etiology.

## 2024-01-03 NOTE — PROGRESS NOTE ADULT - SUBJECTIVE AND OBJECTIVE BOX
ANAYA JOINER 58y Female      Patient is a 58y old  Female who presents with a chief complaint of transfer from [place] for tertiary level care in the setting of diffuse body rash (30 Dec 2023 22:18)        INTERVAL HPI/OVERNIGHT EVENTS: No acute events overnight. Patient was seen and evaluated at the bedside. The patient still endorses abdominal discomfort and diarrhea. She stated she has had no improvement on the steroids. Vitals stable. Patient denies fever/chills, chest pain, shortness of breath,, headaches, nausea/vomiting, or visual changes.       PHYSICAL EXAM:  GENERAL: NAD  HEAD:  Atraumatic, Normocephalic  EYES: left eye and rt eye with mild redness+ no drainage  ENMT: MMM  NECK: Supple, No JVD  NERVOUS SYSTEM: AOX3, motor and sensation grossly intact in b/l UE and b/l LE  PSYCHIATRIC: Appropriate affect and mood  CHEST/LUNG: Clear to auscultation bilaterally; No rales, rhonchi, wheezing, or rubs  HEART: Regular rate and rhythm; No murmurs, rubs, or gallops. No LE edema  ABDOMEN: Soft, mild epigastric tenderness, Nondistended; Bowel sounds present  EXTREMITIES:  2+ Peripheral Pulses, No clubbing, cyanosis  SKIN:  rash improving    Vital Signs Last 24 Hrs  T(C): 36.6 (02 Jan 2024 05:42), Max: 36.9 (01 Jan 2024 13:12)  T(F): 97.8 (02 Jan 2024 05:42), Max: 98.4 (01 Jan 2024 13:12)  HR: 69 (02 Jan 2024 05:42) (66 - 74)  BP: 121/79 (02 Jan 2024 05:42) (108/63 - 121/79)  BP(mean): --  RR: 18 (02 Jan 2024 05:42) (17 - 18)  SpO2: 99% (02 Jan 2024 05:42) (98% - 99%)    Parameters below as of 02 Jan 2024 05:42  Patient On (Oxygen Delivery Method): room air    Consultant(s) Notes Reviewed:  [X] YES  [ ] NO  Care Discussed with Consultants/Other Providers [X] YES  [ ] NO  Imaging Personally Reviewed:  [X] YES  [ ] NO      LABS:    CBC Full  -  ( 02 Jan 2024 07:00 )  WBC Count : 6.51 K/uL  RBC Count : 2.91 M/uL  Hemoglobin : 9.0 g/dL  Hematocrit : 28.1 %  Platelet Count - Automated : 447 K/uL  Mean Cell Volume : 96.6 fL  Mean Cell Hemoglobin : 30.9 pg  Mean Cell Hemoglobin Concentration : 32.0 gm/dL  Auto Neutrophil # : 5.59 K/uL  Auto Lymphocyte # : 0.73 K/uL  Auto Monocyte # : 0.14 K/uL  Auto Eosinophil # : 0.00 K/uL  Auto Basophil # : 0.01 K/uL  Auto Neutrophil % : 85.8 %  Auto Lymphocyte % : 11.2 %  Auto Monocyte % : 2.2 %  Auto Eosinophil % : 0.0 %  Auto Basophil % : 0.2 %    01-02    137  |  106  |  20  ----------------------------<  148<H>  4.2   |  21<L>  |  0.68    Ca    8.0<L>      02 Jan 2024 07:00  Phos  2.2     01-02  Mg     1.80     01-02    TPro  4.8<L>  /  Alb  2.6<L>  /  TBili  <0.2  /  DBili  x   /  AST  19  /  ALT  8   /  AlkPhos  90  01-02       Urinalysis Basic - ( 30 Dec 2023 05:52 )    Color: x / Appearance: x / SG: x / pH: x  Gluc: 81 mg/dL / Ketone: x  / Bili: x / Urobili: x   Blood: x / Protein: x / Nitrite: x   Leuk Esterase: x / RBC: x / WBC x   Sq Epi: x / Non Sq Epi: x / Bacteria: x        CAPILLARY BLOOD GLUCOSE      POCT Blood Glucose.: 134 mg/dL (30 Dec 2023 21:11)  POCT Blood Glucose.: 110 mg/dL (30 Dec 2023 17:28)  POCT Blood Glucose.: 126 mg/dL (30 Dec 2023 15:23)  POCT Blood Glucose.: 83 mg/dL (30 Dec 2023 08:44)           ANAYA JOINER 58y Female      Patient is a 58y old  Female who presents with a chief complaint of transfer from [place] for tertiary level care in the setting of diffuse body rash (30 Dec 2023 22:18)        INTERVAL HPI/OVERNIGHT EVENTS: No acute events overnight. Patient was seen and evaluated at the bedside. The patient still endorses abdominal discomfort and diarrhea. She stated she has had no improvement on the steroids. Vitals stable. Patient denies fever/chills, chest pain, shortness of breath,, headaches, nausea/vomiting, or visual changes.       PHYSICAL EXAM:  GENERAL: NAD  HEAD:  Atraumatic, Normocephalic  EYES: left eye and rt eye with mild redness+ no drainage  ENMT: MMM  NECK: Supple, No JVD  NERVOUS SYSTEM: AOX3, motor and sensation grossly intact in b/l UE and b/l LE  PSYCHIATRIC: Appropriate affect and mood  CHEST/LUNG: Clear to auscultation bilaterally; No rales, rhonchi, wheezing, or rubs  HEART: Regular rate and rhythm; No murmurs, rubs, or gallops. No LE edema  ABDOMEN: Soft, mild epigastric tenderness, Nondistended; Bowel sounds present  EXTREMITIES:  2+ Peripheral Pulses, No clubbing, cyanosis  SKIN:  rash improving    Vital Signs Last 24 Hrs  T(C): 36.6 (02 Jan 2024 05:42), Max: 36.9 (01 Jan 2024 13:12)  T(F): 97.8 (02 Jan 2024 05:42), Max: 98.4 (01 Jan 2024 13:12)  HR: 69 (02 Jan 2024 05:42) (66 - 74)  BP: 121/79 (02 Jan 2024 05:42) (108/63 - 121/79)  BP(mean): --  RR: 18 (02 Jan 2024 05:42) (17 - 18)  SpO2: 99% (02 Jan 2024 05:42) (98% - 99%)    Parameters below as of 02 Jan 2024 05:42  Patient On (Oxygen Delivery Method): room air    Consultant(s) Notes Reviewed:  [X] YES  [ ] NO  Care Discussed with Consultants/Other Providers [X] YES  [ ] NO  Imaging Personally Reviewed:  [X] YES  [ ] NO      LABS:    CBC Full  -  ( 02 Jan 2024 07:00 )  WBC Count : 6.51 K/uL  RBC Count : 2.91 M/uL  Hemoglobin : 9.0 g/dL  Hematocrit : 28.1 %  Platelet Count - Automated : 447 K/uL  Mean Cell Volume : 96.6 fL  Mean Cell Hemoglobin : 30.9 pg  Mean Cell Hemoglobin Concentration : 32.0 gm/dL  Auto Neutrophil # : 5.59 K/uL  Auto Lymphocyte # : 0.73 K/uL  Auto Monocyte # : 0.14 K/uL  Auto Eosinophil # : 0.00 K/uL  Auto Basophil # : 0.01 K/uL  Auto Neutrophil % : 85.8 %  Auto Lymphocyte % : 11.2 %  Auto Monocyte % : 2.2 %  Auto Eosinophil % : 0.0 %  Auto Basophil % : 0.2 %    01-02    137  |  106  |  20  ----------------------------<  148<H>  4.2   |  21<L>  |  0.68    Ca    8.0<L>      02 Jan 2024 07:00  Phos  2.2     01-02  Mg     1.80     01-02    TPro  4.8<L>  /  Alb  2.6<L>  /  TBili  <0.2  /  DBili  x   /  AST  19  /  ALT  8   /  AlkPhos  90  01-02       Urinalysis Basic - ( 30 Dec 2023 05:52 )    Color: x / Appearance: x / SG: x / pH: x  Gluc: 81 mg/dL / Ketone: x  / Bili: x / Urobili: x   Blood: x / Protein: x / Nitrite: x   Leuk Esterase: x / RBC: x / WBC x   Sq Epi: x / Non Sq Epi: x / Bacteria: x        CAPILLARY BLOOD GLUCOSE      POCT Blood Glucose.: 134 mg/dL (30 Dec 2023 21:11)  POCT Blood Glucose.: 110 mg/dL (30 Dec 2023 17:28)  POCT Blood Glucose.: 126 mg/dL (30 Dec 2023 15:23)  POCT Blood Glucose.: 83 mg/dL (30 Dec 2023 08:44)           ANAYA JOINER 58y Female      Patient is a 58y old  Female who presents with a chief complaint of transfer from [place] for tertiary level care in the setting of diffuse body rash (30 Dec 2023 22:18)        INTERVAL HPI/OVERNIGHT EVENTS: No acute events overnight. Patient was seen and evaluated at the bedside. The patient still endorses abdominal discomfort, she reports no improvement on the steroids. Patient denies fever/chills, chest pain, shortness of breath,, headaches, nausea/vomiting, or visual changes.       PHYSICAL EXAM:  GENERAL: NAD  HEAD:  Atraumatic, Normocephalic  EYES: left eye and rt eye with mild redness+ no drainage  ENMT: MMM  NECK: Supple, No JVD  NERVOUS SYSTEM: AOX3, motor and sensation grossly intact in b/l UE and b/l LE  PSYCHIATRIC: Appropriate affect and mood  CHEST/LUNG: Clear to auscultation bilaterally; No rales, rhonchi, wheezing, or rubs  HEART: Regular rate and rhythm; No murmurs, rubs, or gallops. No LE edema  ABDOMEN: Soft, mild epigastric tenderness, Nondistended; Bowel sounds present  EXTREMITIES:  2+ Peripheral Pulses, No clubbing, cyanosis  SKIN:  rash improving    Vital Signs Last 24 Hrs  T(C): 37 (03 Jan 2024 06:43), Max: 37 (03 Jan 2024 06:43)  T(F): 98.6 (03 Jan 2024 06:43), Max: 98.6 (03 Jan 2024 06:43)  HR: 70 (03 Jan 2024 06:43) (65 - 82)  BP: 118/64 (03 Jan 2024 06:43) (110/61 - 141/79)  BP(mean): --  RR: 16 (03 Jan 2024 06:43) (16 - 18)  SpO2: 100% (03 Jan 2024 06:43) (99% - 100%)    Parameters below as of 03 Jan 2024 06:43  Patient On (Oxygen Delivery Method): room air      Consultant(s) Notes Reviewed:  [X] YES  [ ] NO  Care Discussed with Consultants/Other Providers [X] YES  [ ] NO  Imaging Personally Reviewed:  [X] YES  [ ] NO      LABS:    CBC Full  -  ( 03 Jan 2024 06:04 )  WBC Count : 5.13 K/uL  RBC Count : 2.84 M/uL  Hemoglobin : 8.6 g/dL  Hematocrit : 27.5 %  Platelet Count - Automated : 420 K/uL  Mean Cell Volume : 96.8 fL  Mean Cell Hemoglobin : 30.3 pg  Mean Cell Hemoglobin Concentration : 31.3 gm/dL  Auto Neutrophil # : x  Auto Lymphocyte # : x  Auto Monocyte # : x  Auto Eosinophil # : x  Auto Basophil # : x  Auto Neutrophil % : x  Auto Lymphocyte % : x  Auto Monocyte % : x  Auto Eosinophil % : x  Auto Basophil % : x    01-03    137  |  105  |  21  ----------------------------<  181<H>  4.3   |  20<L>  |  0.57    Ca    8.3<L>      03 Jan 2024 06:04  Phos  2.4     01-03  Mg     2.00     01-03    TPro  5.3<L>  /  Alb  2.9<L>  /  TBili  <0.2  /  DBili  x   /  AST  22  /  ALT  12  /  AlkPhos  88  01-03       Urinalysis Basic - ( 30 Dec 2023 05:52 )    Color: x / Appearance: x / SG: x / pH: x  Gluc: 81 mg/dL / Ketone: x  / Bili: x / Urobili: x   Blood: x / Protein: x / Nitrite: x   Leuk Esterase: x / RBC: x / WBC x   Sq Epi: x / Non Sq Epi: x / Bacteria: x        CAPILLARY BLOOD GLUCOSE      POCT Blood Glucose.: 134 mg/dL (30 Dec 2023 21:11)  POCT Blood Glucose.: 110 mg/dL (30 Dec 2023 17:28)  POCT Blood Glucose.: 126 mg/dL (30 Dec 2023 15:23)  POCT Blood Glucose.: 83 mg/dL (30 Dec 2023 08:44)           ANAYA JOINER 58y Female      Patient is a 58y old  Female who presents with a chief complaint of transfer from [place] for tertiary level care in the setting of diffuse body rash (30 Dec 2023 22:18)        INTERVAL HPI/OVERNIGHT EVENTS: No acute events overnight. Patient was seen and evaluated at the bedside. The patient still endorses abdominal discomfort, she reports no improvement on the steroids. Patient denies fever/chills, chest pain, shortness of breath,, headaches, nausea/vomiting, or visual changes.       PHYSICAL EXAM:  GENERAL: NAD  HEAD:  Atraumatic, Normocephalic  EYES: left eye and rt eye with mild redness+ no drainage  ENMT: MMM  NECK: Supple, No JVD  NERVOUS SYSTEM: AOX3, motor and sensation grossly intact in b/l UE and b/l LE  PSYCHIATRIC: Appropriate affect and mood  CHEST/LUNG: Clear to auscultation bilaterally; No rales, rhonchi, wheezing, or rubs  HEART: Regular rate and rhythm; No murmurs, rubs, or gallops. No LE edema  ABDOMEN: Soft, mild epigastric tenderness, Nondistended; Bowel sounds present  EXTREMITIES:  2+ Peripheral Pulses, No clubbing, cyanosis  SKIN:  rash improving    Vital Signs Last 24 Hrs  T(C): 37 (03 Jan 2024 06:43), Max: 37 (03 Jan 2024 06:43)  T(F): 98.6 (03 Jan 2024 06:43), Max: 98.6 (03 Jan 2024 06:43)  HR: 70 (03 Jan 2024 06:43) (65 - 82)  BP: 118/64 (03 Jan 2024 06:43) (110/61 - 141/79)  BP(mean): --  RR: 16 (03 Jan 2024 06:43) (16 - 18)  SpO2: 100% (03 Jan 2024 06:43) (99% - 100%)    Parameters below as of 03 Jan 2024 06:43  Patient On (Oxygen Delivery Method): room air      Consultant(s) Notes Reviewed:  [X] YES  [ ] NO  Care Discussed with Consultants/Other Providers [X] YES  [ ] NO  Imaging Personally Reviewed:  [X] YES  [ ] NO      LABS:    CBC Full  -  ( 03 Jan 2024 06:04 )  WBC Count : 5.13 K/uL  RBC Count : 2.84 M/uL  Hemoglobin : 8.6 g/dL  Hematocrit : 27.5 %  Platelet Count - Automated : 420 K/uL  Mean Cell Volume : 96.8 fL  Mean Cell Hemoglobin : 30.3 pg  Mean Cell Hemoglobin Concentration : 31.3 gm/dL  Auto Neutrophil # : x  Auto Lymphocyte # : x  Auto Monocyte # : x  Auto Eosinophil # : x  Auto Basophil # : x  Auto Neutrophil % : x  Auto Lymphocyte % : x  Auto Monocyte % : x  Auto Eosinophil % : x  Auto Basophil % : x    01-03    137  |  105  |  21  ----------------------------<  181<H>  4.3   |  20<L>  |  0.57    Ca    8.3<L>      03 Jan 2024 06:04  Phos  2.4     01-03  Mg     2.00     01-03    TPro  5.3<L>  /  Alb  2.9<L>  /  TBili  <0.2  /  DBili  x   /  AST  22  /  ALT  12  /  AlkPhos  88  01-03       Urinalysis Basic - ( 30 Dec 2023 05:52 )    Color: x / Appearance: x / SG: x / pH: x  Gluc: 81 mg/dL / Ketone: x  / Bili: x / Urobili: x   Blood: x / Protein: x / Nitrite: x   Leuk Esterase: x / RBC: x / WBC x   Sq Epi: x / Non Sq Epi: x / Bacteria: x        CAPILLARY BLOOD GLUCOSE      POCT Blood Glucose.: 134 mg/dL (30 Dec 2023 21:11)  POCT Blood Glucose.: 110 mg/dL (30 Dec 2023 17:28)  POCT Blood Glucose.: 126 mg/dL (30 Dec 2023 15:23)  POCT Blood Glucose.: 83 mg/dL (30 Dec 2023 08:44)

## 2024-01-03 NOTE — CHART NOTE - NSCHARTNOTEFT_GEN_A_CORE
Records received by the primary team in [place].     Colonoscopy from 2/2023   - Hemorrhoids  - Inflammation was found from the rectosigmoid to the cecum. This was graded as Salinas Score 2-3 (moderate to severe disease). Colon was segmentally biopsied.   - The distal rectum mucosa besides the mild enema trauma was spared.   - A pseudopolyp with a blood clot was found in the recto-sigmoid colon. One hemostatic clip was placed.     Pathology report:     Terminal ileum - Normal  Cecum - Crypt architectural changes, increase in intraepithelial lymphocytes, increase in lamina propria lymphoplasmocytic infiltrate, and siderophages.   AC - Low grade dysplasia with reactive epithelial changes, Crypt architectural changes, increase in intraepithelial lymphocytes, increase in lamina propria lymphoplasmocytic infiltrate, and siderophages  TC - Marked active colitis  DC - Crypt architectural changes and active inflammation.   SC - active colitis  Rectum - active colitis    Recommendations:   Patient found to have moderate to severe colitis in the past, likely concerning for UC based on endoscopic and histologic presentation. Currently she is receiving IV steroids and has persistent symptoms, concerned for steroid refractory colitis. It would be beneficial to repeat a colonoscopy to assess the severity of the disease and also follow up with the low grade dysplasia which is high risk region for malignancy.   - Will discuss with the patient regarding repeat procedure tomorrow, if she is not agreeable, we could try to start her on Remicade as inpatient but if the patient is refractory to biologic, will need to pursue colonoscopy to assess severity  before considering colectomy for refractory disease.     Discussed the case with Dr. Gonzalez.     GI will continue to follow.     Moisés Colin, PGY-5  Gastroenterology/Hepatology Fellow  Available on Microsoft Teams  [phone] (Short Range Pager)  [phone] (Long Range Pager)    After 5pm, please contact the on-call GI fellow. [phone] Records received by the primary team in [place].     Colonoscopy from 2/2023   - Hemorrhoids  - Inflammation was found from the rectosigmoid to the cecum. This was graded as Salinas Score 2-3 (moderate to severe disease). Colon was segmentally biopsied.   - The distal rectum mucosa besides the mild enema trauma was spared.   - A pseudopolyp with a blood clot was found in the recto-sigmoid colon. One hemostatic clip was placed.     Pathology report:     Terminal ileum - Normal  Cecum - Crypt architectural changes, increase in intraepithelial lymphocytes, increase in lamina propria lymphoplasmocytic infiltrate, and siderophages.   AC - Low grade dysplasia with reactive epithelial changes, Crypt architectural changes, increase in intraepithelial lymphocytes, increase in lamina propria lymphoplasmocytic infiltrate, and siderophages  TC - Marked active colitis  DC - Crypt architectural changes and active inflammation.   SC - active colitis  Rectum - active colitis    Recommendations:   Patient found to have moderate to severe colitis in the past, likely concerning for UC based on endoscopic and histologic presentation. Currently she is receiving IV steroids and has persistent symptoms, concerned for steroid refractory colitis. It would be beneficial to repeat a colonoscopy to assess the severity of the disease and also follow up with the low grade dysplasia which is high risk region for malignancy.   - Will discuss with the patient regarding repeat procedure tomorrow, if she is not agreeable, we could try to start her on Remicade as inpatient but if the patient is refractory to biologic, will need to pursue colonoscopy to assess severity  before considering colectomy for refractory disease.     Discussed the case with Dr. Gonzalez.     GI will continue to follow.     Moisés Colin, PGY-5  Gastroenterology/Hepatology Fellow  Available on Microsoft Teams  [phone] (Short Range Pager)  [phone] (Long Range Pager)    After 5pm, please contact the on-call GI fellow. [phone]

## 2024-01-04 LAB
ALBUMIN SERPL ELPH-MCNC: 2.8 G/DL — LOW (ref 3.3–5)
ALBUMIN SERPL ELPH-MCNC: 2.8 G/DL — LOW (ref 3.3–5)
ALP SERPL-CCNC: 93 U/L — SIGNIFICANT CHANGE UP (ref 40–120)
ALP SERPL-CCNC: 93 U/L — SIGNIFICANT CHANGE UP (ref 40–120)
ALT FLD-CCNC: 21 U/L — SIGNIFICANT CHANGE UP (ref 4–33)
ALT FLD-CCNC: 21 U/L — SIGNIFICANT CHANGE UP (ref 4–33)
ANION GAP SERPL CALC-SCNC: 11 MMOL/L — SIGNIFICANT CHANGE UP (ref 7–14)
ANION GAP SERPL CALC-SCNC: 11 MMOL/L — SIGNIFICANT CHANGE UP (ref 7–14)
APTT BLD: 30.9 SEC — SIGNIFICANT CHANGE UP (ref 24.5–35.6)
APTT BLD: 30.9 SEC — SIGNIFICANT CHANGE UP (ref 24.5–35.6)
AST SERPL-CCNC: 29 U/L — SIGNIFICANT CHANGE UP (ref 4–32)
AST SERPL-CCNC: 29 U/L — SIGNIFICANT CHANGE UP (ref 4–32)
BILIRUB SERPL-MCNC: <0.2 MG/DL — SIGNIFICANT CHANGE UP (ref 0.2–1.2)
BILIRUB SERPL-MCNC: <0.2 MG/DL — SIGNIFICANT CHANGE UP (ref 0.2–1.2)
BUN SERPL-MCNC: 15 MG/DL — SIGNIFICANT CHANGE UP (ref 7–23)
BUN SERPL-MCNC: 15 MG/DL — SIGNIFICANT CHANGE UP (ref 7–23)
CALCIUM SERPL-MCNC: 7.9 MG/DL — LOW (ref 8.4–10.5)
CALCIUM SERPL-MCNC: 7.9 MG/DL — LOW (ref 8.4–10.5)
CALPROTECTIN STL-MCNT: 2830 UG/G — HIGH (ref 0–120)
CALPROTECTIN STL-MCNT: 2830 UG/G — HIGH (ref 0–120)
CHLORIDE SERPL-SCNC: 102 MMOL/L — SIGNIFICANT CHANGE UP (ref 98–107)
CHLORIDE SERPL-SCNC: 102 MMOL/L — SIGNIFICANT CHANGE UP (ref 98–107)
CO2 SERPL-SCNC: 23 MMOL/L — SIGNIFICANT CHANGE UP (ref 22–31)
CO2 SERPL-SCNC: 23 MMOL/L — SIGNIFICANT CHANGE UP (ref 22–31)
CREAT SERPL-MCNC: 0.38 MG/DL — LOW (ref 0.5–1.3)
CREAT SERPL-MCNC: 0.38 MG/DL — LOW (ref 0.5–1.3)
CRP SERPL-MCNC: 9 MG/L — HIGH
CRP SERPL-MCNC: 9 MG/L — HIGH
EGFR: 118 ML/MIN/1.73M2 — SIGNIFICANT CHANGE UP
EGFR: 118 ML/MIN/1.73M2 — SIGNIFICANT CHANGE UP
ERYTHROCYTE [SEDIMENTATION RATE] IN BLOOD: 16 MM/HR — SIGNIFICANT CHANGE UP (ref 4–25)
ERYTHROCYTE [SEDIMENTATION RATE] IN BLOOD: 16 MM/HR — SIGNIFICANT CHANGE UP (ref 4–25)
GLUCOSE BLDC GLUCOMTR-MCNC: 141 MG/DL — HIGH (ref 70–99)
GLUCOSE BLDC GLUCOMTR-MCNC: 141 MG/DL — HIGH (ref 70–99)
GLUCOSE BLDC GLUCOMTR-MCNC: 146 MG/DL — HIGH (ref 70–99)
GLUCOSE BLDC GLUCOMTR-MCNC: 146 MG/DL — HIGH (ref 70–99)
GLUCOSE BLDC GLUCOMTR-MCNC: 190 MG/DL — HIGH (ref 70–99)
GLUCOSE BLDC GLUCOMTR-MCNC: 190 MG/DL — HIGH (ref 70–99)
GLUCOSE BLDC GLUCOMTR-MCNC: 192 MG/DL — HIGH (ref 70–99)
GLUCOSE BLDC GLUCOMTR-MCNC: 192 MG/DL — HIGH (ref 70–99)
GLUCOSE BLDC GLUCOMTR-MCNC: 212 MG/DL — HIGH (ref 70–99)
GLUCOSE BLDC GLUCOMTR-MCNC: 212 MG/DL — HIGH (ref 70–99)
GLUCOSE SERPL-MCNC: 138 MG/DL — HIGH (ref 70–99)
GLUCOSE SERPL-MCNC: 138 MG/DL — HIGH (ref 70–99)
HCT VFR BLD CALC: 28.1 % — LOW (ref 34.5–45)
HCT VFR BLD CALC: 28.1 % — LOW (ref 34.5–45)
HGB BLD-MCNC: 8.8 G/DL — LOW (ref 11.5–15.5)
HGB BLD-MCNC: 8.8 G/DL — LOW (ref 11.5–15.5)
INR BLD: 1.9 RATIO — HIGH (ref 0.85–1.18)
INR BLD: 1.9 RATIO — HIGH (ref 0.85–1.18)
MAGNESIUM SERPL-MCNC: 1.8 MG/DL — SIGNIFICANT CHANGE UP (ref 1.6–2.6)
MAGNESIUM SERPL-MCNC: 1.8 MG/DL — SIGNIFICANT CHANGE UP (ref 1.6–2.6)
MCHC RBC-ENTMCNC: 30 PG — SIGNIFICANT CHANGE UP (ref 27–34)
MCHC RBC-ENTMCNC: 30 PG — SIGNIFICANT CHANGE UP (ref 27–34)
MCHC RBC-ENTMCNC: 31.3 GM/DL — LOW (ref 32–36)
MCHC RBC-ENTMCNC: 31.3 GM/DL — LOW (ref 32–36)
MCV RBC AUTO: 95.9 FL — SIGNIFICANT CHANGE UP (ref 80–100)
MCV RBC AUTO: 95.9 FL — SIGNIFICANT CHANGE UP (ref 80–100)
NRBC # BLD: 0 /100 WBCS — SIGNIFICANT CHANGE UP (ref 0–0)
NRBC # BLD: 0 /100 WBCS — SIGNIFICANT CHANGE UP (ref 0–0)
NRBC # FLD: 0 K/UL — SIGNIFICANT CHANGE UP (ref 0–0)
NRBC # FLD: 0 K/UL — SIGNIFICANT CHANGE UP (ref 0–0)
PHOSPHATE SERPL-MCNC: 1.3 MG/DL — LOW (ref 2.5–4.5)
PHOSPHATE SERPL-MCNC: 1.3 MG/DL — LOW (ref 2.5–4.5)
PLATELET # BLD AUTO: 437 K/UL — HIGH (ref 150–400)
PLATELET # BLD AUTO: 437 K/UL — HIGH (ref 150–400)
POTASSIUM SERPL-MCNC: 4.5 MMOL/L — SIGNIFICANT CHANGE UP (ref 3.5–5.3)
POTASSIUM SERPL-MCNC: 4.5 MMOL/L — SIGNIFICANT CHANGE UP (ref 3.5–5.3)
POTASSIUM SERPL-SCNC: 4.5 MMOL/L — SIGNIFICANT CHANGE UP (ref 3.5–5.3)
POTASSIUM SERPL-SCNC: 4.5 MMOL/L — SIGNIFICANT CHANGE UP (ref 3.5–5.3)
PROT SERPL-MCNC: 5.4 G/DL — LOW (ref 6–8.3)
PROT SERPL-MCNC: 5.4 G/DL — LOW (ref 6–8.3)
PROTHROM AB SERPL-ACNC: 21.1 SEC — HIGH (ref 9.5–13)
PROTHROM AB SERPL-ACNC: 21.1 SEC — HIGH (ref 9.5–13)
RBC # BLD: 2.93 M/UL — LOW (ref 3.8–5.2)
RBC # BLD: 2.93 M/UL — LOW (ref 3.8–5.2)
RBC # FLD: 14.6 % — HIGH (ref 10.3–14.5)
RBC # FLD: 14.6 % — HIGH (ref 10.3–14.5)
SODIUM SERPL-SCNC: 136 MMOL/L — SIGNIFICANT CHANGE UP (ref 135–145)
SODIUM SERPL-SCNC: 136 MMOL/L — SIGNIFICANT CHANGE UP (ref 135–145)
WBC # BLD: 9.58 K/UL — SIGNIFICANT CHANGE UP (ref 3.8–10.5)
WBC # BLD: 9.58 K/UL — SIGNIFICANT CHANGE UP (ref 3.8–10.5)
WBC # FLD AUTO: 9.58 K/UL — SIGNIFICANT CHANGE UP (ref 3.8–10.5)
WBC # FLD AUTO: 9.58 K/UL — SIGNIFICANT CHANGE UP (ref 3.8–10.5)

## 2024-01-04 PROCEDURE — 99232 SBSQ HOSP IP/OBS MODERATE 35: CPT | Mod: GC

## 2024-01-04 RX ORDER — WARFARIN SODIUM 2.5 MG/1
6 TABLET ORAL ONCE
Refills: 0 | Status: COMPLETED | OUTPATIENT
Start: 2024-01-04 | End: 2024-01-04

## 2024-01-04 RX ADMIN — CARVEDILOL PHOSPHATE 3.12 MILLIGRAM(S): 80 CAPSULE, EXTENDED RELEASE ORAL at 18:26

## 2024-01-04 RX ADMIN — MIRTAZAPINE 30 MILLIGRAM(S): 45 TABLET, ORALLY DISINTEGRATING ORAL at 12:55

## 2024-01-04 RX ADMIN — PANTOPRAZOLE SODIUM 40 MILLIGRAM(S): 20 TABLET, DELAYED RELEASE ORAL at 05:18

## 2024-01-04 RX ADMIN — Medication 2: at 18:26

## 2024-01-04 RX ADMIN — Medication 400 MILLIGRAM(S): at 14:13

## 2024-01-04 RX ADMIN — Medication 1 MILLIGRAM(S): at 12:10

## 2024-01-04 RX ADMIN — Medication 85 MILLIMOLE(S): at 12:52

## 2024-01-04 RX ADMIN — Medication 20 MILLIGRAM(S): at 14:13

## 2024-01-04 RX ADMIN — Medication 400 MILLIGRAM(S): at 22:27

## 2024-01-04 RX ADMIN — Medication 20 MILLIGRAM(S): at 05:18

## 2024-01-04 RX ADMIN — Medication 1: at 12:56

## 2024-01-04 RX ADMIN — CARVEDILOL PHOSPHATE 3.12 MILLIGRAM(S): 80 CAPSULE, EXTENDED RELEASE ORAL at 05:19

## 2024-01-04 RX ADMIN — Medication 400 MILLIGRAM(S): at 05:19

## 2024-01-04 RX ADMIN — WARFARIN SODIUM 6 MILLIGRAM(S): 2.5 TABLET ORAL at 22:26

## 2024-01-04 RX ADMIN — Medication 20 MILLIGRAM(S): at 22:26

## 2024-01-04 RX ADMIN — SPIRONOLACTONE 100 MILLIGRAM(S): 25 TABLET, FILM COATED ORAL at 05:18

## 2024-01-04 NOTE — PROGRESS NOTE ADULT - PROBLEM SELECTOR PLAN 1
Patient presented with body rash of b/l upper extremity and lower extremity of unknown etiology that is improving from initial presentation   Rash of unclear etiology, initiall ifnectious versus inflammatory now derm believes resolving ecchymosis  work up at [place] includes:   CMV IgG negative   LDH normal   Babesia negative   Lyme negative   RPR negative   ESR, CRP elevated   -rheumatology: likely not vasculitis  -dermatology: likely resolving ecchymosis i/s/o coumadin use  -ophtho: likely subconjunctival hemorrhage no need to stop coumadin Patient presented with body rash of b/l upper extremity and lower extremity of unknown etiology that is improving from initial presentation   Rash of unclear etiology, initiall ifnectious versus inflammatory now derm believes resolving ecchymosis  work up at [facility] includes:   CMV IgG negative   LDH normal   Babesia negative   Lyme negative   RPR negative   ESR, CRP elevated   -rheumatology: likely not vasculitis  -dermatology: likely resolving ecchymosis i/s/o coumadin use  -ophtho: likely subconjunctival hemorrhage no need to stop coumadin

## 2024-01-04 NOTE — PROGRESS NOTE ADULT - ATTENDING COMMENTS
58W w/ type 2 diabetes, HTN, MDD vs bipolar disorder, UC (dx on colonoscopy at [place], previously treated with steroids), NAFLD c/b ?decompensated cirrhosis, AF (warfarin), admitted to [facility] with periorbital swelling, rash, and pancolitis, transferred to [facility] for derm/rheum/ophtho eval, overall felt to be have resolving bruising, subconjunctival hemorrhage, and UC flare currently receiving IV steroids and planning for possible repeat colonoscopy and infliximab initiation.    Continues to have abdominal pain, diarrhea with rectal tube in place. CRP downtrending  [place] colonoscopy records in chart    - Appreciate GI recommendations - patient considering repeat colonoscopy, continue IV steroids 58W w/ type 2 diabetes, HTN, MDD vs bipolar disorder, UC (dx on colonoscopy at [place], previously treated with steroids), NAFLD c/b ?decompensated cirrhosis, AF (warfarin), admitted to [facility] with periorbital swelling, rash, and pancolitis, transferred to [facility] for derm/rheum/ophtho eval, overall felt to be have resolving bruising, subconjunctival hemorrhage, and UC flare currently receiving IV steroids and planning for possible repeat colonoscopy and infliximab initiation.    Continues to have abdominal pain, diarrhea with rectal tube in place. CRP downtrending  [place] colonoscopy records in chart    - Appreciate GI recommendations - patient considering repeat colonoscopy, continue IV steroids

## 2024-01-04 NOTE — PROGRESS NOTE ADULT - PROBLEM SELECTOR PLAN 2
Patient with history of ulcerative colitis found to have pancolitis   -likely ulcerative colitis flare   -continue mesalamine 400mg TID    c/w IV steroids 20 q8h (12/31)   - PPI 40mg daily   -Continue with mesalamine 400 mg TID for now  -regular diet  -GI following  -in process of obtaining colonoscopy report from [facility] [place] Patient with history of ulcerative colitis found to have pancolitis   -likely ulcerative colitis flare   -continue mesalamine 400mg TID    c/w IV steroids 20 q8h (12/31)   - PPI 40mg daily   -Continue with mesalamine 400 mg TID for now  -regular diet  -GI following  -in process of obtaining colonoscopy report from [facility] Patient with history of ulcerative colitis found to have pancolitis   -likely ulcerative colitis flare   -continue mesalamine 400mg TID    c/w IV steroids 20 q8h (12/31)   - PPI 40mg daily   -Continue with mesalamine 400 mg TID for now  -regular diet  -GI following, potential plan for scope if pt agreeable, if not will start remicade  -colonoscopy report from [facility]: moderate to severe colitis in the past, likely concerning for UC based on endoscopic and histologic presentation Patient with history of ulcerative colitis found to have pancolitis   -likely ulcerative colitis flare   -continue mesalamine 400mg TID    c/w IV steroids 20 q8h (12/31)   - PPI 40mg daily   -Continue with mesalamine 400 mg TID for now  -regular diet  -GI following, potential plan for scope if pt agreeable, if not will start remicade  -colonoscopy report from [facility]: moderate to severe colitis in the past, likely concerning for UC based on endoscopic and histologic presentation

## 2024-01-04 NOTE — PROGRESS NOTE ADULT - TIME BILLING
Time-based billing (NON-critical care).     More than 50% of the visit was spent counseling and / or coordinating care by the attending physician.  The necessity of the time spent during the encounter on this date of service was due to:     documentation in [place], reviewing chart and coordinating care with patient/resident and interdisciplinary staff (such as , social workers, etc) as well as reviewing vitals, laboratory data, radiology, medication list, consultants' recommendations and prior records. Interventions were performed as documented above. Time-based billing (NON-critical care).     More than 50% of the visit was spent counseling and / or coordinating care by the attending physician.  The necessity of the time spent during the encounter on this date of service was due to:     documentation in [place], reviewing chart and coordinating care with patient/resident and interdisciplinary staff (such as , social workers, etc) as well as reviewing vitals, laboratory data, radiology, medication list, consultants' recommendations and prior records. Interventions were performed as documented above.

## 2024-01-04 NOTE — PROGRESS NOTE ADULT - SUBJECTIVE AND OBJECTIVE BOX
ANAYA JOINER 58y Female      Patient is a 58y old  Female who presents with a chief complaint of transfer from [place] for tertiary level care in the setting of diffuse body rash (30 Dec 2023 22:18)        INTERVAL HPI/OVERNIGHT EVENTS: No acute events overnight. Patient was seen and evaluated at the bedside. The patient still endorses abdominal discomfort, she reports no improvement on the steroids. Patient denies fever/chills, chest pain, shortness of breath,, headaches, nausea/vomiting, or visual changes.       PHYSICAL EXAM:  GENERAL: NAD  HEAD:  Atraumatic, Normocephalic  EYES: left eye and rt eye with mild redness+ no drainage  ENMT: MMM  NECK: Supple, No JVD  NERVOUS SYSTEM: AOX3, motor and sensation grossly intact in b/l UE and b/l LE  PSYCHIATRIC: Appropriate affect and mood  CHEST/LUNG: Clear to auscultation bilaterally; No rales, rhonchi, wheezing, or rubs  HEART: Regular rate and rhythm; No murmurs, rubs, or gallops. No LE edema  ABDOMEN: Soft, mild epigastric tenderness, Nondistended; Bowel sounds present  EXTREMITIES:  2+ Peripheral Pulses, No clubbing, cyanosis  SKIN:  rash improving    Vital Signs Last 24 Hrs  T(C): 37 (03 Jan 2024 06:43), Max: 37 (03 Jan 2024 06:43)  T(F): 98.6 (03 Jan 2024 06:43), Max: 98.6 (03 Jan 2024 06:43)  HR: 70 (03 Jan 2024 06:43) (65 - 82)  BP: 118/64 (03 Jan 2024 06:43) (110/61 - 141/79)  BP(mean): --  RR: 16 (03 Jan 2024 06:43) (16 - 18)  SpO2: 100% (03 Jan 2024 06:43) (99% - 100%)    Parameters below as of 03 Jan 2024 06:43  Patient On (Oxygen Delivery Method): room air      Consultant(s) Notes Reviewed:  [X] YES  [ ] NO  Care Discussed with Consultants/Other Providers [X] YES  [ ] NO  Imaging Personally Reviewed:  [X] YES  [ ] NO      LABS:    CBC Full  -  ( 03 Jan 2024 06:04 )  WBC Count : 5.13 K/uL  RBC Count : 2.84 M/uL  Hemoglobin : 8.6 g/dL  Hematocrit : 27.5 %  Platelet Count - Automated : 420 K/uL  Mean Cell Volume : 96.8 fL  Mean Cell Hemoglobin : 30.3 pg  Mean Cell Hemoglobin Concentration : 31.3 gm/dL  Auto Neutrophil # : x  Auto Lymphocyte # : x  Auto Monocyte # : x  Auto Eosinophil # : x  Auto Basophil # : x  Auto Neutrophil % : x  Auto Lymphocyte % : x  Auto Monocyte % : x  Auto Eosinophil % : x  Auto Basophil % : x    01-03    137  |  105  |  21  ----------------------------<  181<H>  4.3   |  20<L>  |  0.57    Ca    8.3<L>      03 Jan 2024 06:04  Phos  2.4     01-03  Mg     2.00     01-03    TPro  5.3<L>  /  Alb  2.9<L>  /  TBili  <0.2  /  DBili  x   /  AST  22  /  ALT  12  /  AlkPhos  88  01-03       Urinalysis Basic - ( 30 Dec 2023 05:52 )    Color: x / Appearance: x / SG: x / pH: x  Gluc: 81 mg/dL / Ketone: x  / Bili: x / Urobili: x   Blood: x / Protein: x / Nitrite: x   Leuk Esterase: x / RBC: x / WBC x   Sq Epi: x / Non Sq Epi: x / Bacteria: x        CAPILLARY BLOOD GLUCOSE      POCT Blood Glucose.: 134 mg/dL (30 Dec 2023 21:11)  POCT Blood Glucose.: 110 mg/dL (30 Dec 2023 17:28)  POCT Blood Glucose.: 126 mg/dL (30 Dec 2023 15:23)  POCT Blood Glucose.: 83 mg/dL (30 Dec 2023 08:44)           ANAYA JOINER 58y Female      Patient is a 58y old  Female who presents with a chief complaint of transfer from [place] for tertiary level care in the setting of diffuse body rash (30 Dec 2023 22:18)        INTERVAL HPI/OVERNIGHT EVENTS: No acute events overnight. Patient was seen and evaluated at the bedside. The patient still endorses abdominal discomfort, she reports no improvement on the steroids. Patient denies fever/chills, chest pain, shortness of breath,, headaches, nausea/vomiting, or visual changes.       PHYSICAL EXAM:  GENERAL: NAD  HEAD:  Atraumatic, Normocephalic  EYES: left eye and rt eye with mild redness+ no drainage  ENMT: MMM  NECK: Supple, No JVD  NERVOUS SYSTEM: AOX3, motor and sensation grossly intact in b/l UE and b/l LE  PSYCHIATRIC: Appropriate affect and mood  CHEST/LUNG: Clear to auscultation bilaterally; No rales, rhonchi, wheezing, or rubs  HEART: Regular rate and rhythm; No murmurs, rubs, or gallops. No LE edema  ABDOMEN: Soft, mild epigastric tenderness, Nondistended; Bowel sounds present  EXTREMITIES:  2+ Peripheral Pulses, No clubbing, cyanosis  SKIN:  rash improving    Vital Signs Last 24 Hrs  T(C): 37 (03 Jan 2024 06:43), Max: 37 (03 Jan 2024 06:43)  T(F): 98.6 (03 Jan 2024 06:43), Max: 98.6 (03 Jan 2024 06:43)  HR: 70 (03 Jan 2024 06:43) (65 - 82)  BP: 118/64 (03 Jan 2024 06:43) (110/61 - 141/79)  BP(mean): --  RR: 16 (03 Jan 2024 06:43) (16 - 18)  SpO2: 100% (03 Jan 2024 06:43) (99% - 100%)    Parameters below as of 03 Jan 2024 06:43  Patient On (Oxygen Delivery Method): room air      Consultant(s) Notes Reviewed:  [X] YES  [ ] NO  Care Discussed with Consultants/Other Providers [X] YES  [ ] NO  Imaging Personally Reviewed:  [X] YES  [ ] NO      LABS:    CBC Full  -  ( 03 Jan 2024 06:04 )  WBC Count : 5.13 K/uL  RBC Count : 2.84 M/uL  Hemoglobin : 8.6 g/dL  Hematocrit : 27.5 %  Platelet Count - Automated : 420 K/uL  Mean Cell Volume : 96.8 fL  Mean Cell Hemoglobin : 30.3 pg  Mean Cell Hemoglobin Concentration : 31.3 gm/dL  Auto Neutrophil # : x  Auto Lymphocyte # : x  Auto Monocyte # : x  Auto Eosinophil # : x  Auto Basophil # : x  Auto Neutrophil % : x  Auto Lymphocyte % : x  Auto Monocyte % : x  Auto Eosinophil % : x  Auto Basophil % : x    01-03    137  |  105  |  21  ----------------------------<  181<H>  4.3   |  20<L>  |  0.57    Ca    8.3<L>      03 Jan 2024 06:04  Phos  2.4     01-03  Mg     2.00     01-03    TPro  5.3<L>  /  Alb  2.9<L>  /  TBili  <0.2  /  DBili  x   /  AST  22  /  ALT  12  /  AlkPhos  88  01-03       Urinalysis Basic - ( 30 Dec 2023 05:52 )    Color: x / Appearance: x / SG: x / pH: x  Gluc: 81 mg/dL / Ketone: x  / Bili: x / Urobili: x   Blood: x / Protein: x / Nitrite: x   Leuk Esterase: x / RBC: x / WBC x   Sq Epi: x / Non Sq Epi: x / Bacteria: x        CAPILLARY BLOOD GLUCOSE      POCT Blood Glucose.: 134 mg/dL (30 Dec 2023 21:11)  POCT Blood Glucose.: 110 mg/dL (30 Dec 2023 17:28)  POCT Blood Glucose.: 126 mg/dL (30 Dec 2023 15:23)  POCT Blood Glucose.: 83 mg/dL (30 Dec 2023 08:44)           ANAYA JOINER 58y Female      Patient is a 58y old  Female who presents with a chief complaint of transfer from [place] for tertiary level care in the setting of diffuse body rash (30 Dec 2023 22:18)        INTERVAL HPI/OVERNIGHT EVENTS: No acute events overnight. Patient was seen and evaluated at the bedside. The patient still endorses abdominal discomfort, she reports no improvement on the steroids. L arm swelling resolved and L hand lesions improving. Patient denies fever/chills, chest pain, shortness of breath,, headaches, nausea/vomiting, or visual changes.       PHYSICAL EXAM:  GENERAL: NAD  HEAD:  Atraumatic, Normocephalic  EYES: left eye and rt eye with mild redness+ no drainage  ENMT: MMM  NECK: Supple, No JVD  NERVOUS SYSTEM: AOX3, motor and sensation grossly intact in b/l UE and b/l LE  PSYCHIATRIC: Appropriate affect and mood  CHEST/LUNG: Clear to auscultation bilaterally; No rales, rhonchi, wheezing, or rubs  HEART: Regular rate and rhythm; No murmurs, rubs, or gallops. No LE edema  ABDOMEN: Soft, mild epigastric tenderness, Nondistended; Bowel sounds present  EXTREMITIES:  2+ Peripheral Pulses, No clubbing, cyanosis  SKIN:  rash improving    Vital Signs Last 24 Hrs  T(C): 36.6 (04 Jan 2024 06:48), Max: 36.9 (03 Jan 2024 21:40)  T(F): 97.8 (04 Jan 2024 06:48), Max: 98.5 (03 Jan 2024 21:40)  HR: 67 (04 Jan 2024 06:48) (67 - 73)  BP: 109/67 (04 Jan 2024 06:48) (96/54 - 117/66)  BP(mean): --  RR: 17 (04 Jan 2024 06:48) (16 - 18)  SpO2: 100% (04 Jan 2024 06:48) (98% - 100%)    Parameters below as of 04 Jan 2024 06:48  Patient On (Oxygen Delivery Method): room air    Consultant(s) Notes Reviewed:  [X] YES  [ ] NO  Care Discussed with Consultants/Other Providers [X] YES  [ ] NO  Imaging Personally Reviewed:  [X] YES  [ ] NO      LABS:    CBC Full  -  ( 04 Jan 2024 05:21 )  WBC Count : 9.58 K/uL  RBC Count : 2.93 M/uL  Hemoglobin : 8.8 g/dL  Hematocrit : 28.1 %  Platelet Count - Automated : 437 K/uL  Mean Cell Volume : 95.9 fL  Mean Cell Hemoglobin : 30.0 pg  Mean Cell Hemoglobin Concentration : 31.3 gm/dL  Auto Neutrophil # : x  Auto Lymphocyte # : x  Auto Monocyte # : x  Auto Eosinophil # : x  Auto Basophil # : x  Auto Neutrophil % : x  Auto Lymphocyte % : x  Auto Monocyte % : x  Auto Eosinophil % : x  Auto Basophil % : x    01-04    136  |  102  |  15  ----------------------------<  138<H>  4.5   |  23  |  0.38<L>    Ca    7.9<L>      04 Jan 2024 05:21  Phos  1.3     01-04  Mg     1.80     01-04    TPro  5.4<L>  /  Alb  2.8<L>  /  TBili  <0.2  /  DBili  x   /  AST  29  /  ALT  21  /  AlkPhos  93  01-04       Urinalysis Basic - ( 30 Dec 2023 05:52 )    Color: x / Appearance: x / SG: x / pH: x  Gluc: 81 mg/dL / Ketone: x  / Bili: x / Urobili: x   Blood: x / Protein: x / Nitrite: x   Leuk Esterase: x / RBC: x / WBC x   Sq Epi: x / Non Sq Epi: x / Bacteria: x        CAPILLARY BLOOD GLUCOSE      POCT Blood Glucose.: 134 mg/dL (30 Dec 2023 21:11)  POCT Blood Glucose.: 110 mg/dL (30 Dec 2023 17:28)  POCT Blood Glucose.: 126 mg/dL (30 Dec 2023 15:23)  POCT Blood Glucose.: 83 mg/dL (30 Dec 2023 08:44)           ANAYA JOINER 58y Female      Patient is a 58y old  Female who presents with a chief complaint of transfer from [place] for tertiary level care in the setting of diffuse body rash (30 Dec 2023 22:18)        INTERVAL HPI/OVERNIGHT EVENTS: No acute events overnight. Patient was seen and evaluated at the bedside. The patient still endorses abdominal discomfort, she reports no improvement on the steroids. L arm swelling resolved and L hand lesions improving. Patient denies fever/chills, chest pain, shortness of breath,, headaches, nausea/vomiting, or visual changes.       PHYSICAL EXAM:  GENERAL: NAD  HEAD:  Atraumatic, Normocephalic  EYES: left eye and rt eye with mild redness+ no drainage  ENMT: MMM  NECK: Supple, No JVD  NERVOUS SYSTEM: AOX3, motor and sensation grossly intact in b/l UE and b/l LE  PSYCHIATRIC: Appropriate affect and mood  CHEST/LUNG: Clear to auscultation bilaterally; No rales, rhonchi, wheezing, or rubs  HEART: Regular rate and rhythm; No murmurs, rubs, or gallops. No LE edema  ABDOMEN: Soft, mild epigastric tenderness, Nondistended; Bowel sounds present  EXTREMITIES:  2+ Peripheral Pulses, No clubbing, cyanosis  SKIN:  rash improving    Vital Signs Last 24 Hrs  T(C): 36.6 (04 Jan 2024 06:48), Max: 36.9 (03 Jan 2024 21:40)  T(F): 97.8 (04 Jan 2024 06:48), Max: 98.5 (03 Jan 2024 21:40)  HR: 67 (04 Jan 2024 06:48) (67 - 73)  BP: 109/67 (04 Jan 2024 06:48) (96/54 - 117/66)  BP(mean): --  RR: 17 (04 Jan 2024 06:48) (16 - 18)  SpO2: 100% (04 Jan 2024 06:48) (98% - 100%)    Parameters below as of 04 Jan 2024 06:48  Patient On (Oxygen Delivery Method): room air    Consultant(s) Notes Reviewed:  [X] YES  [ ] NO  Care Discussed with Consultants/Other Providers [X] YES  [ ] NO  Imaging Personally Reviewed:  [X] YES  [ ] NO      LABS:    CBC Full  -  ( 04 Jan 2024 05:21 )  WBC Count : 9.58 K/uL  RBC Count : 2.93 M/uL  Hemoglobin : 8.8 g/dL  Hematocrit : 28.1 %  Platelet Count - Automated : 437 K/uL  Mean Cell Volume : 95.9 fL  Mean Cell Hemoglobin : 30.0 pg  Mean Cell Hemoglobin Concentration : 31.3 gm/dL  Auto Neutrophil # : x  Auto Lymphocyte # : x  Auto Monocyte # : x  Auto Eosinophil # : x  Auto Basophil # : x  Auto Neutrophil % : x  Auto Lymphocyte % : x  Auto Monocyte % : x  Auto Eosinophil % : x  Auto Basophil % : x    01-04    136  |  102  |  15  ----------------------------<  138<H>  4.5   |  23  |  0.38<L>    Ca    7.9<L>      04 Jan 2024 05:21  Phos  1.3     01-04  Mg     1.80     01-04    TPro  5.4<L>  /  Alb  2.8<L>  /  TBili  <0.2  /  DBili  x   /  AST  29  /  ALT  21  /  AlkPhos  93  01-04       Urinalysis Basic - ( 30 Dec 2023 05:52 )    Color: x / Appearance: x / SG: x / pH: x  Gluc: 81 mg/dL / Ketone: x  / Bili: x / Urobili: x   Blood: x / Protein: x / Nitrite: x   Leuk Esterase: x / RBC: x / WBC x   Sq Epi: x / Non Sq Epi: x / Bacteria: x        CAPILLARY BLOOD GLUCOSE      POCT Blood Glucose.: 134 mg/dL (30 Dec 2023 21:11)  POCT Blood Glucose.: 110 mg/dL (30 Dec 2023 17:28)  POCT Blood Glucose.: 126 mg/dL (30 Dec 2023 15:23)  POCT Blood Glucose.: 83 mg/dL (30 Dec 2023 08:44)

## 2024-01-04 NOTE — PROGRESS NOTE ADULT - ATTENDING COMMENTS
Stool output recorded is decreasing, but patient continues to report abdominal pain. CRP significantly improved since admission.     # History of UC: As noted above, records from [place] obtained with colonoscopy in 2/2023 with pancolitis, Salinas score 2-3 with biopsies with scattered active colitis as well as LGD in right colon (see additional findings above). Though biopsies not pathognomonic for UC, the endoscopic findings and clinical history are consistent with this diagnosis.   # Afib on Coumadin  # CAD  # Bipolar disease  # Reported history of cirrhosis, possibly NAFLD    --Rediscussed potential repeat endoscopic evaluation with patient. She requested to think about this further and discuss with her sister prior to finalizing a decision. Though a flex sig could be performed to assess severity of distal disease, would suggest colonoscopy especially given prior history of right-sided LGD.   --Continue steroids  --Pending patient's decision re: endoscopic evaluation, would consider inpatient Remicade dosing. If she ultimately declines endoscopic evaluation, can plan for Remicade later today or tomorrow.   --Continue steroids and mesalamine for now  --Trend CRP, currently downtrending    Additional recommendations as above.   Rectum - active colitis Stool output recorded is decreasing, but patient continues to report abdominal pain. CRP significantly improved since admission.     # History of UC: As noted above, records from [place] obtained with colonoscopy in 2/2023 with pancolitis, Salinas score 2-3 with biopsies with scattered active colitis as well as LGD in right colon (see additional findings above). Though biopsies not pathognomonic for UC, the endoscopic findings and clinical history are consistent with this diagnosis.   # Afib on Coumadin  # CAD  # Bipolar disease  # Reported history of cirrhosis, possibly NAFLD    --Rediscussed potential repeat endoscopic evaluation with patient. She requested to think about this further and discuss with her sister prior to finalizing a decision. Though a flex sig could be performed to assess severity of distal disease, would suggest colonoscopy especially given prior history of right-sided LGD.   --Continue steroids  --Pending patient's decision re: endoscopic evaluation, would consider inpatient Remicade dosing. If she ultimately declines endoscopic evaluation, can plan for Remicade later today or tomorrow.   --Continue steroids and mesalamine for now  --Trend CRP, currently downtrending    Additional recommendations as above.   Rectum - active colitis Stool output recorded is decreasing, but patient continues to report abdominal pain. CRP significantly improved since admission.     # History of UC: As noted above, records from [place] obtained with colonoscopy in 2/2023 with pancolitis, Salinas score 2-3 with biopsies with scattered active colitis as well as LGD in right colon (see additional findings above). Though biopsies not pathognomonic for UC, the endoscopic findings and clinical history are consistent with this diagnosis.   # Afib on Coumadin  # CAD  # Bipolar disease  # Reported history of cirrhosis, possibly NAFLD    --Rediscussed potential repeat endoscopic evaluation with patient. She requested to think about this further and discuss with her sister prior to finalizing a decision. Though a flex sig could be performed to assess severity of distal disease, would suggest colonoscopy especially given prior history of right-sided LGD.   --Continue steroids  --Pending patient's decision re: endoscopic evaluation, would consider inpatient Remicade dosing. If she ultimately declines endoscopic evaluation, can plan for Remicade later today or tomorrow.   --Continue steroids and mesalamine for now  --Trend CRP, currently downtrending    Additional recommendations as above. Stool output recorded is decreasing, but patient continues to report abdominal pain. CRP significantly improved since admission.     # History of UC: As noted above, records from [place] obtained with colonoscopy in 2/2023 with pancolitis, Salinas score 2-3 with biopsies with scattered active colitis as well as LGD in right colon (see additional findings above). Though biopsies not pathognomonic for UC, the endoscopic findings and clinical history are consistent with this diagnosis.   # Afib on Coumadin  # CAD  # Bipolar disease  # Reported history of cirrhosis, possibly NAFLD    --Rediscussed potential repeat endoscopic evaluation with patient. She requested to think about this further and discuss with her sister prior to finalizing a decision. Though a flex sig could be performed to assess severity of distal disease, would suggest colonoscopy especially given prior history of right-sided LGD.   --Continue steroids  --Pending patient's decision re: endoscopic evaluation, would consider inpatient Remicade dosing. If she ultimately declines endoscopic evaluation, can plan for Remicade later today or tomorrow.   --Continue steroids and mesalamine for now  --Trend CRP, currently downtrending    Additional recommendations as above.

## 2024-01-04 NOTE — CHART NOTE - NSCHARTNOTEFT_GEN_A_CORE
Called and updated the patient's sister, Tasneem, [phone], who provided more history.     Patient dx with UC in [place] last year in 2023 s/p colonoscopy and path report, she was initially started on IV steroids but did not respond, so she was given a dose of Remicade and transferred to [facility] but there was no follow up with further infusions, patient was only on oral mesalamine. Patient's mother was also recently diagnosed with colon cancer at age 81 and underwent colectomy. Patient's maternal uncle had UC and underwent colectomy s/p colostomy but ended up developing colon cancer and  from it. Explained to the sister that it would be beneficial for the patient to undergo repeat colonoscopy to assess severity and check for colon cancer. Patient is concerned about the Called and updated the patient's sister, Tasneem, [phone], who provided more history.     Patient dx with UC in [place] last year in 2023 s/p colonoscopy and path report, she was initially started on IV steroids but did not respond, so she was given a dose of Remicade and transferred to [facility] but there was no follow up with further infusions, patient was only on oral mesalamine. Patient's mother was also recently diagnosed with colon cancer at age 81 and underwent colectomy. Patient's maternal uncle had UC and underwent colectomy s/p colostomy but ended up developing colon cancer and  from it. Explained to the sister that it would be beneficial for the patient to undergo repeat colonoscopy to assess severity and check for colon cancer. Patient is concerned about the Called and updated the patient's sister, Tasneem, [phone], who provided more history.     Patient dx with UC in [place] last year in 2023 s/p colonoscopy and path report, she was initially started on IV steroids but did not respond, so she was given a dose of Remicade and transferred to [facility] but there was no follow up with further infusions, patient was only on oral mesalamine. Patient's mother was also recently diagnosed with colon cancer at age 81 and underwent colectomy. Patient's maternal uncle had UC and underwent colectomy s/p colostomy but ended up developing colon cancer and  from it. Explained to the sister that it would be beneficial for the patient to undergo repeat colonoscopy to assess severity and check for colon cancer. Patient is concerned about the prep, gave her two options with one being NGT placement for the prep or scheduled antiemetics while completing the prep. Sister will call the patient and discuss with her.     Moisés Colin, PGY-5  Gastroenterology/Hepatology Fellow  Available on [employer] Teams  [phone] (Short Range Pager)  [phone] (Long Range Pager)    After 5pm, please contact the on-call GI fellow. [phone] Called and updated the patient's sister, Tasneem, [phone], who provided more history.     Patient dx with UC in [place] last year in 2023 s/p colonoscopy and path report, she was initially started on IV steroids but did not respond, so she was given a dose of Remicade and transferred to [facility] but there was no follow up with further infusions, patient was only on oral mesalamine. Patient's mother was also recently diagnosed with colon cancer at age 81 and underwent colectomy. Patient's maternal uncle had UC and underwent colectomy s/p colostomy but ended up developing colon cancer and  from it. Explained to the sister that it would be beneficial for the patient to undergo repeat colonoscopy to assess severity and check for colon cancer. Patient is concerned about the prep, gave her two options with one being NGT placement for the prep or scheduled antiemetics while completing the prep. Sister will call the patient and discuss with her.     Moisés Colin, PGY-5  Gastroenterology/Hepatology Fellow  Available on [employer] Teams  [phone] (Short Range Pager)  [phone] (Long Range Pager)    After 5pm, please contact the on-call GI fellow. [phone]

## 2024-01-04 NOTE — PROGRESS NOTE ADULT - SUBJECTIVE AND OBJECTIVE BOX
Gastroenterology/Hepatology Progress Note      Interval Events:     - Stool o/p decreased to 150 cc since 12pm yesterday, but still has watery diarrhea with no blood. Patient reported persistent abd pain worsening with meals but no nausea or vomiting.   - No fevers or chills.     Allergies:  No Known Allergies      Hospital Medications:  acetaminophen     Tablet .. 650 milliGRAM(s) Oral every 6 hours PRN  aluminum hydroxide/magnesium hydroxide/simethicone Suspension 30 milliLiter(s) Oral every 6 hours PRN  carvedilol 3.125 milliGRAM(s) Oral every 12 hours  dextrose 5%. 1000 milliLiter(s) IV Continuous <Continuous>  dextrose 5%. 1000 milliLiter(s) IV Continuous <Continuous>  dextrose 50% Injectable 12.5 Gram(s) IV Push once  dextrose 50% Injectable 25 Gram(s) IV Push once  dextrose 50% Injectable 25 Gram(s) IV Push once  dextrose Oral Gel 15 Gram(s) Oral once PRN  folic acid 1 milliGRAM(s) Oral daily  glucagon  Injectable 1 milliGRAM(s) IntraMuscular once  influenza   Vaccine 0.5 milliLiter(s) IntraMuscular once  insulin lispro (ADMELOG) corrective regimen sliding scale   SubCutaneous three times a day before meals  insulin lispro (ADMELOG) corrective regimen sliding scale   SubCutaneous at bedtime  lactated ringers. 1000 milliLiter(s) IV Continuous <Continuous>  mesalamine DR Capsule 400 milliGRAM(s) Oral three times a day  methylPREDNISolone sodium succinate Injectable 20 milliGRAM(s) IV Push every 8 hours  mirtazapine 30 milliGRAM(s) Oral daily  ondansetron Injectable 4 milliGRAM(s) IV Push once  pantoprazole    Tablet 40 milliGRAM(s) Oral before breakfast  rifAXIMin 550 milliGRAM(s) Oral two times a day  sodium phosphate 30 milliMole(s)/500 mL IVPB 30 milliMole(s) IV Intermittent once  spironolactone 100 milliGRAM(s) Oral daily      ROS: 14 point ROS negative unless otherwise state in subjective    PHYSICAL EXAM:   Vital Signs:  Vital Signs Last 24 Hrs  T(C): 36.6 (04 Jan 2024 06:48), Max: 36.9 (03 Jan 2024 21:40)  T(F): 97.8 (04 Jan 2024 06:48), Max: 98.5 (03 Jan 2024 21:40)  HR: 67 (04 Jan 2024 06:48) (67 - 73)  BP: 109/67 (04 Jan 2024 06:48) (96/54 - 117/66)  BP(mean): --  RR: 17 (04 Jan 2024 06:48) (16 - 18)  SpO2: 100% (04 Jan 2024 06:48) (98% - 100%)    Parameters below as of 04 Jan 2024 06:48  Patient On (Oxygen Delivery Method): room air      Daily     Daily     GENERAL:  No acute distress, chronically ill appearing, lying in bed.   HEENT:  NCAT, no scleral icterus   CHEST:  no respiratory distress  HEART:  Regular rate and rhythm  ABDOMEN:  Soft, mild diffuse tenderness, non-distended, no palpable masses.   RECTUM: has rectal tube which has dark brown watery stool w/ no blood.   EXTREMITIES: No LE edema b/l  NEURO:  Alert and oriented x 3, no tremors, no tremors.     LABS:                        8.8    9.58  )-----------( 437      ( 04 Jan 2024 05:21 )             28.1     Mean Cell Volume: 95.9 fL (01-04-24 @ 05:21)    01-04    136  |  102  |  15  ----------------------------<  138<H>  4.5   |  23  |  0.38<L>    Ca    7.9<L>      04 Jan 2024 05:21  Phos  1.3     01-04  Mg     1.80     01-04    TPro  5.4<L>  /  Alb  2.8<L>  /  TBili  <0.2  /  DBili  x   /  AST  29  /  ALT  21  /  AlkPhos  93  01-04    LIVER FUNCTIONS - ( 04 Jan 2024 05:21 )  Alb: 2.8 g/dL / Pro: 5.4 g/dL / ALK PHOS: 93 U/L / ALT: 21 U/L / AST: 29 U/L / GGT: x           PT/INR - ( 04 Jan 2024 05:21 )   PT: 21.1 sec;   INR: 1.90 ratio         PTT - ( 04 Jan 2024 05:21 )  PTT:30.9 sec  Urinalysis Basic - ( 04 Jan 2024 05:21 )    Color: x / Appearance: x / SG: x / pH: x  Gluc: 138 mg/dL / Ketone: x  / Bili: x / Urobili: x   Blood: x / Protein: x / Nitrite: x   Leuk Esterase: x / RBC: x / WBC x   Sq Epi: x / Non Sq Epi: x / Bacteria: x            Imaging:            FINDINGS:  CHEST:  LUNGS AND LARGE AIRWAYS: Patent central airways. Dependent atelectatic   changes at the lung bases.  PLEURA: No pleural effusion.  VESSELS: Within normal limits.  HEART: Heart size is normal. No pericardial effusion.  MEDIASTINUM AND FOREST: No lymphadenopathy.  CHEST WALL AND LOWER NECK: Within normal limits.    ABDOMEN AND PELVIS:  LIVER: Within normal limits.  BILE DUCTS: Normal caliber.  GALLBLADDER: Cholecystectomy.  SPLEEN: Within normal limits.  PANCREAS: Within normal limits.  ADRENALS: Within normal limits.  KIDNEYS/URETERS: Within normal limits.    BLADDER: Within normal limits.  REPRODUCTIVE ORGANS: Posterior calcified fibroid.    BOWEL: No bowel obstruction. Appendix is unremarkable. There is   thickening, thumbprinting and hyperemia of the colon from the cecum   through rectum compatible with pancolitis.  PERITONEUM: No ascites.  VESSELS: Aorta is not dilated. Moderate atherosclerotic vascular   calcification.  No perivascular inflammation as clinically questioned.  RETROPERITONEUM/LYMPH NODES: No lymphadenopathy.  ABDOMINAL WALL: Within normal limits.  BONES: Within normal limits.    IMPRESSION:  Mild pancolitis, of infectious or inflammatory etiology.    Records received by the primary team in [place].     Colonoscopy from 2/2023   - Hemorrhoids  - Inflammation was found from the rectosigmoid to the cecum. This was graded as Salinas Score 2-3 (moderate to severe disease). Colon was segmentally biopsied.   - The distal rectum mucosa besides the mild enema trauma was spared.   - A pseudopolyp with a blood clot was found in the recto-sigmoid colon. One hemostatic clip was placed.     Pathology report:     Terminal ileum - Normal  Cecum - Crypt architectural changes, increase in intraepithelial lymphocytes, increase in lamina propria lymphoplasmocytic infiltrate, and siderophages.   AC - Low grade dysplasia with reactive epithelial changes, Crypt architectural changes, increase in intraepithelial lymphocytes, increase in lamina propria lymphoplasmocytic infiltrate, and siderophages  TC - Marked active colitis  DC - Crypt architectural changes and active inflammation.   SC - active colitis  Rectum - active colitis         Gastroenterology/Hepatology Progress Note      Interval Events:     - Stool o/p decreased to 150 cc since 12pm yesterday, but still has watery diarrhea with no blood. Patient reported persistent abd pain worsening with meals but no nausea or vomiting.   - No fevers or chills.     Allergies:  No Known Allergies      Hospital Medications:  acetaminophen     Tablet .. 650 milliGRAM(s) Oral every 6 hours PRN  aluminum hydroxide/magnesium hydroxide/simethicone Suspension 30 milliLiter(s) Oral every 6 hours PRN  carvedilol 3.125 milliGRAM(s) Oral every 12 hours  dextrose 5%. 1000 milliLiter(s) IV Continuous <Continuous>  dextrose 5%. 1000 milliLiter(s) IV Continuous <Continuous>  dextrose 50% Injectable 12.5 Gram(s) IV Push once  dextrose 50% Injectable 25 Gram(s) IV Push once  dextrose 50% Injectable 25 Gram(s) IV Push once  dextrose Oral Gel 15 Gram(s) Oral once PRN  folic acid 1 milliGRAM(s) Oral daily  glucagon  Injectable 1 milliGRAM(s) IntraMuscular once  influenza   Vaccine 0.5 milliLiter(s) IntraMuscular once  insulin lispro (ADMELOG) corrective regimen sliding scale   SubCutaneous three times a day before meals  insulin lispro (ADMELOG) corrective regimen sliding scale   SubCutaneous at bedtime  lactated ringers. 1000 milliLiter(s) IV Continuous <Continuous>  mesalamine DR Capsule 400 milliGRAM(s) Oral three times a day  methylPREDNISolone sodium succinate Injectable 20 milliGRAM(s) IV Push every 8 hours  mirtazapine 30 milliGRAM(s) Oral daily  ondansetron Injectable 4 milliGRAM(s) IV Push once  pantoprazole    Tablet 40 milliGRAM(s) Oral before breakfast  rifAXIMin 550 milliGRAM(s) Oral two times a day  sodium phosphate 30 milliMole(s)/500 mL IVPB 30 milliMole(s) IV Intermittent once  spironolactone 100 milliGRAM(s) Oral daily      ROS: 14 point ROS negative unless otherwise state in subjective    PHYSICAL EXAM:   Vital Signs:  Vital Signs Last 24 Hrs  T(C): 36.6 (04 Jan 2024 06:48), Max: 36.9 (03 Jan 2024 21:40)  T(F): 97.8 (04 Jan 2024 06:48), Max: 98.5 (03 Jan 2024 21:40)  HR: 67 (04 Jan 2024 06:48) (67 - 73)  BP: 109/67 (04 Jan 2024 06:48) (96/54 - 117/66)  BP(mean): --  RR: 17 (04 Jan 2024 06:48) (16 - 18)  SpO2: 100% (04 Jan 2024 06:48) (98% - 100%)    Parameters below as of 04 Jan 2024 06:48  Patient On (Oxygen Delivery Method): room air      Daily     Daily     GENERAL:  No acute distress, chronically ill appearing, lying in bed.   HEENT:  NCAT, no scleral icterus   CHEST:  no respiratory distress  HEART:  Regular rate and rhythm  ABDOMEN:  Soft, mild diffuse tenderness, non-distended, no palpable masses.   RECTUM: has rectal tube which has dark brown watery stool w/ no blood.   EXTREMITIES: No LE edema b/l  NEURO:  Alert and oriented x 3, no tremors, no tremors.     LABS:                        8.8    9.58  )-----------( 437      ( 04 Jan 2024 05:21 )             28.1     Mean Cell Volume: 95.9 fL (01-04-24 @ 05:21)    01-04    136  |  102  |  15  ----------------------------<  138<H>  4.5   |  23  |  0.38<L>    Ca    7.9<L>      04 Jan 2024 05:21  Phos  1.3     01-04  Mg     1.80     01-04    TPro  5.4<L>  /  Alb  2.8<L>  /  TBili  <0.2  /  DBili  x   /  AST  29  /  ALT  21  /  AlkPhos  93  01-04    LIVER FUNCTIONS - ( 04 Jan 2024 05:21 )  Alb: 2.8 g/dL / Pro: 5.4 g/dL / ALK PHOS: 93 U/L / ALT: 21 U/L / AST: 29 U/L / GGT: x           PT/INR - ( 04 Jan 2024 05:21 )   PT: 21.1 sec;   INR: 1.90 ratio         PTT - ( 04 Jan 2024 05:21 )  PTT:30.9 sec  Urinalysis Basic - ( 04 Jan 2024 05:21 )    Color: x / Appearance: x / SG: x / pH: x  Gluc: 138 mg/dL / Ketone: x  / Bili: x / Urobili: x   Blood: x / Protein: x / Nitrite: x   Leuk Esterase: x / RBC: x / WBC x   Sq Epi: x / Non Sq Epi: x / Bacteria: x            Imaging:            FINDINGS:  CHEST:  LUNGS AND LARGE AIRWAYS: Patent central airways. Dependent atelectatic   changes at the lung bases.  PLEURA: No pleural effusion.  VESSELS: Within normal limits.  HEART: Heart size is normal. No pericardial effusion.  MEDIASTINUM AND FOREST: No lymphadenopathy.  CHEST WALL AND LOWER NECK: Within normal limits.    ABDOMEN AND PELVIS:  LIVER: Within normal limits.  BILE DUCTS: Normal caliber.  GALLBLADDER: Cholecystectomy.  SPLEEN: Within normal limits.  PANCREAS: Within normal limits.  ADRENALS: Within normal limits.  KIDNEYS/URETERS: Within normal limits.    BLADDER: Within normal limits.  REPRODUCTIVE ORGANS: Posterior calcified fibroid.    BOWEL: No bowel obstruction. Appendix is unremarkable. There is   thickening, thumbprinting and hyperemia of the colon from the cecum   through rectum compatible with pancolitis.  PERITONEUM: No ascites.  VESSELS: Aorta is not dilated. Moderate atherosclerotic vascular   calcification.  No perivascular inflammation as clinically questioned.  RETROPERITONEUM/LYMPH NODES: No lymphadenopathy.  ABDOMINAL WALL: Within normal limits.  BONES: Within normal limits.    IMPRESSION:  Mild pancolitis, of infectious or inflammatory etiology.    Records received by the primary team in [place].     Colonoscopy from 2/2023   - Hemorrhoids  - Inflammation was found from the rectosigmoid to the cecum. This was graded as Salinas Score 2-3 (moderate to severe disease). Colon was segmentally biopsied.   - The distal rectum mucosa besides the mild enema trauma was spared.   - A pseudopolyp with a blood clot was found in the recto-sigmoid colon. One hemostatic clip was placed.     Pathology report:     Terminal ileum - Normal  Cecum - Crypt architectural changes, increase in intraepithelial lymphocytes, increase in lamina propria lymphoplasmocytic infiltrate, and siderophages.   AC - Low grade dysplasia with reactive epithelial changes, Crypt architectural changes, increase in intraepithelial lymphocytes, increase in lamina propria lymphoplasmocytic infiltrate, and siderophages  TC - Marked active colitis  DC - Crypt architectural changes and active inflammation.   SC - active colitis  Rectum - active colitis         Gastroenterology/Hepatology Progress Note      Interval Events:     - Stool o/p decreased to 150 cc since 12pm yesterday, but still has watery diarrhea with no blood. Patient reported persistent abd pain worsening with meals but no nausea or vomiting.   - No fevers or chills.     Allergies:  No Known Allergies      Hospital Medications:  acetaminophen     Tablet .. 650 milliGRAM(s) Oral every 6 hours PRN  aluminum hydroxide/magnesium hydroxide/simethicone Suspension 30 milliLiter(s) Oral every 6 hours PRN  carvedilol 3.125 milliGRAM(s) Oral every 12 hours  dextrose 5%. 1000 milliLiter(s) IV Continuous <Continuous>  dextrose 5%. 1000 milliLiter(s) IV Continuous <Continuous>  dextrose 50% Injectable 12.5 Gram(s) IV Push once  dextrose 50% Injectable 25 Gram(s) IV Push once  dextrose 50% Injectable 25 Gram(s) IV Push once  dextrose Oral Gel 15 Gram(s) Oral once PRN  folic acid 1 milliGRAM(s) Oral daily  glucagon  Injectable 1 milliGRAM(s) IntraMuscular once  influenza   Vaccine 0.5 milliLiter(s) IntraMuscular once  insulin lispro (ADMELOG) corrective regimen sliding scale   SubCutaneous three times a day before meals  insulin lispro (ADMELOG) corrective regimen sliding scale   SubCutaneous at bedtime  lactated ringers. 1000 milliLiter(s) IV Continuous <Continuous>  mesalamine DR Capsule 400 milliGRAM(s) Oral three times a day  methylPREDNISolone sodium succinate Injectable 20 milliGRAM(s) IV Push every 8 hours  mirtazapine 30 milliGRAM(s) Oral daily  ondansetron Injectable 4 milliGRAM(s) IV Push once  pantoprazole    Tablet 40 milliGRAM(s) Oral before breakfast  rifAXIMin 550 milliGRAM(s) Oral two times a day  sodium phosphate 30 milliMole(s)/500 mL IVPB 30 milliMole(s) IV Intermittent once  spironolactone 100 milliGRAM(s) Oral daily      ROS: 14 point ROS negative unless otherwise state in subjective    PHYSICAL EXAM:   Vital Signs:  Vital Signs Last 24 Hrs  T(C): 36.6 (04 Jan 2024 06:48), Max: 36.9 (03 Jan 2024 21:40)  T(F): 97.8 (04 Jan 2024 06:48), Max: 98.5 (03 Jan 2024 21:40)  HR: 67 (04 Jan 2024 06:48) (67 - 73)  BP: 109/67 (04 Jan 2024 06:48) (96/54 - 117/66)  BP(mean): --  RR: 17 (04 Jan 2024 06:48) (16 - 18)  SpO2: 100% (04 Jan 2024 06:48) (98% - 100%)    Parameters below as of 04 Jan 2024 06:48  Patient On (Oxygen Delivery Method): room air      Daily     Daily     GENERAL:  No acute distress, chronically ill appearing, lying in bed.   HEENT:  NCAT, no scleral icterus   CHEST:  no respiratory distress  HEART:  Regular rate and rhythm  ABDOMEN:  Soft, mild diffuse tenderness, non-distended, no palpable masses.   RECTUM: has rectal tube which has dark brown watery stool w/ no blood.   EXTREMITIES: No LE edema b/l  NEURO:  Alert and oriented x 3, no tremors, no tremors.     LABS:                        8.8    9.58  )-----------( 437      ( 04 Jan 2024 05:21 )             28.1     Mean Cell Volume: 95.9 fL (01-04-24 @ 05:21)    01-04    136  |  102  |  15  ----------------------------<  138<H>  4.5   |  23  |  0.38<L>    Ca    7.9<L>      04 Jan 2024 05:21  Phos  1.3     01-04  Mg     1.80     01-04    TPro  5.4<L>  /  Alb  2.8<L>  /  TBili  <0.2  /  DBili  x   /  AST  29  /  ALT  21  /  AlkPhos  93  01-04    LIVER FUNCTIONS - ( 04 Jan 2024 05:21 )  Alb: 2.8 g/dL / Pro: 5.4 g/dL / ALK PHOS: 93 U/L / ALT: 21 U/L / AST: 29 U/L / GGT: x           PT/INR - ( 04 Jan 2024 05:21 )   PT: 21.1 sec;   INR: 1.90 ratio         PTT - ( 04 Jan 2024 05:21 )  PTT:30.9 sec  Urinalysis Basic - ( 04 Jan 2024 05:21 )    Color: x / Appearance: x / SG: x / pH: x  Gluc: 138 mg/dL / Ketone: x  / Bili: x / Urobili: x   Blood: x / Protein: x / Nitrite: x   Leuk Esterase: x / RBC: x / WBC x   Sq Epi: x / Non Sq Epi: x / Bacteria: x      CTAP  FINDINGS:  CHEST:  LUNGS AND LARGE AIRWAYS: Patent central airways. Dependent atelectatic   changes at the lung bases.  PLEURA: No pleural effusion.  VESSELS: Within normal limits.  HEART: Heart size is normal. No pericardial effusion.  MEDIASTINUM AND FOREST: No lymphadenopathy.  CHEST WALL AND LOWER NECK: Within normal limits.    ABDOMEN AND PELVIS:  LIVER: Within normal limits.  BILE DUCTS: Normal caliber.  GALLBLADDER: Cholecystectomy.  SPLEEN: Within normal limits.  PANCREAS: Within normal limits.  ADRENALS: Within normal limits.  KIDNEYS/URETERS: Within normal limits.    BLADDER: Within normal limits.  REPRODUCTIVE ORGANS: Posterior calcified fibroid.    BOWEL: No bowel obstruction. Appendix is unremarkable. There is   thickening, thumbprinting and hyperemia of the colon from the cecum   through rectum compatible with pancolitis.  PERITONEUM: No ascites.  VESSELS: Aorta is not dilated. Moderate atherosclerotic vascular   calcification.  No perivascular inflammation as clinically questioned.  RETROPERITONEUM/LYMPH NODES: No lymphadenopathy.  ABDOMINAL WALL: Within normal limits.  BONES: Within normal limits.    IMPRESSION:  Mild pancolitis, of infectious or inflammatory etiology.    Records received by the primary team in [place].     Colonoscopy from 2/2023   - Hemorrhoids  - Inflammation was found from the rectosigmoid to the cecum. This was graded as Salinas Score 2-3 (moderate to severe disease). Colon was segmentally biopsied.   - The distal rectum mucosa besides the mild enema trauma was spared.   - A pseudopolyp with a blood clot was found in the recto-sigmoid colon. One hemostatic clip was placed.     Pathology report:     Terminal ileum - Normal  Cecum - Crypt architectural changes, increase in intraepithelial lymphocytes, increase in lamina propria lymphoplasmocytic infiltrate, and siderophages.   AC - Low grade dysplasia with reactive epithelial changes, Crypt architectural changes, increase in intraepithelial lymphocytes, increase in lamina propria lymphoplasmocytic infiltrate, and siderophages  TC - Marked active colitis  DC - Crypt architectural changes and active inflammation.   SC - active colitis  Rectum - active colitis         Gastroenterology/Hepatology Progress Note      Interval Events:     - Stool o/p decreased to 150 cc since 12pm yesterday, but still has watery diarrhea with no blood. Patient reported persistent abd pain worsening with meals but no nausea or vomiting.   - No fevers or chills.     Allergies:  No Known Allergies      Hospital Medications:  acetaminophen     Tablet .. 650 milliGRAM(s) Oral every 6 hours PRN  aluminum hydroxide/magnesium hydroxide/simethicone Suspension 30 milliLiter(s) Oral every 6 hours PRN  carvedilol 3.125 milliGRAM(s) Oral every 12 hours  dextrose 5%. 1000 milliLiter(s) IV Continuous <Continuous>  dextrose 5%. 1000 milliLiter(s) IV Continuous <Continuous>  dextrose 50% Injectable 12.5 Gram(s) IV Push once  dextrose 50% Injectable 25 Gram(s) IV Push once  dextrose 50% Injectable 25 Gram(s) IV Push once  dextrose Oral Gel 15 Gram(s) Oral once PRN  folic acid 1 milliGRAM(s) Oral daily  glucagon  Injectable 1 milliGRAM(s) IntraMuscular once  influenza   Vaccine 0.5 milliLiter(s) IntraMuscular once  insulin lispro (ADMELOG) corrective regimen sliding scale   SubCutaneous three times a day before meals  insulin lispro (ADMELOG) corrective regimen sliding scale   SubCutaneous at bedtime  lactated ringers. 1000 milliLiter(s) IV Continuous <Continuous>  mesalamine DR Capsule 400 milliGRAM(s) Oral three times a day  methylPREDNISolone sodium succinate Injectable 20 milliGRAM(s) IV Push every 8 hours  mirtazapine 30 milliGRAM(s) Oral daily  ondansetron Injectable 4 milliGRAM(s) IV Push once  pantoprazole    Tablet 40 milliGRAM(s) Oral before breakfast  rifAXIMin 550 milliGRAM(s) Oral two times a day  sodium phosphate 30 milliMole(s)/500 mL IVPB 30 milliMole(s) IV Intermittent once  spironolactone 100 milliGRAM(s) Oral daily      ROS: 14 point ROS negative unless otherwise state in subjective    PHYSICAL EXAM:   Vital Signs:  Vital Signs Last 24 Hrs  T(C): 36.6 (04 Jan 2024 06:48), Max: 36.9 (03 Jan 2024 21:40)  T(F): 97.8 (04 Jan 2024 06:48), Max: 98.5 (03 Jan 2024 21:40)  HR: 67 (04 Jan 2024 06:48) (67 - 73)  BP: 109/67 (04 Jan 2024 06:48) (96/54 - 117/66)  BP(mean): --  RR: 17 (04 Jan 2024 06:48) (16 - 18)  SpO2: 100% (04 Jan 2024 06:48) (98% - 100%)    Parameters below as of 04 Jan 2024 06:48  Patient On (Oxygen Delivery Method): room air      Daily     Daily     GENERAL:  No acute distress, chronically ill appearing, lying in bed.   HEENT:  NCAT, no scleral icterus   CHEST:  no respiratory distress  HEART:  Regular rate and rhythm  ABDOMEN:  Soft, mild diffuse tenderness, non-distended, no palpable masses.   RECTUM: has rectal tube which has dark brown watery stool w/ no blood.   EXTREMITIES: No LE edema b/l  NEURO:  Alert and oriented x 3, no tremors, no tremors.     LABS:                        8.8    9.58  )-----------( 437      ( 04 Jan 2024 05:21 )             28.1     Mean Cell Volume: 95.9 fL (01-04-24 @ 05:21)    01-04    136  |  102  |  15  ----------------------------<  138<H>  4.5   |  23  |  0.38<L>    Ca    7.9<L>      04 Jan 2024 05:21  Phos  1.3     01-04  Mg     1.80     01-04    TPro  5.4<L>  /  Alb  2.8<L>  /  TBili  <0.2  /  DBili  x   /  AST  29  /  ALT  21  /  AlkPhos  93  01-04    LIVER FUNCTIONS - ( 04 Jan 2024 05:21 )  Alb: 2.8 g/dL / Pro: 5.4 g/dL / ALK PHOS: 93 U/L / ALT: 21 U/L / AST: 29 U/L / GGT: x           PT/INR - ( 04 Jan 2024 05:21 )   PT: 21.1 sec;   INR: 1.90 ratio         PTT - ( 04 Jan 2024 05:21 )  PTT:30.9 sec  Urinalysis Basic - ( 04 Jan 2024 05:21 )    Color: x / Appearance: x / SG: x / pH: x  Gluc: 138 mg/dL / Ketone: x  / Bili: x / Urobili: x   Blood: x / Protein: x / Nitrite: x   Leuk Esterase: x / RBC: x / WBC x   Sq Epi: x / Non Sq Epi: x / Bacteria: x      CTAP  FINDINGS:  CHEST:  LUNGS AND LARGE AIRWAYS: Patent central airways. Dependent atelectatic   changes at the lung bases.  PLEURA: No pleural effusion.  VESSELS: Within normal limits.  HEART: Heart size is normal. No pericardial effusion.  MEDIASTINUM AND FOREST: No lymphadenopathy.  CHEST WALL AND LOWER NECK: Within normal limits.    ABDOMEN AND PELVIS:  LIVER: Within normal limits.  BILE DUCTS: Normal caliber.  GALLBLADDER: Cholecystectomy.  SPLEEN: Within normal limits.  PANCREAS: Within normal limits.  ADRENALS: Within normal limits.  KIDNEYS/URETERS: Within normal limits.    BLADDER: Within normal limits.  REPRODUCTIVE ORGANS: Posterior calcified fibroid.    BOWEL: No bowel obstruction. Appendix is unremarkable. There is   thickening, thumbprinting and hyperemia of the colon from the cecum   through rectum compatible with pancolitis.  PERITONEUM: No ascites.  VESSELS: Aorta is not dilated. Moderate atherosclerotic vascular   calcification.  No perivascular inflammation as clinically questioned.  RETROPERITONEUM/LYMPH NODES: No lymphadenopathy.  ABDOMINAL WALL: Within normal limits.  BONES: Within normal limits.    IMPRESSION:  Mild pancolitis, of infectious or inflammatory etiology.    Records received by the primary team in [place].     Colonoscopy from 2/2023   - Hemorrhoids  - Inflammation was found from the rectosigmoid to the cecum. This was graded as Salinas Score 2-3 (moderate to severe disease). Colon was segmentally biopsied.   - The distal rectum mucosa besides the mild enema trauma was spared.   - A pseudopolyp with a blood clot was found in the recto-sigmoid colon. One hemostatic clip was placed.     Pathology report:     Terminal ileum - Normal  Cecum - Crypt architectural changes, increase in intraepithelial lymphocytes, increase in lamina propria lymphoplasmocytic infiltrate, and siderophages.   AC - Low grade dysplasia with reactive epithelial changes, Crypt architectural changes, increase in intraepithelial lymphocytes, increase in lamina propria lymphoplasmocytic infiltrate, and siderophages  TC - Marked active colitis  DC - Crypt architectural changes and active inflammation.   SC - active colitis  Rectum - active colitis

## 2024-01-04 NOTE — PROGRESS NOTE ADULT - REASON FOR ADMISSION
transfer from [place] for tertiary level care in the setting of diffuse body rash transfer from [place] for tertiary level care in the setting of diffuse body rash colitis, diarrhea

## 2024-01-04 NOTE — PROGRESS NOTE ADULT - PROBLEM SELECTOR PLAN 3
Patient with anemia with iron studies more consistent with anemia of chronic disease   -B12 and folate WNL   -patient had drop of Hgb to 6.9 at [place] prior to transfer, transfused with 1pRBC with response to 9.5  -continue to monitor   -transfuse for Hgb >7 Patient with anemia with iron studies more consistent with anemia of chronic disease   -B12 and folate WNL   -patient had drop of Hgb to 6.9 at [place] prior to transfer, transfused with 1pRBC with response to 9.5  -continue to monitor   -transfuse for Hgb >7

## 2024-01-04 NOTE — PROGRESS NOTE ADULT - ASSESSMENT
58 y F w PMHx of CAD, diabetes, insomnia, MDD, GERD, hypertension, irritable bowel syndrome with diarrhea, ulcerative colitis, cirrhosis, NAFLD, hepatic encephalopathy, bipolar disorder, chronic Afib on Coumadin, blepharitis of the left eye who initially presented to [place] due to reported hypotension, tachycardia and diffuse purpura. Patient transferred to [facility] for further workup and consultations for her body rash.  58 y F w PMHx of CAD, diabetes, insomnia, MDD, GERD, hypertension, irritable bowel syndrome with diarrhea, ulcerative colitis, cirrhosis, NAFLD, hepatic encephalopathy, bipolar disorder, chronic Afib on Coumadin, blepharitis of the left eye who initially presented to [place] due to reported hypotension, tachycardia and diffuse purpura. Patient transferred to [facility] for further workup and consultations for her body rash.

## 2024-01-04 NOTE — PROGRESS NOTE ADULT - REASON FOR ADMISSION
transfer from [place] for tertiary level care in the setting of diffuse body rash transfer from [place] for tertiary level care in the setting of diffuse body rash

## 2024-01-04 NOTE — PROGRESS NOTE ADULT - ASSESSMENT
Impression:   58 yrs old male w/ hx of CAD (not on anti-platelets), insomnia, DM, GERD, MDD, HTN, Ulcerative colitis, Bipolar disorder, cirrhosis (unknown etiology, NAFLD?) c/w HE, Afib (on Coumadin), and Blepharitis who initially presented to [facility] for hypotension, diffuse body rashes and pancolitis.     #"mild" Pancolitis  #Ulcerative colitis?   - Reported bloody stools w/ fecal incontinence worsening for the past one year. Underwent two colonoscopies on 11/2022 and 3/2023 but no records present. CT A/P showed thickening, hyperemia and mild inflammation throughout the colon. Does not follow w/ GI as o/p. No prior biologic use. Previously responded to steroids.   - GI PCR and c diff testing neg from 12/25.   - On oral mesalamine 400 mg TID. IV steroids on 12/31.   - Concerned for UC flare, infectious work up neg.   - declined flex sig/colonoscopy during this admission but reports from [place] present, please see above. Colonoscopy from 2/2023 showed severe pancolitis w/ positive path.   - hepatitis B serologies negative. Quant TB testing neg.   - fecal calprotectin 2050   - CRP downtrending     #Cirrhosis?   - Unclear if the patient has cirrhosis b/c the CT imaging showed normal liver w/ no splenomegaly. Her INR is elevated in the setting of Coumadin use. She has thrombocytopenia in the setting of possible infection.   - Less concerned for cirrhosis based on the clinical picture, not been seen by hepatologist.   - On rifaximin and Aldactone per home meds.     Recommendations:   - Discussed with the patient this morning regarding colonoscopy/flex sig to assess for severity of the disease. Patient is concerned about the prep on whether she can tolerate, would like to discuss with her sister before deciding. If she decides to proceed with the procedure, will schedule her for Monday.   - Continue with IV steroids.   - PPI 40mg daily   - Continue with mesalamine 400 mg TID for now  - please document stool volume daily   - DVT PPx   - CBC and CRP daily.     Discussed the case with Dr. Gonzalez.     GI will continue to follow.     All recommendations are tentative until note is attested by an attending.     Moisés Colin, PGY-5  Gastroenterology/Hepatology Fellow  Available on Microsoft Teams  [phone] (Short Range Pager)  [phone] (Long Range Pager)    After 5pm, please contact the on-call GI fellow. [phone] Impression:   58 yrs old male w/ hx of CAD (not on anti-platelets), insomnia, DM, GERD, MDD, HTN, Ulcerative colitis, Bipolar disorder, cirrhosis (unknown etiology, NAFLD?) c/w HE, Afib (on Coumadin), and Blepharitis who initially presented to [facility] for hypotension, diffuse body rashes and pancolitis.     #"mild" Pancolitis  #Ulcerative colitis?   - Reported bloody stools w/ fecal incontinence worsening for the past one year. Underwent two colonoscopies on 11/2022 and 3/2023 but no records present. CT A/P showed thickening, hyperemia and mild inflammation throughout the colon. Does not follow w/ GI as o/p. No prior biologic use. Previously responded to steroids.   - GI PCR and c diff testing neg from 12/25.   - On oral mesalamine 400 mg TID. IV steroids on 12/31.   - Concerned for UC flare, infectious work up neg.   - declined flex sig/colonoscopy during this admission but reports from [place] present, please see above. Colonoscopy from 2/2023 showed severe pancolitis w/ positive path.   - hepatitis B serologies negative. Quant TB testing neg.   - fecal calprotectin 2050   - CRP downtrending     #Cirrhosis?   - Unclear if the patient has cirrhosis b/c the CT imaging showed normal liver w/ no splenomegaly. Her INR is elevated in the setting of Coumadin use. She has thrombocytopenia in the setting of possible infection.   - Less concerned for cirrhosis based on the clinical picture, not been seen by hepatologist.   - On rifaximin and Aldactone per home meds.     Recommendations:   - Discussed with the patient this morning regarding colonoscopy/flex sig to assess for severity of the disease. Patient is concerned about the prep on whether she can tolerate, would like to discuss with her sister before deciding. If she decides to proceed with the procedure, will schedule her for Monday.   - Continue with IV steroids.   - PPI 40mg daily   - Continue with mesalamine 400 mg TID for now  - please document stool volume daily   - DVT PPx   - CBC and CRP daily.     Discussed the case with Dr. Gonzalez.     GI will continue to follow.     All recommendations are tentative until note is attested by an attending.     Moisés Colin, PGY-5  Gastroenterology/Hepatology Fellow  Available on Microsoft Teams  [phone] (Short Range Pager)  [phone] (Long Range Pager)    After 5pm, please contact the on-call GI fellow. [phone] normal...

## 2024-01-05 LAB
ALBUMIN SERPL ELPH-MCNC: 3 G/DL — LOW (ref 3.3–5)
ALBUMIN SERPL ELPH-MCNC: 3 G/DL — LOW (ref 3.3–5)
ALP SERPL-CCNC: 95 U/L — SIGNIFICANT CHANGE UP (ref 40–120)
ALP SERPL-CCNC: 95 U/L — SIGNIFICANT CHANGE UP (ref 40–120)
ALT FLD-CCNC: 24 U/L — SIGNIFICANT CHANGE UP (ref 4–33)
ALT FLD-CCNC: 24 U/L — SIGNIFICANT CHANGE UP (ref 4–33)
ANION GAP SERPL CALC-SCNC: 9 MMOL/L — SIGNIFICANT CHANGE UP (ref 7–14)
ANION GAP SERPL CALC-SCNC: 9 MMOL/L — SIGNIFICANT CHANGE UP (ref 7–14)
AST SERPL-CCNC: 24 U/L — SIGNIFICANT CHANGE UP (ref 4–32)
AST SERPL-CCNC: 24 U/L — SIGNIFICANT CHANGE UP (ref 4–32)
BASOPHILS # BLD AUTO: 0.02 K/UL — SIGNIFICANT CHANGE UP (ref 0–0.2)
BASOPHILS # BLD AUTO: 0.02 K/UL — SIGNIFICANT CHANGE UP (ref 0–0.2)
BASOPHILS NFR BLD AUTO: 0.2 % — SIGNIFICANT CHANGE UP (ref 0–2)
BASOPHILS NFR BLD AUTO: 0.2 % — SIGNIFICANT CHANGE UP (ref 0–2)
BILIRUB SERPL-MCNC: <0.2 MG/DL — SIGNIFICANT CHANGE UP (ref 0.2–1.2)
BILIRUB SERPL-MCNC: <0.2 MG/DL — SIGNIFICANT CHANGE UP (ref 0.2–1.2)
BUN SERPL-MCNC: 18 MG/DL — SIGNIFICANT CHANGE UP (ref 7–23)
BUN SERPL-MCNC: 18 MG/DL — SIGNIFICANT CHANGE UP (ref 7–23)
CALCIUM SERPL-MCNC: 8.2 MG/DL — LOW (ref 8.4–10.5)
CALCIUM SERPL-MCNC: 8.2 MG/DL — LOW (ref 8.4–10.5)
CHLORIDE SERPL-SCNC: 100 MMOL/L — SIGNIFICANT CHANGE UP (ref 98–107)
CHLORIDE SERPL-SCNC: 100 MMOL/L — SIGNIFICANT CHANGE UP (ref 98–107)
CO2 SERPL-SCNC: 26 MMOL/L — SIGNIFICANT CHANGE UP (ref 22–31)
CO2 SERPL-SCNC: 26 MMOL/L — SIGNIFICANT CHANGE UP (ref 22–31)
CREAT SERPL-MCNC: 0.43 MG/DL — LOW (ref 0.5–1.3)
CREAT SERPL-MCNC: 0.43 MG/DL — LOW (ref 0.5–1.3)
CRP SERPL-MCNC: 5.9 MG/L — HIGH
CRP SERPL-MCNC: 5.9 MG/L — HIGH
EGFR: 114 ML/MIN/1.73M2 — SIGNIFICANT CHANGE UP
EGFR: 114 ML/MIN/1.73M2 — SIGNIFICANT CHANGE UP
EOSINOPHIL # BLD AUTO: 0 K/UL — SIGNIFICANT CHANGE UP (ref 0–0.5)
EOSINOPHIL # BLD AUTO: 0 K/UL — SIGNIFICANT CHANGE UP (ref 0–0.5)
EOSINOPHIL NFR BLD AUTO: 0 % — SIGNIFICANT CHANGE UP (ref 0–6)
EOSINOPHIL NFR BLD AUTO: 0 % — SIGNIFICANT CHANGE UP (ref 0–6)
ERYTHROCYTE [SEDIMENTATION RATE] IN BLOOD: 14 MM/HR — SIGNIFICANT CHANGE UP (ref 4–25)
ERYTHROCYTE [SEDIMENTATION RATE] IN BLOOD: 14 MM/HR — SIGNIFICANT CHANGE UP (ref 4–25)
GLUCOSE BLDC GLUCOMTR-MCNC: 111 MG/DL — HIGH (ref 70–99)
GLUCOSE BLDC GLUCOMTR-MCNC: 111 MG/DL — HIGH (ref 70–99)
GLUCOSE BLDC GLUCOMTR-MCNC: 141 MG/DL — HIGH (ref 70–99)
GLUCOSE BLDC GLUCOMTR-MCNC: 141 MG/DL — HIGH (ref 70–99)
GLUCOSE BLDC GLUCOMTR-MCNC: 161 MG/DL — HIGH (ref 70–99)
GLUCOSE BLDC GLUCOMTR-MCNC: 161 MG/DL — HIGH (ref 70–99)
GLUCOSE BLDC GLUCOMTR-MCNC: 195 MG/DL — HIGH (ref 70–99)
GLUCOSE BLDC GLUCOMTR-MCNC: 195 MG/DL — HIGH (ref 70–99)
GLUCOSE SERPL-MCNC: 113 MG/DL — HIGH (ref 70–99)
GLUCOSE SERPL-MCNC: 113 MG/DL — HIGH (ref 70–99)
HCT VFR BLD CALC: 27.1 % — LOW (ref 34.5–45)
HCT VFR BLD CALC: 27.1 % — LOW (ref 34.5–45)
HGB BLD-MCNC: 9 G/DL — LOW (ref 11.5–15.5)
HGB BLD-MCNC: 9 G/DL — LOW (ref 11.5–15.5)
IANC: 8.63 K/UL — HIGH (ref 1.8–7.4)
IANC: 8.63 K/UL — HIGH (ref 1.8–7.4)
IMM GRANULOCYTES NFR BLD AUTO: 2.6 % — HIGH (ref 0–0.9)
IMM GRANULOCYTES NFR BLD AUTO: 2.6 % — HIGH (ref 0–0.9)
INR BLD: 2.41 RATIO — HIGH (ref 0.85–1.18)
INR BLD: 2.41 RATIO — HIGH (ref 0.85–1.18)
LYMPHOCYTES # BLD AUTO: 1.37 K/UL — SIGNIFICANT CHANGE UP (ref 1–3.3)
LYMPHOCYTES # BLD AUTO: 1.37 K/UL — SIGNIFICANT CHANGE UP (ref 1–3.3)
LYMPHOCYTES # BLD AUTO: 12.8 % — LOW (ref 13–44)
LYMPHOCYTES # BLD AUTO: 12.8 % — LOW (ref 13–44)
MAGNESIUM SERPL-MCNC: 1.8 MG/DL — SIGNIFICANT CHANGE UP (ref 1.6–2.6)
MAGNESIUM SERPL-MCNC: 1.8 MG/DL — SIGNIFICANT CHANGE UP (ref 1.6–2.6)
MCHC RBC-ENTMCNC: 30.8 PG — SIGNIFICANT CHANGE UP (ref 27–34)
MCHC RBC-ENTMCNC: 30.8 PG — SIGNIFICANT CHANGE UP (ref 27–34)
MCHC RBC-ENTMCNC: 33.2 GM/DL — SIGNIFICANT CHANGE UP (ref 32–36)
MCHC RBC-ENTMCNC: 33.2 GM/DL — SIGNIFICANT CHANGE UP (ref 32–36)
MCV RBC AUTO: 92.8 FL — SIGNIFICANT CHANGE UP (ref 80–100)
MCV RBC AUTO: 92.8 FL — SIGNIFICANT CHANGE UP (ref 80–100)
MONOCYTES # BLD AUTO: 0.41 K/UL — SIGNIFICANT CHANGE UP (ref 0–0.9)
MONOCYTES # BLD AUTO: 0.41 K/UL — SIGNIFICANT CHANGE UP (ref 0–0.9)
MONOCYTES NFR BLD AUTO: 3.8 % — SIGNIFICANT CHANGE UP (ref 2–14)
MONOCYTES NFR BLD AUTO: 3.8 % — SIGNIFICANT CHANGE UP (ref 2–14)
NEUTROPHILS # BLD AUTO: 8.63 K/UL — HIGH (ref 1.8–7.4)
NEUTROPHILS # BLD AUTO: 8.63 K/UL — HIGH (ref 1.8–7.4)
NEUTROPHILS NFR BLD AUTO: 80.6 % — HIGH (ref 43–77)
NEUTROPHILS NFR BLD AUTO: 80.6 % — HIGH (ref 43–77)
NRBC # BLD: 0 /100 WBCS — SIGNIFICANT CHANGE UP (ref 0–0)
NRBC # BLD: 0 /100 WBCS — SIGNIFICANT CHANGE UP (ref 0–0)
NRBC # FLD: 0 K/UL — SIGNIFICANT CHANGE UP (ref 0–0)
NRBC # FLD: 0 K/UL — SIGNIFICANT CHANGE UP (ref 0–0)
PHOSPHATE SERPL-MCNC: 1.5 MG/DL — LOW (ref 2.5–4.5)
PHOSPHATE SERPL-MCNC: 1.5 MG/DL — LOW (ref 2.5–4.5)
PLATELET # BLD AUTO: 467 K/UL — HIGH (ref 150–400)
PLATELET # BLD AUTO: 467 K/UL — HIGH (ref 150–400)
POTASSIUM SERPL-MCNC: 4.8 MMOL/L — SIGNIFICANT CHANGE UP (ref 3.5–5.3)
POTASSIUM SERPL-MCNC: 4.8 MMOL/L — SIGNIFICANT CHANGE UP (ref 3.5–5.3)
POTASSIUM SERPL-SCNC: 4.8 MMOL/L — SIGNIFICANT CHANGE UP (ref 3.5–5.3)
POTASSIUM SERPL-SCNC: 4.8 MMOL/L — SIGNIFICANT CHANGE UP (ref 3.5–5.3)
PROT SERPL-MCNC: 5.5 G/DL — LOW (ref 6–8.3)
PROT SERPL-MCNC: 5.5 G/DL — LOW (ref 6–8.3)
PROTHROM AB SERPL-ACNC: 26.5 SEC — HIGH (ref 9.5–13)
PROTHROM AB SERPL-ACNC: 26.5 SEC — HIGH (ref 9.5–13)
RBC # BLD: 2.92 M/UL — LOW (ref 3.8–5.2)
RBC # BLD: 2.92 M/UL — LOW (ref 3.8–5.2)
RBC # FLD: 14.4 % — SIGNIFICANT CHANGE UP (ref 10.3–14.5)
RBC # FLD: 14.4 % — SIGNIFICANT CHANGE UP (ref 10.3–14.5)
SODIUM SERPL-SCNC: 135 MMOL/L — SIGNIFICANT CHANGE UP (ref 135–145)
SODIUM SERPL-SCNC: 135 MMOL/L — SIGNIFICANT CHANGE UP (ref 135–145)
WBC # BLD: 10.71 K/UL — HIGH (ref 3.8–10.5)
WBC # BLD: 10.71 K/UL — HIGH (ref 3.8–10.5)
WBC # FLD AUTO: 10.71 K/UL — HIGH (ref 3.8–10.5)
WBC # FLD AUTO: 10.71 K/UL — HIGH (ref 3.8–10.5)

## 2024-01-05 PROCEDURE — 99233 SBSQ HOSP IP/OBS HIGH 50: CPT | Mod: GC

## 2024-01-05 PROCEDURE — 99232 SBSQ HOSP IP/OBS MODERATE 35: CPT | Mod: GC

## 2024-01-05 RX ORDER — WARFARIN SODIUM 2.5 MG/1
3 TABLET ORAL ONCE
Refills: 0 | Status: COMPLETED | OUTPATIENT
Start: 2024-01-05 | End: 2024-01-05

## 2024-01-05 RX ORDER — WARFARIN SODIUM 2.5 MG/1
6 TABLET ORAL ONCE
Refills: 0 | Status: DISCONTINUED | OUTPATIENT
Start: 2024-01-05 | End: 2024-01-05

## 2024-01-05 RX ADMIN — Medication 20 MILLIGRAM(S): at 21:58

## 2024-01-05 RX ADMIN — CARVEDILOL PHOSPHATE 3.12 MILLIGRAM(S): 80 CAPSULE, EXTENDED RELEASE ORAL at 05:32

## 2024-01-05 RX ADMIN — PANTOPRAZOLE SODIUM 40 MILLIGRAM(S): 20 TABLET, DELAYED RELEASE ORAL at 05:33

## 2024-01-05 RX ADMIN — Medication 85 MILLIMOLE(S): at 09:18

## 2024-01-05 RX ADMIN — MIRTAZAPINE 30 MILLIGRAM(S): 45 TABLET, ORALLY DISINTEGRATING ORAL at 12:39

## 2024-01-05 RX ADMIN — WARFARIN SODIUM 3 MILLIGRAM(S): 2.5 TABLET ORAL at 22:41

## 2024-01-05 RX ADMIN — Medication 400 MILLIGRAM(S): at 21:58

## 2024-01-05 RX ADMIN — Medication 1: at 18:00

## 2024-01-05 RX ADMIN — SPIRONOLACTONE 100 MILLIGRAM(S): 25 TABLET, FILM COATED ORAL at 05:33

## 2024-01-05 RX ADMIN — Medication 20 MILLIGRAM(S): at 12:39

## 2024-01-05 RX ADMIN — Medication 400 MILLIGRAM(S): at 05:32

## 2024-01-05 RX ADMIN — Medication 400 MILLIGRAM(S): at 12:39

## 2024-01-05 RX ADMIN — Medication 1 MILLIGRAM(S): at 12:40

## 2024-01-05 RX ADMIN — Medication 20 MILLIGRAM(S): at 05:33

## 2024-01-05 RX ADMIN — CARVEDILOL PHOSPHATE 3.12 MILLIGRAM(S): 80 CAPSULE, EXTENDED RELEASE ORAL at 17:09

## 2024-01-05 NOTE — PROGRESS NOTE ADULT - ASSESSMENT
Impression:   58 yrs old male w/ hx of CAD (not on anti-platelets), insomnia, DM, GERD, MDD, HTN, Ulcerative colitis, Bipolar disorder, cirrhosis (unknown etiology, NAFLD?) c/w HE, Afib (on Coumadin), and Blepharitis who initially presented to [facility] for hypotension, diffuse body rashes and pancolitis.     #"mild" Pancolitis  #Ulcerative colitis?   - Reported bloody stools w/ fecal incontinence worsening for the past one year. Underwent two colonoscopies on 11/2022 and 3/2023 but no records present. CT A/P showed thickening, hyperemia and mild inflammation throughout the colon. Does not follow w/ GI as o/p. No prior biologic use. Previously responded to steroids.   - GI PCR and c diff testing neg from 12/25.   - On oral mesalamine 400 mg TID. IV steroids on 12/31.   - Concerned for UC flare, infectious work up neg.   - declined flex sig/colonoscopy during this admission but reports from [place] present, please see above. Colonoscopy from 2/2023 showed severe pancolitis w/ positive path.   - hepatitis B serologies negative. Quant TB testing neg.   - fecal calprotectin 2050   - CRP downtrending   - Further history obtained from her Tasneem, [phone], - patient was previously on Remicade but unclear if she responded to it but did not receive infusions when she went to the facility.     #Cirrhosis?   - Unclear if the patient has cirrhosis b/c the CT imaging showed normal liver w/ no splenomegaly. Her INR is elevated in the setting of Coumadin use. She has thrombocytopenia in the setting of possible infection.   - Less concerned for cirrhosis based on the clinical picture, not been seen by hepatologist.   - On rifaximin and Aldactone per home meds.     Recommendations:   - Would recommend repeat colonoscopy/flex sig to assess for severity of the disease. Possible colonoscopy early next week if the patient is amenable to it.   - Continue with IV steroids for now.   - PPI 40mg daily   - Continue with mesalamine 400 mg TID for now  - please document stool volume daily   - DVT PPx   - CBC and CRP daily.     Discussed the case with Dr. Gonzalez.     GI will continue to follow.     All recommendations are tentative until note is attested by an attending.     Moisés Colin, PGY-5  Gastroenterology/Hepatology Fellow  Available on Microsoft Teams  [phone] (Short Range Pager)  [phone] (Long Range Pager)    After 5pm, please contact the on-call GI fellow. [phone] Impression:   58 yrs old male w/ hx of CAD (not on anti-platelets), insomnia, DM, GERD, MDD, HTN, Ulcerative colitis, Bipolar disorder, cirrhosis (unknown etiology, NAFLD?) c/w HE, Afib (on Coumadin), and Blepharitis who initially presented to [facility] for hypotension, diffuse body rashes and pancolitis.     #"mild" Pancolitis  #Ulcerative colitis?   - Reported bloody stools w/ fecal incontinence worsening for the past one year. Underwent two colonoscopies on 11/2022 and 3/2023 but no records present. CT A/P showed thickening, hyperemia and mild inflammation throughout the colon. Does not follow w/ GI as o/p. No prior biologic use. Previously responded to steroids.   - GI PCR and c diff testing neg from 12/25.   - On oral mesalamine 400 mg TID. IV steroids on 12/31.   - Concerned for UC flare, infectious work up neg.   - declined flex sig/colonoscopy during this admission but reports from [place] present, please see above. Colonoscopy from 2/2023 showed severe pancolitis w/ positive path.   - hepatitis B serologies negative. Quant TB testing neg.   - fecal calprotectin 2050   - CRP downtrending   - Further history obtained from her Tasneem, [phone], - patient was previously on Remicade but unclear if she responded to it but did not receive infusions when she went to the facility.     #Cirrhosis?   - Unclear if the patient has cirrhosis b/c the CT imaging showed normal liver w/ no splenomegaly. Her INR is elevated in the setting of Coumadin use. She has thrombocytopenia in the setting of possible infection.   - Less concerned for cirrhosis based on the clinical picture, not been seen by hepatologist.   - On rifaximin and Aldactone per home meds.     Recommendations:   - Would recommend repeat colonoscopy/flex sig to assess for severity of the disease. Possible colonoscopy early next week if the patient is amenable to it.   - Continue with IV steroids for now.   - PPI 40mg daily   - Continue with mesalamine 400 mg TID for now  - please document stool volume daily   - DVT PPx   - CBC and CRP daily.     Discussed the case with Dr. Gonzalez.     GI will continue to follow.     All recommendations are tentative until note is attested by an attending.     Moisés Colin, PGY-5  Gastroenterology/Hepatology Fellow  Available on Microsoft Teams  [phone] (Short Range Pager)  [phone] (Long Range Pager)    After 5pm, please contact the on-call GI fellow. [phone]

## 2024-01-05 NOTE — PROGRESS NOTE ADULT - ATTENDING COMMENTS
58W w/ type 2 diabetes, HTN, MDD vs bipolar disorder, UC (dx on colonoscopy at [place], previously treated with steroids), NAFLD c/b ?decompensated cirrhosis, AF (warfarin), admitted to [facility] with periorbital swelling, rash, and pancolitis, transferred to [facility] for derm/rheum/ophtho eval, overall felt to be have resolving bruising, subconjunctival hemorrhage, and UC flare currently receiving IV steroids and planning for possible repeat colonoscopy and infliximab initiation.    Continues to have abdominal pain, diarrhea with rectal tube in place. CRP downtrending  [place] colonoscopy records in chart    - Appreciate GI recommendations - patient considering repeat colonoscopy, considering infliximab continue IV steroids    Time-based billing (NON-critical care).     35 minutes spent on total encounter; more than 50% of the visit was spent counseling and / or coordinating care by the attending physician.  The necessity of the time spent during the encounter on this date of service was due to:     documentation in [place], reviewing chart and coordinating care with patient/resident and interdisciplinary staff (such as , social workers, etc) as well as reviewing vitals, laboratory data, radiology, medication list, consultants' recommendations and prior records. Interventions were performed as documented above. 58W w/ type 2 diabetes, HTN, MDD vs bipolar disorder, UC (dx on colonoscopy at [place], previously treated with steroids), NAFLD c/b ?decompensated cirrhosis, AF (warfarin), admitted to [facility] with periorbital swelling, rash, and pancolitis, transferred to [facility] for derm/rheum/ophtho eval, overall felt to be have resolving bruising, subconjunctival hemorrhage, and UC flare currently receiving IV steroids and planning for possible repeat colonoscopy and infliximab initiation.    Continues to have abdominal pain, diarrhea with rectal tube in place. CRP downtrending  [place] colonoscopy records in chart    - Appreciate GI recommendations - patient considering repeat colonoscopy, considering infliximab continue IV steroids    Time-based billing (NON-critical care).     35 minutes spent on total encounter; more than 50% of the visit was spent counseling and / or coordinating care by the attending physician.  The necessity of the time spent during the encounter on this date of service was due to:     documentation in [place], reviewing chart and coordinating care with patient/resident and interdisciplinary staff (such as , social workers, etc) as well as reviewing vitals, laboratory data, radiology, medication list, consultants' recommendations and prior records. Interventions were performed as documented above.

## 2024-01-05 NOTE — PROGRESS NOTE ADULT - ATTENDING COMMENTS
No new GI complaints. Patient still contemplating possible endoscopic evaluation.     # History of UC: As noted above, records from [place] obtained with colonoscopy in 2/2023 with pancolitis, Salinas score 2-3 with biopsies with scattered active colitis as well as LGD in right colon (see additional findings above). Though biopsies not pathognomonic for UC, the endoscopic findings and clinical history are consistent with this diagnosis. Per available records and patient/sister, patient had received Remicade at [place], but it is unclear if she had clinical response. Additionally, they report no additional infusions were given after discharge to her facility.  # Afib on Coumadin  # CAD  # Bipolar disease  # Reported history of cirrhosis, possibly NAFLD    --Patient remains undecided re: endoscopic evaluation. Will readdress with patient on Mon 1/8. Suggest clear liquid diet on 1/8 in event patient amenable for colonoscopy on Tues 1/9. Though a flex sig could be performed to assess severity of distal disease, would suggest colonoscopy especially given prior history of right-sided LGD.   --Would suggest retrial of Remicade if no contraindication, but patient remains somewhat reluctant to dosing at this time.   --Ultimately, if/when endoscopic evaluation confirms severe disease and/or if patient does not respond to dosing of Remicade, will suggest colorectal surgery consultation as limited options if patient non-responsive to steroids and anti-TNF while inpatient  --Continue steroids and mesalamine for now  --Trend CRP, currently downtrending    Additional recommendations as above. No new GI complaints. Patient still contemplating possible endoscopic evaluation.     # History of UC: As noted above, records from [place] obtained with colonoscopy in 2/2023 with pancolitis, Salinas score 2-3 with biopsies with scattered active colitis as well as LGD in right colon (see additional findings above). Though biopsies not pathognomonic for UC, the endoscopic findings and clinical history are consistent with this diagnosis. Per available records and patient/sister, patient had received Remicade at [place], but it is unclear if she had clinical response. Additionally, they report no additional infusions were given after discharge to her facility.  # Afib on Coumadin  # CAD  # Bipolar disease  # Reported history of cirrhosis, possibly NAFLD    --Patient remains undecided re: endoscopic evaluation. Will readdress with patient on Mon 1/8. Suggest clear liquid diet on 1/8 in event patient amenable for colonoscopy on Tues 1/9. Though a flex sig could be performed to assess severity of distal disease, would suggest colonoscopy especially given prior history of right-sided LGD.   --Would suggest retrial of Remicade if no contraindication, but patient remains somewhat reluctant to dosing at this time.   --Ultimately, if/when endoscopic evaluation confirms severe disease and/or if patient does not respond to dosing of Remicade, will suggest colorectal surgery consultation as limited options if patient non-responsive to steroids and anti-TNF while inpatient  --Continue steroids and mesalamine for now  --Trend CRP, currently downtrending    Additional recommendations as above.

## 2024-01-05 NOTE — PROGRESS NOTE ADULT - ASSESSMENT
58 y F w PMHx of CAD, diabetes, insomnia, MDD, GERD, hypertension, irritable bowel syndrome with diarrhea, ulcerative colitis, cirrhosis, NAFLD, hepatic encephalopathy, bipolar disorder, chronic Afib on Coumadin, blepharitis of the left eye who initially presented to [place] due to reported hypotension, tachycardia and diffuse purpura. Patient transferred to [facility] for further workup and consultations for her body rash.  58 y F w PMHx of CAD, diabetes, insomnia, MDD, GERD, hypertension, irritable bowel syndrome with diarrhea, ulcerative colitis, cirrhosis, NAFLD, hepatic encephalopathy, bipolar disorder, chronic Afib on Coumadin, blepharitis of the left eye who initially presented to [place] due to reported hypotension, tachycardia and diffuse purpura. Patient transferred to [facility] for further workup and consultations for her body rash.  S Plasty Text: Given the location and shape of the defect, and the orientation of relaxed skin tension lines, an S-plasty was deemed most appropriate for repair.  Using a sterile surgical marker, the appropriate outline of the S-plasty was drawn, incorporating the defect and placing the expected incisions within the relaxed skin tension lines where possible.  The area thus outlined was incised deep to adipose tissue with a #15 scalpel blade.  The skin margins were undermined to an appropriate distance in all directions utilizing iris scissors. The skin flaps were advanced over the defect.  The opposing margins were then approximated with interrupted buried subcutaneous sutures.

## 2024-01-05 NOTE — PROGRESS NOTE ADULT - SUBJECTIVE AND OBJECTIVE BOX
Gastroenterology/Hepatology Progress Note      Interval Events:     - No acute events overnight.   - Has abd pain in lower abdomen, but no nausea and vomiting. No fevers and chills.   - Had extensive discussion with the sister yesterday.   -    Allergies:  No Known Allergies      Hospital Medications:  acetaminophen     Tablet .. 650 milliGRAM(s) Oral every 6 hours PRN  aluminum hydroxide/magnesium hydroxide/simethicone Suspension 30 milliLiter(s) Oral every 6 hours PRN  carvedilol 3.125 milliGRAM(s) Oral every 12 hours  dextrose 5%. 1000 milliLiter(s) IV Continuous <Continuous>  dextrose 5%. 1000 milliLiter(s) IV Continuous <Continuous>  dextrose 50% Injectable 12.5 Gram(s) IV Push once  dextrose 50% Injectable 25 Gram(s) IV Push once  dextrose 50% Injectable 25 Gram(s) IV Push once  dextrose Oral Gel 15 Gram(s) Oral once PRN  folic acid 1 milliGRAM(s) Oral daily  glucagon  Injectable 1 milliGRAM(s) IntraMuscular once  influenza   Vaccine 0.5 milliLiter(s) IntraMuscular once  insulin lispro (ADMELOG) corrective regimen sliding scale   SubCutaneous three times a day before meals  insulin lispro (ADMELOG) corrective regimen sliding scale   SubCutaneous at bedtime  lactated ringers. 1000 milliLiter(s) IV Continuous <Continuous>  mesalamine DR Capsule 400 milliGRAM(s) Oral three times a day  methylPREDNISolone sodium succinate Injectable 20 milliGRAM(s) IV Push every 8 hours  mirtazapine 30 milliGRAM(s) Oral daily  ondansetron Injectable 4 milliGRAM(s) IV Push once  pantoprazole    Tablet 40 milliGRAM(s) Oral before breakfast  rifAXIMin 550 milliGRAM(s) Oral two times a day  spironolactone 100 milliGRAM(s) Oral daily      ROS: 14 point ROS negative unless otherwise state in subjective    PHYSICAL EXAM:   Vital Signs:  Vital Signs Last 24 Hrs  T(C): 36.7 (05 Jan 2024 05:12), Max: 36.8 (04 Jan 2024 13:45)  T(F): 98 (05 Jan 2024 05:12), Max: 98.2 (04 Jan 2024 13:45)  HR: 73 (05 Jan 2024 05:12) (73 - 82)  BP: 105/66 (05 Jan 2024 05:12) (98/66 - 105/66)  BP(mean): --  RR: 17 (05 Jan 2024 05:12) (17 - 17)  SpO2: 99% (05 Jan 2024 05:12) (99% - 99%)    Parameters below as of 05 Jan 2024 05:12  Patient On (Oxygen Delivery Method): room air      Daily     Daily     GENERAL:  No acute distress  HEENT:  NCAT, no scleral icterus  CHEST: no resp distress  HEART:  RRR  ABDOMEN:  Soft, non-tender, non-distended, normoactive bowel sounds, no masses  EXTREMITIES:  No cyanosis, clubbing, or edema  SKIN:  No rash/erythema/ecchymoses/petechiae/wounds/abscess/warm/dry  NEURO:  Alert and oriented x 3, no asterixis, no tremor    LABS:                        9.0    10.71 )-----------( 467      ( 05 Jan 2024 06:30 )             27.1     Mean Cell Volume: 92.8 fL (01-05-24 @ 06:30)    01-05    135  |  100  |  18  ----------------------------<  113<H>  4.8   |  26  |  0.43<L>    Ca    8.2<L>      05 Jan 2024 06:30  Phos  1.5     01-05  Mg     1.80     01-05    TPro  5.5<L>  /  Alb  3.0<L>  /  TBili  <0.2  /  DBili  x   /  AST  24  /  ALT  24  /  AlkPhos  95  01-05    LIVER FUNCTIONS - ( 05 Jan 2024 06:30 )  Alb: 3.0 g/dL / Pro: 5.5 g/dL / ALK PHOS: 95 U/L / ALT: 24 U/L / AST: 24 U/L / GGT: x           PT/INR - ( 04 Jan 2024 05:21 )   PT: 21.1 sec;   INR: 1.90 ratio         PTT - ( 04 Jan 2024 05:21 )  PTT:30.9 sec  Urinalysis Basic - ( 05 Jan 2024 06:30 )    Color: x / Appearance: x / SG: x / pH: x  Gluc: 113 mg/dL / Ketone: x  / Bili: x / Urobili: x   Blood: x / Protein: x / Nitrite: x   Leuk Esterase: x / RBC: x / WBC x   Sq Epi: x / Non Sq Epi: x / Bacteria: x            Imaging:           Gastroenterology/Hepatology Progress Note      Interval Events:     - No acute events overnight.   - Has abd pain in lower abdomen, but no nausea and vomiting. No fevers and chills.   - Had extensive discussion with the sister yesterday and patient would like to discuss with her sister before proceeding with a colonoscopy.   - Tolerating po diet well    Allergies:  No Known Allergies      Hospital Medications:  acetaminophen     Tablet .. 650 milliGRAM(s) Oral every 6 hours PRN  aluminum hydroxide/magnesium hydroxide/simethicone Suspension 30 milliLiter(s) Oral every 6 hours PRN  carvedilol 3.125 milliGRAM(s) Oral every 12 hours  dextrose 5%. 1000 milliLiter(s) IV Continuous <Continuous>  dextrose 5%. 1000 milliLiter(s) IV Continuous <Continuous>  dextrose 50% Injectable 12.5 Gram(s) IV Push once  dextrose 50% Injectable 25 Gram(s) IV Push once  dextrose 50% Injectable 25 Gram(s) IV Push once  dextrose Oral Gel 15 Gram(s) Oral once PRN  folic acid 1 milliGRAM(s) Oral daily  glucagon  Injectable 1 milliGRAM(s) IntraMuscular once  influenza   Vaccine 0.5 milliLiter(s) IntraMuscular once  insulin lispro (ADMELOG) corrective regimen sliding scale   SubCutaneous three times a day before meals  insulin lispro (ADMELOG) corrective regimen sliding scale   SubCutaneous at bedtime  lactated ringers. 1000 milliLiter(s) IV Continuous <Continuous>  mesalamine DR Capsule 400 milliGRAM(s) Oral three times a day  methylPREDNISolone sodium succinate Injectable 20 milliGRAM(s) IV Push every 8 hours  mirtazapine 30 milliGRAM(s) Oral daily  ondansetron Injectable 4 milliGRAM(s) IV Push once  pantoprazole    Tablet 40 milliGRAM(s) Oral before breakfast  rifAXIMin 550 milliGRAM(s) Oral two times a day  spironolactone 100 milliGRAM(s) Oral daily      ROS: 14 point ROS negative unless otherwise state in subjective    PHYSICAL EXAM:   Vital Signs:  Vital Signs Last 24 Hrs  T(C): 36.7 (05 Jan 2024 05:12), Max: 36.8 (04 Jan 2024 13:45)  T(F): 98 (05 Jan 2024 05:12), Max: 98.2 (04 Jan 2024 13:45)  HR: 73 (05 Jan 2024 05:12) (73 - 82)  BP: 105/66 (05 Jan 2024 05:12) (98/66 - 105/66)  BP(mean): --  RR: 17 (05 Jan 2024 05:12) (17 - 17)  SpO2: 99% (05 Jan 2024 05:12) (99% - 99%)    Parameters below as of 05 Jan 2024 05:12  Patient On (Oxygen Delivery Method): room air      Daily     Daily       GENERAL:  No acute distress, chronically ill appearing, lying in bed.   HEENT:  NCAT, no scleral icterus   CHEST:  no respiratory distress  HEART:  Regular rate and rhythm  ABDOMEN:  Soft, mild diffuse tenderness, non-distended, no palpable masses.   RECTUM: has rectal tube which has dark brown watery stool w/ no blood.   EXTREMITIES: No LE edema b/l  NEURO:  Alert and oriented x 3, no tremors, no tremors.     LABS:                        9.0    10.71 )-----------( 467      ( 05 Jan 2024 06:30 )             27.1     Mean Cell Volume: 92.8 fL (01-05-24 @ 06:30)    01-05    135  |  100  |  18  ----------------------------<  113<H>  4.8   |  26  |  0.43<L>    Ca    8.2<L>      05 Jan 2024 06:30  Phos  1.5     01-05  Mg     1.80     01-05    TPro  5.5<L>  /  Alb  3.0<L>  /  TBili  <0.2  /  DBili  x   /  AST  24  /  ALT  24  /  AlkPhos  95  01-05    LIVER FUNCTIONS - ( 05 Jan 2024 06:30 )  Alb: 3.0 g/dL / Pro: 5.5 g/dL / ALK PHOS: 95 U/L / ALT: 24 U/L / AST: 24 U/L / GGT: x           PT/INR - ( 04 Jan 2024 05:21 )   PT: 21.1 sec;   INR: 1.90 ratio         PTT - ( 04 Jan 2024 05:21 )  PTT:30.9 sec  Urinalysis Basic - ( 05 Jan 2024 06:30 )    Color: x / Appearance: x / SG: x / pH: x  Gluc: 113 mg/dL / Ketone: x  / Bili: x / Urobili: x   Blood: x / Protein: x / Nitrite: x   Leuk Esterase: x / RBC: x / WBC x   Sq Epi: x / Non Sq Epi: x / Bacteria: x            Imaging:        CTAP  FINDINGS:  CHEST:  LUNGS AND LARGE AIRWAYS: Patent central airways. Dependent atelectatic   changes at the lung bases.  PLEURA: No pleural effusion.  VESSELS: Within normal limits.  HEART: Heart size is normal. No pericardial effusion.  MEDIASTINUM AND FOREST: No lymphadenopathy.  CHEST WALL AND LOWER NECK: Within normal limits.    ABDOMEN AND PELVIS:  LIVER: Within normal limits.  BILE DUCTS: Normal caliber.  GALLBLADDER: Cholecystectomy.  SPLEEN: Within normal limits.  PANCREAS: Within normal limits.  ADRENALS: Within normal limits.  KIDNEYS/URETERS: Within normal limits.    BLADDER: Within normal limits.  REPRODUCTIVE ORGANS: Posterior calcified fibroid.    BOWEL: No bowel obstruction. Appendix is unremarkable. There is   thickening, thumbprinting and hyperemia of the colon from the cecum   through rectum compatible with pancolitis.  PERITONEUM: No ascites.  VESSELS: Aorta is not dilated. Moderate atherosclerotic vascular   calcification.  No perivascular inflammation as clinically questioned.  RETROPERITONEUM/LYMPH NODES: No lymphadenopathy.  ABDOMINAL WALL: Within normal limits.  BONES: Within normal limits.    IMPRESSION:  Mild pancolitis, of infectious or inflammatory etiology.    Records received by the primary team in [place].     Colonoscopy from 2/2023   - Hemorrhoids  - Inflammation was found from the rectosigmoid to the cecum. This was graded as Salinas Score 2-3 (moderate to severe disease). Colon was segmentally biopsied.   - The distal rectum mucosa besides the mild enema trauma was spared.   - A pseudopolyp with a blood clot was found in the recto-sigmoid colon. One hemostatic clip was placed.     Pathology report:     Terminal ileum - Normal  Cecum - Crypt architectural changes, increase in intraepithelial lymphocytes, increase in lamina propria lymphoplasmocytic infiltrate, and siderophages.   AC - Low grade dysplasia with reactive epithelial changes, Crypt architectural changes, increase in intraepithelial lymphocytes, increase in lamina propria lymphoplasmocytic infiltrate, and siderophages  TC - Marked active colitis  DC - Crypt architectural changes and active inflammation.   SC - active colitis  Rectum - active colitis             Gastroenterology/Hepatology Progress Note      Interval Events:     - No acute events overnight.   - Has abd pain in lower abdomen, but no nausea and vomiting. No fevers and chills.   - Had extensive discussion with the sister yesterday and patient would like to discuss with her sister before proceeding with a colonoscopy.   - Tolerating po diet well    Allergies:  No Known Allergies      Hospital Medications:  acetaminophen     Tablet .. 650 milliGRAM(s) Oral every 6 hours PRN  aluminum hydroxide/magnesium hydroxide/simethicone Suspension 30 milliLiter(s) Oral every 6 hours PRN  carvedilol 3.125 milliGRAM(s) Oral every 12 hours  dextrose 5%. 1000 milliLiter(s) IV Continuous <Continuous>  dextrose 5%. 1000 milliLiter(s) IV Continuous <Continuous>  dextrose 50% Injectable 12.5 Gram(s) IV Push once  dextrose 50% Injectable 25 Gram(s) IV Push once  dextrose 50% Injectable 25 Gram(s) IV Push once  dextrose Oral Gel 15 Gram(s) Oral once PRN  folic acid 1 milliGRAM(s) Oral daily  glucagon  Injectable 1 milliGRAM(s) IntraMuscular once  influenza   Vaccine 0.5 milliLiter(s) IntraMuscular once  insulin lispro (ADMELOG) corrective regimen sliding scale   SubCutaneous three times a day before meals  insulin lispro (ADMELOG) corrective regimen sliding scale   SubCutaneous at bedtime  lactated ringers. 1000 milliLiter(s) IV Continuous <Continuous>  mesalamine DR Capsule 400 milliGRAM(s) Oral three times a day  methylPREDNISolone sodium succinate Injectable 20 milliGRAM(s) IV Push every 8 hours  mirtazapine 30 milliGRAM(s) Oral daily  ondansetron Injectable 4 milliGRAM(s) IV Push once  pantoprazole    Tablet 40 milliGRAM(s) Oral before breakfast  rifAXIMin 550 milliGRAM(s) Oral two times a day  spironolactone 100 milliGRAM(s) Oral daily      ROS: 14 point ROS negative unless otherwise state in subjective    PHYSICAL EXAM:   Vital Signs:  Vital Signs Last 24 Hrs  T(C): 36.7 (05 Jan 2024 05:12), Max: 36.8 (04 Jan 2024 13:45)  T(F): 98 (05 Jan 2024 05:12), Max: 98.2 (04 Jan 2024 13:45)  HR: 73 (05 Jan 2024 05:12) (73 - 82)  BP: 105/66 (05 Jan 2024 05:12) (98/66 - 105/66)  BP(mean): --  RR: 17 (05 Jan 2024 05:12) (17 - 17)  SpO2: 99% (05 Jan 2024 05:12) (99% - 99%)    Parameters below as of 05 Jan 2024 05:12  Patient On (Oxygen Delivery Method): room air      Daily     Daily       GENERAL:  No acute distress, chronically ill appearing, lying in bed.   HEENT:  NCAT, no scleral icterus   CHEST:  no respiratory distress  HEART:  Regular rate and rhythm  ABDOMEN:  Soft, mild diffuse tenderness, non-distended, no palpable masses.   RECTUM: has rectal tube which has dark brown watery stool w/ no blood.   EXTREMITIES: No LE edema b/l  NEURO:  Alert and oriented x 3, no tremors, no tremors.     LABS:                        9.0    10.71 )-----------( 467      ( 05 Jan 2024 06:30 )             27.1     Mean Cell Volume: 92.8 fL (01-05-24 @ 06:30)    01-05    135  |  100  |  18  ----------------------------<  113<H>  4.8   |  26  |  0.43<L>    Ca    8.2<L>      05 Jan 2024 06:30  Phos  1.5     01-05  Mg     1.80     01-05    TPro  5.5<L>  /  Alb  3.0<L>  /  TBili  <0.2  /  DBili  x   /  AST  24  /  ALT  24  /  AlkPhos  95  01-05    LIVER FUNCTIONS - ( 05 Jan 2024 06:30 )  Alb: 3.0 g/dL / Pro: 5.5 g/dL / ALK PHOS: 95 U/L / ALT: 24 U/L / AST: 24 U/L / GGT: x           PT/INR - ( 04 Jan 2024 05:21 )   PT: 21.1 sec;   INR: 1.90 ratio         PTT - ( 04 Jan 2024 05:21 )  PTT:30.9 sec  Urinalysis Basic - ( 05 Jan 2024 06:30 )    Color: x / Appearance: x / SG: x / pH: x  Gluc: 113 mg/dL / Ketone: x  / Bili: x / Urobili: x   Blood: x / Protein: x / Nitrite: x   Leuk Esterase: x / RBC: x / WBC x   Sq Epi: x / Non Sq Epi: x / Bacteria: x            Imaging:        CTAP  FINDINGS:  CHEST:  LUNGS AND LARGE AIRWAYS: Patent central airways. Dependent atelectatic   changes at the lung bases.  PLEURA: No pleural effusion.  VESSELS: Within normal limits.  HEART: Heart size is normal. No pericardial effusion.  MEDIASTINUM AND FOREST: No lymphadenopathy.  CHEST WALL AND LOWER NECK: Within normal limits.    ABDOMEN AND PELVIS:  LIVER: Within normal limits.  BILE DUCTS: Normal caliber.  GALLBLADDER: Cholecystectomy.  SPLEEN: Within normal limits.  PANCREAS: Within normal limits.  ADRENALS: Within normal limits.  KIDNEYS/URETERS: Within normal limits.    BLADDER: Within normal limits.  REPRODUCTIVE ORGANS: Posterior calcified fibroid.    BOWEL: No bowel obstruction. Appendix is unremarkable. There is   thickening, thumbprinting and hyperemia of the colon from the cecum   through rectum compatible with pancolitis.  PERITONEUM: No ascites.  VESSELS: Aorta is not dilated. Moderate atherosclerotic vascular   calcification.  No perivascular inflammation as clinically questioned.  RETROPERITONEUM/LYMPH NODES: No lymphadenopathy.  ABDOMINAL WALL: Within normal limits.  BONES: Within normal limits.    IMPRESSION:  Mild pancolitis, of infectious or inflammatory etiology.    Records received by the primary team in [place].     Colonoscopy from 2/2023   - Hemorrhoids  - Inflammation was found from the rectosigmoid to the cecum. This was graded as Salinas Score 2-3 (moderate to severe disease). Colon was segmentally biopsied.   - The distal rectum mucosa besides the mild enema trauma was spared.   - A pseudopolyp with a blood clot was found in the recto-sigmoid colon. One hemostatic clip was placed.     Pathology report:     Terminal ileum - Normal  Cecum - Crypt architectural changes, increase in intraepithelial lymphocytes, increase in lamina propria lymphoplasmocytic infiltrate, and siderophages.   AC - Low grade dysplasia with reactive epithelial changes, Crypt architectural changes, increase in intraepithelial lymphocytes, increase in lamina propria lymphoplasmocytic infiltrate, and siderophages  TC - Marked active colitis  DC - Crypt architectural changes and active inflammation.   SC - active colitis  Rectum - active colitis

## 2024-01-05 NOTE — PROGRESS NOTE ADULT - PROBLEM SELECTOR PLAN 1
Patient presented with body rash of b/l upper extremity and lower extremity of unknown etiology that is improving from initial presentation   Rash of unclear etiology, initiall ifnectious versus inflammatory now derm believes resolving ecchymosis  work up at [place] includes:   CMV IgG negative   LDH normal   Babesia negative   Lyme negative   RPR negative   ESR, CRP elevated   -rheumatology: likely not vasculitis  -dermatology: likely resolving ecchymosis i/s/o coumadin use  -ophtho: likely subconjunctival hemorrhage no need to stop coumadin Patient presented with body rash of b/l upper extremity and lower extremity of unknown etiology that is improving from initial presentation   Rash of unclear etiology, initiall ifnectious versus inflammatory now derm believes resolving ecchymosis  work up at [place] includes:   CMV IgG negative   LDH normal   Babesia negative   Lyme negative   RPR negative   ESR, CRP elevated   -rheumatology: likely not vasculitis  -dermatology: likely resolving ecchymosis i/s/o coumadin use  -ophtho: likely subconjunctival hemorrhage no need to stop coumadin

## 2024-01-05 NOTE — PROGRESS NOTE ADULT - PROBLEM SELECTOR PLAN 3
Patient with anemia with iron studies more consistent with anemia of chronic disease   -B12 and folate WNL   -patient had drop of Hgb to 6.9 at [place] prior to transfer, transfused with 1pRBC with response to 9.5  -continue to monitor   -transfuse for Hgb >7 Patient with anemia with iron studies more consistent with anemia of chronic disease   -B12 and folate WNL   -patient had drop of Hgb to 6.9 at [place] prior to transfer, transfused with 1pRBC with response to 9.5  -continue to monitor   -transfuse for Hgb >7

## 2024-01-05 NOTE — PROGRESS NOTE ADULT - SUBJECTIVE AND OBJECTIVE BOX
ANAYA JOINER 58y Female      Patient is a 58y old  Female who presents with a chief complaint of transfer from [place] for tertiary level care in the setting of diffuse body rash (30 Dec 2023 22:18)        INTERVAL HPI/OVERNIGHT EVENTS: No acute events overnight. Patient was seen and evaluated at the bedside. The patient still endorses abdominal discomfort, she reports no improvement on the steroids. L arm swelling resolved and L hand lesions improving. Patient denies fever/chills, chest pain, shortness of breath,, headaches, nausea/vomiting, or visual changes.       PHYSICAL EXAM:  GENERAL: NAD  HEAD:  Atraumatic, Normocephalic  EYES: left eye and rt eye with mild redness+ no drainage  ENMT: MMM  NECK: Supple, No JVD  NERVOUS SYSTEM: AOX3, motor and sensation grossly intact in b/l UE and b/l LE  PSYCHIATRIC: Appropriate affect and mood  CHEST/LUNG: Clear to auscultation bilaterally; No rales, rhonchi, wheezing, or rubs  HEART: Regular rate and rhythm; No murmurs, rubs, or gallops. No LE edema  ABDOMEN: Soft, mild epigastric tenderness, Nondistended; Bowel sounds present  EXTREMITIES:  2+ Peripheral Pulses, No clubbing, cyanosis  SKIN:  rash improving    Vital Signs Last 24 Hrs  T(C): 36.6 (04 Jan 2024 06:48), Max: 36.9 (03 Jan 2024 21:40)  T(F): 97.8 (04 Jan 2024 06:48), Max: 98.5 (03 Jan 2024 21:40)  HR: 67 (04 Jan 2024 06:48) (67 - 73)  BP: 109/67 (04 Jan 2024 06:48) (96/54 - 117/66)  BP(mean): --  RR: 17 (04 Jan 2024 06:48) (16 - 18)  SpO2: 100% (04 Jan 2024 06:48) (98% - 100%)    Parameters below as of 04 Jan 2024 06:48  Patient On (Oxygen Delivery Method): room air    Consultant(s) Notes Reviewed:  [X] YES  [ ] NO  Care Discussed with Consultants/Other Providers [X] YES  [ ] NO  Imaging Personally Reviewed:  [X] YES  [ ] NO      LABS:    CBC Full  -  ( 04 Jan 2024 05:21 )  WBC Count : 9.58 K/uL  RBC Count : 2.93 M/uL  Hemoglobin : 8.8 g/dL  Hematocrit : 28.1 %  Platelet Count - Automated : 437 K/uL  Mean Cell Volume : 95.9 fL  Mean Cell Hemoglobin : 30.0 pg  Mean Cell Hemoglobin Concentration : 31.3 gm/dL  Auto Neutrophil # : x  Auto Lymphocyte # : x  Auto Monocyte # : x  Auto Eosinophil # : x  Auto Basophil # : x  Auto Neutrophil % : x  Auto Lymphocyte % : x  Auto Monocyte % : x  Auto Eosinophil % : x  Auto Basophil % : x    01-04    136  |  102  |  15  ----------------------------<  138<H>  4.5   |  23  |  0.38<L>    Ca    7.9<L>      04 Jan 2024 05:21  Phos  1.3     01-04  Mg     1.80     01-04    TPro  5.4<L>  /  Alb  2.8<L>  /  TBili  <0.2  /  DBili  x   /  AST  29  /  ALT  21  /  AlkPhos  93  01-04       Urinalysis Basic - ( 30 Dec 2023 05:52 )    Color: x / Appearance: x / SG: x / pH: x  Gluc: 81 mg/dL / Ketone: x  / Bili: x / Urobili: x   Blood: x / Protein: x / Nitrite: x   Leuk Esterase: x / RBC: x / WBC x   Sq Epi: x / Non Sq Epi: x / Bacteria: x        CAPILLARY BLOOD GLUCOSE      POCT Blood Glucose.: 134 mg/dL (30 Dec 2023 21:11)  POCT Blood Glucose.: 110 mg/dL (30 Dec 2023 17:28)  POCT Blood Glucose.: 126 mg/dL (30 Dec 2023 15:23)  POCT Blood Glucose.: 83 mg/dL (30 Dec 2023 08:44)           ANAYA JOINER 58y Female      Patient is a 58y old  Female who presents with a chief complaint of transfer from [place] for tertiary level care in the setting of diffuse body rash (30 Dec 2023 22:18)        INTERVAL HPI/OVERNIGHT EVENTS: No acute events overnight. Patient was seen and evaluated at the bedside. The patient still endorses abdominal discomfort, she reports no improvement on the steroids. L arm swelling resolved and L hand lesions improving. Patient denies fever/chills, chest pain, shortness of breath,, headaches, nausea/vomiting, or visual changes.       PHYSICAL EXAM:  GENERAL: NAD  HEAD:  Atraumatic, Normocephalic  EYES: left eye and rt eye with mild redness+ no drainage  ENMT: MMM  NECK: Supple, No JVD  NERVOUS SYSTEM: AOX3, motor and sensation grossly intact in b/l UE and b/l LE  PSYCHIATRIC: Appropriate affect and mood  CHEST/LUNG: Clear to auscultation bilaterally; No rales, rhonchi, wheezing, or rubs  HEART: Regular rate and rhythm; No murmurs, rubs, or gallops. No LE edema  ABDOMEN: Soft, mild epigastric tenderness, Nondistended; Bowel sounds present  EXTREMITIES:  2+ Peripheral Pulses, No clubbing, cyanosis  SKIN:  rash improving    Vital Signs Last 24 Hrs  T(C): 36.6 (04 Jan 2024 06:48), Max: 36.9 (03 Jan 2024 21:40)  T(F): 97.8 (04 Jan 2024 06:48), Max: 98.5 (03 Jan 2024 21:40)  HR: 67 (04 Jan 2024 06:48) (67 - 73)  BP: 109/67 (04 Jan 2024 06:48) (96/54 - 117/66)  BP(mean): --  RR: 17 (04 Jan 2024 06:48) (16 - 18)  SpO2: 100% (04 Jan 2024 06:48) (98% - 100%)    Parameters below as of 04 Jan 2024 06:48  Patient On (Oxygen Delivery Method): room air    Consultant(s) Notes Reviewed:  [X] YES  [ ] NO  Care Discussed with Consultants/Other Providers [X] YES  [ ] NO  Imaging Personally Reviewed:  [X] YES  [ ] NO      LABS:    CBC Full  -  ( 04 Jan 2024 05:21 )  WBC Count : 9.58 K/uL  RBC Count : 2.93 M/uL  Hemoglobin : 8.8 g/dL  Hematocrit : 28.1 %  Platelet Count - Automated : 437 K/uL  Mean Cell Volume : 95.9 fL  Mean Cell Hemoglobin : 30.0 pg  Mean Cell Hemoglobin Concentration : 31.3 gm/dL  Auto Neutrophil # : x  Auto Lymphocyte # : x  Auto Monocyte # : x  Auto Eosinophil # : x  Auto Basophil # : x  Auto Neutrophil % : x  Auto Lymphocyte % : x  Auto Monocyte % : x  Auto Eosinophil % : x  Auto Basophil % : x    01-04    136  |  102  |  15  ----------------------------<  138<H>  4.5   |  23  |  0.38<L>    Ca    7.9<L>      04 Jan 2024 05:21  Phos  1.3     01-04  Mg     1.80     01-04    TPro  5.4<L>  /  Alb  2.8<L>  /  TBili  <0.2  /  DBili  x   /  AST  29  /  ALT  21  /  AlkPhos  93  01-04       Urinalysis Basic - ( 30 Dec 2023 05:52 )    Color: x / Appearance: x / SG: x / pH: x  Gluc: 81 mg/dL / Ketone: x  / Bili: x / Urobili: x   Blood: x / Protein: x / Nitrite: x   Leuk Esterase: x / RBC: x / WBC x   Sq Epi: x / Non Sq Epi: x / Bacteria: x        CAPILLARY BLOOD GLUCOSE      POCT Blood Glucose.: 134 mg/dL (30 Dec 2023 21:11)  POCT Blood Glucose.: 110 mg/dL (30 Dec 2023 17:28)  POCT Blood Glucose.: 126 mg/dL (30 Dec 2023 15:23)  POCT Blood Glucose.: 83 mg/dL (30 Dec 2023 08:44)

## 2024-01-05 NOTE — PROGRESS NOTE ADULT - PROBLEM SELECTOR PLAN 2
Patient with history of ulcerative colitis found to have pancolitis   -likely ulcerative colitis flare   -continue mesalamine 400mg TID    c/w IV steroids 20 q8h (12/31)   - PPI 40mg daily   -Continue with mesalamine 400 mg TID for now  -regular diet  -GI following, potential plan for scope if pt agreeable, if not will start remicade  -colonoscopy report from [facility]: moderate to severe colitis in the past, likely concerning for UC based on endoscopic and histologic presentation Patient with history of ulcerative colitis found to have pancolitis   -likely ulcerative colitis flare   -continue mesalamine 400mg TID    c/w IV steroids 20 q8h (12/31)   - PPI 40mg daily   -Continue with mesalamine 400 mg TID for now  -regular diet  -GI following, potential plan for scope if pt agreeable, if not will start remicade  -colonoscopy report from [facility]: moderate to severe colitis in the past, likely concerning for UC based on endoscopic and histologic presentation

## 2024-01-05 NOTE — PROGRESS NOTE ADULT - REASON FOR ADMISSION
transfer from [place] for tertiary level care in the setting of diffuse body rash transfer from [place] for tertiary level care in the setting of diffuse body rash UC

## 2024-01-06 LAB
ALBUMIN SERPL ELPH-MCNC: 2.9 G/DL — LOW (ref 3.3–5)
ALBUMIN SERPL ELPH-MCNC: 2.9 G/DL — LOW (ref 3.3–5)
ALP SERPL-CCNC: 96 U/L — SIGNIFICANT CHANGE UP (ref 40–120)
ALP SERPL-CCNC: 96 U/L — SIGNIFICANT CHANGE UP (ref 40–120)
ALT FLD-CCNC: 20 U/L — SIGNIFICANT CHANGE UP (ref 4–33)
ALT FLD-CCNC: 20 U/L — SIGNIFICANT CHANGE UP (ref 4–33)
ANION GAP SERPL CALC-SCNC: 10 MMOL/L — SIGNIFICANT CHANGE UP (ref 7–14)
ANION GAP SERPL CALC-SCNC: 10 MMOL/L — SIGNIFICANT CHANGE UP (ref 7–14)
APTT BLD: 33 SEC — SIGNIFICANT CHANGE UP (ref 24.5–35.6)
APTT BLD: 33 SEC — SIGNIFICANT CHANGE UP (ref 24.5–35.6)
AST SERPL-CCNC: 20 U/L — SIGNIFICANT CHANGE UP (ref 4–32)
AST SERPL-CCNC: 20 U/L — SIGNIFICANT CHANGE UP (ref 4–32)
BASOPHILS # BLD AUTO: 0.02 K/UL — SIGNIFICANT CHANGE UP (ref 0–0.2)
BASOPHILS # BLD AUTO: 0.02 K/UL — SIGNIFICANT CHANGE UP (ref 0–0.2)
BASOPHILS NFR BLD AUTO: 0.2 % — SIGNIFICANT CHANGE UP (ref 0–2)
BASOPHILS NFR BLD AUTO: 0.2 % — SIGNIFICANT CHANGE UP (ref 0–2)
BILIRUB SERPL-MCNC: <0.2 MG/DL — SIGNIFICANT CHANGE UP (ref 0.2–1.2)
BILIRUB SERPL-MCNC: <0.2 MG/DL — SIGNIFICANT CHANGE UP (ref 0.2–1.2)
BUN SERPL-MCNC: 15 MG/DL — SIGNIFICANT CHANGE UP (ref 7–23)
BUN SERPL-MCNC: 15 MG/DL — SIGNIFICANT CHANGE UP (ref 7–23)
CALCIUM SERPL-MCNC: 7.8 MG/DL — LOW (ref 8.4–10.5)
CALCIUM SERPL-MCNC: 7.8 MG/DL — LOW (ref 8.4–10.5)
CHLORIDE SERPL-SCNC: 100 MMOL/L — SIGNIFICANT CHANGE UP (ref 98–107)
CHLORIDE SERPL-SCNC: 100 MMOL/L — SIGNIFICANT CHANGE UP (ref 98–107)
CO2 SERPL-SCNC: 27 MMOL/L — SIGNIFICANT CHANGE UP (ref 22–31)
CO2 SERPL-SCNC: 27 MMOL/L — SIGNIFICANT CHANGE UP (ref 22–31)
CREAT SERPL-MCNC: 0.39 MG/DL — LOW (ref 0.5–1.3)
CREAT SERPL-MCNC: 0.39 MG/DL — LOW (ref 0.5–1.3)
CRP SERPL-MCNC: 17.4 MG/L — HIGH
CRP SERPL-MCNC: 17.4 MG/L — HIGH
EGFR: 117 ML/MIN/1.73M2 — SIGNIFICANT CHANGE UP
EGFR: 117 ML/MIN/1.73M2 — SIGNIFICANT CHANGE UP
EOSINOPHIL # BLD AUTO: 0.03 K/UL — SIGNIFICANT CHANGE UP (ref 0–0.5)
EOSINOPHIL # BLD AUTO: 0.03 K/UL — SIGNIFICANT CHANGE UP (ref 0–0.5)
EOSINOPHIL NFR BLD AUTO: 0.3 % — SIGNIFICANT CHANGE UP (ref 0–6)
EOSINOPHIL NFR BLD AUTO: 0.3 % — SIGNIFICANT CHANGE UP (ref 0–6)
ERYTHROCYTE [SEDIMENTATION RATE] IN BLOOD: 26 MM/HR — HIGH (ref 4–25)
ERYTHROCYTE [SEDIMENTATION RATE] IN BLOOD: 26 MM/HR — HIGH (ref 4–25)
GLUCOSE BLDC GLUCOMTR-MCNC: 110 MG/DL — HIGH (ref 70–99)
GLUCOSE BLDC GLUCOMTR-MCNC: 110 MG/DL — HIGH (ref 70–99)
GLUCOSE BLDC GLUCOMTR-MCNC: 140 MG/DL — HIGH (ref 70–99)
GLUCOSE BLDC GLUCOMTR-MCNC: 140 MG/DL — HIGH (ref 70–99)
GLUCOSE BLDC GLUCOMTR-MCNC: 170 MG/DL — HIGH (ref 70–99)
GLUCOSE BLDC GLUCOMTR-MCNC: 170 MG/DL — HIGH (ref 70–99)
GLUCOSE BLDC GLUCOMTR-MCNC: 215 MG/DL — HIGH (ref 70–99)
GLUCOSE BLDC GLUCOMTR-MCNC: 215 MG/DL — HIGH (ref 70–99)
GLUCOSE SERPL-MCNC: 86 MG/DL — SIGNIFICANT CHANGE UP (ref 70–99)
GLUCOSE SERPL-MCNC: 86 MG/DL — SIGNIFICANT CHANGE UP (ref 70–99)
HCT VFR BLD CALC: 29.6 % — LOW (ref 34.5–45)
HCT VFR BLD CALC: 29.6 % — LOW (ref 34.5–45)
HGB BLD-MCNC: 9.5 G/DL — LOW (ref 11.5–15.5)
HGB BLD-MCNC: 9.5 G/DL — LOW (ref 11.5–15.5)
IANC: 7.82 K/UL — HIGH (ref 1.8–7.4)
IANC: 7.82 K/UL — HIGH (ref 1.8–7.4)
IMM GRANULOCYTES NFR BLD AUTO: 3.3 % — HIGH (ref 0–0.9)
IMM GRANULOCYTES NFR BLD AUTO: 3.3 % — HIGH (ref 0–0.9)
INR BLD: 1.99 RATIO — HIGH (ref 0.85–1.18)
INR BLD: 1.99 RATIO — HIGH (ref 0.85–1.18)
LYMPHOCYTES # BLD AUTO: 1.17 K/UL — SIGNIFICANT CHANGE UP (ref 1–3.3)
LYMPHOCYTES # BLD AUTO: 1.17 K/UL — SIGNIFICANT CHANGE UP (ref 1–3.3)
LYMPHOCYTES # BLD AUTO: 12.1 % — LOW (ref 13–44)
LYMPHOCYTES # BLD AUTO: 12.1 % — LOW (ref 13–44)
MAGNESIUM SERPL-MCNC: 1.8 MG/DL — SIGNIFICANT CHANGE UP (ref 1.6–2.6)
MAGNESIUM SERPL-MCNC: 1.8 MG/DL — SIGNIFICANT CHANGE UP (ref 1.6–2.6)
MCHC RBC-ENTMCNC: 31.3 PG — SIGNIFICANT CHANGE UP (ref 27–34)
MCHC RBC-ENTMCNC: 31.3 PG — SIGNIFICANT CHANGE UP (ref 27–34)
MCHC RBC-ENTMCNC: 32.1 GM/DL — SIGNIFICANT CHANGE UP (ref 32–36)
MCHC RBC-ENTMCNC: 32.1 GM/DL — SIGNIFICANT CHANGE UP (ref 32–36)
MCV RBC AUTO: 97.4 FL — SIGNIFICANT CHANGE UP (ref 80–100)
MCV RBC AUTO: 97.4 FL — SIGNIFICANT CHANGE UP (ref 80–100)
MONOCYTES # BLD AUTO: 0.33 K/UL — SIGNIFICANT CHANGE UP (ref 0–0.9)
MONOCYTES # BLD AUTO: 0.33 K/UL — SIGNIFICANT CHANGE UP (ref 0–0.9)
MONOCYTES NFR BLD AUTO: 3.4 % — SIGNIFICANT CHANGE UP (ref 2–14)
MONOCYTES NFR BLD AUTO: 3.4 % — SIGNIFICANT CHANGE UP (ref 2–14)
NEUTROPHILS # BLD AUTO: 7.82 K/UL — HIGH (ref 1.8–7.4)
NEUTROPHILS # BLD AUTO: 7.82 K/UL — HIGH (ref 1.8–7.4)
NEUTROPHILS NFR BLD AUTO: 80.7 % — HIGH (ref 43–77)
NEUTROPHILS NFR BLD AUTO: 80.7 % — HIGH (ref 43–77)
NRBC # BLD: 0 /100 WBCS — SIGNIFICANT CHANGE UP (ref 0–0)
NRBC # BLD: 0 /100 WBCS — SIGNIFICANT CHANGE UP (ref 0–0)
NRBC # FLD: 0 K/UL — SIGNIFICANT CHANGE UP (ref 0–0)
NRBC # FLD: 0 K/UL — SIGNIFICANT CHANGE UP (ref 0–0)
PHOSPHATE SERPL-MCNC: 2.2 MG/DL — LOW (ref 2.5–4.5)
PHOSPHATE SERPL-MCNC: 2.2 MG/DL — LOW (ref 2.5–4.5)
PLATELET # BLD AUTO: 453 K/UL — HIGH (ref 150–400)
PLATELET # BLD AUTO: 453 K/UL — HIGH (ref 150–400)
POTASSIUM SERPL-MCNC: 4.4 MMOL/L — SIGNIFICANT CHANGE UP (ref 3.5–5.3)
POTASSIUM SERPL-MCNC: 4.4 MMOL/L — SIGNIFICANT CHANGE UP (ref 3.5–5.3)
POTASSIUM SERPL-SCNC: 4.4 MMOL/L — SIGNIFICANT CHANGE UP (ref 3.5–5.3)
POTASSIUM SERPL-SCNC: 4.4 MMOL/L — SIGNIFICANT CHANGE UP (ref 3.5–5.3)
PROT SERPL-MCNC: 5.6 G/DL — LOW (ref 6–8.3)
PROT SERPL-MCNC: 5.6 G/DL — LOW (ref 6–8.3)
PROTHROM AB SERPL-ACNC: 21.8 SEC — HIGH (ref 9.5–13)
PROTHROM AB SERPL-ACNC: 21.8 SEC — HIGH (ref 9.5–13)
RBC # BLD: 3.04 M/UL — LOW (ref 3.8–5.2)
RBC # BLD: 3.04 M/UL — LOW (ref 3.8–5.2)
RBC # FLD: 14.9 % — HIGH (ref 10.3–14.5)
RBC # FLD: 14.9 % — HIGH (ref 10.3–14.5)
SODIUM SERPL-SCNC: 137 MMOL/L — SIGNIFICANT CHANGE UP (ref 135–145)
SODIUM SERPL-SCNC: 137 MMOL/L — SIGNIFICANT CHANGE UP (ref 135–145)
WBC # BLD: 9.69 K/UL — SIGNIFICANT CHANGE UP (ref 3.8–10.5)
WBC # BLD: 9.69 K/UL — SIGNIFICANT CHANGE UP (ref 3.8–10.5)
WBC # FLD AUTO: 9.69 K/UL — SIGNIFICANT CHANGE UP (ref 3.8–10.5)
WBC # FLD AUTO: 9.69 K/UL — SIGNIFICANT CHANGE UP (ref 3.8–10.5)

## 2024-01-06 PROCEDURE — 99232 SBSQ HOSP IP/OBS MODERATE 35: CPT | Mod: GC

## 2024-01-06 RX ORDER — CARVEDILOL PHOSPHATE 80 MG/1
3.12 CAPSULE, EXTENDED RELEASE ORAL EVERY 12 HOURS
Refills: 0 | Status: DISCONTINUED | OUTPATIENT
Start: 2024-01-07 | End: 2024-01-12

## 2024-01-06 RX ORDER — WARFARIN SODIUM 2.5 MG/1
6 TABLET ORAL ONCE
Refills: 0 | Status: COMPLETED | OUTPATIENT
Start: 2024-01-06 | End: 2024-01-06

## 2024-01-06 RX ADMIN — Medication 400 MILLIGRAM(S): at 21:40

## 2024-01-06 RX ADMIN — Medication 400 MILLIGRAM(S): at 06:08

## 2024-01-06 RX ADMIN — Medication 63.75 MILLIMOLE(S): at 11:27

## 2024-01-06 RX ADMIN — WARFARIN SODIUM 6 MILLIGRAM(S): 2.5 TABLET ORAL at 21:40

## 2024-01-06 RX ADMIN — Medication 400 MILLIGRAM(S): at 15:01

## 2024-01-06 RX ADMIN — MIRTAZAPINE 30 MILLIGRAM(S): 45 TABLET, ORALLY DISINTEGRATING ORAL at 11:30

## 2024-01-06 RX ADMIN — CARVEDILOL PHOSPHATE 3.12 MILLIGRAM(S): 80 CAPSULE, EXTENDED RELEASE ORAL at 17:52

## 2024-01-06 RX ADMIN — Medication 1 MILLIGRAM(S): at 11:30

## 2024-01-06 RX ADMIN — Medication 20 MILLIGRAM(S): at 21:41

## 2024-01-06 RX ADMIN — PANTOPRAZOLE SODIUM 40 MILLIGRAM(S): 20 TABLET, DELAYED RELEASE ORAL at 06:09

## 2024-01-06 RX ADMIN — Medication 20 MILLIGRAM(S): at 15:00

## 2024-01-06 RX ADMIN — SPIRONOLACTONE 100 MILLIGRAM(S): 25 TABLET, FILM COATED ORAL at 06:08

## 2024-01-06 RX ADMIN — CARVEDILOL PHOSPHATE 3.12 MILLIGRAM(S): 80 CAPSULE, EXTENDED RELEASE ORAL at 06:08

## 2024-01-06 RX ADMIN — Medication 20 MILLIGRAM(S): at 06:09

## 2024-01-06 RX ADMIN — Medication 1: at 18:22

## 2024-01-06 NOTE — PROGRESS NOTE ADULT - PROBLEM SELECTOR PLAN 2
Patient with history of ulcerative colitis found to have pancolitis   -likely ulcerative colitis flare   -continue mesalamine 400mg TID    c/w IV steroids 20 q8h (12/31)   - PPI 40mg daily   -Continue with mesalamine 400 mg TID for now  -regular diet  -GI following, potential plan for scope if pt agreeable, if not will start remicade  -colonoscopy report from [facility]: moderate to severe colitis in the past, likely concerning for UC based on endoscopic and histologic presentation Patient with history of ulcerative colitis found to have pancolitis   -likely ulcerative colitis flare   -continue mesalamine 400mg TID    c/w IV steroids 20 q8h (12/31)   - PPI 40mg daily   -Continue with mesalamine 400 mg TID for now  -regular diet  -GI following, potential plan for scope if pt agreeable, if not will start remicade  -colonoscopy report from [facility]: moderate to severe colitis in the past, likely concerning for UC based on endoscopic and histologic presentation

## 2024-01-06 NOTE — PROGRESS NOTE ADULT - ASSESSMENT
58 y F w PMHx of CAD, diabetes, insomnia, MDD, GERD, hypertension, irritable bowel syndrome with diarrhea, ulcerative colitis, cirrhosis, NAFLD, hepatic encephalopathy, bipolar disorder, chronic Afib on Coumadin, blepharitis of the left eye who initially presented to [place] due to reported hypotension, tachycardia and diffuse purpura. Patient transferred to [facility] for further workup and consultations for her body rash.  58 y F w PMHx of CAD, diabetes, insomnia, MDD, GERD, hypertension, irritable bowel syndrome with diarrhea, ulcerative colitis, cirrhosis, NAFLD, hepatic encephalopathy, bipolar disorder, chronic Afib on Coumadin, blepharitis of the left eye who initially presented to [place] due to reported hypotension, tachycardia and diffuse purpura. Patient transferred to [facility] for further workup and consultations for her body rash.

## 2024-01-06 NOTE — PROVIDER CONTACT NOTE (OTHER) - ACTION/TREATMENT ORDERED:
MD notified. okay to give BP medication.
provider made aware, came to bedside to further assess patient, and ordered X-ray of finger
provider made aware, came to bedside to further assess patient

## 2024-01-06 NOTE — PROGRESS NOTE ADULT - REASON FOR ADMISSION
transfer from [place] for tertiary level care in the setting of diffuse body rash transfer from [place] for tertiary level care in the setting of diffuse body rash

## 2024-01-06 NOTE — PROGRESS NOTE ADULT - ATTENDING COMMENTS
58W w/ type 2 diabetes, HTN, MDD vs bipolar disorder, UC (dx on colonoscopy at [place], previously treated with steroids), NAFLD c/b ?decompensated cirrhosis, AF (warfarin), admitted to [facility] with periorbital swelling, rash, and pancolitis, transferred to [facility] for derm/rheum/ophtho eval, overall felt to be have resolving bruising, subconjunctival hemorrhage, and UC flare currently receiving IV steroids and planning for possible repeat colonoscopy and infliximab initiation.    #Abd pain with diarrhea cf UC flare  - cont with IV steroids and mesalamine per GI  - planned for repeat Colonoscopy, GI considering infliximab   - cont to monitor fluid status, monitor electrolytes   - ivf hydration prn    rest as above. 58W w/ type 2 diabetes, HTN, MDD vs bipolar disorder, UC (dx on colonoscopy at [place], previously treated with steroids), NAFLD c/b ?decompensated cirrhosis, AF (warfarin), admitted to [facility] with periorbital swelling, rash, and pancolitis, transferred to [facility] for derm/rheum/ophtho eval, overall felt to be have resolving bruising, subconjunctival hemorrhage, and UC flare currently receiving IV steroids and planning for possible repeat colonoscopy and infliximab initiation.    #Abd pain with diarrhea cf UC flare  - cont with IV steroids and mesalamine per GI  - planned for repeat Colonoscopy, GI considering infliximab   - cont to monitor fluid status, monitor electrolytes   - ivf hydration prn    rest as above.

## 2024-01-06 NOTE — PROVIDER CONTACT NOTE (OTHER) - SITUATION
/74- okay to give 3.125 coreg without parameters on emar
Left pinky swollen and bruised
Patient states "I want to die"

## 2024-01-06 NOTE — PROGRESS NOTE ADULT - PROBLEM SELECTOR PLAN 3
Patient with anemia with iron studies more consistent with anemia of chronic disease   -B12 and folate WNL   -patient had drop of Hgb to 6.9 at [place] prior to transfer, transfused with 1pRBC with response to 9.5  -continue to monitor   -transfuse for Hgb >7 Patient with anemia with iron studies more consistent with anemia of chronic disease   -B12 and folate WNL   -patient had drop of Hgb to 6.9 at [place] prior to transfer, transfused with 1pRBC with response to 9.5  -continue to monitor   -transfuse for Hgb >7

## 2024-01-06 NOTE — PROVIDER CONTACT NOTE (OTHER) - ASSESSMENT
patient A&O4. no acute distress noted
Patient AxO4 with a history of MDD and Anxiety
patient AxO4, complains of pain in L pinky

## 2024-01-06 NOTE — PROGRESS NOTE ADULT - PROBLEM SELECTOR PLAN 1
Patient presented with body rash of b/l upper extremity and lower extremity of unknown etiology that is improving from initial presentation   Rash of unclear etiology, initiall ifnectious versus inflammatory now derm believes resolving ecchymosis  work up at [place] includes:   CMV IgG negative   LDH normal   Babesia negative   Lyme negative   RPR negative   ESR, CRP elevated   -rheumatology: likely not vasculitis  -dermatology: likely resolving ecchymosis i/s/o coumadin use  -ophtho: likely subconjunctival hemorrhage no need to stop coumadin Patient presented with body rash of b/l upper extremity and lower extremity of unknown etiology that is improving from initial presentation   Rash of unclear etiology, initiall ifnectious versus inflammatory now derm believes resolving ecchymosis  work up at [place] includes:   CMV IgG negative   LDH normal   Babesia negative   Lyme negative   RPR negative   ESR, CRP elevated   -rheumatology: likely not vasculitis  -dermatology: likely resolving ecchymosis i/s/o coumadin use  -ophtho: likely subconjunctival hemorrhage no need to stop coumadin Patient presented with body rash of b/l upper extremity and lower extremity of unknown etiology that is improving from initial presentation   Rash of unclear etiology, initial ifnectious versus inflammatory now derm believes resolving ecchymosis  work up at [place] includes:   CMV IgG negative   LDH normal   Babesia negative   Lyme negative   RPR negative   ESR, CRP elevated   -rheumatology: likely not vasculitis  -dermatology: likely resolving ecchymosis i/s/o coumadin use  -ophtho: likely subconjunctival hemorrhage no need to stop coumadin Patient presented with body rash of b/l upper extremity and lower extremity of unknown etiology that is improving from initial presentation   Rash of unclear etiology, initial ifnectious versus inflammatory now derm believes resolving ecchymosis  work up at [place] includes:   CMV IgG negative   LDH normal   Babesia negative   Lyme negative   RPR negative   ESR, CRP elevated   -rheumatology: likely not vasculitis  -dermatology: likely resolving ecchymosis i/s/o coumadin use  -ophtho: likely subconjunctival hemorrhage no need to stop coumadin

## 2024-01-06 NOTE — PROVIDER CONTACT NOTE (OTHER) - REASON
Patient states "I want to die"
Left pinky swollen and bruised
/74- okay to give 3.125 coreg without parameters on emar

## 2024-01-06 NOTE — PROGRESS NOTE ADULT - SUBJECTIVE AND OBJECTIVE BOX
ANAYA JOINER 58y Female      Patient is a 58y old  Female who presents with a chief complaint of transfer from [place] for tertiary level care in the setting of diffuse body rash (30 Dec 2023 22:18)        INTERVAL HPI/OVERNIGHT EVENTS: No acute events overnight. Patient was seen and evaluated at the bedside. The patient still endorses abdominal discomfort, she reports no improvement on the steroids. L arm swelling resolved and L hand lesions improving. Patient denies fever/chills, chest pain, shortness of breath,, headaches, nausea/vomiting, or visual changes.       PHYSICAL EXAM:  GENERAL: NAD  HEAD:  Atraumatic, Normocephalic  EYES: left eye and rt eye with mild redness+ no drainage  ENMT: MMM  NECK: Supple, No JVD  NERVOUS SYSTEM: AOX3, motor and sensation grossly intact in b/l UE and b/l LE  PSYCHIATRIC: Appropriate affect and mood  CHEST/LUNG: Clear to auscultation bilaterally; No rales, rhonchi, wheezing, or rubs  HEART: Regular rate and rhythm; No murmurs, rubs, or gallops. No LE edema  ABDOMEN: Soft, mild epigastric tenderness, Nondistended; Bowel sounds present  EXTREMITIES:  2+ Peripheral Pulses, No clubbing, cyanosis  SKIN:  rash improving    Vital Signs Last 24 Hrs  T(C): 36.6 (04 Jan 2024 06:48), Max: 36.9 (03 Jan 2024 21:40)  T(F): 97.8 (04 Jan 2024 06:48), Max: 98.5 (03 Jan 2024 21:40)  HR: 67 (04 Jan 2024 06:48) (67 - 73)  BP: 109/67 (04 Jan 2024 06:48) (96/54 - 117/66)  BP(mean): --  RR: 17 (04 Jan 2024 06:48) (16 - 18)  SpO2: 100% (04 Jan 2024 06:48) (98% - 100%)    Parameters below as of 04 Jan 2024 06:48  Patient On (Oxygen Delivery Method): room air    Consultant(s) Notes Reviewed:  [X] YES  [ ] NO  Care Discussed with Consultants/Other Providers [X] YES  [ ] NO  Imaging Personally Reviewed:  [X] YES  [ ] NO      LABS:    CBC Full  -  ( 04 Jan 2024 05:21 )  WBC Count : 9.58 K/uL  RBC Count : 2.93 M/uL  Hemoglobin : 8.8 g/dL  Hematocrit : 28.1 %  Platelet Count - Automated : 437 K/uL  Mean Cell Volume : 95.9 fL  Mean Cell Hemoglobin : 30.0 pg  Mean Cell Hemoglobin Concentration : 31.3 gm/dL  Auto Neutrophil # : x  Auto Lymphocyte # : x  Auto Monocyte # : x  Auto Eosinophil # : x  Auto Basophil # : x  Auto Neutrophil % : x  Auto Lymphocyte % : x  Auto Monocyte % : x  Auto Eosinophil % : x  Auto Basophil % : x    01-04    136  |  102  |  15  ----------------------------<  138<H>  4.5   |  23  |  0.38<L>    Ca    7.9<L>      04 Jan 2024 05:21  Phos  1.3     01-04  Mg     1.80     01-04    TPro  5.4<L>  /  Alb  2.8<L>  /  TBili  <0.2  /  DBili  x   /  AST  29  /  ALT  21  /  AlkPhos  93  01-04       Urinalysis Basic - ( 30 Dec 2023 05:52 )    Color: x / Appearance: x / SG: x / pH: x  Gluc: 81 mg/dL / Ketone: x  / Bili: x / Urobili: x   Blood: x / Protein: x / Nitrite: x   Leuk Esterase: x / RBC: x / WBC x   Sq Epi: x / Non Sq Epi: x / Bacteria: x        CAPILLARY BLOOD GLUCOSE      POCT Blood Glucose.: 134 mg/dL (30 Dec 2023 21:11)  POCT Blood Glucose.: 110 mg/dL (30 Dec 2023 17:28)  POCT Blood Glucose.: 126 mg/dL (30 Dec 2023 15:23)  POCT Blood Glucose.: 83 mg/dL (30 Dec 2023 08:44)           ANAYA JOINER 58y Female      Patient is a 58y old  Female who presents with a chief complaint of transfer from [place] for tertiary level care in the setting of diffuse body rash (30 Dec 2023 22:18)        INTERVAL HPI/OVERNIGHT EVENTS: No acute events overnight. Patient was seen and evaluated at the bedside. The patient still endorses abdominal discomfort, she reports no improvement on the steroids. L arm swelling resolved and L hand lesions improving. Patient denies fever/chills, chest pain, shortness of breath,, headaches, nausea/vomiting, or visual changes.       PHYSICAL EXAM:  GENERAL: NAD  HEAD:  Atraumatic, Normocephalic  EYES: left eye and rt eye with mild redness+ no drainage  ENMT: MMM  NECK: Supple, No JVD  NERVOUS SYSTEM: AOX3, motor and sensation grossly intact in b/l UE and b/l LE  PSYCHIATRIC: Appropriate affect and mood  CHEST/LUNG: Clear to auscultation bilaterally; No rales, rhonchi, wheezing, or rubs  HEART: Regular rate and rhythm; No murmurs, rubs, or gallops. No LE edema  ABDOMEN: Soft, mild epigastric tenderness, Nondistended; Bowel sounds present  EXTREMITIES:  2+ Peripheral Pulses, No clubbing, cyanosis  SKIN:  rash improving    Vital Signs Last 24 Hrs  T(C): 36.6 (04 Jan 2024 06:48), Max: 36.9 (03 Jan 2024 21:40)  T(F): 97.8 (04 Jan 2024 06:48), Max: 98.5 (03 Jan 2024 21:40)  HR: 67 (04 Jan 2024 06:48) (67 - 73)  BP: 109/67 (04 Jan 2024 06:48) (96/54 - 117/66)  BP(mean): --  RR: 17 (04 Jan 2024 06:48) (16 - 18)  SpO2: 100% (04 Jan 2024 06:48) (98% - 100%)    Parameters below as of 04 Jan 2024 06:48  Patient On (Oxygen Delivery Method): room air    Consultant(s) Notes Reviewed:  [X] YES  [ ] NO  Care Discussed with Consultants/Other Providers [X] YES  [ ] NO  Imaging Personally Reviewed:  [X] YES  [ ] NO      LABS:    CBC Full  -  ( 04 Jan 2024 05:21 )  WBC Count : 9.58 K/uL  RBC Count : 2.93 M/uL  Hemoglobin : 8.8 g/dL  Hematocrit : 28.1 %  Platelet Count - Automated : 437 K/uL  Mean Cell Volume : 95.9 fL  Mean Cell Hemoglobin : 30.0 pg  Mean Cell Hemoglobin Concentration : 31.3 gm/dL  Auto Neutrophil # : x  Auto Lymphocyte # : x  Auto Monocyte # : x  Auto Eosinophil # : x  Auto Basophil # : x  Auto Neutrophil % : x  Auto Lymphocyte % : x  Auto Monocyte % : x  Auto Eosinophil % : x  Auto Basophil % : x    01-04    136  |  102  |  15  ----------------------------<  138<H>  4.5   |  23  |  0.38<L>    Ca    7.9<L>      04 Jan 2024 05:21  Phos  1.3     01-04  Mg     1.80     01-04    TPro  5.4<L>  /  Alb  2.8<L>  /  TBili  <0.2  /  DBili  x   /  AST  29  /  ALT  21  /  AlkPhos  93  01-04       Urinalysis Basic - ( 30 Dec 2023 05:52 )    Color: x / Appearance: x / SG: x / pH: x  Gluc: 81 mg/dL / Ketone: x  / Bili: x / Urobili: x   Blood: x / Protein: x / Nitrite: x   Leuk Esterase: x / RBC: x / WBC x   Sq Epi: x / Non Sq Epi: x / Bacteria: x        CAPILLARY BLOOD GLUCOSE      POCT Blood Glucose.: 134 mg/dL (30 Dec 2023 21:11)  POCT Blood Glucose.: 110 mg/dL (30 Dec 2023 17:28)  POCT Blood Glucose.: 126 mg/dL (30 Dec 2023 15:23)  POCT Blood Glucose.: 83 mg/dL (30 Dec 2023 08:44)           ANAYA JOINER 58y Female      Patient is a 58y old  Female who presents with a chief complaint of transfer from [place] for tertiary level care in the setting of diffuse body rash (30 Dec 2023 22:18)      INTERVAL HPI/OVERNIGHT EVENTS: No acute events overnight. Patient was seen and evaluated at the bedside. The patient still endorses abdominal discomfort, she reports no improvement on the steroids. L arm swelling resolved and L hand lesions improving. Patient denies fever/chills, chest pain, shortness of breath,, headaches, nausea/vomiting, or visual changes.       PHYSICAL EXAM:  GENERAL: NAD  HEAD:  Atraumatic, Normocephalic  EYES: left eye and rt eye with mild redness+ no drainage  ENMT: MMM  NECK: Supple, No JVD  NERVOUS SYSTEM: AOX3, motor and sensation grossly intact in b/l UE and b/l LE  PSYCHIATRIC: Appropriate affect and mood  CHEST/LUNG: Clear to auscultation bilaterally; No rales, rhonchi, wheezing, or rubs  HEART: Regular rate and rhythm; No murmurs, rubs, or gallops. No LE edema  ABDOMEN: Soft, mild epigastric tenderness, Nondistended; Bowel sounds present  EXTREMITIES:  2+ Peripheral Pulses, No clubbing, cyanosis  SKIN:  rash improving    Vital Signs Last 24 Hrs  T(C): 36.7 (06 Jan 2024 05:59), Max: 37.2 (05 Jan 2024 13:12)  T(F): 98 (06 Jan 2024 05:59), Max: 99 (05 Jan 2024 13:12)  HR: 90 (06 Jan 2024 05:59) (83 - 95)  BP: 98/73 (06 Jan 2024 05:59) (98/73 - 107/72)  BP(mean): --  RR: 18 (06 Jan 2024 05:59) (16 - 18)  SpO2: 100% (06 Jan 2024 05:59) (98% - 100%)    Parameters below as of 06 Jan 2024 05:59  Patient On (Oxygen Delivery Method): room air      Consultant(s) Notes Reviewed:  [X] YES  [ ] NO  Care Discussed with Consultants/Other Providers [X] YES  [ ] NO  Imaging Personally Reviewed:  [X] YES  [ ] NO      LABS:     CBC Full  -  ( 06 Jan 2024 07:05 )  WBC Count : 9.69 K/uL  RBC Count : 3.04 M/uL  Hemoglobin : 9.5 g/dL  Hematocrit : 29.6 %  Platelet Count - Automated : 453 K/uL  Mean Cell Volume : 97.4 fL  Mean Cell Hemoglobin : 31.3 pg  Mean Cell Hemoglobin Concentration : 32.1 gm/dL  Auto Neutrophil # : 7.82 K/uL  Auto Lymphocyte # : 1.17 K/uL  Auto Monocyte # : 0.33 K/uL  Auto Eosinophil # : 0.03 K/uL  Auto Basophil # : 0.02 K/uL  Auto Neutrophil % : 80.7 %  Auto Lymphocyte % : 12.1 %  Auto Monocyte % : 3.4 %  Auto Eosinophil % : 0.3 %  Auto Basophil % : 0.2 %    01-06    137  |  100  |  15  ----------------------------<  86  4.4   |  27  |  0.39<L>    Ca    7.8<L>      06 Jan 2024 07:05  Phos  2.2     01-06  Mg     1.80     01-06    TPro  5.6<L>  /  Alb  2.9<L>  /  TBili  <0.2  /  DBili  x   /  AST  20  /  ALT  20  /  AlkPhos  96  01-06      Urinalysis Basic - ( 30 Dec 2023 05:52 )    Color: x / Appearance: x / SG: x / pH: x  Gluc: 81 mg/dL / Ketone: x  / Bili: x / Urobili: x   Blood: x / Protein: x / Nitrite: x   Leuk Esterase: x / RBC: x / WBC x   Sq Epi: x / Non Sq Epi: x / Bacteria: x        CAPILLARY BLOOD GLUCOSE      POCT Blood Glucose.: 134 mg/dL (30 Dec 2023 21:11)  POCT Blood Glucose.: 110 mg/dL (30 Dec 2023 17:28)  POCT Blood Glucose.: 126 mg/dL (30 Dec 2023 15:23)  POCT Blood Glucose.: 83 mg/dL (30 Dec 2023 08:44)           ANAYA JOINER 58y Female      Patient is a 58y old  Female who presents with a chief complaint of transfer from [place] for tertiary level care in the setting of diffuse body rash (30 Dec 2023 22:18)      INTERVAL HPI/OVERNIGHT EVENTS: No acute events overnight. Patient was seen and evaluated at the bedside. The patient still endorses abdominal discomfort, she reports no improvement on the steroids. L arm swelling resolved and L hand lesions improving. Patient denies fever/chills, chest pain, shortness of breath,, headaches, nausea/vomiting, or visual changes.       PHYSICAL EXAM:  GENERAL: NAD  HEAD:  Atraumatic, Normocephalic  EYES: left eye and rt eye with mild redness+ no drainage  ENMT: MMM  NECK: Supple, No JVD  NERVOUS SYSTEM: AOX3, motor and sensation grossly intact in b/l UE and b/l LE  PSYCHIATRIC: Appropriate affect and mood  CHEST/LUNG: Clear to auscultation bilaterally; No rales, rhonchi, wheezing, or rubs  HEART: Regular rate and rhythm; No murmurs, rubs, or gallops. No LE edema  ABDOMEN: Soft, mild epigastric tenderness, Nondistended; Bowel sounds present  EXTREMITIES:  2+ Peripheral Pulses, No clubbing, cyanosis  SKIN:  rash improving    Vital Signs Last 24 Hrs  T(C): 36.7 (06 Jan 2024 05:59), Max: 37.2 (05 Jan 2024 13:12)  T(F): 98 (06 Jan 2024 05:59), Max: 99 (05 Jan 2024 13:12)  HR: 90 (06 Jan 2024 05:59) (83 - 95)  BP: 98/73 (06 Jan 2024 05:59) (98/73 - 107/72)  BP(mean): --  RR: 18 (06 Jan 2024 05:59) (16 - 18)  SpO2: 100% (06 Jan 2024 05:59) (98% - 100%)    Parameters below as of 06 Jan 2024 05:59  Patient On (Oxygen Delivery Method): room air      Consultant(s) Notes Reviewed:  [X] YES  [ ] NO  Care Discussed with Consultants/Other Providers [X] YES  [ ] NO  Imaging Personally Reviewed:  [X] YES  [ ] NO      LABS:     CBC Full  -  ( 06 Jan 2024 07:05 )  WBC Count : 9.69 K/uL  RBC Count : 3.04 M/uL  Hemoglobin : 9.5 g/dL  Hematocrit : 29.6 %  Platelet Count - Automated : 453 K/uL  Mean Cell Volume : 97.4 fL  Mean Cell Hemoglobin : 31.3 pg  Mean Cell Hemoglobin Concentration : 32.1 gm/dL  Auto Neutrophil # : 7.82 K/uL  Auto Lymphocyte # : 1.17 K/uL  Auto Monocyte # : 0.33 K/uL  Auto Eosinophil # : 0.03 K/uL  Auto Basophil # : 0.02 K/uL  Auto Neutrophil % : 80.7 %  Auto Lymphocyte % : 12.1 %  Auto Monocyte % : 3.4 %  Auto Eosinophil % : 0.3 %  Auto Basophil % : 0.2 %    01-06    137  |  100  |  15  ----------------------------<  86  4.4   |  27  |  0.39<L>    Ca    7.8<L>      06 Jan 2024 07:05  Phos  2.2     01-06  Mg     1.80     01-06    TPro  5.6<L>  /  Alb  2.9<L>  /  TBili  <0.2  /  DBili  x   /  AST  20  /  ALT  20  /  AlkPhos  96  01-06      Urinalysis Basic - ( 30 Dec 2023 05:52 )    Color: x / Appearance: x / SG: x / pH: x  Gluc: 81 mg/dL / Ketone: x  / Bili: x / Urobili: x   Blood: x / Protein: x / Nitrite: x   Leuk Esterase: x / RBC: x / WBC x   Sq Epi: x / Non Sq Epi: x / Bacteria: x        CAPILLARY BLOOD GLUCOSE      POCT Blood Glucose.: 134 mg/dL (30 Dec 2023 21:11)  POCT Blood Glucose.: 110 mg/dL (30 Dec 2023 17:28)  POCT Blood Glucose.: 126 mg/dL (30 Dec 2023 15:23)  POCT Blood Glucose.: 83 mg/dL (30 Dec 2023 08:44)           ANAYA JOINER 58y Female      Patient is a 58y old  Female who presents with a chief complaint of transfer from [place] for tertiary level care in the setting of diffuse body rash (30 Dec 2023 22:18)      INTERVAL HPI/OVERNIGHT EVENTS: No acute events overnight. Patient was seen and evaluated at the bedside. The patient endorses abdominal discomfort. L arm swelling resolved and L hand lesions improving. She has not spoken to her sister yet. Patient denies fever/chills, chest pain, shortness of breath,, headaches, nausea/vomiting, or visual changes.       PHYSICAL EXAM:  GENERAL: NAD  HEAD:  Atraumatic, Normocephalic  EYES: left eye and rt eye with mild redness+ no drainage  ENMT: MMM  NECK: Supple, No JVD  NERVOUS SYSTEM: AOX3, motor and sensation grossly intact in b/l UE and b/l LE  PSYCHIATRIC: Appropriate affect and mood  CHEST/LUNG: Clear to auscultation bilaterally; No rales, rhonchi, wheezing, or rubs  HEART: Regular rate and rhythm; No murmurs, rubs, or gallops. No LE edema  ABDOMEN: Soft, mild epigastric tenderness, Nondistended; Bowel sounds present  EXTREMITIES:  2+ Peripheral Pulses, No clubbing, cyanosis  SKIN:  rash improving    Vital Signs Last 24 Hrs  T(C): 36.7 (06 Jan 2024 05:59), Max: 37.2 (05 Jan 2024 13:12)  T(F): 98 (06 Jan 2024 05:59), Max: 99 (05 Jan 2024 13:12)  HR: 90 (06 Jan 2024 05:59) (83 - 95)  BP: 98/73 (06 Jan 2024 05:59) (98/73 - 107/72)  BP(mean): --  RR: 18 (06 Jan 2024 05:59) (16 - 18)  SpO2: 100% (06 Jan 2024 05:59) (98% - 100%)    Parameters below as of 06 Jan 2024 05:59  Patient On (Oxygen Delivery Method): room air      Consultant(s) Notes Reviewed:  [X] YES  [ ] NO  Care Discussed with Consultants/Other Providers [X] YES  [ ] NO  Imaging Personally Reviewed:  [X] YES  [ ] NO      LABS:     CBC Full  -  ( 06 Jan 2024 07:05 )  WBC Count : 9.69 K/uL  RBC Count : 3.04 M/uL  Hemoglobin : 9.5 g/dL  Hematocrit : 29.6 %  Platelet Count - Automated : 453 K/uL  Mean Cell Volume : 97.4 fL  Mean Cell Hemoglobin : 31.3 pg  Mean Cell Hemoglobin Concentration : 32.1 gm/dL  Auto Neutrophil # : 7.82 K/uL  Auto Lymphocyte # : 1.17 K/uL  Auto Monocyte # : 0.33 K/uL  Auto Eosinophil # : 0.03 K/uL  Auto Basophil # : 0.02 K/uL  Auto Neutrophil % : 80.7 %  Auto Lymphocyte % : 12.1 %  Auto Monocyte % : 3.4 %  Auto Eosinophil % : 0.3 %  Auto Basophil % : 0.2 %    01-06    137  |  100  |  15  ----------------------------<  86  4.4   |  27  |  0.39<L>    Ca    7.8<L>      06 Jan 2024 07:05  Phos  2.2     01-06  Mg     1.80     01-06    TPro  5.6<L>  /  Alb  2.9<L>  /  TBili  <0.2  /  DBili  x   /  AST  20  /  ALT  20  /  AlkPhos  96  01-06      Urinalysis Basic - ( 30 Dec 2023 05:52 )    Color: x / Appearance: x / SG: x / pH: x  Gluc: 81 mg/dL / Ketone: x  / Bili: x / Urobili: x   Blood: x / Protein: x / Nitrite: x   Leuk Esterase: x / RBC: x / WBC x   Sq Epi: x / Non Sq Epi: x / Bacteria: x        CAPILLARY BLOOD GLUCOSE      POCT Blood Glucose.: 134 mg/dL (30 Dec 2023 21:11)  POCT Blood Glucose.: 110 mg/dL (30 Dec 2023 17:28)  POCT Blood Glucose.: 126 mg/dL (30 Dec 2023 15:23)  POCT Blood Glucose.: 83 mg/dL (30 Dec 2023 08:44)           ANAYA JOINER 58y Female      Patient is a 58y old  Female who presents with a chief complaint of transfer from [place] for tertiary level care in the setting of diffuse body rash (30 Dec 2023 22:18)      INTERVAL HPI/OVERNIGHT EVENTS: No acute events overnight. Patient was seen and evaluated at the bedside. The patient endorses abdominal discomfort. L arm swelling resolved and L hand lesions improving. She has not spoken to her sister yet. Patient denies fever/chills, chest pain, shortness of breath,, headaches, nausea/vomiting, or visual changes.       PHYSICAL EXAM:  GENERAL: NAD  HEAD:  Atraumatic, Normocephalic  EYES: left eye and rt eye with mild redness+ no drainage  ENMT: MMM  NECK: Supple, No JVD  NERVOUS SYSTEM: AOX3, motor and sensation grossly intact in b/l UE and b/l LE  PSYCHIATRIC: Appropriate affect and mood  CHEST/LUNG: Clear to auscultation bilaterally; No rales, rhonchi, wheezing, or rubs  HEART: Regular rate and rhythm; No murmurs, rubs, or gallops. No LE edema  ABDOMEN: Soft, mild epigastric tenderness, Nondistended; Bowel sounds present  EXTREMITIES:  2+ Peripheral Pulses, No clubbing, cyanosis  SKIN:  rash improving    Vital Signs Last 24 Hrs  T(C): 36.7 (06 Jan 2024 05:59), Max: 37.2 (05 Jan 2024 13:12)  T(F): 98 (06 Jan 2024 05:59), Max: 99 (05 Jan 2024 13:12)  HR: 90 (06 Jan 2024 05:59) (83 - 95)  BP: 98/73 (06 Jan 2024 05:59) (98/73 - 107/72)  BP(mean): --  RR: 18 (06 Jan 2024 05:59) (16 - 18)  SpO2: 100% (06 Jan 2024 05:59) (98% - 100%)    Parameters below as of 06 Jan 2024 05:59  Patient On (Oxygen Delivery Method): room air      Consultant(s) Notes Reviewed:  [X] YES  [ ] NO  Care Discussed with Consultants/Other Providers [X] YES  [ ] NO  Imaging Personally Reviewed:  [X] YES  [ ] NO      LABS:     CBC Full  -  ( 06 Jan 2024 07:05 )  WBC Count : 9.69 K/uL  RBC Count : 3.04 M/uL  Hemoglobin : 9.5 g/dL  Hematocrit : 29.6 %  Platelet Count - Automated : 453 K/uL  Mean Cell Volume : 97.4 fL  Mean Cell Hemoglobin : 31.3 pg  Mean Cell Hemoglobin Concentration : 32.1 gm/dL  Auto Neutrophil # : 7.82 K/uL  Auto Lymphocyte # : 1.17 K/uL  Auto Monocyte # : 0.33 K/uL  Auto Eosinophil # : 0.03 K/uL  Auto Basophil # : 0.02 K/uL  Auto Neutrophil % : 80.7 %  Auto Lymphocyte % : 12.1 %  Auto Monocyte % : 3.4 %  Auto Eosinophil % : 0.3 %  Auto Basophil % : 0.2 %    01-06    137  |  100  |  15  ----------------------------<  86  4.4   |  27  |  0.39<L>    Ca    7.8<L>      06 Jan 2024 07:05  Phos  2.2     01-06  Mg     1.80     01-06    TPro  5.6<L>  /  Alb  2.9<L>  /  TBili  <0.2  /  DBili  x   /  AST  20  /  ALT  20  /  AlkPhos  96  01-06      Urinalysis Basic - ( 30 Dec 2023 05:52 )    Color: x / Appearance: x / SG: x / pH: x  Gluc: 81 mg/dL / Ketone: x  / Bili: x / Urobili: x   Blood: x / Protein: x / Nitrite: x   Leuk Esterase: x / RBC: x / WBC x   Sq Epi: x / Non Sq Epi: x / Bacteria: x        CAPILLARY BLOOD GLUCOSE      POCT Blood Glucose.: 134 mg/dL (30 Dec 2023 21:11)  POCT Blood Glucose.: 110 mg/dL (30 Dec 2023 17:28)  POCT Blood Glucose.: 126 mg/dL (30 Dec 2023 15:23)  POCT Blood Glucose.: 83 mg/dL (30 Dec 2023 08:44)

## 2024-01-07 LAB
ALBUMIN SERPL ELPH-MCNC: 3 G/DL — LOW (ref 3.3–5)
ALBUMIN SERPL ELPH-MCNC: 3 G/DL — LOW (ref 3.3–5)
ALP SERPL-CCNC: 96 U/L — SIGNIFICANT CHANGE UP (ref 40–120)
ALP SERPL-CCNC: 96 U/L — SIGNIFICANT CHANGE UP (ref 40–120)
ALT FLD-CCNC: 19 U/L — SIGNIFICANT CHANGE UP (ref 4–33)
ALT FLD-CCNC: 19 U/L — SIGNIFICANT CHANGE UP (ref 4–33)
ANION GAP SERPL CALC-SCNC: 11 MMOL/L — SIGNIFICANT CHANGE UP (ref 7–14)
ANION GAP SERPL CALC-SCNC: 11 MMOL/L — SIGNIFICANT CHANGE UP (ref 7–14)
APTT BLD: 29.1 SEC — SIGNIFICANT CHANGE UP (ref 24.5–35.6)
APTT BLD: 29.1 SEC — SIGNIFICANT CHANGE UP (ref 24.5–35.6)
AST SERPL-CCNC: 19 U/L — SIGNIFICANT CHANGE UP (ref 4–32)
AST SERPL-CCNC: 19 U/L — SIGNIFICANT CHANGE UP (ref 4–32)
BASOPHILS # BLD AUTO: 0 K/UL — SIGNIFICANT CHANGE UP (ref 0–0.2)
BASOPHILS # BLD AUTO: 0 K/UL — SIGNIFICANT CHANGE UP (ref 0–0.2)
BASOPHILS NFR BLD AUTO: 0 % — SIGNIFICANT CHANGE UP (ref 0–2)
BASOPHILS NFR BLD AUTO: 0 % — SIGNIFICANT CHANGE UP (ref 0–2)
BILIRUB SERPL-MCNC: <0.2 MG/DL — SIGNIFICANT CHANGE UP (ref 0.2–1.2)
BILIRUB SERPL-MCNC: <0.2 MG/DL — SIGNIFICANT CHANGE UP (ref 0.2–1.2)
BUN SERPL-MCNC: 23 MG/DL — SIGNIFICANT CHANGE UP (ref 7–23)
BUN SERPL-MCNC: 23 MG/DL — SIGNIFICANT CHANGE UP (ref 7–23)
CALCIUM SERPL-MCNC: 8.3 MG/DL — LOW (ref 8.4–10.5)
CALCIUM SERPL-MCNC: 8.3 MG/DL — LOW (ref 8.4–10.5)
CHLORIDE SERPL-SCNC: 100 MMOL/L — SIGNIFICANT CHANGE UP (ref 98–107)
CHLORIDE SERPL-SCNC: 100 MMOL/L — SIGNIFICANT CHANGE UP (ref 98–107)
CO2 SERPL-SCNC: 24 MMOL/L — SIGNIFICANT CHANGE UP (ref 22–31)
CO2 SERPL-SCNC: 24 MMOL/L — SIGNIFICANT CHANGE UP (ref 22–31)
CREAT SERPL-MCNC: 0.46 MG/DL — LOW (ref 0.5–1.3)
CREAT SERPL-MCNC: 0.46 MG/DL — LOW (ref 0.5–1.3)
CRP SERPL-MCNC: 21.3 MG/L — HIGH
CRP SERPL-MCNC: 21.3 MG/L — HIGH
EGFR: 112 ML/MIN/1.73M2 — SIGNIFICANT CHANGE UP
EGFR: 112 ML/MIN/1.73M2 — SIGNIFICANT CHANGE UP
EOSINOPHIL # BLD AUTO: 0 K/UL — SIGNIFICANT CHANGE UP (ref 0–0.5)
EOSINOPHIL # BLD AUTO: 0 K/UL — SIGNIFICANT CHANGE UP (ref 0–0.5)
EOSINOPHIL NFR BLD AUTO: 0 % — SIGNIFICANT CHANGE UP (ref 0–6)
EOSINOPHIL NFR BLD AUTO: 0 % — SIGNIFICANT CHANGE UP (ref 0–6)
ERYTHROCYTE [SEDIMENTATION RATE] IN BLOOD: 30 MM/HR — HIGH (ref 4–25)
ERYTHROCYTE [SEDIMENTATION RATE] IN BLOOD: 30 MM/HR — HIGH (ref 4–25)
GLUCOSE BLDC GLUCOMTR-MCNC: 119 MG/DL — HIGH (ref 70–99)
GLUCOSE BLDC GLUCOMTR-MCNC: 119 MG/DL — HIGH (ref 70–99)
GLUCOSE BLDC GLUCOMTR-MCNC: 142 MG/DL — HIGH (ref 70–99)
GLUCOSE BLDC GLUCOMTR-MCNC: 142 MG/DL — HIGH (ref 70–99)
GLUCOSE BLDC GLUCOMTR-MCNC: 176 MG/DL — HIGH (ref 70–99)
GLUCOSE BLDC GLUCOMTR-MCNC: 176 MG/DL — HIGH (ref 70–99)
GLUCOSE BLDC GLUCOMTR-MCNC: 196 MG/DL — HIGH (ref 70–99)
GLUCOSE BLDC GLUCOMTR-MCNC: 196 MG/DL — HIGH (ref 70–99)
GLUCOSE SERPL-MCNC: 115 MG/DL — HIGH (ref 70–99)
GLUCOSE SERPL-MCNC: 115 MG/DL — HIGH (ref 70–99)
HCT VFR BLD CALC: 26.4 % — LOW (ref 34.5–45)
HCT VFR BLD CALC: 26.4 % — LOW (ref 34.5–45)
HGB BLD-MCNC: 8.6 G/DL — LOW (ref 11.5–15.5)
HGB BLD-MCNC: 8.6 G/DL — LOW (ref 11.5–15.5)
IANC: 6.83 K/UL — SIGNIFICANT CHANGE UP (ref 1.8–7.4)
IANC: 6.83 K/UL — SIGNIFICANT CHANGE UP (ref 1.8–7.4)
IMM GRANULOCYTES NFR BLD AUTO: 2.3 % — HIGH (ref 0–0.9)
IMM GRANULOCYTES NFR BLD AUTO: 2.3 % — HIGH (ref 0–0.9)
INR BLD: 1.48 RATIO — HIGH (ref 0.85–1.18)
INR BLD: 1.48 RATIO — HIGH (ref 0.85–1.18)
LYMPHOCYTES # BLD AUTO: 0.81 K/UL — LOW (ref 1–3.3)
LYMPHOCYTES # BLD AUTO: 0.81 K/UL — LOW (ref 1–3.3)
LYMPHOCYTES # BLD AUTO: 9.8 % — LOW (ref 13–44)
LYMPHOCYTES # BLD AUTO: 9.8 % — LOW (ref 13–44)
MAGNESIUM SERPL-MCNC: 2 MG/DL — SIGNIFICANT CHANGE UP (ref 1.6–2.6)
MAGNESIUM SERPL-MCNC: 2 MG/DL — SIGNIFICANT CHANGE UP (ref 1.6–2.6)
MCHC RBC-ENTMCNC: 31.2 PG — SIGNIFICANT CHANGE UP (ref 27–34)
MCHC RBC-ENTMCNC: 31.2 PG — SIGNIFICANT CHANGE UP (ref 27–34)
MCHC RBC-ENTMCNC: 32.6 GM/DL — SIGNIFICANT CHANGE UP (ref 32–36)
MCHC RBC-ENTMCNC: 32.6 GM/DL — SIGNIFICANT CHANGE UP (ref 32–36)
MCV RBC AUTO: 95.7 FL — SIGNIFICANT CHANGE UP (ref 80–100)
MCV RBC AUTO: 95.7 FL — SIGNIFICANT CHANGE UP (ref 80–100)
MONOCYTES # BLD AUTO: 0.43 K/UL — SIGNIFICANT CHANGE UP (ref 0–0.9)
MONOCYTES # BLD AUTO: 0.43 K/UL — SIGNIFICANT CHANGE UP (ref 0–0.9)
MONOCYTES NFR BLD AUTO: 5.2 % — SIGNIFICANT CHANGE UP (ref 2–14)
MONOCYTES NFR BLD AUTO: 5.2 % — SIGNIFICANT CHANGE UP (ref 2–14)
NEUTROPHILS # BLD AUTO: 6.83 K/UL — SIGNIFICANT CHANGE UP (ref 1.8–7.4)
NEUTROPHILS # BLD AUTO: 6.83 K/UL — SIGNIFICANT CHANGE UP (ref 1.8–7.4)
NEUTROPHILS NFR BLD AUTO: 82.7 % — HIGH (ref 43–77)
NEUTROPHILS NFR BLD AUTO: 82.7 % — HIGH (ref 43–77)
NRBC # BLD: 0 /100 WBCS — SIGNIFICANT CHANGE UP (ref 0–0)
NRBC # BLD: 0 /100 WBCS — SIGNIFICANT CHANGE UP (ref 0–0)
NRBC # FLD: 0 K/UL — SIGNIFICANT CHANGE UP (ref 0–0)
NRBC # FLD: 0 K/UL — SIGNIFICANT CHANGE UP (ref 0–0)
PHOSPHATE SERPL-MCNC: 2.5 MG/DL — SIGNIFICANT CHANGE UP (ref 2.5–4.5)
PHOSPHATE SERPL-MCNC: 2.5 MG/DL — SIGNIFICANT CHANGE UP (ref 2.5–4.5)
PLATELET # BLD AUTO: 457 K/UL — HIGH (ref 150–400)
PLATELET # BLD AUTO: 457 K/UL — HIGH (ref 150–400)
POTASSIUM SERPL-MCNC: 4.8 MMOL/L — SIGNIFICANT CHANGE UP (ref 3.5–5.3)
POTASSIUM SERPL-MCNC: 4.8 MMOL/L — SIGNIFICANT CHANGE UP (ref 3.5–5.3)
POTASSIUM SERPL-SCNC: 4.8 MMOL/L — SIGNIFICANT CHANGE UP (ref 3.5–5.3)
POTASSIUM SERPL-SCNC: 4.8 MMOL/L — SIGNIFICANT CHANGE UP (ref 3.5–5.3)
PROT SERPL-MCNC: 5.7 G/DL — LOW (ref 6–8.3)
PROT SERPL-MCNC: 5.7 G/DL — LOW (ref 6–8.3)
PROTHROM AB SERPL-ACNC: 16.5 SEC — HIGH (ref 9.5–13)
PROTHROM AB SERPL-ACNC: 16.5 SEC — HIGH (ref 9.5–13)
RBC # BLD: 2.76 M/UL — LOW (ref 3.8–5.2)
RBC # BLD: 2.76 M/UL — LOW (ref 3.8–5.2)
RBC # FLD: 15.5 % — HIGH (ref 10.3–14.5)
RBC # FLD: 15.5 % — HIGH (ref 10.3–14.5)
SODIUM SERPL-SCNC: 135 MMOL/L — SIGNIFICANT CHANGE UP (ref 135–145)
SODIUM SERPL-SCNC: 135 MMOL/L — SIGNIFICANT CHANGE UP (ref 135–145)
WBC # BLD: 8.26 K/UL — SIGNIFICANT CHANGE UP (ref 3.8–10.5)
WBC # BLD: 8.26 K/UL — SIGNIFICANT CHANGE UP (ref 3.8–10.5)
WBC # FLD AUTO: 8.26 K/UL — SIGNIFICANT CHANGE UP (ref 3.8–10.5)
WBC # FLD AUTO: 8.26 K/UL — SIGNIFICANT CHANGE UP (ref 3.8–10.5)

## 2024-01-07 PROCEDURE — 99232 SBSQ HOSP IP/OBS MODERATE 35: CPT

## 2024-01-07 RX ORDER — WARFARIN SODIUM 2.5 MG/1
6 TABLET ORAL ONCE
Refills: 0 | Status: COMPLETED | OUTPATIENT
Start: 2024-01-07 | End: 2024-01-07

## 2024-01-07 RX ADMIN — Medication 400 MILLIGRAM(S): at 21:42

## 2024-01-07 RX ADMIN — Medication 1: at 18:27

## 2024-01-07 RX ADMIN — Medication 1 MILLIGRAM(S): at 13:07

## 2024-01-07 RX ADMIN — Medication 20 MILLIGRAM(S): at 21:42

## 2024-01-07 RX ADMIN — Medication 20 MILLIGRAM(S): at 05:09

## 2024-01-07 RX ADMIN — SPIRONOLACTONE 100 MILLIGRAM(S): 25 TABLET, FILM COATED ORAL at 05:10

## 2024-01-07 RX ADMIN — Medication 20 MILLIGRAM(S): at 13:07

## 2024-01-07 RX ADMIN — MIRTAZAPINE 30 MILLIGRAM(S): 45 TABLET, ORALLY DISINTEGRATING ORAL at 13:07

## 2024-01-07 RX ADMIN — WARFARIN SODIUM 6 MILLIGRAM(S): 2.5 TABLET ORAL at 21:41

## 2024-01-07 RX ADMIN — Medication 400 MILLIGRAM(S): at 13:07

## 2024-01-07 RX ADMIN — CARVEDILOL PHOSPHATE 3.12 MILLIGRAM(S): 80 CAPSULE, EXTENDED RELEASE ORAL at 17:44

## 2024-01-07 RX ADMIN — Medication 400 MILLIGRAM(S): at 05:11

## 2024-01-07 NOTE — PROGRESS NOTE ADULT - PROBLEM SELECTOR PLAN 3
Patient with anemia with iron studies more consistent with anemia of chronic disease   -B12 and folate WNL   -patient had drop of Hgb to 6.9 at [place] prior to transfer, transfused with 1pRBC with response to 9.5  -continue to monitor   -transfuse for Hgb >7 Patient with anemia with iron studies more consistent with anemia of chronic disease   -B12 and folate WNL   -patient had drop of Hgb to 6.9 at [place] prior to transfer, transfused with 1pRBC with response to 9.5  -continue to monitor   -transfuse for Hgb >7

## 2024-01-07 NOTE — PROGRESS NOTE ADULT - ASSESSMENT
58 y F w PMHx of CAD, diabetes, insomnia, MDD, GERD, hypertension, irritable bowel syndrome with diarrhea, ulcerative colitis, cirrhosis, NAFLD, hepatic encephalopathy, bipolar disorder, chronic Afib on Coumadin, blepharitis of the left eye who initially presented to [place] due to reported hypotension, tachycardia and diffuse purpura. Patient transferred to [facility] for further workup and consultations for her body rash.  58 y F w PMHx of CAD, diabetes, insomnia, MDD, GERD, hypertension, irritable bowel syndrome with diarrhea, ulcerative colitis, cirrhosis, NAFLD, hepatic encephalopathy, bipolar disorder, chronic Afib on Coumadin, blepharitis of the left eye who initially presented to [place] due to reported hypotension, tachycardia and diffuse purpura. Patient transferred to [facility] for further workup and consultations for her body rash.

## 2024-01-07 NOTE — PROGRESS NOTE ADULT - SUBJECTIVE AND OBJECTIVE BOX
PROGRESS NOTE:     Patient is a 58y old  Female who presents with a chief complaint of transfer from [place] for tertiary level care in the setting of diffuse body rash (06 Jan 2024 07:40)      SUBJECTIVE / OVERNIGHT EVENTS:    ADDITIONAL REVIEW OF SYSTEMS:    MEDICATIONS  (STANDING):  carvedilol 3.125 milliGRAM(s) Oral every 12 hours  dextrose 5%. 1000 milliLiter(s) (100 mL/Hr) IV Continuous <Continuous>  dextrose 5%. 1000 milliLiter(s) (50 mL/Hr) IV Continuous <Continuous>  dextrose 50% Injectable 12.5 Gram(s) IV Push once  dextrose 50% Injectable 25 Gram(s) IV Push once  dextrose 50% Injectable 25 Gram(s) IV Push once  folic acid 1 milliGRAM(s) Oral daily  glucagon  Injectable 1 milliGRAM(s) IntraMuscular once  influenza   Vaccine 0.5 milliLiter(s) IntraMuscular once  insulin lispro (ADMELOG) corrective regimen sliding scale   SubCutaneous at bedtime  insulin lispro (ADMELOG) corrective regimen sliding scale   SubCutaneous three times a day before meals  lactated ringers. 1000 milliLiter(s) (100 mL/Hr) IV Continuous <Continuous>  mesalamine DR Capsule 400 milliGRAM(s) Oral three times a day  methylPREDNISolone sodium succinate Injectable 20 milliGRAM(s) IV Push every 8 hours  mirtazapine 30 milliGRAM(s) Oral daily  ondansetron Injectable 4 milliGRAM(s) IV Push once  pantoprazole    Tablet 40 milliGRAM(s) Oral before breakfast  rifAXIMin 550 milliGRAM(s) Oral two times a day  spironolactone 100 milliGRAM(s) Oral daily    MEDICATIONS  (PRN):  acetaminophen     Tablet .. 650 milliGRAM(s) Oral every 6 hours PRN Temp greater or equal to 38C (100.4F), Mild Pain (1 - 3)  aluminum hydroxide/magnesium hydroxide/simethicone Suspension 30 milliLiter(s) Oral every 6 hours PRN Dyspepsia  dextrose Oral Gel 15 Gram(s) Oral once PRN Blood Glucose LESS THAN 70 milliGRAM(s)/deciliter      CAPILLARY BLOOD GLUCOSE      POCT Blood Glucose.: 119 mg/dL (07 Jan 2024 08:43)  POCT Blood Glucose.: 215 mg/dL (06 Jan 2024 21:36)  POCT Blood Glucose.: 170 mg/dL (06 Jan 2024 18:13)  POCT Blood Glucose.: 140 mg/dL (06 Jan 2024 12:49)    I&O's Summary      PHYSICAL EXAM:  Vital Signs Last 24 Hrs  T(C): 36.9 (07 Jan 2024 06:16), Max: 37 (06 Jan 2024 21:26)  T(F): 98.4 (07 Jan 2024 06:16), Max: 98.6 (06 Jan 2024 21:26)  HR: 82 (07 Jan 2024 06:16) (82 - 97)  BP: 112/66 (07 Jan 2024 06:16) (102/62 - 112/66)  BP(mean): --  RR: 18 (07 Jan 2024 06:16) (17 - 18)  SpO2: 99% (07 Jan 2024 06:16) (98% - 99%)    Parameters below as of 07 Jan 2024 06:16  Patient On (Oxygen Delivery Method): room air        CONSTITUTIONAL: NAD, well-developed  RESPIRATORY: Normal respiratory effort; lungs are clear to auscultation bilaterally  CARDIOVASCULAR: Regular rate and rhythm, normal S1 and S2, no murmur/rub/gallop; No lower extremity edema; Peripheral pulses are 2+ bilaterally  ABDOMEN: Nontender to palpation, normoactive bowel sounds, no rebound/guarding  MUSCULOSKELETAL: no clubbing or cyanosis of digits; no joint swelling or tenderness to palpation  PSYCH: A+O to person, place, and time; affect appropriate    LABS:                        8.6    8.26  )-----------( 457      ( 07 Jan 2024 07:42 )             26.4     01-07    135  |  100  |  23  ----------------------------<  115<H>  4.8   |  24  |  0.46<L>    Ca    8.3<L>      07 Jan 2024 07:42  Phos  2.5     01-07  Mg     2.00     01-07    TPro  5.7<L>  /  Alb  3.0<L>  /  TBili  <0.2  /  DBili  x   /  AST  19  /  ALT  19  /  AlkPhos  96  01-07    PT/INR - ( 07 Jan 2024 07:42 )   PT: 16.5 sec;   INR: 1.48 ratio         PTT - ( 07 Jan 2024 07:42 )  PTT:29.1 sec      Urinalysis Basic - ( 07 Jan 2024 07:42 )    Color: x / Appearance: x / SG: x / pH: x  Gluc: 115 mg/dL / Ketone: x  / Bili: x / Urobili: x   Blood: x / Protein: x / Nitrite: x   Leuk Esterase: x / RBC: x / WBC x   Sq Epi: x / Non Sq Epi: x / Bacteria: x          RADIOLOGY & ADDITIONAL TESTS:  Results Reviewed:   Imaging Personally Reviewed:  Electrocardiogram Personally Reviewed:    COORDINATION OF CARE:  Care Discussed with Consultants/Other Providers [Y/N]:  Prior or Outpatient Records Reviewed [Y/N]:   PROGRESS NOTE:     Patient is a 58y old  Female who presents with a chief complaint of transfer from [place] for tertiary level care in the setting of diffuse body rash (06 Jan 2024 07:40)      SUBJECTIVE / OVERNIGHT EVENTS:    ADDITIONAL REVIEW OF SYSTEMS:    MEDICATIONS  (STANDING):  carvedilol 3.125 milliGRAM(s) Oral every 12 hours  dextrose 5%. 1000 milliLiter(s) (100 mL/Hr) IV Continuous <Continuous>  dextrose 5%. 1000 milliLiter(s) (50 mL/Hr) IV Continuous <Continuous>  dextrose 50% Injectable 12.5 Gram(s) IV Push once  dextrose 50% Injectable 25 Gram(s) IV Push once  dextrose 50% Injectable 25 Gram(s) IV Push once  folic acid 1 milliGRAM(s) Oral daily  glucagon  Injectable 1 milliGRAM(s) IntraMuscular once  influenza   Vaccine 0.5 milliLiter(s) IntraMuscular once  insulin lispro (ADMELOG) corrective regimen sliding scale   SubCutaneous at bedtime  insulin lispro (ADMELOG) corrective regimen sliding scale   SubCutaneous three times a day before meals  lactated ringers. 1000 milliLiter(s) (100 mL/Hr) IV Continuous <Continuous>  mesalamine DR Capsule 400 milliGRAM(s) Oral three times a day  methylPREDNISolone sodium succinate Injectable 20 milliGRAM(s) IV Push every 8 hours  mirtazapine 30 milliGRAM(s) Oral daily  ondansetron Injectable 4 milliGRAM(s) IV Push once  pantoprazole    Tablet 40 milliGRAM(s) Oral before breakfast  rifAXIMin 550 milliGRAM(s) Oral two times a day  spironolactone 100 milliGRAM(s) Oral daily    MEDICATIONS  (PRN):  acetaminophen     Tablet .. 650 milliGRAM(s) Oral every 6 hours PRN Temp greater or equal to 38C (100.4F), Mild Pain (1 - 3)  aluminum hydroxide/magnesium hydroxide/simethicone Suspension 30 milliLiter(s) Oral every 6 hours PRN Dyspepsia  dextrose Oral Gel 15 Gram(s) Oral once PRN Blood Glucose LESS THAN 70 milliGRAM(s)/deciliter      CAPILLARY BLOOD GLUCOSE      POCT Blood Glucose.: 119 mg/dL (07 Jan 2024 08:43)  POCT Blood Glucose.: 215 mg/dL (06 Jan 2024 21:36)  POCT Blood Glucose.: 170 mg/dL (06 Jan 2024 18:13)  POCT Blood Glucose.: 140 mg/dL (06 Jan 2024 12:49)    I&O's Summary      PHYSICAL EXAM:  Vital Signs Last 24 Hrs  T(C): 36.9 (07 Jan 2024 06:16), Max: 37 (06 Jan 2024 21:26)  T(F): 98.4 (07 Jan 2024 06:16), Max: 98.6 (06 Jan 2024 21:26)  HR: 82 (07 Jan 2024 06:16) (82 - 97)  BP: 112/66 (07 Jan 2024 06:16) (102/62 - 112/66)  BP(mean): --  RR: 18 (07 Jan 2024 06:16) (17 - 18)  SpO2: 99% (07 Jan 2024 06:16) (98% - 99%)    Parameters below as of 07 Jan 2024 06:16  Patient On (Oxygen Delivery Method): room air        CONSTITUTIONAL: NAD, well-developed  RESPIRATORY: Normal respiratory effort; lungs are clear to auscultation bilaterally  CARDIOVASCULAR: Regular rate and rhythm, normal S1 and S2, no murmur/rub/gallop; No lower extremity edema; Peripheral pulses are 2+ bilaterally  ABDOMEN: Nontender to palpation, normoactive bowel sounds, no rebound/guarding  MUSCULOSKELETAL: no clubbing or cyanosis of digits; no joint swelling or tenderness to palpation  PSYCH: A+O to person, place, and time; affect appropriate    LABS:                        8.6    8.26  )-----------( 457      ( 07 Jan 2024 07:42 )             26.4     01-07    135  |  100  |  23  ----------------------------<  115<H>  4.8   |  24  |  0.46<L>    Ca    8.3<L>      07 Jan 2024 07:42  Phos  2.5     01-07  Mg     2.00     01-07    TPro  5.7<L>  /  Alb  3.0<L>  /  TBili  <0.2  /  DBili  x   /  AST  19  /  ALT  19  /  AlkPhos  96  01-07    PT/INR - ( 07 Jan 2024 07:42 )   PT: 16.5 sec;   INR: 1.48 ratio         PTT - ( 07 Jan 2024 07:42 )  PTT:29.1 sec      Urinalysis Basic - ( 07 Jan 2024 07:42 )    Color: x / Appearance: x / SG: x / pH: x  Gluc: 115 mg/dL / Ketone: x  / Bili: x / Urobili: x   Blood: x / Protein: x / Nitrite: x   Leuk Esterase: x / RBC: x / WBC x   Sq Epi: x / Non Sq Epi: x / Bacteria: x          RADIOLOGY & ADDITIONAL TESTS:  Results Reviewed:   Imaging Personally Reviewed:  Electrocardiogram Personally Reviewed:    COORDINATION OF CARE:  Care Discussed with Consultants/Other Providers [Y/N]:  Prior or Outpatient Records Reviewed [Y/N]:   PROGRESS NOTE:     Patient is a 58y old  Female who presents with a chief complaint of transfer from [place] for tertiary level care in the setting of diffuse body rash (06 Jan 2024 07:40)      SUBJECTIVE / OVERNIGHT EVENTS: No acute events overnight. This AM seen and examined at bedside- patient resting comfortably in bed, no complaints at this time.     ADDITIONAL REVIEW OF SYSTEMS: Negative     MEDICATIONS  (STANDING):  carvedilol 3.125 milliGRAM(s) Oral every 12 hours  dextrose 5%. 1000 milliLiter(s) (100 mL/Hr) IV Continuous <Continuous>  dextrose 5%. 1000 milliLiter(s) (50 mL/Hr) IV Continuous <Continuous>  dextrose 50% Injectable 12.5 Gram(s) IV Push once  dextrose 50% Injectable 25 Gram(s) IV Push once  dextrose 50% Injectable 25 Gram(s) IV Push once  folic acid 1 milliGRAM(s) Oral daily  glucagon  Injectable 1 milliGRAM(s) IntraMuscular once  influenza   Vaccine 0.5 milliLiter(s) IntraMuscular once  insulin lispro (ADMELOG) corrective regimen sliding scale   SubCutaneous at bedtime  insulin lispro (ADMELOG) corrective regimen sliding scale   SubCutaneous three times a day before meals  lactated ringers. 1000 milliLiter(s) (100 mL/Hr) IV Continuous <Continuous>  mesalamine DR Capsule 400 milliGRAM(s) Oral three times a day  methylPREDNISolone sodium succinate Injectable 20 milliGRAM(s) IV Push every 8 hours  mirtazapine 30 milliGRAM(s) Oral daily  ondansetron Injectable 4 milliGRAM(s) IV Push once  pantoprazole    Tablet 40 milliGRAM(s) Oral before breakfast  rifAXIMin 550 milliGRAM(s) Oral two times a day  spironolactone 100 milliGRAM(s) Oral daily    MEDICATIONS  (PRN):  acetaminophen     Tablet .. 650 milliGRAM(s) Oral every 6 hours PRN Temp greater or equal to 38C (100.4F), Mild Pain (1 - 3)  aluminum hydroxide/magnesium hydroxide/simethicone Suspension 30 milliLiter(s) Oral every 6 hours PRN Dyspepsia  dextrose Oral Gel 15 Gram(s) Oral once PRN Blood Glucose LESS THAN 70 milliGRAM(s)/deciliter      CAPILLARY BLOOD GLUCOSE      POCT Blood Glucose.: 119 mg/dL (07 Jan 2024 08:43)  POCT Blood Glucose.: 215 mg/dL (06 Jan 2024 21:36)  POCT Blood Glucose.: 170 mg/dL (06 Jan 2024 18:13)  POCT Blood Glucose.: 140 mg/dL (06 Jan 2024 12:49)    I&O's Summary      PHYSICAL EXAM:  Vital Signs Last 24 Hrs  T(C): 36.9 (07 Jan 2024 06:16), Max: 37 (06 Jan 2024 21:26)  T(F): 98.4 (07 Jan 2024 06:16), Max: 98.6 (06 Jan 2024 21:26)  HR: 82 (07 Jan 2024 06:16) (82 - 97)  BP: 112/66 (07 Jan 2024 06:16) (102/62 - 112/66)  BP(mean): --  RR: 18 (07 Jan 2024 06:16) (17 - 18)  SpO2: 99% (07 Jan 2024 06:16) (98% - 99%)    Parameters below as of 07 Jan 2024 06:16  Patient On (Oxygen Delivery Method): room air    PHYSICAL EXAM:  GENERAL: NAD  HEAD:  Atraumatic, Normocephalic  EYES: left eye and rt eye with mild redness+ no drainage  ENMT: MMM  NECK: Supple, No JVD  NERVOUS SYSTEM: AOX3, motor and sensation grossly intact in b/l UE and b/l LE  PSYCHIATRIC: Appropriate affect and mood  CHEST/LUNG: Clear to auscultation bilaterally; No rales, rhonchi, wheezing, or rubs  HEART: Regular rate and rhythm; No murmurs, rubs, or gallops. No LE edema  ABDOMEN: Soft, mild epigastric tenderness, Nondistended; Bowel sounds present  EXTREMITIES:  2+ Peripheral Pulses, No clubbing, cyanosis  SKIN:  rash improving      LABS:                        8.6    8.26  )-----------( 457      ( 07 Jan 2024 07:42 )             26.4     01-07    135  |  100  |  23  ----------------------------<  115<H>  4.8   |  24  |  0.46<L>    Ca    8.3<L>      07 Jan 2024 07:42  Phos  2.5     01-07  Mg     2.00     01-07    TPro  5.7<L>  /  Alb  3.0<L>  /  TBili  <0.2  /  DBili  x   /  AST  19  /  ALT  19  /  AlkPhos  96  01-07    PT/INR - ( 07 Jan 2024 07:42 )   PT: 16.5 sec;   INR: 1.48 ratio         PTT - ( 07 Jan 2024 07:42 )  PTT:29.1 sec      Urinalysis Basic - ( 07 Jan 2024 07:42 )    Color: x / Appearance: x / SG: x / pH: x  Gluc: 115 mg/dL / Ketone: x  / Bili: x / Urobili: x   Blood: x / Protein: x / Nitrite: x   Leuk Esterase: x / RBC: x / WBC x   Sq Epi: x / Non Sq Epi: x / Bacteria: x          RADIOLOGY & ADDITIONAL TESTS:  Results Reviewed:   Imaging Personally Reviewed:  Electrocardiogram Personally Reviewed:    COORDINATION OF CARE:  Care Discussed with Consultants/Other Providers [Y/N]:  Prior or Outpatient Records Reviewed [Y/N]:   PROGRESS NOTE:     Patient is a 58y old  Female who presents with a chief complaint of transfer from [place] for tertiary level care in the setting of diffuse body rash (06 Jan 2024 07:40)      SUBJECTIVE / OVERNIGHT EVENTS: No acute events overnight. This AM seen and examined at bedside- patient resting comfortably in bed, no complaints at this time.     ADDITIONAL REVIEW OF SYSTEMS: Negative     MEDICATIONS  (STANDING):  carvedilol 3.125 milliGRAM(s) Oral every 12 hours  dextrose 5%. 1000 milliLiter(s) (100 mL/Hr) IV Continuous <Continuous>  dextrose 5%. 1000 milliLiter(s) (50 mL/Hr) IV Continuous <Continuous>  dextrose 50% Injectable 12.5 Gram(s) IV Push once  dextrose 50% Injectable 25 Gram(s) IV Push once  dextrose 50% Injectable 25 Gram(s) IV Push once  folic acid 1 milliGRAM(s) Oral daily  glucagon  Injectable 1 milliGRAM(s) IntraMuscular once  influenza   Vaccine 0.5 milliLiter(s) IntraMuscular once  insulin lispro (ADMELOG) corrective regimen sliding scale   SubCutaneous at bedtime  insulin lispro (ADMELOG) corrective regimen sliding scale   SubCutaneous three times a day before meals  lactated ringers. 1000 milliLiter(s) (100 mL/Hr) IV Continuous <Continuous>  mesalamine DR Capsule 400 milliGRAM(s) Oral three times a day  methylPREDNISolone sodium succinate Injectable 20 milliGRAM(s) IV Push every 8 hours  mirtazapine 30 milliGRAM(s) Oral daily  ondansetron Injectable 4 milliGRAM(s) IV Push once  pantoprazole    Tablet 40 milliGRAM(s) Oral before breakfast  rifAXIMin 550 milliGRAM(s) Oral two times a day  spironolactone 100 milliGRAM(s) Oral daily    MEDICATIONS  (PRN):  acetaminophen     Tablet .. 650 milliGRAM(s) Oral every 6 hours PRN Temp greater or equal to 38C (100.4F), Mild Pain (1 - 3)  aluminum hydroxide/magnesium hydroxide/simethicone Suspension 30 milliLiter(s) Oral every 6 hours PRN Dyspepsia  dextrose Oral Gel 15 Gram(s) Oral once PRN Blood Glucose LESS THAN 70 milliGRAM(s)/deciliter      CAPILLARY BLOOD GLUCOSE      POCT Blood Glucose.: 119 mg/dL (07 Jan 2024 08:43)  POCT Blood Glucose.: 215 mg/dL (06 Jan 2024 21:36)  POCT Blood Glucose.: 170 mg/dL (06 Jan 2024 18:13)  POCT Blood Glucose.: 140 mg/dL (06 Jan 2024 12:49)    I&O's Summary      PHYSICAL EXAM:  Vital Signs Last 24 Hrs  T(C): 36.9 (07 Jan 2024 06:16), Max: 37 (06 Jan 2024 21:26)  T(F): 98.4 (07 Jan 2024 06:16), Max: 98.6 (06 Jan 2024 21:26)  HR: 82 (07 Jan 2024 06:16) (82 - 97)  BP: 112/66 (07 Jan 2024 06:16) (102/62 - 112/66)  BP(mean): --  RR: 18 (07 Jan 2024 06:16) (17 - 18)  SpO2: 99% (07 Jan 2024 06:16) (98% - 99%)    Parameters below as of 07 Jan 2024 06:16  Patient On (Oxygen Delivery Method): room air    PHYSICAL EXAM:  GENERAL: NAD  HEAD:  Atraumatic, Normocephalic  EYES: left eye and rt eye with mild redness+ no drainage  ENMT: MMM  NECK: Supple, No JVD  NERVOUS SYSTEM: AOX3, motor and sensation grossly intact in b/l UE and b/l LE  PSYCHIATRIC: Appropriate affect and mood  CHEST/LUNG: Clear to auscultation bilaterally; No rales, rhonchi, wheezing, or rubs  HEART: Regular rate and rhythm; No murmurs, rubs, or gallops. No LE edema  ABDOMEN: Soft, mild epigastric tenderness, Nondistended; Bowel sounds present  EXTREMITIES:  2+ Peripheral Pulses, No clubbing, cyanosis  SKIN:  rash improving      LABS:                        8.6    8.26  )-----------( 457      ( 07 Jan 2024 07:42 )             26.4     01-07    135  |  100  |  23  ----------------------------<  115<H>  4.8   |  24  |  0.46<L>    Ca    8.3<L>      07 Jan 2024 07:42  Phos  2.5     01-07  Mg     2.00     01-07    TPro  5.7<L>  /  Alb  3.0<L>  /  TBili  <0.2  /  DBili  x   /  AST  19  /  ALT  19  /  AlkPhos  96  01-07    PT/INR - ( 07 Jan 2024 07:42 )   PT: 16.5 sec;   INR: 1.48 ratio         PTT - ( 07 Jan 2024 07:42 )  PTT:29.1 sec      Urinalysis Basic - ( 07 Jan 2024 07:42 )    Color: x / Appearance: x / SG: x / pH: x  Gluc: 115 mg/dL / Ketone: x  / Bili: x / Urobili: x   Blood: x / Protein: x / Nitrite: x   Leuk Esterase: x / RBC: x / WBC x   Sq Epi: x / Non Sq Epi: x / Bacteria: x          RADIOLOGY & ADDITIONAL TESTS:  Results Reviewed:   Imaging Personally Reviewed:  Electrocardiogram Personally Reviewed:    COORDINATION OF CARE:  Care Discussed with Consultants/Other Providers [Y/N]:  Prior or Outpatient Records Reviewed [Y/N]:

## 2024-01-07 NOTE — PROGRESS NOTE ADULT - REASON FOR ADMISSION
transfer from [place] for tertiary level care in the setting of diffuse body rash transfer from [place] for tertiary level care in the setting of diffuse body rash

## 2024-01-07 NOTE — PROGRESS NOTE ADULT - ATTENDING COMMENTS
58W w/ type 2 diabetes, HTN, MDD vs bipolar disorder, UC (dx on colonoscopy at [place], previously treated with steroids), NAFLD c/b ?decompensated cirrhosis, AF (warfarin), admitted to [facility] with periorbital swelling, rash, and pancolitis, transferred to [facility] for derm/rheum/ophtho eval, overall felt to be have resolving bruising, subconjunctival hemorrhage, and UC flare currently receiving IV steroids and planning for possible repeat colonoscopy and infliximab initiation.    #Abd pain with diarrhea cf UC flare  - cont with IV steroids and mesalamine per GI  - planned for repeat Colonoscopy pending pt's decision, she is still refusing  - cont to monitor fluid status, monitor electrolytes   - ivf hydration prn    rest as above. 58W w/ type 2 diabetes, HTN, MDD vs bipolar disorder, UC (dx on colonoscopy at [place], previously treated with steroids), NAFLD c/b ?decompensated cirrhosis, AF (warfarin), admitted to [facility] with periorbital swelling, rash, and pancolitis, transferred to [facility] for derm/rheum/ophtho eval, overall felt to be have resolving bruising, subconjunctival hemorrhage, and UC flare currently receiving IV steroids and planning for possible repeat colonoscopy and infliximab initiation.    #Abd pain with diarrhea cf UC flare  - cont with IV steroids and mesalamine per GI  - planned for repeat Colonoscopy pending pt's decision, she is still refusing  - cont to monitor fluid status, monitor electrolytes   - ivf hydration prn    rest as above.

## 2024-01-07 NOTE — PROGRESS NOTE ADULT - PROBLEM SELECTOR PLAN 1
Patient presented with body rash of b/l upper extremity and lower extremity of unknown etiology that is improving from initial presentation   Rash of unclear etiology, initial ifnectious versus inflammatory now derm believes resolving ecchymosis  work up at [place] includes:   CMV IgG negative   LDH normal   Babesia negative   Lyme negative   RPR negative   ESR, CRP elevated   -rheumatology: likely not vasculitis  -dermatology: likely resolving ecchymosis i/s/o coumadin use  -ophtho: likely subconjunctival hemorrhage no need to stop coumadin Patient presented with body rash of b/l upper extremity and lower extremity of unknown etiology that is improving from initial presentation   Rash of unclear etiology, initial ifnectious versus inflammatory now derm believes resolving ecchymosis  work up at [place] includes:   CMV IgG negative   LDH normal   Babesia negative   Lyme negative   RPR negative   ESR, CRP elevated   -rheumatology: likely not vasculitis  -dermatology: likely resolving ecchymosis i/s/o coumadin use  -ophtho: likely subconjunctival hemorrhage no need to stop coumadin

## 2024-01-07 NOTE — PROGRESS NOTE ADULT - PROBLEM SELECTOR PLAN 2
Patient with history of ulcerative colitis found to have pancolitis   -likely ulcerative colitis flare   -continue mesalamine 400mg TID    c/w IV steroids 20 q8h (12/31)   - PPI 40mg daily   -Continue with mesalamine 400 mg TID for now  -regular diet  -GI following, potential plan for scope if pt agreeable, if not will start remicade  -colonoscopy report from [facility]: moderate to severe colitis in the past, likely concerning for UC based on endoscopic and histologic presentation Patient with history of ulcerative colitis found to have pancolitis   -likely ulcerative colitis flare   -continue mesalamine 400mg TID    c/w IV steroids 20 q8h (12/31)   - PPI 40mg daily   -Continue with mesalamine 400 mg TID for now  -regular diet  -GI following, potential plan for scope if pt agreeable, if not will start remicade  -colonoscopy report from [facility]: moderate to severe colitis in the past, likely concerning for UC based on endoscopic and histologic presentation

## 2024-01-08 LAB
ALBUMIN SERPL ELPH-MCNC: 3.4 G/DL — SIGNIFICANT CHANGE UP (ref 3.3–5)
ALBUMIN SERPL ELPH-MCNC: 3.4 G/DL — SIGNIFICANT CHANGE UP (ref 3.3–5)
ALP SERPL-CCNC: 109 U/L — SIGNIFICANT CHANGE UP (ref 40–120)
ALP SERPL-CCNC: 109 U/L — SIGNIFICANT CHANGE UP (ref 40–120)
ALT FLD-CCNC: 24 U/L — SIGNIFICANT CHANGE UP (ref 4–33)
ALT FLD-CCNC: 24 U/L — SIGNIFICANT CHANGE UP (ref 4–33)
ANION GAP SERPL CALC-SCNC: 14 MMOL/L — SIGNIFICANT CHANGE UP (ref 7–14)
ANION GAP SERPL CALC-SCNC: 14 MMOL/L — SIGNIFICANT CHANGE UP (ref 7–14)
APTT BLD: 30.7 SEC — SIGNIFICANT CHANGE UP (ref 24.5–35.6)
APTT BLD: 30.7 SEC — SIGNIFICANT CHANGE UP (ref 24.5–35.6)
AST SERPL-CCNC: 23 U/L — SIGNIFICANT CHANGE UP (ref 4–32)
AST SERPL-CCNC: 23 U/L — SIGNIFICANT CHANGE UP (ref 4–32)
BASOPHILS # BLD AUTO: 0.01 K/UL — SIGNIFICANT CHANGE UP (ref 0–0.2)
BASOPHILS # BLD AUTO: 0.01 K/UL — SIGNIFICANT CHANGE UP (ref 0–0.2)
BASOPHILS NFR BLD AUTO: 0.1 % — SIGNIFICANT CHANGE UP (ref 0–2)
BASOPHILS NFR BLD AUTO: 0.1 % — SIGNIFICANT CHANGE UP (ref 0–2)
BILIRUB SERPL-MCNC: <0.2 MG/DL — SIGNIFICANT CHANGE UP (ref 0.2–1.2)
BILIRUB SERPL-MCNC: <0.2 MG/DL — SIGNIFICANT CHANGE UP (ref 0.2–1.2)
BUN SERPL-MCNC: 33 MG/DL — HIGH (ref 7–23)
BUN SERPL-MCNC: 33 MG/DL — HIGH (ref 7–23)
CALCIUM SERPL-MCNC: 9 MG/DL — SIGNIFICANT CHANGE UP (ref 8.4–10.5)
CALCIUM SERPL-MCNC: 9 MG/DL — SIGNIFICANT CHANGE UP (ref 8.4–10.5)
CHLORIDE SERPL-SCNC: 101 MMOL/L — SIGNIFICANT CHANGE UP (ref 98–107)
CHLORIDE SERPL-SCNC: 101 MMOL/L — SIGNIFICANT CHANGE UP (ref 98–107)
CO2 SERPL-SCNC: 23 MMOL/L — SIGNIFICANT CHANGE UP (ref 22–31)
CO2 SERPL-SCNC: 23 MMOL/L — SIGNIFICANT CHANGE UP (ref 22–31)
CREAT SERPL-MCNC: 0.63 MG/DL — SIGNIFICANT CHANGE UP (ref 0.5–1.3)
CREAT SERPL-MCNC: 0.63 MG/DL — SIGNIFICANT CHANGE UP (ref 0.5–1.3)
CRP SERPL-MCNC: 9 MG/L — HIGH
CRP SERPL-MCNC: 9 MG/L — HIGH
EGFR: 104 ML/MIN/1.73M2 — SIGNIFICANT CHANGE UP
EGFR: 104 ML/MIN/1.73M2 — SIGNIFICANT CHANGE UP
EOSINOPHIL # BLD AUTO: 0 K/UL — SIGNIFICANT CHANGE UP (ref 0–0.5)
EOSINOPHIL # BLD AUTO: 0 K/UL — SIGNIFICANT CHANGE UP (ref 0–0.5)
EOSINOPHIL NFR BLD AUTO: 0 % — SIGNIFICANT CHANGE UP (ref 0–6)
EOSINOPHIL NFR BLD AUTO: 0 % — SIGNIFICANT CHANGE UP (ref 0–6)
ERYTHROCYTE [SEDIMENTATION RATE] IN BLOOD: 46 MM/HR — HIGH (ref 4–25)
ERYTHROCYTE [SEDIMENTATION RATE] IN BLOOD: 46 MM/HR — HIGH (ref 4–25)
GLUCOSE BLDC GLUCOMTR-MCNC: 152 MG/DL — HIGH (ref 70–99)
GLUCOSE BLDC GLUCOMTR-MCNC: 152 MG/DL — HIGH (ref 70–99)
GLUCOSE BLDC GLUCOMTR-MCNC: 155 MG/DL — HIGH (ref 70–99)
GLUCOSE BLDC GLUCOMTR-MCNC: 155 MG/DL — HIGH (ref 70–99)
GLUCOSE BLDC GLUCOMTR-MCNC: 222 MG/DL — HIGH (ref 70–99)
GLUCOSE BLDC GLUCOMTR-MCNC: 222 MG/DL — HIGH (ref 70–99)
GLUCOSE BLDC GLUCOMTR-MCNC: 243 MG/DL — HIGH (ref 70–99)
GLUCOSE BLDC GLUCOMTR-MCNC: 243 MG/DL — HIGH (ref 70–99)
GLUCOSE SERPL-MCNC: 135 MG/DL — HIGH (ref 70–99)
GLUCOSE SERPL-MCNC: 135 MG/DL — HIGH (ref 70–99)
HCT VFR BLD CALC: 30.7 % — LOW (ref 34.5–45)
HCT VFR BLD CALC: 30.7 % — LOW (ref 34.5–45)
HGB BLD-MCNC: 9.8 G/DL — LOW (ref 11.5–15.5)
HGB BLD-MCNC: 9.8 G/DL — LOW (ref 11.5–15.5)
IANC: 10.4 K/UL — HIGH (ref 1.8–7.4)
IANC: 10.4 K/UL — HIGH (ref 1.8–7.4)
IMM GRANULOCYTES NFR BLD AUTO: 1.3 % — HIGH (ref 0–0.9)
IMM GRANULOCYTES NFR BLD AUTO: 1.3 % — HIGH (ref 0–0.9)
INR BLD: 1.68 RATIO — HIGH (ref 0.85–1.18)
INR BLD: 1.68 RATIO — HIGH (ref 0.85–1.18)
LYMPHOCYTES # BLD AUTO: 0.68 K/UL — LOW (ref 1–3.3)
LYMPHOCYTES # BLD AUTO: 0.68 K/UL — LOW (ref 1–3.3)
LYMPHOCYTES # BLD AUTO: 5.9 % — LOW (ref 13–44)
LYMPHOCYTES # BLD AUTO: 5.9 % — LOW (ref 13–44)
MAGNESIUM SERPL-MCNC: 2.2 MG/DL — SIGNIFICANT CHANGE UP (ref 1.6–2.6)
MAGNESIUM SERPL-MCNC: 2.2 MG/DL — SIGNIFICANT CHANGE UP (ref 1.6–2.6)
MCHC RBC-ENTMCNC: 30.8 PG — SIGNIFICANT CHANGE UP (ref 27–34)
MCHC RBC-ENTMCNC: 30.8 PG — SIGNIFICANT CHANGE UP (ref 27–34)
MCHC RBC-ENTMCNC: 31.9 GM/DL — LOW (ref 32–36)
MCHC RBC-ENTMCNC: 31.9 GM/DL — LOW (ref 32–36)
MCV RBC AUTO: 96.5 FL — SIGNIFICANT CHANGE UP (ref 80–100)
MCV RBC AUTO: 96.5 FL — SIGNIFICANT CHANGE UP (ref 80–100)
MONOCYTES # BLD AUTO: 0.33 K/UL — SIGNIFICANT CHANGE UP (ref 0–0.9)
MONOCYTES # BLD AUTO: 0.33 K/UL — SIGNIFICANT CHANGE UP (ref 0–0.9)
MONOCYTES NFR BLD AUTO: 2.9 % — SIGNIFICANT CHANGE UP (ref 2–14)
MONOCYTES NFR BLD AUTO: 2.9 % — SIGNIFICANT CHANGE UP (ref 2–14)
NEUTROPHILS # BLD AUTO: 10.4 K/UL — HIGH (ref 1.8–7.4)
NEUTROPHILS # BLD AUTO: 10.4 K/UL — HIGH (ref 1.8–7.4)
NEUTROPHILS NFR BLD AUTO: 89.8 % — HIGH (ref 43–77)
NEUTROPHILS NFR BLD AUTO: 89.8 % — HIGH (ref 43–77)
NRBC # BLD: 0 /100 WBCS — SIGNIFICANT CHANGE UP (ref 0–0)
NRBC # BLD: 0 /100 WBCS — SIGNIFICANT CHANGE UP (ref 0–0)
NRBC # FLD: 0 K/UL — SIGNIFICANT CHANGE UP (ref 0–0)
NRBC # FLD: 0 K/UL — SIGNIFICANT CHANGE UP (ref 0–0)
PHOSPHATE SERPL-MCNC: 2.9 MG/DL — SIGNIFICANT CHANGE UP (ref 2.5–4.5)
PHOSPHATE SERPL-MCNC: 2.9 MG/DL — SIGNIFICANT CHANGE UP (ref 2.5–4.5)
PLATELET # BLD AUTO: 494 K/UL — HIGH (ref 150–400)
PLATELET # BLD AUTO: 494 K/UL — HIGH (ref 150–400)
POTASSIUM SERPL-MCNC: 5.5 MMOL/L — HIGH (ref 3.5–5.3)
POTASSIUM SERPL-MCNC: 5.5 MMOL/L — HIGH (ref 3.5–5.3)
POTASSIUM SERPL-SCNC: 5.5 MMOL/L — HIGH (ref 3.5–5.3)
POTASSIUM SERPL-SCNC: 5.5 MMOL/L — HIGH (ref 3.5–5.3)
PROT SERPL-MCNC: 6.5 G/DL — SIGNIFICANT CHANGE UP (ref 6–8.3)
PROT SERPL-MCNC: 6.5 G/DL — SIGNIFICANT CHANGE UP (ref 6–8.3)
PROTHROM AB SERPL-ACNC: 18.5 SEC — HIGH (ref 9.5–13)
PROTHROM AB SERPL-ACNC: 18.5 SEC — HIGH (ref 9.5–13)
RBC # BLD: 3.18 M/UL — LOW (ref 3.8–5.2)
RBC # BLD: 3.18 M/UL — LOW (ref 3.8–5.2)
RBC # FLD: 15.7 % — HIGH (ref 10.3–14.5)
RBC # FLD: 15.7 % — HIGH (ref 10.3–14.5)
SODIUM SERPL-SCNC: 138 MMOL/L — SIGNIFICANT CHANGE UP (ref 135–145)
SODIUM SERPL-SCNC: 138 MMOL/L — SIGNIFICANT CHANGE UP (ref 135–145)
WBC # BLD: 11.57 K/UL — HIGH (ref 3.8–10.5)
WBC # BLD: 11.57 K/UL — HIGH (ref 3.8–10.5)
WBC # FLD AUTO: 11.57 K/UL — HIGH (ref 3.8–10.5)
WBC # FLD AUTO: 11.57 K/UL — HIGH (ref 3.8–10.5)

## 2024-01-08 PROCEDURE — 99232 SBSQ HOSP IP/OBS MODERATE 35: CPT | Mod: GC

## 2024-01-08 RX ORDER — SODIUM ZIRCONIUM CYCLOSILICATE 10 G/10G
5 POWDER, FOR SUSPENSION ORAL ONCE
Refills: 0 | Status: COMPLETED | OUTPATIENT
Start: 2024-01-08 | End: 2024-01-08

## 2024-01-08 RX ORDER — SODIUM CHLORIDE 9 MG/ML
1000 INJECTION, SOLUTION INTRAVENOUS
Refills: 0 | Status: DISCONTINUED | OUTPATIENT
Start: 2024-01-08 | End: 2024-01-12

## 2024-01-08 RX ORDER — WARFARIN SODIUM 2.5 MG/1
6 TABLET ORAL ONCE
Refills: 0 | Status: COMPLETED | OUTPATIENT
Start: 2024-01-08 | End: 2024-01-08

## 2024-01-08 RX ADMIN — WARFARIN SODIUM 6 MILLIGRAM(S): 2.5 TABLET ORAL at 21:42

## 2024-01-08 RX ADMIN — SPIRONOLACTONE 100 MILLIGRAM(S): 25 TABLET, FILM COATED ORAL at 05:40

## 2024-01-08 RX ADMIN — Medication 20 MILLIGRAM(S): at 05:40

## 2024-01-08 RX ADMIN — Medication 40 MILLIGRAM(S): at 13:09

## 2024-01-08 RX ADMIN — Medication 1: at 12:39

## 2024-01-08 RX ADMIN — Medication 400 MILLIGRAM(S): at 21:42

## 2024-01-08 RX ADMIN — CARVEDILOL PHOSPHATE 3.12 MILLIGRAM(S): 80 CAPSULE, EXTENDED RELEASE ORAL at 05:40

## 2024-01-08 RX ADMIN — SODIUM CHLORIDE 75 MILLILITER(S): 9 INJECTION, SOLUTION INTRAVENOUS at 09:38

## 2024-01-08 RX ADMIN — Medication 2: at 17:48

## 2024-01-08 RX ADMIN — Medication 1 MILLIGRAM(S): at 12:40

## 2024-01-08 RX ADMIN — SODIUM ZIRCONIUM CYCLOSILICATE 5 GRAM(S): 10 POWDER, FOR SUSPENSION ORAL at 12:38

## 2024-01-08 RX ADMIN — CARVEDILOL PHOSPHATE 3.12 MILLIGRAM(S): 80 CAPSULE, EXTENDED RELEASE ORAL at 17:42

## 2024-01-08 RX ADMIN — Medication 1: at 09:12

## 2024-01-08 RX ADMIN — Medication 400 MILLIGRAM(S): at 05:40

## 2024-01-08 RX ADMIN — MIRTAZAPINE 30 MILLIGRAM(S): 45 TABLET, ORALLY DISINTEGRATING ORAL at 12:40

## 2024-01-08 RX ADMIN — PANTOPRAZOLE SODIUM 40 MILLIGRAM(S): 20 TABLET, DELAYED RELEASE ORAL at 06:25

## 2024-01-08 RX ADMIN — Medication 400 MILLIGRAM(S): at 13:10

## 2024-01-08 NOTE — PROGRESS NOTE ADULT - REASON FOR ADMISSION
transfer from [place] for tertiary level care in the setting of diffuse body rash transfer from [place] for tertiary level care in the setting of diffuse body rash

## 2024-01-08 NOTE — PROGRESS NOTE ADULT - ASSESSMENT
58 y F w PMHx of CAD, diabetes, insomnia, MDD, GERD, hypertension, irritable bowel syndrome with diarrhea, ulcerative colitis, cirrhosis, NAFLD, hepatic encephalopathy, bipolar disorder, chronic Afib on Coumadin, blepharitis of the left eye who initially presented to [facility] due to reported hypotension, tachycardia and diffuse purpura. Patient transferred to [facility] for further workup and consultations for her body rash.  58 y F w PMHx of CAD, diabetes, insomnia, MDD, GERD, hypertension, irritable bowel syndrome with diarrhea, ulcerative colitis, cirrhosis, NAFLD, hepatic encephalopathy, bipolar disorder, chronic Afib on Coumadin, blepharitis of the left eye who initially presented to [facility] due to reported hypotension, tachycardia and diffuse purpura. Patient transferred to [facility] for further workup and consultations for her body rash.

## 2024-01-08 NOTE — PROGRESS NOTE ADULT - PROBLEM SELECTOR PLAN 1
Patient presented with body rash of b/l upper extremity and lower extremity of unknown etiology that is improving from initial presentation   Rash of unclear etiology, initial ifnectious versus inflammatory now derm believes resolving ecchymosis  work up at [place] includes:   CMV IgG negative   LDH normal   Babesia negative   Lyme negative   RPR negative   ESR, CRP elevated   -rheumatology: likely not vasculitis  -dermatology: likely resolving ecchymosis i/s/o coumadin use  -ophtho: likely subconjunctival hemorrhage no need to stop coumadin Patient presented with body rash of b/l upper extremity and lower extremity of unknown etiology that is improving from initial presentation   Rash of unclear etiology, initial ifnectious versus inflammatory now derm believes resolving ecchymosis  work up at [place] includes:   CMV IgG negative   LDH normal   Babesia negative   Lyme negative   RPR negative   ESR, CRP elevated   -rheumatology: likely not vasculitis  -dermatology: likely resolving ecchymosis i/s/o coumadin use  -ophtho: likely subconjunctival hemorrhage no need to stop coumadin Patient presented with body rash of b/l upper extremity and lower extremity of unknown etiology that is improving from initial presentation   Rash of unclear etiology, initial infectious versus inflammatory now derm believes resolving ecchymosis  work up at [place] includes:   CMV IgG negative   LDH normal   Babesia negative   Lyme negative   RPR negative   ESR, CRP elevated   -rheumatology: likely not vasculitis  -dermatology: likely resolving ecchymosis i/s/o coumadin use  -ophtho: likely subconjunctival hemorrhage no need to stop coumadin Patient presented with body rash of b/l upper extremity and lower extremity of unknown etiology that is improving from initial presentation   Rash of unclear etiology, initial infectious versus inflammatory now derm believes resolving ecchymosis  work up at [place] includes:   CMV IgG negative   LDH normal   Babesia negative   Lyme negative   RPR negative   ESR, CRP elevated   -rheumatology: likely not vasculitis  -dermatology: likely resolving ecchymosis i/s/o coumadin use  -ophtho: likely subconjunctival hemorrhage no need to stop coumadin

## 2024-01-08 NOTE — PROGRESS NOTE ADULT - REASON FOR ADMISSION
transfer from [place] for tertiary level care in the setting of diffuse body rash transfer from [place] for tertiary level care in the setting of diffuse body rash

## 2024-01-08 NOTE — PROGRESS NOTE ADULT - ASSESSMENT
58 yrs old male w/ hx of CAD (not on anti-platelets), insomnia, DM, GERD, MDD, HTN, Ulcerative colitis, Bipolar disorder, cirrhosis (unknown etiology, NAFLD?) c/w HE, Afib (on Coumadin), and Blepharitis who initially presented to [facility] for hypotension, diffuse body rashes and pancolitis.     #"mild" Pancolitis  #Ulcerative colitis  - Reported bloody stools w/ fecal incontinence worsening for the past one year. Underwent two colonoscopies on 11/2022 and 3/2023 but no records present. CT A/P showed thickening, hyperemia and mild inflammation throughout the colon. Records from [place] obtained with colonoscopy in 2/2023 with pancolitis, Salinas score 2-3 with biopsies with scattered active colitis as well as LGD in right colon. Per available records and patient/sister, patient had received Remicade at [place], but it is unclear if she had clinical response. Patient currently on oral mesalamine 400 mg TID and IV steroids started on 12/31 with no clinical response (no diarrhea reported).   - GI PCR and c diff testing neg from 12/25.   - declined flex sig/colonoscopy during this admission but reports from [place] present, please see above. Colonoscopy from 2/2023 showed severe pancolitis w/ positive path.   - hepatitis B serologies negative. Quant TB testing neg.   - fecal calprotectin 2050   - CRP downtrending   - Further history obtained from her Tasneem, [phone], - patient was previously on Remicade but unclear if she responded to it but did not receive infusions when she went to the facility.     #Cirrhosis?   - Unclear if the patient has cirrhosis b/c the CT imaging showed normal liver w/ no splenomegaly. Her INR is elevated in the setting of Coumadin use.   - Less concerned for cirrhosis based on the clinical picture  - On rifaximin and Aldactone per home meds.     Recommendations:   - recommended repeat colonoscopy/flex sig to assess for severity of the disease but patient deferred. given patient responded to IV steroids, plan to switch to PO today   - Please switch to Prednisone 40 mg daily   - PPI 40mg daily   - Continue with mesalamine 400 mg TID for now  - please document stool count  - DVT PPx   - CBC and CRP daily.     Recommendations preliminary until signed by attending.     Omi Cole MD  Gastroenterology/Hepatology Fellow  1st option: [phone] (text or call), ONLY available from 7:00 am to 5:00 pm.   **Contact on-call GI fellow via answering service ([phone]) from 5pm-7am AND on weekends/holidays**  2nd option: Available via [employer] Teams  3rd option: Pager: [phone]           58 yrs old male w/ hx of CAD (not on anti-platelets), insomnia, DM, GERD, MDD, HTN, Ulcerative colitis, Bipolar disorder, cirrhosis (unknown etiology, NAFLD?) c/w HE, Afib (on Coumadin), and Blepharitis who initially presented to [facility] for hypotension, diffuse body rashes and pancolitis.     #"mild" Pancolitis  #Ulcerative colitis  - Reported bloody stools w/ fecal incontinence worsening for the past one year. Underwent two colonoscopies on 11/2022 and 3/2023 but no records present. CT A/P showed thickening, hyperemia and mild inflammation throughout the colon. Records from [place] obtained with colonoscopy in 2/2023 with pancolitis, Salinas score 2-3 with biopsies with scattered active colitis as well as LGD in right colon. Per available records and patient/sister, patient had received Remicade at [place], but it is unclear if she had clinical response. Patient currently on oral mesalamine 400 mg TID and IV steroids started on 12/31 with no clinical response (no diarrhea reported).   - GI PCR and c diff testing neg from 12/25.   - declined flex sig/colonoscopy during this admission but reports from [place] present, please see above. Colonoscopy from 2/2023 showed severe pancolitis w/ positive path.   - hepatitis B serologies negative. Quant TB testing neg.   - fecal calprotectin 2050   - CRP downtrending   - Further history obtained from her Tasneem, [phone], - patient was previously on Remicade but unclear if she responded to it but did not receive infusions when she went to the facility.     #Cirrhosis?   - Unclear if the patient has cirrhosis b/c the CT imaging showed normal liver w/ no splenomegaly. Her INR is elevated in the setting of Coumadin use.   - Less concerned for cirrhosis based on the clinical picture  - On rifaximin and Aldactone per home meds.     Recommendations:   - recommended repeat colonoscopy/flex sig to assess for severity of the disease but patient deferred. given patient responded to IV steroids, plan to switch to PO today   - Please switch to Prednisone 40 mg daily   - PPI 40mg daily   - Continue with mesalamine 400 mg TID for now  - please document stool count  - DVT PPx   - CBC and CRP daily.     Recommendations preliminary until signed by attending.     Omi Cole MD  Gastroenterology/Hepatology Fellow  1st option: [phone] (text or call), ONLY available from 7:00 am to 5:00 pm.   **Contact on-call GI fellow via answering service ([phone]) from 5pm-7am AND on weekends/holidays**  2nd option: Available via Microsoft Teams  3rd option: Pager: [phone]

## 2024-01-08 NOTE — PROGRESS NOTE ADULT - PROBLEM SELECTOR PLAN 2
Patient with history of ulcerative colitis found to have pancolitis   -likely ulcerative colitis flare   -continue mesalamine 400mg TID    c/w IV steroids 20 q8h (12/31)   - PPI 40mg daily   -Continue with mesalamine 400 mg TID for now  -regular diet  -GI following, potential plan for scope if pt agreeable, if not will start remicade  -colonoscopy report from [facility]: moderate to severe colitis in the past, likely concerning for UC based on endoscopic and histologic presentation Patient with history of ulcerative colitis found to have pancolitis   -likely ulcerative colitis flare   -continue mesalamine 400mg TID    c/w IV steroids 20 q8h (12/31)   - PPI 40mg daily   -Continue with mesalamine 400 mg TID for now  -regular diet  -GI following, potential plan for scope if pt agreeable, if not will start remicade  -colonoscopy report from [facility]: moderate to severe colitis in the past, likely concerning for UC based on endoscopic and histologic presentation Patient with history of ulcerative colitis found to have pancolitis   -likely ulcerative colitis flare   -continue mesalamine 400mg TID   - s/p IV steroids 20 q8h (12/31-1/8)  - GI recommends to switch to pred 40 qd today   - PPI 40mg daily   -Continue with mesalamine 400 mg TID for now  -regular diet  -GI following, potential plan for scope if pt agreeable, if not will start remicade  -colonoscopy report from [facility]: moderate to severe colitis in the past, likely concerning for UC based on endoscopic and histologic presentation Patient with history of ulcerative colitis found to have pancolitis   -likely ulcerative colitis flare   -continue mesalamine 400mg TID   - s/p IV steroids 20 q8h (12/31-1/8)  - GI recommends to switch to pred 40 qd today   - PPI 40mg daily   -Continue with mesalamine 400 mg TID for now  -regular diet  -GI following, potential plan for scope if pt agreeable, if not will start remicade  -colonoscopy report from [facility]: moderate to severe colitis in the past, likely concerning for UC based on endoscopic and histologic presentation

## 2024-01-08 NOTE — PROGRESS NOTE ADULT - ATTENDING COMMENTS
No new GI complaints. Patient still contemplating possible endoscopic evaluation.     # History of UC: As noted above, records from [place] obtained with colonoscopy in 2/2023 with pancolitis, Salinas score 2-3 with biopsies with scattered active colitis as well as LGD in right colon (see additional findings above). Though biopsies not pathognomonic for UC, the endoscopic findings and clinical history are consistent with this diagnosis. Per available records and patient/sister, patient had received Remicade at [place], but it is unclear if she had clinical response. Additionally, they report no additional infusions were given after discharge to her facility.  # Afib on Coumadin  # CAD  # Bipolar disease  # Reported history of cirrhosis, possibly NAFLD    --as per nursing, no BM/diarrhea today  -- Pt reports upper GI upset post prandially, would like to avoid colonoscopy unless necessary    Given improvement of loose stools and labs, would hold off on colonoscopy given recent one 8/2023 at [place]  --Recommend transition to PO prednisone 40mg PO QD, check CRP/sed rate   -- Low residue diet  -- Pt would like to resume care at [facility] eventually, would need to be reinduced with Remicade (had 2 doses and was then sent to [facility])    Additional recommendations as above. No new GI complaints. Patient still contemplating possible endoscopic evaluation.     # History of UC: As noted above, records from [place] obtained with colonoscopy in 2/2023 with pancolitis, Salinas score 2-3 with biopsies with scattered active colitis as well as LGD in right colon (see additional findings above). Though biopsies not pathognomonic for UC, the endoscopic findings and clinical history are consistent with this diagnosis. Per available records and patient/sister, patient had received Remicade at [place], but it is unclear if she had clinical response. Additionally, they report no additional infusions were given after discharge to her facility.  # Afib on Coumadin  # CAD  # Bipolar disease  # Reported history of cirrhosis, possibly NAFLD    --as per nursing, no BM/diarrhea today  -- Pt reports upper GI upset post prandially, would like to avoid colonoscopy unless necessary    Given improvement of loose stools and labs, would hold off on colonoscopy given recent one 8/2023 at [place]  --Recommend transition to PO prednisone 40mg PO QD, check CRP/sed rate   -- Low residue diet  -- Pt would like to resume care at [place] eventually, would need to be reinduced with Remicade (had 2 doses and was then sent to [facility])    Additional recommendations as above.

## 2024-01-08 NOTE — PROGRESS NOTE ADULT - PROBLEM SELECTOR PLAN 3
Patient with anemia with iron studies more consistent with anemia of chronic disease   -B12 and folate WNL   -patient had drop of Hgb to 6.9 at [place] prior to transfer, transfused with 1pRBC with response to 9.5  -continue to monitor   -transfuse for Hgb >7 Patient with anemia with iron studies more consistent with anemia of chronic disease   -B12 and folate WNL   -patient had drop of Hgb to 6.9 at [place] prior to transfer, transfused with 1pRBC with response to 9.5  -continue to monitor   -transfuse for Hgb >7

## 2024-01-08 NOTE — PROGRESS NOTE ADULT - ATTENDING COMMENTS
58W w/ type 2 diabetes, HTN, MDD vs bipolar disorder, UC (dx on colonoscopy at [place], previously treated with steroids), NAFLD c/b ?decompensated cirrhosis, AF (warfarin), admitted to [facility] with periorbital swelling, rash, and pancolitis, transferred to [facility] for derm/rheum/ophtho eval, overall felt to be have resolving bruising, subconjunctival hemorrhage, and UC flare currently receiving IV steroids and planning for possible repeat colonoscopy and infliximab initiation.    Continues to have abdominal pain and diarrhea (quantity unclear as rectal tube now out), though CRP continues to downtrend  - Appreciate GI recommendations - patient considering repeat colonoscopy vs flex sig, but remains unable to give a clear decision on this; GI rec transition to PO steroids today, continue mesalamine, considering infliximab (though unclear if she responded when she received in the past)    Time-based billing (NON-critical care).     35 minutes spent on total encounter; more than 50% of the visit was spent counseling and / or coordinating care by the attending physician.  The necessity of the time spent during the encounter on this date of service was due to:     documentation in [place], reviewing chart and coordinating care with patient/resident and interdisciplinary staff (such as , social workers, etc) as well as reviewing vitals, laboratory data, radiology, medication list, consultants' recommendations and prior records. Interventions were performed as documented above. 58W w/ type 2 diabetes, HTN, MDD vs bipolar disorder, UC (dx on colonoscopy at [place], previously treated with steroids), NAFLD c/b ?decompensated cirrhosis, AF (warfarin), admitted to [facility] with periorbital swelling, rash, and pancolitis, transferred to [facility] for derm/rheum/ophtho eval, overall felt to be have resolving bruising, subconjunctival hemorrhage, and UC flare currently receiving IV steroids and planning for possible repeat colonoscopy and infliximab initiation.    Continues to have abdominal pain and diarrhea (quantity unclear as rectal tube now out), though CRP continues to downtrend  - Appreciate GI recommendations - patient considering repeat colonoscopy vs flex sig, but remains unable to give a clear decision on this; GI rec transition to PO steroids today, continue mesalamine, considering infliximab (though unclear if she responded when she received in the past)    Time-based billing (NON-critical care).     35 minutes spent on total encounter; more than 50% of the visit was spent counseling and / or coordinating care by the attending physician.  The necessity of the time spent during the encounter on this date of service was due to:     documentation in [place], reviewing chart and coordinating care with patient/resident and interdisciplinary staff (such as , social workers, etc) as well as reviewing vitals, laboratory data, radiology, medication list, consultants' recommendations and prior records. Interventions were performed as documented above.

## 2024-01-08 NOTE — PROGRESS NOTE ADULT - SUBJECTIVE AND OBJECTIVE BOX
Interval Events:   Patient denies any abdominal pain, nausea /vomiting, diarrhea. Does reports her stomach feels "funny and not normal"  per nurse, no diarrhea in the last 24 hrs.     Hospital Medications:  acetaminophen     Tablet .. 650 milliGRAM(s) Oral every 6 hours PRN  aluminum hydroxide/magnesium hydroxide/simethicone Suspension 30 milliLiter(s) Oral every 6 hours PRN  carvedilol 3.125 milliGRAM(s) Oral every 12 hours  dextrose 5%. 1000 milliLiter(s) IV Continuous <Continuous>  dextrose 5%. 1000 milliLiter(s) IV Continuous <Continuous>  dextrose 50% Injectable 12.5 Gram(s) IV Push once  dextrose 50% Injectable 25 Gram(s) IV Push once  dextrose 50% Injectable 25 Gram(s) IV Push once  dextrose Oral Gel 15 Gram(s) Oral once PRN  folic acid 1 milliGRAM(s) Oral daily  glucagon  Injectable 1 milliGRAM(s) IntraMuscular once  influenza   Vaccine 0.5 milliLiter(s) IntraMuscular once  insulin lispro (ADMELOG) corrective regimen sliding scale   SubCutaneous three times a day before meals  insulin lispro (ADMELOG) corrective regimen sliding scale   SubCutaneous at bedtime  lactated ringers. 1000 milliLiter(s) IV Continuous <Continuous>  mesalamine DR Capsule 400 milliGRAM(s) Oral three times a day  methylPREDNISolone sodium succinate Injectable 20 milliGRAM(s) IV Push every 8 hours  mirtazapine 30 milliGRAM(s) Oral daily  ondansetron Injectable 4 milliGRAM(s) IV Push once  pantoprazole    Tablet 40 milliGRAM(s) Oral before breakfast  rifAXIMin 550 milliGRAM(s) Oral two times a day  sodium zirconium cyclosilicate 5 Gram(s) Oral once  spironolactone 100 milliGRAM(s) Oral daily      ROS: All system reviewed and negative except as mentioned above.    PHYSICAL EXAM:   Vital Signs:  Vital Signs Last 24 Hrs  T(C): 36.9 (08 Jan 2024 05:35), Max: 37.1 (07 Jan 2024 21:15)  T(F): 98.4 (08 Jan 2024 05:35), Max: 98.8 (07 Jan 2024 21:15)  HR: 73 (08 Jan 2024 05:35) (73 - 88)  BP: 109/64 (08 Jan 2024 05:35) (107/60 - 110/65)  BP(mean): --  RR: 17 (08 Jan 2024 05:35) (17 - 18)  SpO2: 100% (08 Jan 2024 05:35) (98% - 100%)    Parameters below as of 08 Jan 2024 05:35  Patient On (Oxygen Delivery Method): room air      Daily     Daily     GENERAL:  NAD, Appears stated age  HEENT:  NC/AT,  conjunctivae clear and pink, sclera -anicteric  CHEST:  Normal Effort, Breath sounds clear  HEART:  RRR, S1 + S2, no murmurs  ABDOMEN:  Soft, non-tender, non-distended, normoactive bowel sounds,  no masses  EXTREMITIES:  no cyanosis or edema  SKIN:  Warm & Dry. No rash or erythema  NEURO:  Alert, oriented, no focal deficit    LABS:                        9.8    11.57 )-----------( 494      ( 08 Jan 2024 06:00 )             30.7     Mean Cell Volume: 96.5 fL (01-08-24 @ 06:00)    01-08    138  |  101  |  33<H>  ----------------------------<  135<H>  5.5<H>   |  23  |  0.63    Ca    9.0      08 Jan 2024 06:00  Phos  2.9     01-08  Mg     2.20     01-08    TPro  6.5  /  Alb  3.4  /  TBili  <0.2  /  DBili  x   /  AST  23  /  ALT  24  /  AlkPhos  109  01-08    LIVER FUNCTIONS - ( 08 Jan 2024 06:00 )  Alb: 3.4 g/dL / Pro: 6.5 g/dL / ALK PHOS: 109 U/L / ALT: 24 U/L / AST: 23 U/L / GGT: x           PT/INR - ( 08 Jan 2024 06:00 )   PT: 18.5 sec;   INR: 1.68 ratio         PTT - ( 08 Jan 2024 06:00 )  PTT:30.7 sec  Urinalysis Basic - ( 08 Jan 2024 06:00 )    Color: x / Appearance: x / SG: x / pH: x  Gluc: 135 mg/dL / Ketone: x  / Bili: x / Urobili: x   Blood: x / Protein: x / Nitrite: x   Leuk Esterase: x / RBC: x / WBC x   Sq Epi: x / Non Sq Epi: x / Bacteria: x                              9.8    11.57 )-----------( 494      ( 08 Jan 2024 06:00 )             30.7                         8.6    8.26  )-----------( 457      ( 07 Jan 2024 07:42 )             26.4                         9.5    9.69  )-----------( 453      ( 06 Jan 2024 07:05 )             29.6       Imaging: Images reviewed.    Records received by the primary team in [place].     Colonoscopy from 2/2023   - Hemorrhoids  - Inflammation was found from the rectosigmoid to the cecum. This was graded as Salinas Score 2-3 (moderate to severe disease). Colon was segmentally biopsied.   - The distal rectum mucosa besides the mild enema trauma was spared.   - A pseudopolyp with a blood clot was found in the recto-sigmoid colon. One hemostatic clip was placed.     Pathology report:     Terminal ileum - Normal  Cecum - Crypt architectural changes, increase in intraepithelial lymphocytes, increase in lamina propria lymphoplasmocytic infiltrate, and siderophages.   AC - Low grade dysplasia with reactive epithelial changes, Crypt architectural changes, increase in intraepithelial lymphocytes, increase in lamina propria lymphoplasmocytic infiltrate, and siderophages  TC - Marked active colitis  DC - Crypt architectural changes and active inflammation.   SC - active colitis  Rectum - active colitis           Interval Events:   Patient denies any abdominal pain, nausea /vomiting, diarrhea. Does reports her stomach feels "funny and not normal"  per nurse, no diarrhea in the last 24 hrs.     Hospital Medications:  acetaminophen     Tablet .. 650 milliGRAM(s) Oral every 6 hours PRN  aluminum hydroxide/magnesium hydroxide/simethicone Suspension 30 milliLiter(s) Oral every 6 hours PRN  carvedilol 3.125 milliGRAM(s) Oral every 12 hours  dextrose 5%. 1000 milliLiter(s) IV Continuous <Continuous>  dextrose 5%. 1000 milliLiter(s) IV Continuous <Continuous>  dextrose 50% Injectable 12.5 Gram(s) IV Push once  dextrose 50% Injectable 25 Gram(s) IV Push once  dextrose 50% Injectable 25 Gram(s) IV Push once  dextrose Oral Gel 15 Gram(s) Oral once PRN  folic acid 1 milliGRAM(s) Oral daily  glucagon  Injectable 1 milliGRAM(s) IntraMuscular once  influenza   Vaccine 0.5 milliLiter(s) IntraMuscular once  insulin lispro (ADMELOG) corrective regimen sliding scale   SubCutaneous three times a day before meals  insulin lispro (ADMELOG) corrective regimen sliding scale   SubCutaneous at bedtime  lactated ringers. 1000 milliLiter(s) IV Continuous <Continuous>  mesalamine DR Capsule 400 milliGRAM(s) Oral three times a day  methylPREDNISolone sodium succinate Injectable 20 milliGRAM(s) IV Push every 8 hours  mirtazapine 30 milliGRAM(s) Oral daily  ondansetron Injectable 4 milliGRAM(s) IV Push once  pantoprazole    Tablet 40 milliGRAM(s) Oral before breakfast  rifAXIMin 550 milliGRAM(s) Oral two times a day  sodium zirconium cyclosilicate 5 Gram(s) Oral once  spironolactone 100 milliGRAM(s) Oral daily      ROS: All system reviewed and negative except as mentioned above.    PHYSICAL EXAM:   Vital Signs:  Vital Signs Last 24 Hrs  T(C): 36.9 (08 Jan 2024 05:35), Max: 37.1 (07 Jan 2024 21:15)  T(F): 98.4 (08 Jan 2024 05:35), Max: 98.8 (07 Jan 2024 21:15)  HR: 73 (08 Jan 2024 05:35) (73 - 88)  BP: 109/64 (08 Jan 2024 05:35) (107/60 - 110/65)  BP(mean): --  RR: 17 (08 Jan 2024 05:35) (17 - 18)  SpO2: 100% (08 Jan 2024 05:35) (98% - 100%)    Parameters below as of 08 Jan 2024 05:35  Patient On (Oxygen Delivery Method): room air      Daily     Daily     GENERAL:  NAD, Appears stated age  HEENT:  NC/AT,  conjunctivae clear and pink, sclera -anicteric  CHEST:  Normal Effort, Breath sounds clear  HEART:  RRR, S1 + S2, no murmurs  ABDOMEN:  Soft, non-tender, non-distended, normoactive bowel sounds,  no masses  EXTREMITIES:  no cyanosis or edema  SKIN:  Warm & Dry. No rash or erythema  NEURO:  Alert, oriented, no focal deficit    LABS:                        9.8    11.57 )-----------( 494      ( 08 Jan 2024 06:00 )             30.7     Mean Cell Volume: 96.5 fL (01-08-24 @ 06:00)    01-08    138  |  101  |  33<H>  ----------------------------<  135<H>  5.5<H>   |  23  |  0.63    Ca    9.0      08 Jan 2024 06:00  Phos  2.9     01-08  Mg     2.20     01-08    TPro  6.5  /  Alb  3.4  /  TBili  <0.2  /  DBili  x   /  AST  23  /  ALT  24  /  AlkPhos  109  01-08    LIVER FUNCTIONS - ( 08 Jan 2024 06:00 )  Alb: 3.4 g/dL / Pro: 6.5 g/dL / ALK PHOS: 109 U/L / ALT: 24 U/L / AST: 23 U/L / GGT: x           PT/INR - ( 08 Jan 2024 06:00 )   PT: 18.5 sec;   INR: 1.68 ratio         PTT - ( 08 Jan 2024 06:00 )  PTT:30.7 sec  Urinalysis Basic - ( 08 Jan 2024 06:00 )    Color: x / Appearance: x / SG: x / pH: x  Gluc: 135 mg/dL / Ketone: x  / Bili: x / Urobili: x   Blood: x / Protein: x / Nitrite: x   Leuk Esterase: x / RBC: x / WBC x   Sq Epi: x / Non Sq Epi: x / Bacteria: x                              9.8    11.57 )-----------( 494      ( 08 Jan 2024 06:00 )             30.7                         8.6    8.26  )-----------( 457      ( 07 Jan 2024 07:42 )             26.4                         9.5    9.69  )-----------( 453      ( 06 Jan 2024 07:05 )             29.6       Imaging: Images reviewed.    Records received by the primary team in [place].     Colonoscopy from 2/2023   - Hemorrhoids  - Inflammation was found from the rectosigmoid to the cecum. This was graded as Salinas Score 2-3 (moderate to severe disease). Colon was segmentally biopsied.   - The distal rectum mucosa besides the mild enema trauma was spared.   - A pseudopolyp with a blood clot was found in the recto-sigmoid colon. One hemostatic clip was placed.     Pathology report:     Terminal ileum - Normal  Cecum - Crypt architectural changes, increase in intraepithelial lymphocytes, increase in lamina propria lymphoplasmocytic infiltrate, and siderophages.   AC - Low grade dysplasia with reactive epithelial changes, Crypt architectural changes, increase in intraepithelial lymphocytes, increase in lamina propria lymphoplasmocytic infiltrate, and siderophages  TC - Marked active colitis  DC - Crypt architectural changes and active inflammation.   SC - active colitis  Rectum - active colitis

## 2024-01-08 NOTE — PROGRESS NOTE ADULT - SUBJECTIVE AND OBJECTIVE BOX
PROGRESS NOTE:     Patient is a 58y old  Female who presents with a chief complaint of transfer from [place] for tertiary level care in the setting of diffuse body rash (06 Jan 2024 07:40)      SUBJECTIVE / OVERNIGHT EVENTS: No acute events overnight. This AM seen and examined at bedside- patient resting comfortably in bed, no complaints at this time.     ADDITIONAL REVIEW OF SYSTEMS: Negative     MEDICATIONS  (STANDING):  carvedilol 3.125 milliGRAM(s) Oral every 12 hours  dextrose 5%. 1000 milliLiter(s) (100 mL/Hr) IV Continuous <Continuous>  dextrose 5%. 1000 milliLiter(s) (50 mL/Hr) IV Continuous <Continuous>  dextrose 50% Injectable 12.5 Gram(s) IV Push once  dextrose 50% Injectable 25 Gram(s) IV Push once  dextrose 50% Injectable 25 Gram(s) IV Push once  folic acid 1 milliGRAM(s) Oral daily  glucagon  Injectable 1 milliGRAM(s) IntraMuscular once  influenza   Vaccine 0.5 milliLiter(s) IntraMuscular once  insulin lispro (ADMELOG) corrective regimen sliding scale   SubCutaneous at bedtime  insulin lispro (ADMELOG) corrective regimen sliding scale   SubCutaneous three times a day before meals  lactated ringers. 1000 milliLiter(s) (100 mL/Hr) IV Continuous <Continuous>  mesalamine DR Capsule 400 milliGRAM(s) Oral three times a day  methylPREDNISolone sodium succinate Injectable 20 milliGRAM(s) IV Push every 8 hours  mirtazapine 30 milliGRAM(s) Oral daily  ondansetron Injectable 4 milliGRAM(s) IV Push once  pantoprazole    Tablet 40 milliGRAM(s) Oral before breakfast  rifAXIMin 550 milliGRAM(s) Oral two times a day  spironolactone 100 milliGRAM(s) Oral daily    MEDICATIONS  (PRN):  acetaminophen     Tablet .. 650 milliGRAM(s) Oral every 6 hours PRN Temp greater or equal to 38C (100.4F), Mild Pain (1 - 3)  aluminum hydroxide/magnesium hydroxide/simethicone Suspension 30 milliLiter(s) Oral every 6 hours PRN Dyspepsia  dextrose Oral Gel 15 Gram(s) Oral once PRN Blood Glucose LESS THAN 70 milliGRAM(s)/deciliter      CAPILLARY BLOOD GLUCOSE      POCT Blood Glucose.: 119 mg/dL (07 Jan 2024 08:43)  POCT Blood Glucose.: 215 mg/dL (06 Jan 2024 21:36)  POCT Blood Glucose.: 170 mg/dL (06 Jan 2024 18:13)  POCT Blood Glucose.: 140 mg/dL (06 Jan 2024 12:49)    I&O's Summary      PHYSICAL EXAM:  Vital Signs Last 24 Hrs  T(C): 36.9 (07 Jan 2024 06:16), Max: 37 (06 Jan 2024 21:26)  T(F): 98.4 (07 Jan 2024 06:16), Max: 98.6 (06 Jan 2024 21:26)  HR: 82 (07 Jan 2024 06:16) (82 - 97)  BP: 112/66 (07 Jan 2024 06:16) (102/62 - 112/66)  BP(mean): --  RR: 18 (07 Jan 2024 06:16) (17 - 18)  SpO2: 99% (07 Jan 2024 06:16) (98% - 99%)    Parameters below as of 07 Jan 2024 06:16  Patient On (Oxygen Delivery Method): room air    PHYSICAL EXAM:  GENERAL: NAD  HEAD:  Atraumatic, Normocephalic  EYES: left eye and rt eye with mild redness+ no drainage  ENMT: MMM  NECK: Supple, No JVD  NERVOUS SYSTEM: AOX3, motor and sensation grossly intact in b/l UE and b/l LE  PSYCHIATRIC: Appropriate affect and mood  CHEST/LUNG: Clear to auscultation bilaterally; No rales, rhonchi, wheezing, or rubs  HEART: Regular rate and rhythm; No murmurs, rubs, or gallops. No LE edema  ABDOMEN: Soft, mild epigastric tenderness, Nondistended; Bowel sounds present  EXTREMITIES:  2+ Peripheral Pulses, No clubbing, cyanosis  SKIN:  rash improving      LABS:                        8.6    8.26  )-----------( 457      ( 07 Jan 2024 07:42 )             26.4     01-07    135  |  100  |  23  ----------------------------<  115<H>  4.8   |  24  |  0.46<L>    Ca    8.3<L>      07 Jan 2024 07:42  Phos  2.5     01-07  Mg     2.00     01-07    TPro  5.7<L>  /  Alb  3.0<L>  /  TBili  <0.2  /  DBili  x   /  AST  19  /  ALT  19  /  AlkPhos  96  01-07    PT/INR - ( 07 Jan 2024 07:42 )   PT: 16.5 sec;   INR: 1.48 ratio         PTT - ( 07 Jan 2024 07:42 )  PTT:29.1 sec      Urinalysis Basic - ( 07 Jan 2024 07:42 )    Color: x / Appearance: x / SG: x / pH: x  Gluc: 115 mg/dL / Ketone: x  / Bili: x / Urobili: x   Blood: x / Protein: x / Nitrite: x   Leuk Esterase: x / RBC: x / WBC x   Sq Epi: x / Non Sq Epi: x / Bacteria: x          RADIOLOGY & ADDITIONAL TESTS:  Results Reviewed:   Imaging Personally Reviewed:  Electrocardiogram Personally Reviewed:    COORDINATION OF CARE:  Care Discussed with Consultants/Other Providers [Y/N]:  Prior or Outpatient Records Reviewed [Y/N]:   PROGRESS NOTE:     Patient is a 58y old  Female who presents with a chief complaint of transfer from [place] for tertiary level care in the setting of diffuse body rash (06 Jan 2024 07:40)      SUBJECTIVE / OVERNIGHT EVENTS: No acute events overnight. This AM seen and examined at bedside- patient resting comfortably in bed, no complaints at this time.     ADDITIONAL REVIEW OF SYSTEMS: Negative     MEDICATIONS  (STANDING):  carvedilol 3.125 milliGRAM(s) Oral every 12 hours  dextrose 5%. 1000 milliLiter(s) (100 mL/Hr) IV Continuous <Continuous>  dextrose 5%. 1000 milliLiter(s) (50 mL/Hr) IV Continuous <Continuous>  dextrose 50% Injectable 12.5 Gram(s) IV Push once  dextrose 50% Injectable 25 Gram(s) IV Push once  dextrose 50% Injectable 25 Gram(s) IV Push once  folic acid 1 milliGRAM(s) Oral daily  glucagon  Injectable 1 milliGRAM(s) IntraMuscular once  influenza   Vaccine 0.5 milliLiter(s) IntraMuscular once  insulin lispro (ADMELOG) corrective regimen sliding scale   SubCutaneous at bedtime  insulin lispro (ADMELOG) corrective regimen sliding scale   SubCutaneous three times a day before meals  lactated ringers. 1000 milliLiter(s) (100 mL/Hr) IV Continuous <Continuous>  mesalamine DR Capsule 400 milliGRAM(s) Oral three times a day  methylPREDNISolone sodium succinate Injectable 20 milliGRAM(s) IV Push every 8 hours  mirtazapine 30 milliGRAM(s) Oral daily  ondansetron Injectable 4 milliGRAM(s) IV Push once  pantoprazole    Tablet 40 milliGRAM(s) Oral before breakfast  rifAXIMin 550 milliGRAM(s) Oral two times a day  spironolactone 100 milliGRAM(s) Oral daily    MEDICATIONS  (PRN):  acetaminophen     Tablet .. 650 milliGRAM(s) Oral every 6 hours PRN Temp greater or equal to 38C (100.4F), Mild Pain (1 - 3)  aluminum hydroxide/magnesium hydroxide/simethicone Suspension 30 milliLiter(s) Oral every 6 hours PRN Dyspepsia  dextrose Oral Gel 15 Gram(s) Oral once PRN Blood Glucose LESS THAN 70 milliGRAM(s)/deciliter      CAPILLARY BLOOD GLUCOSE      POCT Blood Glucose.: 119 mg/dL (07 Jan 2024 08:43)  POCT Blood Glucose.: 215 mg/dL (06 Jan 2024 21:36)  POCT Blood Glucose.: 170 mg/dL (06 Jan 2024 18:13)  POCT Blood Glucose.: 140 mg/dL (06 Jan 2024 12:49)    I&O's Summary      PHYSICAL EXAM:  Vital Signs Last 24 Hrs  T(C): 36.9 (07 Jan 2024 06:16), Max: 37 (06 Jan 2024 21:26)  T(F): 98.4 (07 Jan 2024 06:16), Max: 98.6 (06 Jan 2024 21:26)  HR: 82 (07 Jan 2024 06:16) (82 - 97)  BP: 112/66 (07 Jan 2024 06:16) (102/62 - 112/66)  BP(mean): --  RR: 18 (07 Jan 2024 06:16) (17 - 18)  SpO2: 99% (07 Jan 2024 06:16) (98% - 99%)    Parameters below as of 07 Jan 2024 06:16  Patient On (Oxygen Delivery Method): room air    PHYSICAL EXAM:  GENERAL: NAD  HEAD:  Atraumatic, Normocephalic  EYES: left eye and rt eye with mild redness+ no drainage  ENMT: MMM  NECK: Supple, No JVD  NERVOUS SYSTEM: AOX3, motor and sensation grossly intact in b/l UE and b/l LE  PSYCHIATRIC: Appropriate affect and mood  CHEST/LUNG: Clear to auscultation bilaterally; No rales, rhonchi, wheezing, or rubs  HEART: Regular rate and rhythm; No murmurs, rubs, or gallops. No LE edema  ABDOMEN: Soft, mild epigastric tenderness, Nondistended; Bowel sounds present  EXTREMITIES:  2+ Peripheral Pulses, No clubbing, cyanosis  SKIN:  rash improving      LABS:                        8.6    8.26  )-----------( 457      ( 07 Jan 2024 07:42 )             26.4     01-07    135  |  100  |  23  ----------------------------<  115<H>  4.8   |  24  |  0.46<L>    Ca    8.3<L>      07 Jan 2024 07:42  Phos  2.5     01-07  Mg     2.00     01-07    TPro  5.7<L>  /  Alb  3.0<L>  /  TBili  <0.2  /  DBili  x   /  AST  19  /  ALT  19  /  AlkPhos  96  01-07    PT/INR - ( 07 Jan 2024 07:42 )   PT: 16.5 sec;   INR: 1.48 ratio         PTT - ( 07 Jan 2024 07:42 )  PTT:29.1 sec      Urinalysis Basic - ( 07 Jan 2024 07:42 )    Color: x / Appearance: x / SG: x / pH: x  Gluc: 115 mg/dL / Ketone: x  / Bili: x / Urobili: x   Blood: x / Protein: x / Nitrite: x   Leuk Esterase: x / RBC: x / WBC x   Sq Epi: x / Non Sq Epi: x / Bacteria: x          RADIOLOGY & ADDITIONAL TESTS:  Results Reviewed:   Imaging Personally Reviewed:  Electrocardiogram Personally Reviewed:    COORDINATION OF CARE:  Care Discussed with Consultants/Other Providers [Y/N]:  Prior or Outpatient Records Reviewed [Y/N]:   PROGRESS NOTE:     Patient is a 58y old  Female who presents with a chief complaint of transfer from [place] for tertiary level care in the setting of diffuse body rash (06 Jan 2024 07:40)      SUBJECTIVE / OVERNIGHT EVENTS: No acute events overnight. This AM seen and examined at bedside- pt continues to endorse discomfort, did not speak with her sister over the weekend. Deferred colonoscopy with GI.     ADDITIONAL REVIEW OF SYSTEMS: Negative     MEDICATIONS  (STANDING):  carvedilol 3.125 milliGRAM(s) Oral every 12 hours  dextrose 5%. 1000 milliLiter(s) (100 mL/Hr) IV Continuous <Continuous>  dextrose 5%. 1000 milliLiter(s) (50 mL/Hr) IV Continuous <Continuous>  dextrose 50% Injectable 12.5 Gram(s) IV Push once  dextrose 50% Injectable 25 Gram(s) IV Push once  dextrose 50% Injectable 25 Gram(s) IV Push once  folic acid 1 milliGRAM(s) Oral daily  glucagon  Injectable 1 milliGRAM(s) IntraMuscular once  influenza   Vaccine 0.5 milliLiter(s) IntraMuscular once  insulin lispro (ADMELOG) corrective regimen sliding scale   SubCutaneous at bedtime  insulin lispro (ADMELOG) corrective regimen sliding scale   SubCutaneous three times a day before meals  lactated ringers. 1000 milliLiter(s) (100 mL/Hr) IV Continuous <Continuous>  mesalamine DR Capsule 400 milliGRAM(s) Oral three times a day  methylPREDNISolone sodium succinate Injectable 20 milliGRAM(s) IV Push every 8 hours  mirtazapine 30 milliGRAM(s) Oral daily  ondansetron Injectable 4 milliGRAM(s) IV Push once  pantoprazole    Tablet 40 milliGRAM(s) Oral before breakfast  rifAXIMin 550 milliGRAM(s) Oral two times a day  spironolactone 100 milliGRAM(s) Oral daily    MEDICATIONS  (PRN):  acetaminophen     Tablet .. 650 milliGRAM(s) Oral every 6 hours PRN Temp greater or equal to 38C (100.4F), Mild Pain (1 - 3)  aluminum hydroxide/magnesium hydroxide/simethicone Suspension 30 milliLiter(s) Oral every 6 hours PRN Dyspepsia  dextrose Oral Gel 15 Gram(s) Oral once PRN Blood Glucose LESS THAN 70 milliGRAM(s)/deciliter      CAPILLARY BLOOD GLUCOSE      POCT Blood Glucose.: 119 mg/dL (07 Jan 2024 08:43)  POCT Blood Glucose.: 215 mg/dL (06 Jan 2024 21:36)  POCT Blood Glucose.: 170 mg/dL (06 Jan 2024 18:13)  POCT Blood Glucose.: 140 mg/dL (06 Jan 2024 12:49)    I&O's Summary      Vital Signs Last 24 Hrs  T(C): 36.9 (08 Jan 2024 05:35), Max: 37.1 (07 Jan 2024 21:15)  T(F): 98.4 (08 Jan 2024 05:35), Max: 98.8 (07 Jan 2024 21:15)  HR: 73 (08 Jan 2024 05:35) (73 - 88)  BP: 109/64 (08 Jan 2024 05:35) (107/60 - 110/65)  BP(mean): --  RR: 17 (08 Jan 2024 05:35) (17 - 18)  SpO2: 100% (08 Jan 2024 05:35) (98% - 100%)    Parameters below as of 08 Jan 2024 05:35  Patient On (Oxygen Delivery Method): room air      PHYSICAL EXAM:  GENERAL: NAD  HEAD:  Atraumatic, Normocephalic  EYES: left eye and rt eye with mild redness+ no drainage  ENMT: MMM  NECK: Supple, No JVD  NERVOUS SYSTEM: AOX3, motor and sensation grossly intact in b/l UE and b/l LE  PSYCHIATRIC: Appropriate affect and mood  CHEST/LUNG: Clear to auscultation bilaterally; No rales, rhonchi, wheezing, or rubs  HEART: Regular rate and rhythm; No murmurs, rubs, or gallops. No LE edema  ABDOMEN: Soft, mild epigastric tenderness, Nondistended; Bowel sounds present  EXTREMITIES:  2+ Peripheral Pulses, No clubbing, cyanosis  SKIN:  rash improving      LABS:             CBC Full  -  ( 08 Jan 2024 06:00 )  WBC Count : 11.57 K/uL  RBC Count : 3.18 M/uL  Hemoglobin : 9.8 g/dL  Hematocrit : 30.7 %  Platelet Count - Automated : 494 K/uL  Mean Cell Volume : 96.5 fL  Mean Cell Hemoglobin : 30.8 pg  Mean Cell Hemoglobin Concentration : 31.9 gm/dL  Auto Neutrophil # : 10.40 K/uL  Auto Lymphocyte # : 0.68 K/uL  Auto Monocyte # : 0.33 K/uL  Auto Eosinophil # : 0.00 K/uL  Auto Basophil # : 0.01 K/uL  Auto Neutrophil % : 89.8 %  Auto Lymphocyte % : 5.9 %  Auto Monocyte % : 2.9 %  Auto Eosinophil % : 0.0 %  Auto Basophil % : 0.1 %    01-08    138  |  101  |  33<H>  ----------------------------<  135<H>  5.5<H>   |  23  |  0.63    Ca    9.0      08 Jan 2024 06:00  Phos  2.9     01-08  Mg     2.20     01-08    TPro  6.5  /  Alb  3.4  /  TBili  <0.2  /  DBili  x   /  AST  23  /  ALT  24  /  AlkPhos  109  01-08              Urinalysis Basic - ( 07 Jan 2024 07:42 )    Color: x / Appearance: x / SG: x / pH: x  Gluc: 115 mg/dL / Ketone: x  / Bili: x / Urobili: x   Blood: x / Protein: x / Nitrite: x   Leuk Esterase: x / RBC: x / WBC x   Sq Epi: x / Non Sq Epi: x / Bacteria: x          RADIOLOGY & ADDITIONAL TESTS:  Results Reviewed:   Imaging Personally Reviewed:  Electrocardiogram Personally Reviewed:    COORDINATION OF CARE:  Care Discussed with Consultants/Other Providers [Y/N]:  Prior or Outpatient Records Reviewed [Y/N]:   PROGRESS NOTE:     Patient is a 58y old  Female who presents with a chief complaint of transfer from [place] for tertiary level care in the setting of diffuse body rash (06 Jan 2024 07:40)      SUBJECTIVE / OVERNIGHT EVENTS: No acute events overnight. This AM seen and examined at bedside- pt continues to endorse discomfort, did not speak with her sister over the weekend. Deferred colonoscopy with GI.     ADDITIONAL REVIEW OF SYSTEMS: Negative     MEDICATIONS  (STANDING):  carvedilol 3.125 milliGRAM(s) Oral every 12 hours  dextrose 5%. 1000 milliLiter(s) (100 mL/Hr) IV Continuous <Continuous>  dextrose 5%. 1000 milliLiter(s) (50 mL/Hr) IV Continuous <Continuous>  dextrose 50% Injectable 12.5 Gram(s) IV Push once  dextrose 50% Injectable 25 Gram(s) IV Push once  dextrose 50% Injectable 25 Gram(s) IV Push once  folic acid 1 milliGRAM(s) Oral daily  glucagon  Injectable 1 milliGRAM(s) IntraMuscular once  influenza   Vaccine 0.5 milliLiter(s) IntraMuscular once  insulin lispro (ADMELOG) corrective regimen sliding scale   SubCutaneous at bedtime  insulin lispro (ADMELOG) corrective regimen sliding scale   SubCutaneous three times a day before meals  lactated ringers. 1000 milliLiter(s) (100 mL/Hr) IV Continuous <Continuous>  mesalamine DR Capsule 400 milliGRAM(s) Oral three times a day  methylPREDNISolone sodium succinate Injectable 20 milliGRAM(s) IV Push every 8 hours  mirtazapine 30 milliGRAM(s) Oral daily  ondansetron Injectable 4 milliGRAM(s) IV Push once  pantoprazole    Tablet 40 milliGRAM(s) Oral before breakfast  rifAXIMin 550 milliGRAM(s) Oral two times a day  spironolactone 100 milliGRAM(s) Oral daily    MEDICATIONS  (PRN):  acetaminophen     Tablet .. 650 milliGRAM(s) Oral every 6 hours PRN Temp greater or equal to 38C (100.4F), Mild Pain (1 - 3)  aluminum hydroxide/magnesium hydroxide/simethicone Suspension 30 milliLiter(s) Oral every 6 hours PRN Dyspepsia  dextrose Oral Gel 15 Gram(s) Oral once PRN Blood Glucose LESS THAN 70 milliGRAM(s)/deciliter      CAPILLARY BLOOD GLUCOSE      POCT Blood Glucose.: 119 mg/dL (07 Jan 2024 08:43)  POCT Blood Glucose.: 215 mg/dL (06 Jan 2024 21:36)  POCT Blood Glucose.: 170 mg/dL (06 Jan 2024 18:13)  POCT Blood Glucose.: 140 mg/dL (06 Jan 2024 12:49)    I&O's Summary      Vital Signs Last 24 Hrs  T(C): 36.9 (08 Jan 2024 05:35), Max: 37.1 (07 Jan 2024 21:15)  T(F): 98.4 (08 Jan 2024 05:35), Max: 98.8 (07 Jan 2024 21:15)  HR: 73 (08 Jan 2024 05:35) (73 - 88)  BP: 109/64 (08 Jan 2024 05:35) (107/60 - 110/65)  BP(mean): --  RR: 17 (08 Jan 2024 05:35) (17 - 18)  SpO2: 100% (08 Jan 2024 05:35) (98% - 100%)    Parameters below as of 08 Jan 2024 05:35  Patient On (Oxygen Delivery Method): room air      PHYSICAL EXAM:  GENERAL: NAD  HEAD:  Atraumatic, Normocephalic  EYES: left eye and rt eye with mild redness+ no drainage  ENMT: MMM  NECK: Supple, No JVD  NERVOUS SYSTEM: AOX3, motor and sensation grossly intact in b/l UE and b/l LE  PSYCHIATRIC: Appropriate affect and mood  CHEST/LUNG: Clear to auscultation bilaterally; No rales, rhonchi, wheezing, or rubs  HEART: Regular rate and rhythm; No murmurs, rubs, or gallops. No LE edema  ABDOMEN: Soft, mild epigastric tenderness, Nondistended; Bowel sounds present  EXTREMITIES:  2+ Peripheral Pulses, No clubbing, cyanosis  SKIN:  rash improving      LABS:             CBC Full  -  ( 08 Jan 2024 06:00 )  WBC Count : 11.57 K/uL  RBC Count : 3.18 M/uL  Hemoglobin : 9.8 g/dL  Hematocrit : 30.7 %  Platelet Count - Automated : 494 K/uL  Mean Cell Volume : 96.5 fL  Mean Cell Hemoglobin : 30.8 pg  Mean Cell Hemoglobin Concentration : 31.9 gm/dL  Auto Neutrophil # : 10.40 K/uL  Auto Lymphocyte # : 0.68 K/uL  Auto Monocyte # : 0.33 K/uL  Auto Eosinophil # : 0.00 K/uL  Auto Basophil # : 0.01 K/uL  Auto Neutrophil % : 89.8 %  Auto Lymphocyte % : 5.9 %  Auto Monocyte % : 2.9 %  Auto Eosinophil % : 0.0 %  Auto Basophil % : 0.1 %    01-08    138  |  101  |  33<H>  ----------------------------<  135<H>  5.5<H>   |  23  |  0.63    Ca    9.0      08 Jan 2024 06:00  Phos  2.9     01-08  Mg     2.20     01-08    TPro  6.5  /  Alb  3.4  /  TBili  <0.2  /  DBili  x   /  AST  23  /  ALT  24  /  AlkPhos  109  01-08              Urinalysis Basic - ( 07 Jan 2024 07:42 )    Color: x / Appearance: x / SG: x / pH: x  Gluc: 115 mg/dL / Ketone: x  / Bili: x / Urobili: x   Blood: x / Protein: x / Nitrite: x   Leuk Esterase: x / RBC: x / WBC x   Sq Epi: x / Non Sq Epi: x / Bacteria: x          RADIOLOGY & ADDITIONAL TESTS:  Results Reviewed:   Imaging Personally Reviewed:  Electrocardiogram Personally Reviewed:    COORDINATION OF CARE:  Care Discussed with Consultants/Other Providers [Y/N]:  Prior or Outpatient Records Reviewed [Y/N]:

## 2024-01-08 NOTE — CHART NOTE - NSCHARTNOTEFT_GEN_A_CORE
Source: Patient [x ]    Family [ ]     other [x ] chart review    Patient seen for severe malnutrition f/u. 58 year old female with a PMH of CAD, diabetes, insomnia, MDD, GERD, hypertension, irritable bowel syndrome with diarrhea, ulcerative colitis, cirrhosis, NAFLD, hepatic encephalopathy, bipolar disorder, chronic Afib on Coumadin, blepharitis of the left eye who initially presented to [facility] due to reported hypotension, tachycardia and diffuse purpura per chart.    Patient seen during breakfast. Limited information obtained at this time, patient wanted to sleep. Consumed about 50% of breakfast per observation. Receives ensure plus high protein to help meet nutritional needs. Last bowel movement (1/7) per RN flow sheet. No diarrhea in the past 24 hrs. per chart review. Noted w/ sacrum stage 1 pressure injury and no edema per RN flow sheet.    Diet : Diet, Regular:   Gluten-Gliadin Restricted  Supplement Feeding Modality:  Oral  Ensure Plus High Protein Cans or Servings Per Day:  1       Frequency:  Three Times a day (01-02-24 @ 15:25)    Current Weight: 40.3 kg (1/18) per bed scale observation  40 kg (12/28)  Weight Change: stable weight    Pertinent Medications: MEDICATIONS  (STANDING):  carvedilol 3.125 milliGRAM(s) Oral every 12 hours  dextrose 5%. 1000 milliLiter(s) (100 mL/Hr) IV Continuous <Continuous>  dextrose 5%. 1000 milliLiter(s) (50 mL/Hr) IV Continuous <Continuous>  dextrose 50% Injectable 25 Gram(s) IV Push once  dextrose 50% Injectable 12.5 Gram(s) IV Push once  dextrose 50% Injectable 25 Gram(s) IV Push once  folic acid 1 milliGRAM(s) Oral daily  glucagon  Injectable 1 milliGRAM(s) IntraMuscular once  influenza   Vaccine 0.5 milliLiter(s) IntraMuscular once  insulin lispro (ADMELOG) corrective regimen sliding scale   SubCutaneous three times a day before meals  insulin lispro (ADMELOG) corrective regimen sliding scale   SubCutaneous at bedtime  lactated ringers. 1000 milliLiter(s) (75 mL/Hr) IV Continuous <Continuous>  mesalamine DR Capsule 400 milliGRAM(s) Oral three times a day  methylPREDNISolone sodium succinate Injectable 20 milliGRAM(s) IV Push every 8 hours  mirtazapine 30 milliGRAM(s) Oral daily  ondansetron Injectable 4 milliGRAM(s) IV Push once  pantoprazole    Tablet 40 milliGRAM(s) Oral before breakfast  rifAXIMin 550 milliGRAM(s) Oral two times a day  sodium zirconium cyclosilicate 5 Gram(s) Oral once  spironolactone 100 milliGRAM(s) Oral daily    MEDICATIONS  (PRN):  acetaminophen     Tablet .. 650 milliGRAM(s) Oral every 6 hours PRN Temp greater or equal to 38C (100.4F), Mild Pain (1 - 3)  aluminum hydroxide/magnesium hydroxide/simethicone Suspension 30 milliLiter(s) Oral every 6 hours PRN Dyspepsia  dextrose Oral Gel 15 Gram(s) Oral once PRN Blood Glucose LESS THAN 70 milliGRAM(s)/deciliter    Pertinent Labs:  01-08 Na138 mmol/L Glu 135 mg/dL<H> K+ 5.5 mmol/L<H> Cr  0.63 mg/dL BUN 33 mg/dL<H> 01-08 Phos 2.9 mg/dL 01-08 Alb 3.4 g/dL  CAPILLARY BLOOD GLUCOSE  POCT Blood Glucose.: 155 mg/dL (08 Jan 2024 08:48)  POCT Blood Glucose.: 176 mg/dL (07 Jan 2024 21:20)  POCT Blood Glucose.: 196 mg/dL (07 Jan 2024 18:05)  POCT Blood Glucose.: 142 mg/dL (07 Jan 2024 12:18)    Estimated Needs: [ x] no change since previous assessment    Previous Nutrition Diagnosis: Severe malnutrition    Nutrition Diagnosis is [x ] ongoing  [ ] resolved [ ] not applicable     Education:    [  ] Given today    Type of education provided:    [  ] Given on previous assessment by RD    [  ] Not applicable 2/2 cognitive deficit    [  ] Pt refusal of education offered    [  ] Not applicable 2/2 current prognosis    [ x ] Not warranted at present    Recommend  - adjust diet to low fiber  - continue w/ ensure plus high protein TID (1,050 kcal, 60 g pro)  - addition of multivitamin given poor PO intake  - obtain weekly weight    Monitoring and Evaluation:     [x ] PO intake [x ] Tolerance to diet prescription [x ] weights [x ] follow up per protocol    Donavan Huang, [id number] or TEAMS Source: Patient [x ]    Family [ ]     other [x ] chart review    Patient seen for severe malnutrition f/u. 58 year old female with a PMH of CAD, diabetes, insomnia, MDD, GERD, hypertension, irritable bowel syndrome with diarrhea, ulcerative colitis, cirrhosis, NAFLD, hepatic encephalopathy, bipolar disorder, chronic Afib on Coumadin, blepharitis of the left eye who initially presented to [facility] due to reported hypotension, tachycardia and diffuse purpura per chart.    Patient seen during breakfast. Limited information obtained at this time, patient wanted to sleep. Consumed about 50% of breakfast per observation. Receives ensure plus high protein to help meet nutritional needs. Last bowel movement (1/7) per RN flow sheet. No diarrhea in the past 24 hrs. per chart review. Noted w/ sacrum stage 1 pressure injury and no edema per RN flow sheet.    Diet : Diet, Regular:   Gluten-Gliadin Restricted  Supplement Feeding Modality:  Oral  Ensure Plus High Protein Cans or Servings Per Day:  1       Frequency:  Three Times a day (01-02-24 @ 15:25)    Current Weight: 40.3 kg (1/18) per bed scale observation  40 kg (12/28)  Weight Change: stable weight    Pertinent Medications: MEDICATIONS  (STANDING):  carvedilol 3.125 milliGRAM(s) Oral every 12 hours  dextrose 5%. 1000 milliLiter(s) (100 mL/Hr) IV Continuous <Continuous>  dextrose 5%. 1000 milliLiter(s) (50 mL/Hr) IV Continuous <Continuous>  dextrose 50% Injectable 25 Gram(s) IV Push once  dextrose 50% Injectable 12.5 Gram(s) IV Push once  dextrose 50% Injectable 25 Gram(s) IV Push once  folic acid 1 milliGRAM(s) Oral daily  glucagon  Injectable 1 milliGRAM(s) IntraMuscular once  influenza   Vaccine 0.5 milliLiter(s) IntraMuscular once  insulin lispro (ADMELOG) corrective regimen sliding scale   SubCutaneous three times a day before meals  insulin lispro (ADMELOG) corrective regimen sliding scale   SubCutaneous at bedtime  lactated ringers. 1000 milliLiter(s) (75 mL/Hr) IV Continuous <Continuous>  mesalamine DR Capsule 400 milliGRAM(s) Oral three times a day  methylPREDNISolone sodium succinate Injectable 20 milliGRAM(s) IV Push every 8 hours  mirtazapine 30 milliGRAM(s) Oral daily  ondansetron Injectable 4 milliGRAM(s) IV Push once  pantoprazole    Tablet 40 milliGRAM(s) Oral before breakfast  rifAXIMin 550 milliGRAM(s) Oral two times a day  sodium zirconium cyclosilicate 5 Gram(s) Oral once  spironolactone 100 milliGRAM(s) Oral daily    MEDICATIONS  (PRN):  acetaminophen     Tablet .. 650 milliGRAM(s) Oral every 6 hours PRN Temp greater or equal to 38C (100.4F), Mild Pain (1 - 3)  aluminum hydroxide/magnesium hydroxide/simethicone Suspension 30 milliLiter(s) Oral every 6 hours PRN Dyspepsia  dextrose Oral Gel 15 Gram(s) Oral once PRN Blood Glucose LESS THAN 70 milliGRAM(s)/deciliter    Pertinent Labs:  01-08 Na138 mmol/L Glu 135 mg/dL<H> K+ 5.5 mmol/L<H> Cr  0.63 mg/dL BUN 33 mg/dL<H> 01-08 Phos 2.9 mg/dL 01-08 Alb 3.4 g/dL  CAPILLARY BLOOD GLUCOSE  POCT Blood Glucose.: 155 mg/dL (08 Jan 2024 08:48)  POCT Blood Glucose.: 176 mg/dL (07 Jan 2024 21:20)  POCT Blood Glucose.: 196 mg/dL (07 Jan 2024 18:05)  POCT Blood Glucose.: 142 mg/dL (07 Jan 2024 12:18)    Estimated Needs: [ x] no change since previous assessment    Previous Nutrition Diagnosis: Severe malnutrition    Nutrition Diagnosis is [x ] ongoing  [ ] resolved [ ] not applicable     Education:    [  ] Given today    Type of education provided:    [  ] Given on previous assessment by RD    [  ] Not applicable 2/2 cognitive deficit    [  ] Pt refusal of education offered    [  ] Not applicable 2/2 current prognosis    [ x ] Not warranted at present    Recommend  - adjust diet to low fiber  - continue w/ ensure plus high protein TID (1,050 kcal, 60 g pro)  - addition of multivitamin given poor PO intake  - obtain weekly weight    Monitoring and Evaluation:     [x ] PO intake [x ] Tolerance to diet prescription [x ] weights [x ] follow up per protocol    Donavan Huang, [id number] or TEAMS

## 2024-01-09 LAB
ALBUMIN SERPL ELPH-MCNC: 3.2 G/DL — LOW (ref 3.3–5)
ALBUMIN SERPL ELPH-MCNC: 3.2 G/DL — LOW (ref 3.3–5)
ALP SERPL-CCNC: 99 U/L — SIGNIFICANT CHANGE UP (ref 40–120)
ALP SERPL-CCNC: 99 U/L — SIGNIFICANT CHANGE UP (ref 40–120)
ALT FLD-CCNC: 25 U/L — SIGNIFICANT CHANGE UP (ref 4–33)
ALT FLD-CCNC: 25 U/L — SIGNIFICANT CHANGE UP (ref 4–33)
ANION GAP SERPL CALC-SCNC: 11 MMOL/L — SIGNIFICANT CHANGE UP (ref 7–14)
ANION GAP SERPL CALC-SCNC: 11 MMOL/L — SIGNIFICANT CHANGE UP (ref 7–14)
APTT BLD: 30.2 SEC — SIGNIFICANT CHANGE UP (ref 24.5–35.6)
APTT BLD: 30.2 SEC — SIGNIFICANT CHANGE UP (ref 24.5–35.6)
AST SERPL-CCNC: 21 U/L — SIGNIFICANT CHANGE UP (ref 4–32)
AST SERPL-CCNC: 21 U/L — SIGNIFICANT CHANGE UP (ref 4–32)
BILIRUB SERPL-MCNC: <0.2 MG/DL — SIGNIFICANT CHANGE UP (ref 0.2–1.2)
BILIRUB SERPL-MCNC: <0.2 MG/DL — SIGNIFICANT CHANGE UP (ref 0.2–1.2)
BUN SERPL-MCNC: 30 MG/DL — HIGH (ref 7–23)
BUN SERPL-MCNC: 30 MG/DL — HIGH (ref 7–23)
CALCIUM SERPL-MCNC: 8.7 MG/DL — SIGNIFICANT CHANGE UP (ref 8.4–10.5)
CALCIUM SERPL-MCNC: 8.7 MG/DL — SIGNIFICANT CHANGE UP (ref 8.4–10.5)
CHLORIDE SERPL-SCNC: 100 MMOL/L — SIGNIFICANT CHANGE UP (ref 98–107)
CHLORIDE SERPL-SCNC: 100 MMOL/L — SIGNIFICANT CHANGE UP (ref 98–107)
CO2 SERPL-SCNC: 25 MMOL/L — SIGNIFICANT CHANGE UP (ref 22–31)
CO2 SERPL-SCNC: 25 MMOL/L — SIGNIFICANT CHANGE UP (ref 22–31)
CREAT SERPL-MCNC: 0.45 MG/DL — LOW (ref 0.5–1.3)
CREAT SERPL-MCNC: 0.45 MG/DL — LOW (ref 0.5–1.3)
CRP SERPL-MCNC: 4.8 MG/L — SIGNIFICANT CHANGE UP
CRP SERPL-MCNC: 4.8 MG/L — SIGNIFICANT CHANGE UP
EGFR: 113 ML/MIN/1.73M2 — SIGNIFICANT CHANGE UP
EGFR: 113 ML/MIN/1.73M2 — SIGNIFICANT CHANGE UP
ERYTHROCYTE [SEDIMENTATION RATE] IN BLOOD: 34 MM/HR — HIGH (ref 4–25)
ERYTHROCYTE [SEDIMENTATION RATE] IN BLOOD: 34 MM/HR — HIGH (ref 4–25)
GLUCOSE BLDC GLUCOMTR-MCNC: 124 MG/DL — HIGH (ref 70–99)
GLUCOSE BLDC GLUCOMTR-MCNC: 124 MG/DL — HIGH (ref 70–99)
GLUCOSE BLDC GLUCOMTR-MCNC: 173 MG/DL — HIGH (ref 70–99)
GLUCOSE BLDC GLUCOMTR-MCNC: 173 MG/DL — HIGH (ref 70–99)
GLUCOSE BLDC GLUCOMTR-MCNC: 300 MG/DL — HIGH (ref 70–99)
GLUCOSE BLDC GLUCOMTR-MCNC: 300 MG/DL — HIGH (ref 70–99)
GLUCOSE BLDC GLUCOMTR-MCNC: 82 MG/DL — SIGNIFICANT CHANGE UP (ref 70–99)
GLUCOSE BLDC GLUCOMTR-MCNC: 82 MG/DL — SIGNIFICANT CHANGE UP (ref 70–99)
GLUCOSE SERPL-MCNC: 90 MG/DL — SIGNIFICANT CHANGE UP (ref 70–99)
GLUCOSE SERPL-MCNC: 90 MG/DL — SIGNIFICANT CHANGE UP (ref 70–99)
HCT VFR BLD CALC: 28 % — LOW (ref 34.5–45)
HCT VFR BLD CALC: 28 % — LOW (ref 34.5–45)
HGB BLD-MCNC: 9.1 G/DL — LOW (ref 11.5–15.5)
HGB BLD-MCNC: 9.1 G/DL — LOW (ref 11.5–15.5)
INR BLD: 2.15 RATIO — HIGH (ref 0.85–1.18)
INR BLD: 2.15 RATIO — HIGH (ref 0.85–1.18)
MAGNESIUM SERPL-MCNC: 2.2 MG/DL — SIGNIFICANT CHANGE UP (ref 1.6–2.6)
MAGNESIUM SERPL-MCNC: 2.2 MG/DL — SIGNIFICANT CHANGE UP (ref 1.6–2.6)
MCHC RBC-ENTMCNC: 30.7 PG — SIGNIFICANT CHANGE UP (ref 27–34)
MCHC RBC-ENTMCNC: 30.7 PG — SIGNIFICANT CHANGE UP (ref 27–34)
MCHC RBC-ENTMCNC: 32.5 GM/DL — SIGNIFICANT CHANGE UP (ref 32–36)
MCHC RBC-ENTMCNC: 32.5 GM/DL — SIGNIFICANT CHANGE UP (ref 32–36)
MCV RBC AUTO: 94.6 FL — SIGNIFICANT CHANGE UP (ref 80–100)
MCV RBC AUTO: 94.6 FL — SIGNIFICANT CHANGE UP (ref 80–100)
NRBC # BLD: 0 /100 WBCS — SIGNIFICANT CHANGE UP (ref 0–0)
NRBC # BLD: 0 /100 WBCS — SIGNIFICANT CHANGE UP (ref 0–0)
NRBC # FLD: 0 K/UL — SIGNIFICANT CHANGE UP (ref 0–0)
NRBC # FLD: 0 K/UL — SIGNIFICANT CHANGE UP (ref 0–0)
PHOSPHATE SERPL-MCNC: 3.1 MG/DL — SIGNIFICANT CHANGE UP (ref 2.5–4.5)
PHOSPHATE SERPL-MCNC: 3.1 MG/DL — SIGNIFICANT CHANGE UP (ref 2.5–4.5)
PLATELET # BLD AUTO: 459 K/UL — HIGH (ref 150–400)
PLATELET # BLD AUTO: 459 K/UL — HIGH (ref 150–400)
POTASSIUM SERPL-MCNC: 4.4 MMOL/L — SIGNIFICANT CHANGE UP (ref 3.5–5.3)
POTASSIUM SERPL-MCNC: 4.4 MMOL/L — SIGNIFICANT CHANGE UP (ref 3.5–5.3)
POTASSIUM SERPL-SCNC: 4.4 MMOL/L — SIGNIFICANT CHANGE UP (ref 3.5–5.3)
POTASSIUM SERPL-SCNC: 4.4 MMOL/L — SIGNIFICANT CHANGE UP (ref 3.5–5.3)
PROT SERPL-MCNC: 6 G/DL — SIGNIFICANT CHANGE UP (ref 6–8.3)
PROT SERPL-MCNC: 6 G/DL — SIGNIFICANT CHANGE UP (ref 6–8.3)
PROTHROM AB SERPL-ACNC: 23.8 SEC — HIGH (ref 9.5–13)
PROTHROM AB SERPL-ACNC: 23.8 SEC — HIGH (ref 9.5–13)
RBC # BLD: 2.96 M/UL — LOW (ref 3.8–5.2)
RBC # BLD: 2.96 M/UL — LOW (ref 3.8–5.2)
RBC # FLD: 15.2 % — HIGH (ref 10.3–14.5)
RBC # FLD: 15.2 % — HIGH (ref 10.3–14.5)
SODIUM SERPL-SCNC: 136 MMOL/L — SIGNIFICANT CHANGE UP (ref 135–145)
SODIUM SERPL-SCNC: 136 MMOL/L — SIGNIFICANT CHANGE UP (ref 135–145)
WBC # BLD: 9.69 K/UL — SIGNIFICANT CHANGE UP (ref 3.8–10.5)
WBC # BLD: 9.69 K/UL — SIGNIFICANT CHANGE UP (ref 3.8–10.5)
WBC # FLD AUTO: 9.69 K/UL — SIGNIFICANT CHANGE UP (ref 3.8–10.5)
WBC # FLD AUTO: 9.69 K/UL — SIGNIFICANT CHANGE UP (ref 3.8–10.5)

## 2024-01-09 PROCEDURE — 99232 SBSQ HOSP IP/OBS MODERATE 35: CPT | Mod: GC

## 2024-01-09 RX ORDER — WARFARIN SODIUM 2.5 MG/1
6 TABLET ORAL ONCE
Refills: 0 | Status: COMPLETED | OUTPATIENT
Start: 2024-01-09 | End: 2024-01-09

## 2024-01-09 RX ADMIN — Medication 1 MILLIGRAM(S): at 12:21

## 2024-01-09 RX ADMIN — Medication 400 MILLIGRAM(S): at 21:58

## 2024-01-09 RX ADMIN — Medication 400 MILLIGRAM(S): at 06:29

## 2024-01-09 RX ADMIN — MIRTAZAPINE 30 MILLIGRAM(S): 45 TABLET, ORALLY DISINTEGRATING ORAL at 12:22

## 2024-01-09 RX ADMIN — CARVEDILOL PHOSPHATE 3.12 MILLIGRAM(S): 80 CAPSULE, EXTENDED RELEASE ORAL at 06:30

## 2024-01-09 RX ADMIN — SPIRONOLACTONE 100 MILLIGRAM(S): 25 TABLET, FILM COATED ORAL at 06:30

## 2024-01-09 RX ADMIN — Medication 40 MILLIGRAM(S): at 12:21

## 2024-01-09 RX ADMIN — CARVEDILOL PHOSPHATE 3.12 MILLIGRAM(S): 80 CAPSULE, EXTENDED RELEASE ORAL at 17:39

## 2024-01-09 RX ADMIN — Medication 1 TABLET(S): at 12:20

## 2024-01-09 RX ADMIN — Medication 1: at 21:58

## 2024-01-09 RX ADMIN — PANTOPRAZOLE SODIUM 40 MILLIGRAM(S): 20 TABLET, DELAYED RELEASE ORAL at 06:30

## 2024-01-09 RX ADMIN — Medication 400 MILLIGRAM(S): at 14:38

## 2024-01-09 RX ADMIN — WARFARIN SODIUM 6 MILLIGRAM(S): 2.5 TABLET ORAL at 21:57

## 2024-01-09 RX ADMIN — Medication 1: at 17:39

## 2024-01-09 NOTE — PROGRESS NOTE ADULT - ATTENDING COMMENTS
58W w/ type 2 diabetes, HTN, MDD vs bipolar disorder, UC (dx on colonoscopy at [facility], previously treated with steroids), NAFLD c/b ?decompensated cirrhosis, AF (warfarin), admitted to [facility] with periorbital swelling, rash, and pancolitis, transferred to [facility] for derm/rheum/ophtho eval, overall felt to be have resolving bruising, subconjunctival hemorrhage, and UC flare currently improving on steroids.    Continues to have abdominal pain, but improved diarrhea with no BM x2 days.     - Appreciate GI recommendations  - Continue PO prednisone and mesalamine PO  - Clarify with GI steroid plan on discharge  - She will f/up with [facility] after discharge    Time-based billing (NON-critical care).     35 minutes spent on total encounter; more than 50% of the visit was spent counseling and / or coordinating care by the attending physician.  The necessity of the time spent during the encounter on this date of service was due to:     documentation in [place], reviewing chart and coordinating care with patient/resident and interdisciplinary staff (such as , social workers, etc) as well as reviewing vitals, laboratory data, radiology, medication list, consultants' recommendations and prior records. Interventions were performed as documented above. 58W w/ type 2 diabetes, HTN, MDD vs bipolar disorder, UC (dx on colonoscopy at [place], previously treated with steroids), NAFLD c/b ?decompensated cirrhosis, AF (warfarin), admitted to [facility] with periorbital swelling, rash, and pancolitis, transferred to [facility] for derm/rheum/ophtho eval, overall felt to be have resolving bruising, subconjunctival hemorrhage, and UC flare currently improving on steroids.    Continues to have abdominal pain, but improved diarrhea with no BM x2 days.     - Appreciate GI recommendations  - Continue PO prednisone and mesalamine PO  - Clarify with GI steroid plan on discharge  - She will f/up with [facility] after discharge    Time-based billing (NON-critical care).     35 minutes spent on total encounter; more than 50% of the visit was spent counseling and / or coordinating care by the attending physician.  The necessity of the time spent during the encounter on this date of service was due to:     documentation in [place], reviewing chart and coordinating care with patient/resident and interdisciplinary staff (such as , social workers, etc) as well as reviewing vitals, laboratory data, radiology, medication list, consultants' recommendations and prior records. Interventions were performed as documented above.

## 2024-01-09 NOTE — PROGRESS NOTE ADULT - PROBLEM SELECTOR PLAN 3
Patient with anemia with iron studies more consistent with anemia of chronic disease   -B12 and folate WNL   -patient had drop of Hgb to 6.9 at [place] prior to transfer, transfused with 1pRBC with response to 9.5  -continue to monitor   -transfuse for Hgb >7 Patient with anemia with iron studies more consistent with anemia of chronic disease   -B12 and folate WNL   -patient had drop of Hgb to 6.9 at [place] prior to transfer, transfused with 1pRBC with response to 9.5  -continue to monitor   -transfuse for Hgb >7

## 2024-01-09 NOTE — PROGRESS NOTE ADULT - ATTENDING COMMENTS
No new GI complaints. Patient still contemplating possible endoscopic evaluation.     # History of UC: As noted above, records from [place] obtained with colonoscopy in 2/2023 with pancolitis, Salinas score 2-3 with biopsies with scattered active colitis as well as LGD in right colon (see additional findings above). Though biopsies not pathognomonic for UC, the endoscopic findings and clinical history are consistent with this diagnosis. Per available records and patient/sister, patient had received Remicade at [place], but it is unclear if she had clinical response. Additionally, they report no additional infusions were given after discharge to her facility.  # Afib on Coumadin  # CAD  # Bipolar disease  # Reported history of cirrhosis, possibly NAFLD    --as per nursing, no BM/diarrhea today  Pt seems sad/withdrawn    -- Pt reports upper GI upset post prandially, would like to avoid colonoscopy unless necessary    Given improvement of loose stools and labs, would hold off on colonoscopy given recent one 8/2023 at [place]  --c/w prednisone 40mg daily x7 days, will need slow taper as outpatient  -- Low residue diet  -- Pt would like to resume care at [place] eventually, would need to be reinduced with Remicade (had 2 doses and was then sent to [facility]) No new GI complaints. Patient still contemplating possible endoscopic evaluation.     # History of UC: As noted above, records from [place] obtained with colonoscopy in 2/2023 with pancolitis, Salinas score 2-3 with biopsies with scattered active colitis as well as LGD in right colon (see additional findings above). Though biopsies not pathognomonic for UC, the endoscopic findings and clinical history are consistent with this diagnosis. Per available records and patient/sister, patient had received Remicade at [place], but it is unclear if she had clinical response. Additionally, they report no additional infusions were given after discharge to her facility.  # Afib on Coumadin  # CAD  # Bipolar disease  # Reported history of cirrhosis, possibly NAFLD    --as per nursing, no BM/diarrhea today  Pt seems sad/withdrawn    -- Pt reports upper GI upset post prandially, would like to avoid colonoscopy unless necessary    Given improvement of loose stools and labs, would hold off on colonoscopy given recent one 8/2023 at [facility]  --c/w prednisone 40mg daily x7 days, will need slow taper as outpatient  -- Low residue diet  -- Pt would like to resume care at [facility] eventually, would need to be reinduced with Remicade (had 2 doses and was then sent to [facility])

## 2024-01-09 NOTE — PROGRESS NOTE ADULT - ASSESSMENT
58 y F w PMHx of CAD, diabetes, insomnia, MDD, GERD, hypertension, irritable bowel syndrome with diarrhea, ulcerative colitis, cirrhosis, NAFLD, hepatic encephalopathy, bipolar disorder, chronic Afib on Coumadin, blepharitis of the left eye who initially presented to [facility] due to reported hypotension, tachycardia and diffuse purpura. Patient transferred to [facility] for further workup and consultations for her body rash.  58 y F w PMHx of CAD, diabetes, insomnia, MDD, GERD, hypertension, irritable bowel syndrome with diarrhea, ulcerative colitis, cirrhosis, NAFLD, hepatic encephalopathy, bipolar disorder, chronic Afib on Coumadin, blepharitis of the left eye who initially presented to [facility] due to reported hypotension, tachycardia and diffuse purpura. Patient transferred to [facility] for further workup and consultations for her body rash.

## 2024-01-09 NOTE — PROGRESS NOTE ADULT - REASON FOR ADMISSION
transfer from [place] for tertiary level care in the setting of diffuse body rash transfer from [place] for tertiary level care in the setting of diffuse body rash

## 2024-01-09 NOTE — PROGRESS NOTE ADULT - PROBLEM SELECTOR PLAN 2
Patient with history of ulcerative colitis found to have pancolitis   -likely ulcerative colitis flare   -continue mesalamine 400mg TID   - s/p IV steroids 20 q8h (12/31-1/8)  - GI recommends to switch to pred 40 qd today   - PPI 40mg daily   -Continue with mesalamine 400 mg TID for now  -regular diet  -GI following, potential plan for scope if pt agreeable, if not will start remicade  -colonoscopy report from [facility]: moderate to severe colitis in the past, likely concerning for UC based on endoscopic and histologic presentation Patient with history of ulcerative colitis found to have pancolitis   -likely ulcerative colitis flare   -continue mesalamine 400mg TID   - s/p IV steroids 20 q8h (12/31-1/8)  - GI recommends to switch to pred 40 qd today   - PPI 40mg daily   -Continue with mesalamine 400 mg TID for now  -regular diet  -GI following, potential plan for scope if pt agreeable, if not will start remicade  -colonoscopy report from [facility]: moderate to severe colitis in the past, likely concerning for UC based on endoscopic and histologic presentation

## 2024-01-09 NOTE — PROGRESS NOTE ADULT - ASSESSMENT
58 yrs old male w/ hx of CAD (not on anti-platelets), insomnia, DM, GERD, MDD, HTN, Ulcerative colitis, Bipolar disorder, cirrhosis (unknown etiology, NAFLD?) c/w HE, Afib (on Coumadin), and Blepharitis who initially presented to [facility] for hypotension, diffuse body rashes and pancolitis.     #"mild" Pancolitis  #Ulcerative colitis  - Reported bloody stools w/ fecal incontinence worsening for the past one year. Underwent two colonoscopies on 11/2022 and 3/2023 but no records present. CT A/P showed thickening, hyperemia and mild inflammation throughout the colon. Records from [place] obtained with colonoscopy in 2/2023 with pancolitis, Salinas score 2-3 with biopsies with scattered active colitis as well as LGD in right colon. Per available records and patient/sister, patient had received Remicade at [place], but it is unclear if she had clinical response. Patient currently on oral mesalamine 400 mg TID and IV steroids started on 12/31 with no clinical response (no diarrhea reported).   - GI PCR and c diff testing neg from 12/25.   - declined flex sig/colonoscopy during this admission but reports from [place] present, please see above. Colonoscopy from 2/2023 showed severe pancolitis w/ positive path.   - hepatitis B serologies negative. Quant TB testing neg.   - fecal calprotectin 2050   - CRP downtrending   - Further history obtained from her Tasneem, [phone], - patient was previously on Remicade but unclear if she responded to it but did not receive infusions when she went to the facility.     #Cirrhosis?   - Unclear if the patient has cirrhosis b/c the CT imaging showed normal liver w/ no splenomegaly. Her INR is elevated in the setting of Coumadin use.   - Less concerned for cirrhosis based on the clinical picture  - On rifaximin and Aldactone per home meds.     Recommendations:   - recommended repeat colonoscopy/flex sig to assess for severity of the disease but patient deferred.   - c/w Prednisone 40 mg daily   - PPI 40mg daily   - Continue with mesalamine 400 mg TID for now  - please document stool count  - DVT PPx   - CBC and CRP daily.   - Patient will need close OP follow to discuss OP therapy for UC. She [place] [facility] from our perspective     Recommendations preliminary until signed by attending.     Omi Cole MD  Gastroenterology/Hepatology Fellow  1st option: [phone] (text or call), ONLY available from 7:00 am to 5:00 pm.   **Contact on-call GI fellow via answering service ([phone]) from 5pm-7am AND on weekends/holidays**  2nd option: Available via [employer] Teams  3rd option: Pager: [phone]           58 yrs old male w/ hx of CAD (not on anti-platelets), insomnia, DM, GERD, MDD, HTN, Ulcerative colitis, Bipolar disorder, cirrhosis (unknown etiology, NAFLD?) c/w HE, Afib (on Coumadin), and Blepharitis who initially presented to [facility] for hypotension, diffuse body rashes and pancolitis.     #"mild" Pancolitis  #Ulcerative colitis  - Reported bloody stools w/ fecal incontinence worsening for the past one year. Underwent two colonoscopies on 11/2022 and 3/2023 but no records present. CT A/P showed thickening, hyperemia and mild inflammation throughout the colon. Records from [place] obtained with colonoscopy in 2/2023 with pancolitis, Salinas score 2-3 with biopsies with scattered active colitis as well as LGD in right colon. Per available records and patient/sister, patient had received Remicade at [place], but it is unclear if she had clinical response. Patient currently on oral mesalamine 400 mg TID and IV steroids started on 12/31 with no clinical response (no diarrhea reported).   - GI PCR and c diff testing neg from 12/25.   - declined flex sig/colonoscopy during this admission but reports from [place] present, please see above. Colonoscopy from 2/2023 showed severe pancolitis w/ positive path.   - hepatitis B serologies negative. Quant TB testing neg.   - fecal calprotectin 2050   - CRP downtrending   - Further history obtained from her Tasneem, [phone], - patient was previously on Remicade but unclear if she responded to it but did not receive infusions when she went to the facility.     #Cirrhosis?   - Unclear if the patient has cirrhosis b/c the CT imaging showed normal liver w/ no splenomegaly. Her INR is elevated in the setting of Coumadin use.   - Less concerned for cirrhosis based on the clinical picture  - On rifaximin and Aldactone per home meds.     Recommendations:   - recommended repeat colonoscopy/flex sig to assess for severity of the disease but patient deferred.   - c/w Prednisone 40 mg daily   - PPI 40mg daily   - Continue with mesalamine 400 mg TID for now  - please document stool count  - DVT PPx   - CBC and CRP daily.   - Patient will need close OP follow to discuss OP therapy for UC. She [place] [facility] [place] [facility] our perspective     Recommendations preliminary until signed by attending.     Omi Cole MD  Gastroenterology/Hepatology Fellow  1st option: [phone] (text or call), ONLY available from 7:00 am to 5:00 pm.   **Contact on-call GI fellow via answering service ([phone]) from 5pm-7am AND on weekends/holidays**  2nd option: Available via [employer] Teams  3rd option: Pager: [phone]

## 2024-01-09 NOTE — PROGRESS NOTE ADULT - SUBJECTIVE AND OBJECTIVE BOX
PROGRESS NOTE:     Patient is a 58y old  Female who presents with a chief complaint of transfer from [place] for tertiary level care in the setting of diffuse body rash (06 Jan 2024 07:40)      SUBJECTIVE / OVERNIGHT EVENTS: No acute events overnight. This AM seen and examined at bedside- pt continues to endorse discomfort, did not speak with her sister over the weekend. Deferred colonoscopy with GI.     ADDITIONAL REVIEW OF SYSTEMS: Negative     MEDICATIONS  (STANDING):  carvedilol 3.125 milliGRAM(s) Oral every 12 hours  dextrose 5%. 1000 milliLiter(s) (100 mL/Hr) IV Continuous <Continuous>  dextrose 5%. 1000 milliLiter(s) (50 mL/Hr) IV Continuous <Continuous>  dextrose 50% Injectable 12.5 Gram(s) IV Push once  dextrose 50% Injectable 25 Gram(s) IV Push once  dextrose 50% Injectable 25 Gram(s) IV Push once  folic acid 1 milliGRAM(s) Oral daily  glucagon  Injectable 1 milliGRAM(s) IntraMuscular once  influenza   Vaccine 0.5 milliLiter(s) IntraMuscular once  insulin lispro (ADMELOG) corrective regimen sliding scale   SubCutaneous at bedtime  insulin lispro (ADMELOG) corrective regimen sliding scale   SubCutaneous three times a day before meals  lactated ringers. 1000 milliLiter(s) (100 mL/Hr) IV Continuous <Continuous>  mesalamine DR Capsule 400 milliGRAM(s) Oral three times a day  methylPREDNISolone sodium succinate Injectable 20 milliGRAM(s) IV Push every 8 hours  mirtazapine 30 milliGRAM(s) Oral daily  ondansetron Injectable 4 milliGRAM(s) IV Push once  pantoprazole    Tablet 40 milliGRAM(s) Oral before breakfast  rifAXIMin 550 milliGRAM(s) Oral two times a day  spironolactone 100 milliGRAM(s) Oral daily    MEDICATIONS  (PRN):  acetaminophen     Tablet .. 650 milliGRAM(s) Oral every 6 hours PRN Temp greater or equal to 38C (100.4F), Mild Pain (1 - 3)  aluminum hydroxide/magnesium hydroxide/simethicone Suspension 30 milliLiter(s) Oral every 6 hours PRN Dyspepsia  dextrose Oral Gel 15 Gram(s) Oral once PRN Blood Glucose LESS THAN 70 milliGRAM(s)/deciliter      CAPILLARY BLOOD GLUCOSE      POCT Blood Glucose.: 119 mg/dL (07 Jan 2024 08:43)  POCT Blood Glucose.: 215 mg/dL (06 Jan 2024 21:36)  POCT Blood Glucose.: 170 mg/dL (06 Jan 2024 18:13)  POCT Blood Glucose.: 140 mg/dL (06 Jan 2024 12:49)    I&O's Summary      Vital Signs Last 24 Hrs  T(C): 36.9 (08 Jan 2024 05:35), Max: 37.1 (07 Jan 2024 21:15)  T(F): 98.4 (08 Jan 2024 05:35), Max: 98.8 (07 Jan 2024 21:15)  HR: 73 (08 Jan 2024 05:35) (73 - 88)  BP: 109/64 (08 Jan 2024 05:35) (107/60 - 110/65)  BP(mean): --  RR: 17 (08 Jan 2024 05:35) (17 - 18)  SpO2: 100% (08 Jan 2024 05:35) (98% - 100%)    Parameters below as of 08 Jan 2024 05:35  Patient On (Oxygen Delivery Method): room air      PHYSICAL EXAM:  GENERAL: NAD  HEAD:  Atraumatic, Normocephalic  EYES: left eye and rt eye with mild redness+ no drainage  ENMT: MMM  NECK: Supple, No JVD  NERVOUS SYSTEM: AOX3, motor and sensation grossly intact in b/l UE and b/l LE  PSYCHIATRIC: Appropriate affect and mood  CHEST/LUNG: Clear to auscultation bilaterally; No rales, rhonchi, wheezing, or rubs  HEART: Regular rate and rhythm; No murmurs, rubs, or gallops. No LE edema  ABDOMEN: Soft, mild epigastric tenderness, Nondistended; Bowel sounds present  EXTREMITIES:  2+ Peripheral Pulses, No clubbing, cyanosis  SKIN:  rash improving      LABS:             CBC Full  -  ( 08 Jan 2024 06:00 )  WBC Count : 11.57 K/uL  RBC Count : 3.18 M/uL  Hemoglobin : 9.8 g/dL  Hematocrit : 30.7 %  Platelet Count - Automated : 494 K/uL  Mean Cell Volume : 96.5 fL  Mean Cell Hemoglobin : 30.8 pg  Mean Cell Hemoglobin Concentration : 31.9 gm/dL  Auto Neutrophil # : 10.40 K/uL  Auto Lymphocyte # : 0.68 K/uL  Auto Monocyte # : 0.33 K/uL  Auto Eosinophil # : 0.00 K/uL  Auto Basophil # : 0.01 K/uL  Auto Neutrophil % : 89.8 %  Auto Lymphocyte % : 5.9 %  Auto Monocyte % : 2.9 %  Auto Eosinophil % : 0.0 %  Auto Basophil % : 0.1 %    01-08    138  |  101  |  33<H>  ----------------------------<  135<H>  5.5<H>   |  23  |  0.63    Ca    9.0      08 Jan 2024 06:00  Phos  2.9     01-08  Mg     2.20     01-08    TPro  6.5  /  Alb  3.4  /  TBili  <0.2  /  DBili  x   /  AST  23  /  ALT  24  /  AlkPhos  109  01-08              Urinalysis Basic - ( 07 Jan 2024 07:42 )    Color: x / Appearance: x / SG: x / pH: x  Gluc: 115 mg/dL / Ketone: x  / Bili: x / Urobili: x   Blood: x / Protein: x / Nitrite: x   Leuk Esterase: x / RBC: x / WBC x   Sq Epi: x / Non Sq Epi: x / Bacteria: x          RADIOLOGY & ADDITIONAL TESTS:  Results Reviewed:   Imaging Personally Reviewed:  Electrocardiogram Personally Reviewed:    COORDINATION OF CARE:  Care Discussed with Consultants/Other Providers [Y/N]:  Prior or Outpatient Records Reviewed [Y/N]:   PROGRESS NOTE:     Patient is a 58y old  Female who presents with a chief complaint of transfer from [place] for tertiary level care in the setting of diffuse body rash (06 Jan 2024 07:40)      SUBJECTIVE / OVERNIGHT EVENTS: No acute events overnight. This AM seen and examined at bedside- pt continues to endorse discomfort, did not speak with her sister over the weekend. Deferred colonoscopy with GI.     ADDITIONAL REVIEW OF SYSTEMS: Negative     MEDICATIONS  (STANDING):  carvedilol 3.125 milliGRAM(s) Oral every 12 hours  dextrose 5%. 1000 milliLiter(s) (100 mL/Hr) IV Continuous <Continuous>  dextrose 5%. 1000 milliLiter(s) (50 mL/Hr) IV Continuous <Continuous>  dextrose 50% Injectable 12.5 Gram(s) IV Push once  dextrose 50% Injectable 25 Gram(s) IV Push once  dextrose 50% Injectable 25 Gram(s) IV Push once  folic acid 1 milliGRAM(s) Oral daily  glucagon  Injectable 1 milliGRAM(s) IntraMuscular once  influenza   Vaccine 0.5 milliLiter(s) IntraMuscular once  insulin lispro (ADMELOG) corrective regimen sliding scale   SubCutaneous at bedtime  insulin lispro (ADMELOG) corrective regimen sliding scale   SubCutaneous three times a day before meals  lactated ringers. 1000 milliLiter(s) (100 mL/Hr) IV Continuous <Continuous>  mesalamine DR Capsule 400 milliGRAM(s) Oral three times a day  methylPREDNISolone sodium succinate Injectable 20 milliGRAM(s) IV Push every 8 hours  mirtazapine 30 milliGRAM(s) Oral daily  ondansetron Injectable 4 milliGRAM(s) IV Push once  pantoprazole    Tablet 40 milliGRAM(s) Oral before breakfast  rifAXIMin 550 milliGRAM(s) Oral two times a day  spironolactone 100 milliGRAM(s) Oral daily    MEDICATIONS  (PRN):  acetaminophen     Tablet .. 650 milliGRAM(s) Oral every 6 hours PRN Temp greater or equal to 38C (100.4F), Mild Pain (1 - 3)  aluminum hydroxide/magnesium hydroxide/simethicone Suspension 30 milliLiter(s) Oral every 6 hours PRN Dyspepsia  dextrose Oral Gel 15 Gram(s) Oral once PRN Blood Glucose LESS THAN 70 milliGRAM(s)/deciliter      CAPILLARY BLOOD GLUCOSE      POCT Blood Glucose.: 119 mg/dL (07 Jan 2024 08:43)  POCT Blood Glucose.: 215 mg/dL (06 Jan 2024 21:36)  POCT Blood Glucose.: 170 mg/dL (06 Jan 2024 18:13)  POCT Blood Glucose.: 140 mg/dL (06 Jan 2024 12:49)    I&O's Summary      Vital Signs Last 24 Hrs  T(C): 36.9 (08 Jan 2024 05:35), Max: 37.1 (07 Jan 2024 21:15)  T(F): 98.4 (08 Jan 2024 05:35), Max: 98.8 (07 Jan 2024 21:15)  HR: 73 (08 Jan 2024 05:35) (73 - 88)  BP: 109/64 (08 Jan 2024 05:35) (107/60 - 110/65)  BP(mean): --  RR: 17 (08 Jan 2024 05:35) (17 - 18)  SpO2: 100% (08 Jan 2024 05:35) (98% - 100%)    Parameters below as of 08 Jan 2024 05:35  Patient On (Oxygen Delivery Method): room air      PHYSICAL EXAM:  GENERAL: NAD  HEAD:  Atraumatic, Normocephalic  EYES: left eye and rt eye with mild redness+ no drainage  ENMT: MMM  NECK: Supple, No JVD  NERVOUS SYSTEM: AOX3, motor and sensation grossly intact in b/l UE and b/l LE  PSYCHIATRIC: Appropriate affect and mood  CHEST/LUNG: Clear to auscultation bilaterally; No rales, rhonchi, wheezing, or rubs  HEART: Regular rate and rhythm; No murmurs, rubs, or gallops. No LE edema  ABDOMEN: Soft, mild epigastric tenderness, Nondistended; Bowel sounds present  EXTREMITIES:  2+ Peripheral Pulses, No clubbing, cyanosis  SKIN:  rash improving      LABS:             CBC Full  -  ( 08 Jan 2024 06:00 )  WBC Count : 11.57 K/uL  RBC Count : 3.18 M/uL  Hemoglobin : 9.8 g/dL  Hematocrit : 30.7 %  Platelet Count - Automated : 494 K/uL  Mean Cell Volume : 96.5 fL  Mean Cell Hemoglobin : 30.8 pg  Mean Cell Hemoglobin Concentration : 31.9 gm/dL  Auto Neutrophil # : 10.40 K/uL  Auto Lymphocyte # : 0.68 K/uL  Auto Monocyte # : 0.33 K/uL  Auto Eosinophil # : 0.00 K/uL  Auto Basophil # : 0.01 K/uL  Auto Neutrophil % : 89.8 %  Auto Lymphocyte % : 5.9 %  Auto Monocyte % : 2.9 %  Auto Eosinophil % : 0.0 %  Auto Basophil % : 0.1 %    01-08    138  |  101  |  33<H>  ----------------------------<  135<H>  5.5<H>   |  23  |  0.63    Ca    9.0      08 Jan 2024 06:00  Phos  2.9     01-08  Mg     2.20     01-08    TPro  6.5  /  Alb  3.4  /  TBili  <0.2  /  DBili  x   /  AST  23  /  ALT  24  /  AlkPhos  109  01-08              Urinalysis Basic - ( 07 Jan 2024 07:42 )    Color: x / Appearance: x / SG: x / pH: x  Gluc: 115 mg/dL / Ketone: x  / Bili: x / Urobili: x   Blood: x / Protein: x / Nitrite: x   Leuk Esterase: x / RBC: x / WBC x   Sq Epi: x / Non Sq Epi: x / Bacteria: x          RADIOLOGY & ADDITIONAL TESTS:  Results Reviewed:   Imaging Personally Reviewed:  Electrocardiogram Personally Reviewed:    COORDINATION OF CARE:  Care Discussed with Consultants/Other Providers [Y/N]:  Prior or Outpatient Records Reviewed [Y/N]:

## 2024-01-09 NOTE — PROGRESS NOTE ADULT - REASON FOR ADMISSION
14-May-2022 10:50 transfer from [place] for tertiary level care in the setting of diffuse body rash transfer from [place] for tertiary level care in the setting of diffuse body rash

## 2024-01-09 NOTE — PROGRESS NOTE ADULT - PROBLEM SELECTOR PLAN 1
Patient presented with body rash of b/l upper extremity and lower extremity of unknown etiology that is improving from initial presentation   Rash of unclear etiology, initial infectious versus inflammatory now derm believes resolving ecchymosis  work up at [facility] includes:   CMV IgG negative   LDH normal   Babesia negative   Lyme negative   RPR negative   ESR, CRP elevated   -rheumatology: likely not vasculitis  -dermatology: likely resolving ecchymosis i/s/o coumadin use  -ophtho: likely subconjunctival hemorrhage no need to stop coumadin Patient presented with body rash of b/l upper extremity and lower extremity of unknown etiology that is improving from initial presentation   Rash of unclear etiology, initial infectious versus inflammatory now derm believes resolving ecchymosis  work up at [facility] includes:   CMV IgG negative   LDH normal   Babesia negative   Lyme negative   RPR negative   ESR, CRP elevated   -rheumatology: likely not vasculitis  -dermatology: likely resolving ecchymosis i/s/o coumadin use  -ophtho: likely subconjunctival hemorrhage no need to stop coumadin

## 2024-01-09 NOTE — PROGRESS NOTE ADULT - SUBJECTIVE AND OBJECTIVE BOX
Interval Events:   no diarrhea reported by patient or nurse    Hospital Medications:  acetaminophen     Tablet .. 650 milliGRAM(s) Oral every 6 hours PRN  aluminum hydroxide/magnesium hydroxide/simethicone Suspension 30 milliLiter(s) Oral every 6 hours PRN  carvedilol 3.125 milliGRAM(s) Oral every 12 hours  dextrose 5%. 1000 milliLiter(s) IV Continuous <Continuous>  dextrose 5%. 1000 milliLiter(s) IV Continuous <Continuous>  dextrose 50% Injectable 12.5 Gram(s) IV Push once  dextrose 50% Injectable 25 Gram(s) IV Push once  dextrose 50% Injectable 25 Gram(s) IV Push once  dextrose Oral Gel 15 Gram(s) Oral once PRN  folic acid 1 milliGRAM(s) Oral daily  glucagon  Injectable 1 milliGRAM(s) IntraMuscular once  influenza   Vaccine 0.5 milliLiter(s) IntraMuscular once  insulin lispro (ADMELOG) corrective regimen sliding scale   SubCutaneous at bedtime  insulin lispro (ADMELOG) corrective regimen sliding scale   SubCutaneous three times a day before meals  lactated ringers. 1000 milliLiter(s) IV Continuous <Continuous>  mesalamine DR Capsule 400 milliGRAM(s) Oral three times a day  mirtazapine 30 milliGRAM(s) Oral daily  multivitamin 1 Tablet(s) Oral daily  ondansetron Injectable 4 milliGRAM(s) IV Push once  pantoprazole    Tablet 40 milliGRAM(s) Oral before breakfast  predniSONE   Tablet 40 milliGRAM(s) Oral every 24 hours  rifAXIMin 550 milliGRAM(s) Oral two times a day  spironolactone 100 milliGRAM(s) Oral daily      ROS: All system reviewed and negative except as mentioned above.    PHYSICAL EXAM:   Vital Signs:  Vital Signs Last 24 Hrs  T(C): 36.6 (09 Jan 2024 05:24), Max: 36.7 (08 Jan 2024 13:04)  T(F): 97.9 (09 Jan 2024 05:24), Max: 98 (08 Jan 2024 13:04)  HR: 69 (09 Jan 2024 05:24) (69 - 85)  BP: 112/66 (09 Jan 2024 05:24) (112/66 - 139/86)  BP(mean): --  RR: 17 (09 Jan 2024 05:24) (17 - 18)  SpO2: 97% (09 Jan 2024 05:24) (97% - 98%)    Parameters below as of 09 Jan 2024 05:24  Patient On (Oxygen Delivery Method): room air      Daily     Daily     GENERAL:  NAD, chachectic  HEENT:  NC/AT,  conjunctivae clear and pink, sclera -anicteric  CHEST:  Normal Effort, Breath sounds clear  HEART:  RRR, S1 + S2, no murmurs  ABDOMEN:  Soft, non-tender, non-distended, normoactive bowel sounds,  no masses  EXTREMITIES:  no cyanosis or edema  SKIN:  Warm & Dry. No rash or erythema  NEURO:  Alert, oriented, no focal deficit    LABS:                        9.1    9.69  )-----------( 459      ( 09 Jan 2024 06:15 )             28.0     Mean Cell Volume: 94.6 fL (01-09-24 @ 06:15)    01-09    136  |  100  |  30<H>  ----------------------------<  90  4.4   |  25  |  0.45<L>    Ca    8.7      09 Jan 2024 06:15  Phos  3.1     01-09  Mg     2.20     01-09    TPro  6.0  /  Alb  3.2<L>  /  TBili  <0.2  /  DBili  x   /  AST  21  /  ALT  25  /  AlkPhos  99  01-09    LIVER FUNCTIONS - ( 09 Jan 2024 06:15 )  Alb: 3.2 g/dL / Pro: 6.0 g/dL / ALK PHOS: 99 U/L / ALT: 25 U/L / AST: 21 U/L / GGT: x           PT/INR - ( 09 Jan 2024 06:15 )   PT: 23.8 sec;   INR: 2.15 ratio         PTT - ( 09 Jan 2024 06:15 )  PTT:30.2 sec  Urinalysis Basic - ( 09 Jan 2024 06:15 )    Color: x / Appearance: x / SG: x / pH: x  Gluc: 90 mg/dL / Ketone: x  / Bili: x / Urobili: x   Blood: x / Protein: x / Nitrite: x   Leuk Esterase: x / RBC: x / WBC x   Sq Epi: x / Non Sq Epi: x / Bacteria: x                              9.1    9.69  )-----------( 459      ( 09 Jan 2024 06:15 )             28.0                         9.8    11.57 )-----------( 494      ( 08 Jan 2024 06:00 )             30.7                         8.6    8.26  )-----------( 457      ( 07 Jan 2024 07:42 )             26.4       Imaging: Images reviewed.

## 2024-01-10 LAB
ALBUMIN SERPL ELPH-MCNC: 3.3 G/DL — SIGNIFICANT CHANGE UP (ref 3.3–5)
ALBUMIN SERPL ELPH-MCNC: 3.3 G/DL — SIGNIFICANT CHANGE UP (ref 3.3–5)
ALP SERPL-CCNC: 103 U/L — SIGNIFICANT CHANGE UP (ref 40–120)
ALP SERPL-CCNC: 103 U/L — SIGNIFICANT CHANGE UP (ref 40–120)
ALT FLD-CCNC: 29 U/L — SIGNIFICANT CHANGE UP (ref 4–33)
ALT FLD-CCNC: 29 U/L — SIGNIFICANT CHANGE UP (ref 4–33)
ANION GAP SERPL CALC-SCNC: 14 MMOL/L — SIGNIFICANT CHANGE UP (ref 7–14)
ANION GAP SERPL CALC-SCNC: 14 MMOL/L — SIGNIFICANT CHANGE UP (ref 7–14)
APTT BLD: 32.5 SEC — SIGNIFICANT CHANGE UP (ref 24.5–35.6)
APTT BLD: 32.5 SEC — SIGNIFICANT CHANGE UP (ref 24.5–35.6)
AST SERPL-CCNC: 23 U/L — SIGNIFICANT CHANGE UP (ref 4–32)
AST SERPL-CCNC: 23 U/L — SIGNIFICANT CHANGE UP (ref 4–32)
BASOPHILS # BLD AUTO: 0.01 K/UL — SIGNIFICANT CHANGE UP (ref 0–0.2)
BASOPHILS # BLD AUTO: 0.01 K/UL — SIGNIFICANT CHANGE UP (ref 0–0.2)
BASOPHILS NFR BLD AUTO: 0.1 % — SIGNIFICANT CHANGE UP (ref 0–2)
BASOPHILS NFR BLD AUTO: 0.1 % — SIGNIFICANT CHANGE UP (ref 0–2)
BILIRUB SERPL-MCNC: 0.2 MG/DL — SIGNIFICANT CHANGE UP (ref 0.2–1.2)
BILIRUB SERPL-MCNC: 0.2 MG/DL — SIGNIFICANT CHANGE UP (ref 0.2–1.2)
BUN SERPL-MCNC: 36 MG/DL — HIGH (ref 7–23)
BUN SERPL-MCNC: 36 MG/DL — HIGH (ref 7–23)
CALCIUM SERPL-MCNC: 8.8 MG/DL — SIGNIFICANT CHANGE UP (ref 8.4–10.5)
CALCIUM SERPL-MCNC: 8.8 MG/DL — SIGNIFICANT CHANGE UP (ref 8.4–10.5)
CHLORIDE SERPL-SCNC: 97 MMOL/L — LOW (ref 98–107)
CHLORIDE SERPL-SCNC: 97 MMOL/L — LOW (ref 98–107)
CO2 SERPL-SCNC: 24 MMOL/L — SIGNIFICANT CHANGE UP (ref 22–31)
CO2 SERPL-SCNC: 24 MMOL/L — SIGNIFICANT CHANGE UP (ref 22–31)
CREAT SERPL-MCNC: 0.49 MG/DL — LOW (ref 0.5–1.3)
CREAT SERPL-MCNC: 0.49 MG/DL — LOW (ref 0.5–1.3)
CRP SERPL-MCNC: 41.8 MG/L — HIGH
CRP SERPL-MCNC: 41.8 MG/L — HIGH
EGFR: 111 ML/MIN/1.73M2 — SIGNIFICANT CHANGE UP
EGFR: 111 ML/MIN/1.73M2 — SIGNIFICANT CHANGE UP
EOSINOPHIL # BLD AUTO: 0 K/UL — SIGNIFICANT CHANGE UP (ref 0–0.5)
EOSINOPHIL # BLD AUTO: 0 K/UL — SIGNIFICANT CHANGE UP (ref 0–0.5)
EOSINOPHIL NFR BLD AUTO: 0 % — SIGNIFICANT CHANGE UP (ref 0–6)
EOSINOPHIL NFR BLD AUTO: 0 % — SIGNIFICANT CHANGE UP (ref 0–6)
ERYTHROCYTE [SEDIMENTATION RATE] IN BLOOD: 60 MM/HR — HIGH (ref 4–25)
ERYTHROCYTE [SEDIMENTATION RATE] IN BLOOD: 60 MM/HR — HIGH (ref 4–25)
GLUCOSE BLDC GLUCOMTR-MCNC: 133 MG/DL — HIGH (ref 70–99)
GLUCOSE BLDC GLUCOMTR-MCNC: 133 MG/DL — HIGH (ref 70–99)
GLUCOSE BLDC GLUCOMTR-MCNC: 181 MG/DL — HIGH (ref 70–99)
GLUCOSE BLDC GLUCOMTR-MCNC: 181 MG/DL — HIGH (ref 70–99)
GLUCOSE BLDC GLUCOMTR-MCNC: 349 MG/DL — HIGH (ref 70–99)
GLUCOSE BLDC GLUCOMTR-MCNC: 349 MG/DL — HIGH (ref 70–99)
GLUCOSE BLDC GLUCOMTR-MCNC: 93 MG/DL — SIGNIFICANT CHANGE UP (ref 70–99)
GLUCOSE BLDC GLUCOMTR-MCNC: 93 MG/DL — SIGNIFICANT CHANGE UP (ref 70–99)
GLUCOSE SERPL-MCNC: 100 MG/DL — HIGH (ref 70–99)
GLUCOSE SERPL-MCNC: 100 MG/DL — HIGH (ref 70–99)
HCT VFR BLD CALC: 28.1 % — LOW (ref 34.5–45)
HCT VFR BLD CALC: 28.1 % — LOW (ref 34.5–45)
HGB BLD-MCNC: 9.4 G/DL — LOW (ref 11.5–15.5)
HGB BLD-MCNC: 9.4 G/DL — LOW (ref 11.5–15.5)
IANC: 6.14 K/UL — SIGNIFICANT CHANGE UP (ref 1.8–7.4)
IANC: 6.14 K/UL — SIGNIFICANT CHANGE UP (ref 1.8–7.4)
IMM GRANULOCYTES NFR BLD AUTO: 1.4 % — HIGH (ref 0–0.9)
IMM GRANULOCYTES NFR BLD AUTO: 1.4 % — HIGH (ref 0–0.9)
INR BLD: 1.84 RATIO — HIGH (ref 0.85–1.18)
INR BLD: 1.84 RATIO — HIGH (ref 0.85–1.18)
LYMPHOCYTES # BLD AUTO: 1.22 K/UL — SIGNIFICANT CHANGE UP (ref 1–3.3)
LYMPHOCYTES # BLD AUTO: 1.22 K/UL — SIGNIFICANT CHANGE UP (ref 1–3.3)
LYMPHOCYTES # BLD AUTO: 15.4 % — SIGNIFICANT CHANGE UP (ref 13–44)
LYMPHOCYTES # BLD AUTO: 15.4 % — SIGNIFICANT CHANGE UP (ref 13–44)
MAGNESIUM SERPL-MCNC: 2.2 MG/DL — SIGNIFICANT CHANGE UP (ref 1.6–2.6)
MAGNESIUM SERPL-MCNC: 2.2 MG/DL — SIGNIFICANT CHANGE UP (ref 1.6–2.6)
MCHC RBC-ENTMCNC: 31 PG — SIGNIFICANT CHANGE UP (ref 27–34)
MCHC RBC-ENTMCNC: 31 PG — SIGNIFICANT CHANGE UP (ref 27–34)
MCHC RBC-ENTMCNC: 33.5 GM/DL — SIGNIFICANT CHANGE UP (ref 32–36)
MCHC RBC-ENTMCNC: 33.5 GM/DL — SIGNIFICANT CHANGE UP (ref 32–36)
MCV RBC AUTO: 92.7 FL — SIGNIFICANT CHANGE UP (ref 80–100)
MCV RBC AUTO: 92.7 FL — SIGNIFICANT CHANGE UP (ref 80–100)
MONOCYTES # BLD AUTO: 0.46 K/UL — SIGNIFICANT CHANGE UP (ref 0–0.9)
MONOCYTES # BLD AUTO: 0.46 K/UL — SIGNIFICANT CHANGE UP (ref 0–0.9)
MONOCYTES NFR BLD AUTO: 5.8 % — SIGNIFICANT CHANGE UP (ref 2–14)
MONOCYTES NFR BLD AUTO: 5.8 % — SIGNIFICANT CHANGE UP (ref 2–14)
NEUTROPHILS # BLD AUTO: 6.14 K/UL — SIGNIFICANT CHANGE UP (ref 1.8–7.4)
NEUTROPHILS # BLD AUTO: 6.14 K/UL — SIGNIFICANT CHANGE UP (ref 1.8–7.4)
NEUTROPHILS NFR BLD AUTO: 77.3 % — HIGH (ref 43–77)
NEUTROPHILS NFR BLD AUTO: 77.3 % — HIGH (ref 43–77)
NRBC # BLD: 0 /100 WBCS — SIGNIFICANT CHANGE UP (ref 0–0)
NRBC # BLD: 0 /100 WBCS — SIGNIFICANT CHANGE UP (ref 0–0)
NRBC # FLD: 0 K/UL — SIGNIFICANT CHANGE UP (ref 0–0)
NRBC # FLD: 0 K/UL — SIGNIFICANT CHANGE UP (ref 0–0)
PHOSPHATE SERPL-MCNC: 3.9 MG/DL — SIGNIFICANT CHANGE UP (ref 2.5–4.5)
PHOSPHATE SERPL-MCNC: 3.9 MG/DL — SIGNIFICANT CHANGE UP (ref 2.5–4.5)
PLATELET # BLD AUTO: 439 K/UL — HIGH (ref 150–400)
PLATELET # BLD AUTO: 439 K/UL — HIGH (ref 150–400)
POTASSIUM SERPL-MCNC: 4.2 MMOL/L — SIGNIFICANT CHANGE UP (ref 3.5–5.3)
POTASSIUM SERPL-MCNC: 4.2 MMOL/L — SIGNIFICANT CHANGE UP (ref 3.5–5.3)
POTASSIUM SERPL-SCNC: 4.2 MMOL/L — SIGNIFICANT CHANGE UP (ref 3.5–5.3)
POTASSIUM SERPL-SCNC: 4.2 MMOL/L — SIGNIFICANT CHANGE UP (ref 3.5–5.3)
PROT SERPL-MCNC: 6.4 G/DL — SIGNIFICANT CHANGE UP (ref 6–8.3)
PROT SERPL-MCNC: 6.4 G/DL — SIGNIFICANT CHANGE UP (ref 6–8.3)
PROTHROM AB SERPL-ACNC: 20.2 SEC — HIGH (ref 9.5–13)
PROTHROM AB SERPL-ACNC: 20.2 SEC — HIGH (ref 9.5–13)
RBC # BLD: 3.03 M/UL — LOW (ref 3.8–5.2)
RBC # BLD: 3.03 M/UL — LOW (ref 3.8–5.2)
RBC # FLD: 15.2 % — HIGH (ref 10.3–14.5)
RBC # FLD: 15.2 % — HIGH (ref 10.3–14.5)
SODIUM SERPL-SCNC: 135 MMOL/L — SIGNIFICANT CHANGE UP (ref 135–145)
SODIUM SERPL-SCNC: 135 MMOL/L — SIGNIFICANT CHANGE UP (ref 135–145)
WBC # BLD: 7.94 K/UL — SIGNIFICANT CHANGE UP (ref 3.8–10.5)
WBC # BLD: 7.94 K/UL — SIGNIFICANT CHANGE UP (ref 3.8–10.5)
WBC # FLD AUTO: 7.94 K/UL — SIGNIFICANT CHANGE UP (ref 3.8–10.5)
WBC # FLD AUTO: 7.94 K/UL — SIGNIFICANT CHANGE UP (ref 3.8–10.5)

## 2024-01-10 PROCEDURE — 99232 SBSQ HOSP IP/OBS MODERATE 35: CPT | Mod: GC

## 2024-01-10 RX ORDER — WARFARIN SODIUM 2.5 MG/1
6 TABLET ORAL ONCE
Refills: 0 | Status: COMPLETED | OUTPATIENT
Start: 2024-01-10 | End: 2024-01-10

## 2024-01-10 RX ADMIN — CARVEDILOL PHOSPHATE 3.12 MILLIGRAM(S): 80 CAPSULE, EXTENDED RELEASE ORAL at 05:21

## 2024-01-10 RX ADMIN — Medication 2: at 21:36

## 2024-01-10 RX ADMIN — Medication 1 TABLET(S): at 13:09

## 2024-01-10 RX ADMIN — WARFARIN SODIUM 6 MILLIGRAM(S): 2.5 TABLET ORAL at 21:56

## 2024-01-10 RX ADMIN — Medication 400 MILLIGRAM(S): at 05:20

## 2024-01-10 RX ADMIN — MIRTAZAPINE 30 MILLIGRAM(S): 45 TABLET, ORALLY DISINTEGRATING ORAL at 13:08

## 2024-01-10 RX ADMIN — CARVEDILOL PHOSPHATE 3.12 MILLIGRAM(S): 80 CAPSULE, EXTENDED RELEASE ORAL at 17:38

## 2024-01-10 RX ADMIN — Medication 400 MILLIGRAM(S): at 13:55

## 2024-01-10 RX ADMIN — Medication 1 MILLIGRAM(S): at 13:09

## 2024-01-10 RX ADMIN — Medication 400 MILLIGRAM(S): at 21:55

## 2024-01-10 RX ADMIN — SPIRONOLACTONE 100 MILLIGRAM(S): 25 TABLET, FILM COATED ORAL at 05:21

## 2024-01-10 RX ADMIN — Medication 40 MILLIGRAM(S): at 13:10

## 2024-01-10 RX ADMIN — PANTOPRAZOLE SODIUM 40 MILLIGRAM(S): 20 TABLET, DELAYED RELEASE ORAL at 06:13

## 2024-01-10 NOTE — PROGRESS NOTE ADULT - ASSESSMENT
58 yrs old male w/ hx of CAD (not on anti-platelets), insomnia, DM, GERD, MDD, HTN, Ulcerative colitis, Bipolar disorder, cirrhosis (unknown etiology, NAFLD?) c/w HE, Afib (on Coumadin), and Blepharitis who initially presented to [facility] for hypotension, diffuse body rashes and pancolitis.     #"mild" Pancolitis  #Ulcerative colitis  - Reported bloody stools w/ fecal incontinence worsening for the past one year. Underwent two colonoscopies on 11/2022 and 3/2023 but no records present. CT A/P showed thickening, hyperemia and mild inflammation throughout the colon. Records from [facility] obtained with colonoscopy in 2/2023 with pancolitis, Salinas score 2-3 with biopsies with scattered active colitis as well as LGD in right colon. Per available records and patient/sister, patient had received Remicade at [facility], but it is unclear if she had clinical response. Patient currently on oral mesalamine 400 mg TID and IV steroids started on 12/31 and transitioned to Po on 1/9.   - GI PCR and c diff testing neg from 12/25.   - declined flex sig/colonoscopy during this admission but reports from [facility] present, please see above. Colonoscopy from 2/2023 showed severe pancolitis w/ positive path.   - hepatitis B serologies negative. Quant TB testing neg.   - fecal calprotectin 2050   - CRP downtrending   - Further history obtained from her Tasneem, [phone], - patient was previously on Remicade but unclear if she responded to it but did not receive infusions when she went to the facility.     #Cirrhosis?   - Unclear if the patient has cirrhosis b/c the CT imaging showed normal liver w/ no splenomegaly. Her INR is elevated in the setting of Coumadin use.   - Less concerned for cirrhosis based on the clinical picture  - On rifaximin and Aldactone per home meds.     Recommendations:   - c/w Prednisone 40 mg daily   - PPI 40mg daily   - Continue with mesalamine 400 mg TID for now  - please document stool count (although CRP elevated, still having formed Bm per nursing staff)  - DVT PPx   - CBC and CRP daily.   - Patient will need close OP follow to discuss OP therapy for UC. She prefers following up at [facility]    Recommendations preliminary until signed by attending.     Omi Cole MD  Gastroenterology/Hepatology Fellow  1st option: [phone] (text or call), ONLY available from 7:00 am to 5:00 pm.   **Contact on-call GI fellow via answering service ([phone]) from 5pm-7am AND on weekends/holidays**  2nd option: Available via Microsoft Teams  3rd option: Pager: [phone]         58 yrs old male w/ hx of CAD (not on anti-platelets), insomnia, DM, GERD, MDD, HTN, Ulcerative colitis, Bipolar disorder, cirrhosis (unknown etiology, NAFLD?) c/w HE, Afib (on Coumadin), and Blepharitis who initially presented to [facility] for hypotension, diffuse body rashes and pancolitis.     #"mild" Pancolitis  #Ulcerative colitis  - Reported bloody stools w/ fecal incontinence worsening for the past one year. Underwent two colonoscopies on 11/2022 and 3/2023 but no records present. CT A/P showed thickening, hyperemia and mild inflammation throughout the colon. Records from [facility] obtained with colonoscopy in 2/2023 with pancolitis, Salinas score 2-3 with biopsies with scattered active colitis as well as LGD in right colon. Per available records and patient/sister, patient had received Remicade at [facility], but it is unclear if she had clinical response. Patient currently on oral mesalamine 400 mg TID and IV steroids started on 12/31 and transitioned to Po on 1/9.   - GI PCR and c diff testing neg from 12/25.   - declined flex sig/colonoscopy during this admission but reports from [place] present, please see above. Colonoscopy from 2/2023 showed severe pancolitis w/ positive path.   - hepatitis B serologies negative. Quant TB testing neg.   - fecal calprotectin 2050   - CRP downtrending   - Further history obtained from her Tasneem, [phone], - patient was previously on Remicade but unclear if she responded to it but did not receive infusions when she went to the facility.     #Cirrhosis?   - Unclear if the patient has cirrhosis b/c the CT imaging showed normal liver w/ no splenomegaly. Her INR is elevated in the setting of Coumadin use.   - Less concerned for cirrhosis based on the clinical picture  - On rifaximin and Aldactone per home meds.     Recommendations:   - c/w Prednisone 40 mg daily   - PPI 40mg daily   - Continue with mesalamine 400 mg TID for now  - please document stool count (although CRP elevated, still having formed Bm per nursing staff)  - DVT PPx   - CBC and CRP daily.   - Patient will need close OP follow to discuss OP therapy for UC. She prefers following up at [facility]    Recommendations preliminary until signed by attending.     Omi Cole MD  Gastroenterology/Hepatology Fellow  1st option: [phone] (text or call), ONLY available from 7:00 am to 5:00 pm.   **Contact on-call GI fellow via answering service ([phone]) from 5pm-7am AND on weekends/holidays**  2nd option: Available via Microsoft Teams  3rd option: Pager: [phone]         58 yrs old male w/ hx of CAD (not on anti-platelets), insomnia, DM, GERD, MDD, HTN, Ulcerative colitis, Bipolar disorder, cirrhosis (unknown etiology, NAFLD?) c/w HE, Afib (on Coumadin), and Blepharitis who initially presented to [facility] for hypotension, diffuse body rashes and pancolitis.     #"mild" Pancolitis  #Ulcerative colitis  - Reported bloody stools w/ fecal incontinence worsening for the past one year. Underwent two colonoscopies on 11/2022 and 3/2023 but no records present. CT A/P showed thickening, hyperemia and mild inflammation throughout the colon. Records from [facility] [place] obtained with colonoscopy in 2/2023 with pancolitis, Salinas score 2-3 with biopsies with scattered active colitis as well as LGD in right colon. Per available records and patient/sister, patient had received Remicade at [place], but it is unclear if she had clinical response. Patient currently on oral mesalamine 400 mg TID and IV steroids started on 12/31 and transitioned to Po on 1/9.   - GI PCR and c diff testing neg from 12/25.   - declined flex sig/colonoscopy during this admission but reports from [place] present, please see above. Colonoscopy from 2/2023 showed severe pancolitis w/ positive path.   - hepatitis B serologies negative. Quant TB testing neg.   - fecal calprotectin 2050   - CRP downtrending   - Further history obtained from her Tasneem, [phone], - patient was previously on Remicade but unclear if she responded to it but did not receive infusions when she went to the facility.     #Cirrhosis?   - Unclear if the patient has cirrhosis b/c the CT imaging showed normal liver w/ no splenomegaly. Her INR is elevated in the setting of Coumadin use.   - Less concerned for cirrhosis based on the clinical picture  - On rifaximin and Aldactone per home meds.     Recommendations:   - c/w Prednisone 40 mg daily, may need to switch to IV if inflammatory markers worsen by tomorrow  - Continue with mesalamine 400 mg TID for now  - please document stool count (although CRP elevated, still having formed Bm per nursing staff and team reporting she was complaining of constipation)  - DVT PPx   - CBC and CRP daily.   - Patient will need close OP follow to discuss OP therapy for UC. She prefers following up at [facility]    Recommendations preliminary until signed by attending.     Omi Cole MD  Gastroenterology/Hepatology Fellow  1st option: [phone] (text or call), ONLY available from 7:00 am to 5:00 pm.   **Contact on-call GI fellow via answering service ([phone]) from 5pm-7am AND on weekends/holidays**  2nd option: Available via Microsoft Teams  3rd option: Pager: [phone]         58 yrs old male w/ hx of CAD (not on anti-platelets), insomnia, DM, GERD, MDD, HTN, Ulcerative colitis, Bipolar disorder, cirrhosis (unknown etiology, NAFLD?) c/w HE, Afib (on Coumadin), and Blepharitis who initially presented to [facility] for hypotension, diffuse body rashes and pancolitis.     #"mild" Pancolitis  #Ulcerative colitis  - Reported bloody stools w/ fecal incontinence worsening for the past one year. Underwent two colonoscopies on 11/2022 and 3/2023 but no records present. CT A/P showed thickening, hyperemia and mild inflammation throughout the colon. Records from [facility] obtained with colonoscopy in 2/2023 with pancolitis, Salinas score 2-3 with biopsies with scattered active colitis as well as LGD in right colon. Per available records and patient/sister, patient had received Remicade at [facility], but it is unclear if she had clinical response. Patient currently on oral mesalamine 400 mg TID and IV steroids started on 12/31 and transitioned to Po on 1/9.   - GI PCR and c diff testing neg from 12/25.   - declined flex sig/colonoscopy during this admission but reports from [place] present, please see above. Colonoscopy from 2/2023 showed severe pancolitis w/ positive path.   - hepatitis B serologies negative. Quant TB testing neg.   - fecal calprotectin 2050   - CRP downtrending   - Further history obtained from her Tasneem, [phone], - patient was previously on Remicade but unclear if she responded to it but did not receive infusions when she went to the facility.     #Cirrhosis?   - Unclear if the patient has cirrhosis b/c the CT imaging showed normal liver w/ no splenomegaly. Her INR is elevated in the setting of Coumadin use.   - Less concerned for cirrhosis based on the clinical picture  - On rifaximin and Aldactone per home meds.     Recommendations:   - c/w Prednisone 40 mg daily, may need to switch to IV if inflammatory markers worsen by tomorrow  - Continue with mesalamine 400 mg TID for now  - please document stool count (although CRP elevated, still having formed Bm per nursing staff and team reporting she was complaining of constipation)  - DVT PPx   - CBC and CRP daily.   - Patient will need close OP follow to discuss OP therapy for UC. She prefers following up at [facility]    Recommendations preliminary until signed by attending.     Omi Cole MD  Gastroenterology/Hepatology Fellow  1st option: [phone] (text or call), ONLY available from 7:00 am to 5:00 pm.   **Contact on-call GI fellow via answering service ([phone]) from 5pm-7am AND on weekends/holidays**  2nd option: Available via Microsoft Teams  3rd option: Pager: [phone]

## 2024-01-10 NOTE — PROGRESS NOTE ADULT - REASON FOR ADMISSION
transfer from [place] for tertiary level care in the setting of diffuse body rash transfer from [place] for tertiary level care in the setting of diffuse body rash

## 2024-01-10 NOTE — PHYSICAL THERAPY INITIAL EVALUATION ADULT - NSPTDISCHREC_GEN_A_CORE
back to nursing facility. Pt. not placed on skilled therapy services secondary to pt. performing at their baseline functional mobility performance./No skilled PT needs

## 2024-01-10 NOTE — PROGRESS NOTE ADULT - ASSESSMENT
58 y F w PMHx of CAD, diabetes, insomnia, MDD, GERD, hypertension, irritable bowel syndrome with diarrhea, ulcerative colitis, cirrhosis, NAFLD, hepatic encephalopathy, bipolar disorder, chronic Afib on Coumadin, blepharitis of the left eye who initially presented to [facility] due to reported hypotension, tachycardia and diffuse purpura. Patient transferred to [facility] for further workup and consultations for her body rash.  58 y F w PMHx of CAD, diabetes, insomnia, MDD, GERD, hypertension, irritable bowel syndrome with diarrhea, ulcerative colitis, cirrhosis, NAFLD, hepatic encephalopathy, bipolar disorder, chronic Afib on Coumadin, blepharitis of the left eye who initially presented to [facility] due to reported hypotension, tachycardia and diffuse purpura. Patient transferred to [facility] for further workup and consultations for her body rash.

## 2024-01-10 NOTE — PROGRESS NOTE ADULT - ATTENDING COMMENTS
58W w/ type 2 diabetes, HTN, MDD vs bipolar disorder, UC (dx on colonoscopy at [facility], previously treated with steroids), NAFLD c/b ?decompensated cirrhosis, AF (warfarin), admitted to [facility] with periorbital swelling, rash, and pancolitis, transferred to [facility] for derm/rheum/ophtho eval, overall felt to be have resolving bruising, subconjunctival hemorrhage, and UC flare currently on steroids.    Continues to have abdominal pain, but improved diarrhea with more formed BMs. CRP uptrended to 41.8 today from 4.8 concerning for worsening ulcerative colitis flare     - Appreciate GI recommendations  - Continue PO prednisone and mesalamine PO   - Replace rectal tube for stool count, trend CRP, GI to consider transition back to IV steroids  - She will f/up with [facility] after discharge    Time-based billing (NON-critical care).     35 minutes spent on total encounter; more than 50% of the visit was spent counseling and / or coordinating care by the attending physician.  The necessity of the time spent during the encounter on this date of service was due to:     documentation in [place], reviewing chart and coordinating care with patient/resident and interdisciplinary staff (such as , social workers, etc) as well as reviewing vitals, laboratory data, radiology, medication list, consultants' recommendations and prior records. Interventions were performed as documented above. 58W w/ type 2 diabetes, HTN, MDD vs bipolar disorder, UC (dx on colonoscopy at [facility], previously treated with steroids), NAFLD c/b ?decompensated cirrhosis, AF (warfarin), admitted to [facility] with periorbital swelling, rash, and pancolitis, transferred to [facility] for derm/rheum/ophtho eval, overall felt to be have resolving bruising, subconjunctival hemorrhage, and UC flare currently on steroids.    Continues to have abdominal pain, but improved diarrhea with more formed BMs. CRP uptrended to 41.8 today from 4.8 concerning for worsening ulcerative colitis flare     - Appreciate GI recommendations  - Continue PO prednisone and mesalamine PO   - Replace rectal tube for stool count, trend CRP, GI to consider transition back to IV steroids  - She will f/up with [facility] after discharge    Time-based billing (NON-critical care).     35 minutes spent on total encounter; more than 50% of the visit was spent counseling and / or coordinating care by the attending physician.  The necessity of the time spent during the encounter on this date of service was due to:     documentation in [place], reviewing chart and coordinating care with patient/resident and interdisciplinary staff (such as , social workers, etc) as well as reviewing vitals, laboratory data, radiology, medication list, consultants' recommendations and prior records. Interventions were performed as documented above.

## 2024-01-10 NOTE — PROGRESS NOTE ADULT - PROBLEM SELECTOR PLAN 1
Patient presented with body rash of b/l upper extremity and lower extremity of unknown etiology that is improving from initial presentation   Rash of unclear etiology, initial infectious versus inflammatory now derm believes resolving ecchymosis  work up at [facility] includes:   CMV IgG negative   LDH normal   Babesia negative   Lyme negative   RPR negative   ESR, CRP elevated   -rheumatology: likely not vasculitis  -dermatology: likely resolving ecchymosis i/s/o coumadin use  -ophtho: likely subconjunctival hemorrhage no need to stop coumadin Patient presented with body rash of b/l upper extremity and lower extremity of unknown etiology that is improving from initial presentation   Rash of unclear etiology, initial infectious versus inflammatory now derm believes resolving ecchymosis  work up at [facility] includes:   CMV IgG negative   LDH normal   Babesia negative   Lyme negative   RPR negative   ESR, CRP elevated   -rheumatology: likely not vasculitis  -dermatology: likely resolving ecchymosis i/s/o coumadin use  -ophtho: likely subconjunctival hemorrhage no need to stop coumadin

## 2024-01-10 NOTE — OCCUPATIONAL THERAPY INITIAL EVALUATION ADULT - LEVEL OF INDEPENDENCE: GROOMING, OT EVAL
moderate assist (50% patients effort) pt. c/o migraines frontal and top of head since yesterday w/photophobia, pain 8/10.  Hx. of brain sgy post stroke, breast Ca w/lymph nodes removed left side, migraines. not on thinners.

## 2024-01-10 NOTE — PHYSICAL THERAPY INITIAL EVALUATION ADULT - ADDITIONAL COMMENTS
Pt stated that she lives in the nursing home and hasn't ambulated in ~1 year. Pt stated that she "gets lifted" out of bed to a wheelchair occasionally. Pt reported being bedbound.     Pt. left comfortable in bed with all tubes/lines intact, +bed alarm, call bell in reach and in NAD. RN aware of session and pt current position.

## 2024-01-10 NOTE — OCCUPATIONAL THERAPY INITIAL EVALUATION ADULT - RANGE OF MOTION EXAMINATION, UPPER EXTREMITY
except right shoulder 0-45 flexion with resistance to ROM/bilateral UE Active ROM was WFL  (within functional limits)

## 2024-01-10 NOTE — PROGRESS NOTE ADULT - SUBJECTIVE AND OBJECTIVE BOX
Interval Events:   Pt reported multiple loose BM overnight but nurse reporting one large BM  CRP elevated      Hospital Medications:  acetaminophen     Tablet .. 650 milliGRAM(s) Oral every 6 hours PRN  aluminum hydroxide/magnesium hydroxide/simethicone Suspension 30 milliLiter(s) Oral every 6 hours PRN  carvedilol 3.125 milliGRAM(s) Oral every 12 hours  dextrose 5%. 1000 milliLiter(s) IV Continuous <Continuous>  dextrose 5%. 1000 milliLiter(s) IV Continuous <Continuous>  dextrose 50% Injectable 25 Gram(s) IV Push once  dextrose 50% Injectable 12.5 Gram(s) IV Push once  dextrose 50% Injectable 25 Gram(s) IV Push once  dextrose Oral Gel 15 Gram(s) Oral once PRN  folic acid 1 milliGRAM(s) Oral daily  glucagon  Injectable 1 milliGRAM(s) IntraMuscular once  influenza   Vaccine 0.5 milliLiter(s) IntraMuscular once  insulin lispro (ADMELOG) corrective regimen sliding scale   SubCutaneous at bedtime  insulin lispro (ADMELOG) corrective regimen sliding scale   SubCutaneous three times a day before meals  lactated ringers. 1000 milliLiter(s) IV Continuous <Continuous>  mesalamine DR Capsule 400 milliGRAM(s) Oral three times a day  mirtazapine 30 milliGRAM(s) Oral daily  multivitamin 1 Tablet(s) Oral daily  ondansetron Injectable 4 milliGRAM(s) IV Push once  pantoprazole    Tablet 40 milliGRAM(s) Oral before breakfast  predniSONE   Tablet 40 milliGRAM(s) Oral every 24 hours  rifAXIMin 550 milliGRAM(s) Oral two times a day  spironolactone 100 milliGRAM(s) Oral daily      ROS: All system reviewed and negative except as mentioned above.    PHYSICAL EXAM:   Vital Signs:  Vital Signs Last 24 Hrs  T(C): 36.7 (10 Corey 2024 05:13), Max: 37.1 (2024 13:15)  T(F): 98.1 (10 Corey 2024 05:13), Max: 98.7 (2024 13:15)  HR: 71 (10 [facility]y 2024 05:13) (71 - 96)  BP: 126/74 (10 [facility] 2024 05:13) (105/60 - 126/74)  BP(mean): --  RR: 17 (10 [facility] 2024 05:13) (17 - 18)  SpO2: 95% (10 [facility] 2024 05:13) (95% - 100%)    Parameters below as of 10 Corey 2024 05:13  Patient On (Oxygen Delivery Method): room air      Daily     Daily Weight in k.8 (10 [facility] 2024 03:52)    GENERAL:  NAD, chachectic  HEENT:  NC/AT,  conjunctivae clear and pink, sclera -anicteric  CHEST:  Normal Effort, Breath sounds clear  HEART:  RRR, S1 + S2, no murmurs  ABDOMEN:  Soft, non-tender, non-distended, normoactive bowel sounds,  no masses  EXTREMITIES:  no cyanosis or edema  SKIN:  Warm & Dry. No rash or erythema  NEURO:  Alert, oriented, no focal deficit    LABS:                        9.4    7.94  )-----------( 439      ( 10 Corey 2024 06:35 )             28.1     Mean Cell Volume: 92.7 fL (01-10- @ 06:35)    01-10    135  |  97<L>  |  36<H>  ----------------------------<  100<H>  4.2   |  24  |  0.49<L>    Ca    8.8      10 [facility] 2024 06:35  Phos  3.9     01-10  Mg     2.20     01-10    TPro  6.4  /  Alb  3.3  /  TBili  0.2  /  DBili  x   /  AST  23  /  ALT  29  /  AlkPhos  103  01-10    LIVER FUNCTIONS - ( 10 [facility]y 2024 06:35 )  Alb: 3.3 g/dL / Pro: 6.4 g/dL / ALK PHOS: 103 U/L / ALT: 29 U/L / AST: 23 U/L / GGT: x           PT/INR - ( 10 [facility] 2024 06:35 )   PT: 20.2 sec;   INR: 1.84 ratio         PTT - ( 10 [facility]y 2024 06:35 )  PTT:32.5 sec  Urinalysis Basic - ( 10 [facility]y 2024 06:35 )    Color: x / Appearance: x / SG: x / pH: x  Gluc: 100 mg/dL / Ketone: x  / Bili: x / Urobili: x   Blood: x / Protein: x / Nitrite: x   Leuk Esterase: x / RBC: x / WBC x   Sq Epi: x / Non Sq Epi: x / Bacteria: x                              9.4    7.94  )-----------( 439      ( 10 Corey 2024 06:35 )             28.1                         9.1    9.69  )-----------( 459      ( 2024 06:15 )             28.0                         9.8    11.57 )-----------( 494      ( 2024 06:00 )             30.7       Imaging: Images reviewed.

## 2024-01-10 NOTE — PHYSICAL THERAPY INITIAL EVALUATION ADULT - PERTINENT HX OF CURRENT PROBLEM, REHAB EVAL
Pt is a 58 year old female who initially presented to [facility] due to reported hypotension, tachycardia and diffuse purpura. Patient transferred to this hospital for further workup and consultations for her body rash. Pt is a 58 year old female who initially presented to [facility] due to reported hypotension, tachycardia and diffuse purpura. Patient transferred to this hospital for further workup and consultations for her body rash.

## 2024-01-10 NOTE — PROGRESS NOTE ADULT - PROBLEM SELECTOR PLAN 3
Patient with anemia with iron studies more consistent with anemia of chronic disease   -B12 and folate WNL   -patient had drop of Hgb to 6.9 at [facility] prior to transfer, transfused with 1pRBC with response to 9.5  -continue to monitor   -transfuse for Hgb >7 Patient with anemia with iron studies more consistent with anemia of chronic disease   -B12 and folate WNL   -patient had drop of Hgb to 6.9 at [place] prior to transfer, transfused with 1pRBC with response to 9.5  -continue to monitor   -transfuse for Hgb >7

## 2024-01-10 NOTE — OCCUPATIONAL THERAPY INITIAL EVALUATION ADULT - PERTINENT HX OF CURRENT PROBLEM, REHAB EVAL
Pt is a 58 year old female who initially presented to [facility] due to reported hypotension, tachycardia and diffuse purpura. Patient transferred to this hospital for further workup and consultations for her body rash. Pt is a 58 year old female who initially presented to [facility] due to reported hypotension, tachycardia and diffuse purpura. Patient transferred to this hospital for further workup and consultations for her body rash.

## 2024-01-10 NOTE — PROGRESS NOTE ADULT - PROBLEM SELECTOR PLAN 2
Patient with history of ulcerative colitis found to have pancolitis   -likely ulcerative colitis flare   -continue mesalamine 400mg TID   - s/p IV steroids 20 q8h (12/31-1/8)  - GI recommends to switch to pred 40 qd today   - PPI 40mg daily   -Continue with mesalamine 400 mg TID for now  -regular diet  -GI following, potential plan for scope if pt agreeable, if not will start remicade  -colonoscopy report from [facility]: moderate to severe colitis in the past, likely concerning for UC based on endoscopic and histologic presentation Patient with history of ulcerative colitis found to have pancolitis   -likely ulcerative colitis flare   -continue mesalamine 400mg TID   - s/p IV steroids 20 q8h (12/31-1/8)  - GI recommends to switch to pred 40 qd today   - PPI 40mg daily   -Continue with mesalamine 400 mg TID for now  -regular diet  -GI following, potential plan for scope if pt agreeable, if not will start remicade  -colonoscopy report from [facility]: moderate to severe colitis in the past, likely concerning for UC based on endoscopic and histologic presentation Patient with history of ulcerative colitis found to have pancolitis   -likely ulcerative colitis flare   -continue mesalamine 400mg TID   - s/p IV steroids 20 q8h (12/31-1/8)  - GI recommends to c/w pred 40 qd   - PPI 40mg daily   -Continue with mesalamine 400 mg TID for now  -regular diet  -GI following, potential plan for scope if pt agreeable, if not will start remicade  -colonoscopy report from [facility]: moderate to severe colitis in the past, likely concerning for UC based on endoscopic and histologic presentation Patient with history of ulcerative colitis found to have pancolitis   -likely ulcerative colitis flare   -continue mesalamine 400mg TID   - s/p IV steroids 20 q8h (12/31-1/8)  - GI recommends to c/w pred 40 qd   - PPI 40mg daily   -Continue with mesalamine 400 mg TID for now  -regular diet  -GI following, potential plan for scope if pt agreeable, if not will start remicade  -colonoscopy report from [facility]: moderate to severe colitis in the past, likely concerning for UC based on endoscopic and histologic presentation

## 2024-01-10 NOTE — PROGRESS NOTE ADULT - SUBJECTIVE AND OBJECTIVE BOX
PROGRESS NOTE:     Patient is a 58y old  Female who presents with a chief complaint of transfer from [place] for tertiary level care in the setting of diffuse body rash (06 Jan 2024 07:40)      SUBJECTIVE / OVERNIGHT EVENTS: No acute events overnight. This AM seen and examined at bedside- pt continues to endorse discomfort, did not speak with her sister over the weekend. Deferred colonoscopy with GI.     ADDITIONAL REVIEW OF SYSTEMS: Negative     MEDICATIONS  (STANDING):  carvedilol 3.125 milliGRAM(s) Oral every 12 hours  dextrose 5%. 1000 milliLiter(s) (100 mL/Hr) IV Continuous <Continuous>  dextrose 5%. 1000 milliLiter(s) (50 mL/Hr) IV Continuous <Continuous>  dextrose 50% Injectable 12.5 Gram(s) IV Push once  dextrose 50% Injectable 25 Gram(s) IV Push once  dextrose 50% Injectable 25 Gram(s) IV Push once  folic acid 1 milliGRAM(s) Oral daily  glucagon  Injectable 1 milliGRAM(s) IntraMuscular once  influenza   Vaccine 0.5 milliLiter(s) IntraMuscular once  insulin lispro (ADMELOG) corrective regimen sliding scale   SubCutaneous at bedtime  insulin lispro (ADMELOG) corrective regimen sliding scale   SubCutaneous three times a day before meals  lactated ringers. 1000 milliLiter(s) (100 mL/Hr) IV Continuous <Continuous>  mesalamine DR Capsule 400 milliGRAM(s) Oral three times a day  methylPREDNISolone sodium succinate Injectable 20 milliGRAM(s) IV Push every 8 hours  mirtazapine 30 milliGRAM(s) Oral daily  ondansetron Injectable 4 milliGRAM(s) IV Push once  pantoprazole    Tablet 40 milliGRAM(s) Oral before breakfast  rifAXIMin 550 milliGRAM(s) Oral two times a day  spironolactone 100 milliGRAM(s) Oral daily    MEDICATIONS  (PRN):  acetaminophen     Tablet .. 650 milliGRAM(s) Oral every 6 hours PRN Temp greater or equal to 38C (100.4F), Mild Pain (1 - 3)  aluminum hydroxide/magnesium hydroxide/simethicone Suspension 30 milliLiter(s) Oral every 6 hours PRN Dyspepsia  dextrose Oral Gel 15 Gram(s) Oral once PRN Blood Glucose LESS THAN 70 milliGRAM(s)/deciliter      CAPILLARY BLOOD GLUCOSE      POCT Blood Glucose.: 119 mg/dL (07 Jan 2024 08:43)  POCT Blood Glucose.: 215 mg/dL (06 Jan 2024 21:36)  POCT Blood Glucose.: 170 mg/dL (06 Jan 2024 18:13)  POCT Blood Glucose.: 140 mg/dL (06 Jan 2024 12:49)    I&O's Summary      Vital Signs Last 24 Hrs  T(C): 36.9 (08 Jan 2024 05:35), Max: 37.1 (07 Jan 2024 21:15)  T(F): 98.4 (08 Jan 2024 05:35), Max: 98.8 (07 Jan 2024 21:15)  HR: 73 (08 Jan 2024 05:35) (73 - 88)  BP: 109/64 (08 Jan 2024 05:35) (107/60 - 110/65)  BP(mean): --  RR: 17 (08 Jan 2024 05:35) (17 - 18)  SpO2: 100% (08 Jan 2024 05:35) (98% - 100%)    Parameters below as of 08 Jan 2024 05:35  Patient On (Oxygen Delivery Method): room air      PHYSICAL EXAM:  GENERAL: NAD  HEAD:  Atraumatic, Normocephalic  EYES: left eye and rt eye with mild redness+ no drainage  ENMT: MMM  NECK: Supple, No JVD  NERVOUS SYSTEM: AOX3, motor and sensation grossly intact in b/l UE and b/l LE  PSYCHIATRIC: Appropriate affect and mood  CHEST/LUNG: Clear to auscultation bilaterally; No rales, rhonchi, wheezing, or rubs  HEART: Regular rate and rhythm; No murmurs, rubs, or gallops. No LE edema  ABDOMEN: Soft, mild epigastric tenderness, Nondistended; Bowel sounds present  EXTREMITIES:  2+ Peripheral Pulses, No clubbing, cyanosis  SKIN:  rash improving      LABS:             CBC Full  -  ( 08 Jan 2024 06:00 )  WBC Count : 11.57 K/uL  RBC Count : 3.18 M/uL  Hemoglobin : 9.8 g/dL  Hematocrit : 30.7 %  Platelet Count - Automated : 494 K/uL  Mean Cell Volume : 96.5 fL  Mean Cell Hemoglobin : 30.8 pg  Mean Cell Hemoglobin Concentration : 31.9 gm/dL  Auto Neutrophil # : 10.40 K/uL  Auto Lymphocyte # : 0.68 K/uL  Auto Monocyte # : 0.33 K/uL  Auto Eosinophil # : 0.00 K/uL  Auto Basophil # : 0.01 K/uL  Auto Neutrophil % : 89.8 %  Auto Lymphocyte % : 5.9 %  Auto Monocyte % : 2.9 %  Auto Eosinophil % : 0.0 %  Auto Basophil % : 0.1 %    01-08    138  |  101  |  33<H>  ----------------------------<  135<H>  5.5<H>   |  23  |  0.63    Ca    9.0      08 Jan 2024 06:00  Phos  2.9     01-08  Mg     2.20     01-08    TPro  6.5  /  Alb  3.4  /  TBili  <0.2  /  DBili  x   /  AST  23  /  ALT  24  /  AlkPhos  109  01-08              Urinalysis Basic - ( 07 Jan 2024 07:42 )    Color: x / Appearance: x / SG: x / pH: x  Gluc: 115 mg/dL / Ketone: x  / Bili: x / Urobili: x   Blood: x / Protein: x / Nitrite: x   Leuk Esterase: x / RBC: x / WBC x   Sq Epi: x / Non Sq Epi: x / Bacteria: x          RADIOLOGY & ADDITIONAL TESTS:  Results Reviewed:   Imaging Personally Reviewed:  Electrocardiogram Personally Reviewed:    COORDINATION OF CARE:  Care Discussed with Consultants/Other Providers [Y/N]:  Prior or Outpatient Records Reviewed [Y/N]:   PROGRESS NOTE:     Patient is a 58y old  Female who presents with a chief complaint of transfer from [place] for tertiary level care in the setting of diffuse body rash (06 Jan 2024 07:40)      SUBJECTIVE / OVERNIGHT EVENTS: No acute events overnight. This AM seen and examined at bedside- pt continues to endorse discomfort, did not speak with her sister over the weekend. Deferred colonoscopy with GI.     ADDITIONAL REVIEW OF SYSTEMS: Negative     MEDICATIONS  (STANDING):  carvedilol 3.125 milliGRAM(s) Oral every 12 hours  dextrose 5%. 1000 milliLiter(s) (100 mL/Hr) IV Continuous <Continuous>  dextrose 5%. 1000 milliLiter(s) (50 mL/Hr) IV Continuous <Continuous>  dextrose 50% Injectable 12.5 Gram(s) IV Push once  dextrose 50% Injectable 25 Gram(s) IV Push once  dextrose 50% Injectable 25 Gram(s) IV Push once  folic acid 1 milliGRAM(s) Oral daily  glucagon  Injectable 1 milliGRAM(s) IntraMuscular once  influenza   Vaccine 0.5 milliLiter(s) IntraMuscular once  insulin lispro (ADMELOG) corrective regimen sliding scale   SubCutaneous at bedtime  insulin lispro (ADMELOG) corrective regimen sliding scale   SubCutaneous three times a day before meals  lactated ringers. 1000 milliLiter(s) (100 mL/Hr) IV Continuous <Continuous>  mesalamine DR Capsule 400 milliGRAM(s) Oral three times a day  methylPREDNISolone sodium succinate Injectable 20 milliGRAM(s) IV Push every 8 hours  mirtazapine 30 milliGRAM(s) Oral daily  ondansetron Injectable 4 milliGRAM(s) IV Push once  pantoprazole    Tablet 40 milliGRAM(s) Oral before breakfast  rifAXIMin 550 milliGRAM(s) Oral two times a day  spironolactone 100 milliGRAM(s) Oral daily    MEDICATIONS  (PRN):  acetaminophen     Tablet .. 650 milliGRAM(s) Oral every 6 hours PRN Temp greater or equal to 38C (100.4F), Mild Pain (1 - 3)  aluminum hydroxide/magnesium hydroxide/simethicone Suspension 30 milliLiter(s) Oral every 6 hours PRN Dyspepsia  dextrose Oral Gel 15 Gram(s) Oral once PRN Blood Glucose LESS THAN 70 milliGRAM(s)/deciliter      CAPILLARY BLOOD GLUCOSE      POCT Blood Glucose.: 119 mg/dL (07 Jan 2024 08:43)  POCT Blood Glucose.: 215 mg/dL (06 Jan 2024 21:36)  POCT Blood Glucose.: 170 mg/dL (06 Jan 2024 18:13)  POCT Blood Glucose.: 140 mg/dL (06 Jan 2024 12:49)    I&O's Summary      Vital Signs Last 24 Hrs  T(C): 36.9 (08 Jan 2024 05:35), Max: 37.1 (07 Jan 2024 21:15)  T(F): 98.4 (08 Jan 2024 05:35), Max: 98.8 (07 Jan 2024 21:15)  HR: 73 (08 Jan 2024 05:35) (73 - 88)  BP: 109/64 (08 Jan 2024 05:35) (107/60 - 110/65)  BP(mean): --  RR: 17 (08 Jan 2024 05:35) (17 - 18)  SpO2: 100% (08 Jan 2024 05:35) (98% - 100%)    Parameters below as of 08 Jan 2024 05:35  Patient On (Oxygen Delivery Method): room air      PHYSICAL EXAM:  GENERAL: NAD  HEAD:  Atraumatic, Normocephalic  EYES: left eye and rt eye with mild redness+ no drainage  ENMT: MMM  NECK: Supple, No JVD  NERVOUS SYSTEM: AOX3, motor and sensation grossly intact in b/l UE and b/l LE  PSYCHIATRIC: Appropriate affect and mood  CHEST/LUNG: Clear to auscultation bilaterally; No rales, rhonchi, wheezing, or rubs  HEART: Regular rate and rhythm; No murmurs, rubs, or gallops. No LE edema  ABDOMEN: Soft, mild epigastric tenderness, Nondistended; Bowel sounds present  EXTREMITIES:  2+ Peripheral Pulses, No clubbing, cyanosis  SKIN:  rash improving      LABS:             CBC Full  -  ( 08 Jan 2024 06:00 )  WBC Count : 11.57 K/uL  RBC Count : 3.18 M/uL  Hemoglobin : 9.8 g/dL  Hematocrit : 30.7 %  Platelet Count - Automated : 494 K/uL  Mean Cell Volume : 96.5 fL  Mean Cell Hemoglobin : 30.8 pg  Mean Cell Hemoglobin Concentration : 31.9 gm/dL  Auto Neutrophil # : 10.40 K/uL  Auto Lymphocyte # : 0.68 K/uL  Auto Monocyte # : 0.33 K/uL  Auto Eosinophil # : 0.00 K/uL  Auto Basophil # : 0.01 K/uL  Auto Neutrophil % : 89.8 %  Auto Lymphocyte % : 5.9 %  Auto Monocyte % : 2.9 %  Auto Eosinophil % : 0.0 %  Auto Basophil % : 0.1 %    01-08    138  |  101  |  33<H>  ----------------------------<  135<H>  5.5<H>   |  23  |  0.63    Ca    9.0      08 Jan 2024 06:00  Phos  2.9     01-08  Mg     2.20     01-08    TPro  6.5  /  Alb  3.4  /  TBili  <0.2  /  DBili  x   /  AST  23  /  ALT  24  /  AlkPhos  109  01-08              Urinalysis Basic - ( 07 Jan 2024 07:42 )    Color: x / Appearance: x / SG: x / pH: x  Gluc: 115 mg/dL / Ketone: x  / Bili: x / Urobili: x   Blood: x / Protein: x / Nitrite: x   Leuk Esterase: x / RBC: x / WBC x   Sq Epi: x / Non Sq Epi: x / Bacteria: x          RADIOLOGY & ADDITIONAL TESTS:  Results Reviewed:   Imaging Personally Reviewed:  Electrocardiogram Personally Reviewed:    COORDINATION OF CARE:  Care Discussed with Consultants/Other Providers [Y/N]:  Prior or Outpatient Records Reviewed [Y/N]:   PROGRESS NOTE:     Patient is a 58y old  Female who presents with a chief complaint of transfer from [place] for tertiary level care in the setting of diffuse body rash (06 Jan 2024 07:40)      SUBJECTIVE / OVERNIGHT EVENTS: No acute events overnight. This AM seen and examined at bedside- pt continues to endorse discomfort, stated that she is tired of being in the hospital. Endorsed depressive symptoms but did not want to speak to psychiatrist. Denied SI or intent. Stated she feels constipated now.    ADDITIONAL REVIEW OF SYSTEMS: Negative     MEDICATIONS  (STANDING):  carvedilol 3.125 milliGRAM(s) Oral every 12 hours  dextrose 5%. 1000 milliLiter(s) (100 mL/Hr) IV Continuous <Continuous>  dextrose 5%. 1000 milliLiter(s) (50 mL/Hr) IV Continuous <Continuous>  dextrose 50% Injectable 12.5 Gram(s) IV Push once  dextrose 50% Injectable 25 Gram(s) IV Push once  dextrose 50% Injectable 25 Gram(s) IV Push once  folic acid 1 milliGRAM(s) Oral daily  glucagon  Injectable 1 milliGRAM(s) IntraMuscular once  influenza   Vaccine 0.5 milliLiter(s) IntraMuscular once  insulin lispro (ADMELOG) corrective regimen sliding scale   SubCutaneous at bedtime  insulin lispro (ADMELOG) corrective regimen sliding scale   SubCutaneous three times a day before meals  lactated ringers. 1000 milliLiter(s) (100 mL/Hr) IV Continuous <Continuous>  mesalamine DR Capsule 400 milliGRAM(s) Oral three times a day  methylPREDNISolone sodium succinate Injectable 20 milliGRAM(s) IV Push every 8 hours  mirtazapine 30 milliGRAM(s) Oral daily  ondansetron Injectable 4 milliGRAM(s) IV Push once  pantoprazole    Tablet 40 milliGRAM(s) Oral before breakfast  rifAXIMin 550 milliGRAM(s) Oral two times a day  spironolactone 100 milliGRAM(s) Oral daily    MEDICATIONS  (PRN):  acetaminophen     Tablet .. 650 milliGRAM(s) Oral every 6 hours PRN Temp greater or equal to 38C (100.4F), Mild Pain (1 - 3)  aluminum hydroxide/magnesium hydroxide/simethicone Suspension 30 milliLiter(s) Oral every 6 hours PRN Dyspepsia  dextrose Oral Gel 15 Gram(s) Oral once PRN Blood Glucose LESS THAN 70 milliGRAM(s)/deciliter      CAPILLARY BLOOD GLUCOSE      POCT Blood Glucose.: 119 mg/dL (07 Jan 2024 08:43)  POCT Blood Glucose.: 215 mg/dL (06 Jan 2024 21:36)  POCT Blood Glucose.: 170 mg/dL (06 Jan 2024 18:13)  POCT Blood Glucose.: 140 mg/dL (06 Jan 2024 12:49)    I&O's Summary      Vital Signs Last 24 Hrs  T(C): 36.7 (10 Corey 2024 05:13), Max: 37.1 (09 Jan 2024 13:15)  T(F): 98.1 (10 Corey 2024 05:13), Max: 98.7 (09 Jan 2024 13:15)  HR: 71 (10 Corey 2024 05:13) (71 - 96)  BP: 126/74 (10 Corey 2024 05:13) (105/60 - 126/74)  BP(mean): --  RR: 17 (10 Corey 2024 05:13) (17 - 18)  SpO2: 95% (10 Corey 2024 05:13) (95% - 100%)    Parameters below as of 10 Corey 2024 05:13  Patient On (Oxygen Delivery Method): room air        PHYSICAL EXAM:  GENERAL: NAD  HEAD:  Atraumatic, Normocephalic  EYES: left eye and rt eye with mild redness+ no drainage  ENMT: MMM  NECK: Supple, No JVD  NERVOUS SYSTEM: AOX3, motor and sensation grossly intact in b/l UE and b/l LE  PSYCHIATRIC: Appropriate affect and mood  CHEST/LUNG: Clear to auscultation bilaterally; No rales, rhonchi, wheezing, or rubs  HEART: Regular rate and rhythm; No murmurs, rubs, or gallops. No LE edema  ABDOMEN: Soft, mild epigastric tenderness, Nondistended; Bowel sounds present  EXTREMITIES:  2+ Peripheral Pulses, No clubbing, cyanosis  SKIN:  rash improving      LABS:             CBC Full  -  ( 10 Corey 2024 06:35 )  WBC Count : 7.94 K/uL  RBC Count : 3.03 M/uL  Hemoglobin : 9.4 g/dL  Hematocrit : 28.1 %  Platelet Count - Automated : 439 K/uL  Mean Cell Volume : 92.7 fL  Mean Cell Hemoglobin : 31.0 pg  Mean Cell Hemoglobin Concentration : 33.5 gm/dL  Auto Neutrophil # : 6.14 K/uL  Auto Lymphocyte # : 1.22 K/uL  Auto Monocyte # : 0.46 K/uL  Auto Eosinophil # : 0.00 K/uL  Auto Basophil # : 0.01 K/uL  Auto Neutrophil % : 77.3 %  Auto Lymphocyte % : 15.4 %  Auto Monocyte % : 5.8 %  Auto Eosinophil % : 0.0 %  Auto Basophil % : 0.1 %    01-10    135  |  97<L>  |  36<H>  ----------------------------<  100<H>  4.2   |  24  |  0.49<L>    Ca    8.8      10 Corey 2024 06:35  Phos  3.9     01-10  Mg     2.20     01-10    TPro  6.4  /  Alb  3.3  /  TBili  0.2  /  DBili  x   /  AST  23  /  ALT  29  /  AlkPhos  103  01-10    Urinalysis Basic - ( 07 Jan 2024 07:42 )    Color: x / Appearance: x / SG: x / pH: x  Gluc: 115 mg/dL / Ketone: x  / Bili: x / Urobili: x   Blood: x / Protein: x / Nitrite: x   Leuk Esterase: x / RBC: x / WBC x   Sq Epi: x / Non Sq Epi: x / Bacteria: x          RADIOLOGY & ADDITIONAL TESTS:  Results Reviewed:   Imaging Personally Reviewed:  Electrocardiogram Personally Reviewed:    COORDINATION OF CARE:  Care Discussed with Consultants/Other Providers [Y/N]:  Prior or Outpatient Records Reviewed [Y/N]:   PROGRESS NOTE:     Patient is a 58y old  Female who presents with a chief complaint of transfer from [place] for tertiary level care in the setting of diffuse body rash (06 Jan 2024 07:40)      SUBJECTIVE / OVERNIGHT EVENTS: No acute events overnight. This AM seen and examined at bedside- pt continues to endorse discomfort, stated that she is tired of being in the hospital. Endorsed depressive symptoms but did not want to speak to psychiatrist. Denied SI or intent. Stated she feels constipated now.    ADDITIONAL REVIEW OF SYSTEMS: Negative     MEDICATIONS  (STANDING):  carvedilol 3.125 milliGRAM(s) Oral every 12 hours  dextrose 5%. 1000 milliLiter(s) (100 mL/Hr) IV Continuous <Continuous>  dextrose 5%. 1000 milliLiter(s) (50 mL/Hr) IV Continuous <Continuous>  dextrose 50% Injectable 12.5 Gram(s) IV Push once  dextrose 50% Injectable 25 Gram(s) IV Push once  dextrose 50% Injectable 25 Gram(s) IV Push once  folic acid 1 milliGRAM(s) Oral daily  glucagon  Injectable 1 milliGRAM(s) IntraMuscular once  influenza   Vaccine 0.5 milliLiter(s) IntraMuscular once  insulin lispro (ADMELOG) corrective regimen sliding scale   SubCutaneous at bedtime  insulin lispro (ADMELOG) corrective regimen sliding scale   SubCutaneous three times a day before meals  lactated ringers. 1000 milliLiter(s) (100 mL/Hr) IV Continuous <Continuous>  mesalamine DR Capsule 400 milliGRAM(s) Oral three times a day  methylPREDNISolone sodium succinate Injectable 20 milliGRAM(s) IV Push every 8 hours  mirtazapine 30 milliGRAM(s) Oral daily  ondansetron Injectable 4 milliGRAM(s) IV Push once  pantoprazole    Tablet 40 milliGRAM(s) Oral before breakfast  rifAXIMin 550 milliGRAM(s) Oral two times a day  spironolactone 100 milliGRAM(s) Oral daily    MEDICATIONS  (PRN):  acetaminophen     Tablet .. 650 milliGRAM(s) Oral every 6 hours PRN Temp greater or equal to 38C (100.4F), Mild Pain (1 - 3)  aluminum hydroxide/magnesium hydroxide/simethicone Suspension 30 milliLiter(s) Oral every 6 hours PRN Dyspepsia  dextrose Oral Gel 15 Gram(s) Oral once PRN Blood Glucose LESS THAN 70 milliGRAM(s)/deciliter      CAPILLARY BLOOD GLUCOSE      POCT Blood Glucose.: 119 mg/dL (07 Jan 2024 08:43)  POCT Blood Glucose.: 215 mg/dL (06 Jan 2024 21:36)  POCT Blood Glucose.: 170 mg/dL (06 Jan 2024 18:13)  POCT Blood Glucose.: 140 mg/dL (06 Jan 2024 12:49)    I&O's Summary      Vital Signs Last 24 Hrs  T(C): 36.7 (10 Corey 2024 05:13), Max: 37.1 (09 Jan 2024 13:15)  T(F): 98.1 (10 Corey 2024 05:13), Max: 98.7 (09 Jan 2024 13:15)  HR: 71 (10 Corey 2024 05:13) (71 - 96)  BP: 126/74 (10 Corey 2024 05:13) (105/60 - 126/74)  BP(mean): --  RR: 17 (10 Corey 2024 05:13) (17 - 18)  SpO2: 95% (10 Corey 2024 05:13) (95% - 100%)    Parameters below as of 10 Corey 2024 05:13  Patient On (Oxygen Delivery Method): room air        PHYSICAL EXAM:  GENERAL: NAD  HEAD:  Atraumatic, Normocephalic  EYES: left eye and rt eye with mild redness+ no drainage  ENMT: MMM  NECK: Supple, No JVD  NERVOUS SYSTEM: AOX3, motor and sensation grossly intact in b/l UE and b/l LE  PSYCHIATRIC: Appropriate affect and mood  CHEST/LUNG: Clear to auscultation bilaterally; No rales, rhonchi, wheezing, or rubs  HEART: Regular rate and rhythm; No murmurs, rubs, or gallops. No LE edema  ABDOMEN: Soft, mild epigastric tenderness, Nondistended; Bowel sounds present  EXTREMITIES:  2+ Peripheral Pulses, No clubbing, cyanosis  SKIN:  rash improving      LABS:             CBC Full  -  ( 10 Corey 2024 06:35 )  WBC Count : 7.94 K/uL  RBC Count : 3.03 M/uL  Hemoglobin : 9.4 g/dL  Hematocrit : 28.1 %  Platelet Count - Automated : 439 K/uL  Mean Cell Volume : 92.7 fL  Mean Cell Hemoglobin : 31.0 pg  Mean Cell Hemoglobin Concentration : 33.5 gm/dL  Auto Neutrophil # : 6.14 K/uL  Auto Lymphocyte # : 1.22 K/uL  Auto Monocyte # : 0.46 K/uL  Auto Eosinophil # : 0.00 K/uL  Auto Basophil # : 0.01 K/uL  Auto Neutrophil % : 77.3 %  Auto Lymphocyte % : 15.4 %  Auto Monocyte % : 5.8 %  Auto Eosinophil % : 0.0 %  Auto Basophil % : 0.1 %    01-10    135  |  97<L>  |  36<H>  ----------------------------<  100<H>  4.2   |  24  |  0.49<L>    Ca    8.8      10 Corey 2024 06:35  Phos  3.9     01-10  Mg     2.20     01-10    TPro  6.4  /  Alb  3.3  /  TBili  0.2  /  DBili  x   /  AST  23  /  ALT  29  /  AlkPhos  103  01-10    Urinalysis Basic - ( 07 Jan 2024 07:42 )    Color: x / Appearance: x / SG: x / pH: x  Gluc: 115 mg/dL / Ketone: x  / Bili: x / Urobili: x   Blood: x / Protein: x / Nitrite: x   Leuk Esterase: x / RBC: x / WBC x   Sq Epi: x / Non Sq Epi: x / Bacteria: x          RADIOLOGY & ADDITIONAL TESTS:  Results Reviewed:   Imaging Personally Reviewed:  Electrocardiogram Personally Reviewed:    COORDINATION OF CARE:  Care Discussed with Consultants/Other Providers [Y/N]:  Prior or Outpatient Records Reviewed [Y/N]:

## 2024-01-10 NOTE — PROGRESS NOTE ADULT - REASON FOR ADMISSION
transfer from [place] for tertiary level care in the setting of diffuse body rash transfer from [place] for tertiary level care in the setting of diffuse body rash

## 2024-01-11 LAB
ALBUMIN SERPL ELPH-MCNC: 3.4 G/DL — SIGNIFICANT CHANGE UP (ref 3.3–5)
ALBUMIN SERPL ELPH-MCNC: 3.4 G/DL — SIGNIFICANT CHANGE UP (ref 3.3–5)
ALP SERPL-CCNC: 105 U/L — SIGNIFICANT CHANGE UP (ref 40–120)
ALP SERPL-CCNC: 105 U/L — SIGNIFICANT CHANGE UP (ref 40–120)
ALT FLD-CCNC: 37 U/L — HIGH (ref 4–33)
ALT FLD-CCNC: 37 U/L — HIGH (ref 4–33)
ANION GAP SERPL CALC-SCNC: 16 MMOL/L — HIGH (ref 7–14)
ANION GAP SERPL CALC-SCNC: 16 MMOL/L — HIGH (ref 7–14)
AST SERPL-CCNC: 28 U/L — SIGNIFICANT CHANGE UP (ref 4–32)
AST SERPL-CCNC: 28 U/L — SIGNIFICANT CHANGE UP (ref 4–32)
B PERT DNA SPEC QL NAA+PROBE: SIGNIFICANT CHANGE UP
B PERT DNA SPEC QL NAA+PROBE: SIGNIFICANT CHANGE UP
B PERT+PARAPERT DNA PNL SPEC NAA+PROBE: SIGNIFICANT CHANGE UP
B PERT+PARAPERT DNA PNL SPEC NAA+PROBE: SIGNIFICANT CHANGE UP
BASOPHILS # BLD AUTO: 0.01 K/UL — SIGNIFICANT CHANGE UP (ref 0–0.2)
BASOPHILS # BLD AUTO: 0.01 K/UL — SIGNIFICANT CHANGE UP (ref 0–0.2)
BASOPHILS NFR BLD AUTO: 0.1 % — SIGNIFICANT CHANGE UP (ref 0–2)
BASOPHILS NFR BLD AUTO: 0.1 % — SIGNIFICANT CHANGE UP (ref 0–2)
BILIRUB SERPL-MCNC: 0.2 MG/DL — SIGNIFICANT CHANGE UP (ref 0.2–1.2)
BILIRUB SERPL-MCNC: 0.2 MG/DL — SIGNIFICANT CHANGE UP (ref 0.2–1.2)
BORDETELLA PARAPERTUSSIS (RAPRVP): SIGNIFICANT CHANGE UP
BORDETELLA PARAPERTUSSIS (RAPRVP): SIGNIFICANT CHANGE UP
BUN SERPL-MCNC: 38 MG/DL — HIGH (ref 7–23)
BUN SERPL-MCNC: 38 MG/DL — HIGH (ref 7–23)
C PNEUM DNA SPEC QL NAA+PROBE: SIGNIFICANT CHANGE UP
C PNEUM DNA SPEC QL NAA+PROBE: SIGNIFICANT CHANGE UP
CALCIUM SERPL-MCNC: 8.9 MG/DL — SIGNIFICANT CHANGE UP (ref 8.4–10.5)
CALCIUM SERPL-MCNC: 8.9 MG/DL — SIGNIFICANT CHANGE UP (ref 8.4–10.5)
CHLORIDE SERPL-SCNC: 99 MMOL/L — SIGNIFICANT CHANGE UP (ref 98–107)
CHLORIDE SERPL-SCNC: 99 MMOL/L — SIGNIFICANT CHANGE UP (ref 98–107)
CO2 SERPL-SCNC: 22 MMOL/L — SIGNIFICANT CHANGE UP (ref 22–31)
CO2 SERPL-SCNC: 22 MMOL/L — SIGNIFICANT CHANGE UP (ref 22–31)
CREAT SERPL-MCNC: 0.55 MG/DL — SIGNIFICANT CHANGE UP (ref 0.5–1.3)
CREAT SERPL-MCNC: 0.55 MG/DL — SIGNIFICANT CHANGE UP (ref 0.5–1.3)
CRP SERPL-MCNC: 22.8 MG/L — HIGH
CRP SERPL-MCNC: 22.8 MG/L — HIGH
EGFR: 108 ML/MIN/1.73M2 — SIGNIFICANT CHANGE UP
EGFR: 108 ML/MIN/1.73M2 — SIGNIFICANT CHANGE UP
EOSINOPHIL # BLD AUTO: 0 K/UL — SIGNIFICANT CHANGE UP (ref 0–0.5)
EOSINOPHIL # BLD AUTO: 0 K/UL — SIGNIFICANT CHANGE UP (ref 0–0.5)
EOSINOPHIL NFR BLD AUTO: 0 % — SIGNIFICANT CHANGE UP (ref 0–6)
EOSINOPHIL NFR BLD AUTO: 0 % — SIGNIFICANT CHANGE UP (ref 0–6)
ERYTHROCYTE [SEDIMENTATION RATE] IN BLOOD: 92 MM/HR — HIGH (ref 4–25)
ERYTHROCYTE [SEDIMENTATION RATE] IN BLOOD: 92 MM/HR — HIGH (ref 4–25)
FLUAV SUBTYP SPEC NAA+PROBE: SIGNIFICANT CHANGE UP
FLUAV SUBTYP SPEC NAA+PROBE: SIGNIFICANT CHANGE UP
FLUBV RNA SPEC QL NAA+PROBE: SIGNIFICANT CHANGE UP
FLUBV RNA SPEC QL NAA+PROBE: SIGNIFICANT CHANGE UP
GLUCOSE BLDC GLUCOMTR-MCNC: 133 MG/DL — HIGH (ref 70–99)
GLUCOSE BLDC GLUCOMTR-MCNC: 133 MG/DL — HIGH (ref 70–99)
GLUCOSE BLDC GLUCOMTR-MCNC: 212 MG/DL — HIGH (ref 70–99)
GLUCOSE BLDC GLUCOMTR-MCNC: 212 MG/DL — HIGH (ref 70–99)
GLUCOSE BLDC GLUCOMTR-MCNC: 226 MG/DL — HIGH (ref 70–99)
GLUCOSE BLDC GLUCOMTR-MCNC: 226 MG/DL — HIGH (ref 70–99)
GLUCOSE BLDC GLUCOMTR-MCNC: 251 MG/DL — HIGH (ref 70–99)
GLUCOSE BLDC GLUCOMTR-MCNC: 251 MG/DL — HIGH (ref 70–99)
GLUCOSE BLDC GLUCOMTR-MCNC: 92 MG/DL — SIGNIFICANT CHANGE UP (ref 70–99)
GLUCOSE BLDC GLUCOMTR-MCNC: 92 MG/DL — SIGNIFICANT CHANGE UP (ref 70–99)
GLUCOSE SERPL-MCNC: 114 MG/DL — HIGH (ref 70–99)
GLUCOSE SERPL-MCNC: 114 MG/DL — HIGH (ref 70–99)
HADV DNA SPEC QL NAA+PROBE: SIGNIFICANT CHANGE UP
HADV DNA SPEC QL NAA+PROBE: SIGNIFICANT CHANGE UP
HCOV 229E RNA SPEC QL NAA+PROBE: SIGNIFICANT CHANGE UP
HCOV 229E RNA SPEC QL NAA+PROBE: SIGNIFICANT CHANGE UP
HCOV HKU1 RNA SPEC QL NAA+PROBE: SIGNIFICANT CHANGE UP
HCOV HKU1 RNA SPEC QL NAA+PROBE: SIGNIFICANT CHANGE UP
HCOV NL63 RNA SPEC QL NAA+PROBE: SIGNIFICANT CHANGE UP
HCOV NL63 RNA SPEC QL NAA+PROBE: SIGNIFICANT CHANGE UP
HCOV OC43 RNA SPEC QL NAA+PROBE: SIGNIFICANT CHANGE UP
HCOV OC43 RNA SPEC QL NAA+PROBE: SIGNIFICANT CHANGE UP
HCT VFR BLD CALC: 28.5 % — LOW (ref 34.5–45)
HCT VFR BLD CALC: 28.5 % — LOW (ref 34.5–45)
HGB BLD-MCNC: 9.5 G/DL — LOW (ref 11.5–15.5)
HGB BLD-MCNC: 9.5 G/DL — LOW (ref 11.5–15.5)
HMPV RNA SPEC QL NAA+PROBE: SIGNIFICANT CHANGE UP
HMPV RNA SPEC QL NAA+PROBE: SIGNIFICANT CHANGE UP
HPIV1 RNA SPEC QL NAA+PROBE: SIGNIFICANT CHANGE UP
HPIV1 RNA SPEC QL NAA+PROBE: SIGNIFICANT CHANGE UP
HPIV2 RNA SPEC QL NAA+PROBE: SIGNIFICANT CHANGE UP
HPIV2 RNA SPEC QL NAA+PROBE: SIGNIFICANT CHANGE UP
HPIV3 RNA SPEC QL NAA+PROBE: SIGNIFICANT CHANGE UP
HPIV3 RNA SPEC QL NAA+PROBE: SIGNIFICANT CHANGE UP
HPIV4 RNA SPEC QL NAA+PROBE: SIGNIFICANT CHANGE UP
HPIV4 RNA SPEC QL NAA+PROBE: SIGNIFICANT CHANGE UP
IANC: 6.57 K/UL — SIGNIFICANT CHANGE UP (ref 1.8–7.4)
IANC: 6.57 K/UL — SIGNIFICANT CHANGE UP (ref 1.8–7.4)
IMM GRANULOCYTES NFR BLD AUTO: 1.5 % — HIGH (ref 0–0.9)
IMM GRANULOCYTES NFR BLD AUTO: 1.5 % — HIGH (ref 0–0.9)
LYMPHOCYTES # BLD AUTO: 0.98 K/UL — LOW (ref 1–3.3)
LYMPHOCYTES # BLD AUTO: 0.98 K/UL — LOW (ref 1–3.3)
LYMPHOCYTES # BLD AUTO: 12.2 % — LOW (ref 13–44)
LYMPHOCYTES # BLD AUTO: 12.2 % — LOW (ref 13–44)
M PNEUMO DNA SPEC QL NAA+PROBE: SIGNIFICANT CHANGE UP
M PNEUMO DNA SPEC QL NAA+PROBE: SIGNIFICANT CHANGE UP
MAGNESIUM SERPL-MCNC: 2.5 MG/DL — SIGNIFICANT CHANGE UP (ref 1.6–2.6)
MAGNESIUM SERPL-MCNC: 2.5 MG/DL — SIGNIFICANT CHANGE UP (ref 1.6–2.6)
MCHC RBC-ENTMCNC: 31.4 PG — SIGNIFICANT CHANGE UP (ref 27–34)
MCHC RBC-ENTMCNC: 31.4 PG — SIGNIFICANT CHANGE UP (ref 27–34)
MCHC RBC-ENTMCNC: 33.3 GM/DL — SIGNIFICANT CHANGE UP (ref 32–36)
MCHC RBC-ENTMCNC: 33.3 GM/DL — SIGNIFICANT CHANGE UP (ref 32–36)
MCV RBC AUTO: 94.1 FL — SIGNIFICANT CHANGE UP (ref 80–100)
MCV RBC AUTO: 94.1 FL — SIGNIFICANT CHANGE UP (ref 80–100)
MONOCYTES # BLD AUTO: 0.36 K/UL — SIGNIFICANT CHANGE UP (ref 0–0.9)
MONOCYTES # BLD AUTO: 0.36 K/UL — SIGNIFICANT CHANGE UP (ref 0–0.9)
MONOCYTES NFR BLD AUTO: 4.5 % — SIGNIFICANT CHANGE UP (ref 2–14)
MONOCYTES NFR BLD AUTO: 4.5 % — SIGNIFICANT CHANGE UP (ref 2–14)
NEUTROPHILS # BLD AUTO: 6.57 K/UL — SIGNIFICANT CHANGE UP (ref 1.8–7.4)
NEUTROPHILS # BLD AUTO: 6.57 K/UL — SIGNIFICANT CHANGE UP (ref 1.8–7.4)
NEUTROPHILS NFR BLD AUTO: 81.7 % — HIGH (ref 43–77)
NEUTROPHILS NFR BLD AUTO: 81.7 % — HIGH (ref 43–77)
NRBC # BLD: 0 /100 WBCS — SIGNIFICANT CHANGE UP (ref 0–0)
NRBC # BLD: 0 /100 WBCS — SIGNIFICANT CHANGE UP (ref 0–0)
NRBC # FLD: 0 K/UL — SIGNIFICANT CHANGE UP (ref 0–0)
NRBC # FLD: 0 K/UL — SIGNIFICANT CHANGE UP (ref 0–0)
PHOSPHATE SERPL-MCNC: 3.4 MG/DL — SIGNIFICANT CHANGE UP (ref 2.5–4.5)
PHOSPHATE SERPL-MCNC: 3.4 MG/DL — SIGNIFICANT CHANGE UP (ref 2.5–4.5)
PLATELET # BLD AUTO: 437 K/UL — HIGH (ref 150–400)
PLATELET # BLD AUTO: 437 K/UL — HIGH (ref 150–400)
POTASSIUM SERPL-MCNC: 4.7 MMOL/L — SIGNIFICANT CHANGE UP (ref 3.5–5.3)
POTASSIUM SERPL-MCNC: 4.7 MMOL/L — SIGNIFICANT CHANGE UP (ref 3.5–5.3)
POTASSIUM SERPL-SCNC: 4.7 MMOL/L — SIGNIFICANT CHANGE UP (ref 3.5–5.3)
POTASSIUM SERPL-SCNC: 4.7 MMOL/L — SIGNIFICANT CHANGE UP (ref 3.5–5.3)
PROT SERPL-MCNC: 6.7 G/DL — SIGNIFICANT CHANGE UP (ref 6–8.3)
PROT SERPL-MCNC: 6.7 G/DL — SIGNIFICANT CHANGE UP (ref 6–8.3)
RAPID RVP RESULT: DETECTED
RAPID RVP RESULT: DETECTED
RBC # BLD: 3.03 M/UL — LOW (ref 3.8–5.2)
RBC # BLD: 3.03 M/UL — LOW (ref 3.8–5.2)
RBC # FLD: 15.3 % — HIGH (ref 10.3–14.5)
RBC # FLD: 15.3 % — HIGH (ref 10.3–14.5)
RSV RNA SPEC QL NAA+PROBE: SIGNIFICANT CHANGE UP
RSV RNA SPEC QL NAA+PROBE: SIGNIFICANT CHANGE UP
RV+EV RNA SPEC QL NAA+PROBE: SIGNIFICANT CHANGE UP
RV+EV RNA SPEC QL NAA+PROBE: SIGNIFICANT CHANGE UP
SARS-COV-2 RNA SPEC QL NAA+PROBE: DETECTED
SARS-COV-2 RNA SPEC QL NAA+PROBE: DETECTED
SODIUM SERPL-SCNC: 137 MMOL/L — SIGNIFICANT CHANGE UP (ref 135–145)
SODIUM SERPL-SCNC: 137 MMOL/L — SIGNIFICANT CHANGE UP (ref 135–145)
WBC # BLD: 8.04 K/UL — SIGNIFICANT CHANGE UP (ref 3.8–10.5)
WBC # BLD: 8.04 K/UL — SIGNIFICANT CHANGE UP (ref 3.8–10.5)
WBC # FLD AUTO: 8.04 K/UL — SIGNIFICANT CHANGE UP (ref 3.8–10.5)
WBC # FLD AUTO: 8.04 K/UL — SIGNIFICANT CHANGE UP (ref 3.8–10.5)

## 2024-01-11 PROCEDURE — 99232 SBSQ HOSP IP/OBS MODERATE 35: CPT | Mod: GC

## 2024-01-11 RX ADMIN — Medication 40 MILLIGRAM(S): at 12:28

## 2024-01-11 RX ADMIN — CARVEDILOL PHOSPHATE 3.12 MILLIGRAM(S): 80 CAPSULE, EXTENDED RELEASE ORAL at 17:15

## 2024-01-11 RX ADMIN — CARVEDILOL PHOSPHATE 3.12 MILLIGRAM(S): 80 CAPSULE, EXTENDED RELEASE ORAL at 05:17

## 2024-01-11 RX ADMIN — Medication 1 MILLIGRAM(S): at 12:27

## 2024-01-11 RX ADMIN — Medication 400 MILLIGRAM(S): at 22:44

## 2024-01-11 RX ADMIN — Medication 400 MILLIGRAM(S): at 05:17

## 2024-01-11 RX ADMIN — PANTOPRAZOLE SODIUM 40 MILLIGRAM(S): 20 TABLET, DELAYED RELEASE ORAL at 05:17

## 2024-01-11 RX ADMIN — Medication 2: at 18:48

## 2024-01-11 RX ADMIN — MIRTAZAPINE 30 MILLIGRAM(S): 45 TABLET, ORALLY DISINTEGRATING ORAL at 12:27

## 2024-01-11 RX ADMIN — Medication 400 MILLIGRAM(S): at 12:27

## 2024-01-11 RX ADMIN — SPIRONOLACTONE 100 MILLIGRAM(S): 25 TABLET, FILM COATED ORAL at 05:17

## 2024-01-11 RX ADMIN — Medication 1 TABLET(S): at 12:28

## 2024-01-11 NOTE — PROGRESS NOTE ADULT - PROBLEM SELECTOR PLAN 2
Patient with history of ulcerative colitis found to have pancolitis   -likely ulcerative colitis flare   -continue mesalamine 400mg TID   - s/p IV steroids 20 q8h (12/31-1/8)  - GI recommends to c/w pred 40 qd   - PPI 40mg daily   -Continue with mesalamine 400 mg TID for now  -regular diet  -GI following, potential plan for scope if pt agreeable, if not will start remicade  -colonoscopy report from [facility]: moderate to severe colitis in the past, likely concerning for UC based on endoscopic and histologic presentation Patient with history of ulcerative colitis found to have pancolitis   -likely ulcerative colitis flare   -continue mesalamine 400mg TID   - s/p IV steroids 20 q8h (12/31-1/8)  - GI recommends to c/w pred 40 qd   - PPI 40mg daily   -Continue with mesalamine 400 mg TID for now  -regular diet  -GI following, potential plan for scope if pt agreeable, if not will start remicade  -colonoscopy report from [facility]: moderate to severe colitis in the past, likely concerning for UC based on endoscopic and histologic presentation

## 2024-01-11 NOTE — PROGRESS NOTE ADULT - SUBJECTIVE AND OBJECTIVE BOX
Interval Events:   CRP down to 22.  Still on PO steroids  Per nurse, had 1 formed Bm overnight. No blood.       Hospital Medications:  acetaminophen     Tablet .. 650 milliGRAM(s) Oral every 6 hours PRN  aluminum hydroxide/magnesium hydroxide/simethicone Suspension 30 milliLiter(s) Oral every 6 hours PRN  carvedilol 3.125 milliGRAM(s) Oral every 12 hours  dextrose 5%. 1000 milliLiter(s) IV Continuous <Continuous>  dextrose 5%. 1000 milliLiter(s) IV Continuous <Continuous>  dextrose 50% Injectable 12.5 Gram(s) IV Push once  dextrose 50% Injectable 25 Gram(s) IV Push once  dextrose 50% Injectable 25 Gram(s) IV Push once  dextrose Oral Gel 15 Gram(s) Oral once PRN  folic acid 1 milliGRAM(s) Oral daily  glucagon  Injectable 1 milliGRAM(s) IntraMuscular once  influenza   Vaccine 0.5 milliLiter(s) IntraMuscular once  insulin lispro (ADMELOG) corrective regimen sliding scale   SubCutaneous at bedtime  insulin lispro (ADMELOG) corrective regimen sliding scale   SubCutaneous three times a day before meals  lactated ringers. 1000 milliLiter(s) IV Continuous <Continuous>  mesalamine DR Capsule 400 milliGRAM(s) Oral three times a day  mirtazapine 30 milliGRAM(s) Oral daily  multivitamin 1 Tablet(s) Oral daily  ondansetron Injectable 4 milliGRAM(s) IV Push once  pantoprazole    Tablet 40 milliGRAM(s) Oral before breakfast  predniSONE   Tablet 40 milliGRAM(s) Oral every 24 hours  rifAXIMin 550 milliGRAM(s) Oral two times a day  spironolactone 100 milliGRAM(s) Oral daily      ROS: All system reviewed and negative except as mentioned above.    PHYSICAL EXAM:   Vital Signs:  Vital Signs Last 24 Hrs  T(C): 36.4 (11 Jan 2024 05:06), Max: 37.2 (10 Corey 2024 21:10)  T(F): 97.5 (11 Jan 2024 05:06), Max: 98.9 (10 Corey 2024 21:10)  HR: 71 (11 Jan 2024 05:06) (71 - 89)  BP: 127/70 (11 Jan 2024 05:06) (106/64 - 127/70)  BP(mean): --  RR: 17 (11 Jan 2024 05:06) (17 - 18)  SpO2: 95% (11 Jan 2024 05:06) (95% - 97%)    Parameters below as of 11 Jan 2024 05:06  Patient On (Oxygen Delivery Method): room air      Daily     Daily     GENERAL:  NAD, chachectic  HEENT:  NC/AT,  conjunctivae clear and pink, sclera -anicteric  CHEST:  Normal Effort, Breath sounds clear  HEART:  RRR, S1 + S2, no murmurs  ABDOMEN:  Soft, mild discomfort to palpation.   EXTREMITIES:  no cyanosis or edema  SKIN:  Warm & Dry. No rash or erythema  NEURO:  Alert, oriented, no focal deficit    LABS:                        9.5    8.04  )-----------( 437      ( 11 Jan 2024 06:00 )             28.5     Mean Cell Volume: 94.1 fL (01-11-24 @ 06:00)    01-11    137  |  99  |  38<H>  ----------------------------<  114<H>  4.7   |  22  |  0.55    Ca    8.9      11 Jan 2024 06:00  Phos  3.4     01-11  Mg     2.50     01-11    TPro  6.7  /  Alb  3.4  /  TBili  0.2  /  DBili  x   /  AST  28  /  ALT  37<H>  /  AlkPhos  105  01-11    LIVER FUNCTIONS - ( 11 Jan 2024 06:00 )  Alb: 3.4 g/dL / Pro: 6.7 g/dL / ALK PHOS: 105 U/L / ALT: 37 U/L / AST: 28 U/L / GGT: x           PT/INR - ( 10 Corey 2024 06:35 )   PT: 20.2 sec;   INR: 1.84 ratio         PTT - ( 10 Corey 2024 06:35 )  PTT:32.5 sec  Urinalysis Basic - ( 11 Jan 2024 06:00 )    Color: x / Appearance: x / SG: x / pH: x  Gluc: 114 mg/dL / Ketone: x  / Bili: x / Urobili: x   Blood: x / Protein: x / Nitrite: x   Leuk Esterase: x / RBC: x / WBC x   Sq Epi: x / Non Sq Epi: x / Bacteria: x                              9.5    8.04  )-----------( 437      ( 11 Jan 2024 06:00 )             28.5                         9.4    7.94  )-----------( 439      ( 10 Corey 2024 06:35 )             28.1                         9.1    9.69  )-----------( 459      ( 09 Jan 2024 06:15 )             28.0       Imaging: Images reviewed.

## 2024-01-11 NOTE — PROGRESS NOTE ADULT - ATTENDING COMMENTS
58W w/ type 2 diabetes, HTN, MDD vs bipolar disorder, UC (dx on colonoscopy at [facility], previously treated with steroids), NAFLD c/b ?decompensated cirrhosis, AF (warfarin), admitted to [facility] with periorbital swelling, rash, and pancolitis, transferred to [facility] for derm/rheum/ophtho eval, overall felt to be have resolving bruising, subconjunctival hemorrhage, and UC flare currently on steroids.     Continues to have abdominal pain, but with formed bowel movements. CRP downtrending today. Tested positive for COVID, which might explain recent large increase in CRP at the same time that GI symptoms seemed to be improving.     - Appreciate GI recommendations  - Continue PO prednisone and mesalamine PO   - Stool count, trend CRP, GI to consider transition back to IV steroids  - Supportive care for COVID-19, currently asymptomatic  - She will f/up with [facility] after discharge    Time-based billing (NON-critical care).     35 minutes spent on total encounter; more than 50% of the visit was spent counseling and / or coordinating care by the attending physician.  The necessity of the time spent during the encounter on this date of service was due to:     documentation in [employer], reviewing chart and coordinating care with patient/resident and interdisciplinary staff (such as , social workers, etc) as well as reviewing vitals, laboratory data, radiology, medication list, consultants' recommendations and prior records. Interventions were performed as documented above. 58W w/ type 2 diabetes, HTN, MDD vs bipolar disorder, UC (dx on colonoscopy at [facility], previously treated with steroids), NAFLD c/b ?decompensated cirrhosis, AF (warfarin), admitted to [facility] with periorbital swelling, rash, and pancolitis, transferred to [facility] for derm/rheum/ophtho eval, overall felt to be have resolving bruising, subconjunctival hemorrhage, and UC flare currently on steroids.     Continues to have abdominal pain, but with formed bowel movements. CRP downtrending today. Tested positive for COVID, which might explain recent large increase in CRP at the same time that GI symptoms seemed to be improving.     - Appreciate GI recommendations  - Continue PO prednisone and mesalamine PO   - Stool count, trend CRP, GI to consider transition back to IV steroids  - Supportive care for COVID-19, currently asymptomatic  - She will f/up with [facility] after discharge    Time-based billing (NON-critical care).     35 minutes spent on total encounter; more than 50% of the visit was spent counseling and / or coordinating care by the attending physician.  The necessity of the time spent during the encounter on this date of service was due to:     documentation in [place], reviewing chart and coordinating care with patient/resident and interdisciplinary staff (such as , social workers, etc) as well as reviewing vitals, laboratory data, radiology, medication list, consultants' recommendations and prior records. Interventions were performed as documented above.

## 2024-01-11 NOTE — PROGRESS NOTE ADULT - REASON FOR ADMISSION
transfer from [place] for tertiary level care in the setting of diffuse body rash transfer from [place] for tertiary level care in the setting of diffuse body rash

## 2024-01-11 NOTE — PROGRESS NOTE ADULT - PROBLEM SELECTOR PLAN 1
Patient presented with body rash of b/l upper extremity and lower extremity of unknown etiology that is improving from initial presentation   Rash of unclear etiology, initial infectious versus inflammatory now derm believes resolving ecchymosis  work up at [facility] includes:   CMV IgG negative   LDH normal   Babesia negative   Lyme negative   RPR negative   ESR, CRP elevated   -rheumatology: likely not vasculitis  -dermatology: likely resolving ecchymosis i/s/o coumadin use  -ophtho: likely subconjunctival hemorrhage no need to stop coumadin Patient presented with body rash of b/l upper extremity and lower extremity of unknown etiology that is improving from initial presentation   Rash of unclear etiology, initial infectious versus inflammatory now derm believes resolving ecchymosis  work up at [facility] includes:   CMV IgG negative   LDH normal   Babesia negative   Lyme negative   RPR negative   ESR, CRP elevated   -rheumatology: likely not vasculitis  -dermatology: likely resolving ecchymosis i/s/o coumadin use  -ophtho: likely subconjunctival hemorrhage no need to stop coumadin

## 2024-01-11 NOTE — PROGRESS NOTE ADULT - ASSESSMENT
58 yrs old male w/ hx of CAD (not on anti-platelets), insomnia, DM, GERD, MDD, HTN, Ulcerative colitis, Bipolar disorder, cirrhosis (unknown etiology, NAFLD?) c/w HE, Afib (on Coumadin), and Blepharitis who initially presented to [facility] for hypotension, diffuse body rashes and pancolitis.     #"mild" Pancolitis  #Ulcerative colitis  - Reported bloody stools w/ fecal incontinence worsening for the past one year. Underwent two colonoscopies on 11/2022 and 3/2023 but no records present. CT A/P showed thickening, hyperemia and mild inflammation throughout the colon. Records from [place] obtained with colonoscopy in 2/2023 with pancolitis, Salinas score 2-3 with biopsies with scattered active colitis as well as LGD in right colon. Per available records and patient/sister, patient had received Remicade at [facility], but it is unclear if she had clinical response. Patient currently on oral mesalamine 400 mg TID and IV steroids started on 12/31 and transitioned to Po on 1/9.   - GI PCR and c diff testing neg from 12/25.   - declined flex sig/colonoscopy during this admission but reports from [place] present, please see above. Colonoscopy from 2/2023 showed severe pancolitis w/ positive path.   - hepatitis B serologies negative. Quant TB testing neg.   - fecal calprotectin 2050   - CRP downtrending   - Further history obtained from her Tasneem, [phone], - patient was previously on Remicade but unclear if she responded to it but did not receive infusions when she went to the facility.     #Cirrhosis?   - Unclear if the patient has cirrhosis b/c the CT imaging showed normal liver w/ no splenomegaly. Her INR is elevated in the setting of Coumadin use.   - Less concerned for cirrhosis based on the clinical picture  - On rifaximin and Aldactone per home meds.     Recommendations:   - c/w Prednisone 40 mg daily,  - Continue with mesalamine 400 mg TID for now  - please document stool count (although CRP elevated, still having formed Bm per nursing staff)  - DVT PPx   - CBC and CRP daily.   - Patient will need close OP follow to discuss OP therapy for UC. She prefers following up at [facility]    Recommendations preliminary until signed by attending.     Omi Cole MD  Gastroenterology/Hepatology Fellow  1st option: [phone] (text or call), ONLY available from 7:00 am to 5:00 pm.   **Contact on-call GI fellow via answering service ([phone]) from 5pm-7am AND on weekends/holidays**  2nd option: Available via [employer] Teams  3rd option: Pager: [phone]         58 yrs old male w/ hx of CAD (not on anti-platelets), insomnia, DM, GERD, MDD, HTN, Ulcerative colitis, Bipolar disorder, cirrhosis (unknown etiology, NAFLD?) c/w HE, Afib (on Coumadin), and Blepharitis who initially presented to [facility] for hypotension, diffuse body rashes and pancolitis.     #"mild" Pancolitis  #Ulcerative colitis  - Reported bloody stools w/ fecal incontinence worsening for the past one year. Underwent two colonoscopies on 11/2022 and 3/2023 but no records present. CT A/P showed thickening, hyperemia and mild inflammation throughout the colon. Records from [place] obtained with colonoscopy in 2/2023 with pancolitis, Salinas score 2-3 with biopsies with scattered active colitis as well as LGD in right colon. Per available records and patient/sister, patient had received Remicade at [place], but it is unclear if she had clinical response. Patient currently on oral mesalamine 400 mg TID and IV steroids started on 12/31 and transitioned to Po on 1/9.   - GI PCR and c diff testing neg from 12/25.   - declined flex sig/colonoscopy during this admission but reports from [place] present, please see above. Colonoscopy from 2/2023 showed severe pancolitis w/ positive path.   - hepatitis B serologies negative. Quant TB testing neg.   - fecal calprotectin 2050   - CRP downtrending   - Further history obtained from her Tasneem, [phone], - patient was previously on Remicade but unclear if she responded to it but did not receive infusions when she went to the facility.     #Cirrhosis?   - Unclear if the patient has cirrhosis b/c the CT imaging showed normal liver w/ no splenomegaly. Her INR is elevated in the setting of Coumadin use.   - Less concerned for cirrhosis based on the clinical picture  - On rifaximin and Aldactone per home meds.     Recommendations:   - c/w Prednisone 40 mg daily,  - Continue with mesalamine 400 mg TID for now  - please document stool count (although CRP elevated, still having formed Bm per nursing staff)  - DVT PPx   - CBC and CRP daily.   - Patient will need close OP follow to discuss OP therapy for UC. She prefers following up at [place]    Recommendations preliminary until signed by attending.     Omi Cole MD  Gastroenterology/Hepatology Fellow  1st option: [phone] (text or call), ONLY available from 7:00 am to 5:00 pm.   **Contact on-call GI fellow via answering service ([phone]) from 5pm-7am AND on weekends/holidays**  2nd option: Available via [employer] Teams  3rd option: Pager: [phone]

## 2024-01-11 NOTE — PROGRESS NOTE ADULT - PROBLEM SELECTOR PLAN 3
Patient with anemia with iron studies more consistent with anemia of chronic disease   -B12 and folate WNL   -patient had drop of Hgb to 6.9 at [place] prior to transfer, transfused with 1pRBC with response to 9.5  -continue to monitor   -transfuse for Hgb >7 Patient with anemia with iron studies more consistent with anemia of chronic disease   -B12 and folate WNL   -patient had drop of Hgb to 6.9 at [place] prior to transfer, transfused with 1pRBC with response to 9.5  -continue to monitor   -transfuse for Hgb >7

## 2024-01-11 NOTE — PROGRESS NOTE ADULT - ATTENDING COMMENTS
No new GI complaints. Patient still contemplating possible endoscopic evaluation.     # History of UC: As noted above, records from [place] obtained with colonoscopy in 2/2023 with pancolitis, Salinas score 2-3 with biopsies with scattered active colitis as well as LGD in right colon (see additional findings above). Though biopsies not pathognomonic for UC, the endoscopic findings and clinical history are consistent with this diagnosis. Per available records and patient/sister, patient had received Remicade at [place], but it is unclear if she had clinical response. Additionally, they report no additional infusions were given after discharge to her facility.  # Afib on Coumadin  # CAD  # Bipolar disease  # Reported history of cirrhosis, possibly NAFLD    --as per nursing, hard BM today  sed rate elevated, found to have new COVID (neg PCR 12/22, now sars-cov +)  Pt seems sad/withdrawn    -- Pt reports upper GI upset post prandially, would like to avoid colonoscopy unless necessary    Given improvement of loose stools and labs, would hold off on colonoscopy given recent one 8/2023 at [place] [facility]  --c/w prednisone 40mg daily x7 days, will need slow taper as outpatient  -- Low residue diet  -- Pt would like to resume care at [facility] eventually, would need to be reinduced with Remicade (had 2 doses and was then sent to [facility]) No new GI complaints. Patient still contemplating possible endoscopic evaluation.     # History of UC: As noted above, records from [place] obtained with colonoscopy in 2/2023 with pancolitis, Salinas score 2-3 with biopsies with scattered active colitis as well as LGD in right colon (see additional findings above). Though biopsies not pathognomonic for UC, the endoscopic findings and clinical history are consistent with this diagnosis. Per available records and patient/sister, patient had received Remicade at [place], but it is unclear if she had clinical response. Additionally, they report no additional infusions were given after discharge to her facility.  # Afib on Coumadin  # CAD  # Bipolar disease  # Reported history of cirrhosis, possibly NAFLD    --as per nursing, hard BM today  sed rate elevated, found to have new COVID (neg PCR 12/22, now sars-cov +)  Pt seems sad/withdrawn    -- Pt reports upper GI upset post prandially, would like to avoid colonoscopy unless necessary    Given improvement of loose stools and labs, would hold off on colonoscopy given recent one 8/2023 at [place]  --c/w prednisone 40mg daily x7 days, will need slow taper as outpatient  -- Low residue diet  -- Pt would like to resume care at [place] eventually, would need to be reinduced with Remicade (had 2 doses and was then sent to [facility])

## 2024-01-11 NOTE — PROGRESS NOTE ADULT - ASSESSMENT
58 y F w PMHx of CAD, diabetes, insomnia, MDD, GERD, hypertension, irritable bowel syndrome with diarrhea, ulcerative colitis, cirrhosis, NAFLD, hepatic encephalopathy, bipolar disorder, chronic Afib on Coumadin, blepharitis of the left eye who initially presented to [place] due to reported hypotension, tachycardia and diffuse purpura. Patient transferred to [facility] for further workup and consultations for her body rash.  58 y F w PMHx of CAD, diabetes, insomnia, MDD, GERD, hypertension, irritable bowel syndrome with diarrhea, ulcerative colitis, cirrhosis, NAFLD, hepatic encephalopathy, bipolar disorder, chronic Afib on Coumadin, blepharitis of the left eye who initially presented to [place] due to reported hypotension, tachycardia and diffuse purpura. Patient transferred to [facility] for further workup and consultations for her body rash.

## 2024-01-11 NOTE — PROGRESS NOTE ADULT - REASON FOR ADMISSION
transfer from [place] for tertiary level care in the setting of diffuse body rash transfer from [place] for tertiary level care in the setting of diffuse body rash

## 2024-01-11 NOTE — PROGRESS NOTE ADULT - SUBJECTIVE AND OBJECTIVE BOX
PROGRESS NOTE:     Patient is a 58y old  Female who presents with a chief complaint of transfer from [place] for tertiary level care in the setting of diffuse body rash (06 Jan 2024 07:40)      SUBJECTIVE / OVERNIGHT EVENTS: No acute events overnight. This AM seen and examined at bedside- pt continues to endorse discomfort, stated that she is tired of being in the hospital. Endorsed depressive symptoms but did not want to speak to psychiatrist. Denied SI or intent. Stated she feels constipated now.    ADDITIONAL REVIEW OF SYSTEMS: Negative     MEDICATIONS  (STANDING):  carvedilol 3.125 milliGRAM(s) Oral every 12 hours  dextrose 5%. 1000 milliLiter(s) (100 mL/Hr) IV Continuous <Continuous>  dextrose 5%. 1000 milliLiter(s) (50 mL/Hr) IV Continuous <Continuous>  dextrose 50% Injectable 12.5 Gram(s) IV Push once  dextrose 50% Injectable 25 Gram(s) IV Push once  dextrose 50% Injectable 25 Gram(s) IV Push once  folic acid 1 milliGRAM(s) Oral daily  glucagon  Injectable 1 milliGRAM(s) IntraMuscular once  influenza   Vaccine 0.5 milliLiter(s) IntraMuscular once  insulin lispro (ADMELOG) corrective regimen sliding scale   SubCutaneous at bedtime  insulin lispro (ADMELOG) corrective regimen sliding scale   SubCutaneous three times a day before meals  lactated ringers. 1000 milliLiter(s) (100 mL/Hr) IV Continuous <Continuous>  mesalamine DR Capsule 400 milliGRAM(s) Oral three times a day  methylPREDNISolone sodium succinate Injectable 20 milliGRAM(s) IV Push every 8 hours  mirtazapine 30 milliGRAM(s) Oral daily  ondansetron Injectable 4 milliGRAM(s) IV Push once  pantoprazole    Tablet 40 milliGRAM(s) Oral before breakfast  rifAXIMin 550 milliGRAM(s) Oral two times a day  spironolactone 100 milliGRAM(s) Oral daily    MEDICATIONS  (PRN):  acetaminophen     Tablet .. 650 milliGRAM(s) Oral every 6 hours PRN Temp greater or equal to 38C (100.4F), Mild Pain (1 - 3)  aluminum hydroxide/magnesium hydroxide/simethicone Suspension 30 milliLiter(s) Oral every 6 hours PRN Dyspepsia  dextrose Oral Gel 15 Gram(s) Oral once PRN Blood Glucose LESS THAN 70 milliGRAM(s)/deciliter      CAPILLARY BLOOD GLUCOSE      POCT Blood Glucose.: 119 mg/dL (07 Jan 2024 08:43)  POCT Blood Glucose.: 215 mg/dL (06 Jan 2024 21:36)  POCT Blood Glucose.: 170 mg/dL (06 Jan 2024 18:13)  POCT Blood Glucose.: 140 mg/dL (06 Jan 2024 12:49)    I&O's Summary      Vital Signs Last 24 Hrs  T(C): 36.7 (10 Corey 2024 05:13), Max: 37.1 (09 Jan 2024 13:15)  T(F): 98.1 (10 Corey 2024 05:13), Max: 98.7 (09 Jan 2024 13:15)  HR: 71 (10 Corey 2024 05:13) (71 - 96)  BP: 126/74 (10 Corey 2024 05:13) (105/60 - 126/74)  BP(mean): --  RR: 17 (10 Corey 2024 05:13) (17 - 18)  SpO2: 95% (10 Corey 2024 05:13) (95% - 100%)    Parameters below as of 10 Corey 2024 05:13  Patient On (Oxygen Delivery Method): room air        PHYSICAL EXAM:  GENERAL: NAD  HEAD:  Atraumatic, Normocephalic  EYES: left eye and rt eye with mild redness+ no drainage  ENMT: MMM  NECK: Supple, No JVD  NERVOUS SYSTEM: AOX3, motor and sensation grossly intact in b/l UE and b/l LE  PSYCHIATRIC: Appropriate affect and mood  CHEST/LUNG: Clear to auscultation bilaterally; No rales, rhonchi, wheezing, or rubs  HEART: Regular rate and rhythm; No murmurs, rubs, or gallops. No LE edema  ABDOMEN: Soft, mild epigastric tenderness, Nondistended; Bowel sounds present  EXTREMITIES:  2+ Peripheral Pulses, No clubbing, cyanosis  SKIN:  rash improving      LABS:             CBC Full  -  ( 10 Corey 2024 06:35 )  WBC Count : 7.94 K/uL  RBC Count : 3.03 M/uL  Hemoglobin : 9.4 g/dL  Hematocrit : 28.1 %  Platelet Count - Automated : 439 K/uL  Mean Cell Volume : 92.7 fL  Mean Cell Hemoglobin : 31.0 pg  Mean Cell Hemoglobin Concentration : 33.5 gm/dL  Auto Neutrophil # : 6.14 K/uL  Auto Lymphocyte # : 1.22 K/uL  Auto Monocyte # : 0.46 K/uL  Auto Eosinophil # : 0.00 K/uL  Auto Basophil # : 0.01 K/uL  Auto Neutrophil % : 77.3 %  Auto Lymphocyte % : 15.4 %  Auto Monocyte % : 5.8 %  Auto Eosinophil % : 0.0 %  Auto Basophil % : 0.1 %    01-10    135  |  97<L>  |  36<H>  ----------------------------<  100<H>  4.2   |  24  |  0.49<L>    Ca    8.8      10 Corey 2024 06:35  Phos  3.9     01-10  Mg     2.20     01-10    TPro  6.4  /  Alb  3.3  /  TBili  0.2  /  DBili  x   /  AST  23  /  ALT  29  /  AlkPhos  103  01-10    Urinalysis Basic - ( 07 Jan 2024 07:42 )    Color: x / Appearance: x / SG: x / pH: x  Gluc: 115 mg/dL / Ketone: x  / Bili: x / Urobili: x   Blood: x / Protein: x / Nitrite: x   Leuk Esterase: x / RBC: x / WBC x   Sq Epi: x / Non Sq Epi: x / Bacteria: x          RADIOLOGY & ADDITIONAL TESTS:  Results Reviewed:   Imaging Personally Reviewed:  Electrocardiogram Personally Reviewed:    COORDINATION OF CARE:  Care Discussed with Consultants/Other Providers [Y/N]:  Prior or Outpatient Records Reviewed [Y/N]:   PROGRESS NOTE:     Patient is a 58y old  Female who presents with a chief complaint of transfer from [place] for tertiary level care in the setting of diffuse body rash (06 Jan 2024 07:40)      SUBJECTIVE / OVERNIGHT EVENTS: No acute events overnight. This AM seen and examined at bedside- pt continues to endorse discomfort, stated that she is tired of being in the hospital. Endorsed depressive symptoms but did not want to speak to psychiatrist. Denied SI or intent. Stated she feels constipated now.    ADDITIONAL REVIEW OF SYSTEMS: Negative     MEDICATIONS  (STANDING):  carvedilol 3.125 milliGRAM(s) Oral every 12 hours  dextrose 5%. 1000 milliLiter(s) (100 mL/Hr) IV Continuous <Continuous>  dextrose 5%. 1000 milliLiter(s) (50 mL/Hr) IV Continuous <Continuous>  dextrose 50% Injectable 12.5 Gram(s) IV Push once  dextrose 50% Injectable 25 Gram(s) IV Push once  dextrose 50% Injectable 25 Gram(s) IV Push once  folic acid 1 milliGRAM(s) Oral daily  glucagon  Injectable 1 milliGRAM(s) IntraMuscular once  influenza   Vaccine 0.5 milliLiter(s) IntraMuscular once  insulin lispro (ADMELOG) corrective regimen sliding scale   SubCutaneous at bedtime  insulin lispro (ADMELOG) corrective regimen sliding scale   SubCutaneous three times a day before meals  lactated ringers. 1000 milliLiter(s) (100 mL/Hr) IV Continuous <Continuous>  mesalamine DR Capsule 400 milliGRAM(s) Oral three times a day  methylPREDNISolone sodium succinate Injectable 20 milliGRAM(s) IV Push every 8 hours  mirtazapine 30 milliGRAM(s) Oral daily  ondansetron Injectable 4 milliGRAM(s) IV Push once  pantoprazole    Tablet 40 milliGRAM(s) Oral before breakfast  rifAXIMin 550 milliGRAM(s) Oral two times a day  spironolactone 100 milliGRAM(s) Oral daily    MEDICATIONS  (PRN):  acetaminophen     Tablet .. 650 milliGRAM(s) Oral every 6 hours PRN Temp greater or equal to 38C (100.4F), Mild Pain (1 - 3)  aluminum hydroxide/magnesium hydroxide/simethicone Suspension 30 milliLiter(s) Oral every 6 hours PRN Dyspepsia  dextrose Oral Gel 15 Gram(s) Oral once PRN Blood Glucose LESS THAN 70 milliGRAM(s)/deciliter      CAPILLARY BLOOD GLUCOSE      POCT Blood Glucose.: 119 mg/dL (07 Jan 2024 08:43)  POCT Blood Glucose.: 215 mg/dL (06 Jan 2024 21:36)  POCT Blood Glucose.: 170 mg/dL (06 Jan 2024 18:13)  POCT Blood Glucose.: 140 mg/dL (06 Jan 2024 12:49)    I&O's Summary      Vital Signs Last 24 Hrs  T(C): 36.7 (10 Corey 2024 05:13), Max: 37.1 (09 Jan 2024 13:15)  T(F): 98.1 (10 Corey 2024 05:13), Max: 98.7 (09 Jan 2024 13:15)  HR: 71 (10 Corey 2024 05:13) (71 - 96)  BP: 126/74 (10 Corey 2024 05:13) (105/60 - 126/74)  BP(mean): --  RR: 17 (10 Corey 2024 05:13) (17 - 18)  SpO2: 95% (10 Corey 2024 05:13) (95% - 100%)    Parameters below as of 10 Corey 2024 05:13  Patient On (Oxygen Delivery Method): room air        PHYSICAL EXAM:  GENERAL: NAD  HEAD:  Atraumatic, Normocephalic  EYES: left eye and rt eye with mild redness+ no drainage  ENMT: MMM  NECK: Supple, No JVD  NERVOUS SYSTEM: AOX3, motor and sensation grossly intact in b/l UE and b/l LE  PSYCHIATRIC: Appropriate affect and mood  CHEST/LUNG: Clear to auscultation bilaterally; No rales, rhonchi, wheezing, or rubs  HEART: Regular rate and rhythm; No murmurs, rubs, or gallops. No LE edema  ABDOMEN: Soft, mild epigastric tenderness, Nondistended; Bowel sounds present  EXTREMITIES:  2+ Peripheral Pulses, No clubbing, cyanosis  SKIN:  rash improving      LABS:             CBC Full  -  ( 10 Corey 2024 06:35 )  WBC Count : 7.94 K/uL  RBC Count : 3.03 M/uL  Hemoglobin : 9.4 g/dL  Hematocrit : 28.1 %  Platelet Count - Automated : 439 K/uL  Mean Cell Volume : 92.7 fL  Mean Cell Hemoglobin : 31.0 pg  Mean Cell Hemoglobin Concentration : 33.5 gm/dL  Auto Neutrophil # : 6.14 K/uL  Auto Lymphocyte # : 1.22 K/uL  Auto Monocyte # : 0.46 K/uL  Auto Eosinophil # : 0.00 K/uL  Auto Basophil # : 0.01 K/uL  Auto Neutrophil % : 77.3 %  Auto Lymphocyte % : 15.4 %  Auto Monocyte % : 5.8 %  Auto Eosinophil % : 0.0 %  Auto Basophil % : 0.1 %    01-10    135  |  97<L>  |  36<H>  ----------------------------<  100<H>  4.2   |  24  |  0.49<L>    Ca    8.8      10 Corey 2024 06:35  Phos  3.9     01-10  Mg     2.20     01-10    TPro  6.4  /  Alb  3.3  /  TBili  0.2  /  DBili  x   /  AST  23  /  ALT  29  /  AlkPhos  103  01-10    Urinalysis Basic - ( 07 Jan 2024 07:42 )    Color: x / Appearance: x / SG: x / pH: x  Gluc: 115 mg/dL / Ketone: x  / Bili: x / Urobili: x   Blood: x / Protein: x / Nitrite: x   Leuk Esterase: x / RBC: x / WBC x   Sq Epi: x / Non Sq Epi: x / Bacteria: x          RADIOLOGY & ADDITIONAL TESTS:  Results Reviewed:   Imaging Personally Reviewed:  Electrocardiogram Personally Reviewed:    COORDINATION OF CARE:  Care Discussed with Consultants/Other Providers [Y/N]:  Prior or Outpatient Records Reviewed [Y/N]:   PROGRESS NOTE:     Patient is a 58y old  Female who presents with a chief complaint of transfer from [place] for tertiary level care in the setting of diffuse body rash (06 Jan 2024 07:40)      SUBJECTIVE / OVERNIGHT EVENTS: No acute events overnight. This AM seen and examined at bedside- pt continues to endorse discomfort, only 1 hard stool overnight. Pt stated she had multiple BMs yesterday, per nurse BMs were soft, non-watery.     ADDITIONAL REVIEW OF SYSTEMS: Negative     MEDICATIONS  (STANDING):  carvedilol 3.125 milliGRAM(s) Oral every 12 hours  dextrose 5%. 1000 milliLiter(s) (100 mL/Hr) IV Continuous <Continuous>  dextrose 5%. 1000 milliLiter(s) (50 mL/Hr) IV Continuous <Continuous>  dextrose 50% Injectable 12.5 Gram(s) IV Push once  dextrose 50% Injectable 25 Gram(s) IV Push once  dextrose 50% Injectable 25 Gram(s) IV Push once  folic acid 1 milliGRAM(s) Oral daily  glucagon  Injectable 1 milliGRAM(s) IntraMuscular once  influenza   Vaccine 0.5 milliLiter(s) IntraMuscular once  insulin lispro (ADMELOG) corrective regimen sliding scale   SubCutaneous at bedtime  insulin lispro (ADMELOG) corrective regimen sliding scale   SubCutaneous three times a day before meals  lactated ringers. 1000 milliLiter(s) (100 mL/Hr) IV Continuous <Continuous>  mesalamine DR Capsule 400 milliGRAM(s) Oral three times a day  methylPREDNISolone sodium succinate Injectable 20 milliGRAM(s) IV Push every 8 hours  mirtazapine 30 milliGRAM(s) Oral daily  ondansetron Injectable 4 milliGRAM(s) IV Push once  pantoprazole    Tablet 40 milliGRAM(s) Oral before breakfast  rifAXIMin 550 milliGRAM(s) Oral two times a day  spironolactone 100 milliGRAM(s) Oral daily    MEDICATIONS  (PRN):  acetaminophen     Tablet .. 650 milliGRAM(s) Oral every 6 hours PRN Temp greater or equal to 38C (100.4F), Mild Pain (1 - 3)  aluminum hydroxide/magnesium hydroxide/simethicone Suspension 30 milliLiter(s) Oral every 6 hours PRN Dyspepsia  dextrose Oral Gel 15 Gram(s) Oral once PRN Blood Glucose LESS THAN 70 milliGRAM(s)/deciliter      CAPILLARY BLOOD GLUCOSE      POCT Blood Glucose.: 119 mg/dL (07 Jan 2024 08:43)  POCT Blood Glucose.: 215 mg/dL (06 Jan 2024 21:36)  POCT Blood Glucose.: 170 mg/dL (06 Jan 2024 18:13)  POCT Blood Glucose.: 140 mg/dL (06 Jan 2024 12:49)    I&O's Summary      Vital Signs Last 24 Hrs  T(C): 36.4 (11 Jan 2024 05:06), Max: 37.2 (10 Corey 2024 21:10)  T(F): 97.5 (11 Jan 2024 05:06), Max: 98.9 (10 Corey 2024 21:10)  HR: 71 (11 Jan 2024 05:06) (71 - 89)  BP: 127/70 (11 Jan 2024 05:06) (106/64 - 127/70)  BP(mean): --  RR: 17 (11 Jan 2024 05:06) (17 - 18)  SpO2: 95% (11 Jan 2024 05:06) (95% - 97%)    Parameters below as of 11 Jan 2024 05:06  Patient On (Oxygen Delivery Method): room air      PHYSICAL EXAM:  GENERAL: NAD  HEAD:  Atraumatic, Normocephalic  EYES: left eye and rt eye with mild redness+ no drainage  ENMT: MMM  NECK: Supple, No JVD  NERVOUS SYSTEM: AOX3, motor and sensation grossly intact in b/l UE and b/l LE  PSYCHIATRIC: Appropriate affect and mood  CHEST/LUNG: Clear to auscultation bilaterally; No rales, rhonchi, wheezing, or rubs  HEART: Regular rate and rhythm; No murmurs, rubs, or gallops. No LE edema  ABDOMEN: Soft, mild epigastric tenderness, Nondistended; Bowel sounds present  EXTREMITIES:  2+ Peripheral Pulses, No clubbing, cyanosis  SKIN:  rash improving      LABS:    CBC Full  -  ( 11 Jan 2024 06:00 )  WBC Count : 8.04 K/uL  RBC Count : 3.03 M/uL  Hemoglobin : 9.5 g/dL  Hematocrit : 28.5 %  Platelet Count - Automated : 437 K/uL  Mean Cell Volume : 94.1 fL  Mean Cell Hemoglobin : 31.4 pg  Mean Cell Hemoglobin Concentration : 33.3 gm/dL  Auto Neutrophil # : 6.57 K/uL  Auto Lymphocyte # : 0.98 K/uL  Auto Monocyte # : 0.36 K/uL  Auto Eosinophil # : 0.00 K/uL  Auto Basophil # : 0.01 K/uL  Auto Neutrophil % : 81.7 %  Auto Lymphocyte % : 12.2 %  Auto Monocyte % : 4.5 %  Auto Eosinophil % : 0.0 %  Auto Basophil % : 0.1 %    01-11    137  |  99  |  38<H>  ----------------------------<  114<H>  4.7   |  22  |  0.55    Ca    8.9      11 Jan 2024 06:00  Phos  3.4     01-11  Mg     2.50     01-11    TPro  6.7  /  Alb  3.4  /  TBili  0.2  /  DBili  x   /  AST  28  /  ALT  37<H>  /  AlkPhos  105  01-11      Urinalysis Basic - ( 07 Jan 2024 07:42 )    Color: x / Appearance: x / SG: x / pH: x  Gluc: 115 mg/dL / Ketone: x  / Bili: x / Urobili: x   Blood: x / Protein: x / Nitrite: x   Leuk Esterase: x / RBC: x / WBC x   Sq Epi: x / Non Sq Epi: x / Bacteria: x          RADIOLOGY & ADDITIONAL TESTS:  Results Reviewed:   Imaging Personally Reviewed:  Electrocardiogram Personally Reviewed:    COORDINATION OF CARE:  Care Discussed with Consultants/Other Providers [Y/N]:  Prior or Outpatient Records Reviewed [Y/N]:   PROGRESS NOTE:     Patient is a 58y old  Female who presents with a chief complaint of transfer from [place] for tertiary level care in the setting of diffuse body rash (06 Jan 2024 07:40)      SUBJECTIVE / OVERNIGHT EVENTS: No acute events overnight. This AM seen and examined at bedside- pt continues to endorse discomfort, only 1 hard stool overnight. Pt stated she had multiple BMs yesterday, per nurse BMs were soft, non-watery.     ADDITIONAL REVIEW OF SYSTEMS: Negative     MEDICATIONS  (STANDING):  carvedilol 3.125 milliGRAM(s) Oral every 12 hours  dextrose 5%. 1000 milliLiter(s) (100 mL/Hr) IV Continuous <Continuous>  dextrose 5%. 1000 milliLiter(s) (50 mL/Hr) IV Continuous <Continuous>  dextrose 50% Injectable 12.5 Gram(s) IV Push once  dextrose 50% Injectable 25 Gram(s) IV Push once  dextrose 50% Injectable 25 Gram(s) IV Push once  folic acid 1 milliGRAM(s) Oral daily  glucagon  Injectable 1 milliGRAM(s) IntraMuscular once  influenza   Vaccine 0.5 milliLiter(s) IntraMuscular once  insulin lispro (ADMELOG) corrective regimen sliding scale   SubCutaneous at bedtime  insulin lispro (ADMELOG) corrective regimen sliding scale   SubCutaneous three times a day before meals  lactated ringers. 1000 milliLiter(s) (100 mL/Hr) IV Continuous <Continuous>  mesalamine DR Capsule 400 milliGRAM(s) Oral three times a day  methylPREDNISolone sodium succinate Injectable 20 milliGRAM(s) IV Push every 8 hours  mirtazapine 30 milliGRAM(s) Oral daily  ondansetron Injectable 4 milliGRAM(s) IV Push once  pantoprazole    Tablet 40 milliGRAM(s) Oral before breakfast  rifAXIMin 550 milliGRAM(s) Oral two times a day  spironolactone 100 milliGRAM(s) Oral daily    MEDICATIONS  (PRN):  acetaminophen     Tablet .. 650 milliGRAM(s) Oral every 6 hours PRN Temp greater or equal to 38C (100.4F), Mild Pain (1 - 3)  aluminum hydroxide/magnesium hydroxide/simethicone Suspension 30 milliLiter(s) Oral every 6 hours PRN Dyspepsia  dextrose Oral Gel 15 Gram(s) Oral once PRN Blood Glucose LESS THAN 70 milliGRAM(s)/deciliter      CAPILLARY BLOOD GLUCOSE      POCT Blood Glucose.: 119 mg/dL (07 Jan 2024 08:43)  POCT Blood Glucose.: 215 mg/dL (06 Jan 2024 21:36)  POCT Blood Glucose.: 170 mg/dL (06 Jan 2024 18:13)  POCT Blood Glucose.: 140 mg/dL (06 Jan 2024 12:49)    I&O's Summary      Vital Signs Last 24 Hrs  T(C): 36.4 (11 Jan 2024 05:06), Max: 37.2 (10 Corey 2024 21:10)  T(F): 97.5 (11 Jan 2024 05:06), Max: 98.9 (10 Corey 2024 21:10)  HR: 71 (11 Jan 2024 05:06) (71 - 89)  BP: 127/70 (11 Jan 2024 05:06) (106/64 - 127/70)  BP(mean): --  RR: 17 (11 Jan 2024 05:06) (17 - 18)  SpO2: 95% (11 Jan 2024 05:06) (95% - 97%)    Parameters below as of 11 Jan 2024 05:06  Patient On (Oxygen Delivery Method): room air      PHYSICAL EXAM:  GENERAL: NAD  HEAD:  Atraumatic, Normocephalic  EYES: left eye and rt eye with mild redness+ no drainage  ENMT: MMM  NECK: Supple, No JVD  NERVOUS SYSTEM: AOX3, motor and sensation grossly intact in b/l UE and b/l LE  PSYCHIATRIC: Appropriate affect and mood  CHEST/LUNG: Clear to auscultation bilaterally; No rales, rhonchi, wheezing, or rubs  HEART: Regular rate and rhythm; No murmurs, rubs, or gallops. No LE edema  ABDOMEN: Soft, mild epigastric tenderness, Nondistended; Bowel sounds present  EXTREMITIES:  2+ Peripheral Pulses, No clubbing, cyanosis  SKIN:  rash improving      LABS:    CBC Full  -  ( 11 Jan 2024 06:00 )  WBC Count : 8.04 K/uL  RBC Count : 3.03 M/uL  Hemoglobin : 9.5 g/dL  Hematocrit : 28.5 %  Platelet Count - Automated : 437 K/uL  Mean Cell Volume : 94.1 fL  Mean Cell Hemoglobin : 31.4 pg  Mean Cell Hemoglobin Concentration : 33.3 gm/dL  Auto Neutrophil # : 6.57 K/uL  Auto Lymphocyte # : 0.98 K/uL  Auto Monocyte # : 0.36 K/uL  Auto Eosinophil # : 0.00 K/uL  Auto Basophil # : 0.01 K/uL  Auto Neutrophil % : 81.7 %  Auto Lymphocyte % : 12.2 %  Auto Monocyte % : 4.5 %  Auto Eosinophil % : 0.0 %  Auto Basophil % : 0.1 %    01-11    137  |  99  |  38<H>  ----------------------------<  114<H>  4.7   |  22  |  0.55    Ca    8.9      11 Jan 2024 06:00  Phos  3.4     01-11  Mg     2.50     01-11    TPro  6.7  /  Alb  3.4  /  TBili  0.2  /  DBili  x   /  AST  28  /  ALT  37<H>  /  AlkPhos  105  01-11      Urinalysis Basic - ( 07 Jan 2024 07:42 )    Color: x / Appearance: x / SG: x / pH: x  Gluc: 115 mg/dL / Ketone: x  / Bili: x / Urobili: x   Blood: x / Protein: x / Nitrite: x   Leuk Esterase: x / RBC: x / WBC x   Sq Epi: x / Non Sq Epi: x / Bacteria: x          RADIOLOGY & ADDITIONAL TESTS:  Results Reviewed:   Imaging Personally Reviewed:  Electrocardiogram Personally Reviewed:    COORDINATION OF CARE:  Care Discussed with Consultants/Other Providers [Y/N]:  Prior or Outpatient Records Reviewed [Y/N]:

## 2024-01-12 ENCOUNTER — TRANSCRIPTION ENCOUNTER (OUTPATIENT)
Age: 59
End: 2024-01-12

## 2024-01-12 VITALS
RESPIRATION RATE: 18 BRPM | DIASTOLIC BLOOD PRESSURE: 89 MMHG | TEMPERATURE: 98 F | HEART RATE: 84 BPM | SYSTOLIC BLOOD PRESSURE: 132 MMHG | OXYGEN SATURATION: 98 %

## 2024-01-12 LAB
ALBUMIN SERPL ELPH-MCNC: 3.5 G/DL — SIGNIFICANT CHANGE UP (ref 3.3–5)
ALBUMIN SERPL ELPH-MCNC: 3.5 G/DL — SIGNIFICANT CHANGE UP (ref 3.3–5)
ALP SERPL-CCNC: 108 U/L — SIGNIFICANT CHANGE UP (ref 40–120)
ALP SERPL-CCNC: 108 U/L — SIGNIFICANT CHANGE UP (ref 40–120)
ALT FLD-CCNC: 40 U/L — HIGH (ref 4–33)
ALT FLD-CCNC: 40 U/L — HIGH (ref 4–33)
ANION GAP SERPL CALC-SCNC: 15 MMOL/L — HIGH (ref 7–14)
ANION GAP SERPL CALC-SCNC: 15 MMOL/L — HIGH (ref 7–14)
APTT BLD: 33.7 SEC — SIGNIFICANT CHANGE UP (ref 24.5–35.6)
APTT BLD: 33.7 SEC — SIGNIFICANT CHANGE UP (ref 24.5–35.6)
AST SERPL-CCNC: 31 U/L — SIGNIFICANT CHANGE UP (ref 4–32)
AST SERPL-CCNC: 31 U/L — SIGNIFICANT CHANGE UP (ref 4–32)
BASOPHILS # BLD AUTO: 0 K/UL — SIGNIFICANT CHANGE UP (ref 0–0.2)
BASOPHILS # BLD AUTO: 0 K/UL — SIGNIFICANT CHANGE UP (ref 0–0.2)
BASOPHILS NFR BLD AUTO: 0 % — SIGNIFICANT CHANGE UP (ref 0–2)
BASOPHILS NFR BLD AUTO: 0 % — SIGNIFICANT CHANGE UP (ref 0–2)
BILIRUB SERPL-MCNC: 0.3 MG/DL — SIGNIFICANT CHANGE UP (ref 0.2–1.2)
BILIRUB SERPL-MCNC: 0.3 MG/DL — SIGNIFICANT CHANGE UP (ref 0.2–1.2)
BUN SERPL-MCNC: 43 MG/DL — HIGH (ref 7–23)
BUN SERPL-MCNC: 43 MG/DL — HIGH (ref 7–23)
CALCIUM SERPL-MCNC: 9.2 MG/DL — SIGNIFICANT CHANGE UP (ref 8.4–10.5)
CALCIUM SERPL-MCNC: 9.2 MG/DL — SIGNIFICANT CHANGE UP (ref 8.4–10.5)
CHLORIDE SERPL-SCNC: 99 MMOL/L — SIGNIFICANT CHANGE UP (ref 98–107)
CHLORIDE SERPL-SCNC: 99 MMOL/L — SIGNIFICANT CHANGE UP (ref 98–107)
CO2 SERPL-SCNC: 23 MMOL/L — SIGNIFICANT CHANGE UP (ref 22–31)
CO2 SERPL-SCNC: 23 MMOL/L — SIGNIFICANT CHANGE UP (ref 22–31)
CREAT SERPL-MCNC: 0.57 MG/DL — SIGNIFICANT CHANGE UP (ref 0.5–1.3)
CREAT SERPL-MCNC: 0.57 MG/DL — SIGNIFICANT CHANGE UP (ref 0.5–1.3)
CRP SERPL-MCNC: 12.1 MG/L — HIGH
CRP SERPL-MCNC: 12.1 MG/L — HIGH
EGFR: 107 ML/MIN/1.73M2 — SIGNIFICANT CHANGE UP
EGFR: 107 ML/MIN/1.73M2 — SIGNIFICANT CHANGE UP
EOSINOPHIL # BLD AUTO: 0 K/UL — SIGNIFICANT CHANGE UP (ref 0–0.5)
EOSINOPHIL # BLD AUTO: 0 K/UL — SIGNIFICANT CHANGE UP (ref 0–0.5)
EOSINOPHIL NFR BLD AUTO: 0 % — SIGNIFICANT CHANGE UP (ref 0–6)
EOSINOPHIL NFR BLD AUTO: 0 % — SIGNIFICANT CHANGE UP (ref 0–6)
ERYTHROCYTE [SEDIMENTATION RATE] IN BLOOD: 88 MM/HR — HIGH (ref 4–25)
ERYTHROCYTE [SEDIMENTATION RATE] IN BLOOD: 88 MM/HR — HIGH (ref 4–25)
GLUCOSE BLDC GLUCOMTR-MCNC: 161 MG/DL — HIGH (ref 70–99)
GLUCOSE BLDC GLUCOMTR-MCNC: 161 MG/DL — HIGH (ref 70–99)
GLUCOSE BLDC GLUCOMTR-MCNC: 94 MG/DL — SIGNIFICANT CHANGE UP (ref 70–99)
GLUCOSE BLDC GLUCOMTR-MCNC: 94 MG/DL — SIGNIFICANT CHANGE UP (ref 70–99)
GLUCOSE SERPL-MCNC: 122 MG/DL — HIGH (ref 70–99)
GLUCOSE SERPL-MCNC: 122 MG/DL — HIGH (ref 70–99)
HCT VFR BLD CALC: 27.6 % — LOW (ref 34.5–45)
HCT VFR BLD CALC: 27.6 % — LOW (ref 34.5–45)
HGB BLD-MCNC: 9.6 G/DL — LOW (ref 11.5–15.5)
HGB BLD-MCNC: 9.6 G/DL — LOW (ref 11.5–15.5)
IANC: 5.98 K/UL — SIGNIFICANT CHANGE UP (ref 1.8–7.4)
IANC: 5.98 K/UL — SIGNIFICANT CHANGE UP (ref 1.8–7.4)
IMM GRANULOCYTES NFR BLD AUTO: 1.1 % — HIGH (ref 0–0.9)
IMM GRANULOCYTES NFR BLD AUTO: 1.1 % — HIGH (ref 0–0.9)
INR BLD: 2.12 RATIO — HIGH (ref 0.85–1.18)
INR BLD: 2.12 RATIO — HIGH (ref 0.85–1.18)
LYMPHOCYTES # BLD AUTO: 1.02 K/UL — SIGNIFICANT CHANGE UP (ref 1–3.3)
LYMPHOCYTES # BLD AUTO: 1.02 K/UL — SIGNIFICANT CHANGE UP (ref 1–3.3)
LYMPHOCYTES # BLD AUTO: 13.8 % — SIGNIFICANT CHANGE UP (ref 13–44)
LYMPHOCYTES # BLD AUTO: 13.8 % — SIGNIFICANT CHANGE UP (ref 13–44)
MAGNESIUM SERPL-MCNC: 2.5 MG/DL — SIGNIFICANT CHANGE UP (ref 1.6–2.6)
MAGNESIUM SERPL-MCNC: 2.5 MG/DL — SIGNIFICANT CHANGE UP (ref 1.6–2.6)
MCHC RBC-ENTMCNC: 34 PG — SIGNIFICANT CHANGE UP (ref 27–34)
MCHC RBC-ENTMCNC: 34 PG — SIGNIFICANT CHANGE UP (ref 27–34)
MCHC RBC-ENTMCNC: 34.8 GM/DL — SIGNIFICANT CHANGE UP (ref 32–36)
MCHC RBC-ENTMCNC: 34.8 GM/DL — SIGNIFICANT CHANGE UP (ref 32–36)
MCV RBC AUTO: 97.9 FL — SIGNIFICANT CHANGE UP (ref 80–100)
MCV RBC AUTO: 97.9 FL — SIGNIFICANT CHANGE UP (ref 80–100)
MONOCYTES # BLD AUTO: 0.3 K/UL — SIGNIFICANT CHANGE UP (ref 0–0.9)
MONOCYTES # BLD AUTO: 0.3 K/UL — SIGNIFICANT CHANGE UP (ref 0–0.9)
MONOCYTES NFR BLD AUTO: 4.1 % — SIGNIFICANT CHANGE UP (ref 2–14)
MONOCYTES NFR BLD AUTO: 4.1 % — SIGNIFICANT CHANGE UP (ref 2–14)
NEUTROPHILS # BLD AUTO: 5.98 K/UL — SIGNIFICANT CHANGE UP (ref 1.8–7.4)
NEUTROPHILS # BLD AUTO: 5.98 K/UL — SIGNIFICANT CHANGE UP (ref 1.8–7.4)
NEUTROPHILS NFR BLD AUTO: 81 % — HIGH (ref 43–77)
NEUTROPHILS NFR BLD AUTO: 81 % — HIGH (ref 43–77)
NRBC # BLD: 0 /100 WBCS — SIGNIFICANT CHANGE UP (ref 0–0)
NRBC # BLD: 0 /100 WBCS — SIGNIFICANT CHANGE UP (ref 0–0)
NRBC # FLD: 0 K/UL — SIGNIFICANT CHANGE UP (ref 0–0)
NRBC # FLD: 0 K/UL — SIGNIFICANT CHANGE UP (ref 0–0)
PHOSPHATE SERPL-MCNC: 3.7 MG/DL — SIGNIFICANT CHANGE UP (ref 2.5–4.5)
PHOSPHATE SERPL-MCNC: 3.7 MG/DL — SIGNIFICANT CHANGE UP (ref 2.5–4.5)
PLATELET # BLD AUTO: 452 K/UL — HIGH (ref 150–400)
PLATELET # BLD AUTO: 452 K/UL — HIGH (ref 150–400)
POTASSIUM SERPL-MCNC: 5 MMOL/L — SIGNIFICANT CHANGE UP (ref 3.5–5.3)
POTASSIUM SERPL-MCNC: 5 MMOL/L — SIGNIFICANT CHANGE UP (ref 3.5–5.3)
POTASSIUM SERPL-SCNC: 5 MMOL/L — SIGNIFICANT CHANGE UP (ref 3.5–5.3)
POTASSIUM SERPL-SCNC: 5 MMOL/L — SIGNIFICANT CHANGE UP (ref 3.5–5.3)
PROT SERPL-MCNC: 6.9 G/DL — SIGNIFICANT CHANGE UP (ref 6–8.3)
PROT SERPL-MCNC: 6.9 G/DL — SIGNIFICANT CHANGE UP (ref 6–8.3)
PROTHROM AB SERPL-ACNC: 23.2 SEC — HIGH (ref 9.5–13)
PROTHROM AB SERPL-ACNC: 23.2 SEC — HIGH (ref 9.5–13)
RBC # BLD: 2.82 M/UL — LOW (ref 3.8–5.2)
RBC # BLD: 2.82 M/UL — LOW (ref 3.8–5.2)
RBC # FLD: 15.9 % — HIGH (ref 10.3–14.5)
RBC # FLD: 15.9 % — HIGH (ref 10.3–14.5)
SODIUM SERPL-SCNC: 137 MMOL/L — SIGNIFICANT CHANGE UP (ref 135–145)
SODIUM SERPL-SCNC: 137 MMOL/L — SIGNIFICANT CHANGE UP (ref 135–145)
WBC # BLD: 7.38 K/UL — SIGNIFICANT CHANGE UP (ref 3.8–10.5)
WBC # BLD: 7.38 K/UL — SIGNIFICANT CHANGE UP (ref 3.8–10.5)
WBC # FLD AUTO: 7.38 K/UL — SIGNIFICANT CHANGE UP (ref 3.8–10.5)
WBC # FLD AUTO: 7.38 K/UL — SIGNIFICANT CHANGE UP (ref 3.8–10.5)

## 2024-01-12 PROCEDURE — 99239 HOSP IP/OBS DSCHRG MGMT >30: CPT | Mod: GC

## 2024-01-12 PROCEDURE — 99232 SBSQ HOSP IP/OBS MODERATE 35: CPT | Mod: GC

## 2024-01-12 RX ORDER — LACTOBACILLUS ACIDOPHILUS 100MM CELL
1 CAPSULE ORAL
Refills: 0 | DISCHARGE

## 2024-01-12 RX ORDER — SACUBITRIL AND VALSARTAN 24; 26 MG/1; MG/1
1 TABLET, FILM COATED ORAL
Refills: 0 | DISCHARGE

## 2024-01-12 RX ORDER — ERYTHROMYCIN BASE 5 MG/GRAM
1 OINTMENT (GRAM) OPHTHALMIC (EYE)
Refills: 0 | DISCHARGE

## 2024-01-12 RX ORDER — INSULIN GLARGINE 100 [IU]/ML
4 INJECTION, SOLUTION SUBCUTANEOUS
Refills: 0 | DISCHARGE

## 2024-01-12 RX ORDER — FOLIC ACID 0.8 MG
1 TABLET ORAL
Refills: 0 | DISCHARGE

## 2024-01-12 RX ORDER — CARVEDILOL PHOSPHATE 80 MG/1
1 CAPSULE, EXTENDED RELEASE ORAL
Refills: 0 | DISCHARGE

## 2024-01-12 RX ORDER — PANTOPRAZOLE SODIUM 20 MG/1
1 TABLET, DELAYED RELEASE ORAL
Refills: 0 | DISCHARGE

## 2024-01-12 RX ORDER — WARFARIN SODIUM 2.5 MG/1
1 TABLET ORAL
Refills: 0 | DISCHARGE

## 2024-01-12 RX ADMIN — Medication 1: at 13:15

## 2024-01-12 RX ADMIN — CARVEDILOL PHOSPHATE 3.12 MILLIGRAM(S): 80 CAPSULE, EXTENDED RELEASE ORAL at 06:14

## 2024-01-12 RX ADMIN — Medication 400 MILLIGRAM(S): at 06:15

## 2024-01-12 RX ADMIN — PANTOPRAZOLE SODIUM 40 MILLIGRAM(S): 20 TABLET, DELAYED RELEASE ORAL at 06:14

## 2024-01-12 RX ADMIN — MIRTAZAPINE 30 MILLIGRAM(S): 45 TABLET, ORALLY DISINTEGRATING ORAL at 13:45

## 2024-01-12 RX ADMIN — Medication 1 MILLIGRAM(S): at 13:45

## 2024-01-12 RX ADMIN — Medication 400 MILLIGRAM(S): at 13:44

## 2024-01-12 RX ADMIN — SPIRONOLACTONE 100 MILLIGRAM(S): 25 TABLET, FILM COATED ORAL at 06:15

## 2024-01-12 RX ADMIN — Medication 40 MILLIGRAM(S): at 13:44

## 2024-01-12 RX ADMIN — Medication 1 TABLET(S): at 13:44

## 2024-01-12 NOTE — PROGRESS NOTE ADULT - SUBJECTIVE AND OBJECTIVE BOX
***************************************************************  Hadley Jovel  (PGY1) Internal Medicine  On TEAMS  ***************************************************************    PROGRESS NOTE:     Patient is a 58y old  Female who presents with a chief complaint of transfer from [place] for tertiary level care in the setting of diffuse body rash (11 Jan 2024 08:29)      SUBJECTIVE / OVERNIGHT EVENTS:      MEDICATIONS  (STANDING):  carvedilol 3.125 milliGRAM(s) Oral every 12 hours  dextrose 5%. 1000 milliLiter(s) (100 mL/Hr) IV Continuous <Continuous>  dextrose 5%. 1000 milliLiter(s) (50 mL/Hr) IV Continuous <Continuous>  dextrose 50% Injectable 25 Gram(s) IV Push once  dextrose 50% Injectable 12.5 Gram(s) IV Push once  dextrose 50% Injectable 25 Gram(s) IV Push once  folic acid 1 milliGRAM(s) Oral daily  glucagon  Injectable 1 milliGRAM(s) IntraMuscular once  influenza   Vaccine 0.5 milliLiter(s) IntraMuscular once  insulin lispro (ADMELOG) corrective regimen sliding scale   SubCutaneous three times a day before meals  insulin lispro (ADMELOG) corrective regimen sliding scale   SubCutaneous at bedtime  lactated ringers. 1000 milliLiter(s) (75 mL/Hr) IV Continuous <Continuous>  mesalamine DR Capsule 400 milliGRAM(s) Oral three times a day  mirtazapine 30 milliGRAM(s) Oral daily  multivitamin 1 Tablet(s) Oral daily  ondansetron Injectable 4 milliGRAM(s) IV Push once  pantoprazole    Tablet 40 milliGRAM(s) Oral before breakfast  predniSONE   Tablet 40 milliGRAM(s) Oral every 24 hours  rifAXIMin 550 milliGRAM(s) Oral two times a day  spironolactone 100 milliGRAM(s) Oral daily    MEDICATIONS  (PRN):  acetaminophen     Tablet .. 650 milliGRAM(s) Oral every 6 hours PRN Temp greater or equal to 38C (100.4F), Mild Pain (1 - 3)  aluminum hydroxide/magnesium hydroxide/simethicone Suspension 30 milliLiter(s) Oral every 6 hours PRN Dyspepsia  benzonatate 100 milliGRAM(s) Oral three times a day PRN Cough  dextrose Oral Gel 15 Gram(s) Oral once PRN Blood Glucose LESS THAN 70 milliGRAM(s)/deciliter      CAPILLARY BLOOD GLUCOSE      POCT Blood Glucose.: 226 mg/dL (11 Jan 2024 22:41)  POCT Blood Glucose.: 251 mg/dL (11 Jan 2024 21:15)  POCT Blood Glucose.: 212 mg/dL (11 Jan 2024 18:10)  POCT Blood Glucose.: 133 mg/dL (11 Jan 2024 12:09)  POCT Blood Glucose.: 92 mg/dL (11 Jan 2024 08:36)    I&O's Summary    10 Corey 2024 07:01  -  11 Jan 2024 07:00  --------------------------------------------------------  IN: 250 mL / OUT: 0 mL / NET: 250 mL        PHYSICAL EXAM:  Vital Signs Last 24 Hrs  T(C): 36.7 (12 Jan 2024 05:38), Max: 36.7 (11 Jan 2024 14:08)  T(F): 98.1 (12 Jan 2024 05:38), Max: 98.1 (11 Jan 2024 14:08)  HR: 72 (12 Jan 2024 05:38) (67 - 84)  BP: 127/85 (12 Jan 2024 05:38) (121/79 - 142/86)  BP(mean): --  RR: 17 (12 Jan 2024 05:38) (17 - 17)  SpO2: 99% (12 Jan 2024 05:38) (95% - 99%)    Parameters below as of 12 Jan 2024 05:38  Patient On (Oxygen Delivery Method): room air        General : NAD  CV: RRR no R/M/G  Lungs: CTAB  Abd : Soft, non-tender, non-distended  Extremities : No LE Edema  Neuro : A&Ox3    LABS:                        9.5    8.04  )-----------( 437      ( 11 Jan 2024 06:00 )             28.5     01-11    137  |  99  |  38<H>  ----------------------------<  114<H>  4.7   |  22  |  0.55    Ca    8.9      11 Jan 2024 06:00  Phos  3.4     01-11  Mg     2.50     01-11    TPro  6.7  /  Alb  3.4  /  TBili  0.2  /  DBili  x   /  AST  28  /  ALT  37<H>  /  AlkPhos  105  01-11          Urinalysis Basic - ( 11 Jan 2024 06:00 )    Color: x / Appearance: x / SG: x / pH: x  Gluc: 114 mg/dL / Ketone: x  / Bili: x / Urobili: x   Blood: x / Protein: x / Nitrite: x   Leuk Esterase: x / RBC: x / WBC x   Sq Epi: x / Non Sq Epi: x / Bacteria: x          Medications (Standing)  MEDICATIONS  (STANDING):  carvedilol 3.125 milliGRAM(s) Oral every 12 hours  dextrose 5%. 1000 milliLiter(s) (100 mL/Hr) IV Continuous <Continuous>  dextrose 5%. 1000 milliLiter(s) (50 mL/Hr) IV Continuous <Continuous>  dextrose 50% Injectable 12.5 Gram(s) IV Push once  dextrose 50% Injectable 25 Gram(s) IV Push once  dextrose 50% Injectable 25 Gram(s) IV Push once  folic acid 1 milliGRAM(s) Oral daily  glucagon  Injectable 1 milliGRAM(s) IntraMuscular once  influenza   Vaccine 0.5 milliLiter(s) IntraMuscular once  insulin lispro (ADMELOG) corrective regimen sliding scale   SubCutaneous at bedtime  insulin lispro (ADMELOG) corrective regimen sliding scale   SubCutaneous three times a day before meals  lactated ringers. 1000 milliLiter(s) (75 mL/Hr) IV Continuous <Continuous>  mesalamine DR Capsule 400 milliGRAM(s) Oral three times a day  mirtazapine 30 milliGRAM(s) Oral daily  multivitamin 1 Tablet(s) Oral daily  ondansetron Injectable 4 milliGRAM(s) IV Push once  pantoprazole    Tablet 40 milliGRAM(s) Oral before breakfast  predniSONE   Tablet 40 milliGRAM(s) Oral every 24 hours  rifAXIMin 550 milliGRAM(s) Oral two times a day  spironolactone 100 milliGRAM(s) Oral daily        COORDINATION OF CARE:  Care Discussed with Consultants/Other Providers [Y/N]:  Prior or Outpatient Records Reviewed [Y/N]:   ***************************************************************  Hadley Jovel  (PGY1) Internal Medicine  On TEAMS  ***************************************************************    PROGRESS NOTE:     Patient is a 58y old  Female who presents with a chief complaint of transfer from [place] for tertiary level care in the setting of diffuse body rash (11 Jan 2024 08:29)      SUBJECTIVE / OVERNIGHT EVENTS:      MEDICATIONS  (STANDING):  carvedilol 3.125 milliGRAM(s) Oral every 12 hours  dextrose 5%. 1000 milliLiter(s) (100 mL/Hr) IV Continuous <Continuous>  dextrose 5%. 1000 milliLiter(s) (50 mL/Hr) IV Continuous <Continuous>  dextrose 50% Injectable 25 Gram(s) IV Push once  dextrose 50% Injectable 12.5 Gram(s) IV Push once  dextrose 50% Injectable 25 Gram(s) IV Push once  folic acid 1 milliGRAM(s) Oral daily  glucagon  Injectable 1 milliGRAM(s) IntraMuscular once  influenza   Vaccine 0.5 milliLiter(s) IntraMuscular once  insulin lispro (ADMELOG) corrective regimen sliding scale   SubCutaneous three times a day before meals  insulin lispro (ADMELOG) corrective regimen sliding scale   SubCutaneous at bedtime  lactated ringers. 1000 milliLiter(s) (75 mL/Hr) IV Continuous <Continuous>  mesalamine DR Capsule 400 milliGRAM(s) Oral three times a day  mirtazapine 30 milliGRAM(s) Oral daily  multivitamin 1 Tablet(s) Oral daily  ondansetron Injectable 4 milliGRAM(s) IV Push once  pantoprazole    Tablet 40 milliGRAM(s) Oral before breakfast  predniSONE   Tablet 40 milliGRAM(s) Oral every 24 hours  rifAXIMin 550 milliGRAM(s) Oral two times a day  spironolactone 100 milliGRAM(s) Oral daily    MEDICATIONS  (PRN):  acetaminophen     Tablet .. 650 milliGRAM(s) Oral every 6 hours PRN Temp greater or equal to 38C (100.4F), Mild Pain (1 - 3)  aluminum hydroxide/magnesium hydroxide/simethicone Suspension 30 milliLiter(s) Oral every 6 hours PRN Dyspepsia  benzonatate 100 milliGRAM(s) Oral three times a day PRN Cough  dextrose Oral Gel 15 Gram(s) Oral once PRN Blood Glucose LESS THAN 70 milliGRAM(s)/deciliter      CAPILLARY BLOOD GLUCOSE      POCT Blood Glucose.: 226 mg/dL (11 Jan 2024 22:41)  POCT Blood Glucose.: 251 mg/dL (11 Jan 2024 21:15)  POCT Blood Glucose.: 212 mg/dL (11 Jan 2024 18:10)  POCT Blood Glucose.: 133 mg/dL (11 Jan 2024 12:09)  POCT Blood Glucose.: 92 mg/dL (11 Jan 2024 08:36)    I&O's Summary    10 Corey 2024 07:01  -  11 Jan 2024 07:00  --------------------------------------------------------  IN: 250 mL / OUT: 0 mL / NET: 250 mL        PHYSICAL EXAM:  Vital Signs Last 24 Hrs  T(C): 36.7 (12 Jan 2024 05:38), Max: 36.7 (11 Jan 2024 14:08)  T(F): 98.1 (12 Jan 2024 05:38), Max: 98.1 (11 Jan 2024 14:08)  HR: 72 (12 Jan 2024 05:38) (67 - 84)  BP: 127/85 (12 Jan 2024 05:38) (121/79 - 142/86)  BP(mean): --  RR: 17 (12 Jan 2024 05:38) (17 - 17)  SpO2: 99% (12 Jan 2024 05:38) (95% - 99%)    Parameters below as of 12 Jan 2024 05:38  Patient On (Oxygen Delivery Method): room air        General : NAD  CV: RRR no R/M/G  Lungs: CTAB  Abd : Soft, non-tender, non-distended  Extremities : No LE Edema  Neuro : A&Ox3    LABS:                        9.5    8.04  )-----------( 437      ( 11 Jan 2024 06:00 )             28.5     01-11    137  |  99  |  38<H>  ----------------------------<  114<H>  4.7   |  22  |  0.55    Ca    8.9      11 Jan 2024 06:00  Phos  3.4     01-11  Mg     2.50     01-11    TPro  6.7  /  Alb  3.4  /  TBili  0.2  /  DBili  x   /  AST  28  /  ALT  37<H>  /  AlkPhos  105  01-11          Urinalysis Basic - ( 11 Jan 2024 06:00 )    Color: x / Appearance: x / SG: x / pH: x  Gluc: 114 mg/dL / Ketone: x  / Bili: x / Urobili: x   Blood: x / Protein: x / Nitrite: x   Leuk Esterase: x / RBC: x / WBC x   Sq Epi: x / Non Sq Epi: x / Bacteria: x          Medications (Standing)  MEDICATIONS  (STANDING):  carvedilol 3.125 milliGRAM(s) Oral every 12 hours  dextrose 5%. 1000 milliLiter(s) (100 mL/Hr) IV Continuous <Continuous>  dextrose 5%. 1000 milliLiter(s) (50 mL/Hr) IV Continuous <Continuous>  dextrose 50% Injectable 12.5 Gram(s) IV Push once  dextrose 50% Injectable 25 Gram(s) IV Push once  dextrose 50% Injectable 25 Gram(s) IV Push once  folic acid 1 milliGRAM(s) Oral daily  glucagon  Injectable 1 milliGRAM(s) IntraMuscular once  influenza   Vaccine 0.5 milliLiter(s) IntraMuscular once  insulin lispro (ADMELOG) corrective regimen sliding scale   SubCutaneous at bedtime  insulin lispro (ADMELOG) corrective regimen sliding scale   SubCutaneous three times a day before meals  lactated ringers. 1000 milliLiter(s) (75 mL/Hr) IV Continuous <Continuous>  mesalamine DR Capsule 400 milliGRAM(s) Oral three times a day  mirtazapine 30 milliGRAM(s) Oral daily  multivitamin 1 Tablet(s) Oral daily  ondansetron Injectable 4 milliGRAM(s) IV Push once  pantoprazole    Tablet 40 milliGRAM(s) Oral before breakfast  predniSONE   Tablet 40 milliGRAM(s) Oral every 24 hours  rifAXIMin 550 milliGRAM(s) Oral two times a day  spironolactone 100 milliGRAM(s) Oral daily        COORDINATION OF CARE:  Care Discussed with Consultants/Other Providers [Y/N]:  Prior or Outpatient Records Reviewed [Y/N]:   ***************************************************************  Hadley Jovel  (PGY1) Internal Medicine  On TEAMS  ***************************************************************    PROGRESS NOTE:     Patient is a 58y old  Female who presents with a chief complaint of transfer from [place] for tertiary level care in the setting of diffuse body rash (11 Jan 2024 08:29)      SUBJECTIVE / OVERNIGHT EVENTS: Patient seen by the anne this morning. No acute o/n events.   COVID +, fatigue, cough. Minimal BM and urine output. Last BM 2 days ago. Decrease PO because of abdominal pain.   Patient denies fever, chills, nausea, vomitting, chest pain, shortness of breath,       MEDICATIONS  (STANDING):  carvedilol 3.125 milliGRAM(s) Oral every 12 hours  dextrose 5%. 1000 milliLiter(s) (100 mL/Hr) IV Continuous <Continuous>  dextrose 5%. 1000 milliLiter(s) (50 mL/Hr) IV Continuous <Continuous>  dextrose 50% Injectable 25 Gram(s) IV Push once  dextrose 50% Injectable 12.5 Gram(s) IV Push once  dextrose 50% Injectable 25 Gram(s) IV Push once  folic acid 1 milliGRAM(s) Oral daily  glucagon  Injectable 1 milliGRAM(s) IntraMuscular once  influenza   Vaccine 0.5 milliLiter(s) IntraMuscular once  insulin lispro (ADMELOG) corrective regimen sliding scale   SubCutaneous three times a day before meals  insulin lispro (ADMELOG) corrective regimen sliding scale   SubCutaneous at bedtime  lactated ringers. 1000 milliLiter(s) (75 mL/Hr) IV Continuous <Continuous>  mesalamine DR Capsule 400 milliGRAM(s) Oral three times a day  mirtazapine 30 milliGRAM(s) Oral daily  multivitamin 1 Tablet(s) Oral daily  ondansetron Injectable 4 milliGRAM(s) IV Push once  pantoprazole    Tablet 40 milliGRAM(s) Oral before breakfast  predniSONE   Tablet 40 milliGRAM(s) Oral every 24 hours  rifAXIMin 550 milliGRAM(s) Oral two times a day  spironolactone 100 milliGRAM(s) Oral daily    MEDICATIONS  (PRN):  acetaminophen     Tablet .. 650 milliGRAM(s) Oral every 6 hours PRN Temp greater or equal to 38C (100.4F), Mild Pain (1 - 3)  aluminum hydroxide/magnesium hydroxide/simethicone Suspension 30 milliLiter(s) Oral every 6 hours PRN Dyspepsia  benzonatate 100 milliGRAM(s) Oral three times a day PRN Cough  dextrose Oral Gel 15 Gram(s) Oral once PRN Blood Glucose LESS THAN 70 milliGRAM(s)/deciliter      CAPILLARY BLOOD GLUCOSE      POCT Blood Glucose.: 226 mg/dL (11 Jan 2024 22:41)  POCT Blood Glucose.: 251 mg/dL (11 Jan 2024 21:15)  POCT Blood Glucose.: 212 mg/dL (11 Jan 2024 18:10)  POCT Blood Glucose.: 133 mg/dL (11 Jan 2024 12:09)  POCT Blood Glucose.: 92 mg/dL (11 Jan 2024 08:36)    I&O's Summary    10 Corey 2024 07:01  -  11 Jan 2024 07:00  --------------------------------------------------------  IN: 250 mL / OUT: 0 mL / NET: 250 mL        PHYSICAL EXAM:  Vital Signs Last 24 Hrs  T(C): 36.7 (12 Jan 2024 05:38), Max: 36.7 (11 Jan 2024 14:08)  T(F): 98.1 (12 Jan 2024 05:38), Max: 98.1 (11 Jan 2024 14:08)  HR: 72 (12 Jan 2024 05:38) (67 - 84)  BP: 127/85 (12 Jan 2024 05:38) (121/79 - 142/86)  BP(mean): --  RR: 17 (12 Jan 2024 05:38) (17 - 17)  SpO2: 99% (12 Jan 2024 05:38) (95% - 99%)    Parameters below as of 12 Jan 2024 05:38  Patient On (Oxygen Delivery Method): room air    GENERAL: NAD  HEAD:  Atraumatic, Normocephalic  EYES: left eye and rt eye with mild redness+ no drainage  ENMT: MMM  NECK: Supple, No JVD  NERVOUS SYSTEM: AOX3, motor and sensation grossly intact in b/l UE and b/l LE  PSYCHIATRIC: Appropriate affect and mood  CHEST/LUNG: Clear to auscultation bilaterally; No rales, rhonchi, wheezing, or rubs  HEART: Regular rate and rhythm; No murmurs, rubs, or gallops. No LE edema  ABDOMEN: Soft, mild epigastric tenderness, Nondistended; Bowel sounds present  EXTREMITIES:  2+ Peripheral Pulses, No clubbing, cyanosis  SKIN:  rash improving      LABS:                        9.5    8.04  )-----------( 437      ( 11 Jan 2024 06:00 )             28.5     01-11    137  |  99  |  38<H>  ----------------------------<  114<H>  4.7   |  22  |  0.55    Ca    8.9      11 Jan 2024 06:00  Phos  3.4     01-11  Mg     2.50     01-11    TPro  6.7  /  Alb  3.4  /  TBili  0.2  /  DBili  x   /  AST  28  /  ALT  37<H>  /  AlkPhos  105  01-11          Urinalysis Basic - ( 11 Jan 2024 06:00 )    Color: x / Appearance: x / SG: x / pH: x  Gluc: 114 mg/dL / Ketone: x  / Bili: x / Urobili: x   Blood: x / Protein: x / Nitrite: x   Leuk Esterase: x / RBC: x / WBC x   Sq Epi: x / Non Sq Epi: x / Bacteria: x          Medications (Standing)  MEDICATIONS  (STANDING):  carvedilol 3.125 milliGRAM(s) Oral every 12 hours  dextrose 5%. 1000 milliLiter(s) (100 mL/Hr) IV Continuous <Continuous>  dextrose 5%. 1000 milliLiter(s) (50 mL/Hr) IV Continuous <Continuous>  dextrose 50% Injectable 12.5 Gram(s) IV Push once  dextrose 50% Injectable 25 Gram(s) IV Push once  dextrose 50% Injectable 25 Gram(s) IV Push once  folic acid 1 milliGRAM(s) Oral daily  glucagon  Injectable 1 milliGRAM(s) IntraMuscular once  influenza   Vaccine 0.5 milliLiter(s) IntraMuscular once  insulin lispro (ADMELOG) corrective regimen sliding scale   SubCutaneous at bedtime  insulin lispro (ADMELOG) corrective regimen sliding scale   SubCutaneous three times a day before meals  lactated ringers. 1000 milliLiter(s) (75 mL/Hr) IV Continuous <Continuous>  mesalamine DR Capsule 400 milliGRAM(s) Oral three times a day  mirtazapine 30 milliGRAM(s) Oral daily  multivitamin 1 Tablet(s) Oral daily  ondansetron Injectable 4 milliGRAM(s) IV Push once  pantoprazole    Tablet 40 milliGRAM(s) Oral before breakfast  predniSONE   Tablet 40 milliGRAM(s) Oral every 24 hours  rifAXIMin 550 milliGRAM(s) Oral two times a day  spironolactone 100 milliGRAM(s) Oral daily        COORDINATION OF CARE:  Care Discussed with Consultants/Other Providers [Y/N]:  Prior or Outpatient Records Reviewed [Y/N]:   ***************************************************************  Hadley Jovel  (PGY1) Internal Medicine  On TEAMS  ***************************************************************    PROGRESS NOTE:     Patient is a 58y old  Female who presents with a chief complaint of transfer from [place] for tertiary level care in the setting of diffuse body rash (11 Jan 2024 08:29)      SUBJECTIVE / OVERNIGHT EVENTS: Patient seen by the anne this morning. No acute o/n events.   COVID +, fatigue, cough. Minimal BM and urine output. Last BM 2 days ago. Decrease PO because of abdominal pain.   Patient denies fever, chills, nausea, vomitting, chest pain, shortness of breath,       MEDICATIONS  (STANDING):  carvedilol 3.125 milliGRAM(s) Oral every 12 hours  dextrose 5%. 1000 milliLiter(s) (100 mL/Hr) IV Continuous <Continuous>  dextrose 5%. 1000 milliLiter(s) (50 mL/Hr) IV Continuous <Continuous>  dextrose 50% Injectable 25 Gram(s) IV Push once  dextrose 50% Injectable 12.5 Gram(s) IV Push once  dextrose 50% Injectable 25 Gram(s) IV Push once  folic acid 1 milliGRAM(s) Oral daily  glucagon  Injectable 1 milliGRAM(s) IntraMuscular once  influenza   Vaccine 0.5 milliLiter(s) IntraMuscular once  insulin lispro (ADMELOG) corrective regimen sliding scale   SubCutaneous three times a day before meals  insulin lispro (ADMELOG) corrective regimen sliding scale   SubCutaneous at bedtime  lactated ringers. 1000 milliLiter(s) (75 mL/Hr) IV Continuous <Continuous>  mesalamine DR Capsule 400 milliGRAM(s) Oral three times a day  mirtazapine 30 milliGRAM(s) Oral daily  multivitamin 1 Tablet(s) Oral daily  ondansetron Injectable 4 milliGRAM(s) IV Push once  pantoprazole    Tablet 40 milliGRAM(s) Oral before breakfast  predniSONE   Tablet 40 milliGRAM(s) Oral every 24 hours  rifAXIMin 550 milliGRAM(s) Oral two times a day  spironolactone 100 milliGRAM(s) Oral daily    MEDICATIONS  (PRN):  acetaminophen     Tablet .. 650 milliGRAM(s) Oral every 6 hours PRN Temp greater or equal to 38C (100.4F), Mild Pain (1 - 3)  aluminum hydroxide/magnesium hydroxide/simethicone Suspension 30 milliLiter(s) Oral every 6 hours PRN Dyspepsia  benzonatate 100 milliGRAM(s) Oral three times a day PRN Cough  dextrose Oral Gel 15 Gram(s) Oral once PRN Blood Glucose LESS THAN 70 milliGRAM(s)/deciliter      CAPILLARY BLOOD GLUCOSE      POCT Blood Glucose.: 226 mg/dL (11 Jan 2024 22:41)  POCT Blood Glucose.: 251 mg/dL (11 Jan 2024 21:15)  POCT Blood Glucose.: 212 mg/dL (11 Jan 2024 18:10)  POCT Blood Glucose.: 133 mg/dL (11 Jan 2024 12:09)  POCT Blood Glucose.: 92 mg/dL (11 Jan 2024 08:36)    I&O's Summary    10 Corey 2024 07:01  -  11 Jan 2024 07:00  --------------------------------------------------------  IN: 250 mL / OUT: 0 mL / NET: 250 mL        PHYSICAL EXAM:  Vital Signs Last 24 Hrs  T(C): 36.7 (12 Jan 2024 05:38), Max: 36.7 (11 Jan 2024 14:08)  T(F): 98.1 (12 Jan 2024 05:38), Max: 98.1 (11 Jan 2024 14:08)  HR: 72 (12 Jan 2024 05:38) (67 - 84)  BP: 127/85 (12 Jan 2024 05:38) (121/79 - 142/86)  BP(mean): --  RR: 17 (12 Jan 2024 05:38) (17 - 17)  SpO2: 99% (12 Jan 2024 05:38) (95% - 99%)    Parameters below as of 12 Jan 2024 05:38  Patient On (Oxygen Delivery Method): room air    GENERAL: NAD  HEAD:  Atraumatic, Normocephalic  EYES: left eye and rt eye with mild redness+ no drainage  ENMT: MMM  NECK: Supple, No JVD  NERVOUS SYSTEM: AOX3, motor and sensation grossly intact in b/l UE and b/l LE  PSYCHIATRIC: Appropriate affect and mood  CHEST/LUNG: Clear to auscultation bilaterally; No rales, rhonchi, wheezing, or rubs  HEART: Regular rate and rhythm; No murmurs, rubs, or gallops. No LE edema  ABDOMEN: Soft, mild epigastric tenderness, Nondistended; Bowel sounds present  EXTREMITIES:  2+ Peripheral Pulses, No clubbing, cyanosis  SKIN:  rash improving      LABS:                        9.5    8.04  )-----------( 437      ( 11 Jan 2024 06:00 )             28.5     01-11    137  |  99  |  38<H>  ----------------------------<  114<H>  4.7   |  22  |  0.55    Ca    8.9      11 Jan 2024 06:00  Phos  3.4     01-11  Mg     2.50     01-11    TPro  6.7  /  Alb  3.4  /  TBili  0.2  /  DBili  x   /  AST  28  /  ALT  37<H>  /  AlkPhos  105  01-11          Urinalysis Basic - ( 11 Jan 2024 06:00 )    Color: x / Appearance: x / SG: x / pH: x  Gluc: 114 mg/dL / Ketone: x  / Bili: x / Urobili: x   Blood: x / Protein: x / Nitrite: x   Leuk Esterase: x / RBC: x / WBC x   Sq Epi: x / Non Sq Epi: x / Bacteria: x          Medications (Standing)  MEDICATIONS  (STANDING):  carvedilol 3.125 milliGRAM(s) Oral every 12 hours  dextrose 5%. 1000 milliLiter(s) (100 mL/Hr) IV Continuous <Continuous>  dextrose 5%. 1000 milliLiter(s) (50 mL/Hr) IV Continuous <Continuous>  dextrose 50% Injectable 12.5 Gram(s) IV Push once  dextrose 50% Injectable 25 Gram(s) IV Push once  dextrose 50% Injectable 25 Gram(s) IV Push once  folic acid 1 milliGRAM(s) Oral daily  glucagon  Injectable 1 milliGRAM(s) IntraMuscular once  influenza   Vaccine 0.5 milliLiter(s) IntraMuscular once  insulin lispro (ADMELOG) corrective regimen sliding scale   SubCutaneous at bedtime  insulin lispro (ADMELOG) corrective regimen sliding scale   SubCutaneous three times a day before meals  lactated ringers. 1000 milliLiter(s) (75 mL/Hr) IV Continuous <Continuous>  mesalamine DR Capsule 400 milliGRAM(s) Oral three times a day  mirtazapine 30 milliGRAM(s) Oral daily  multivitamin 1 Tablet(s) Oral daily  ondansetron Injectable 4 milliGRAM(s) IV Push once  pantoprazole    Tablet 40 milliGRAM(s) Oral before breakfast  predniSONE   Tablet 40 milliGRAM(s) Oral every 24 hours  rifAXIMin 550 milliGRAM(s) Oral two times a day  spironolactone 100 milliGRAM(s) Oral daily        COORDINATION OF CARE:  Care Discussed with Consultants/Other Providers [Y/N]:  Prior or Outpatient Records Reviewed [Y/N]:

## 2024-01-12 NOTE — DISCHARGE NOTE NURSING/CASE MANAGEMENT/SOCIAL WORK - PATIENT PORTAL LINK FT
You can access the FollowMyHealth Patient Portal offered by [facility] by registering at the following website: [email or web address]. By joining [employer]’s FollowMyHealth portal, you will also be able to view your health information using other applications (apps) compatible with our system. You can access the FollowMyHealth Patient Portal offered by [facility] by registering at the following website: [email or web address]. By joining [employer]’s FollowMyHealth portal, you will also be able to view your health information using other applications (apps) compatible with our system.

## 2024-01-12 NOTE — PROGRESS NOTE ADULT - PROBLEM SELECTOR PLAN 3
Patient with anemia with iron studies more consistent with anemia of chronic disease   -B12 and folate WNL   -patient had drop of Hgb to 6.9 at [place] prior to transfer, transfused with 1pRBC with response to 9.5  -continue to monitor   -transfuse for Hgb >7 Patient with anemia with iron studies more consistent with anemia of chronic disease   -B12 and folate WNL   -patient had drop of Hgb to 6.9 at [place] prior to transfer, transfused with 1pRBC with response to 9.5  -continue to monitor   -transfuse for Hgb >7

## 2024-01-12 NOTE — DISCHARGE NOTE NURSING/CASE MANAGEMENT/SOCIAL WORK - NSDPFAC_GEN_ALL_CORE
[facility] [address] [place] [id number] [phone] [facility] [address] [place] [id number] [phone]

## 2024-01-12 NOTE — CHART NOTE - NSCHARTNOTEFT_GEN_A_CORE
GI Brief Note:    Patient still having formed BM on PO steroids. Patient inflammatory markers were elevated in setting of new COVID infection and unlikely related to her UC. Patient want to follow in [facility] as she lives closer to them.   No further work up from our perspective. Rest per primary team    Please reach out to the GI team for any question or concern.     Omi Cole MD  Gastroenterology/Hepatology Fellow  1st option: [phone] (text or call), ONLY available from 7:00 am to 5:00 pm.   **Contact on-call GI fellow via answering service ([phone]) from 5pm-7am AND on weekends/holidays**  2nd option: Available via Microsoft Teams  3rd option: Pager: [phone] GI Brief Note:    Patient still having formed BM on PO steroids. Patient inflammatory markers were elevated in setting of new COVID infection and unlikely related to her UC. Patient want to follow in [facility] as she lives closer to them.   No further work up from our perspective. Rest per primary team    Please reach out to the GI team for any question or concern.     Omi Cole MD  Gastroenterology/Hepatology Fellow  1st option: [phone] (text or call), ONLY available from 7:00 am to 5:00 pm.   **Contact on-call GI fellow via answering service ([phone]) from 5pm-7am AND on weekends/holidays**  2nd option: Available via [employer] Teams  3rd option: Pager: [phone]

## 2024-01-12 NOTE — PROGRESS NOTE ADULT - ATTENDING COMMENTS
58W w/ type 2 diabetes, HTN, MDD vs bipolar disorder, UC (dx on colonoscopy at [facility], previously treated with steroids), NAFLD c/b ?decompensated cirrhosis, AF (warfarin), admitted to [facility] with periorbital swelling, rash, and pancolitis, transferred to [facility] for derm/rheum/ophtho eval, overall felt to be have resolving bruising, subconjunctival hemorrhage, and UC flare currently on steroids and improving also with mildly symptomatic COVID-19 infection.    Continues to have abdominal pain, but with formed bowel movements. CRP downtrending again today. COVID symptoms mild. Planning for discharge to facility today with GI follow-up    - Appreciate GI recommendations  - Continue PO prednisone and mesalamine PO  - clarify discharge plan with GI prior to discharge given that today is d5 of prednisone 40 mg   - Supportive care for COVID-19  - She will f/up with [facility] after discharge - appointment scheduled    Discussed discharge plan with patient and her sister, Tasneem, over the phone. 58W w/ type 2 diabetes, HTN, MDD vs bipolar disorder, UC (dx on colonoscopy at [place], previously treated with steroids), NAFLD c/b ?decompensated cirrhosis, AF (warfarin), admitted to [facility] with periorbital swelling, rash, and pancolitis, transferred to [facility] for derm/rheum/ophtho eval, overall felt to be have resolving bruising, subconjunctival hemorrhage, and UC flare currently on steroids and improving also with mildly symptomatic COVID-19 infection.    Continues to have abdominal pain, but with formed bowel movements. CRP downtrending again today. COVID symptoms mild. Planning for discharge to facility today with GI follow-up    - Appreciate GI recommendations  - Continue PO prednisone and mesalamine PO  - clarify discharge plan with GI prior to discharge given that today is d5 of prednisone 40 mg   - Supportive care for COVID-19  - She will f/up with [facility] after discharge - appointment scheduled    Discussed discharge plan with patient and her sister, Tasneem, over the phone. 58W w/ type 2 diabetes, HTN, MDD vs bipolar disorder, UC (dx on colonoscopy at [place], previously treated with steroids), NAFLD c/b ?decompensated cirrhosis, AF (warfarin), admitted to [facility] with periorbital swelling, rash, and pancolitis, transferred to [facility] for derm/rheum/ophtho eval, overall felt to be have resolving bruising, subconjunctival hemorrhage, and UC flare currently on steroids and improving also with mildly symptomatic COVID-19 infection.    Continues to have abdominal pain, but with formed bowel movements. CRP downtrending again today. COVID symptoms mild. Planning for discharge to facility today with GI follow-up    - Appreciate GI recommendations  - Continue PO prednisone and mesalamine PO  - clarify discharge plan with GI prior to discharge given that today is d5 of prednisone 40 mg   - Supportive care for COVID-19  - She will f/up with [facility] after discharge - appointment scheduled    Discussed discharge plan with patient and her sister, Tasneem, over the phone 58W w/ type 2 diabetes, HTN, MDD vs bipolar disorder, UC (dx on colonoscopy at [place], previously treated with steroids), NAFLD c/b ?decompensated cirrhosis, AF (warfarin), admitted to [facility] with periorbital swelling, rash, and pancolitis, transferred to [facility] for derm/rheum/ophtho eval, overall felt to be have resolving bruising, subconjunctival hemorrhage, and UC flare currently on steroids and improving also with mildly symptomatic COVID-19 infection.    Continues to have abdominal pain, but with formed bowel movements. CRP downtrending again today. COVID symptoms mild. Planning for discharge to facility today with GI follow-up    - Appreciate GI recommendations  - Continue PO prednisone and mesalamine PO  - clarify discharge plan with GI prior to discharge given that today is d5 of prednisone 40 mg   - Supportive care for COVID-19  - She will f/up with [facility] after discharge - appointment scheduled    Discussed discharge plan with patient and her sister, Tasneem, over the phone

## 2024-01-12 NOTE — CHART NOTE - NSCHARTNOTESELECT_GEN_ALL_CORE
Event Note
Event Note
GI fellow/Event Note
Event Note
Event Note
GI fellow/Event Note
Nutrition Services

## 2024-01-12 NOTE — PROGRESS NOTE ADULT - NUTRITIONAL ASSESSMENT
This patient has been assessed with a concern for Malnutrition and has been determined to have a diagnosis/diagnoses of Severe protein-calorie malnutrition and Underweight (BMI < 19).    This patient is being managed with:   Diet Regular-  Fiber/Residue Restricted (LOWFIBER)  Gluten-Gliadin Restricted  Supplement Feeding Modality:  Oral  Ensure Plus High Protein Cans or Servings Per Day:  1       Frequency:  Three Times a day  Entered: Jan 8 2024 12:38PM  
This patient has been assessed with a concern for Malnutrition and has been determined to have a diagnosis/diagnoses of Severe protein-calorie malnutrition and Underweight (BMI < 19).    This patient is being managed with:   Diet Regular-  Gluten-Gliadin Restricted  Supplement Feeding Modality:  Oral  Ensure Plus High Protein Cans or Servings Per Day:  1       Frequency:  Three Times a day  Entered: Jan 2 2024  3:25PM  
This patient has been assessed with a concern for Malnutrition and has been determined to have a diagnosis/diagnoses of Severe protein-calorie malnutrition and Underweight (BMI < 19).    This patient is being managed with:   Diet Regular-  Fiber/Residue Restricted (LOWFIBER)  Gluten-Gliadin Restricted  Supplement Feeding Modality:  Oral  Ensure Plus High Protein Cans or Servings Per Day:  1       Frequency:  Three Times a day  Entered: Jan 8 2024 12:38PM  
This patient has been assessed with a concern for Malnutrition and has been determined to have a diagnosis/diagnoses of Severe protein-calorie malnutrition and Underweight (BMI < 19).    This patient is being managed with:   Diet Regular-  Gluten-Gliadin Restricted  Supplement Feeding Modality:  Oral  Ensure Plus High Protein Cans or Servings Per Day:  1       Frequency:  Three Times a day  Entered: Jan 2 2024  3:25PM  
This patient has been assessed with a concern for Malnutrition and has been determined to have a diagnosis/diagnoses of Severe protein-calorie malnutrition and Underweight (BMI < 19).    This patient is being managed with:   Diet Regular-  Gluten-Gliadin Restricted  Supplement Feeding Modality:  Oral  Ensure Plus High Protein Cans or Servings Per Day:  1       Frequency:  Three Times a day  Entered: Jan 2 2024  3:25PM  
This patient has been assessed with a concern for Malnutrition and has been determined to have a diagnosis/diagnoses of Severe protein-calorie malnutrition and Underweight (BMI < 19).    This patient is being managed with:   Diet Regular-  Fiber/Residue Restricted (LOWFIBER)  Gluten-Gliadin Restricted  Supplement Feeding Modality:  Oral  Ensure Plus High Protein Cans or Servings Per Day:  1       Frequency:  Three Times a day  Entered: Jan 8 2024 12:38PM  
This patient has been assessed with a concern for Malnutrition and has been determined to have a diagnosis/diagnoses of Severe protein-calorie malnutrition and Underweight (BMI < 19).    This patient is being managed with:   Diet Regular-  Gluten-Gliadin Restricted  Supplement Feeding Modality:  Oral  Ensure Plus High Protein Cans or Servings Per Day:  1       Frequency:  Three Times a day  Entered: Jan 2 2024  3:25PM  
This patient has been assessed with a concern for Malnutrition and has been determined to have a diagnosis/diagnoses of Severe protein-calorie malnutrition and Underweight (BMI < 19).    This patient is being managed with:   Diet Regular-  Fiber/Residue Restricted (LOWFIBER)  Gluten-Gliadin Restricted  Supplement Feeding Modality:  Oral  Ensure Plus High Protein Cans or Servings Per Day:  1       Frequency:  Three Times a day  Entered: Jan 8 2024 12:38PM

## 2024-01-12 NOTE — PROGRESS NOTE ADULT - REASON FOR ADMISSION
transfer from [place] for tertiary level care in the setting of diffuse body rash transfer from [place] for tertiary level care in the setting of diffuse body rash

## 2024-01-12 NOTE — PROGRESS NOTE ADULT - ASSESSMENT
58 y F w PMHx of CAD, diabetes, insomnia, MDD, GERD, hypertension, irritable bowel syndrome with diarrhea, ulcerative colitis, cirrhosis, NAFLD, hepatic encephalopathy, bipolar disorder, chronic Afib on Coumadin, blepharitis of the left eye who initially presented to [facility] due to reported hypotension, tachycardia and diffuse purpura. Patient transferred to [facility] for further workup and consultations for her body rash.  58 y F w PMHx of CAD, diabetes, insomnia, MDD, GERD, hypertension, irritable bowel syndrome with diarrhea, ulcerative colitis, cirrhosis, NAFLD, hepatic encephalopathy, bipolar disorder, chronic Afib on Coumadin, blepharitis of the left eye who initially presented to [facility] due to reported hypotension, tachycardia and diffuse purpura. Patient transferred to [facility] for further workup and consultations for her body rash.

## 2024-01-12 NOTE — PROGRESS NOTE ADULT - PROBLEM SELECTOR PLAN 1
Patient presented with body rash of b/l upper extremity and lower extremity of unknown etiology that is improving from initial presentation   Rash of unclear etiology, initial infectious versus inflammatory now derm believes resolving ecchymosis  work up at [place] includes:   CMV IgG negative   LDH normal   Babesia negative   Lyme negative   RPR negative   ESR, CRP elevated   -rheumatology: likely not vasculitis  -dermatology: likely resolving ecchymosis i/s/o coumadin use  -ophtho: likely subconjunctival hemorrhage no need to stop coumadin Patient presented with body rash of b/l upper extremity and lower extremity of unknown etiology that is improving from initial presentation   Rash of unclear etiology, initial infectious versus inflammatory now derm believes resolving ecchymosis  work up at [place] includes:   CMV IgG negative   LDH normal   Babesia negative   Lyme negative   RPR negative   ESR, CRP elevated   -rheumatology: likely not vasculitis  -dermatology: likely resolving ecchymosis i/s/o coumadin use  -ophtho: likely subconjunctival hemorrhage no need to stop coumadin

## 2024-01-12 NOTE — PROGRESS NOTE ADULT - PROBLEM SELECTOR PLAN 2
Patient with history of ulcerative colitis found to have pancolitis   -likely ulcerative colitis flare   -continue mesalamine 400mg TID   - s/p IV steroids 20 q8h (12/31-1/8)  - GI recommends to c/w pred 40 qd   - PPI 40mg daily   -Continue with mesalamine 400 mg TID for now  -regular diet  -GI following, potential plan for scope if pt agreeable, if not will start remicade  -colonoscopy report from [facility]: moderate to severe colitis in the past, likely concerning for UC based on endoscopic and histologic presentation Patient with history of ulcerative colitis found to have pancolitis   -likely ulcerative colitis flare   -continue mesalamine 400mg TID   - s/p IV steroids 20 q8h (12/31-1/8)  - GI recommends to c/w pred 40 qd   - PPI 40mg daily   -Continue with mesalamine 400 mg TID for now  -regular diet  -GI following, potential plan for scope if pt agreeable, if not will start remicade  -colonoscopy report from [facility]: moderate to severe colitis in the past, likely concerning for UC based on endoscopic and histologic presentation

## 2024-01-12 NOTE — PROGRESS NOTE ADULT - PROBLEM SELECTOR PLAN 7
Physical Therapy Visit    Visit Type: Daily Treatment Note  Visit: 2  Referring Provider: Myah Fernandez MD  Medical Diagnosis (from order): Diagnosis Information    Diagnosis  733.01 (ICD-9-CM) - M81.0 (ICD-10-CM) - Age-related osteoporosis without current pathological fracture         SUBJECTIVE                                                                                                               Has not been working as diligently on on home exercises as she wanted to. Exercises are a little easier on right side. More pain in left hip than low back today.     Pain / Symptoms  - Pain rating (out of 10): Current: 3 ; Worst: 5 (moving around)      OBJECTIVE                                                                                                                                       Treatment     Therapeutic Exercise  Recumbent stationary bike seat #6 / 5:00 / level 4-6 resist  Total gym squats 28° 20 reps B/L and 20 left U/L  Side to side step overs 8\" step 8 each way  Seated with tall posture and legs extended. Turn lean reach med ball right and left 4.4# and 10 reps  Review cat cow  4 point hands and knees bird dog opposite arm and leg lift and hold 5 sec 5 each  Supine LTR 10  Each way  Supine abdominal bicycle  Review figure 4 bridge              Skilled input: verbal instruction/cues, tactile instruction/cues, posture correction and demonstration    Home Exercise Program  Access Code: [email or web address]  URL: [email or web address]  Date: 05/15/2023  Prepared by: Jm Wall    Exercises  - abdominal march/ bicycle  - 2 x daily - 5 x weekly - 1 sets - 20 reps      ASSESSMENT                                                                                                            Continues to report left hip and low back pain. Exercises at home are going well. A little pain with exercises. Working a little more today on core strength and ROM.        Therapy procedure time and total  treatment time can be found documented on the Time Entry flowsheet     continue pantoprazole 40mg daily

## 2024-01-12 NOTE — PROGRESS NOTE ADULT - PROBLEM SELECTOR PROBLEM 1
Body rash

## 2024-01-12 NOTE — DISCHARGE NOTE NURSING/CASE MANAGEMENT/SOCIAL WORK - NSDCPEFALRISK_GEN_ALL_CORE
For information on Fall & Injury Prevention, visit: [email or web address] OR  [email or web address] OR  https://www.cdc.gov/steadi/patient.html For information on Fall & Injury Prevention, visit: [email or web address] OR  [email or web address] OR  https://www.cdc.gov/steadi/patient.html

## 2024-12-11 NOTE — H&P ADULT - TIME BILLING
lacerations
Time spent includes direct patient care  (interview and examination of patient), discussion with other providers, support staff and/or patient's family members, review of medical records, ordering diagnostic tests and analyzing results, and documentation.
